# Patient Record
Sex: MALE | Race: BLACK OR AFRICAN AMERICAN | NOT HISPANIC OR LATINO | Employment: OTHER | URBAN - METROPOLITAN AREA
[De-identification: names, ages, dates, MRNs, and addresses within clinical notes are randomized per-mention and may not be internally consistent; named-entity substitution may affect disease eponyms.]

---

## 2017-01-02 ENCOUNTER — COMMUNICATION - HEALTHEAST (OUTPATIENT)
Dept: INTERNAL MEDICINE | Facility: CLINIC | Age: 67
End: 2017-01-02

## 2017-01-10 ENCOUNTER — COMMUNICATION - HEALTHEAST (OUTPATIENT)
Dept: INTERNAL MEDICINE | Facility: CLINIC | Age: 67
End: 2017-01-10

## 2017-01-10 ENCOUNTER — COMMUNICATION - HEALTHEAST (OUTPATIENT)
Dept: SCHEDULING | Facility: CLINIC | Age: 67
End: 2017-01-10

## 2017-01-11 ENCOUNTER — COMMUNICATION - HEALTHEAST (OUTPATIENT)
Dept: SCHEDULING | Facility: CLINIC | Age: 67
End: 2017-01-11

## 2017-01-20 ENCOUNTER — COMMUNICATION - HEALTHEAST (OUTPATIENT)
Dept: INTERNAL MEDICINE | Facility: CLINIC | Age: 67
End: 2017-01-20

## 2017-01-25 ENCOUNTER — COMMUNICATION - HEALTHEAST (OUTPATIENT)
Dept: INTERNAL MEDICINE | Facility: CLINIC | Age: 67
End: 2017-01-25

## 2017-01-26 ENCOUNTER — COMMUNICATION - HEALTHEAST (OUTPATIENT)
Dept: INTERNAL MEDICINE | Facility: CLINIC | Age: 67
End: 2017-01-26

## 2017-01-27 ENCOUNTER — COMMUNICATION - HEALTHEAST (OUTPATIENT)
Dept: INTERNAL MEDICINE | Facility: CLINIC | Age: 67
End: 2017-01-27

## 2017-02-06 ENCOUNTER — OFFICE VISIT - HEALTHEAST (OUTPATIENT)
Dept: INTERNAL MEDICINE | Facility: CLINIC | Age: 67
End: 2017-02-06

## 2017-02-06 DIAGNOSIS — M48.02 STENOSIS OF CERVICAL SPINE WITH MYELOPATHY (H): ICD-10-CM

## 2017-02-06 DIAGNOSIS — J44.9 COPD (CHRONIC OBSTRUCTIVE PULMONARY DISEASE) (H): ICD-10-CM

## 2017-02-06 DIAGNOSIS — I25.10 ASHD (ARTERIOSCLEROTIC HEART DISEASE): ICD-10-CM

## 2017-02-06 DIAGNOSIS — G99.2 STENOSIS OF CERVICAL SPINE WITH MYELOPATHY (H): ICD-10-CM

## 2017-02-08 ENCOUNTER — COMMUNICATION - HEALTHEAST (OUTPATIENT)
Dept: INTERNAL MEDICINE | Facility: CLINIC | Age: 67
End: 2017-02-08

## 2017-02-09 ENCOUNTER — COMMUNICATION - HEALTHEAST (OUTPATIENT)
Dept: INTERNAL MEDICINE | Facility: CLINIC | Age: 67
End: 2017-02-09

## 2017-02-09 DIAGNOSIS — J44.9 COPD (CHRONIC OBSTRUCTIVE PULMONARY DISEASE) (H): ICD-10-CM

## 2017-02-15 ENCOUNTER — COMMUNICATION - HEALTHEAST (OUTPATIENT)
Dept: INTERNAL MEDICINE | Facility: CLINIC | Age: 67
End: 2017-02-15

## 2017-02-17 ENCOUNTER — COMMUNICATION - HEALTHEAST (OUTPATIENT)
Dept: INTERNAL MEDICINE | Facility: CLINIC | Age: 67
End: 2017-02-17

## 2017-02-19 ENCOUNTER — COMMUNICATION - HEALTHEAST (OUTPATIENT)
Dept: INTERNAL MEDICINE | Facility: CLINIC | Age: 67
End: 2017-02-19

## 2017-02-24 ENCOUNTER — AMBULATORY - HEALTHEAST (OUTPATIENT)
Dept: NEUROSURGERY | Facility: CLINIC | Age: 67
End: 2017-02-24

## 2017-02-27 ENCOUNTER — COMMUNICATION - HEALTHEAST (OUTPATIENT)
Dept: INTERNAL MEDICINE | Facility: CLINIC | Age: 67
End: 2017-02-27

## 2017-03-09 ENCOUNTER — COMMUNICATION - HEALTHEAST (OUTPATIENT)
Dept: INTERNAL MEDICINE | Facility: CLINIC | Age: 67
End: 2017-03-09

## 2017-03-10 ENCOUNTER — COMMUNICATION - HEALTHEAST (OUTPATIENT)
Dept: INTERNAL MEDICINE | Facility: CLINIC | Age: 67
End: 2017-03-10

## 2017-03-15 ENCOUNTER — OFFICE VISIT - HEALTHEAST (OUTPATIENT)
Dept: INTERNAL MEDICINE | Facility: CLINIC | Age: 67
End: 2017-03-15

## 2017-03-15 DIAGNOSIS — M48.062 LUMBAR STENOSIS WITH NEUROGENIC CLAUDICATION: ICD-10-CM

## 2017-03-15 DIAGNOSIS — M48.02 STENOSIS OF CERVICAL SPINE WITH MYELOPATHY (H): ICD-10-CM

## 2017-03-15 DIAGNOSIS — G99.2 STENOSIS OF CERVICAL SPINE WITH MYELOPATHY (H): ICD-10-CM

## 2017-03-15 DIAGNOSIS — E11.9 DIABETES MELLITUS, TYPE 2 (H): ICD-10-CM

## 2017-03-16 ENCOUNTER — COMMUNICATION - HEALTHEAST (OUTPATIENT)
Dept: INTERNAL MEDICINE | Facility: CLINIC | Age: 67
End: 2017-03-16

## 2017-03-27 ENCOUNTER — COMMUNICATION - HEALTHEAST (OUTPATIENT)
Dept: INTERNAL MEDICINE | Facility: CLINIC | Age: 67
End: 2017-03-27

## 2017-04-02 ENCOUNTER — RECORDS - HEALTHEAST (OUTPATIENT)
Dept: ADMINISTRATIVE | Facility: OTHER | Age: 67
End: 2017-04-02

## 2017-04-03 ENCOUNTER — RECORDS - HEALTHEAST (OUTPATIENT)
Dept: ADMINISTRATIVE | Facility: OTHER | Age: 67
End: 2017-04-03

## 2017-04-05 ENCOUNTER — COMMUNICATION - HEALTHEAST (OUTPATIENT)
Dept: INTERNAL MEDICINE | Facility: CLINIC | Age: 67
End: 2017-04-05

## 2017-04-05 ENCOUNTER — RECORDS - HEALTHEAST (OUTPATIENT)
Dept: ADMINISTRATIVE | Facility: OTHER | Age: 67
End: 2017-04-05

## 2017-04-06 ENCOUNTER — RECORDS - HEALTHEAST (OUTPATIENT)
Dept: ADMINISTRATIVE | Facility: OTHER | Age: 67
End: 2017-04-06

## 2017-04-07 ENCOUNTER — RECORDS - HEALTHEAST (OUTPATIENT)
Dept: ADMINISTRATIVE | Facility: OTHER | Age: 67
End: 2017-04-07

## 2017-04-10 ENCOUNTER — OFFICE VISIT - HEALTHEAST (OUTPATIENT)
Dept: INTERNAL MEDICINE | Facility: CLINIC | Age: 67
End: 2017-04-10

## 2017-04-10 DIAGNOSIS — M48.062 LUMBAR STENOSIS WITH NEUROGENIC CLAUDICATION: ICD-10-CM

## 2017-04-10 DIAGNOSIS — J44.9 CHRONIC OBSTRUCTIVE PULMONARY DISEASE, UNSPECIFIED COPD TYPE (H): ICD-10-CM

## 2017-04-10 DIAGNOSIS — E11.9 DIABETES MELLITUS, TYPE 2 (H): ICD-10-CM

## 2017-04-10 DIAGNOSIS — J10.1 INFLUENZA B: ICD-10-CM

## 2017-04-10 ASSESSMENT — MIFFLIN-ST. JEOR: SCORE: 1553.25

## 2017-04-19 ENCOUNTER — COMMUNICATION - HEALTHEAST (OUTPATIENT)
Dept: INTERNAL MEDICINE | Facility: CLINIC | Age: 67
End: 2017-04-19

## 2017-04-25 ENCOUNTER — COMMUNICATION - HEALTHEAST (OUTPATIENT)
Dept: SCHEDULING | Facility: CLINIC | Age: 67
End: 2017-04-25

## 2017-04-26 ENCOUNTER — COMMUNICATION - HEALTHEAST (OUTPATIENT)
Dept: INTERNAL MEDICINE | Facility: CLINIC | Age: 67
End: 2017-04-26

## 2017-04-26 ENCOUNTER — AMBULATORY - HEALTHEAST (OUTPATIENT)
Dept: INTERNAL MEDICINE | Facility: CLINIC | Age: 67
End: 2017-04-26

## 2017-04-27 ENCOUNTER — COMMUNICATION - HEALTHEAST (OUTPATIENT)
Dept: INTERNAL MEDICINE | Facility: CLINIC | Age: 67
End: 2017-04-27

## 2017-04-30 ENCOUNTER — COMMUNICATION - HEALTHEAST (OUTPATIENT)
Dept: INTERNAL MEDICINE | Facility: CLINIC | Age: 67
End: 2017-04-30

## 2017-05-01 ENCOUNTER — RECORDS - HEALTHEAST (OUTPATIENT)
Dept: ADMINISTRATIVE | Facility: OTHER | Age: 67
End: 2017-05-01

## 2017-05-02 ENCOUNTER — OFFICE VISIT - HEALTHEAST (OUTPATIENT)
Dept: INTERNAL MEDICINE | Facility: CLINIC | Age: 67
End: 2017-05-02

## 2017-05-02 DIAGNOSIS — M48.02 STENOSIS OF CERVICAL SPINE WITH MYELOPATHY (H): ICD-10-CM

## 2017-05-02 DIAGNOSIS — G99.2 STENOSIS OF CERVICAL SPINE WITH MYELOPATHY (H): ICD-10-CM

## 2017-05-02 DIAGNOSIS — E11.9 DIABETES MELLITUS, TYPE 2 (H): ICD-10-CM

## 2017-05-02 DIAGNOSIS — J44.9 CHRONIC OBSTRUCTIVE PULMONARY DISEASE, UNSPECIFIED COPD TYPE (H): ICD-10-CM

## 2017-05-02 RX ORDER — DOCUSATE SODIUM 100 MG/1
100 CAPSULE, LIQUID FILLED ORAL PRN
Status: SHIPPED | COMMUNITY
Start: 2017-05-02

## 2017-05-08 ENCOUNTER — COMMUNICATION - HEALTHEAST (OUTPATIENT)
Dept: INTERNAL MEDICINE | Facility: CLINIC | Age: 67
End: 2017-05-08

## 2017-05-18 ENCOUNTER — COMMUNICATION - HEALTHEAST (OUTPATIENT)
Dept: INTERNAL MEDICINE | Facility: CLINIC | Age: 67
End: 2017-05-18

## 2017-05-19 ENCOUNTER — COMMUNICATION - HEALTHEAST (OUTPATIENT)
Dept: INTERNAL MEDICINE | Facility: CLINIC | Age: 67
End: 2017-05-19

## 2017-05-21 ENCOUNTER — RECORDS - HEALTHEAST (OUTPATIENT)
Dept: ADMINISTRATIVE | Facility: OTHER | Age: 67
End: 2017-05-21

## 2017-05-22 ENCOUNTER — OFFICE VISIT - HEALTHEAST (OUTPATIENT)
Dept: INTERNAL MEDICINE | Facility: CLINIC | Age: 67
End: 2017-05-22

## 2017-05-22 ENCOUNTER — COMMUNICATION - HEALTHEAST (OUTPATIENT)
Dept: INTERNAL MEDICINE | Facility: CLINIC | Age: 67
End: 2017-05-22

## 2017-05-22 DIAGNOSIS — B02.29 POSTHERPETIC NEURALGIA: ICD-10-CM

## 2017-05-22 DIAGNOSIS — J44.9 CHRONIC OBSTRUCTIVE PULMONARY DISEASE, UNSPECIFIED COPD TYPE (H): ICD-10-CM

## 2017-05-22 DIAGNOSIS — M48.062 LUMBAR STENOSIS WITH NEUROGENIC CLAUDICATION: ICD-10-CM

## 2017-05-22 DIAGNOSIS — L98.2 SWEET SYNDROME: ICD-10-CM

## 2017-05-27 ENCOUNTER — COMMUNICATION - HEALTHEAST (OUTPATIENT)
Dept: INTERNAL MEDICINE | Facility: CLINIC | Age: 67
End: 2017-05-27

## 2017-06-05 ENCOUNTER — COMMUNICATION - HEALTHEAST (OUTPATIENT)
Dept: INTERNAL MEDICINE | Facility: CLINIC | Age: 67
End: 2017-06-05

## 2017-06-07 ENCOUNTER — COMMUNICATION - HEALTHEAST (OUTPATIENT)
Dept: INTERNAL MEDICINE | Facility: CLINIC | Age: 67
End: 2017-06-07

## 2017-06-09 ENCOUNTER — COMMUNICATION - HEALTHEAST (OUTPATIENT)
Dept: INTERNAL MEDICINE | Facility: CLINIC | Age: 67
End: 2017-06-09

## 2017-06-11 ENCOUNTER — COMMUNICATION - HEALTHEAST (OUTPATIENT)
Dept: INTERNAL MEDICINE | Facility: CLINIC | Age: 67
End: 2017-06-11

## 2017-06-12 ENCOUNTER — RECORDS - HEALTHEAST (OUTPATIENT)
Dept: ADMINISTRATIVE | Facility: OTHER | Age: 67
End: 2017-06-12

## 2017-06-13 ENCOUNTER — RECORDS - HEALTHEAST (OUTPATIENT)
Dept: ADMINISTRATIVE | Facility: OTHER | Age: 67
End: 2017-06-13

## 2017-06-13 ENCOUNTER — COMMUNICATION - HEALTHEAST (OUTPATIENT)
Dept: INTERNAL MEDICINE | Facility: CLINIC | Age: 67
End: 2017-06-13

## 2017-06-15 ENCOUNTER — COMMUNICATION - HEALTHEAST (OUTPATIENT)
Dept: INTERNAL MEDICINE | Facility: CLINIC | Age: 67
End: 2017-06-15

## 2017-06-15 ENCOUNTER — RECORDS - HEALTHEAST (OUTPATIENT)
Dept: ADMINISTRATIVE | Facility: OTHER | Age: 67
End: 2017-06-15

## 2017-06-19 ENCOUNTER — COMMUNICATION - HEALTHEAST (OUTPATIENT)
Dept: INTERNAL MEDICINE | Facility: CLINIC | Age: 67
End: 2017-06-19

## 2017-06-19 ENCOUNTER — OFFICE VISIT - HEALTHEAST (OUTPATIENT)
Dept: INTERNAL MEDICINE | Facility: CLINIC | Age: 67
End: 2017-06-19

## 2017-06-19 DIAGNOSIS — I25.10 ASHD (ARTERIOSCLEROTIC HEART DISEASE): ICD-10-CM

## 2017-06-19 DIAGNOSIS — Z23 NEED FOR VACCINATION: ICD-10-CM

## 2017-06-19 DIAGNOSIS — J44.9 CHRONIC OBSTRUCTIVE PULMONARY DISEASE, UNSPECIFIED COPD TYPE (H): ICD-10-CM

## 2017-06-19 DIAGNOSIS — L98.2 SWEET SYNDROME: ICD-10-CM

## 2017-06-19 DIAGNOSIS — M48.062 LUMBAR STENOSIS WITH NEUROGENIC CLAUDICATION: ICD-10-CM

## 2017-06-20 ENCOUNTER — COMMUNICATION - HEALTHEAST (OUTPATIENT)
Dept: INTERNAL MEDICINE | Facility: CLINIC | Age: 67
End: 2017-06-20

## 2017-07-07 ENCOUNTER — RECORDS - HEALTHEAST (OUTPATIENT)
Dept: ADMINISTRATIVE | Facility: OTHER | Age: 67
End: 2017-07-07

## 2017-07-11 ENCOUNTER — COMMUNICATION - HEALTHEAST (OUTPATIENT)
Dept: INTERNAL MEDICINE | Facility: CLINIC | Age: 67
End: 2017-07-11

## 2017-07-12 ENCOUNTER — COMMUNICATION - HEALTHEAST (OUTPATIENT)
Dept: INTERNAL MEDICINE | Facility: CLINIC | Age: 67
End: 2017-07-12

## 2017-07-13 ENCOUNTER — COMMUNICATION - HEALTHEAST (OUTPATIENT)
Dept: INTERNAL MEDICINE | Facility: CLINIC | Age: 67
End: 2017-07-13

## 2017-07-20 ENCOUNTER — COMMUNICATION - HEALTHEAST (OUTPATIENT)
Dept: INTERNAL MEDICINE | Facility: CLINIC | Age: 67
End: 2017-07-20

## 2017-07-20 ENCOUNTER — COMMUNICATION - HEALTHEAST (OUTPATIENT)
Dept: SCHEDULING | Facility: CLINIC | Age: 67
End: 2017-07-20

## 2017-08-01 ENCOUNTER — COMMUNICATION - HEALTHEAST (OUTPATIENT)
Dept: INTERNAL MEDICINE | Facility: CLINIC | Age: 67
End: 2017-08-01

## 2017-08-01 DIAGNOSIS — M48.062 LUMBAR STENOSIS WITH NEUROGENIC CLAUDICATION: ICD-10-CM

## 2017-08-02 ENCOUNTER — COMMUNICATION - HEALTHEAST (OUTPATIENT)
Dept: INTERNAL MEDICINE | Facility: CLINIC | Age: 67
End: 2017-08-02

## 2017-08-02 DIAGNOSIS — M48.062 LUMBAR STENOSIS WITH NEUROGENIC CLAUDICATION: ICD-10-CM

## 2017-08-04 ENCOUNTER — COMMUNICATION - HEALTHEAST (OUTPATIENT)
Dept: INTERNAL MEDICINE | Facility: CLINIC | Age: 67
End: 2017-08-04

## 2017-08-07 ENCOUNTER — COMMUNICATION - HEALTHEAST (OUTPATIENT)
Dept: INTERNAL MEDICINE | Facility: CLINIC | Age: 67
End: 2017-08-07

## 2017-08-16 ENCOUNTER — OFFICE VISIT - HEALTHEAST (OUTPATIENT)
Dept: INTERNAL MEDICINE | Facility: CLINIC | Age: 67
End: 2017-08-16

## 2017-08-16 ENCOUNTER — COMMUNICATION - HEALTHEAST (OUTPATIENT)
Dept: INTERNAL MEDICINE | Facility: CLINIC | Age: 67
End: 2017-08-16

## 2017-08-16 DIAGNOSIS — G99.2 STENOSIS OF CERVICAL SPINE WITH MYELOPATHY (H): ICD-10-CM

## 2017-08-16 DIAGNOSIS — M48.02 STENOSIS OF CERVICAL SPINE WITH MYELOPATHY (H): ICD-10-CM

## 2017-08-16 DIAGNOSIS — J44.9 CHRONIC OBSTRUCTIVE PULMONARY DISEASE, UNSPECIFIED COPD TYPE (H): ICD-10-CM

## 2017-08-16 DIAGNOSIS — E11.9 DIABETES MELLITUS, TYPE 2 (H): ICD-10-CM

## 2017-08-16 DIAGNOSIS — M48.062 LUMBAR STENOSIS WITH NEUROGENIC CLAUDICATION: ICD-10-CM

## 2017-08-17 ENCOUNTER — RECORDS - HEALTHEAST (OUTPATIENT)
Dept: ADMINISTRATIVE | Facility: OTHER | Age: 67
End: 2017-08-17

## 2017-08-18 ENCOUNTER — COMMUNICATION - HEALTHEAST (OUTPATIENT)
Dept: INTERNAL MEDICINE | Facility: CLINIC | Age: 67
End: 2017-08-18

## 2017-08-18 DIAGNOSIS — I25.10 ASHD (ARTERIOSCLEROTIC HEART DISEASE): ICD-10-CM

## 2017-08-19 ENCOUNTER — RECORDS - HEALTHEAST (OUTPATIENT)
Dept: ADMINISTRATIVE | Facility: OTHER | Age: 67
End: 2017-08-19

## 2017-08-21 ENCOUNTER — COMMUNICATION - HEALTHEAST (OUTPATIENT)
Dept: INTERNAL MEDICINE | Facility: CLINIC | Age: 67
End: 2017-08-21

## 2017-08-22 ENCOUNTER — COMMUNICATION - HEALTHEAST (OUTPATIENT)
Dept: INTERNAL MEDICINE | Facility: CLINIC | Age: 67
End: 2017-08-22

## 2017-08-22 DIAGNOSIS — J44.9 COPD (CHRONIC OBSTRUCTIVE PULMONARY DISEASE) (H): ICD-10-CM

## 2017-08-23 ENCOUNTER — COMMUNICATION - HEALTHEAST (OUTPATIENT)
Dept: INTERNAL MEDICINE | Facility: CLINIC | Age: 67
End: 2017-08-23

## 2017-08-25 ENCOUNTER — COMMUNICATION - HEALTHEAST (OUTPATIENT)
Dept: INTERNAL MEDICINE | Facility: CLINIC | Age: 67
End: 2017-08-25

## 2017-09-05 ENCOUNTER — COMMUNICATION - HEALTHEAST (OUTPATIENT)
Dept: SCHEDULING | Facility: CLINIC | Age: 67
End: 2017-09-05

## 2017-09-06 ENCOUNTER — COMMUNICATION - HEALTHEAST (OUTPATIENT)
Dept: INTERNAL MEDICINE | Facility: CLINIC | Age: 67
End: 2017-09-06

## 2017-09-06 DIAGNOSIS — I50.9 EDEMA DUE TO CONGESTIVE HEART FAILURE (H): ICD-10-CM

## 2017-09-06 DIAGNOSIS — I50.9 CHF (CONGESTIVE HEART FAILURE) (H): ICD-10-CM

## 2017-09-08 ENCOUNTER — COMMUNICATION - HEALTHEAST (OUTPATIENT)
Dept: INTERNAL MEDICINE | Facility: CLINIC | Age: 67
End: 2017-09-08

## 2017-09-08 ENCOUNTER — RECORDS - HEALTHEAST (OUTPATIENT)
Dept: ADMINISTRATIVE | Facility: OTHER | Age: 67
End: 2017-09-08

## 2017-09-20 ENCOUNTER — COMMUNICATION - HEALTHEAST (OUTPATIENT)
Dept: INTERNAL MEDICINE | Facility: CLINIC | Age: 67
End: 2017-09-20

## 2017-09-20 DIAGNOSIS — L29.9 PRURITUS: ICD-10-CM

## 2017-09-21 ENCOUNTER — OFFICE VISIT - HEALTHEAST (OUTPATIENT)
Dept: INTERNAL MEDICINE | Facility: CLINIC | Age: 67
End: 2017-09-21

## 2017-09-21 DIAGNOSIS — L29.9 PRURITUS: ICD-10-CM

## 2017-09-21 DIAGNOSIS — J44.9 CHRONIC OBSTRUCTIVE PULMONARY DISEASE, UNSPECIFIED COPD TYPE (H): ICD-10-CM

## 2017-09-21 DIAGNOSIS — Z00.00 HEALTH CARE MAINTENANCE: ICD-10-CM

## 2017-09-21 DIAGNOSIS — L89.92: ICD-10-CM

## 2017-09-21 DIAGNOSIS — I25.10 ASHD (ARTERIOSCLEROTIC HEART DISEASE): ICD-10-CM

## 2017-09-21 DIAGNOSIS — M48.02 STENOSIS OF CERVICAL SPINE WITH MYELOPATHY (H): ICD-10-CM

## 2017-09-21 DIAGNOSIS — G99.2 STENOSIS OF CERVICAL SPINE WITH MYELOPATHY (H): ICD-10-CM

## 2017-09-26 ENCOUNTER — COMMUNICATION - HEALTHEAST (OUTPATIENT)
Dept: INTERNAL MEDICINE | Facility: CLINIC | Age: 67
End: 2017-09-26

## 2017-09-27 ENCOUNTER — COMMUNICATION - HEALTHEAST (OUTPATIENT)
Dept: INTERNAL MEDICINE | Facility: CLINIC | Age: 67
End: 2017-09-27

## 2017-09-27 DIAGNOSIS — J44.9 COPD (CHRONIC OBSTRUCTIVE PULMONARY DISEASE) (H): ICD-10-CM

## 2017-10-02 ENCOUNTER — COMMUNICATION - HEALTHEAST (OUTPATIENT)
Dept: INTERNAL MEDICINE | Facility: CLINIC | Age: 67
End: 2017-10-02

## 2017-10-02 DIAGNOSIS — M48.062 LUMBAR STENOSIS WITH NEUROGENIC CLAUDICATION: ICD-10-CM

## 2017-10-04 ENCOUNTER — COMMUNICATION - HEALTHEAST (OUTPATIENT)
Dept: INTERNAL MEDICINE | Facility: CLINIC | Age: 67
End: 2017-10-04

## 2017-10-08 ENCOUNTER — COMMUNICATION - HEALTHEAST (OUTPATIENT)
Dept: INTERNAL MEDICINE | Facility: CLINIC | Age: 67
End: 2017-10-08

## 2017-10-10 ENCOUNTER — COMMUNICATION - HEALTHEAST (OUTPATIENT)
Dept: INTERNAL MEDICINE | Facility: CLINIC | Age: 67
End: 2017-10-10

## 2017-10-16 ENCOUNTER — COMMUNICATION - HEALTHEAST (OUTPATIENT)
Dept: INTERNAL MEDICINE | Facility: CLINIC | Age: 67
End: 2017-10-16

## 2017-10-16 DIAGNOSIS — J44.9 CHRONIC OBSTRUCTIVE PULMONARY DISEASE, UNSPECIFIED COPD TYPE (H): ICD-10-CM

## 2017-10-17 ENCOUNTER — COMMUNICATION - HEALTHEAST (OUTPATIENT)
Dept: INTERNAL MEDICINE | Facility: CLINIC | Age: 67
End: 2017-10-17

## 2017-10-17 DIAGNOSIS — M48.062 LUMBAR STENOSIS WITH NEUROGENIC CLAUDICATION: ICD-10-CM

## 2017-10-24 ENCOUNTER — COMMUNICATION - HEALTHEAST (OUTPATIENT)
Dept: INTERNAL MEDICINE | Facility: CLINIC | Age: 67
End: 2017-10-24

## 2017-10-24 ENCOUNTER — OFFICE VISIT - HEALTHEAST (OUTPATIENT)
Dept: INTERNAL MEDICINE | Facility: CLINIC | Age: 67
End: 2017-10-24

## 2017-10-24 ENCOUNTER — COMMUNICATION - HEALTHEAST (OUTPATIENT)
Dept: SCHEDULING | Facility: CLINIC | Age: 67
End: 2017-10-24

## 2017-10-24 DIAGNOSIS — M48.02 STENOSIS OF CERVICAL SPINE WITH MYELOPATHY (H): ICD-10-CM

## 2017-10-24 DIAGNOSIS — J44.1 CHRONIC OBSTRUCTIVE PULMONARY DISEASE WITH ACUTE EXACERBATION (H): ICD-10-CM

## 2017-10-24 DIAGNOSIS — L98.2 SWEET SYNDROME: ICD-10-CM

## 2017-10-24 DIAGNOSIS — M48.062 LUMBAR STENOSIS WITH NEUROGENIC CLAUDICATION: ICD-10-CM

## 2017-10-24 DIAGNOSIS — G99.2 STENOSIS OF CERVICAL SPINE WITH MYELOPATHY (H): ICD-10-CM

## 2017-10-26 ENCOUNTER — COMMUNICATION - HEALTHEAST (OUTPATIENT)
Dept: INTERNAL MEDICINE | Facility: CLINIC | Age: 67
End: 2017-10-26

## 2017-10-26 DIAGNOSIS — M48.062 LUMBAR STENOSIS WITH NEUROGENIC CLAUDICATION: ICD-10-CM

## 2017-10-30 ENCOUNTER — COMMUNICATION - HEALTHEAST (OUTPATIENT)
Dept: INTERNAL MEDICINE | Facility: CLINIC | Age: 67
End: 2017-10-30

## 2017-10-30 DIAGNOSIS — J44.9 COPD (CHRONIC OBSTRUCTIVE PULMONARY DISEASE) (H): ICD-10-CM

## 2017-10-31 ENCOUNTER — COMMUNICATION - HEALTHEAST (OUTPATIENT)
Dept: INTERNAL MEDICINE | Facility: CLINIC | Age: 67
End: 2017-10-31

## 2017-10-31 DIAGNOSIS — M48.062 LUMBAR STENOSIS WITH NEUROGENIC CLAUDICATION: ICD-10-CM

## 2017-11-01 ENCOUNTER — COMMUNICATION - HEALTHEAST (OUTPATIENT)
Dept: INTERNAL MEDICINE | Facility: CLINIC | Age: 67
End: 2017-11-01

## 2017-11-01 DIAGNOSIS — Z00.00 HEALTHCARE MAINTENANCE: ICD-10-CM

## 2017-11-07 ENCOUNTER — COMMUNICATION - HEALTHEAST (OUTPATIENT)
Dept: INTERNAL MEDICINE | Facility: CLINIC | Age: 67
End: 2017-11-07

## 2017-11-20 ENCOUNTER — COMMUNICATION - HEALTHEAST (OUTPATIENT)
Dept: INTERNAL MEDICINE | Facility: CLINIC | Age: 67
End: 2017-11-20

## 2017-11-20 DIAGNOSIS — M48.062 LUMBAR STENOSIS WITH NEUROGENIC CLAUDICATION: ICD-10-CM

## 2017-12-04 ENCOUNTER — COMMUNICATION - HEALTHEAST (OUTPATIENT)
Dept: INTERNAL MEDICINE | Facility: CLINIC | Age: 67
End: 2017-12-04

## 2017-12-04 DIAGNOSIS — I50.9 EDEMA DUE TO CONGESTIVE HEART FAILURE (H): ICD-10-CM

## 2017-12-05 ENCOUNTER — COMMUNICATION - HEALTHEAST (OUTPATIENT)
Dept: INTERNAL MEDICINE | Facility: CLINIC | Age: 67
End: 2017-12-05

## 2017-12-05 DIAGNOSIS — J44.9 COPD (CHRONIC OBSTRUCTIVE PULMONARY DISEASE) (H): ICD-10-CM

## 2017-12-07 ENCOUNTER — COMMUNICATION - HEALTHEAST (OUTPATIENT)
Dept: INTERNAL MEDICINE | Facility: CLINIC | Age: 67
End: 2017-12-07

## 2017-12-07 DIAGNOSIS — J44.9 COPD (CHRONIC OBSTRUCTIVE PULMONARY DISEASE) (H): ICD-10-CM

## 2017-12-11 ENCOUNTER — RECORDS - HEALTHEAST (OUTPATIENT)
Dept: ADMINISTRATIVE | Facility: OTHER | Age: 67
End: 2017-12-11

## 2017-12-12 ENCOUNTER — RECORDS - HEALTHEAST (OUTPATIENT)
Dept: ADMINISTRATIVE | Facility: OTHER | Age: 67
End: 2017-12-12

## 2017-12-16 ENCOUNTER — COMMUNICATION - HEALTHEAST (OUTPATIENT)
Dept: INTERNAL MEDICINE | Facility: CLINIC | Age: 67
End: 2017-12-16

## 2017-12-16 DIAGNOSIS — J44.9 COPD (CHRONIC OBSTRUCTIVE PULMONARY DISEASE) (H): ICD-10-CM

## 2017-12-18 ENCOUNTER — COMMUNICATION - HEALTHEAST (OUTPATIENT)
Dept: INTERNAL MEDICINE | Facility: CLINIC | Age: 67
End: 2017-12-18

## 2017-12-18 DIAGNOSIS — M48.062 LUMBAR STENOSIS WITH NEUROGENIC CLAUDICATION: ICD-10-CM

## 2017-12-27 ENCOUNTER — COMMUNICATION - HEALTHEAST (OUTPATIENT)
Dept: INTERNAL MEDICINE | Facility: CLINIC | Age: 67
End: 2017-12-27

## 2017-12-27 ENCOUNTER — OFFICE VISIT - HEALTHEAST (OUTPATIENT)
Dept: INTERNAL MEDICINE | Facility: CLINIC | Age: 67
End: 2017-12-27

## 2017-12-27 DIAGNOSIS — L98.2 SWEET SYNDROME: ICD-10-CM

## 2017-12-27 DIAGNOSIS — J44.1 CHRONIC OBSTRUCTIVE PULMONARY DISEASE WITH ACUTE EXACERBATION (H): ICD-10-CM

## 2017-12-27 DIAGNOSIS — E11.9 DIABETES MELLITUS, TYPE 2 (H): ICD-10-CM

## 2017-12-27 DIAGNOSIS — K59.00 CONSTIPATION: ICD-10-CM

## 2017-12-27 DIAGNOSIS — G99.2 STENOSIS OF CERVICAL SPINE WITH MYELOPATHY (H): ICD-10-CM

## 2017-12-27 DIAGNOSIS — M54.2 NECK PAIN OF OVER 3 MONTHS DURATION: ICD-10-CM

## 2017-12-27 DIAGNOSIS — M48.02 STENOSIS OF CERVICAL SPINE WITH MYELOPATHY (H): ICD-10-CM

## 2017-12-27 ASSESSMENT — MIFFLIN-ST. JEOR: SCORE: 1469.33

## 2018-01-04 ENCOUNTER — COMMUNICATION - HEALTHEAST (OUTPATIENT)
Dept: INTERNAL MEDICINE | Facility: CLINIC | Age: 68
End: 2018-01-04

## 2018-01-04 DIAGNOSIS — E11.9 DIABETES MELLITUS, TYPE 2 (H): ICD-10-CM

## 2018-01-06 ENCOUNTER — COMMUNICATION - HEALTHEAST (OUTPATIENT)
Dept: INTERNAL MEDICINE | Facility: CLINIC | Age: 68
End: 2018-01-06

## 2018-01-08 ENCOUNTER — COMMUNICATION - HEALTHEAST (OUTPATIENT)
Dept: INTERNAL MEDICINE | Facility: CLINIC | Age: 68
End: 2018-01-08

## 2018-01-08 ENCOUNTER — RECORDS - HEALTHEAST (OUTPATIENT)
Dept: ADMINISTRATIVE | Facility: OTHER | Age: 68
End: 2018-01-08

## 2018-01-08 DIAGNOSIS — J44.9 COPD (CHRONIC OBSTRUCTIVE PULMONARY DISEASE) (H): ICD-10-CM

## 2018-01-09 ENCOUNTER — COMMUNICATION - HEALTHEAST (OUTPATIENT)
Dept: INTERNAL MEDICINE | Facility: CLINIC | Age: 68
End: 2018-01-09

## 2018-01-09 DIAGNOSIS — E11.9 DM (DIABETES MELLITUS) (H): ICD-10-CM

## 2018-01-11 ENCOUNTER — COMMUNICATION - HEALTHEAST (OUTPATIENT)
Dept: INTERNAL MEDICINE | Facility: CLINIC | Age: 68
End: 2018-01-11

## 2018-01-11 ENCOUNTER — COMMUNICATION - HEALTHEAST (OUTPATIENT)
Dept: SCHEDULING | Facility: CLINIC | Age: 68
End: 2018-01-11

## 2018-01-11 DIAGNOSIS — M48.062 LUMBAR STENOSIS WITH NEUROGENIC CLAUDICATION: ICD-10-CM

## 2018-01-16 ENCOUNTER — COMMUNICATION - HEALTHEAST (OUTPATIENT)
Dept: INTERNAL MEDICINE | Facility: CLINIC | Age: 68
End: 2018-01-16

## 2018-01-16 DIAGNOSIS — J44.9 COPD (CHRONIC OBSTRUCTIVE PULMONARY DISEASE) (H): ICD-10-CM

## 2018-01-19 ENCOUNTER — HOME CARE/HOSPICE - HEALTHEAST (OUTPATIENT)
Dept: HOME HEALTH SERVICES | Facility: HOME HEALTH | Age: 68
End: 2018-01-19

## 2018-01-20 ENCOUNTER — COMMUNICATION - HEALTHEAST (OUTPATIENT)
Dept: INTERNAL MEDICINE | Facility: CLINIC | Age: 68
End: 2018-01-20

## 2018-01-20 DIAGNOSIS — M48.062 LUMBAR STENOSIS WITH NEUROGENIC CLAUDICATION: ICD-10-CM

## 2018-01-21 ENCOUNTER — COMMUNICATION - HEALTHEAST (OUTPATIENT)
Dept: HOME HEALTH SERVICES | Facility: HOME HEALTH | Age: 68
End: 2018-01-21

## 2018-01-22 ENCOUNTER — COMMUNICATION - HEALTHEAST (OUTPATIENT)
Dept: INTERNAL MEDICINE | Facility: CLINIC | Age: 68
End: 2018-01-22

## 2018-01-24 ENCOUNTER — RECORDS - HEALTHEAST (OUTPATIENT)
Dept: ADMINISTRATIVE | Facility: OTHER | Age: 68
End: 2018-01-24

## 2018-01-31 ENCOUNTER — COMMUNICATION - HEALTHEAST (OUTPATIENT)
Dept: INTERNAL MEDICINE | Facility: CLINIC | Age: 68
End: 2018-01-31

## 2018-02-03 ENCOUNTER — COMMUNICATION - HEALTHEAST (OUTPATIENT)
Dept: INTERNAL MEDICINE | Facility: CLINIC | Age: 68
End: 2018-02-03

## 2018-02-03 DIAGNOSIS — M54.2 NECK PAIN: ICD-10-CM

## 2018-02-05 ENCOUNTER — COMMUNICATION - HEALTHEAST (OUTPATIENT)
Dept: INTERNAL MEDICINE | Facility: CLINIC | Age: 68
End: 2018-02-05

## 2018-02-05 DIAGNOSIS — M48.062 LUMBAR STENOSIS WITH NEUROGENIC CLAUDICATION: ICD-10-CM

## 2018-02-12 ENCOUNTER — COMMUNICATION - HEALTHEAST (OUTPATIENT)
Dept: INTERNAL MEDICINE | Facility: CLINIC | Age: 68
End: 2018-02-12

## 2018-02-12 ENCOUNTER — AMBULATORY - HEALTHEAST (OUTPATIENT)
Dept: NURSING | Facility: CLINIC | Age: 68
End: 2018-02-12

## 2018-02-12 DIAGNOSIS — Z71.89 COMPLEX CARE COORDINATION: ICD-10-CM

## 2018-02-13 ENCOUNTER — COMMUNICATION - HEALTHEAST (OUTPATIENT)
Dept: NURSING | Facility: CLINIC | Age: 68
End: 2018-02-13

## 2018-02-13 ENCOUNTER — COMMUNICATION - HEALTHEAST (OUTPATIENT)
Dept: INTERNAL MEDICINE | Facility: CLINIC | Age: 68
End: 2018-02-13

## 2018-02-14 ENCOUNTER — COMMUNICATION - HEALTHEAST (OUTPATIENT)
Dept: INTERNAL MEDICINE | Facility: CLINIC | Age: 68
End: 2018-02-14

## 2018-02-19 ENCOUNTER — RECORDS - HEALTHEAST (OUTPATIENT)
Dept: ADMINISTRATIVE | Facility: OTHER | Age: 68
End: 2018-02-19

## 2018-02-21 ENCOUNTER — AMBULATORY - HEALTHEAST (OUTPATIENT)
Dept: INTERNAL MEDICINE | Facility: CLINIC | Age: 68
End: 2018-02-21

## 2018-02-21 ENCOUNTER — COMMUNICATION - HEALTHEAST (OUTPATIENT)
Dept: INTERNAL MEDICINE | Facility: CLINIC | Age: 68
End: 2018-02-21

## 2018-02-21 DIAGNOSIS — J44.1 CHRONIC OBSTRUCTIVE PULMONARY DISEASE WITH ACUTE EXACERBATION (H): ICD-10-CM

## 2018-02-21 DIAGNOSIS — M48.062 LUMBAR STENOSIS WITH NEUROGENIC CLAUDICATION: ICD-10-CM

## 2018-03-04 ENCOUNTER — COMMUNICATION - HEALTHEAST (OUTPATIENT)
Dept: SCHEDULING | Facility: CLINIC | Age: 68
End: 2018-03-04

## 2018-03-06 ENCOUNTER — RECORDS - HEALTHEAST (OUTPATIENT)
Dept: ADMINISTRATIVE | Facility: OTHER | Age: 68
End: 2018-03-06

## 2018-03-07 ENCOUNTER — COMMUNICATION - HEALTHEAST (OUTPATIENT)
Dept: INTERNAL MEDICINE | Facility: CLINIC | Age: 68
End: 2018-03-07

## 2018-03-07 DIAGNOSIS — J44.1 CHRONIC OBSTRUCTIVE PULMONARY DISEASE WITH ACUTE EXACERBATION (H): ICD-10-CM

## 2018-03-16 ENCOUNTER — COMMUNICATION - HEALTHEAST (OUTPATIENT)
Dept: INTERNAL MEDICINE | Facility: CLINIC | Age: 68
End: 2018-03-16

## 2018-03-16 DIAGNOSIS — J44.9 COPD (CHRONIC OBSTRUCTIVE PULMONARY DISEASE) (H): ICD-10-CM

## 2018-03-21 ENCOUNTER — RECORDS - HEALTHEAST (OUTPATIENT)
Dept: ADMINISTRATIVE | Facility: OTHER | Age: 68
End: 2018-03-21

## 2018-03-21 ENCOUNTER — COMMUNICATION - HEALTHEAST (OUTPATIENT)
Dept: INTERNAL MEDICINE | Facility: CLINIC | Age: 68
End: 2018-03-21

## 2018-03-21 DIAGNOSIS — M48.062 LUMBAR STENOSIS WITH NEUROGENIC CLAUDICATION: ICD-10-CM

## 2018-03-26 ENCOUNTER — COMMUNICATION - HEALTHEAST (OUTPATIENT)
Dept: INTERNAL MEDICINE | Facility: CLINIC | Age: 68
End: 2018-03-26

## 2018-03-26 DIAGNOSIS — M48.062 LUMBAR STENOSIS WITH NEUROGENIC CLAUDICATION: ICD-10-CM

## 2018-04-02 ENCOUNTER — COMMUNICATION - HEALTHEAST (OUTPATIENT)
Dept: INTERNAL MEDICINE | Facility: CLINIC | Age: 68
End: 2018-04-02

## 2018-04-02 DIAGNOSIS — J44.9 COPD (CHRONIC OBSTRUCTIVE PULMONARY DISEASE) (H): ICD-10-CM

## 2018-04-05 ENCOUNTER — COMMUNICATION - HEALTHEAST (OUTPATIENT)
Dept: INTERNAL MEDICINE | Facility: CLINIC | Age: 68
End: 2018-04-05

## 2018-04-05 DIAGNOSIS — E11.65 TYPE 2 DIABETES MELLITUS WITH HYPERGLYCEMIA, WITHOUT LONG-TERM CURRENT USE OF INSULIN (H): ICD-10-CM

## 2018-04-11 ENCOUNTER — COMMUNICATION - HEALTHEAST (OUTPATIENT)
Dept: INTERNAL MEDICINE | Facility: CLINIC | Age: 68
End: 2018-04-11

## 2018-04-12 ENCOUNTER — COMMUNICATION - HEALTHEAST (OUTPATIENT)
Dept: NURSING | Facility: CLINIC | Age: 68
End: 2018-04-12

## 2018-04-18 ENCOUNTER — COMMUNICATION - HEALTHEAST (OUTPATIENT)
Dept: INTERNAL MEDICINE | Facility: CLINIC | Age: 68
End: 2018-04-18

## 2018-04-18 DIAGNOSIS — M48.062 LUMBAR STENOSIS WITH NEUROGENIC CLAUDICATION: ICD-10-CM

## 2018-05-01 ENCOUNTER — COMMUNICATION - HEALTHEAST (OUTPATIENT)
Dept: INTERNAL MEDICINE | Facility: CLINIC | Age: 68
End: 2018-05-01

## 2018-05-01 DIAGNOSIS — D50.9 IRON DEFICIENCY ANEMIA: ICD-10-CM

## 2018-05-03 ENCOUNTER — COMMUNICATION - HEALTHEAST (OUTPATIENT)
Dept: NURSING | Facility: CLINIC | Age: 68
End: 2018-05-03

## 2018-05-11 ENCOUNTER — COMMUNICATION - HEALTHEAST (OUTPATIENT)
Dept: INTERNAL MEDICINE | Facility: CLINIC | Age: 68
End: 2018-05-11

## 2018-05-11 DIAGNOSIS — M48.062 LUMBAR STENOSIS WITH NEUROGENIC CLAUDICATION: ICD-10-CM

## 2018-05-23 ENCOUNTER — OFFICE VISIT - HEALTHEAST (OUTPATIENT)
Dept: INTERNAL MEDICINE | Facility: CLINIC | Age: 68
End: 2018-05-23

## 2018-05-23 DIAGNOSIS — Z12.11 SCREEN FOR COLON CANCER: ICD-10-CM

## 2018-05-23 DIAGNOSIS — E11.65 TYPE 2 DIABETES MELLITUS WITH HYPERGLYCEMIA, WITHOUT LONG-TERM CURRENT USE OF INSULIN (H): ICD-10-CM

## 2018-05-23 DIAGNOSIS — J44.1 CHRONIC OBSTRUCTIVE PULMONARY DISEASE WITH ACUTE EXACERBATION (H): ICD-10-CM

## 2018-05-23 DIAGNOSIS — I25.10 ASHD (ARTERIOSCLEROTIC HEART DISEASE): ICD-10-CM

## 2018-05-23 DIAGNOSIS — M54.2 NECK PAIN OF OVER 3 MONTHS DURATION: ICD-10-CM

## 2018-05-23 DIAGNOSIS — M65.222 CALCIFIC TENDINITIS OF LEFT UPPER ARM: ICD-10-CM

## 2018-05-23 LAB
ANION GAP SERPL CALCULATED.3IONS-SCNC: 9 MMOL/L (ref 5–18)
BUN SERPL-MCNC: 8 MG/DL (ref 8–22)
CALCIUM SERPL-MCNC: 9.4 MG/DL (ref 8.5–10.5)
CHLORIDE BLD-SCNC: 104 MMOL/L (ref 98–107)
CO2 SERPL-SCNC: 26 MMOL/L (ref 22–31)
CREAT SERPL-MCNC: 0.63 MG/DL (ref 0.7–1.3)
ERYTHROCYTE [DISTWIDTH] IN BLOOD BY AUTOMATED COUNT: 14 % (ref 11–14.5)
GFR SERPL CREATININE-BSD FRML MDRD: >60 ML/MIN/1.73M2
GLUCOSE BLD-MCNC: 89 MG/DL (ref 70–125)
HBA1C MFR BLD: 5.4 % (ref 3.5–6)
HCT VFR BLD AUTO: 36.6 % (ref 40–54)
HGB BLD-MCNC: 11.7 G/DL (ref 14–18)
MCH RBC QN AUTO: 25 PG (ref 27–34)
MCHC RBC AUTO-ENTMCNC: 31.8 G/DL (ref 32–36)
MCV RBC AUTO: 78 FL (ref 80–100)
PLATELET # BLD AUTO: 373 THOU/UL (ref 140–440)
PMV BLD AUTO: 7.2 FL (ref 7–10)
POTASSIUM BLD-SCNC: 3.9 MMOL/L (ref 3.5–5)
RBC # BLD AUTO: 4.67 MILL/UL (ref 4.4–6.2)
SODIUM SERPL-SCNC: 139 MMOL/L (ref 136–145)
WBC: 5.6 THOU/UL (ref 4–11)

## 2018-05-30 ENCOUNTER — COMMUNICATION - HEALTHEAST (OUTPATIENT)
Dept: INTERNAL MEDICINE | Facility: CLINIC | Age: 68
End: 2018-05-30

## 2018-05-30 ENCOUNTER — COMMUNICATION - HEALTHEAST (OUTPATIENT)
Dept: NURSING | Facility: CLINIC | Age: 68
End: 2018-05-30

## 2018-05-30 DIAGNOSIS — J44.9 COPD (CHRONIC OBSTRUCTIVE PULMONARY DISEASE) (H): ICD-10-CM

## 2018-06-12 ENCOUNTER — COMMUNICATION - HEALTHEAST (OUTPATIENT)
Dept: INTERNAL MEDICINE | Facility: CLINIC | Age: 68
End: 2018-06-12

## 2018-06-12 DIAGNOSIS — M48.062 LUMBAR STENOSIS WITH NEUROGENIC CLAUDICATION: ICD-10-CM

## 2018-06-12 DIAGNOSIS — J44.1 CHRONIC OBSTRUCTIVE PULMONARY DISEASE WITH ACUTE EXACERBATION (H): ICD-10-CM

## 2018-06-15 ENCOUNTER — COMMUNICATION - HEALTHEAST (OUTPATIENT)
Dept: INTERNAL MEDICINE | Facility: CLINIC | Age: 68
End: 2018-06-15

## 2018-06-15 DIAGNOSIS — J44.9 COPD (CHRONIC OBSTRUCTIVE PULMONARY DISEASE) (H): ICD-10-CM

## 2018-06-16 ENCOUNTER — COMMUNICATION - HEALTHEAST (OUTPATIENT)
Dept: INTERNAL MEDICINE | Facility: CLINIC | Age: 68
End: 2018-06-16

## 2018-06-16 DIAGNOSIS — M48.062 LUMBAR STENOSIS WITH NEUROGENIC CLAUDICATION: ICD-10-CM

## 2018-06-28 ENCOUNTER — COMMUNICATION - HEALTHEAST (OUTPATIENT)
Dept: INTERNAL MEDICINE | Facility: CLINIC | Age: 68
End: 2018-06-28

## 2018-06-28 DIAGNOSIS — L89.309 DECUBITUS ULCER OF BUTTOCK: ICD-10-CM

## 2018-07-06 ENCOUNTER — COMMUNICATION - HEALTHEAST (OUTPATIENT)
Dept: INTERNAL MEDICINE | Facility: CLINIC | Age: 68
End: 2018-07-06

## 2018-07-06 DIAGNOSIS — E11.65 TYPE 2 DIABETES MELLITUS WITH HYPERGLYCEMIA, WITHOUT LONG-TERM CURRENT USE OF INSULIN (H): ICD-10-CM

## 2018-07-09 ENCOUNTER — COMMUNICATION - HEALTHEAST (OUTPATIENT)
Dept: INTERNAL MEDICINE | Facility: CLINIC | Age: 68
End: 2018-07-09

## 2018-07-09 DIAGNOSIS — J44.1 CHRONIC OBSTRUCTIVE PULMONARY DISEASE WITH ACUTE EXACERBATION (H): ICD-10-CM

## 2018-07-09 DIAGNOSIS — M48.062 LUMBAR STENOSIS WITH NEUROGENIC CLAUDICATION: ICD-10-CM

## 2018-07-24 ENCOUNTER — RECORDS - HEALTHEAST (OUTPATIENT)
Dept: ADMINISTRATIVE | Facility: OTHER | Age: 68
End: 2018-07-24

## 2018-07-24 ENCOUNTER — OFFICE VISIT - HEALTHEAST (OUTPATIENT)
Dept: VASCULAR SURGERY | Facility: CLINIC | Age: 68
End: 2018-07-24

## 2018-07-24 DIAGNOSIS — L89.322 PRESSURE ULCER OF LEFT BUTTOCK, STAGE 2 (H): ICD-10-CM

## 2018-07-24 DIAGNOSIS — Z99.3 WHEELCHAIR BOUND: ICD-10-CM

## 2018-07-24 DIAGNOSIS — L89.312 PRESSURE ULCER OF RIGHT BUTTOCK, STAGE 2 (H): ICD-10-CM

## 2018-07-24 DIAGNOSIS — G12.21 ALS (AMYOTROPHIC LATERAL SCLEROSIS) (H): ICD-10-CM

## 2018-07-24 DIAGNOSIS — F17.200 NICOTINE ADDICTION: ICD-10-CM

## 2018-07-24 DIAGNOSIS — L98.2 SWEET SYNDROME: ICD-10-CM

## 2018-07-24 DIAGNOSIS — E11.628 TYPE 2 DIABETES MELLITUS WITH OTHER SKIN COMPLICATIONS (CODE): ICD-10-CM

## 2018-07-24 DIAGNOSIS — Z79.52 LONG TERM SYSTEMIC STEROID USER: ICD-10-CM

## 2018-07-31 ENCOUNTER — COMMUNICATION - HEALTHEAST (OUTPATIENT)
Dept: INTERNAL MEDICINE | Facility: CLINIC | Age: 68
End: 2018-07-31

## 2018-07-31 DIAGNOSIS — M48.062 LUMBAR STENOSIS WITH NEUROGENIC CLAUDICATION: ICD-10-CM

## 2018-08-01 ENCOUNTER — COMMUNICATION - HEALTHEAST (OUTPATIENT)
Dept: INTERNAL MEDICINE | Facility: CLINIC | Age: 68
End: 2018-08-01

## 2018-08-01 ENCOUNTER — OFFICE VISIT - HEALTHEAST (OUTPATIENT)
Dept: INTERNAL MEDICINE | Facility: CLINIC | Age: 68
End: 2018-08-01

## 2018-08-01 DIAGNOSIS — M75.82 ROTATOR CUFF TENDINITIS, LEFT: ICD-10-CM

## 2018-08-01 DIAGNOSIS — L89.159 DECUBITUS ULCER OF SACRAL REGION: ICD-10-CM

## 2018-08-01 DIAGNOSIS — L29.9 ITCHING: ICD-10-CM

## 2018-08-01 DIAGNOSIS — J44.1 CHRONIC OBSTRUCTIVE PULMONARY DISEASE WITH ACUTE EXACERBATION (H): ICD-10-CM

## 2018-08-01 DIAGNOSIS — G12.21 ALS (AMYOTROPHIC LATERAL SCLEROSIS) (H): ICD-10-CM

## 2018-08-04 ENCOUNTER — COMMUNICATION - HEALTHEAST (OUTPATIENT)
Dept: INTERNAL MEDICINE | Facility: CLINIC | Age: 68
End: 2018-08-04

## 2018-08-04 DIAGNOSIS — M48.062 LUMBAR STENOSIS WITH NEUROGENIC CLAUDICATION: ICD-10-CM

## 2018-08-06 ENCOUNTER — RECORDS - HEALTHEAST (OUTPATIENT)
Dept: ADMINISTRATIVE | Facility: OTHER | Age: 68
End: 2018-08-06

## 2018-08-12 ENCOUNTER — COMMUNICATION - HEALTHEAST (OUTPATIENT)
Dept: INTERNAL MEDICINE | Facility: CLINIC | Age: 68
End: 2018-08-12

## 2018-08-12 DIAGNOSIS — I25.10 ASHD (ARTERIOSCLEROTIC HEART DISEASE): ICD-10-CM

## 2018-08-21 ENCOUNTER — COMMUNICATION - HEALTHEAST (OUTPATIENT)
Dept: INTERNAL MEDICINE | Facility: CLINIC | Age: 68
End: 2018-08-21

## 2018-08-21 DIAGNOSIS — J44.1 CHRONIC OBSTRUCTIVE PULMONARY DISEASE WITH ACUTE EXACERBATION (H): ICD-10-CM

## 2018-08-28 ENCOUNTER — COMMUNICATION - HEALTHEAST (OUTPATIENT)
Dept: INTERNAL MEDICINE | Facility: CLINIC | Age: 68
End: 2018-08-28

## 2018-08-28 ENCOUNTER — RECORDS - HEALTHEAST (OUTPATIENT)
Dept: ADMINISTRATIVE | Facility: OTHER | Age: 68
End: 2018-08-28

## 2018-08-28 DIAGNOSIS — J44.1 CHRONIC OBSTRUCTIVE PULMONARY DISEASE WITH ACUTE EXACERBATION (H): ICD-10-CM

## 2018-08-28 DIAGNOSIS — M48.062 LUMBAR STENOSIS WITH NEUROGENIC CLAUDICATION: ICD-10-CM

## 2018-08-31 ENCOUNTER — COMMUNICATION - HEALTHEAST (OUTPATIENT)
Dept: INTERNAL MEDICINE | Facility: CLINIC | Age: 68
End: 2018-08-31

## 2018-08-31 DIAGNOSIS — J44.9 CHRONIC OBSTRUCTIVE PULMONARY DISEASE, UNSPECIFIED COPD TYPE (H): ICD-10-CM

## 2018-09-18 ENCOUNTER — OFFICE VISIT - HEALTHEAST (OUTPATIENT)
Dept: INTERNAL MEDICINE | Facility: CLINIC | Age: 68
End: 2018-09-18

## 2018-09-18 DIAGNOSIS — M48.062 LUMBAR STENOSIS WITH NEUROGENIC CLAUDICATION: ICD-10-CM

## 2018-09-18 DIAGNOSIS — M25.512 CHRONIC LEFT SHOULDER PAIN: ICD-10-CM

## 2018-09-18 DIAGNOSIS — G89.29 CHRONIC LEFT SHOULDER PAIN: ICD-10-CM

## 2018-09-18 DIAGNOSIS — G12.21 ALS (AMYOTROPHIC LATERAL SCLEROSIS) (H): ICD-10-CM

## 2018-09-18 DIAGNOSIS — J44.9 COPD (CHRONIC OBSTRUCTIVE PULMONARY DISEASE) (H): ICD-10-CM

## 2018-09-18 DIAGNOSIS — M54.2 NECK PAIN OF OVER 3 MONTHS DURATION: ICD-10-CM

## 2018-09-18 DIAGNOSIS — L98.2 SWEET SYNDROME: ICD-10-CM

## 2018-09-18 LAB
ANION GAP SERPL CALCULATED.3IONS-SCNC: 9 MMOL/L (ref 5–18)
BASOPHILS # BLD AUTO: 0 THOU/UL (ref 0–0.2)
BASOPHILS NFR BLD AUTO: 0 % (ref 0–2)
BUN SERPL-MCNC: 8 MG/DL (ref 8–22)
CALCIUM SERPL-MCNC: 9.1 MG/DL (ref 8.5–10.5)
CHLORIDE BLD-SCNC: 106 MMOL/L (ref 98–107)
CO2 SERPL-SCNC: 25 MMOL/L (ref 22–31)
CREAT SERPL-MCNC: 0.61 MG/DL (ref 0.7–1.3)
EOSINOPHIL # BLD AUTO: 0.2 THOU/UL (ref 0–0.4)
EOSINOPHIL NFR BLD AUTO: 3 % (ref 0–6)
ERYTHROCYTE [DISTWIDTH] IN BLOOD BY AUTOMATED COUNT: 14.6 % (ref 11–14.5)
GFR SERPL CREATININE-BSD FRML MDRD: >60 ML/MIN/1.73M2
GLUCOSE BLD-MCNC: 89 MG/DL (ref 70–125)
HCT VFR BLD AUTO: 39 % (ref 40–54)
HGB BLD-MCNC: 12.4 G/DL (ref 14–18)
LYMPHOCYTES # BLD AUTO: 1.6 THOU/UL (ref 0.8–4.4)
LYMPHOCYTES NFR BLD AUTO: 27 % (ref 20–40)
MCH RBC QN AUTO: 25.8 PG (ref 27–34)
MCHC RBC AUTO-ENTMCNC: 31.8 G/DL (ref 32–36)
MCV RBC AUTO: 81 FL (ref 80–100)
MONOCYTES # BLD AUTO: 0.4 THOU/UL (ref 0–0.9)
MONOCYTES NFR BLD AUTO: 7 % (ref 2–10)
NEUTROPHILS # BLD AUTO: 3.7 THOU/UL (ref 2–7.7)
NEUTROPHILS NFR BLD AUTO: 63 % (ref 50–70)
PLATELET # BLD AUTO: 338 THOU/UL (ref 140–440)
PMV BLD AUTO: 7 FL (ref 7–10)
POTASSIUM BLD-SCNC: 3.3 MMOL/L (ref 3.5–5)
RBC # BLD AUTO: 4.81 MILL/UL (ref 4.4–6.2)
SODIUM SERPL-SCNC: 140 MMOL/L (ref 136–145)
WBC: 5.9 THOU/UL (ref 4–11)

## 2018-09-24 ENCOUNTER — COMMUNICATION - HEALTHEAST (OUTPATIENT)
Dept: INTERNAL MEDICINE | Facility: CLINIC | Age: 68
End: 2018-09-24

## 2018-09-24 DIAGNOSIS — J44.9 COPD (CHRONIC OBSTRUCTIVE PULMONARY DISEASE) (H): ICD-10-CM

## 2018-09-25 ENCOUNTER — COMMUNICATION - HEALTHEAST (OUTPATIENT)
Dept: INTERNAL MEDICINE | Facility: CLINIC | Age: 68
End: 2018-09-25

## 2018-09-25 DIAGNOSIS — J44.9 COPD (CHRONIC OBSTRUCTIVE PULMONARY DISEASE) (H): ICD-10-CM

## 2018-10-05 ENCOUNTER — COMMUNICATION - HEALTHEAST (OUTPATIENT)
Dept: INTERNAL MEDICINE | Facility: CLINIC | Age: 68
End: 2018-10-05

## 2018-10-05 DIAGNOSIS — E11.65 TYPE 2 DIABETES MELLITUS WITH HYPERGLYCEMIA, WITHOUT LONG-TERM CURRENT USE OF INSULIN (H): ICD-10-CM

## 2018-10-16 ENCOUNTER — COMMUNICATION - HEALTHEAST (OUTPATIENT)
Dept: INTERNAL MEDICINE | Facility: CLINIC | Age: 68
End: 2018-10-16

## 2018-10-16 DIAGNOSIS — M48.062 LUMBAR STENOSIS WITH NEUROGENIC CLAUDICATION: ICD-10-CM

## 2018-10-17 ENCOUNTER — COMMUNICATION - HEALTHEAST (OUTPATIENT)
Dept: INTERNAL MEDICINE | Facility: CLINIC | Age: 68
End: 2018-10-17

## 2018-10-17 DIAGNOSIS — M48.062 LUMBAR STENOSIS WITH NEUROGENIC CLAUDICATION: ICD-10-CM

## 2018-10-21 ENCOUNTER — RECORDS - HEALTHEAST (OUTPATIENT)
Dept: ADMINISTRATIVE | Facility: OTHER | Age: 68
End: 2018-10-21

## 2018-11-06 ENCOUNTER — COMMUNICATION - HEALTHEAST (OUTPATIENT)
Dept: INTERNAL MEDICINE | Facility: CLINIC | Age: 68
End: 2018-11-06

## 2018-11-13 ENCOUNTER — RECORDS - HEALTHEAST (OUTPATIENT)
Dept: ADMINISTRATIVE | Facility: OTHER | Age: 68
End: 2018-11-13

## 2018-11-14 ENCOUNTER — OFFICE VISIT - HEALTHEAST (OUTPATIENT)
Dept: INTERNAL MEDICINE | Facility: CLINIC | Age: 68
End: 2018-11-14

## 2018-11-14 DIAGNOSIS — J43.1 PANLOBULAR EMPHYSEMA (H): ICD-10-CM

## 2018-11-14 DIAGNOSIS — M48.062 LUMBAR STENOSIS WITH NEUROGENIC CLAUDICATION: ICD-10-CM

## 2018-11-14 DIAGNOSIS — E11.65 TYPE 2 DIABETES MELLITUS WITH HYPERGLYCEMIA, WITHOUT LONG-TERM CURRENT USE OF INSULIN (H): ICD-10-CM

## 2018-11-14 DIAGNOSIS — L98.2 SWEET SYNDROME: ICD-10-CM

## 2018-11-14 DIAGNOSIS — G12.21 ALS (AMYOTROPHIC LATERAL SCLEROSIS) (H): ICD-10-CM

## 2018-11-14 LAB
ALBUMIN SERPL-MCNC: 3.1 G/DL (ref 3.5–5)
ALP SERPL-CCNC: 73 U/L (ref 45–120)
ALT SERPL W P-5'-P-CCNC: <9 U/L (ref 0–45)
ANION GAP SERPL CALCULATED.3IONS-SCNC: 9 MMOL/L (ref 5–18)
AST SERPL W P-5'-P-CCNC: 13 U/L (ref 0–40)
BILIRUB SERPL-MCNC: 0.4 MG/DL (ref 0–1)
BUN SERPL-MCNC: 8 MG/DL (ref 8–22)
CALCIUM SERPL-MCNC: 8.7 MG/DL (ref 8.5–10.5)
CHLORIDE BLD-SCNC: 106 MMOL/L (ref 98–107)
CO2 SERPL-SCNC: 26 MMOL/L (ref 22–31)
CREAT SERPL-MCNC: 0.66 MG/DL (ref 0.7–1.3)
ERYTHROCYTE [DISTWIDTH] IN BLOOD BY AUTOMATED COUNT: 13.7 % (ref 11–14.5)
GFR SERPL CREATININE-BSD FRML MDRD: >60 ML/MIN/1.73M2
GLUCOSE BLD-MCNC: 89 MG/DL (ref 70–125)
HBA1C MFR BLD: 5.7 % (ref 3.5–6)
HCT VFR BLD AUTO: 34.9 % (ref 40–54)
HGB BLD-MCNC: 11.2 G/DL (ref 14–18)
MCH RBC QN AUTO: 25.5 PG (ref 27–34)
MCHC RBC AUTO-ENTMCNC: 32 G/DL (ref 32–36)
MCV RBC AUTO: 80 FL (ref 80–100)
PLATELET # BLD AUTO: 319 THOU/UL (ref 140–440)
PMV BLD AUTO: 7.4 FL (ref 7–10)
POTASSIUM BLD-SCNC: 3.2 MMOL/L (ref 3.5–5)
PROT SERPL-MCNC: 5.8 G/DL (ref 6–8)
RBC # BLD AUTO: 4.38 MILL/UL (ref 4.4–6.2)
SODIUM SERPL-SCNC: 141 MMOL/L (ref 136–145)
WBC: 4.9 THOU/UL (ref 4–11)

## 2018-11-15 ENCOUNTER — COMMUNICATION - HEALTHEAST (OUTPATIENT)
Dept: INTERNAL MEDICINE | Facility: CLINIC | Age: 68
End: 2018-11-15

## 2018-11-16 ENCOUNTER — COMMUNICATION - HEALTHEAST (OUTPATIENT)
Dept: INTERNAL MEDICINE | Facility: CLINIC | Age: 68
End: 2018-11-16

## 2018-11-16 DIAGNOSIS — I25.10 ASHD (ARTERIOSCLEROTIC HEART DISEASE): ICD-10-CM

## 2018-12-10 ENCOUNTER — COMMUNICATION - HEALTHEAST (OUTPATIENT)
Dept: INTERNAL MEDICINE | Facility: CLINIC | Age: 68
End: 2018-12-10

## 2018-12-10 DIAGNOSIS — M48.062 LUMBAR STENOSIS WITH NEUROGENIC CLAUDICATION: ICD-10-CM

## 2018-12-28 ENCOUNTER — COMMUNICATION - HEALTHEAST (OUTPATIENT)
Dept: INTERNAL MEDICINE | Facility: CLINIC | Age: 68
End: 2018-12-28

## 2018-12-28 DIAGNOSIS — E11.9 DIABETES MELLITUS, TYPE 2 (H): ICD-10-CM

## 2019-01-08 ENCOUNTER — OFFICE VISIT - HEALTHEAST (OUTPATIENT)
Dept: INTERNAL MEDICINE | Facility: CLINIC | Age: 69
End: 2019-01-08

## 2019-01-08 DIAGNOSIS — M48.062 LUMBAR STENOSIS WITH NEUROGENIC CLAUDICATION: ICD-10-CM

## 2019-01-08 DIAGNOSIS — L98.2 SWEET SYNDROME: ICD-10-CM

## 2019-01-08 DIAGNOSIS — J44.1 CHRONIC OBSTRUCTIVE PULMONARY DISEASE WITH ACUTE EXACERBATION (H): ICD-10-CM

## 2019-01-08 DIAGNOSIS — I25.10 ASHD (ARTERIOSCLEROTIC HEART DISEASE): ICD-10-CM

## 2019-01-08 DIAGNOSIS — B02.29 POSTHERPETIC NEURALGIA: ICD-10-CM

## 2019-01-08 DIAGNOSIS — G12.21 ALS (AMYOTROPHIC LATERAL SCLEROSIS) (H): ICD-10-CM

## 2019-01-08 LAB
ANION GAP SERPL CALCULATED.3IONS-SCNC: 12 MMOL/L (ref 5–18)
BUN SERPL-MCNC: 9 MG/DL (ref 8–22)
CALCIUM SERPL-MCNC: 8.9 MG/DL (ref 8.5–10.5)
CHLORIDE BLD-SCNC: 105 MMOL/L (ref 98–107)
CO2 SERPL-SCNC: 24 MMOL/L (ref 22–31)
CREAT SERPL-MCNC: 0.66 MG/DL (ref 0.7–1.3)
ERYTHROCYTE [DISTWIDTH] IN BLOOD BY AUTOMATED COUNT: 12.8 % (ref 11–14.5)
GFR SERPL CREATININE-BSD FRML MDRD: >60 ML/MIN/1.73M2
GLUCOSE BLD-MCNC: 83 MG/DL (ref 70–125)
HCT VFR BLD AUTO: 33.4 % (ref 40–54)
HGB BLD-MCNC: 10.9 G/DL (ref 14–18)
MCH RBC QN AUTO: 25 PG (ref 27–34)
MCHC RBC AUTO-ENTMCNC: 32.5 G/DL (ref 32–36)
MCV RBC AUTO: 77 FL (ref 80–100)
PLATELET # BLD AUTO: 353 THOU/UL (ref 140–440)
PMV BLD AUTO: 6.9 FL (ref 7–10)
POTASSIUM BLD-SCNC: 3.5 MMOL/L (ref 3.5–5)
RBC # BLD AUTO: 4.35 MILL/UL (ref 4.4–6.2)
SODIUM SERPL-SCNC: 141 MMOL/L (ref 136–145)
WBC: 5.3 THOU/UL (ref 4–11)

## 2019-01-09 ENCOUNTER — COMMUNICATION - HEALTHEAST (OUTPATIENT)
Dept: INTERNAL MEDICINE | Facility: CLINIC | Age: 69
End: 2019-01-09

## 2019-01-12 ENCOUNTER — COMMUNICATION - HEALTHEAST (OUTPATIENT)
Dept: INTERNAL MEDICINE | Facility: CLINIC | Age: 69
End: 2019-01-12

## 2019-01-12 DIAGNOSIS — M48.062 LUMBAR STENOSIS WITH NEUROGENIC CLAUDICATION: ICD-10-CM

## 2019-01-12 DIAGNOSIS — E11.65 TYPE 2 DIABETES MELLITUS WITH HYPERGLYCEMIA, WITHOUT LONG-TERM CURRENT USE OF INSULIN (H): ICD-10-CM

## 2019-01-16 ENCOUNTER — COMMUNICATION - HEALTHEAST (OUTPATIENT)
Dept: INTERNAL MEDICINE | Facility: CLINIC | Age: 69
End: 2019-01-16

## 2019-01-29 ENCOUNTER — COMMUNICATION - HEALTHEAST (OUTPATIENT)
Dept: INTERNAL MEDICINE | Facility: CLINIC | Age: 69
End: 2019-01-29

## 2019-01-29 DIAGNOSIS — M48.062 LUMBAR STENOSIS WITH NEUROGENIC CLAUDICATION: ICD-10-CM

## 2019-01-30 ENCOUNTER — COMMUNICATION - HEALTHEAST (OUTPATIENT)
Dept: INTERNAL MEDICINE | Facility: CLINIC | Age: 69
End: 2019-01-30

## 2019-01-30 DIAGNOSIS — J44.9 COPD (CHRONIC OBSTRUCTIVE PULMONARY DISEASE) (H): ICD-10-CM

## 2019-02-08 ENCOUNTER — COMMUNICATION - HEALTHEAST (OUTPATIENT)
Dept: INTERNAL MEDICINE | Facility: CLINIC | Age: 69
End: 2019-02-08

## 2019-02-08 DIAGNOSIS — M54.2 NECK PAIN: ICD-10-CM

## 2019-02-08 DIAGNOSIS — I50.9 EDEMA DUE TO CONGESTIVE HEART FAILURE (H): ICD-10-CM

## 2019-02-08 DIAGNOSIS — J44.1 CHRONIC OBSTRUCTIVE PULMONARY DISEASE WITH ACUTE EXACERBATION (H): ICD-10-CM

## 2019-02-13 ENCOUNTER — COMMUNICATION - HEALTHEAST (OUTPATIENT)
Dept: INTERNAL MEDICINE | Facility: CLINIC | Age: 69
End: 2019-02-13

## 2019-02-13 ENCOUNTER — OFFICE VISIT - HEALTHEAST (OUTPATIENT)
Dept: INTERNAL MEDICINE | Facility: CLINIC | Age: 69
End: 2019-02-13

## 2019-02-13 DIAGNOSIS — M75.82 ROTATOR CUFF TENDINITIS, LEFT: ICD-10-CM

## 2019-02-13 DIAGNOSIS — G12.21 ALS (AMYOTROPHIC LATERAL SCLEROSIS) (H): ICD-10-CM

## 2019-02-13 DIAGNOSIS — J41.1 MUCOPURULENT CHRONIC BRONCHITIS (H): ICD-10-CM

## 2019-02-13 DIAGNOSIS — L89.43 PRESSURE INJURY OF CONTIGUOUS REGION INVOLVING BACK AND BUTTOCK, STAGE 3, UNSPECIFIED LATERALITY (H): ICD-10-CM

## 2019-02-22 ENCOUNTER — COMMUNICATION - HEALTHEAST (OUTPATIENT)
Dept: INTERNAL MEDICINE | Facility: CLINIC | Age: 69
End: 2019-02-22

## 2019-02-22 DIAGNOSIS — L98.2 SWEET SYNDROME: ICD-10-CM

## 2019-02-22 DIAGNOSIS — M48.062 LUMBAR STENOSIS WITH NEUROGENIC CLAUDICATION: ICD-10-CM

## 2019-02-23 ENCOUNTER — COMMUNICATION - HEALTHEAST (OUTPATIENT)
Dept: INTERNAL MEDICINE | Facility: CLINIC | Age: 69
End: 2019-02-23

## 2019-02-25 ENCOUNTER — COMMUNICATION - HEALTHEAST (OUTPATIENT)
Dept: INTERNAL MEDICINE | Facility: CLINIC | Age: 69
End: 2019-02-25

## 2019-02-27 ENCOUNTER — COMMUNICATION - HEALTHEAST (OUTPATIENT)
Dept: INTERNAL MEDICINE | Facility: CLINIC | Age: 69
End: 2019-02-27

## 2019-02-27 ENCOUNTER — COMMUNICATION - HEALTHEAST (OUTPATIENT)
Dept: SCHEDULING | Facility: CLINIC | Age: 69
End: 2019-02-27

## 2019-02-27 DIAGNOSIS — J44.1 CHRONIC OBSTRUCTIVE PULMONARY DISEASE WITH ACUTE EXACERBATION (H): ICD-10-CM

## 2019-03-06 ENCOUNTER — COMMUNICATION - HEALTHEAST (OUTPATIENT)
Dept: INTERNAL MEDICINE | Facility: CLINIC | Age: 69
End: 2019-03-06

## 2019-03-06 ENCOUNTER — AMBULATORY - HEALTHEAST (OUTPATIENT)
Dept: INTERNAL MEDICINE | Facility: CLINIC | Age: 69
End: 2019-03-06

## 2019-03-06 DIAGNOSIS — L89.43: ICD-10-CM

## 2019-03-11 ENCOUNTER — COMMUNICATION - HEALTHEAST (OUTPATIENT)
Dept: INTERNAL MEDICINE | Facility: CLINIC | Age: 69
End: 2019-03-11

## 2019-03-11 DIAGNOSIS — E11.9 DIABETES MELLITUS, TYPE 2 (H): ICD-10-CM

## 2019-03-13 ENCOUNTER — COMMUNICATION - HEALTHEAST (OUTPATIENT)
Dept: INTERNAL MEDICINE | Facility: CLINIC | Age: 69
End: 2019-03-13

## 2019-03-13 ENCOUNTER — OFFICE VISIT - HEALTHEAST (OUTPATIENT)
Dept: INTERNAL MEDICINE | Facility: CLINIC | Age: 69
End: 2019-03-13

## 2019-03-13 DIAGNOSIS — M54.2 NECK PAIN OF OVER 3 MONTHS DURATION: ICD-10-CM

## 2019-03-13 DIAGNOSIS — E11.65 TYPE 2 DIABETES MELLITUS WITH HYPERGLYCEMIA, WITHOUT LONG-TERM CURRENT USE OF INSULIN (H): ICD-10-CM

## 2019-03-13 DIAGNOSIS — L89.43 PRESSURE INJURY OF CONTIGUOUS REGION INVOLVING BACK AND BUTTOCK, STAGE 3, UNSPECIFIED LATERALITY (H): ICD-10-CM

## 2019-03-13 DIAGNOSIS — J41.1 MUCOPURULENT CHRONIC BRONCHITIS (H): ICD-10-CM

## 2019-03-13 LAB
ANION GAP SERPL CALCULATED.3IONS-SCNC: 10 MMOL/L (ref 5–18)
BUN SERPL-MCNC: 16 MG/DL (ref 8–22)
CALCIUM SERPL-MCNC: 8.7 MG/DL (ref 8.5–10.5)
CHLORIDE BLD-SCNC: 106 MMOL/L (ref 98–107)
CO2 SERPL-SCNC: 24 MMOL/L (ref 22–31)
CREAT SERPL-MCNC: 0.8 MG/DL (ref 0.7–1.3)
GFR SERPL CREATININE-BSD FRML MDRD: >60 ML/MIN/1.73M2
GLUCOSE BLD-MCNC: 112 MG/DL (ref 70–125)
HBA1C MFR BLD: 5.4 % (ref 3.5–6)
POTASSIUM BLD-SCNC: 4.1 MMOL/L (ref 3.5–5)
SODIUM SERPL-SCNC: 140 MMOL/L (ref 136–145)

## 2019-03-21 ENCOUNTER — COMMUNICATION - HEALTHEAST (OUTPATIENT)
Dept: INTERNAL MEDICINE | Facility: CLINIC | Age: 69
End: 2019-03-21

## 2019-03-21 DIAGNOSIS — L98.2 SWEET SYNDROME: ICD-10-CM

## 2019-03-21 DIAGNOSIS — M48.062 LUMBAR STENOSIS WITH NEUROGENIC CLAUDICATION: ICD-10-CM

## 2019-03-24 ENCOUNTER — RECORDS - HEALTHEAST (OUTPATIENT)
Dept: ADMINISTRATIVE | Facility: OTHER | Age: 69
End: 2019-03-24

## 2019-03-27 LAB — HBA1C MFR BLD: 5.2 % (ref 0–5.6)

## 2019-03-28 ENCOUNTER — COMMUNICATION - HEALTHEAST (OUTPATIENT)
Dept: INTERNAL MEDICINE | Facility: CLINIC | Age: 69
End: 2019-03-28

## 2019-03-29 ENCOUNTER — OFFICE VISIT - HEALTHEAST (OUTPATIENT)
Dept: VASCULAR SURGERY | Facility: CLINIC | Age: 69
End: 2019-03-29

## 2019-03-29 DIAGNOSIS — L98.412 NON-PRESSURE CHRONIC ULCER OF BUTTOCK WITH FAT LAYER EXPOSED (H): ICD-10-CM

## 2019-03-29 DIAGNOSIS — E11.65 TYPE 2 DIABETES MELLITUS WITH HYPERGLYCEMIA, WITHOUT LONG-TERM CURRENT USE OF INSULIN (H): ICD-10-CM

## 2019-03-29 DIAGNOSIS — Z79.52 LONG TERM SYSTEMIC STEROID USER: ICD-10-CM

## 2019-03-29 DIAGNOSIS — D50.9 IRON DEFICIENCY ANEMIA, UNSPECIFIED IRON DEFICIENCY ANEMIA TYPE: ICD-10-CM

## 2019-03-29 DIAGNOSIS — G12.21 ALS (AMYOTROPHIC LATERAL SCLEROSIS) (H): ICD-10-CM

## 2019-03-29 DIAGNOSIS — F17.210 CIGARETTE NICOTINE DEPENDENCE WITHOUT COMPLICATION: ICD-10-CM

## 2019-03-29 DIAGNOSIS — M48.062 LUMBAR STENOSIS WITH NEUROGENIC CLAUDICATION: ICD-10-CM

## 2019-03-29 RX ORDER — RILUZOLE 50 MG/1
TABLET, FILM COATED ORAL
Status: SHIPPED | COMMUNITY
Start: 2019-03-11 | End: 2021-09-28

## 2019-03-29 ASSESSMENT — MIFFLIN-ST. JEOR: SCORE: 1355.93

## 2019-04-02 ENCOUNTER — COMMUNICATION - HEALTHEAST (OUTPATIENT)
Dept: INTERNAL MEDICINE | Facility: CLINIC | Age: 69
End: 2019-04-02

## 2019-04-02 DIAGNOSIS — D50.9 IRON DEFICIENCY ANEMIA: ICD-10-CM

## 2019-04-05 ENCOUNTER — COMMUNICATION - HEALTHEAST (OUTPATIENT)
Dept: INTERNAL MEDICINE | Facility: CLINIC | Age: 69
End: 2019-04-05

## 2019-04-08 ENCOUNTER — COMMUNICATION - HEALTHEAST (OUTPATIENT)
Dept: SCHEDULING | Facility: CLINIC | Age: 69
End: 2019-04-08

## 2019-04-09 ENCOUNTER — RECORDS - HEALTHEAST (OUTPATIENT)
Dept: ADMINISTRATIVE | Facility: OTHER | Age: 69
End: 2019-04-09

## 2019-04-09 ENCOUNTER — COMMUNICATION - HEALTHEAST (OUTPATIENT)
Dept: VASCULAR SURGERY | Facility: CLINIC | Age: 69
End: 2019-04-09

## 2019-04-09 ENCOUNTER — COMMUNICATION - HEALTHEAST (OUTPATIENT)
Dept: INTERNAL MEDICINE | Facility: CLINIC | Age: 69
End: 2019-04-09

## 2019-04-09 DIAGNOSIS — B02.29 POSTHERPETIC NEURALGIA: ICD-10-CM

## 2019-04-11 ENCOUNTER — COMMUNICATION - HEALTHEAST (OUTPATIENT)
Dept: SCHEDULING | Facility: CLINIC | Age: 69
End: 2019-04-11

## 2019-04-11 ENCOUNTER — COMMUNICATION - HEALTHEAST (OUTPATIENT)
Dept: GERIATRIC MEDICINE | Facility: CLINIC | Age: 69
End: 2019-04-11

## 2019-04-12 ENCOUNTER — COMMUNICATION - HEALTHEAST (OUTPATIENT)
Dept: INTERNAL MEDICINE | Facility: CLINIC | Age: 69
End: 2019-04-12

## 2019-04-12 DIAGNOSIS — M48.062 LUMBAR STENOSIS WITH NEUROGENIC CLAUDICATION: ICD-10-CM

## 2019-04-15 ENCOUNTER — RECORDS - HEALTHEAST (OUTPATIENT)
Dept: ADMINISTRATIVE | Facility: OTHER | Age: 69
End: 2019-04-15

## 2019-04-16 ENCOUNTER — COMMUNICATION - HEALTHEAST (OUTPATIENT)
Dept: INTERNAL MEDICINE | Facility: CLINIC | Age: 69
End: 2019-04-16

## 2019-04-16 DIAGNOSIS — M62.838 MUSCLE SPASMS OF LOWER EXTREMITY: ICD-10-CM

## 2019-04-17 ENCOUNTER — COMMUNICATION - HEALTHEAST (OUTPATIENT)
Dept: GERIATRIC MEDICINE | Facility: CLINIC | Age: 69
End: 2019-04-17

## 2019-04-18 ENCOUNTER — COMMUNICATION - HEALTHEAST (OUTPATIENT)
Dept: INTERNAL MEDICINE | Facility: CLINIC | Age: 69
End: 2019-04-18

## 2019-04-18 DIAGNOSIS — M62.838 MUSCLE SPASMS OF LOWER EXTREMITY: ICD-10-CM

## 2019-04-18 RX ORDER — BACLOFEN 10 MG/1
TABLET ORAL
Qty: 135 TABLET | Refills: 0 | Status: SHIPPED | OUTPATIENT
Start: 2019-04-18 | End: 2022-05-23

## 2019-04-19 ENCOUNTER — RECORDS - HEALTHEAST (OUTPATIENT)
Dept: ADMINISTRATIVE | Facility: OTHER | Age: 69
End: 2019-04-19

## 2019-04-19 ENCOUNTER — OFFICE VISIT - HEALTHEAST (OUTPATIENT)
Dept: VASCULAR SURGERY | Facility: CLINIC | Age: 69
End: 2019-04-19

## 2019-04-19 DIAGNOSIS — Z79.52 LONG TERM SYSTEMIC STEROID USER: ICD-10-CM

## 2019-04-19 DIAGNOSIS — L08.9 LOCAL INFECTION OF WOUND: ICD-10-CM

## 2019-04-19 DIAGNOSIS — G12.21 ALS (AMYOTROPHIC LATERAL SCLEROSIS) (H): ICD-10-CM

## 2019-04-19 DIAGNOSIS — L98.412 NON-PRESSURE CHRONIC ULCER OF BUTTOCK WITH FAT LAYER EXPOSED (H): ICD-10-CM

## 2019-04-19 DIAGNOSIS — E11.65 TYPE 2 DIABETES MELLITUS WITH HYPERGLYCEMIA, WITHOUT LONG-TERM CURRENT USE OF INSULIN (H): ICD-10-CM

## 2019-04-19 DIAGNOSIS — F17.210 CIGARETTE NICOTINE DEPENDENCE WITHOUT COMPLICATION: ICD-10-CM

## 2019-04-19 DIAGNOSIS — T14.8XXA LOCAL INFECTION OF WOUND: ICD-10-CM

## 2019-04-22 ENCOUNTER — COMMUNICATION - HEALTHEAST (OUTPATIENT)
Dept: GERIATRIC MEDICINE | Facility: CLINIC | Age: 69
End: 2019-04-22

## 2019-04-22 LAB
BACTERIA SPEC CULT: ABNORMAL
GRAM STAIN RESULT: ABNORMAL
GRAM STAIN RESULT: ABNORMAL

## 2019-04-23 ENCOUNTER — COMMUNICATION - HEALTHEAST (OUTPATIENT)
Dept: VASCULAR SURGERY | Facility: CLINIC | Age: 69
End: 2019-04-23

## 2019-04-23 ENCOUNTER — COMMUNICATION - HEALTHEAST (OUTPATIENT)
Dept: GERIATRIC MEDICINE | Facility: CLINIC | Age: 69
End: 2019-04-23

## 2019-04-23 ENCOUNTER — AMBULATORY - HEALTHEAST (OUTPATIENT)
Dept: VASCULAR SURGERY | Facility: CLINIC | Age: 69
End: 2019-04-23

## 2019-04-23 DIAGNOSIS — L98.412 NON-PRESSURE CHRONIC ULCER OF BUTTOCK WITH FAT LAYER EXPOSED (H): ICD-10-CM

## 2019-04-24 ENCOUNTER — HOME CARE/HOSPICE - HEALTHEAST (OUTPATIENT)
Dept: HOME HEALTH SERVICES | Facility: HOME HEALTH | Age: 69
End: 2019-04-24

## 2019-04-25 ENCOUNTER — HOME CARE/HOSPICE - HEALTHEAST (OUTPATIENT)
Dept: HOME HEALTH SERVICES | Facility: HOME HEALTH | Age: 69
End: 2019-04-25

## 2019-04-25 ENCOUNTER — COMMUNICATION - HEALTHEAST (OUTPATIENT)
Dept: GERIATRIC MEDICINE | Facility: CLINIC | Age: 69
End: 2019-04-25

## 2019-04-25 ENCOUNTER — COMMUNICATION - HEALTHEAST (OUTPATIENT)
Dept: HOME HEALTH SERVICES | Facility: HOME HEALTH | Age: 69
End: 2019-04-25

## 2019-04-25 ASSESSMENT — MIFFLIN-ST. JEOR: SCORE: 1446.65

## 2019-04-27 ENCOUNTER — HOME CARE/HOSPICE - HEALTHEAST (OUTPATIENT)
Dept: HOME HEALTH SERVICES | Facility: HOME HEALTH | Age: 69
End: 2019-04-27

## 2019-04-29 ENCOUNTER — HOME CARE/HOSPICE - HEALTHEAST (OUTPATIENT)
Dept: HOME HEALTH SERVICES | Facility: HOME HEALTH | Age: 69
End: 2019-04-29

## 2019-04-29 ENCOUNTER — COMMUNICATION - HEALTHEAST (OUTPATIENT)
Dept: HOME HEALTH SERVICES | Facility: HOME HEALTH | Age: 69
End: 2019-04-29

## 2019-04-30 ENCOUNTER — OFFICE VISIT - HEALTHEAST (OUTPATIENT)
Dept: VASCULAR SURGERY | Facility: CLINIC | Age: 69
End: 2019-04-30

## 2019-04-30 ENCOUNTER — COMMUNICATION - HEALTHEAST (OUTPATIENT)
Dept: GERIATRIC MEDICINE | Facility: CLINIC | Age: 69
End: 2019-04-30

## 2019-04-30 DIAGNOSIS — Z79.52 LONG TERM SYSTEMIC STEROID USER: ICD-10-CM

## 2019-04-30 DIAGNOSIS — F17.210 CIGARETTE NICOTINE DEPENDENCE WITHOUT COMPLICATION: ICD-10-CM

## 2019-04-30 DIAGNOSIS — G12.21 ALS (AMYOTROPHIC LATERAL SCLEROSIS) (H): ICD-10-CM

## 2019-04-30 DIAGNOSIS — E11.65 TYPE 2 DIABETES MELLITUS WITH HYPERGLYCEMIA, WITHOUT LONG-TERM CURRENT USE OF INSULIN (H): ICD-10-CM

## 2019-04-30 DIAGNOSIS — M79.89 LEG SWELLING: ICD-10-CM

## 2019-04-30 DIAGNOSIS — L98.412 NON-PRESSURE CHRONIC ULCER OF BUTTOCK WITH FAT LAYER EXPOSED (H): ICD-10-CM

## 2019-04-30 ASSESSMENT — MIFFLIN-ST. JEOR: SCORE: 1442.11

## 2019-05-01 ENCOUNTER — HOME CARE/HOSPICE - HEALTHEAST (OUTPATIENT)
Dept: HOME HEALTH SERVICES | Facility: HOME HEALTH | Age: 69
End: 2019-05-01

## 2019-05-02 ENCOUNTER — HOME CARE/HOSPICE - HEALTHEAST (OUTPATIENT)
Dept: HOME HEALTH SERVICES | Facility: HOME HEALTH | Age: 69
End: 2019-05-02

## 2019-05-03 ENCOUNTER — COMMUNICATION - HEALTHEAST (OUTPATIENT)
Dept: INTERNAL MEDICINE | Facility: CLINIC | Age: 69
End: 2019-05-03

## 2019-05-03 ENCOUNTER — HOME CARE/HOSPICE - HEALTHEAST (OUTPATIENT)
Dept: HOME HEALTH SERVICES | Facility: HOME HEALTH | Age: 69
End: 2019-05-03

## 2019-05-05 ENCOUNTER — HOME CARE/HOSPICE - HEALTHEAST (OUTPATIENT)
Dept: HOME HEALTH SERVICES | Facility: HOME HEALTH | Age: 69
End: 2019-05-05

## 2019-05-06 ENCOUNTER — AMBULATORY - HEALTHEAST (OUTPATIENT)
Dept: INTERNAL MEDICINE | Facility: CLINIC | Age: 69
End: 2019-05-06

## 2019-05-06 DIAGNOSIS — Z12.11 COLON CANCER SCREENING: ICD-10-CM

## 2019-05-07 ENCOUNTER — HOME CARE/HOSPICE - HEALTHEAST (OUTPATIENT)
Dept: HOME HEALTH SERVICES | Facility: HOME HEALTH | Age: 69
End: 2019-05-07

## 2019-05-07 ENCOUNTER — COMMUNICATION - HEALTHEAST (OUTPATIENT)
Dept: INTERNAL MEDICINE | Facility: CLINIC | Age: 69
End: 2019-05-07

## 2019-05-08 ENCOUNTER — OFFICE VISIT - HEALTHEAST (OUTPATIENT)
Dept: INTERNAL MEDICINE | Facility: CLINIC | Age: 69
End: 2019-05-08

## 2019-05-08 ENCOUNTER — HOME CARE/HOSPICE - HEALTHEAST (OUTPATIENT)
Dept: HOME HEALTH SERVICES | Facility: HOME HEALTH | Age: 69
End: 2019-05-08

## 2019-05-08 DIAGNOSIS — S91.209A AVULSION OF TOENAIL, INITIAL ENCOUNTER: ICD-10-CM

## 2019-05-08 DIAGNOSIS — J41.1 MUCOPURULENT CHRONIC BRONCHITIS (H): ICD-10-CM

## 2019-05-08 DIAGNOSIS — L03.031 CELLULITIS OF TOE OF RIGHT FOOT: ICD-10-CM

## 2019-05-08 LAB
BASOPHILS # BLD AUTO: 0 THOU/UL (ref 0–0.2)
BASOPHILS NFR BLD AUTO: 0 % (ref 0–2)
C REACTIVE PROTEIN LHE: 0.6 MG/DL (ref 0–0.8)
EOSINOPHIL # BLD AUTO: 0.2 THOU/UL (ref 0–0.4)
EOSINOPHIL NFR BLD AUTO: 5 % (ref 0–6)
ERYTHROCYTE [DISTWIDTH] IN BLOOD BY AUTOMATED COUNT: 14.3 % (ref 11–14.5)
HCT VFR BLD AUTO: 34.1 % (ref 40–54)
HGB BLD-MCNC: 10.8 G/DL (ref 14–18)
LYMPHOCYTES # BLD AUTO: 1.5 THOU/UL (ref 0.8–4.4)
LYMPHOCYTES NFR BLD AUTO: 29 % (ref 20–40)
MCH RBC QN AUTO: 23.7 PG (ref 27–34)
MCHC RBC AUTO-ENTMCNC: 31.7 G/DL (ref 32–36)
MCV RBC AUTO: 75 FL (ref 80–100)
MONOCYTES # BLD AUTO: 0.5 THOU/UL (ref 0–0.9)
MONOCYTES NFR BLD AUTO: 9 % (ref 2–10)
NEUTROPHILS # BLD AUTO: 2.9 THOU/UL (ref 2–7.7)
NEUTROPHILS NFR BLD AUTO: 57 % (ref 50–70)
PLATELET # BLD AUTO: 359 THOU/UL (ref 140–440)
PMV BLD AUTO: 6.7 FL (ref 7–10)
RBC # BLD AUTO: 4.55 MILL/UL (ref 4.4–6.2)
WBC: 5 THOU/UL (ref 4–11)

## 2019-05-09 ENCOUNTER — COMMUNICATION - HEALTHEAST (OUTPATIENT)
Dept: HOME HEALTH SERVICES | Facility: HOME HEALTH | Age: 69
End: 2019-05-09

## 2019-05-09 ENCOUNTER — HOME CARE/HOSPICE - HEALTHEAST (OUTPATIENT)
Dept: HOME HEALTH SERVICES | Facility: HOME HEALTH | Age: 69
End: 2019-05-09

## 2019-05-09 ENCOUNTER — COMMUNICATION - HEALTHEAST (OUTPATIENT)
Dept: INTERNAL MEDICINE | Facility: CLINIC | Age: 69
End: 2019-05-09

## 2019-05-09 DIAGNOSIS — M54.2 NECK PAIN: ICD-10-CM

## 2019-05-09 DIAGNOSIS — B02.29 POSTHERPETIC NEURALGIA: ICD-10-CM

## 2019-05-09 DIAGNOSIS — J44.1 CHRONIC OBSTRUCTIVE PULMONARY DISEASE WITH ACUTE EXACERBATION (H): ICD-10-CM

## 2019-05-10 ENCOUNTER — HOME CARE/HOSPICE - HEALTHEAST (OUTPATIENT)
Dept: HOME HEALTH SERVICES | Facility: HOME HEALTH | Age: 69
End: 2019-05-10

## 2019-05-10 ENCOUNTER — AMBULATORY - HEALTHEAST (OUTPATIENT)
Dept: VASCULAR SURGERY | Facility: CLINIC | Age: 69
End: 2019-05-10

## 2019-05-11 ENCOUNTER — HOME CARE/HOSPICE - HEALTHEAST (OUTPATIENT)
Dept: HOME HEALTH SERVICES | Facility: HOME HEALTH | Age: 69
End: 2019-05-11

## 2019-05-12 ENCOUNTER — HOME CARE/HOSPICE - HEALTHEAST (OUTPATIENT)
Dept: HOME HEALTH SERVICES | Facility: HOME HEALTH | Age: 69
End: 2019-05-12

## 2019-05-13 ENCOUNTER — HOME CARE/HOSPICE - HEALTHEAST (OUTPATIENT)
Dept: HOME HEALTH SERVICES | Facility: HOME HEALTH | Age: 69
End: 2019-05-13

## 2019-05-13 ENCOUNTER — COMMUNICATION - HEALTHEAST (OUTPATIENT)
Dept: GERIATRIC MEDICINE | Facility: CLINIC | Age: 69
End: 2019-05-13

## 2019-05-14 ENCOUNTER — HOME CARE/HOSPICE - HEALTHEAST (OUTPATIENT)
Dept: HOME HEALTH SERVICES | Facility: HOME HEALTH | Age: 69
End: 2019-05-14

## 2019-05-15 ENCOUNTER — HOME CARE/HOSPICE - HEALTHEAST (OUTPATIENT)
Dept: HOME HEALTH SERVICES | Facility: HOME HEALTH | Age: 69
End: 2019-05-15

## 2019-05-16 ENCOUNTER — COMMUNICATION - HEALTHEAST (OUTPATIENT)
Dept: INTERNAL MEDICINE | Facility: CLINIC | Age: 69
End: 2019-05-16

## 2019-05-16 ENCOUNTER — HOME CARE/HOSPICE - HEALTHEAST (OUTPATIENT)
Dept: HOME HEALTH SERVICES | Facility: HOME HEALTH | Age: 69
End: 2019-05-16

## 2019-05-16 DIAGNOSIS — M48.062 LUMBAR STENOSIS WITH NEUROGENIC CLAUDICATION: ICD-10-CM

## 2019-05-16 DIAGNOSIS — L98.2 SWEET SYNDROME: ICD-10-CM

## 2019-05-17 ENCOUNTER — HOME CARE/HOSPICE - HEALTHEAST (OUTPATIENT)
Dept: HOME HEALTH SERVICES | Facility: HOME HEALTH | Age: 69
End: 2019-05-17

## 2019-05-18 ENCOUNTER — HOME CARE/HOSPICE - HEALTHEAST (OUTPATIENT)
Dept: HOME HEALTH SERVICES | Facility: HOME HEALTH | Age: 69
End: 2019-05-18

## 2019-05-19 ENCOUNTER — HOME CARE/HOSPICE - HEALTHEAST (OUTPATIENT)
Dept: HOME HEALTH SERVICES | Facility: HOME HEALTH | Age: 69
End: 2019-05-19

## 2019-05-20 ENCOUNTER — HOME CARE/HOSPICE - HEALTHEAST (OUTPATIENT)
Dept: HOME HEALTH SERVICES | Facility: HOME HEALTH | Age: 69
End: 2019-05-20

## 2019-05-22 ENCOUNTER — COMMUNICATION - HEALTHEAST (OUTPATIENT)
Dept: HOME HEALTH SERVICES | Facility: HOME HEALTH | Age: 69
End: 2019-05-22

## 2019-05-22 ENCOUNTER — HOME CARE/HOSPICE - HEALTHEAST (OUTPATIENT)
Dept: HOME HEALTH SERVICES | Facility: HOME HEALTH | Age: 69
End: 2019-05-22

## 2019-05-23 ENCOUNTER — HOME CARE/HOSPICE - HEALTHEAST (OUTPATIENT)
Dept: HOME HEALTH SERVICES | Facility: HOME HEALTH | Age: 69
End: 2019-05-23

## 2019-05-24 ENCOUNTER — HOME CARE/HOSPICE - HEALTHEAST (OUTPATIENT)
Dept: HOME HEALTH SERVICES | Facility: HOME HEALTH | Age: 69
End: 2019-05-24

## 2019-05-24 ENCOUNTER — COMMUNICATION - HEALTHEAST (OUTPATIENT)
Dept: CARE COORDINATION | Facility: CLINIC | Age: 69
End: 2019-05-24

## 2019-05-27 ENCOUNTER — HOME CARE/HOSPICE - HEALTHEAST (OUTPATIENT)
Dept: HOME HEALTH SERVICES | Facility: HOME HEALTH | Age: 69
End: 2019-05-27

## 2019-05-28 ENCOUNTER — COMMUNICATION - HEALTHEAST (OUTPATIENT)
Dept: INTERNAL MEDICINE | Facility: CLINIC | Age: 69
End: 2019-05-28

## 2019-05-28 DIAGNOSIS — J44.9 CHRONIC OBSTRUCTIVE PULMONARY DISEASE, UNSPECIFIED COPD TYPE (H): ICD-10-CM

## 2019-05-29 ENCOUNTER — COMMUNICATION - HEALTHEAST (OUTPATIENT)
Dept: CARE COORDINATION | Facility: CLINIC | Age: 69
End: 2019-05-29

## 2019-05-29 ENCOUNTER — OFFICE VISIT - HEALTHEAST (OUTPATIENT)
Dept: VASCULAR SURGERY | Facility: CLINIC | Age: 69
End: 2019-05-29

## 2019-05-29 ENCOUNTER — HOME CARE/HOSPICE - HEALTHEAST (OUTPATIENT)
Dept: HOME HEALTH SERVICES | Facility: HOME HEALTH | Age: 69
End: 2019-05-29

## 2019-05-29 DIAGNOSIS — G12.21 ALS (AMYOTROPHIC LATERAL SCLEROSIS) (H): ICD-10-CM

## 2019-05-29 DIAGNOSIS — F17.210 CIGARETTE NICOTINE DEPENDENCE WITHOUT COMPLICATION: ICD-10-CM

## 2019-05-29 DIAGNOSIS — Z99.3 WHEELCHAIR BOUND: ICD-10-CM

## 2019-05-29 DIAGNOSIS — L98.412 NON-PRESSURE CHRONIC ULCER OF BUTTOCK WITH FAT LAYER EXPOSED (H): ICD-10-CM

## 2019-05-29 DIAGNOSIS — E11.65 TYPE 2 DIABETES MELLITUS WITH HYPERGLYCEMIA, WITHOUT LONG-TERM CURRENT USE OF INSULIN (H): ICD-10-CM

## 2019-05-29 DIAGNOSIS — L89.312 PRESSURE ULCER OF RIGHT BUTTOCK, STAGE 2 (H): ICD-10-CM

## 2019-05-29 DIAGNOSIS — Z79.52 LONG TERM SYSTEMIC STEROID USER: ICD-10-CM

## 2019-05-29 DIAGNOSIS — L89.322 PRESSURE ULCER OF LEFT BUTTOCK, STAGE 2 (H): ICD-10-CM

## 2019-05-29 DIAGNOSIS — M48.062 LUMBAR STENOSIS WITH NEUROGENIC CLAUDICATION: ICD-10-CM

## 2019-05-29 DIAGNOSIS — M79.89 LEG SWELLING: ICD-10-CM

## 2019-05-29 DIAGNOSIS — L98.2 SWEET SYNDROME: ICD-10-CM

## 2019-05-31 ENCOUNTER — HOME CARE/HOSPICE - HEALTHEAST (OUTPATIENT)
Dept: HOME HEALTH SERVICES | Facility: HOME HEALTH | Age: 69
End: 2019-05-31

## 2019-06-03 ENCOUNTER — HOME CARE/HOSPICE - HEALTHEAST (OUTPATIENT)
Dept: HOME HEALTH SERVICES | Facility: HOME HEALTH | Age: 69
End: 2019-06-03

## 2019-06-03 ENCOUNTER — COMMUNICATION - HEALTHEAST (OUTPATIENT)
Dept: HOME HEALTH SERVICES | Facility: HOME HEALTH | Age: 69
End: 2019-06-03

## 2019-06-04 ENCOUNTER — HOME CARE/HOSPICE - HEALTHEAST (OUTPATIENT)
Dept: HOME HEALTH SERVICES | Facility: HOME HEALTH | Age: 69
End: 2019-06-04

## 2019-06-04 ENCOUNTER — COMMUNICATION - HEALTHEAST (OUTPATIENT)
Dept: GERIATRIC MEDICINE | Facility: CLINIC | Age: 69
End: 2019-06-04

## 2019-06-05 ENCOUNTER — RECORDS - HEALTHEAST (OUTPATIENT)
Dept: ADMINISTRATIVE | Facility: OTHER | Age: 69
End: 2019-06-05

## 2019-06-05 ENCOUNTER — HOME CARE/HOSPICE - HEALTHEAST (OUTPATIENT)
Dept: HOME HEALTH SERVICES | Facility: HOME HEALTH | Age: 69
End: 2019-06-05

## 2019-06-05 ENCOUNTER — COMMUNICATION - HEALTHEAST (OUTPATIENT)
Dept: CARE COORDINATION | Facility: CLINIC | Age: 69
End: 2019-06-05

## 2019-06-06 ENCOUNTER — RECORDS - HEALTHEAST (OUTPATIENT)
Dept: ADMINISTRATIVE | Facility: OTHER | Age: 69
End: 2019-06-06

## 2019-06-08 ENCOUNTER — COMMUNICATION - HEALTHEAST (OUTPATIENT)
Dept: INTERNAL MEDICINE | Facility: CLINIC | Age: 69
End: 2019-06-08

## 2019-06-08 ENCOUNTER — HOME CARE/HOSPICE - HEALTHEAST (OUTPATIENT)
Dept: HOME HEALTH SERVICES | Facility: HOME HEALTH | Age: 69
End: 2019-06-08

## 2019-06-08 DIAGNOSIS — J44.1 CHRONIC OBSTRUCTIVE PULMONARY DISEASE WITH ACUTE EXACERBATION (H): ICD-10-CM

## 2019-06-08 DIAGNOSIS — E11.9 DIABETES MELLITUS, TYPE 2 (H): ICD-10-CM

## 2019-06-09 ENCOUNTER — RECORDS - HEALTHEAST (OUTPATIENT)
Dept: ADMINISTRATIVE | Facility: OTHER | Age: 69
End: 2019-06-09

## 2019-06-09 ENCOUNTER — HOME CARE/HOSPICE - HEALTHEAST (OUTPATIENT)
Dept: HOME HEALTH SERVICES | Facility: HOME HEALTH | Age: 69
End: 2019-06-09

## 2019-06-10 ENCOUNTER — COMMUNICATION - HEALTHEAST (OUTPATIENT)
Dept: GERIATRIC MEDICINE | Facility: CLINIC | Age: 69
End: 2019-06-10

## 2019-06-10 ENCOUNTER — RECORDS - HEALTHEAST (OUTPATIENT)
Dept: LAB | Facility: CLINIC | Age: 69
End: 2019-06-10

## 2019-06-10 ENCOUNTER — OFFICE VISIT - HEALTHEAST (OUTPATIENT)
Dept: GERIATRICS | Facility: CLINIC | Age: 69
End: 2019-06-10

## 2019-06-10 ENCOUNTER — COMMUNICATION - HEALTHEAST (OUTPATIENT)
Dept: INTERNAL MEDICINE | Facility: CLINIC | Age: 69
End: 2019-06-10

## 2019-06-10 DIAGNOSIS — S82.402G TIBIA/FIBULA FRACTURE, LEFT, CLOSED, WITH DELAYED HEALING, SUBSEQUENT ENCOUNTER: ICD-10-CM

## 2019-06-10 DIAGNOSIS — I10 HYPERTENSION, UNSPECIFIED TYPE: ICD-10-CM

## 2019-06-10 DIAGNOSIS — J43.1 PANLOBULAR EMPHYSEMA (H): ICD-10-CM

## 2019-06-10 DIAGNOSIS — K59.00 CONSTIPATION: ICD-10-CM

## 2019-06-10 DIAGNOSIS — N40.1 BENIGN PROSTATIC HYPERPLASIA WITH LOWER URINARY TRACT SYMPTOMS, SYMPTOM DETAILS UNSPECIFIED: ICD-10-CM

## 2019-06-10 DIAGNOSIS — D64.9 ANEMIA, UNSPECIFIED TYPE: ICD-10-CM

## 2019-06-10 DIAGNOSIS — S82.202G TIBIA/FIBULA FRACTURE, LEFT, CLOSED, WITH DELAYED HEALING, SUBSEQUENT ENCOUNTER: ICD-10-CM

## 2019-06-10 DIAGNOSIS — G89.4 CHRONIC PAIN SYNDROME: ICD-10-CM

## 2019-06-10 DIAGNOSIS — G12.21 ALS (AMYOTROPHIC LATERAL SCLEROSIS) (H): ICD-10-CM

## 2019-06-10 DIAGNOSIS — E11.65 TYPE 2 DIABETES MELLITUS WITH HYPERGLYCEMIA, WITHOUT LONG-TERM CURRENT USE OF INSULIN (H): ICD-10-CM

## 2019-06-10 DIAGNOSIS — I25.10 ASHD (ARTERIOSCLEROTIC HEART DISEASE): ICD-10-CM

## 2019-06-10 DIAGNOSIS — L89.152 PRESSURE INJURY OF SACRAL REGION, STAGE 2 (H): ICD-10-CM

## 2019-06-10 LAB
ANION GAP SERPL CALCULATED.3IONS-SCNC: 5 MMOL/L (ref 5–18)
BUN SERPL-MCNC: 9 MG/DL (ref 8–22)
CALCIUM SERPL-MCNC: 8.7 MG/DL (ref 8.5–10.5)
CHLORIDE BLD-SCNC: 97 MMOL/L (ref 98–107)
CO2 SERPL-SCNC: 33 MMOL/L (ref 22–31)
CREAT SERPL-MCNC: 0.65 MG/DL (ref 0.7–1.3)
GFR SERPL CREATININE-BSD FRML MDRD: >60 ML/MIN/1.73M2
GLUCOSE BLD-MCNC: 158 MG/DL (ref 70–125)
POTASSIUM BLD-SCNC: 3.6 MMOL/L (ref 3.5–5)
SODIUM SERPL-SCNC: 135 MMOL/L (ref 136–145)

## 2019-06-11 ENCOUNTER — AMBULATORY - HEALTHEAST (OUTPATIENT)
Dept: ADMINISTRATIVE | Facility: CLINIC | Age: 69
End: 2019-06-11

## 2019-06-11 ENCOUNTER — OFFICE VISIT - HEALTHEAST (OUTPATIENT)
Dept: GERIATRICS | Facility: CLINIC | Age: 69
End: 2019-06-11

## 2019-06-11 DIAGNOSIS — I10 ESSENTIAL HYPERTENSION: ICD-10-CM

## 2019-06-11 DIAGNOSIS — G12.21 ALS (AMYOTROPHIC LATERAL SCLEROSIS) (H): ICD-10-CM

## 2019-06-11 DIAGNOSIS — E87.8 ELECTROLYTE IMBALANCE: ICD-10-CM

## 2019-06-11 DIAGNOSIS — D50.9 MICROCYTIC ANEMIA: ICD-10-CM

## 2019-06-11 DIAGNOSIS — J43.1 PANLOBULAR EMPHYSEMA (H): ICD-10-CM

## 2019-06-11 DIAGNOSIS — S82.202G TIBIA/FIBULA FRACTURE, LEFT, CLOSED, WITH DELAYED HEALING, SUBSEQUENT ENCOUNTER: ICD-10-CM

## 2019-06-11 DIAGNOSIS — E11.65 TYPE 2 DIABETES MELLITUS WITH HYPERGLYCEMIA, WITHOUT LONG-TERM CURRENT USE OF INSULIN (H): ICD-10-CM

## 2019-06-11 DIAGNOSIS — M48.062 LUMBAR STENOSIS WITH NEUROGENIC CLAUDICATION: ICD-10-CM

## 2019-06-11 DIAGNOSIS — S82.402G TIBIA/FIBULA FRACTURE, LEFT, CLOSED, WITH DELAYED HEALING, SUBSEQUENT ENCOUNTER: ICD-10-CM

## 2019-06-11 RX ORDER — BUDESONIDE 1 MG/2ML
1 INHALANT ORAL 2 TIMES DAILY
Status: SHIPPED | COMMUNITY
Start: 2019-06-11 | End: 2024-03-20

## 2019-06-12 ENCOUNTER — RECORDS - HEALTHEAST (OUTPATIENT)
Dept: LAB | Facility: CLINIC | Age: 69
End: 2019-06-12

## 2019-06-12 LAB
BASOPHILS # BLD AUTO: 0.1 THOU/UL (ref 0–0.2)
BASOPHILS NFR BLD AUTO: 1 % (ref 0–2)
EOSINOPHIL # BLD AUTO: 0.5 THOU/UL (ref 0–0.4)
EOSINOPHIL NFR BLD AUTO: 7 % (ref 0–6)
ERYTHROCYTE [DISTWIDTH] IN BLOOD BY AUTOMATED COUNT: 17.1 % (ref 11–14.5)
HCT VFR BLD AUTO: 28.1 % (ref 40–54)
HGB BLD-MCNC: 8.1 G/DL (ref 14–18)
LYMPHOCYTES # BLD AUTO: 1.4 THOU/UL (ref 0.8–4.4)
LYMPHOCYTES NFR BLD AUTO: 20 % (ref 20–40)
MCH RBC QN AUTO: 22.7 PG (ref 27–34)
MCHC RBC AUTO-ENTMCNC: 28.8 G/DL (ref 32–36)
MCV RBC AUTO: 79 FL (ref 80–100)
MONOCYTES # BLD AUTO: 0.8 THOU/UL (ref 0–0.9)
MONOCYTES NFR BLD AUTO: 11 % (ref 2–10)
NEUTROPHILS # BLD AUTO: 4.4 THOU/UL (ref 2–7.7)
NEUTROPHILS NFR BLD AUTO: 62 % (ref 50–70)
PLATELET # BLD AUTO: 441 THOU/UL (ref 140–440)
PMV BLD AUTO: 9.3 FL (ref 8.5–12.5)
RBC # BLD AUTO: 3.57 MILL/UL (ref 4.4–6.2)
WBC: 7.1 THOU/UL (ref 4–11)

## 2019-06-13 ENCOUNTER — OFFICE VISIT - HEALTHEAST (OUTPATIENT)
Dept: GERIATRICS | Facility: CLINIC | Age: 69
End: 2019-06-13

## 2019-06-13 DIAGNOSIS — J43.1 PANLOBULAR EMPHYSEMA (H): ICD-10-CM

## 2019-06-13 DIAGNOSIS — S82.202G TIBIA/FIBULA FRACTURE, LEFT, CLOSED, WITH DELAYED HEALING, SUBSEQUENT ENCOUNTER: ICD-10-CM

## 2019-06-13 DIAGNOSIS — S82.402G TIBIA/FIBULA FRACTURE, LEFT, CLOSED, WITH DELAYED HEALING, SUBSEQUENT ENCOUNTER: ICD-10-CM

## 2019-06-13 DIAGNOSIS — G12.21 ALS (AMYOTROPHIC LATERAL SCLEROSIS) (H): ICD-10-CM

## 2019-06-13 DIAGNOSIS — G89.4 CHRONIC PAIN SYNDROME: ICD-10-CM

## 2019-06-13 DIAGNOSIS — D50.9 MICROCYTIC ANEMIA: ICD-10-CM

## 2019-06-13 DIAGNOSIS — M48.062 LUMBAR STENOSIS WITH NEUROGENIC CLAUDICATION: ICD-10-CM

## 2019-06-13 LAB
BASOPHILS # BLD AUTO: 0.1 THOU/UL (ref 0–0.2)
BASOPHILS NFR BLD AUTO: 1 % (ref 0–2)
EOSINOPHIL # BLD AUTO: 0.5 THOU/UL (ref 0–0.4)
EOSINOPHIL NFR BLD AUTO: 7 % (ref 0–6)
ERYTHROCYTE [DISTWIDTH] IN BLOOD BY AUTOMATED COUNT: 17.1 % (ref 11–14.5)
HCT VFR BLD AUTO: 28.1 % (ref 40–54)
HGB BLD-MCNC: 8.1 G/DL (ref 14–18)
LYMPHOCYTES # BLD AUTO: 1.4 THOU/UL (ref 0.8–4.4)
LYMPHOCYTES NFR BLD AUTO: 20 % (ref 20–40)
MCH RBC QN AUTO: 22.7 PG (ref 27–34)
MCHC RBC AUTO-ENTMCNC: 28.8 G/DL (ref 32–36)
MCV RBC AUTO: 79 FL (ref 80–100)
MONOCYTES # BLD AUTO: 0.8 THOU/UL (ref 0–0.9)
MONOCYTES NFR BLD AUTO: 11 % (ref 2–10)
NEUTROPHILS # BLD AUTO: 4.4 THOU/UL (ref 2–7.7)
NEUTROPHILS NFR BLD AUTO: 62 % (ref 50–70)
PATH REPORT.MICROSCOPIC SPEC OTHER STN: ABNORMAL
PLAT MORPH BLD: ABNORMAL
PLATELET # BLD AUTO: 441 THOU/UL (ref 140–440)
PMV BLD AUTO: 9.3 FL (ref 8.5–12.5)
POLYCHROMASIA BLD QL SMEAR: ABNORMAL
RBC # BLD AUTO: 3.57 MILL/UL (ref 4.4–6.2)
WBC: 7.1 THOU/UL (ref 4–11)

## 2019-06-14 ENCOUNTER — RECORDS - HEALTHEAST (OUTPATIENT)
Dept: LAB | Facility: CLINIC | Age: 69
End: 2019-06-14

## 2019-06-14 LAB
25(OH)D3 SERPL-MCNC: 34.7 NG/ML (ref 30–80)
ANION GAP SERPL CALCULATED.3IONS-SCNC: 8 MMOL/L (ref 5–18)
BUN SERPL-MCNC: 14 MG/DL (ref 8–22)
CALCIUM SERPL-MCNC: 8.9 MG/DL (ref 8.5–10.5)
CHLORIDE BLD-SCNC: 100 MMOL/L (ref 98–107)
CO2 SERPL-SCNC: 31 MMOL/L (ref 22–31)
CREAT SERPL-MCNC: 0.63 MG/DL (ref 0.7–1.3)
GFR SERPL CREATININE-BSD FRML MDRD: >60 ML/MIN/1.73M2
GLUCOSE BLD-MCNC: 72 MG/DL (ref 70–125)
POTASSIUM BLD-SCNC: 4.7 MMOL/L (ref 3.5–5)
SODIUM SERPL-SCNC: 139 MMOL/L (ref 136–145)
TSH SERPL DL<=0.005 MIU/L-ACNC: 0.67 UIU/ML (ref 0.3–5)

## 2019-06-17 ENCOUNTER — COMMUNICATION - HEALTHEAST (OUTPATIENT)
Dept: HOME HEALTH SERVICES | Facility: HOME HEALTH | Age: 69
End: 2019-06-17

## 2019-06-17 ENCOUNTER — COMMUNICATION - HEALTHEAST (OUTPATIENT)
Dept: INTERNAL MEDICINE | Facility: CLINIC | Age: 69
End: 2019-06-17

## 2019-06-17 ENCOUNTER — COMMUNICATION - HEALTHEAST (OUTPATIENT)
Dept: SCHEDULING | Facility: CLINIC | Age: 69
End: 2019-06-17

## 2019-06-17 DIAGNOSIS — M48.062 LUMBAR STENOSIS WITH NEUROGENIC CLAUDICATION: ICD-10-CM

## 2019-06-17 LAB
LAB AP CHARGES (HE HISTORICAL CONVERSION): NORMAL
PATH REPORT.COMMENTS IMP SPEC: NORMAL
PATH REPORT.COMMENTS IMP SPEC: NORMAL
PATH REPORT.FINAL DX SPEC: NORMAL
PATH REPORT.MICROSCOPIC SPEC OTHER STN: NORMAL

## 2019-06-18 ENCOUNTER — COMMUNICATION - HEALTHEAST (OUTPATIENT)
Dept: GERIATRICS | Facility: CLINIC | Age: 69
End: 2019-06-18

## 2019-06-18 ENCOUNTER — AMBULATORY - HEALTHEAST (OUTPATIENT)
Dept: GERIATRICS | Facility: CLINIC | Age: 69
End: 2019-06-18

## 2019-06-18 ENCOUNTER — HOME CARE/HOSPICE - HEALTHEAST (OUTPATIENT)
Dept: HOME HEALTH SERVICES | Facility: HOME HEALTH | Age: 69
End: 2019-06-18

## 2019-06-25 ENCOUNTER — RECORDS - HEALTHEAST (OUTPATIENT)
Dept: ADMINISTRATIVE | Facility: OTHER | Age: 69
End: 2019-06-25

## 2019-06-25 ENCOUNTER — COMMUNICATION - HEALTHEAST (OUTPATIENT)
Dept: INTERNAL MEDICINE | Facility: CLINIC | Age: 69
End: 2019-06-25

## 2019-06-25 ENCOUNTER — OFFICE VISIT - HEALTHEAST (OUTPATIENT)
Dept: VASCULAR SURGERY | Facility: CLINIC | Age: 69
End: 2019-06-25

## 2019-06-25 ENCOUNTER — OFFICE VISIT - HEALTHEAST (OUTPATIENT)
Dept: INTERNAL MEDICINE | Facility: CLINIC | Age: 69
End: 2019-06-25

## 2019-06-25 DIAGNOSIS — G99.2 STENOSIS OF CERVICAL SPINE WITH MYELOPATHY (H): ICD-10-CM

## 2019-06-25 DIAGNOSIS — L89.150 PRESSURE ULCER OF COCCYGEAL REGION, UNSTAGEABLE (H): ICD-10-CM

## 2019-06-25 DIAGNOSIS — E11.65 TYPE 2 DIABETES MELLITUS WITH HYPERGLYCEMIA, WITHOUT LONG-TERM CURRENT USE OF INSULIN (H): ICD-10-CM

## 2019-06-25 DIAGNOSIS — B02.29 POSTHERPETIC NEURALGIA: ICD-10-CM

## 2019-06-25 DIAGNOSIS — Z79.52 LONG TERM SYSTEMIC STEROID USER: ICD-10-CM

## 2019-06-25 DIAGNOSIS — I25.10 ASHD (ARTERIOSCLEROTIC HEART DISEASE): ICD-10-CM

## 2019-06-25 DIAGNOSIS — F17.210 CIGARETTE NICOTINE DEPENDENCE WITHOUT COMPLICATION: ICD-10-CM

## 2019-06-25 DIAGNOSIS — M48.02 STENOSIS OF CERVICAL SPINE WITH MYELOPATHY (H): ICD-10-CM

## 2019-06-25 DIAGNOSIS — G12.21 ALS (AMYOTROPHIC LATERAL SCLEROSIS) (H): ICD-10-CM

## 2019-06-25 LAB
ANION GAP SERPL CALCULATED.3IONS-SCNC: 10 MMOL/L (ref 5–18)
BUN SERPL-MCNC: 11 MG/DL (ref 8–22)
CALCIUM SERPL-MCNC: 9.2 MG/DL (ref 8.5–10.5)
CHLORIDE BLD-SCNC: 103 MMOL/L (ref 98–107)
CO2 SERPL-SCNC: 26 MMOL/L (ref 22–31)
CREAT SERPL-MCNC: 0.66 MG/DL (ref 0.7–1.3)
GFR SERPL CREATININE-BSD FRML MDRD: >60 ML/MIN/1.73M2
GLUCOSE BLD-MCNC: 81 MG/DL (ref 70–125)
HBA1C MFR BLD: 5.1 % (ref 3.5–6)
POTASSIUM BLD-SCNC: 4.3 MMOL/L (ref 3.5–5)
SODIUM SERPL-SCNC: 139 MMOL/L (ref 136–145)

## 2019-06-26 ENCOUNTER — COMMUNICATION - HEALTHEAST (OUTPATIENT)
Dept: VASCULAR SURGERY | Facility: CLINIC | Age: 69
End: 2019-06-26

## 2019-06-27 ENCOUNTER — COMMUNICATION - HEALTHEAST (OUTPATIENT)
Dept: INTERNAL MEDICINE | Facility: CLINIC | Age: 69
End: 2019-06-27

## 2019-07-01 ENCOUNTER — COMMUNICATION - HEALTHEAST (OUTPATIENT)
Dept: INTERNAL MEDICINE | Facility: CLINIC | Age: 69
End: 2019-07-01

## 2019-07-02 ENCOUNTER — COMMUNICATION - HEALTHEAST (OUTPATIENT)
Dept: INTERNAL MEDICINE | Facility: CLINIC | Age: 69
End: 2019-07-02

## 2019-07-02 ENCOUNTER — RECORDS - HEALTHEAST (OUTPATIENT)
Dept: ADMINISTRATIVE | Facility: OTHER | Age: 69
End: 2019-07-02

## 2019-07-02 ENCOUNTER — COMMUNICATION - HEALTHEAST (OUTPATIENT)
Dept: VASCULAR SURGERY | Facility: CLINIC | Age: 69
End: 2019-07-02

## 2019-07-02 DIAGNOSIS — L98.2 SWEET SYNDROME: ICD-10-CM

## 2019-07-02 DIAGNOSIS — D50.9 IRON DEFICIENCY ANEMIA: ICD-10-CM

## 2019-07-07 ENCOUNTER — COMMUNICATION - HEALTHEAST (OUTPATIENT)
Dept: INTERNAL MEDICINE | Facility: CLINIC | Age: 69
End: 2019-07-07

## 2019-07-07 DIAGNOSIS — M48.062 LUMBAR STENOSIS WITH NEUROGENIC CLAUDICATION: ICD-10-CM

## 2019-07-08 ENCOUNTER — COMMUNICATION - HEALTHEAST (OUTPATIENT)
Dept: INTERNAL MEDICINE | Facility: CLINIC | Age: 69
End: 2019-07-08

## 2019-07-08 DIAGNOSIS — J44.1 CHRONIC OBSTRUCTIVE PULMONARY DISEASE WITH ACUTE EXACERBATION (H): ICD-10-CM

## 2019-07-08 DIAGNOSIS — E11.9 DIABETES MELLITUS, TYPE 2 (H): ICD-10-CM

## 2019-07-08 DIAGNOSIS — J44.9 COPD (CHRONIC OBSTRUCTIVE PULMONARY DISEASE) (H): ICD-10-CM

## 2019-07-09 ENCOUNTER — COMMUNICATION - HEALTHEAST (OUTPATIENT)
Dept: INTERNAL MEDICINE | Facility: CLINIC | Age: 69
End: 2019-07-09

## 2019-07-12 ENCOUNTER — COMMUNICATION - HEALTHEAST (OUTPATIENT)
Dept: INTERNAL MEDICINE | Facility: CLINIC | Age: 69
End: 2019-07-12

## 2019-07-12 DIAGNOSIS — L29.9 ITCHING: ICD-10-CM

## 2019-07-15 ENCOUNTER — AMBULATORY - HEALTHEAST (OUTPATIENT)
Dept: INTERNAL MEDICINE | Facility: CLINIC | Age: 69
End: 2019-07-15

## 2019-07-15 ENCOUNTER — COMMUNICATION - HEALTHEAST (OUTPATIENT)
Dept: INTERNAL MEDICINE | Facility: CLINIC | Age: 69
End: 2019-07-15

## 2019-07-15 DIAGNOSIS — M48.062 LUMBAR STENOSIS WITH NEUROGENIC CLAUDICATION: ICD-10-CM

## 2019-07-15 DIAGNOSIS — L89.43: ICD-10-CM

## 2019-07-15 DIAGNOSIS — L98.2 SWEET SYNDROME: ICD-10-CM

## 2019-07-15 DIAGNOSIS — J44.1 CHRONIC OBSTRUCTIVE PULMONARY DISEASE WITH ACUTE EXACERBATION (H): ICD-10-CM

## 2019-07-16 ENCOUNTER — HOME CARE/HOSPICE - HEALTHEAST (OUTPATIENT)
Dept: HOME HEALTH SERVICES | Facility: HOME HEALTH | Age: 69
End: 2019-07-16

## 2019-07-17 ENCOUNTER — AMBULATORY - HEALTHEAST (OUTPATIENT)
Dept: INTERNAL MEDICINE | Facility: CLINIC | Age: 69
End: 2019-07-17

## 2019-07-17 ENCOUNTER — COMMUNICATION - HEALTHEAST (OUTPATIENT)
Dept: HOME HEALTH SERVICES | Facility: HOME HEALTH | Age: 69
End: 2019-07-17

## 2019-07-17 DIAGNOSIS — L89.43: ICD-10-CM

## 2019-07-18 ENCOUNTER — COMMUNICATION - HEALTHEAST (OUTPATIENT)
Dept: GERIATRIC MEDICINE | Facility: CLINIC | Age: 69
End: 2019-07-18

## 2019-07-19 ENCOUNTER — COMMUNICATION - HEALTHEAST (OUTPATIENT)
Dept: HOME HEALTH SERVICES | Facility: HOME HEALTH | Age: 69
End: 2019-07-19

## 2019-07-21 ENCOUNTER — RECORDS - HEALTHEAST (OUTPATIENT)
Dept: ADMINISTRATIVE | Facility: OTHER | Age: 69
End: 2019-07-21

## 2019-07-21 ENCOUNTER — HOME CARE/HOSPICE - HEALTHEAST (OUTPATIENT)
Dept: HOME HEALTH SERVICES | Facility: HOME HEALTH | Age: 69
End: 2019-07-21

## 2019-07-22 ENCOUNTER — HOME CARE/HOSPICE - HEALTHEAST (OUTPATIENT)
Dept: HOME HEALTH SERVICES | Facility: HOME HEALTH | Age: 69
End: 2019-07-22

## 2019-07-22 ENCOUNTER — COMMUNICATION - HEALTHEAST (OUTPATIENT)
Dept: HOME HEALTH SERVICES | Facility: HOME HEALTH | Age: 69
End: 2019-07-22

## 2019-07-23 ENCOUNTER — COMMUNICATION - HEALTHEAST (OUTPATIENT)
Dept: HOME HEALTH SERVICES | Facility: HOME HEALTH | Age: 69
End: 2019-07-23

## 2019-07-23 ENCOUNTER — HOME CARE/HOSPICE - HEALTHEAST (OUTPATIENT)
Dept: HOME HEALTH SERVICES | Facility: HOME HEALTH | Age: 69
End: 2019-07-23

## 2019-07-23 ENCOUNTER — AMBULATORY - HEALTHEAST (OUTPATIENT)
Dept: INTERNAL MEDICINE | Facility: CLINIC | Age: 69
End: 2019-07-23

## 2019-07-24 ENCOUNTER — RECORDS - HEALTHEAST (OUTPATIENT)
Dept: ADMINISTRATIVE | Facility: OTHER | Age: 69
End: 2019-07-24

## 2019-07-24 ENCOUNTER — HOME CARE/HOSPICE - HEALTHEAST (OUTPATIENT)
Dept: HOME HEALTH SERVICES | Facility: HOME HEALTH | Age: 69
End: 2019-07-24

## 2019-07-25 ENCOUNTER — HOME CARE/HOSPICE - HEALTHEAST (OUTPATIENT)
Dept: HOME HEALTH SERVICES | Facility: HOME HEALTH | Age: 69
End: 2019-07-25

## 2019-07-26 ENCOUNTER — HOME CARE/HOSPICE - HEALTHEAST (OUTPATIENT)
Dept: HOME HEALTH SERVICES | Facility: HOME HEALTH | Age: 69
End: 2019-07-26

## 2019-07-29 ENCOUNTER — HOME CARE/HOSPICE - HEALTHEAST (OUTPATIENT)
Dept: HOME HEALTH SERVICES | Facility: HOME HEALTH | Age: 69
End: 2019-07-29

## 2019-07-30 ENCOUNTER — HOME CARE/HOSPICE - HEALTHEAST (OUTPATIENT)
Dept: HOME HEALTH SERVICES | Facility: HOME HEALTH | Age: 69
End: 2019-07-30

## 2019-07-31 ENCOUNTER — HOME CARE/HOSPICE - HEALTHEAST (OUTPATIENT)
Dept: HOME HEALTH SERVICES | Facility: HOME HEALTH | Age: 69
End: 2019-07-31

## 2019-08-01 ENCOUNTER — COMMUNICATION - HEALTHEAST (OUTPATIENT)
Dept: HOME HEALTH SERVICES | Facility: HOME HEALTH | Age: 69
End: 2019-08-01

## 2019-08-01 ENCOUNTER — COMMUNICATION - HEALTHEAST (OUTPATIENT)
Dept: CARE COORDINATION | Facility: CLINIC | Age: 69
End: 2019-08-01

## 2019-08-01 ENCOUNTER — HOME CARE/HOSPICE - HEALTHEAST (OUTPATIENT)
Dept: HOME HEALTH SERVICES | Facility: HOME HEALTH | Age: 69
End: 2019-08-01

## 2019-08-02 ENCOUNTER — HOME CARE/HOSPICE - HEALTHEAST (OUTPATIENT)
Dept: HOME HEALTH SERVICES | Facility: HOME HEALTH | Age: 69
End: 2019-08-02

## 2019-08-06 ENCOUNTER — HOME CARE/HOSPICE - HEALTHEAST (OUTPATIENT)
Dept: HOME HEALTH SERVICES | Facility: HOME HEALTH | Age: 69
End: 2019-08-06

## 2019-08-07 ENCOUNTER — COMMUNICATION - HEALTHEAST (OUTPATIENT)
Dept: INTERNAL MEDICINE | Facility: CLINIC | Age: 69
End: 2019-08-07

## 2019-08-07 ENCOUNTER — HOME CARE/HOSPICE - HEALTHEAST (OUTPATIENT)
Dept: HOME HEALTH SERVICES | Facility: HOME HEALTH | Age: 69
End: 2019-08-07

## 2019-08-07 ENCOUNTER — RECORDS - HEALTHEAST (OUTPATIENT)
Dept: ADMINISTRATIVE | Facility: OTHER | Age: 69
End: 2019-08-07

## 2019-08-07 DIAGNOSIS — J44.1 CHRONIC OBSTRUCTIVE PULMONARY DISEASE WITH ACUTE EXACERBATION (H): ICD-10-CM

## 2019-08-07 DIAGNOSIS — B02.29 POSTHERPETIC NEURALGIA: ICD-10-CM

## 2019-08-07 LAB
ALBUMIN (URINE) MG/SPEC: <5 MG/L
CREATININE (URINE): 0.54 G/L
HBA1C MFR BLD: 5.3 % (ref 0–5.6)
LDLC SERPL CALC-MCNC: 26 MG/DL

## 2019-08-08 ENCOUNTER — HOME CARE/HOSPICE - HEALTHEAST (OUTPATIENT)
Dept: HOME HEALTH SERVICES | Facility: HOME HEALTH | Age: 69
End: 2019-08-08

## 2019-08-08 ENCOUNTER — COMMUNICATION - HEALTHEAST (OUTPATIENT)
Dept: INTERNAL MEDICINE | Facility: CLINIC | Age: 69
End: 2019-08-08

## 2019-08-08 DIAGNOSIS — M48.062 LUMBAR STENOSIS WITH NEUROGENIC CLAUDICATION: ICD-10-CM

## 2019-08-09 ENCOUNTER — HOME CARE/HOSPICE - HEALTHEAST (OUTPATIENT)
Dept: HOME HEALTH SERVICES | Facility: HOME HEALTH | Age: 69
End: 2019-08-09

## 2019-08-09 ENCOUNTER — OFFICE VISIT - HEALTHEAST (OUTPATIENT)
Dept: VASCULAR SURGERY | Facility: CLINIC | Age: 69
End: 2019-08-09

## 2019-08-09 DIAGNOSIS — E11.65 TYPE 2 DIABETES MELLITUS WITH HYPERGLYCEMIA, WITHOUT LONG-TERM CURRENT USE OF INSULIN (H): ICD-10-CM

## 2019-08-09 DIAGNOSIS — Z79.52 LONG TERM SYSTEMIC STEROID USER: ICD-10-CM

## 2019-08-09 DIAGNOSIS — G12.21 ALS (AMYOTROPHIC LATERAL SCLEROSIS) (H): ICD-10-CM

## 2019-08-09 DIAGNOSIS — L89.150 PRESSURE ULCER OF COCCYGEAL REGION, UNSTAGEABLE (H): ICD-10-CM

## 2019-08-09 DIAGNOSIS — F17.210 CIGARETTE NICOTINE DEPENDENCE WITHOUT COMPLICATION: ICD-10-CM

## 2019-08-09 ASSESSMENT — MIFFLIN-ST. JEOR: SCORE: 1442.11

## 2019-08-12 ENCOUNTER — HOME CARE/HOSPICE - HEALTHEAST (OUTPATIENT)
Dept: HOME HEALTH SERVICES | Facility: HOME HEALTH | Age: 69
End: 2019-08-12

## 2019-08-13 ENCOUNTER — HOME CARE/HOSPICE - HEALTHEAST (OUTPATIENT)
Dept: HOME HEALTH SERVICES | Facility: HOME HEALTH | Age: 69
End: 2019-08-13

## 2019-08-13 ENCOUNTER — COMMUNICATION - HEALTHEAST (OUTPATIENT)
Dept: RADIATION ONCOLOGY | Facility: HOSPITAL | Age: 69
End: 2019-08-13

## 2019-08-13 ENCOUNTER — COMMUNICATION - HEALTHEAST (OUTPATIENT)
Dept: INTERNAL MEDICINE | Facility: CLINIC | Age: 69
End: 2019-08-13

## 2019-08-13 DIAGNOSIS — M48.062 LUMBAR STENOSIS WITH NEUROGENIC CLAUDICATION: ICD-10-CM

## 2019-08-14 ENCOUNTER — HOME CARE/HOSPICE - HEALTHEAST (OUTPATIENT)
Dept: HOME HEALTH SERVICES | Facility: HOME HEALTH | Age: 69
End: 2019-08-14

## 2019-08-14 ENCOUNTER — RECORDS - HEALTHEAST (OUTPATIENT)
Dept: HEALTH INFORMATION MANAGEMENT | Facility: CLINIC | Age: 69
End: 2019-08-14

## 2019-08-15 ENCOUNTER — HOME CARE/HOSPICE - HEALTHEAST (OUTPATIENT)
Dept: HOME HEALTH SERVICES | Facility: HOME HEALTH | Age: 69
End: 2019-08-15

## 2019-08-15 ENCOUNTER — COMMUNICATION - HEALTHEAST (OUTPATIENT)
Dept: GERIATRIC MEDICINE | Facility: CLINIC | Age: 69
End: 2019-08-15

## 2019-08-15 ENCOUNTER — COMMUNICATION - HEALTHEAST (OUTPATIENT)
Dept: HOME HEALTH SERVICES | Facility: HOME HEALTH | Age: 69
End: 2019-08-15

## 2019-08-16 ENCOUNTER — HOME CARE/HOSPICE - HEALTHEAST (OUTPATIENT)
Dept: HOME HEALTH SERVICES | Facility: HOME HEALTH | Age: 69
End: 2019-08-16

## 2019-08-20 ENCOUNTER — COMMUNICATION - HEALTHEAST (OUTPATIENT)
Dept: HOME HEALTH SERVICES | Facility: HOME HEALTH | Age: 69
End: 2019-08-20

## 2019-08-20 ENCOUNTER — HOME CARE/HOSPICE - HEALTHEAST (OUTPATIENT)
Dept: HOME HEALTH SERVICES | Facility: HOME HEALTH | Age: 69
End: 2019-08-20

## 2019-08-21 ENCOUNTER — COMMUNICATION - HEALTHEAST (OUTPATIENT)
Dept: GERIATRIC MEDICINE | Facility: CLINIC | Age: 69
End: 2019-08-21

## 2019-08-22 ENCOUNTER — HOME CARE/HOSPICE - HEALTHEAST (OUTPATIENT)
Dept: HOME HEALTH SERVICES | Facility: HOME HEALTH | Age: 69
End: 2019-08-22

## 2019-08-27 ENCOUNTER — AMBULATORY - HEALTHEAST (OUTPATIENT)
Dept: OTHER | Facility: CLINIC | Age: 69
End: 2019-08-27

## 2019-08-28 ENCOUNTER — COMMUNICATION - HEALTHEAST (OUTPATIENT)
Dept: GERIATRIC MEDICINE | Facility: CLINIC | Age: 69
End: 2019-08-28

## 2019-08-28 ENCOUNTER — OFFICE VISIT - HEALTHEAST (OUTPATIENT)
Dept: INTERNAL MEDICINE | Facility: CLINIC | Age: 69
End: 2019-08-28

## 2019-08-28 DIAGNOSIS — M54.2 NECK PAIN OF OVER 3 MONTHS DURATION: ICD-10-CM

## 2019-08-28 DIAGNOSIS — E11.65 TYPE 2 DIABETES MELLITUS WITH HYPERGLYCEMIA, WITHOUT LONG-TERM CURRENT USE OF INSULIN (H): ICD-10-CM

## 2019-08-28 DIAGNOSIS — M48.02 STENOSIS OF CERVICAL SPINE WITH MYELOPATHY (H): ICD-10-CM

## 2019-08-28 DIAGNOSIS — D50.9 IRON DEFICIENCY ANEMIA: ICD-10-CM

## 2019-08-28 DIAGNOSIS — G99.2 STENOSIS OF CERVICAL SPINE WITH MYELOPATHY (H): ICD-10-CM

## 2019-08-28 DIAGNOSIS — K59.09 CHRONIC CONSTIPATION: ICD-10-CM

## 2019-08-28 LAB
ALBUMIN SERPL-MCNC: 3.3 G/DL (ref 3.5–5)
ALP SERPL-CCNC: 83 U/L (ref 45–120)
ALT SERPL W P-5'-P-CCNC: 15 U/L (ref 0–45)
ANION GAP SERPL CALCULATED.3IONS-SCNC: 9 MMOL/L (ref 5–18)
AST SERPL W P-5'-P-CCNC: 14 U/L (ref 0–40)
BILIRUB SERPL-MCNC: 0.3 MG/DL (ref 0–1)
BUN SERPL-MCNC: 11 MG/DL (ref 8–22)
CALCIUM SERPL-MCNC: 8.6 MG/DL (ref 8.5–10.5)
CHLORIDE BLD-SCNC: 105 MMOL/L (ref 98–107)
CO2 SERPL-SCNC: 26 MMOL/L (ref 22–31)
CREAT SERPL-MCNC: 0.67 MG/DL (ref 0.7–1.3)
ERYTHROCYTE [DISTWIDTH] IN BLOOD BY AUTOMATED COUNT: 15 % (ref 11–14.5)
GFR SERPL CREATININE-BSD FRML MDRD: >60 ML/MIN/1.73M2
GLUCOSE BLD-MCNC: 75 MG/DL (ref 70–125)
HCT VFR BLD AUTO: 28.6 % (ref 40–54)
HGB BLD-MCNC: 8.7 G/DL (ref 14–18)
MCH RBC QN AUTO: 20.4 PG (ref 27–34)
MCHC RBC AUTO-ENTMCNC: 30.5 G/DL (ref 32–36)
MCV RBC AUTO: 67 FL (ref 80–100)
PLATELET # BLD AUTO: 449 THOU/UL (ref 140–440)
PMV BLD AUTO: 6.9 FL (ref 7–10)
POTASSIUM BLD-SCNC: 4.4 MMOL/L (ref 3.5–5)
PROT SERPL-MCNC: 6 G/DL (ref 6–8)
RBC # BLD AUTO: 4.27 MILL/UL (ref 4.4–6.2)
SODIUM SERPL-SCNC: 140 MMOL/L (ref 136–145)
WBC: 6 THOU/UL (ref 4–11)

## 2019-08-28 ASSESSMENT — MIFFLIN-ST. JEOR: SCORE: 1442.11

## 2019-08-29 ENCOUNTER — COMMUNICATION - HEALTHEAST (OUTPATIENT)
Dept: INTERNAL MEDICINE | Facility: CLINIC | Age: 69
End: 2019-08-29

## 2019-09-02 ENCOUNTER — COMMUNICATION - HEALTHEAST (OUTPATIENT)
Dept: SCHEDULING | Facility: CLINIC | Age: 69
End: 2019-09-02

## 2019-09-03 ENCOUNTER — COMMUNICATION - HEALTHEAST (OUTPATIENT)
Dept: INTERNAL MEDICINE | Facility: CLINIC | Age: 69
End: 2019-09-03

## 2019-09-03 DIAGNOSIS — L98.2 SWEET SYNDROME: ICD-10-CM

## 2019-09-03 DIAGNOSIS — M48.062 LUMBAR STENOSIS WITH NEUROGENIC CLAUDICATION: ICD-10-CM

## 2019-09-06 ENCOUNTER — COMMUNICATION - HEALTHEAST (OUTPATIENT)
Dept: INTERNAL MEDICINE | Facility: CLINIC | Age: 69
End: 2019-09-06

## 2019-09-06 DIAGNOSIS — E11.9 DIABETES MELLITUS, TYPE 2 (H): ICD-10-CM

## 2019-09-06 DIAGNOSIS — B02.29 POSTHERPETIC NEURALGIA: ICD-10-CM

## 2019-09-07 ENCOUNTER — COMMUNICATION - HEALTHEAST (OUTPATIENT)
Dept: INTERNAL MEDICINE | Facility: CLINIC | Age: 69
End: 2019-09-07

## 2019-09-07 DIAGNOSIS — J44.1 CHRONIC OBSTRUCTIVE PULMONARY DISEASE WITH ACUTE EXACERBATION (H): ICD-10-CM

## 2019-09-18 ENCOUNTER — COMMUNICATION - HEALTHEAST (OUTPATIENT)
Dept: GERIATRIC MEDICINE | Facility: CLINIC | Age: 69
End: 2019-09-18

## 2019-09-23 ENCOUNTER — COMMUNICATION - HEALTHEAST (OUTPATIENT)
Dept: INTERNAL MEDICINE | Facility: CLINIC | Age: 69
End: 2019-09-23

## 2019-09-23 DIAGNOSIS — M48.062 LUMBAR STENOSIS WITH NEUROGENIC CLAUDICATION: ICD-10-CM

## 2019-09-30 ENCOUNTER — COMMUNICATION - HEALTHEAST (OUTPATIENT)
Dept: INTERNAL MEDICINE | Facility: CLINIC | Age: 69
End: 2019-09-30

## 2019-09-30 ENCOUNTER — RECORDS - HEALTHEAST (OUTPATIENT)
Dept: ADMINISTRATIVE | Facility: OTHER | Age: 69
End: 2019-09-30

## 2019-09-30 DIAGNOSIS — M48.062 LUMBAR STENOSIS WITH NEUROGENIC CLAUDICATION: ICD-10-CM

## 2019-10-01 ENCOUNTER — COMMUNICATION - HEALTHEAST (OUTPATIENT)
Dept: INTERNAL MEDICINE | Facility: CLINIC | Age: 69
End: 2019-10-01

## 2019-10-02 ENCOUNTER — COMMUNICATION - HEALTHEAST (OUTPATIENT)
Dept: GERIATRIC MEDICINE | Facility: CLINIC | Age: 69
End: 2019-10-02

## 2019-10-02 ENCOUNTER — COMMUNICATION - HEALTHEAST (OUTPATIENT)
Dept: INTERNAL MEDICINE | Facility: CLINIC | Age: 69
End: 2019-10-02

## 2019-10-02 DIAGNOSIS — M48.062 LUMBAR STENOSIS WITH NEUROGENIC CLAUDICATION: ICD-10-CM

## 2019-10-04 ENCOUNTER — COMMUNICATION - HEALTHEAST (OUTPATIENT)
Dept: INTERNAL MEDICINE | Facility: CLINIC | Age: 69
End: 2019-10-04

## 2019-10-04 DIAGNOSIS — J44.1 CHRONIC OBSTRUCTIVE PULMONARY DISEASE WITH ACUTE EXACERBATION (H): ICD-10-CM

## 2019-10-04 DIAGNOSIS — J44.9 COPD (CHRONIC OBSTRUCTIVE PULMONARY DISEASE) (H): ICD-10-CM

## 2019-10-06 ENCOUNTER — COMMUNICATION - HEALTHEAST (OUTPATIENT)
Dept: INTERNAL MEDICINE | Facility: CLINIC | Age: 69
End: 2019-10-06

## 2019-10-06 DIAGNOSIS — K59.01 SLOW TRANSIT CONSTIPATION: ICD-10-CM

## 2019-10-06 DIAGNOSIS — J44.1 CHRONIC OBSTRUCTIVE PULMONARY DISEASE WITH ACUTE EXACERBATION (H): ICD-10-CM

## 2019-10-06 DIAGNOSIS — D50.9 IRON DEFICIENCY ANEMIA: ICD-10-CM

## 2019-10-07 ENCOUNTER — COMMUNICATION - HEALTHEAST (OUTPATIENT)
Dept: GERIATRIC MEDICINE | Facility: CLINIC | Age: 69
End: 2019-10-07

## 2019-10-08 ENCOUNTER — OFFICE VISIT - HEALTHEAST (OUTPATIENT)
Dept: INTERNAL MEDICINE | Facility: CLINIC | Age: 69
End: 2019-10-08

## 2019-10-08 ENCOUNTER — RECORDS - HEALTHEAST (OUTPATIENT)
Dept: ADMINISTRATIVE | Facility: OTHER | Age: 69
End: 2019-10-08

## 2019-10-08 DIAGNOSIS — L29.9 ITCHING: ICD-10-CM

## 2019-10-08 DIAGNOSIS — G89.29 CHRONIC NECK PAIN: ICD-10-CM

## 2019-10-08 DIAGNOSIS — J44.9 COPD (CHRONIC OBSTRUCTIVE PULMONARY DISEASE) (H): ICD-10-CM

## 2019-10-08 DIAGNOSIS — Z23 NEED FOR VACCINATION: ICD-10-CM

## 2019-10-08 DIAGNOSIS — M54.2 CHRONIC NECK PAIN: ICD-10-CM

## 2019-10-08 DIAGNOSIS — M77.8 TENDINITIS OF LEFT SHOULDER: ICD-10-CM

## 2019-10-08 DIAGNOSIS — J44.1 CHRONIC OBSTRUCTIVE PULMONARY DISEASE WITH ACUTE EXACERBATION (H): ICD-10-CM

## 2019-10-28 ENCOUNTER — COMMUNICATION - HEALTHEAST (OUTPATIENT)
Dept: SCHEDULING | Facility: CLINIC | Age: 69
End: 2019-10-28

## 2019-10-28 ENCOUNTER — COMMUNICATION - HEALTHEAST (OUTPATIENT)
Dept: INTERNAL MEDICINE | Facility: CLINIC | Age: 69
End: 2019-10-28

## 2019-10-28 DIAGNOSIS — M54.2 CHRONIC NECK PAIN: ICD-10-CM

## 2019-10-28 DIAGNOSIS — M48.062 LUMBAR STENOSIS WITH NEUROGENIC CLAUDICATION: ICD-10-CM

## 2019-10-28 DIAGNOSIS — L98.2 SWEET SYNDROME: ICD-10-CM

## 2019-10-28 DIAGNOSIS — G89.29 CHRONIC NECK PAIN: ICD-10-CM

## 2019-11-05 ENCOUNTER — COMMUNICATION - HEALTHEAST (OUTPATIENT)
Dept: INTERNAL MEDICINE | Facility: CLINIC | Age: 69
End: 2019-11-05

## 2019-11-05 ENCOUNTER — COMMUNICATION - HEALTHEAST (OUTPATIENT)
Dept: GERIATRIC MEDICINE | Facility: CLINIC | Age: 69
End: 2019-11-05

## 2019-11-05 DIAGNOSIS — I25.10 ASHD (ARTERIOSCLEROTIC HEART DISEASE): ICD-10-CM

## 2019-11-07 ENCOUNTER — COMMUNICATION - HEALTHEAST (OUTPATIENT)
Dept: GERIATRIC MEDICINE | Facility: CLINIC | Age: 69
End: 2019-11-07

## 2019-11-14 ENCOUNTER — COMMUNICATION - HEALTHEAST (OUTPATIENT)
Dept: GERIATRIC MEDICINE | Facility: CLINIC | Age: 69
End: 2019-11-14

## 2019-11-15 ENCOUNTER — COMMUNICATION - HEALTHEAST (OUTPATIENT)
Dept: GERIATRIC MEDICINE | Facility: CLINIC | Age: 69
End: 2019-11-15

## 2019-11-15 ENCOUNTER — AMBULATORY - HEALTHEAST (OUTPATIENT)
Dept: OTHER | Facility: CLINIC | Age: 69
End: 2019-11-15

## 2019-11-18 ENCOUNTER — COMMUNICATION - HEALTHEAST (OUTPATIENT)
Dept: GERIATRIC MEDICINE | Facility: CLINIC | Age: 69
End: 2019-11-18

## 2019-11-21 ENCOUNTER — COMMUNICATION - HEALTHEAST (OUTPATIENT)
Dept: INTERNAL MEDICINE | Facility: CLINIC | Age: 69
End: 2019-11-21

## 2019-11-21 DIAGNOSIS — L98.2 SWEET SYNDROME: ICD-10-CM

## 2019-11-21 DIAGNOSIS — B37.0 THRUSH: ICD-10-CM

## 2019-11-21 DIAGNOSIS — D50.9 IRON DEFICIENCY ANEMIA: ICD-10-CM

## 2019-11-21 DIAGNOSIS — M48.062 LUMBAR STENOSIS WITH NEUROGENIC CLAUDICATION: ICD-10-CM

## 2019-11-28 ENCOUNTER — COMMUNICATION - HEALTHEAST (OUTPATIENT)
Dept: INTERNAL MEDICINE | Facility: CLINIC | Age: 69
End: 2019-11-28

## 2019-11-28 DIAGNOSIS — J44.1 CHRONIC OBSTRUCTIVE PULMONARY DISEASE WITH ACUTE EXACERBATION (H): ICD-10-CM

## 2019-12-01 ENCOUNTER — COMMUNICATION - HEALTHEAST (OUTPATIENT)
Dept: INTERNAL MEDICINE | Facility: CLINIC | Age: 69
End: 2019-12-01

## 2019-12-01 DIAGNOSIS — M48.062 LUMBAR STENOSIS WITH NEUROGENIC CLAUDICATION: ICD-10-CM

## 2019-12-16 ENCOUNTER — COMMUNICATION - HEALTHEAST (OUTPATIENT)
Dept: GERIATRIC MEDICINE | Facility: CLINIC | Age: 69
End: 2019-12-16

## 2019-12-16 ENCOUNTER — RECORDS - HEALTHEAST (OUTPATIENT)
Dept: ADMINISTRATIVE | Facility: OTHER | Age: 69
End: 2019-12-16

## 2019-12-23 ENCOUNTER — COMMUNICATION - HEALTHEAST (OUTPATIENT)
Dept: INTERNAL MEDICINE | Facility: CLINIC | Age: 69
End: 2019-12-23

## 2019-12-23 ENCOUNTER — RECORDS - HEALTHEAST (OUTPATIENT)
Dept: HEALTH INFORMATION MANAGEMENT | Facility: CLINIC | Age: 69
End: 2019-12-23

## 2019-12-23 DIAGNOSIS — M48.062 SPINAL STENOSIS OF LUMBAR REGION WITH NEUROGENIC CLAUDICATION: ICD-10-CM

## 2019-12-23 DIAGNOSIS — G12.21 ALS (AMYOTROPHIC LATERAL SCLEROSIS) (H): ICD-10-CM

## 2019-12-24 ENCOUNTER — OFFICE VISIT - HEALTHEAST (OUTPATIENT)
Dept: INTERNAL MEDICINE | Facility: CLINIC | Age: 69
End: 2019-12-24

## 2019-12-24 ENCOUNTER — COMMUNICATION - HEALTHEAST (OUTPATIENT)
Dept: INTERNAL MEDICINE | Facility: CLINIC | Age: 69
End: 2019-12-24

## 2019-12-24 DIAGNOSIS — M48.062 LUMBAR STENOSIS WITH NEUROGENIC CLAUDICATION: ICD-10-CM

## 2019-12-24 DIAGNOSIS — I25.10 ASHD (ARTERIOSCLEROTIC HEART DISEASE): ICD-10-CM

## 2019-12-24 DIAGNOSIS — Z12.5 SCREENING FOR PROSTATE CANCER: ICD-10-CM

## 2019-12-24 DIAGNOSIS — B37.0 THRUSH: ICD-10-CM

## 2019-12-24 DIAGNOSIS — Z79.899 MEDICATION MANAGEMENT: ICD-10-CM

## 2019-12-24 DIAGNOSIS — J44.9 COPD (CHRONIC OBSTRUCTIVE PULMONARY DISEASE) (H): ICD-10-CM

## 2019-12-24 DIAGNOSIS — M54.2 CHRONIC NECK PAIN: ICD-10-CM

## 2019-12-24 DIAGNOSIS — J44.9 CHRONIC OBSTRUCTIVE PULMONARY DISEASE, UNSPECIFIED COPD TYPE (H): ICD-10-CM

## 2019-12-24 DIAGNOSIS — G89.29 CHRONIC NECK PAIN: ICD-10-CM

## 2019-12-24 DIAGNOSIS — M54.2 NECK PAIN OF OVER 3 MONTHS DURATION: ICD-10-CM

## 2019-12-24 DIAGNOSIS — Z00.00 ROUTINE GENERAL MEDICAL EXAMINATION AT A HEALTH CARE FACILITY: ICD-10-CM

## 2019-12-24 DIAGNOSIS — L98.2 SWEET SYNDROME: ICD-10-CM

## 2019-12-24 DIAGNOSIS — B02.29 POSTHERPETIC NEURALGIA: ICD-10-CM

## 2019-12-24 DIAGNOSIS — E11.65 TYPE 2 DIABETES MELLITUS WITH HYPERGLYCEMIA, WITHOUT LONG-TERM CURRENT USE OF INSULIN (H): ICD-10-CM

## 2019-12-24 DIAGNOSIS — J44.1 CHRONIC OBSTRUCTIVE PULMONARY DISEASE WITH ACUTE EXACERBATION (H): ICD-10-CM

## 2019-12-24 DIAGNOSIS — K59.09 CHRONIC CONSTIPATION: ICD-10-CM

## 2019-12-24 DIAGNOSIS — R35.0 BENIGN PROSTATIC HYPERPLASIA WITH URINARY FREQUENCY: ICD-10-CM

## 2019-12-24 DIAGNOSIS — I50.9 EDEMA DUE TO CONGESTIVE HEART FAILURE (H): ICD-10-CM

## 2019-12-24 DIAGNOSIS — J43.1 PANLOBULAR EMPHYSEMA (H): ICD-10-CM

## 2019-12-24 DIAGNOSIS — N40.1 BENIGN PROSTATIC HYPERPLASIA WITH URINARY FREQUENCY: ICD-10-CM

## 2019-12-24 DIAGNOSIS — L29.9 ITCHING: ICD-10-CM

## 2019-12-24 DIAGNOSIS — M17.11 PRIMARY OSTEOARTHRITIS OF RIGHT KNEE: ICD-10-CM

## 2019-12-24 DIAGNOSIS — D50.9 IRON DEFICIENCY ANEMIA: ICD-10-CM

## 2019-12-24 DIAGNOSIS — G12.21 ALS (AMYOTROPHIC LATERAL SCLEROSIS) (H): ICD-10-CM

## 2019-12-24 LAB
ALBUMIN SERPL-MCNC: 3.6 G/DL (ref 3.5–5)
ALP SERPL-CCNC: 85 U/L (ref 45–120)
ALT SERPL W P-5'-P-CCNC: 11 U/L (ref 0–45)
ANION GAP SERPL CALCULATED.3IONS-SCNC: 10 MMOL/L (ref 5–18)
AST SERPL W P-5'-P-CCNC: 12 U/L (ref 0–40)
BASOPHILS # BLD AUTO: 0 THOU/UL (ref 0–0.2)
BASOPHILS NFR BLD AUTO: 0 % (ref 0–2)
BILIRUB SERPL-MCNC: 0.2 MG/DL (ref 0–1)
BUN SERPL-MCNC: 11 MG/DL (ref 8–22)
CALCIUM SERPL-MCNC: 9 MG/DL (ref 8.5–10.5)
CHLORIDE BLD-SCNC: 102 MMOL/L (ref 98–107)
CHOLEST SERPL-MCNC: 97 MG/DL
CO2 SERPL-SCNC: 28 MMOL/L (ref 22–31)
CREAT SERPL-MCNC: 0.71 MG/DL (ref 0.7–1.3)
CREAT UR-MCNC: 170.1 MG/DL
EOSINOPHIL # BLD AUTO: 0.1 THOU/UL (ref 0–0.4)
EOSINOPHIL NFR BLD AUTO: 1 % (ref 0–6)
ERYTHROCYTE [DISTWIDTH] IN BLOOD BY AUTOMATED COUNT: 19.3 % (ref 11–14.5)
FASTING STATUS PATIENT QL REPORTED: YES
GFR SERPL CREATININE-BSD FRML MDRD: >60 ML/MIN/1.73M2
GLUCOSE BLD-MCNC: 136 MG/DL (ref 70–125)
HBA1C MFR BLD: 5.3 % (ref 3.5–6)
HCT VFR BLD AUTO: 30.4 % (ref 40–54)
HDLC SERPL-MCNC: 48 MG/DL
HGB BLD-MCNC: 8.7 G/DL (ref 14–18)
LDLC SERPL CALC-MCNC: 36 MG/DL
LYMPHOCYTES # BLD AUTO: 0.7 THOU/UL (ref 0.8–4.4)
LYMPHOCYTES NFR BLD AUTO: 14 % (ref 20–40)
MCH RBC QN AUTO: 19.6 PG (ref 27–34)
MCHC RBC AUTO-ENTMCNC: 28.6 G/DL (ref 32–36)
MCV RBC AUTO: 69 FL (ref 80–100)
MICROALBUMIN UR-MCNC: 1.81 MG/DL (ref 0–1.99)
MICROALBUMIN/CREAT UR: 10.6 MG/G
MONOCYTES # BLD AUTO: 0.3 THOU/UL (ref 0–0.9)
MONOCYTES NFR BLD AUTO: 6 % (ref 2–10)
NEUTROPHILS # BLD AUTO: 3.9 THOU/UL (ref 2–7.7)
NEUTROPHILS NFR BLD AUTO: 78 % (ref 50–70)
PLATELET # BLD AUTO: 390 THOU/UL (ref 140–440)
PMV BLD AUTO: 8.9 FL (ref 8.5–12.5)
POTASSIUM BLD-SCNC: 3.8 MMOL/L (ref 3.5–5)
PROT SERPL-MCNC: 6.5 G/DL (ref 6–8)
PSA SERPL-MCNC: 3.5 NG/ML (ref 0–4.5)
RBC # BLD AUTO: 4.43 MILL/UL (ref 4.4–6.2)
SODIUM SERPL-SCNC: 140 MMOL/L (ref 136–145)
TRIGL SERPL-MCNC: 64 MG/DL
WBC: 5 THOU/UL (ref 4–11)

## 2019-12-24 RX ORDER — AMOXICILLIN 250 MG
2 CAPSULE ORAL DAILY PRN
Qty: 30 TABLET | Refills: 3 | Status: SHIPPED | OUTPATIENT
Start: 2019-12-24 | End: 2024-03-20

## 2019-12-24 RX ORDER — IPRATROPIUM BROMIDE AND ALBUTEROL SULFATE 2.5; .5 MG/3ML; MG/3ML
3 SOLUTION RESPIRATORY (INHALATION) EVERY 4 HOURS PRN
Qty: 30 VIAL | Refills: 3 | Status: SHIPPED | OUTPATIENT
Start: 2019-12-24 | End: 2024-03-20

## 2019-12-24 RX ORDER — LEVALBUTEROL TARTRATE 45 UG/1
2 AEROSOL, METERED ORAL EVERY 4 HOURS PRN
Qty: 5 INHALER | Refills: 3 | Status: SHIPPED | OUTPATIENT
Start: 2019-12-24 | End: 2024-03-20

## 2019-12-24 RX ORDER — FUROSEMIDE 20 MG
40 TABLET ORAL 2 TIMES DAILY
Qty: 180 TABLET | Refills: 3 | Status: SHIPPED | OUTPATIENT
Start: 2019-12-24 | End: 2021-09-01

## 2019-12-24 RX ORDER — NYSTATIN 100000/ML
500000 SUSPENSION, ORAL (FINAL DOSE FORM) ORAL 4 TIMES DAILY
Qty: 200 ML | Refills: 0 | Status: SHIPPED | OUTPATIENT
Start: 2019-12-24 | End: 2021-08-13

## 2019-12-24 ASSESSMENT — PATIENT HEALTH QUESTIONNAIRE - PHQ9: SUM OF ALL RESPONSES TO PHQ QUESTIONS 1-9: 9

## 2019-12-28 ENCOUNTER — COMMUNICATION - HEALTHEAST (OUTPATIENT)
Dept: INTERNAL MEDICINE | Facility: CLINIC | Age: 69
End: 2019-12-28

## 2019-12-28 DIAGNOSIS — G12.21 ALS (AMYOTROPHIC LATERAL SCLEROSIS) (H): ICD-10-CM

## 2019-12-31 ENCOUNTER — COMMUNICATION - HEALTHEAST (OUTPATIENT)
Dept: HOME HEALTH SERVICES | Facility: HOME HEALTH | Age: 69
End: 2019-12-31

## 2019-12-31 ENCOUNTER — RECORDS - HEALTHEAST (OUTPATIENT)
Dept: HOME HEALTH SERVICES | Facility: HOME HEALTH | Age: 69
End: 2019-12-31

## 2019-12-31 ENCOUNTER — COMMUNICATION - HEALTHEAST (OUTPATIENT)
Dept: INTERNAL MEDICINE | Facility: CLINIC | Age: 69
End: 2019-12-31

## 2019-12-31 ENCOUNTER — HOME CARE/HOSPICE - HEALTHEAST (OUTPATIENT)
Dept: HOME HEALTH SERVICES | Facility: HOME HEALTH | Age: 69
End: 2019-12-31

## 2019-12-31 ENCOUNTER — AMBULATORY - HEALTHEAST (OUTPATIENT)
Dept: INTERNAL MEDICINE | Facility: CLINIC | Age: 69
End: 2019-12-31

## 2020-01-02 ENCOUNTER — COMMUNICATION - HEALTHEAST (OUTPATIENT)
Dept: VASCULAR SURGERY | Facility: CLINIC | Age: 70
End: 2020-01-02

## 2020-01-02 ENCOUNTER — COMMUNICATION - HEALTHEAST (OUTPATIENT)
Dept: INTERNAL MEDICINE | Facility: CLINIC | Age: 70
End: 2020-01-02

## 2020-01-02 DIAGNOSIS — J44.1 CHRONIC OBSTRUCTIVE PULMONARY DISEASE WITH ACUTE EXACERBATION (H): ICD-10-CM

## 2020-01-05 ENCOUNTER — HOME CARE/HOSPICE - HEALTHEAST (OUTPATIENT)
Dept: HOME HEALTH SERVICES | Facility: HOME HEALTH | Age: 70
End: 2020-01-05

## 2020-01-06 ENCOUNTER — COMMUNICATION - HEALTHEAST (OUTPATIENT)
Dept: HOME HEALTH SERVICES | Facility: HOME HEALTH | Age: 70
End: 2020-01-06

## 2020-01-07 ENCOUNTER — COMMUNICATION - HEALTHEAST (OUTPATIENT)
Dept: GERIATRIC MEDICINE | Facility: CLINIC | Age: 70
End: 2020-01-07

## 2020-01-07 ENCOUNTER — HOME CARE/HOSPICE - HEALTHEAST (OUTPATIENT)
Dept: HOME HEALTH SERVICES | Facility: HOME HEALTH | Age: 70
End: 2020-01-07

## 2020-01-07 ENCOUNTER — COMMUNICATION - HEALTHEAST (OUTPATIENT)
Dept: HOME HEALTH SERVICES | Facility: HOME HEALTH | Age: 70
End: 2020-01-07

## 2020-01-08 ENCOUNTER — HOME CARE/HOSPICE - HEALTHEAST (OUTPATIENT)
Dept: HOME HEALTH SERVICES | Facility: HOME HEALTH | Age: 70
End: 2020-01-08

## 2020-01-08 ENCOUNTER — COMMUNICATION - HEALTHEAST (OUTPATIENT)
Dept: HOME HEALTH SERVICES | Facility: HOME HEALTH | Age: 70
End: 2020-01-08

## 2020-01-09 ENCOUNTER — HOME CARE/HOSPICE - HEALTHEAST (OUTPATIENT)
Dept: HOME HEALTH SERVICES | Facility: HOME HEALTH | Age: 70
End: 2020-01-09

## 2020-01-10 ENCOUNTER — HOME CARE/HOSPICE - HEALTHEAST (OUTPATIENT)
Dept: HOME HEALTH SERVICES | Facility: HOME HEALTH | Age: 70
End: 2020-01-10

## 2020-01-13 ENCOUNTER — HOME CARE/HOSPICE - HEALTHEAST (OUTPATIENT)
Dept: HOME HEALTH SERVICES | Facility: HOME HEALTH | Age: 70
End: 2020-01-13

## 2020-01-14 ENCOUNTER — HOME CARE/HOSPICE - HEALTHEAST (OUTPATIENT)
Dept: HOME HEALTH SERVICES | Facility: HOME HEALTH | Age: 70
End: 2020-01-14

## 2020-01-15 ENCOUNTER — HOME CARE/HOSPICE - HEALTHEAST (OUTPATIENT)
Dept: HOME HEALTH SERVICES | Facility: HOME HEALTH | Age: 70
End: 2020-01-15

## 2020-01-16 ENCOUNTER — HOME CARE/HOSPICE - HEALTHEAST (OUTPATIENT)
Dept: HOME HEALTH SERVICES | Facility: HOME HEALTH | Age: 70
End: 2020-01-16

## 2020-01-17 ENCOUNTER — HOME CARE/HOSPICE - HEALTHEAST (OUTPATIENT)
Dept: HOME HEALTH SERVICES | Facility: HOME HEALTH | Age: 70
End: 2020-01-17

## 2020-01-20 ENCOUNTER — HOME CARE/HOSPICE - HEALTHEAST (OUTPATIENT)
Dept: HOME HEALTH SERVICES | Facility: HOME HEALTH | Age: 70
End: 2020-01-20

## 2020-01-21 ENCOUNTER — HOME CARE/HOSPICE - HEALTHEAST (OUTPATIENT)
Dept: HOME HEALTH SERVICES | Facility: HOME HEALTH | Age: 70
End: 2020-01-21

## 2020-01-22 ENCOUNTER — HOME CARE/HOSPICE - HEALTHEAST (OUTPATIENT)
Dept: HOME HEALTH SERVICES | Facility: HOME HEALTH | Age: 70
End: 2020-01-22

## 2020-01-23 ENCOUNTER — COMMUNICATION - HEALTHEAST (OUTPATIENT)
Dept: HOME HEALTH SERVICES | Facility: HOME HEALTH | Age: 70
End: 2020-01-23

## 2020-01-23 ENCOUNTER — HOME CARE/HOSPICE - HEALTHEAST (OUTPATIENT)
Dept: HOME HEALTH SERVICES | Facility: HOME HEALTH | Age: 70
End: 2020-01-23

## 2020-01-24 ENCOUNTER — HOME CARE/HOSPICE - HEALTHEAST (OUTPATIENT)
Dept: HOME HEALTH SERVICES | Facility: HOME HEALTH | Age: 70
End: 2020-01-24

## 2020-01-24 ENCOUNTER — COMMUNICATION - HEALTHEAST (OUTPATIENT)
Dept: INTERNAL MEDICINE | Facility: CLINIC | Age: 70
End: 2020-01-24

## 2020-01-24 DIAGNOSIS — M48.062 LUMBAR STENOSIS WITH NEUROGENIC CLAUDICATION: ICD-10-CM

## 2020-01-27 ENCOUNTER — HOME CARE/HOSPICE - HEALTHEAST (OUTPATIENT)
Dept: HOME HEALTH SERVICES | Facility: HOME HEALTH | Age: 70
End: 2020-01-27

## 2020-01-28 ENCOUNTER — COMMUNICATION - HEALTHEAST (OUTPATIENT)
Dept: HOME HEALTH SERVICES | Facility: HOME HEALTH | Age: 70
End: 2020-01-28

## 2020-01-28 ENCOUNTER — HOME CARE/HOSPICE - HEALTHEAST (OUTPATIENT)
Dept: HOME HEALTH SERVICES | Facility: HOME HEALTH | Age: 70
End: 2020-01-28

## 2020-01-29 ENCOUNTER — HOME CARE/HOSPICE - HEALTHEAST (OUTPATIENT)
Dept: HOME HEALTH SERVICES | Facility: HOME HEALTH | Age: 70
End: 2020-01-29

## 2020-01-29 ENCOUNTER — OFFICE VISIT - HEALTHEAST (OUTPATIENT)
Dept: VASCULAR SURGERY | Facility: CLINIC | Age: 70
End: 2020-01-29

## 2020-01-29 DIAGNOSIS — Z99.3 WHEELCHAIR BOUND: ICD-10-CM

## 2020-01-29 DIAGNOSIS — G12.21 ALS (AMYOTROPHIC LATERAL SCLEROSIS) (H): ICD-10-CM

## 2020-01-29 DIAGNOSIS — L89.150 PRESSURE ULCER OF COCCYGEAL REGION, UNSTAGEABLE (H): ICD-10-CM

## 2020-01-29 DIAGNOSIS — E11.65 TYPE 2 DIABETES MELLITUS WITH HYPERGLYCEMIA, WITHOUT LONG-TERM CURRENT USE OF INSULIN (H): ICD-10-CM

## 2020-01-29 DIAGNOSIS — Z79.52 LONG TERM SYSTEMIC STEROID USER: ICD-10-CM

## 2020-01-29 DIAGNOSIS — M79.89 LEG SWELLING: ICD-10-CM

## 2020-01-30 ENCOUNTER — HOME CARE/HOSPICE - HEALTHEAST (OUTPATIENT)
Dept: HOME HEALTH SERVICES | Facility: HOME HEALTH | Age: 70
End: 2020-01-30

## 2020-01-31 ENCOUNTER — HOME CARE/HOSPICE - HEALTHEAST (OUTPATIENT)
Dept: HOME HEALTH SERVICES | Facility: HOME HEALTH | Age: 70
End: 2020-01-31

## 2020-01-31 ENCOUNTER — COMMUNICATION - HEALTHEAST (OUTPATIENT)
Dept: HOME HEALTH SERVICES | Facility: HOME HEALTH | Age: 70
End: 2020-01-31

## 2020-02-01 ENCOUNTER — HOME CARE/HOSPICE - HEALTHEAST (OUTPATIENT)
Dept: HOME HEALTH SERVICES | Facility: HOME HEALTH | Age: 70
End: 2020-02-01

## 2020-02-02 ENCOUNTER — HOME CARE/HOSPICE - HEALTHEAST (OUTPATIENT)
Dept: HOME HEALTH SERVICES | Facility: HOME HEALTH | Age: 70
End: 2020-02-02

## 2020-02-03 ENCOUNTER — COMMUNICATION - HEALTHEAST (OUTPATIENT)
Dept: INTERNAL MEDICINE | Facility: CLINIC | Age: 70
End: 2020-02-03

## 2020-02-03 ENCOUNTER — HOME CARE/HOSPICE - HEALTHEAST (OUTPATIENT)
Dept: HOME HEALTH SERVICES | Facility: HOME HEALTH | Age: 70
End: 2020-02-03

## 2020-02-03 DIAGNOSIS — K59.01 SLOW TRANSIT CONSTIPATION: ICD-10-CM

## 2020-02-04 ENCOUNTER — HOME CARE/HOSPICE - HEALTHEAST (OUTPATIENT)
Dept: HOME HEALTH SERVICES | Facility: HOME HEALTH | Age: 70
End: 2020-02-04

## 2020-02-05 ENCOUNTER — COMMUNICATION - HEALTHEAST (OUTPATIENT)
Dept: INTERNAL MEDICINE | Facility: CLINIC | Age: 70
End: 2020-02-05

## 2020-02-05 ENCOUNTER — HOME CARE/HOSPICE - HEALTHEAST (OUTPATIENT)
Dept: HOME HEALTH SERVICES | Facility: HOME HEALTH | Age: 70
End: 2020-02-05

## 2020-02-06 ENCOUNTER — HOME CARE/HOSPICE - HEALTHEAST (OUTPATIENT)
Dept: HOME HEALTH SERVICES | Facility: HOME HEALTH | Age: 70
End: 2020-02-06

## 2020-02-07 ENCOUNTER — HOME CARE/HOSPICE - HEALTHEAST (OUTPATIENT)
Dept: HOME HEALTH SERVICES | Facility: HOME HEALTH | Age: 70
End: 2020-02-07

## 2020-02-08 ENCOUNTER — HOME CARE/HOSPICE - HEALTHEAST (OUTPATIENT)
Dept: HOME HEALTH SERVICES | Facility: HOME HEALTH | Age: 70
End: 2020-02-08

## 2020-02-09 ENCOUNTER — HOME CARE/HOSPICE - HEALTHEAST (OUTPATIENT)
Dept: HOME HEALTH SERVICES | Facility: HOME HEALTH | Age: 70
End: 2020-02-09

## 2020-02-10 ENCOUNTER — OFFICE VISIT - HEALTHEAST (OUTPATIENT)
Dept: INTERNAL MEDICINE | Facility: CLINIC | Age: 70
End: 2020-02-10

## 2020-02-10 ENCOUNTER — HOME CARE/HOSPICE - HEALTHEAST (OUTPATIENT)
Dept: HOME HEALTH SERVICES | Facility: HOME HEALTH | Age: 70
End: 2020-02-10

## 2020-02-10 DIAGNOSIS — M48.062 LUMBAR STENOSIS WITH NEUROGENIC CLAUDICATION: ICD-10-CM

## 2020-02-10 DIAGNOSIS — J34.89 RHINORRHEA: ICD-10-CM

## 2020-02-10 DIAGNOSIS — J43.1 PANLOBULAR EMPHYSEMA (H): ICD-10-CM

## 2020-02-11 ENCOUNTER — HOME CARE/HOSPICE - HEALTHEAST (OUTPATIENT)
Dept: HOME HEALTH SERVICES | Facility: HOME HEALTH | Age: 70
End: 2020-02-11

## 2020-02-12 ENCOUNTER — HOME CARE/HOSPICE - HEALTHEAST (OUTPATIENT)
Dept: HOME HEALTH SERVICES | Facility: HOME HEALTH | Age: 70
End: 2020-02-12

## 2020-02-12 ENCOUNTER — COMMUNICATION - HEALTHEAST (OUTPATIENT)
Dept: HOME HEALTH SERVICES | Facility: HOME HEALTH | Age: 70
End: 2020-02-12

## 2020-02-13 ENCOUNTER — HOME CARE/HOSPICE - HEALTHEAST (OUTPATIENT)
Dept: HOME HEALTH SERVICES | Facility: HOME HEALTH | Age: 70
End: 2020-02-13

## 2020-02-14 ENCOUNTER — HOME CARE/HOSPICE - HEALTHEAST (OUTPATIENT)
Dept: HOME HEALTH SERVICES | Facility: HOME HEALTH | Age: 70
End: 2020-02-14

## 2020-02-15 ENCOUNTER — HOME CARE/HOSPICE - HEALTHEAST (OUTPATIENT)
Dept: HOME HEALTH SERVICES | Facility: HOME HEALTH | Age: 70
End: 2020-02-15

## 2020-02-16 ENCOUNTER — HOME CARE/HOSPICE - HEALTHEAST (OUTPATIENT)
Dept: HOME HEALTH SERVICES | Facility: HOME HEALTH | Age: 70
End: 2020-02-16

## 2020-02-17 ENCOUNTER — HOME CARE/HOSPICE - HEALTHEAST (OUTPATIENT)
Dept: HOME HEALTH SERVICES | Facility: HOME HEALTH | Age: 70
End: 2020-02-17

## 2020-02-18 ENCOUNTER — COMMUNICATION - HEALTHEAST (OUTPATIENT)
Dept: INTERNAL MEDICINE | Facility: CLINIC | Age: 70
End: 2020-02-18

## 2020-02-18 ENCOUNTER — HOME CARE/HOSPICE - HEALTHEAST (OUTPATIENT)
Dept: HOME HEALTH SERVICES | Facility: HOME HEALTH | Age: 70
End: 2020-02-18

## 2020-02-18 ENCOUNTER — COMMUNICATION - HEALTHEAST (OUTPATIENT)
Dept: SCHEDULING | Facility: CLINIC | Age: 70
End: 2020-02-18

## 2020-02-18 DIAGNOSIS — M48.062 LUMBAR STENOSIS WITH NEUROGENIC CLAUDICATION: ICD-10-CM

## 2020-02-18 DIAGNOSIS — L98.2 SWEET SYNDROME: ICD-10-CM

## 2020-02-19 ENCOUNTER — HOME CARE/HOSPICE - HEALTHEAST (OUTPATIENT)
Dept: HOME HEALTH SERVICES | Facility: HOME HEALTH | Age: 70
End: 2020-02-19

## 2020-02-20 ENCOUNTER — HOME CARE/HOSPICE - HEALTHEAST (OUTPATIENT)
Dept: HOME HEALTH SERVICES | Facility: HOME HEALTH | Age: 70
End: 2020-02-20

## 2020-02-21 ENCOUNTER — HOME CARE/HOSPICE - HEALTHEAST (OUTPATIENT)
Dept: HOME HEALTH SERVICES | Facility: HOME HEALTH | Age: 70
End: 2020-02-21

## 2020-02-22 ENCOUNTER — HOME CARE/HOSPICE - HEALTHEAST (OUTPATIENT)
Dept: HOME HEALTH SERVICES | Facility: HOME HEALTH | Age: 70
End: 2020-02-22

## 2020-02-23 ENCOUNTER — HOME CARE/HOSPICE - HEALTHEAST (OUTPATIENT)
Dept: HOME HEALTH SERVICES | Facility: HOME HEALTH | Age: 70
End: 2020-02-23

## 2020-02-24 ENCOUNTER — HOME CARE/HOSPICE - HEALTHEAST (OUTPATIENT)
Dept: HOME HEALTH SERVICES | Facility: HOME HEALTH | Age: 70
End: 2020-02-24

## 2020-02-25 ENCOUNTER — HOME CARE/HOSPICE - HEALTHEAST (OUTPATIENT)
Dept: HOME HEALTH SERVICES | Facility: HOME HEALTH | Age: 70
End: 2020-02-25

## 2020-02-26 ENCOUNTER — COMMUNICATION - HEALTHEAST (OUTPATIENT)
Dept: SCHEDULING | Facility: CLINIC | Age: 70
End: 2020-02-26

## 2020-02-26 ENCOUNTER — COMMUNICATION - HEALTHEAST (OUTPATIENT)
Dept: HOME HEALTH SERVICES | Facility: HOME HEALTH | Age: 70
End: 2020-02-26

## 2020-02-26 ENCOUNTER — AMBULATORY - HEALTHEAST (OUTPATIENT)
Dept: INTERNAL MEDICINE | Facility: CLINIC | Age: 70
End: 2020-02-26

## 2020-02-26 ENCOUNTER — HOME CARE/HOSPICE - HEALTHEAST (OUTPATIENT)
Dept: HOME HEALTH SERVICES | Facility: HOME HEALTH | Age: 70
End: 2020-02-26

## 2020-02-26 ENCOUNTER — COMMUNICATION - HEALTHEAST (OUTPATIENT)
Dept: GERIATRIC MEDICINE | Facility: CLINIC | Age: 70
End: 2020-02-26

## 2020-02-26 DIAGNOSIS — M48.062 LUMBAR STENOSIS WITH NEUROGENIC CLAUDICATION: ICD-10-CM

## 2020-02-27 ENCOUNTER — HOME CARE/HOSPICE - HEALTHEAST (OUTPATIENT)
Dept: HOME HEALTH SERVICES | Facility: HOME HEALTH | Age: 70
End: 2020-02-27

## 2020-02-28 ENCOUNTER — HOME CARE/HOSPICE - HEALTHEAST (OUTPATIENT)
Dept: HOME HEALTH SERVICES | Facility: HOME HEALTH | Age: 70
End: 2020-02-28

## 2020-02-29 ENCOUNTER — HOME CARE/HOSPICE - HEALTHEAST (OUTPATIENT)
Dept: HOME HEALTH SERVICES | Facility: HOME HEALTH | Age: 70
End: 2020-02-29

## 2020-03-01 ENCOUNTER — HOME CARE/HOSPICE - HEALTHEAST (OUTPATIENT)
Dept: HOME HEALTH SERVICES | Facility: HOME HEALTH | Age: 70
End: 2020-03-01

## 2020-03-02 ENCOUNTER — HOME CARE/HOSPICE - HEALTHEAST (OUTPATIENT)
Dept: HOME HEALTH SERVICES | Facility: HOME HEALTH | Age: 70
End: 2020-03-02

## 2020-03-02 ENCOUNTER — COMMUNICATION - HEALTHEAST (OUTPATIENT)
Dept: HOME HEALTH SERVICES | Facility: HOME HEALTH | Age: 70
End: 2020-03-02

## 2020-03-03 ENCOUNTER — HOME CARE/HOSPICE - HEALTHEAST (OUTPATIENT)
Dept: HOME HEALTH SERVICES | Facility: HOME HEALTH | Age: 70
End: 2020-03-03

## 2020-03-04 ENCOUNTER — HOME CARE/HOSPICE - HEALTHEAST (OUTPATIENT)
Dept: HOME HEALTH SERVICES | Facility: HOME HEALTH | Age: 70
End: 2020-03-04

## 2020-03-04 ENCOUNTER — COMMUNICATION - HEALTHEAST (OUTPATIENT)
Dept: HOME HEALTH SERVICES | Facility: HOME HEALTH | Age: 70
End: 2020-03-04

## 2020-03-04 ENCOUNTER — OFFICE VISIT - HEALTHEAST (OUTPATIENT)
Dept: VASCULAR SURGERY | Facility: CLINIC | Age: 70
End: 2020-03-04

## 2020-03-04 DIAGNOSIS — G12.21 ALS (AMYOTROPHIC LATERAL SCLEROSIS) (H): ICD-10-CM

## 2020-03-04 DIAGNOSIS — E11.65 TYPE 2 DIABETES MELLITUS WITH HYPERGLYCEMIA, WITHOUT LONG-TERM CURRENT USE OF INSULIN (H): ICD-10-CM

## 2020-03-04 DIAGNOSIS — L89.150 PRESSURE ULCER OF COCCYGEAL REGION, UNSTAGEABLE (H): ICD-10-CM

## 2020-03-04 DIAGNOSIS — M79.89 LEG SWELLING: ICD-10-CM

## 2020-03-04 DIAGNOSIS — Z99.3 WHEELCHAIR BOUND: ICD-10-CM

## 2020-03-04 DIAGNOSIS — Z79.52 LONG TERM SYSTEMIC STEROID USER: ICD-10-CM

## 2020-03-04 RX ORDER — HYDROPHILIC CREAM
1 PASTE (GRAM) TOPICAL 2 TIMES DAILY
Refills: 3 | Status: SHIPPED
Start: 2020-03-04 | End: 2022-05-23

## 2020-03-05 ENCOUNTER — COMMUNICATION - HEALTHEAST (OUTPATIENT)
Dept: INTERNAL MEDICINE | Facility: CLINIC | Age: 70
End: 2020-03-05

## 2020-03-05 ENCOUNTER — COMMUNICATION - HEALTHEAST (OUTPATIENT)
Dept: GERIATRIC MEDICINE | Facility: CLINIC | Age: 70
End: 2020-03-05

## 2020-03-05 ENCOUNTER — HOME CARE/HOSPICE - HEALTHEAST (OUTPATIENT)
Dept: HOME HEALTH SERVICES | Facility: HOME HEALTH | Age: 70
End: 2020-03-05

## 2020-03-05 DIAGNOSIS — M48.062 LUMBAR STENOSIS WITH NEUROGENIC CLAUDICATION: ICD-10-CM

## 2020-03-05 DIAGNOSIS — J34.89 RHINORRHEA: ICD-10-CM

## 2020-03-05 ASSESSMENT — ACTIVITIES OF DAILY LIVING (ADL): DEPENDENT_IADLS:: CLEANING;COOKING;LAUNDRY;SHOPPING;MEAL PREPARATION;TRANSPORTATION;INCONTINENCE

## 2020-03-06 ENCOUNTER — HOME CARE/HOSPICE - HEALTHEAST (OUTPATIENT)
Dept: HOME HEALTH SERVICES | Facility: HOME HEALTH | Age: 70
End: 2020-03-06

## 2020-03-07 ENCOUNTER — HOME CARE/HOSPICE - HEALTHEAST (OUTPATIENT)
Dept: HOME HEALTH SERVICES | Facility: HOME HEALTH | Age: 70
End: 2020-03-07

## 2020-03-08 ENCOUNTER — HOME CARE/HOSPICE - HEALTHEAST (OUTPATIENT)
Dept: HOME HEALTH SERVICES | Facility: HOME HEALTH | Age: 70
End: 2020-03-08

## 2020-03-09 ENCOUNTER — HOME CARE/HOSPICE - HEALTHEAST (OUTPATIENT)
Dept: HOME HEALTH SERVICES | Facility: HOME HEALTH | Age: 70
End: 2020-03-09

## 2020-03-10 ENCOUNTER — HOME CARE/HOSPICE - HEALTHEAST (OUTPATIENT)
Dept: HOME HEALTH SERVICES | Facility: HOME HEALTH | Age: 70
End: 2020-03-10

## 2020-03-11 ENCOUNTER — HOME CARE/HOSPICE - HEALTHEAST (OUTPATIENT)
Dept: HOME HEALTH SERVICES | Facility: HOME HEALTH | Age: 70
End: 2020-03-11

## 2020-03-11 ENCOUNTER — RECORDS - HEALTHEAST (OUTPATIENT)
Dept: ADMINISTRATIVE | Facility: OTHER | Age: 70
End: 2020-03-11

## 2020-03-11 ENCOUNTER — OFFICE VISIT - HEALTHEAST (OUTPATIENT)
Dept: INTERNAL MEDICINE | Facility: CLINIC | Age: 70
End: 2020-03-11

## 2020-03-11 DIAGNOSIS — M48.062 LUMBAR STENOSIS WITH NEUROGENIC CLAUDICATION: ICD-10-CM

## 2020-03-12 ENCOUNTER — HOME CARE/HOSPICE - HEALTHEAST (OUTPATIENT)
Dept: HOME HEALTH SERVICES | Facility: HOME HEALTH | Age: 70
End: 2020-03-12

## 2020-03-12 ENCOUNTER — COMMUNICATION - HEALTHEAST (OUTPATIENT)
Dept: SCHEDULING | Facility: CLINIC | Age: 70
End: 2020-03-12

## 2020-03-13 ENCOUNTER — COMMUNICATION - HEALTHEAST (OUTPATIENT)
Dept: SCHEDULING | Facility: CLINIC | Age: 70
End: 2020-03-13

## 2020-03-13 ENCOUNTER — HOME CARE/HOSPICE - HEALTHEAST (OUTPATIENT)
Dept: HOME HEALTH SERVICES | Facility: HOME HEALTH | Age: 70
End: 2020-03-13

## 2020-03-13 DIAGNOSIS — K59.01 SLOW TRANSIT CONSTIPATION: ICD-10-CM

## 2020-03-14 ENCOUNTER — HOME CARE/HOSPICE - HEALTHEAST (OUTPATIENT)
Dept: HOME HEALTH SERVICES | Facility: HOME HEALTH | Age: 70
End: 2020-03-14

## 2020-03-15 ENCOUNTER — HOME CARE/HOSPICE - HEALTHEAST (OUTPATIENT)
Dept: HOME HEALTH SERVICES | Facility: HOME HEALTH | Age: 70
End: 2020-03-15

## 2020-03-16 ENCOUNTER — HOME CARE/HOSPICE - HEALTHEAST (OUTPATIENT)
Dept: HOME HEALTH SERVICES | Facility: HOME HEALTH | Age: 70
End: 2020-03-16

## 2020-03-17 ENCOUNTER — COMMUNICATION - HEALTHEAST (OUTPATIENT)
Dept: GERIATRIC MEDICINE | Facility: CLINIC | Age: 70
End: 2020-03-17

## 2020-03-18 ENCOUNTER — HOME CARE/HOSPICE - HEALTHEAST (OUTPATIENT)
Dept: HOME HEALTH SERVICES | Facility: HOME HEALTH | Age: 70
End: 2020-03-18

## 2020-03-19 ENCOUNTER — HOME CARE/HOSPICE - HEALTHEAST (OUTPATIENT)
Dept: HOME HEALTH SERVICES | Facility: HOME HEALTH | Age: 70
End: 2020-03-19

## 2020-03-19 ENCOUNTER — COMMUNICATION - HEALTHEAST (OUTPATIENT)
Dept: SCHEDULING | Facility: CLINIC | Age: 70
End: 2020-03-19

## 2020-03-19 ENCOUNTER — COMMUNICATION - HEALTHEAST (OUTPATIENT)
Dept: GERIATRIC MEDICINE | Facility: CLINIC | Age: 70
End: 2020-03-19

## 2020-03-19 ENCOUNTER — COMMUNICATION - HEALTHEAST (OUTPATIENT)
Dept: INTERNAL MEDICINE | Facility: CLINIC | Age: 70
End: 2020-03-19

## 2020-03-19 DIAGNOSIS — L98.2 SWEET SYNDROME: ICD-10-CM

## 2020-03-20 ENCOUNTER — HOME CARE/HOSPICE - HEALTHEAST (OUTPATIENT)
Dept: HOME HEALTH SERVICES | Facility: HOME HEALTH | Age: 70
End: 2020-03-20

## 2020-03-21 ENCOUNTER — HOME CARE/HOSPICE - HEALTHEAST (OUTPATIENT)
Dept: HOME HEALTH SERVICES | Facility: HOME HEALTH | Age: 70
End: 2020-03-21

## 2020-03-22 ENCOUNTER — HOME CARE/HOSPICE - HEALTHEAST (OUTPATIENT)
Dept: HOME HEALTH SERVICES | Facility: HOME HEALTH | Age: 70
End: 2020-03-22

## 2020-03-22 ENCOUNTER — AMBULATORY - HEALTHEAST (OUTPATIENT)
Dept: INTERNAL MEDICINE | Facility: CLINIC | Age: 70
End: 2020-03-22

## 2020-03-23 ENCOUNTER — HOME CARE/HOSPICE - HEALTHEAST (OUTPATIENT)
Dept: HOME HEALTH SERVICES | Facility: HOME HEALTH | Age: 70
End: 2020-03-23

## 2020-03-24 ENCOUNTER — HOME CARE/HOSPICE - HEALTHEAST (OUTPATIENT)
Dept: HOME HEALTH SERVICES | Facility: HOME HEALTH | Age: 70
End: 2020-03-24

## 2020-03-25 ENCOUNTER — HOME CARE/HOSPICE - HEALTHEAST (OUTPATIENT)
Dept: HOME HEALTH SERVICES | Facility: HOME HEALTH | Age: 70
End: 2020-03-25

## 2020-03-26 ENCOUNTER — HOME CARE/HOSPICE - HEALTHEAST (OUTPATIENT)
Dept: HOME HEALTH SERVICES | Facility: HOME HEALTH | Age: 70
End: 2020-03-26

## 2020-03-27 ENCOUNTER — RECORDS - HEALTHEAST (OUTPATIENT)
Dept: ADMINISTRATIVE | Facility: OTHER | Age: 70
End: 2020-03-27

## 2020-03-27 ENCOUNTER — COMMUNICATION - HEALTHEAST (OUTPATIENT)
Dept: VASCULAR SURGERY | Facility: CLINIC | Age: 70
End: 2020-03-27

## 2020-03-27 ENCOUNTER — COMMUNICATION - HEALTHEAST (OUTPATIENT)
Dept: HOME HEALTH SERVICES | Facility: HOME HEALTH | Age: 70
End: 2020-03-27

## 2020-03-27 ENCOUNTER — HOME CARE/HOSPICE - HEALTHEAST (OUTPATIENT)
Dept: HOME HEALTH SERVICES | Facility: HOME HEALTH | Age: 70
End: 2020-03-27

## 2020-03-28 ENCOUNTER — HOME CARE/HOSPICE - HEALTHEAST (OUTPATIENT)
Dept: HOME HEALTH SERVICES | Facility: HOME HEALTH | Age: 70
End: 2020-03-28

## 2020-03-29 ENCOUNTER — COMMUNICATION - HEALTHEAST (OUTPATIENT)
Dept: SCHEDULING | Facility: CLINIC | Age: 70
End: 2020-03-29

## 2020-03-29 ENCOUNTER — HOME CARE/HOSPICE - HEALTHEAST (OUTPATIENT)
Dept: HOME HEALTH SERVICES | Facility: HOME HEALTH | Age: 70
End: 2020-03-29

## 2020-03-29 DIAGNOSIS — M48.062 LUMBAR STENOSIS WITH NEUROGENIC CLAUDICATION: ICD-10-CM

## 2020-03-30 ENCOUNTER — HOME CARE/HOSPICE - HEALTHEAST (OUTPATIENT)
Dept: HOME HEALTH SERVICES | Facility: HOME HEALTH | Age: 70
End: 2020-03-30

## 2020-03-30 ENCOUNTER — AMBULATORY - HEALTHEAST (OUTPATIENT)
Dept: INTERNAL MEDICINE | Facility: CLINIC | Age: 70
End: 2020-03-30

## 2020-03-30 ENCOUNTER — COMMUNICATION - HEALTHEAST (OUTPATIENT)
Dept: INTERNAL MEDICINE | Facility: CLINIC | Age: 70
End: 2020-03-30

## 2020-03-30 ENCOUNTER — COMMUNICATION - HEALTHEAST (OUTPATIENT)
Dept: SCHEDULING | Facility: CLINIC | Age: 70
End: 2020-03-30

## 2020-03-30 DIAGNOSIS — K59.01 SLOW TRANSIT CONSTIPATION: ICD-10-CM

## 2020-03-30 DIAGNOSIS — M48.062 LUMBAR STENOSIS WITH NEUROGENIC CLAUDICATION: ICD-10-CM

## 2020-03-30 RX ORDER — SODIUM PHOSPHATE,MONO-DIBASIC 19G-7G/118
ENEMA (ML) RECTAL
Qty: 133 ML | Refills: 0 | Status: SHIPPED | OUTPATIENT
Start: 2020-03-30 | End: 2022-05-23

## 2020-03-31 ENCOUNTER — RECORDS - HEALTHEAST (OUTPATIENT)
Dept: ADMINISTRATIVE | Facility: OTHER | Age: 70
End: 2020-03-31

## 2020-03-31 ENCOUNTER — HOME CARE/HOSPICE - HEALTHEAST (OUTPATIENT)
Dept: HOME HEALTH SERVICES | Facility: HOME HEALTH | Age: 70
End: 2020-03-31

## 2020-03-31 ENCOUNTER — COMMUNICATION - HEALTHEAST (OUTPATIENT)
Dept: GERIATRIC MEDICINE | Facility: CLINIC | Age: 70
End: 2020-03-31

## 2020-04-01 ENCOUNTER — HOME CARE/HOSPICE - HEALTHEAST (OUTPATIENT)
Dept: HOME HEALTH SERVICES | Facility: HOME HEALTH | Age: 70
End: 2020-04-01

## 2020-04-02 ENCOUNTER — HOME CARE/HOSPICE - HEALTHEAST (OUTPATIENT)
Dept: HOME HEALTH SERVICES | Facility: HOME HEALTH | Age: 70
End: 2020-04-02

## 2020-04-02 ENCOUNTER — COMMUNICATION - HEALTHEAST (OUTPATIENT)
Dept: INTERNAL MEDICINE | Facility: CLINIC | Age: 70
End: 2020-04-02

## 2020-04-02 ENCOUNTER — COMMUNICATION - HEALTHEAST (OUTPATIENT)
Dept: GERIATRIC MEDICINE | Facility: CLINIC | Age: 70
End: 2020-04-02

## 2020-04-02 DIAGNOSIS — L98.2 SWEET SYNDROME: ICD-10-CM

## 2020-04-03 ENCOUNTER — HOME CARE/HOSPICE - HEALTHEAST (OUTPATIENT)
Dept: HOME HEALTH SERVICES | Facility: HOME HEALTH | Age: 70
End: 2020-04-03

## 2020-04-04 ENCOUNTER — HOME CARE/HOSPICE - HEALTHEAST (OUTPATIENT)
Dept: HOME HEALTH SERVICES | Facility: HOME HEALTH | Age: 70
End: 2020-04-04

## 2020-04-05 ENCOUNTER — HOME CARE/HOSPICE - HEALTHEAST (OUTPATIENT)
Dept: HOME HEALTH SERVICES | Facility: HOME HEALTH | Age: 70
End: 2020-04-05

## 2020-04-06 ENCOUNTER — HOME CARE/HOSPICE - HEALTHEAST (OUTPATIENT)
Dept: HOME HEALTH SERVICES | Facility: HOME HEALTH | Age: 70
End: 2020-04-06

## 2020-04-07 ENCOUNTER — HOME CARE/HOSPICE - HEALTHEAST (OUTPATIENT)
Dept: HOME HEALTH SERVICES | Facility: HOME HEALTH | Age: 70
End: 2020-04-07

## 2020-04-08 ENCOUNTER — COMMUNICATION - HEALTHEAST (OUTPATIENT)
Dept: GERIATRIC MEDICINE | Facility: CLINIC | Age: 70
End: 2020-04-08

## 2020-04-08 ENCOUNTER — HOME CARE/HOSPICE - HEALTHEAST (OUTPATIENT)
Dept: HOME HEALTH SERVICES | Facility: HOME HEALTH | Age: 70
End: 2020-04-08

## 2020-04-08 ENCOUNTER — COMMUNICATION - HEALTHEAST (OUTPATIENT)
Dept: VASCULAR SURGERY | Facility: CLINIC | Age: 70
End: 2020-04-08

## 2020-04-09 ENCOUNTER — HOME CARE/HOSPICE - HEALTHEAST (OUTPATIENT)
Dept: HOME HEALTH SERVICES | Facility: HOME HEALTH | Age: 70
End: 2020-04-09

## 2020-04-10 ENCOUNTER — COMMUNICATION - HEALTHEAST (OUTPATIENT)
Dept: VASCULAR SURGERY | Facility: CLINIC | Age: 70
End: 2020-04-10

## 2020-04-10 ENCOUNTER — HOME CARE/HOSPICE - HEALTHEAST (OUTPATIENT)
Dept: HOME HEALTH SERVICES | Facility: HOME HEALTH | Age: 70
End: 2020-04-10

## 2020-04-11 ENCOUNTER — HOME CARE/HOSPICE - HEALTHEAST (OUTPATIENT)
Dept: HOME HEALTH SERVICES | Facility: HOME HEALTH | Age: 70
End: 2020-04-11

## 2020-04-12 ENCOUNTER — COMMUNICATION - HEALTHEAST (OUTPATIENT)
Dept: HOME HEALTH SERVICES | Facility: HOME HEALTH | Age: 70
End: 2020-04-12

## 2020-04-13 ENCOUNTER — HOME CARE/HOSPICE - HEALTHEAST (OUTPATIENT)
Dept: HOME HEALTH SERVICES | Facility: HOME HEALTH | Age: 70
End: 2020-04-13

## 2020-04-13 ENCOUNTER — OFFICE VISIT - HEALTHEAST (OUTPATIENT)
Dept: VASCULAR SURGERY | Facility: CLINIC | Age: 70
End: 2020-04-13

## 2020-04-13 ENCOUNTER — COMMUNICATION - HEALTHEAST (OUTPATIENT)
Dept: VASCULAR SURGERY | Facility: CLINIC | Age: 70
End: 2020-04-13

## 2020-04-13 DIAGNOSIS — E11.65 TYPE 2 DIABETES MELLITUS WITH HYPERGLYCEMIA, WITHOUT LONG-TERM CURRENT USE OF INSULIN (H): ICD-10-CM

## 2020-04-13 DIAGNOSIS — Z79.52 LONG TERM SYSTEMIC STEROID USER: ICD-10-CM

## 2020-04-13 DIAGNOSIS — F17.210 CIGARETTE NICOTINE DEPENDENCE WITHOUT COMPLICATION: ICD-10-CM

## 2020-04-13 DIAGNOSIS — Z99.3 WHEELCHAIR BOUND: ICD-10-CM

## 2020-04-13 DIAGNOSIS — L89.150 PRESSURE ULCER OF COCCYGEAL REGION, UNSTAGEABLE (H): ICD-10-CM

## 2020-04-13 DIAGNOSIS — G12.21 ALS (AMYOTROPHIC LATERAL SCLEROSIS) (H): ICD-10-CM

## 2020-04-13 DIAGNOSIS — M79.89 LEG SWELLING: ICD-10-CM

## 2020-04-14 ENCOUNTER — COMMUNICATION - HEALTHEAST (OUTPATIENT)
Dept: INTERNAL MEDICINE | Facility: CLINIC | Age: 70
End: 2020-04-14

## 2020-04-14 ENCOUNTER — HOME CARE/HOSPICE - HEALTHEAST (OUTPATIENT)
Dept: HOME HEALTH SERVICES | Facility: HOME HEALTH | Age: 70
End: 2020-04-14

## 2020-04-14 DIAGNOSIS — M48.062 LUMBAR STENOSIS WITH NEUROGENIC CLAUDICATION: ICD-10-CM

## 2020-04-15 ENCOUNTER — HOME CARE/HOSPICE - HEALTHEAST (OUTPATIENT)
Dept: HOME HEALTH SERVICES | Facility: HOME HEALTH | Age: 70
End: 2020-04-15

## 2020-04-16 ENCOUNTER — COMMUNICATION - HEALTHEAST (OUTPATIENT)
Dept: HOME HEALTH SERVICES | Facility: HOME HEALTH | Age: 70
End: 2020-04-16

## 2020-04-16 ENCOUNTER — HOME CARE/HOSPICE - HEALTHEAST (OUTPATIENT)
Dept: HOME HEALTH SERVICES | Facility: HOME HEALTH | Age: 70
End: 2020-04-16

## 2020-04-17 ENCOUNTER — COMMUNICATION - HEALTHEAST (OUTPATIENT)
Dept: VASCULAR SURGERY | Facility: CLINIC | Age: 70
End: 2020-04-17

## 2020-04-17 ENCOUNTER — HOME CARE/HOSPICE - HEALTHEAST (OUTPATIENT)
Dept: HOME HEALTH SERVICES | Facility: HOME HEALTH | Age: 70
End: 2020-04-17

## 2020-04-18 ENCOUNTER — HOME CARE/HOSPICE - HEALTHEAST (OUTPATIENT)
Dept: HOME HEALTH SERVICES | Facility: HOME HEALTH | Age: 70
End: 2020-04-18

## 2020-04-19 ENCOUNTER — HOME CARE/HOSPICE - HEALTHEAST (OUTPATIENT)
Dept: HOME HEALTH SERVICES | Facility: HOME HEALTH | Age: 70
End: 2020-04-19

## 2020-04-20 ENCOUNTER — HOME CARE/HOSPICE - HEALTHEAST (OUTPATIENT)
Dept: HOME HEALTH SERVICES | Facility: HOME HEALTH | Age: 70
End: 2020-04-20

## 2020-04-21 ENCOUNTER — HOME CARE/HOSPICE - HEALTHEAST (OUTPATIENT)
Dept: HOME HEALTH SERVICES | Facility: HOME HEALTH | Age: 70
End: 2020-04-21

## 2020-04-21 ENCOUNTER — COMMUNICATION - HEALTHEAST (OUTPATIENT)
Dept: GERIATRIC MEDICINE | Facility: CLINIC | Age: 70
End: 2020-04-21

## 2020-04-22 ENCOUNTER — HOME CARE/HOSPICE - HEALTHEAST (OUTPATIENT)
Dept: HOME HEALTH SERVICES | Facility: HOME HEALTH | Age: 70
End: 2020-04-22

## 2020-04-22 ENCOUNTER — COMMUNICATION - HEALTHEAST (OUTPATIENT)
Dept: HOME HEALTH SERVICES | Facility: HOME HEALTH | Age: 70
End: 2020-04-22

## 2020-04-23 ENCOUNTER — HOME CARE/HOSPICE - HEALTHEAST (OUTPATIENT)
Dept: HOME HEALTH SERVICES | Facility: HOME HEALTH | Age: 70
End: 2020-04-23

## 2020-04-24 ENCOUNTER — HOME CARE/HOSPICE - HEALTHEAST (OUTPATIENT)
Dept: HOME HEALTH SERVICES | Facility: HOME HEALTH | Age: 70
End: 2020-04-24

## 2020-04-24 ENCOUNTER — COMMUNICATION - HEALTHEAST (OUTPATIENT)
Dept: INTERNAL MEDICINE | Facility: CLINIC | Age: 70
End: 2020-04-24

## 2020-04-24 DIAGNOSIS — J34.89 RHINORRHEA: ICD-10-CM

## 2020-04-25 ENCOUNTER — HOME CARE/HOSPICE - HEALTHEAST (OUTPATIENT)
Dept: HOME HEALTH SERVICES | Facility: HOME HEALTH | Age: 70
End: 2020-04-25

## 2020-04-26 ENCOUNTER — HOME CARE/HOSPICE - HEALTHEAST (OUTPATIENT)
Dept: HOME HEALTH SERVICES | Facility: HOME HEALTH | Age: 70
End: 2020-04-26

## 2020-04-27 ENCOUNTER — COMMUNICATION - HEALTHEAST (OUTPATIENT)
Dept: SCHEDULING | Facility: CLINIC | Age: 70
End: 2020-04-27

## 2020-04-27 ENCOUNTER — HOME CARE/HOSPICE - HEALTHEAST (OUTPATIENT)
Dept: HOME HEALTH SERVICES | Facility: HOME HEALTH | Age: 70
End: 2020-04-27

## 2020-04-27 DIAGNOSIS — M62.838 MUSCLE SPASM: ICD-10-CM

## 2020-04-27 DIAGNOSIS — L98.2 SWEET SYNDROME: ICD-10-CM

## 2020-04-28 ENCOUNTER — HOME CARE/HOSPICE - HEALTHEAST (OUTPATIENT)
Dept: HOME HEALTH SERVICES | Facility: HOME HEALTH | Age: 70
End: 2020-04-28

## 2020-04-29 ENCOUNTER — HOME CARE/HOSPICE - HEALTHEAST (OUTPATIENT)
Dept: HOME HEALTH SERVICES | Facility: HOME HEALTH | Age: 70
End: 2020-04-29

## 2020-04-30 ENCOUNTER — COMMUNICATION - HEALTHEAST (OUTPATIENT)
Dept: HOME HEALTH SERVICES | Facility: HOME HEALTH | Age: 70
End: 2020-04-30

## 2020-04-30 ENCOUNTER — HOME CARE/HOSPICE - HEALTHEAST (OUTPATIENT)
Dept: HOME HEALTH SERVICES | Facility: HOME HEALTH | Age: 70
End: 2020-04-30

## 2020-05-01 ENCOUNTER — HOME CARE/HOSPICE - HEALTHEAST (OUTPATIENT)
Dept: HOME HEALTH SERVICES | Facility: HOME HEALTH | Age: 70
End: 2020-05-01

## 2020-05-02 ENCOUNTER — HOME CARE/HOSPICE - HEALTHEAST (OUTPATIENT)
Dept: HOME HEALTH SERVICES | Facility: HOME HEALTH | Age: 70
End: 2020-05-02

## 2020-05-03 ENCOUNTER — AMBULATORY - HEALTHEAST (OUTPATIENT)
Dept: INTERNAL MEDICINE | Facility: CLINIC | Age: 70
End: 2020-05-03

## 2020-05-03 ENCOUNTER — HOME CARE/HOSPICE - HEALTHEAST (OUTPATIENT)
Dept: HOME HEALTH SERVICES | Facility: HOME HEALTH | Age: 70
End: 2020-05-03

## 2020-05-04 ENCOUNTER — COMMUNICATION - HEALTHEAST (OUTPATIENT)
Dept: INTERNAL MEDICINE | Facility: CLINIC | Age: 70
End: 2020-05-04

## 2020-05-04 ENCOUNTER — HOME CARE/HOSPICE - HEALTHEAST (OUTPATIENT)
Dept: HOME HEALTH SERVICES | Facility: HOME HEALTH | Age: 70
End: 2020-05-04

## 2020-05-04 ENCOUNTER — COMMUNICATION - HEALTHEAST (OUTPATIENT)
Dept: VASCULAR SURGERY | Facility: CLINIC | Age: 70
End: 2020-05-04

## 2020-05-04 DIAGNOSIS — L29.9 ITCHING: ICD-10-CM

## 2020-05-04 DIAGNOSIS — M62.838 MUSCLE SPASM: ICD-10-CM

## 2020-05-04 RX ORDER — TRIAMCINOLONE ACETONIDE 1 MG/G
CREAM TOPICAL
Qty: 30 G | Refills: 0 | Status: SHIPPED | OUTPATIENT
Start: 2020-05-04 | End: 2024-03-20

## 2020-05-05 ENCOUNTER — HOME CARE/HOSPICE - HEALTHEAST (OUTPATIENT)
Dept: HOME HEALTH SERVICES | Facility: HOME HEALTH | Age: 70
End: 2020-05-05

## 2020-05-06 ENCOUNTER — HOME CARE/HOSPICE - HEALTHEAST (OUTPATIENT)
Dept: HOME HEALTH SERVICES | Facility: HOME HEALTH | Age: 70
End: 2020-05-06

## 2020-05-07 ENCOUNTER — HOME CARE/HOSPICE - HEALTHEAST (OUTPATIENT)
Dept: HOME HEALTH SERVICES | Facility: HOME HEALTH | Age: 70
End: 2020-05-07

## 2020-05-08 ENCOUNTER — HOME CARE/HOSPICE - HEALTHEAST (OUTPATIENT)
Dept: HOME HEALTH SERVICES | Facility: HOME HEALTH | Age: 70
End: 2020-05-08

## 2020-05-08 ENCOUNTER — COMMUNICATION - HEALTHEAST (OUTPATIENT)
Dept: INTERNAL MEDICINE | Facility: CLINIC | Age: 70
End: 2020-05-08

## 2020-05-08 DIAGNOSIS — M62.838 MUSCLE SPASM: ICD-10-CM

## 2020-05-09 ENCOUNTER — HOME CARE/HOSPICE - HEALTHEAST (OUTPATIENT)
Dept: HOME HEALTH SERVICES | Facility: HOME HEALTH | Age: 70
End: 2020-05-09

## 2020-05-10 ENCOUNTER — HOME CARE/HOSPICE - HEALTHEAST (OUTPATIENT)
Dept: HOME HEALTH SERVICES | Facility: HOME HEALTH | Age: 70
End: 2020-05-10

## 2020-05-11 ENCOUNTER — COMMUNICATION - HEALTHEAST (OUTPATIENT)
Dept: INTERNAL MEDICINE | Facility: CLINIC | Age: 70
End: 2020-05-11

## 2020-05-11 ENCOUNTER — OFFICE VISIT - HEALTHEAST (OUTPATIENT)
Dept: VASCULAR SURGERY | Facility: CLINIC | Age: 70
End: 2020-05-11

## 2020-05-11 ENCOUNTER — HOME CARE/HOSPICE - HEALTHEAST (OUTPATIENT)
Dept: HOME HEALTH SERVICES | Facility: HOME HEALTH | Age: 70
End: 2020-05-11

## 2020-05-11 ENCOUNTER — COMMUNICATION - HEALTHEAST (OUTPATIENT)
Dept: VASCULAR SURGERY | Facility: CLINIC | Age: 70
End: 2020-05-11

## 2020-05-11 DIAGNOSIS — Z99.3 WHEELCHAIR BOUND: ICD-10-CM

## 2020-05-11 DIAGNOSIS — E11.65 TYPE 2 DIABETES MELLITUS WITH HYPERGLYCEMIA, WITHOUT LONG-TERM CURRENT USE OF INSULIN (H): ICD-10-CM

## 2020-05-11 DIAGNOSIS — G12.21 ALS (AMYOTROPHIC LATERAL SCLEROSIS) (H): ICD-10-CM

## 2020-05-11 DIAGNOSIS — Z79.52 LONG TERM SYSTEMIC STEROID USER: ICD-10-CM

## 2020-05-11 DIAGNOSIS — M48.062 LUMBAR STENOSIS WITH NEUROGENIC CLAUDICATION: ICD-10-CM

## 2020-05-11 DIAGNOSIS — F17.210 CIGARETTE NICOTINE DEPENDENCE WITHOUT COMPLICATION: ICD-10-CM

## 2020-05-11 DIAGNOSIS — M79.89 LEG SWELLING: ICD-10-CM

## 2020-05-11 DIAGNOSIS — L89.150 PRESSURE ULCER OF COCCYGEAL REGION, UNSTAGEABLE (H): ICD-10-CM

## 2020-05-12 ENCOUNTER — HOME CARE/HOSPICE - HEALTHEAST (OUTPATIENT)
Dept: HOME HEALTH SERVICES | Facility: HOME HEALTH | Age: 70
End: 2020-05-12

## 2020-05-14 ENCOUNTER — HOME CARE/HOSPICE - HEALTHEAST (OUTPATIENT)
Dept: HOME HEALTH SERVICES | Facility: HOME HEALTH | Age: 70
End: 2020-05-14

## 2020-05-15 ENCOUNTER — HOME CARE/HOSPICE - HEALTHEAST (OUTPATIENT)
Dept: HOME HEALTH SERVICES | Facility: HOME HEALTH | Age: 70
End: 2020-05-15

## 2020-05-16 ENCOUNTER — COMMUNICATION - HEALTHEAST (OUTPATIENT)
Dept: INTERNAL MEDICINE | Facility: CLINIC | Age: 70
End: 2020-05-16

## 2020-05-18 ENCOUNTER — HOME CARE/HOSPICE - HEALTHEAST (OUTPATIENT)
Dept: HOME HEALTH SERVICES | Facility: HOME HEALTH | Age: 70
End: 2020-05-18

## 2020-05-19 ENCOUNTER — COMMUNICATION - HEALTHEAST (OUTPATIENT)
Dept: GERIATRIC MEDICINE | Facility: CLINIC | Age: 70
End: 2020-05-19

## 2020-05-21 ENCOUNTER — HOME CARE/HOSPICE - HEALTHEAST (OUTPATIENT)
Dept: HOME HEALTH SERVICES | Facility: HOME HEALTH | Age: 70
End: 2020-05-21

## 2020-05-25 ENCOUNTER — HOME CARE/HOSPICE - HEALTHEAST (OUTPATIENT)
Dept: HOME HEALTH SERVICES | Facility: HOME HEALTH | Age: 70
End: 2020-05-25

## 2020-05-28 ENCOUNTER — COMMUNICATION - HEALTHEAST (OUTPATIENT)
Dept: INTERNAL MEDICINE | Facility: CLINIC | Age: 70
End: 2020-05-28

## 2020-05-28 ENCOUNTER — HOME CARE/HOSPICE - HEALTHEAST (OUTPATIENT)
Dept: HOME HEALTH SERVICES | Facility: HOME HEALTH | Age: 70
End: 2020-05-28

## 2020-05-28 DIAGNOSIS — J34.89 RHINORRHEA: ICD-10-CM

## 2020-06-01 ENCOUNTER — HOME CARE/HOSPICE - HEALTHEAST (OUTPATIENT)
Dept: HOME HEALTH SERVICES | Facility: HOME HEALTH | Age: 70
End: 2020-06-01

## 2020-06-03 ENCOUNTER — COMMUNICATION - HEALTHEAST (OUTPATIENT)
Dept: INTERNAL MEDICINE | Facility: CLINIC | Age: 70
End: 2020-06-03

## 2020-06-04 ENCOUNTER — HOME CARE/HOSPICE - HEALTHEAST (OUTPATIENT)
Dept: HOME HEALTH SERVICES | Facility: HOME HEALTH | Age: 70
End: 2020-06-04

## 2020-06-08 ENCOUNTER — COMMUNICATION - HEALTHEAST (OUTPATIENT)
Dept: VASCULAR SURGERY | Facility: CLINIC | Age: 70
End: 2020-06-08

## 2020-06-08 ENCOUNTER — HOME CARE/HOSPICE - HEALTHEAST (OUTPATIENT)
Dept: HOME HEALTH SERVICES | Facility: HOME HEALTH | Age: 70
End: 2020-06-08

## 2020-06-08 ENCOUNTER — OFFICE VISIT - HEALTHEAST (OUTPATIENT)
Dept: VASCULAR SURGERY | Facility: CLINIC | Age: 70
End: 2020-06-08

## 2020-06-08 ENCOUNTER — COMMUNICATION - HEALTHEAST (OUTPATIENT)
Dept: HOME HEALTH SERVICES | Facility: HOME HEALTH | Age: 70
End: 2020-06-08

## 2020-06-08 DIAGNOSIS — Z99.3 WHEELCHAIR BOUND: ICD-10-CM

## 2020-06-08 DIAGNOSIS — Z79.52 LONG TERM SYSTEMIC STEROID USER: ICD-10-CM

## 2020-06-08 DIAGNOSIS — G12.21 ALS (AMYOTROPHIC LATERAL SCLEROSIS) (H): ICD-10-CM

## 2020-06-08 DIAGNOSIS — E11.65 TYPE 2 DIABETES MELLITUS WITH HYPERGLYCEMIA, WITHOUT LONG-TERM CURRENT USE OF INSULIN (H): ICD-10-CM

## 2020-06-08 DIAGNOSIS — M79.89 LEG SWELLING: ICD-10-CM

## 2020-06-08 DIAGNOSIS — F17.210 CIGARETTE NICOTINE DEPENDENCE WITHOUT COMPLICATION: ICD-10-CM

## 2020-06-08 DIAGNOSIS — L89.150 PRESSURE ULCER OF COCCYGEAL REGION, UNSTAGEABLE (H): ICD-10-CM

## 2020-06-09 ENCOUNTER — HOME CARE/HOSPICE - HEALTHEAST (OUTPATIENT)
Dept: HOME HEALTH SERVICES | Facility: HOME HEALTH | Age: 70
End: 2020-06-09

## 2020-06-10 ENCOUNTER — HOME CARE/HOSPICE - HEALTHEAST (OUTPATIENT)
Dept: HOME HEALTH SERVICES | Facility: HOME HEALTH | Age: 70
End: 2020-06-10

## 2020-06-11 ENCOUNTER — COMMUNICATION - HEALTHEAST (OUTPATIENT)
Dept: INTERNAL MEDICINE | Facility: CLINIC | Age: 70
End: 2020-06-11

## 2020-06-11 ENCOUNTER — HOME CARE/HOSPICE - HEALTHEAST (OUTPATIENT)
Dept: HOME HEALTH SERVICES | Facility: HOME HEALTH | Age: 70
End: 2020-06-11

## 2020-06-11 DIAGNOSIS — J34.89 RHINORRHEA: ICD-10-CM

## 2020-06-11 DIAGNOSIS — M48.062 LUMBAR STENOSIS WITH NEUROGENIC CLAUDICATION: ICD-10-CM

## 2020-06-12 ENCOUNTER — HOME CARE/HOSPICE - HEALTHEAST (OUTPATIENT)
Dept: HOME HEALTH SERVICES | Facility: HOME HEALTH | Age: 70
End: 2020-06-12

## 2020-06-13 ENCOUNTER — HOME CARE/HOSPICE - HEALTHEAST (OUTPATIENT)
Dept: HOME HEALTH SERVICES | Facility: HOME HEALTH | Age: 70
End: 2020-06-13

## 2020-06-14 ENCOUNTER — HOME CARE/HOSPICE - HEALTHEAST (OUTPATIENT)
Dept: HOME HEALTH SERVICES | Facility: HOME HEALTH | Age: 70
End: 2020-06-14

## 2020-06-15 ENCOUNTER — HOME CARE/HOSPICE - HEALTHEAST (OUTPATIENT)
Dept: HOME HEALTH SERVICES | Facility: HOME HEALTH | Age: 70
End: 2020-06-15

## 2020-06-16 ENCOUNTER — HOME CARE/HOSPICE - HEALTHEAST (OUTPATIENT)
Dept: HOME HEALTH SERVICES | Facility: HOME HEALTH | Age: 70
End: 2020-06-16

## 2020-06-17 ENCOUNTER — COMMUNICATION - HEALTHEAST (OUTPATIENT)
Dept: INTERNAL MEDICINE | Facility: CLINIC | Age: 70
End: 2020-06-17

## 2020-06-17 ENCOUNTER — OFFICE VISIT - HEALTHEAST (OUTPATIENT)
Dept: INTERNAL MEDICINE | Facility: CLINIC | Age: 70
End: 2020-06-17

## 2020-06-17 ENCOUNTER — COMMUNICATION - HEALTHEAST (OUTPATIENT)
Dept: HOME HEALTH SERVICES | Facility: HOME HEALTH | Age: 70
End: 2020-06-17

## 2020-06-17 ENCOUNTER — HOME CARE/HOSPICE - HEALTHEAST (OUTPATIENT)
Dept: HOME HEALTH SERVICES | Facility: HOME HEALTH | Age: 70
End: 2020-06-17

## 2020-06-17 ENCOUNTER — AMBULATORY - HEALTHEAST (OUTPATIENT)
Dept: INTERNAL MEDICINE | Facility: CLINIC | Age: 70
End: 2020-06-17

## 2020-06-17 DIAGNOSIS — G89.29 CHRONIC NECK PAIN: ICD-10-CM

## 2020-06-17 DIAGNOSIS — R35.0 BENIGN PROSTATIC HYPERPLASIA WITH URINARY FREQUENCY: ICD-10-CM

## 2020-06-17 DIAGNOSIS — J43.1 PANLOBULAR EMPHYSEMA (H): ICD-10-CM

## 2020-06-17 DIAGNOSIS — M48.062 LUMBAR STENOSIS WITH NEUROGENIC CLAUDICATION: ICD-10-CM

## 2020-06-17 DIAGNOSIS — M54.2 CHRONIC NECK PAIN: ICD-10-CM

## 2020-06-17 DIAGNOSIS — G12.21 ALS (AMYOTROPHIC LATERAL SCLEROSIS) (H): ICD-10-CM

## 2020-06-17 DIAGNOSIS — N40.1 BENIGN PROSTATIC HYPERPLASIA WITH URINARY FREQUENCY: ICD-10-CM

## 2020-06-17 DIAGNOSIS — G99.2 STENOSIS OF CERVICAL SPINE WITH MYELOPATHY (H): ICD-10-CM

## 2020-06-17 DIAGNOSIS — M48.02 STENOSIS OF CERVICAL SPINE WITH MYELOPATHY (H): ICD-10-CM

## 2020-06-18 ENCOUNTER — HOME CARE/HOSPICE - HEALTHEAST (OUTPATIENT)
Dept: HOME HEALTH SERVICES | Facility: HOME HEALTH | Age: 70
End: 2020-06-18

## 2020-06-19 ENCOUNTER — HOME CARE/HOSPICE - HEALTHEAST (OUTPATIENT)
Dept: HOME HEALTH SERVICES | Facility: HOME HEALTH | Age: 70
End: 2020-06-19

## 2020-06-20 ENCOUNTER — HOME CARE/HOSPICE - HEALTHEAST (OUTPATIENT)
Dept: HOME HEALTH SERVICES | Facility: HOME HEALTH | Age: 70
End: 2020-06-20

## 2020-06-21 ENCOUNTER — HOME CARE/HOSPICE - HEALTHEAST (OUTPATIENT)
Dept: HOME HEALTH SERVICES | Facility: HOME HEALTH | Age: 70
End: 2020-06-21

## 2020-06-22 ENCOUNTER — HOME CARE/HOSPICE - HEALTHEAST (OUTPATIENT)
Dept: HOME HEALTH SERVICES | Facility: HOME HEALTH | Age: 70
End: 2020-06-22

## 2020-06-22 ENCOUNTER — AMBULATORY - HEALTHEAST (OUTPATIENT)
Dept: INTERNAL MEDICINE | Facility: CLINIC | Age: 70
End: 2020-06-22

## 2020-06-22 ENCOUNTER — COMMUNICATION - HEALTHEAST (OUTPATIENT)
Dept: HOME HEALTH SERVICES | Facility: HOME HEALTH | Age: 70
End: 2020-06-22

## 2020-06-23 ENCOUNTER — HOME CARE/HOSPICE - HEALTHEAST (OUTPATIENT)
Dept: HOME HEALTH SERVICES | Facility: HOME HEALTH | Age: 70
End: 2020-06-23

## 2020-06-24 ENCOUNTER — HOME CARE/HOSPICE - HEALTHEAST (OUTPATIENT)
Dept: HOME HEALTH SERVICES | Facility: HOME HEALTH | Age: 70
End: 2020-06-24

## 2020-06-24 ENCOUNTER — COMMUNICATION - HEALTHEAST (OUTPATIENT)
Dept: INTERNAL MEDICINE | Facility: CLINIC | Age: 70
End: 2020-06-24

## 2020-06-24 DIAGNOSIS — G89.29 CHRONIC NECK PAIN: ICD-10-CM

## 2020-06-24 DIAGNOSIS — M54.2 CHRONIC NECK PAIN: ICD-10-CM

## 2020-06-25 ENCOUNTER — HOME CARE/HOSPICE - HEALTHEAST (OUTPATIENT)
Dept: HOME HEALTH SERVICES | Facility: HOME HEALTH | Age: 70
End: 2020-06-25

## 2020-06-26 ENCOUNTER — RECORDS - HEALTHEAST (OUTPATIENT)
Dept: ADMINISTRATIVE | Facility: OTHER | Age: 70
End: 2020-06-26

## 2020-06-26 ENCOUNTER — COMMUNICATION - HEALTHEAST (OUTPATIENT)
Dept: VASCULAR SURGERY | Facility: CLINIC | Age: 70
End: 2020-06-26

## 2020-06-26 ENCOUNTER — HOME CARE/HOSPICE - HEALTHEAST (OUTPATIENT)
Dept: HOME HEALTH SERVICES | Facility: HOME HEALTH | Age: 70
End: 2020-06-26

## 2020-06-27 ENCOUNTER — HOME CARE/HOSPICE - HEALTHEAST (OUTPATIENT)
Dept: HOME HEALTH SERVICES | Facility: HOME HEALTH | Age: 70
End: 2020-06-27

## 2020-06-28 ENCOUNTER — HOME CARE/HOSPICE - HEALTHEAST (OUTPATIENT)
Dept: HOME HEALTH SERVICES | Facility: HOME HEALTH | Age: 70
End: 2020-06-28

## 2020-06-29 ENCOUNTER — COMMUNICATION - HEALTHEAST (OUTPATIENT)
Dept: GERIATRIC MEDICINE | Facility: CLINIC | Age: 70
End: 2020-06-29

## 2020-06-30 ENCOUNTER — HOME CARE/HOSPICE - HEALTHEAST (OUTPATIENT)
Dept: HOME HEALTH SERVICES | Facility: HOME HEALTH | Age: 70
End: 2020-06-30

## 2020-07-01 ENCOUNTER — COMMUNICATION - HEALTHEAST (OUTPATIENT)
Dept: HOME HEALTH SERVICES | Facility: HOME HEALTH | Age: 70
End: 2020-07-01

## 2020-07-01 ENCOUNTER — HOME CARE/HOSPICE - HEALTHEAST (OUTPATIENT)
Dept: HOME HEALTH SERVICES | Facility: HOME HEALTH | Age: 70
End: 2020-07-01

## 2020-07-02 ENCOUNTER — AMBULATORY - HEALTHEAST (OUTPATIENT)
Dept: VASCULAR SURGERY | Facility: CLINIC | Age: 70
End: 2020-07-02

## 2020-07-02 ENCOUNTER — HOME CARE/HOSPICE - HEALTHEAST (OUTPATIENT)
Dept: HOME HEALTH SERVICES | Facility: HOME HEALTH | Age: 70
End: 2020-07-02

## 2020-07-03 ENCOUNTER — HOME CARE/HOSPICE - HEALTHEAST (OUTPATIENT)
Dept: HOME HEALTH SERVICES | Facility: HOME HEALTH | Age: 70
End: 2020-07-03

## 2020-07-04 ENCOUNTER — HOME CARE/HOSPICE - HEALTHEAST (OUTPATIENT)
Dept: HOME HEALTH SERVICES | Facility: HOME HEALTH | Age: 70
End: 2020-07-04

## 2020-07-05 ENCOUNTER — HOME CARE/HOSPICE - HEALTHEAST (OUTPATIENT)
Dept: HOME HEALTH SERVICES | Facility: HOME HEALTH | Age: 70
End: 2020-07-05

## 2020-07-07 ENCOUNTER — HOME CARE/HOSPICE - HEALTHEAST (OUTPATIENT)
Dept: HOME HEALTH SERVICES | Facility: HOME HEALTH | Age: 70
End: 2020-07-07

## 2020-07-07 ENCOUNTER — OFFICE VISIT - HEALTHEAST (OUTPATIENT)
Dept: VASCULAR SURGERY | Facility: CLINIC | Age: 70
End: 2020-07-07

## 2020-07-07 DIAGNOSIS — G12.21 ALS (AMYOTROPHIC LATERAL SCLEROSIS) (H): ICD-10-CM

## 2020-07-07 DIAGNOSIS — F17.210 CIGARETTE NICOTINE DEPENDENCE WITHOUT COMPLICATION: ICD-10-CM

## 2020-07-07 DIAGNOSIS — Z99.3 WHEELCHAIR BOUND: ICD-10-CM

## 2020-07-07 DIAGNOSIS — M79.89 LEG SWELLING: ICD-10-CM

## 2020-07-07 DIAGNOSIS — E11.65 TYPE 2 DIABETES MELLITUS WITH HYPERGLYCEMIA, WITHOUT LONG-TERM CURRENT USE OF INSULIN (H): ICD-10-CM

## 2020-07-07 DIAGNOSIS — L89.150 PRESSURE ULCER OF COCCYGEAL REGION, UNSTAGEABLE (H): ICD-10-CM

## 2020-07-07 DIAGNOSIS — Z79.52 LONG TERM SYSTEMIC STEROID USER: ICD-10-CM

## 2020-07-09 ENCOUNTER — HOME CARE/HOSPICE - HEALTHEAST (OUTPATIENT)
Dept: HOME HEALTH SERVICES | Facility: HOME HEALTH | Age: 70
End: 2020-07-09

## 2020-07-10 ENCOUNTER — HOME CARE/HOSPICE - HEALTHEAST (OUTPATIENT)
Dept: HOME HEALTH SERVICES | Facility: HOME HEALTH | Age: 70
End: 2020-07-10

## 2020-07-11 ENCOUNTER — COMMUNICATION - HEALTHEAST (OUTPATIENT)
Dept: HOME HEALTH SERVICES | Facility: HOME HEALTH | Age: 70
End: 2020-07-11

## 2020-07-11 ENCOUNTER — HOME CARE/HOSPICE - HEALTHEAST (OUTPATIENT)
Dept: HOME HEALTH SERVICES | Facility: HOME HEALTH | Age: 70
End: 2020-07-11

## 2020-07-13 ENCOUNTER — RECORDS - HEALTHEAST (OUTPATIENT)
Dept: ADMINISTRATIVE | Facility: OTHER | Age: 70
End: 2020-07-13

## 2020-07-13 ENCOUNTER — HOME CARE/HOSPICE - HEALTHEAST (OUTPATIENT)
Dept: HOME HEALTH SERVICES | Facility: HOME HEALTH | Age: 70
End: 2020-07-13

## 2020-07-14 ENCOUNTER — HOME CARE/HOSPICE - HEALTHEAST (OUTPATIENT)
Dept: HOME HEALTH SERVICES | Facility: HOME HEALTH | Age: 70
End: 2020-07-14

## 2020-07-16 ENCOUNTER — HOME CARE/HOSPICE - HEALTHEAST (OUTPATIENT)
Dept: HOME HEALTH SERVICES | Facility: HOME HEALTH | Age: 70
End: 2020-07-16

## 2020-07-16 ENCOUNTER — COMMUNICATION - HEALTHEAST (OUTPATIENT)
Dept: HOME HEALTH SERVICES | Facility: HOME HEALTH | Age: 70
End: 2020-07-16

## 2020-07-17 ENCOUNTER — COMMUNICATION - HEALTHEAST (OUTPATIENT)
Dept: VASCULAR SURGERY | Facility: CLINIC | Age: 70
End: 2020-07-17

## 2020-07-19 ENCOUNTER — HOME CARE/HOSPICE - HEALTHEAST (OUTPATIENT)
Dept: HOME HEALTH SERVICES | Facility: HOME HEALTH | Age: 70
End: 2020-07-19

## 2020-07-21 ENCOUNTER — HOME CARE/HOSPICE - HEALTHEAST (OUTPATIENT)
Dept: HOME HEALTH SERVICES | Facility: HOME HEALTH | Age: 70
End: 2020-07-21

## 2020-07-24 ENCOUNTER — HOME CARE/HOSPICE - HEALTHEAST (OUTPATIENT)
Dept: HOME HEALTH SERVICES | Facility: HOME HEALTH | Age: 70
End: 2020-07-24

## 2020-07-28 ENCOUNTER — COMMUNICATION - HEALTHEAST (OUTPATIENT)
Dept: HOME HEALTH SERVICES | Facility: HOME HEALTH | Age: 70
End: 2020-07-28

## 2020-07-28 ENCOUNTER — HOME CARE/HOSPICE - HEALTHEAST (OUTPATIENT)
Dept: HOME HEALTH SERVICES | Facility: HOME HEALTH | Age: 70
End: 2020-07-28

## 2020-08-03 ENCOUNTER — COMMUNICATION - HEALTHEAST (OUTPATIENT)
Dept: INTERNAL MEDICINE | Facility: CLINIC | Age: 70
End: 2020-08-03

## 2020-08-03 ENCOUNTER — HOME CARE/HOSPICE - HEALTHEAST (OUTPATIENT)
Dept: HOME HEALTH SERVICES | Facility: HOME HEALTH | Age: 70
End: 2020-08-03

## 2020-08-03 DIAGNOSIS — J44.1 CHRONIC OBSTRUCTIVE PULMONARY DISEASE WITH ACUTE EXACERBATION (H): ICD-10-CM

## 2020-08-07 ENCOUNTER — HOME CARE/HOSPICE - HEALTHEAST (OUTPATIENT)
Dept: HOME HEALTH SERVICES | Facility: HOME HEALTH | Age: 70
End: 2020-08-07

## 2020-08-10 ENCOUNTER — COMMUNICATION - HEALTHEAST (OUTPATIENT)
Dept: INTERNAL MEDICINE | Facility: CLINIC | Age: 70
End: 2020-08-10

## 2020-08-10 DIAGNOSIS — J44.1 CHRONIC OBSTRUCTIVE PULMONARY DISEASE WITH ACUTE EXACERBATION (H): ICD-10-CM

## 2020-08-10 DIAGNOSIS — M54.2 CHRONIC NECK PAIN: ICD-10-CM

## 2020-08-10 DIAGNOSIS — G89.29 CHRONIC NECK PAIN: ICD-10-CM

## 2020-08-10 DIAGNOSIS — M62.838 MUSCLE SPASM: ICD-10-CM

## 2020-08-10 DIAGNOSIS — M48.062 LUMBAR STENOSIS WITH NEUROGENIC CLAUDICATION: ICD-10-CM

## 2020-08-11 ENCOUNTER — COMMUNICATION - HEALTHEAST (OUTPATIENT)
Dept: INTERNAL MEDICINE | Facility: CLINIC | Age: 70
End: 2020-08-11

## 2020-08-11 DIAGNOSIS — J44.1 CHRONIC OBSTRUCTIVE PULMONARY DISEASE WITH ACUTE EXACERBATION (H): ICD-10-CM

## 2020-08-11 RX ORDER — DULOXETIN HYDROCHLORIDE 20 MG/1
60 CAPSULE, DELAYED RELEASE ORAL DAILY
Qty: 270 CAPSULE | Refills: 3 | Status: SHIPPED | OUTPATIENT
Start: 2020-08-11 | End: 2021-08-26

## 2020-08-12 RX ORDER — TIOTROPIUM BROMIDE 18 UG/1
18 CAPSULE ORAL; RESPIRATORY (INHALATION) DAILY
Qty: 90 CAPSULE | Refills: 3 | Status: SHIPPED | OUTPATIENT
Start: 2020-08-12 | End: 2021-09-03

## 2020-08-13 ENCOUNTER — HOME CARE/HOSPICE - HEALTHEAST (OUTPATIENT)
Dept: HOME HEALTH SERVICES | Facility: HOME HEALTH | Age: 70
End: 2020-08-13

## 2020-08-14 ENCOUNTER — COMMUNICATION - HEALTHEAST (OUTPATIENT)
Dept: INTERNAL MEDICINE | Facility: CLINIC | Age: 70
End: 2020-08-14

## 2020-08-17 ENCOUNTER — HOME CARE/HOSPICE - HEALTHEAST (OUTPATIENT)
Dept: HOME HEALTH SERVICES | Facility: HOME HEALTH | Age: 70
End: 2020-08-17

## 2020-08-17 ENCOUNTER — COMMUNICATION - HEALTHEAST (OUTPATIENT)
Dept: INTERNAL MEDICINE | Facility: CLINIC | Age: 70
End: 2020-08-17

## 2020-08-17 ENCOUNTER — COMMUNICATION - HEALTHEAST (OUTPATIENT)
Dept: HOME HEALTH SERVICES | Facility: HOME HEALTH | Age: 70
End: 2020-08-17

## 2020-08-17 DIAGNOSIS — I50.9 EDEMA DUE TO CONGESTIVE HEART FAILURE (H): ICD-10-CM

## 2020-08-18 RX ORDER — POTASSIUM CHLORIDE 1500 MG/1
TABLET, EXTENDED RELEASE ORAL
Qty: 180 TABLET | Refills: 1 | Status: SHIPPED | OUTPATIENT
Start: 2020-08-18 | End: 2021-12-22

## 2020-08-22 ENCOUNTER — HOME CARE/HOSPICE - HEALTHEAST (OUTPATIENT)
Dept: HOME HEALTH SERVICES | Facility: HOME HEALTH | Age: 70
End: 2020-08-22

## 2020-08-24 ENCOUNTER — COMMUNICATION - HEALTHEAST (OUTPATIENT)
Dept: VASCULAR SURGERY | Facility: CLINIC | Age: 70
End: 2020-08-24

## 2020-08-24 ENCOUNTER — HOME CARE/HOSPICE - HEALTHEAST (OUTPATIENT)
Dept: HOME HEALTH SERVICES | Facility: HOME HEALTH | Age: 70
End: 2020-08-24

## 2020-08-27 ENCOUNTER — HOME CARE/HOSPICE - HEALTHEAST (OUTPATIENT)
Dept: HOME HEALTH SERVICES | Facility: HOME HEALTH | Age: 70
End: 2020-08-27

## 2020-08-30 ENCOUNTER — HOME CARE/HOSPICE - HEALTHEAST (OUTPATIENT)
Dept: HOME HEALTH SERVICES | Facility: HOME HEALTH | Age: 70
End: 2020-08-30

## 2020-08-31 ENCOUNTER — COMMUNICATION - HEALTHEAST (OUTPATIENT)
Dept: GERIATRIC MEDICINE | Facility: CLINIC | Age: 70
End: 2020-08-31

## 2020-09-01 ENCOUNTER — HOME CARE/HOSPICE - HEALTHEAST (OUTPATIENT)
Dept: HOME HEALTH SERVICES | Facility: HOME HEALTH | Age: 70
End: 2020-09-01

## 2020-09-01 ENCOUNTER — AMBULATORY - HEALTHEAST (OUTPATIENT)
Dept: INTERNAL MEDICINE | Facility: CLINIC | Age: 70
End: 2020-09-01

## 2020-09-01 ENCOUNTER — COMMUNICATION - HEALTHEAST (OUTPATIENT)
Dept: HOME HEALTH SERVICES | Facility: HOME HEALTH | Age: 70
End: 2020-09-01

## 2020-09-03 ENCOUNTER — COMMUNICATION - HEALTHEAST (OUTPATIENT)
Dept: GERIATRIC MEDICINE | Facility: CLINIC | Age: 70
End: 2020-09-03

## 2020-09-04 ENCOUNTER — COMMUNICATION - HEALTHEAST (OUTPATIENT)
Dept: INTERNAL MEDICINE | Facility: CLINIC | Age: 70
End: 2020-09-04

## 2020-09-04 ENCOUNTER — HOME CARE/HOSPICE - HEALTHEAST (OUTPATIENT)
Dept: HOME HEALTH SERVICES | Facility: HOME HEALTH | Age: 70
End: 2020-09-04

## 2020-09-04 DIAGNOSIS — M54.2 CHRONIC NECK PAIN: ICD-10-CM

## 2020-09-04 DIAGNOSIS — G89.29 CHRONIC NECK PAIN: ICD-10-CM

## 2020-09-06 ENCOUNTER — HOME CARE/HOSPICE - HEALTHEAST (OUTPATIENT)
Dept: HOME HEALTH SERVICES | Facility: HOME HEALTH | Age: 70
End: 2020-09-06

## 2020-09-09 ENCOUNTER — COMMUNICATION - HEALTHEAST (OUTPATIENT)
Dept: VASCULAR SURGERY | Facility: CLINIC | Age: 70
End: 2020-09-09

## 2020-09-09 ENCOUNTER — COMMUNICATION - HEALTHEAST (OUTPATIENT)
Dept: HOME HEALTH SERVICES | Facility: HOME HEALTH | Age: 70
End: 2020-09-09

## 2020-09-09 ENCOUNTER — HOME CARE/HOSPICE - HEALTHEAST (OUTPATIENT)
Dept: HOME HEALTH SERVICES | Facility: HOME HEALTH | Age: 70
End: 2020-09-09

## 2020-09-10 ENCOUNTER — OFFICE VISIT - HEALTHEAST (OUTPATIENT)
Dept: VASCULAR SURGERY | Facility: CLINIC | Age: 70
End: 2020-09-10

## 2020-09-10 ENCOUNTER — HOME CARE/HOSPICE - HEALTHEAST (OUTPATIENT)
Dept: HOME HEALTH SERVICES | Facility: HOME HEALTH | Age: 70
End: 2020-09-10

## 2020-09-10 DIAGNOSIS — M79.89 LEG SWELLING: ICD-10-CM

## 2020-09-10 DIAGNOSIS — E11.65 TYPE 2 DIABETES MELLITUS WITH HYPERGLYCEMIA, WITHOUT LONG-TERM CURRENT USE OF INSULIN (H): ICD-10-CM

## 2020-09-10 DIAGNOSIS — G12.21 ALS (AMYOTROPHIC LATERAL SCLEROSIS) (H): ICD-10-CM

## 2020-09-10 DIAGNOSIS — F17.210 CIGARETTE NICOTINE DEPENDENCE WITHOUT COMPLICATION: ICD-10-CM

## 2020-09-10 DIAGNOSIS — L89.150 PRESSURE ULCER OF COCCYGEAL REGION, UNSTAGEABLE (H): ICD-10-CM

## 2020-09-10 DIAGNOSIS — Z79.52 LONG TERM SYSTEMIC STEROID USER: ICD-10-CM

## 2020-09-10 DIAGNOSIS — Z99.3 WHEELCHAIR BOUND: ICD-10-CM

## 2020-09-10 ASSESSMENT — MIFFLIN-ST. JEOR: SCORE: 1396.75

## 2020-09-13 ENCOUNTER — HOME CARE/HOSPICE - HEALTHEAST (OUTPATIENT)
Dept: HOME HEALTH SERVICES | Facility: HOME HEALTH | Age: 70
End: 2020-09-13

## 2020-09-14 ENCOUNTER — AMBULATORY - HEALTHEAST (OUTPATIENT)
Dept: INTERNAL MEDICINE | Facility: CLINIC | Age: 70
End: 2020-09-14

## 2020-09-18 ENCOUNTER — HOME CARE/HOSPICE - HEALTHEAST (OUTPATIENT)
Dept: HOME HEALTH SERVICES | Facility: HOME HEALTH | Age: 70
End: 2020-09-18

## 2020-09-19 ENCOUNTER — HOME CARE/HOSPICE - HEALTHEAST (OUTPATIENT)
Dept: HOME HEALTH SERVICES | Facility: HOME HEALTH | Age: 70
End: 2020-09-19

## 2020-09-21 ENCOUNTER — COMMUNICATION - HEALTHEAST (OUTPATIENT)
Dept: INTERNAL MEDICINE | Facility: CLINIC | Age: 70
End: 2020-09-21

## 2020-09-21 ENCOUNTER — COMMUNICATION - HEALTHEAST (OUTPATIENT)
Dept: HOME HEALTH SERVICES | Facility: HOME HEALTH | Age: 70
End: 2020-09-21

## 2020-09-21 DIAGNOSIS — E11.9 DIABETES MELLITUS, TYPE 2 (H): ICD-10-CM

## 2020-09-22 ENCOUNTER — HOME CARE/HOSPICE - HEALTHEAST (OUTPATIENT)
Dept: HOME HEALTH SERVICES | Facility: HOME HEALTH | Age: 70
End: 2020-09-22

## 2020-09-23 ENCOUNTER — HOME CARE/HOSPICE - HEALTHEAST (OUTPATIENT)
Dept: HOME HEALTH SERVICES | Facility: HOME HEALTH | Age: 70
End: 2020-09-23

## 2020-09-25 ENCOUNTER — HOME CARE/HOSPICE - HEALTHEAST (OUTPATIENT)
Dept: HOME HEALTH SERVICES | Facility: HOME HEALTH | Age: 70
End: 2020-09-25

## 2020-09-28 ENCOUNTER — HOME CARE/HOSPICE - HEALTHEAST (OUTPATIENT)
Dept: HOME HEALTH SERVICES | Facility: HOME HEALTH | Age: 70
End: 2020-09-28

## 2020-09-28 ENCOUNTER — COMMUNICATION - HEALTHEAST (OUTPATIENT)
Dept: INTERNAL MEDICINE | Facility: CLINIC | Age: 70
End: 2020-09-28

## 2020-09-28 DIAGNOSIS — M54.2 CHRONIC NECK PAIN: ICD-10-CM

## 2020-09-28 DIAGNOSIS — G89.29 CHRONIC NECK PAIN: ICD-10-CM

## 2020-09-30 ENCOUNTER — HOME CARE/HOSPICE - HEALTHEAST (OUTPATIENT)
Dept: HOME HEALTH SERVICES | Facility: HOME HEALTH | Age: 70
End: 2020-09-30

## 2020-09-30 ENCOUNTER — COMMUNICATION - HEALTHEAST (OUTPATIENT)
Dept: HOME HEALTH SERVICES | Facility: HOME HEALTH | Age: 70
End: 2020-09-30

## 2020-10-01 ENCOUNTER — HOME CARE/HOSPICE - HEALTHEAST (OUTPATIENT)
Dept: HOME HEALTH SERVICES | Facility: HOME HEALTH | Age: 70
End: 2020-10-01

## 2020-10-02 ENCOUNTER — HOME CARE/HOSPICE - HEALTHEAST (OUTPATIENT)
Dept: HOME HEALTH SERVICES | Facility: HOME HEALTH | Age: 70
End: 2020-10-02

## 2020-10-04 ENCOUNTER — HOME CARE/HOSPICE - HEALTHEAST (OUTPATIENT)
Dept: HOME HEALTH SERVICES | Facility: HOME HEALTH | Age: 70
End: 2020-10-04

## 2020-10-07 ENCOUNTER — HOME CARE/HOSPICE - HEALTHEAST (OUTPATIENT)
Dept: HOME HEALTH SERVICES | Facility: HOME HEALTH | Age: 70
End: 2020-10-07

## 2020-10-09 ENCOUNTER — HOME CARE/HOSPICE - HEALTHEAST (OUTPATIENT)
Dept: HOME HEALTH SERVICES | Facility: HOME HEALTH | Age: 70
End: 2020-10-09

## 2020-10-09 ENCOUNTER — COMMUNICATION - HEALTHEAST (OUTPATIENT)
Dept: VASCULAR SURGERY | Facility: CLINIC | Age: 70
End: 2020-10-09

## 2020-10-09 ENCOUNTER — OFFICE VISIT - HEALTHEAST (OUTPATIENT)
Dept: VASCULAR SURGERY | Facility: CLINIC | Age: 70
End: 2020-10-09

## 2020-10-09 DIAGNOSIS — F17.210 CIGARETTE NICOTINE DEPENDENCE WITHOUT COMPLICATION: ICD-10-CM

## 2020-10-09 DIAGNOSIS — L89.150 PRESSURE ULCER OF COCCYGEAL REGION, UNSTAGEABLE (H): ICD-10-CM

## 2020-10-09 DIAGNOSIS — M79.89 LEG SWELLING: ICD-10-CM

## 2020-10-09 DIAGNOSIS — E11.65 TYPE 2 DIABETES MELLITUS WITH HYPERGLYCEMIA, WITHOUT LONG-TERM CURRENT USE OF INSULIN (H): ICD-10-CM

## 2020-10-09 DIAGNOSIS — G12.21 ALS (AMYOTROPHIC LATERAL SCLEROSIS) (H): ICD-10-CM

## 2020-10-09 DIAGNOSIS — Z79.52 LONG TERM SYSTEMIC STEROID USER: ICD-10-CM

## 2020-10-09 DIAGNOSIS — Z99.3 WHEELCHAIR BOUND: ICD-10-CM

## 2020-10-10 ENCOUNTER — HOME CARE/HOSPICE - HEALTHEAST (OUTPATIENT)
Dept: HOME HEALTH SERVICES | Facility: HOME HEALTH | Age: 70
End: 2020-10-10

## 2020-10-12 ENCOUNTER — HOME CARE/HOSPICE - HEALTHEAST (OUTPATIENT)
Dept: HOME HEALTH SERVICES | Facility: HOME HEALTH | Age: 70
End: 2020-10-12

## 2020-10-14 ENCOUNTER — HOME CARE/HOSPICE - HEALTHEAST (OUTPATIENT)
Dept: HOME HEALTH SERVICES | Facility: HOME HEALTH | Age: 70
End: 2020-10-14

## 2020-10-14 ENCOUNTER — COMMUNICATION - HEALTHEAST (OUTPATIENT)
Dept: INTERNAL MEDICINE | Facility: CLINIC | Age: 70
End: 2020-10-14

## 2020-10-14 DIAGNOSIS — J43.1 PANLOBULAR EMPHYSEMA (H): ICD-10-CM

## 2020-10-15 ENCOUNTER — COMMUNICATION - HEALTHEAST (OUTPATIENT)
Dept: INTERNAL MEDICINE | Facility: CLINIC | Age: 70
End: 2020-10-15

## 2020-10-15 DIAGNOSIS — E11.9 DIABETES MELLITUS, TYPE 2 (H): ICD-10-CM

## 2020-10-16 ENCOUNTER — HOME CARE/HOSPICE - HEALTHEAST (OUTPATIENT)
Dept: HOME HEALTH SERVICES | Facility: HOME HEALTH | Age: 70
End: 2020-10-16

## 2020-10-19 ENCOUNTER — HOME CARE/HOSPICE - HEALTHEAST (OUTPATIENT)
Dept: HOME HEALTH SERVICES | Facility: HOME HEALTH | Age: 70
End: 2020-10-19

## 2020-10-20 ENCOUNTER — COMMUNICATION - HEALTHEAST (OUTPATIENT)
Dept: INTERNAL MEDICINE | Facility: CLINIC | Age: 70
End: 2020-10-20

## 2020-10-21 ENCOUNTER — HOME CARE/HOSPICE - HEALTHEAST (OUTPATIENT)
Dept: HOME HEALTH SERVICES | Facility: HOME HEALTH | Age: 70
End: 2020-10-21

## 2020-10-22 ENCOUNTER — COMMUNICATION - HEALTHEAST (OUTPATIENT)
Dept: HOME HEALTH SERVICES | Facility: HOME HEALTH | Age: 70
End: 2020-10-22

## 2020-10-26 ENCOUNTER — AMBULATORY - HEALTHEAST (OUTPATIENT)
Dept: INTERNAL MEDICINE | Facility: CLINIC | Age: 70
End: 2020-10-26

## 2020-10-26 DIAGNOSIS — J43.1 PANLOBULAR EMPHYSEMA (H): ICD-10-CM

## 2020-10-26 DIAGNOSIS — E11.9 DIABETES MELLITUS, TYPE 2 (H): ICD-10-CM

## 2020-10-27 ENCOUNTER — COMMUNICATION - HEALTHEAST (OUTPATIENT)
Dept: INTERNAL MEDICINE | Facility: CLINIC | Age: 70
End: 2020-10-27

## 2020-10-27 ENCOUNTER — HOME CARE/HOSPICE - HEALTHEAST (OUTPATIENT)
Dept: HOME HEALTH SERVICES | Facility: HOME HEALTH | Age: 70
End: 2020-10-27

## 2020-10-27 DIAGNOSIS — G89.29 CHRONIC NECK PAIN: ICD-10-CM

## 2020-10-27 DIAGNOSIS — M54.2 CHRONIC NECK PAIN: ICD-10-CM

## 2020-10-28 ENCOUNTER — HOME CARE/HOSPICE - HEALTHEAST (OUTPATIENT)
Dept: HOME HEALTH SERVICES | Facility: HOME HEALTH | Age: 70
End: 2020-10-28

## 2020-10-28 ENCOUNTER — COMMUNICATION - HEALTHEAST (OUTPATIENT)
Dept: HOME HEALTH SERVICES | Facility: HOME HEALTH | Age: 70
End: 2020-10-28

## 2020-10-29 ENCOUNTER — HOME CARE/HOSPICE - HEALTHEAST (OUTPATIENT)
Dept: HOME HEALTH SERVICES | Facility: HOME HEALTH | Age: 70
End: 2020-10-29

## 2020-10-29 ENCOUNTER — COMMUNICATION - HEALTHEAST (OUTPATIENT)
Dept: HOME HEALTH SERVICES | Facility: HOME HEALTH | Age: 70
End: 2020-10-29

## 2020-11-02 ENCOUNTER — COMMUNICATION - HEALTHEAST (OUTPATIENT)
Dept: HOME HEALTH SERVICES | Facility: HOME HEALTH | Age: 70
End: 2020-11-02

## 2020-11-02 ENCOUNTER — HOME CARE/HOSPICE - HEALTHEAST (OUTPATIENT)
Dept: HOME HEALTH SERVICES | Facility: HOME HEALTH | Age: 70
End: 2020-11-02

## 2020-11-02 ENCOUNTER — COMMUNICATION - HEALTHEAST (OUTPATIENT)
Dept: INTERNAL MEDICINE | Facility: CLINIC | Age: 70
End: 2020-11-02

## 2020-11-02 DIAGNOSIS — N40.1 BENIGN PROSTATIC HYPERPLASIA WITH URINARY FREQUENCY: ICD-10-CM

## 2020-11-02 DIAGNOSIS — E11.9 DIABETES MELLITUS, TYPE 2 (H): ICD-10-CM

## 2020-11-02 DIAGNOSIS — J43.1 PANLOBULAR EMPHYSEMA (H): ICD-10-CM

## 2020-11-02 DIAGNOSIS — M48.062 LUMBAR STENOSIS WITH NEUROGENIC CLAUDICATION: ICD-10-CM

## 2020-11-02 DIAGNOSIS — R35.0 BENIGN PROSTATIC HYPERPLASIA WITH URINARY FREQUENCY: ICD-10-CM

## 2020-11-02 RX ORDER — CETIRIZINE HYDROCHLORIDE 10 MG/1
10 TABLET ORAL DAILY
Qty: 90 TABLET | Refills: 3 | Status: SHIPPED | OUTPATIENT
Start: 2020-11-02 | End: 2021-08-04

## 2020-11-04 ENCOUNTER — HOME CARE/HOSPICE - HEALTHEAST (OUTPATIENT)
Dept: HOME HEALTH SERVICES | Facility: HOME HEALTH | Age: 70
End: 2020-11-04

## 2020-11-05 ENCOUNTER — HOME CARE/HOSPICE - HEALTHEAST (OUTPATIENT)
Dept: HOME HEALTH SERVICES | Facility: HOME HEALTH | Age: 70
End: 2020-11-05

## 2020-11-05 ENCOUNTER — COMMUNICATION - HEALTHEAST (OUTPATIENT)
Dept: INTERNAL MEDICINE | Facility: CLINIC | Age: 70
End: 2020-11-05

## 2020-11-05 DIAGNOSIS — J44.9 COPD (CHRONIC OBSTRUCTIVE PULMONARY DISEASE) (H): ICD-10-CM

## 2020-11-05 DIAGNOSIS — G89.29 CHRONIC NECK PAIN: ICD-10-CM

## 2020-11-05 DIAGNOSIS — M54.2 CHRONIC NECK PAIN: ICD-10-CM

## 2020-11-06 ENCOUNTER — COMMUNICATION - HEALTHEAST (OUTPATIENT)
Dept: VASCULAR SURGERY | Facility: CLINIC | Age: 70
End: 2020-11-06

## 2020-11-06 ENCOUNTER — HOME CARE/HOSPICE - HEALTHEAST (OUTPATIENT)
Dept: HOME HEALTH SERVICES | Facility: HOME HEALTH | Age: 70
End: 2020-11-06

## 2020-11-09 ENCOUNTER — COMMUNICATION - HEALTHEAST (OUTPATIENT)
Dept: HOME HEALTH SERVICES | Facility: HOME HEALTH | Age: 70
End: 2020-11-09

## 2020-11-09 ENCOUNTER — HOME CARE/HOSPICE - HEALTHEAST (OUTPATIENT)
Dept: HOME HEALTH SERVICES | Facility: HOME HEALTH | Age: 70
End: 2020-11-09

## 2020-11-10 ENCOUNTER — COMMUNICATION - HEALTHEAST (OUTPATIENT)
Dept: GERIATRIC MEDICINE | Facility: CLINIC | Age: 70
End: 2020-11-10

## 2020-11-11 RX ORDER — BUDESONIDE AND FORMOTEROL FUMARATE DIHYDRATE 160; 4.5 UG/1; UG/1
AEROSOL RESPIRATORY (INHALATION)
Qty: 2 INHALER | Refills: 6 | Status: SHIPPED | OUTPATIENT
Start: 2020-11-11 | End: 2021-09-03

## 2020-11-12 ENCOUNTER — HOME CARE/HOSPICE - HEALTHEAST (OUTPATIENT)
Dept: HOME HEALTH SERVICES | Facility: HOME HEALTH | Age: 70
End: 2020-11-12

## 2020-11-13 ENCOUNTER — HOME CARE/HOSPICE - HEALTHEAST (OUTPATIENT)
Dept: HOME HEALTH SERVICES | Facility: HOME HEALTH | Age: 70
End: 2020-11-13

## 2020-11-16 ENCOUNTER — COMMUNICATION - HEALTHEAST (OUTPATIENT)
Dept: INTERNAL MEDICINE | Facility: CLINIC | Age: 70
End: 2020-11-16

## 2020-11-16 ENCOUNTER — HOME CARE/HOSPICE - HEALTHEAST (OUTPATIENT)
Dept: HOME HEALTH SERVICES | Facility: HOME HEALTH | Age: 70
End: 2020-11-16

## 2020-11-16 DIAGNOSIS — D50.9 IRON DEFICIENCY ANEMIA: ICD-10-CM

## 2020-11-17 ENCOUNTER — COMMUNICATION - HEALTHEAST (OUTPATIENT)
Dept: INTERNAL MEDICINE | Facility: CLINIC | Age: 70
End: 2020-11-17

## 2020-11-17 DIAGNOSIS — R35.0 BENIGN PROSTATIC HYPERPLASIA WITH URINARY FREQUENCY: ICD-10-CM

## 2020-11-17 DIAGNOSIS — D50.9 IRON DEFICIENCY ANEMIA: ICD-10-CM

## 2020-11-17 DIAGNOSIS — N40.1 BENIGN PROSTATIC HYPERPLASIA WITH URINARY FREQUENCY: ICD-10-CM

## 2020-11-17 RX ORDER — TAMSULOSIN HYDROCHLORIDE 0.4 MG/1
0.4 CAPSULE ORAL
Qty: 90 CAPSULE | Refills: 3 | Status: SHIPPED | OUTPATIENT
Start: 2020-11-17 | End: 2022-06-09

## 2020-11-18 ENCOUNTER — HOME CARE/HOSPICE - HEALTHEAST (OUTPATIENT)
Dept: HOME HEALTH SERVICES | Facility: HOME HEALTH | Age: 70
End: 2020-11-18

## 2020-11-19 ENCOUNTER — HOME CARE/HOSPICE - HEALTHEAST (OUTPATIENT)
Dept: HOME HEALTH SERVICES | Facility: HOME HEALTH | Age: 70
End: 2020-11-19

## 2020-11-23 ENCOUNTER — COMMUNICATION - HEALTHEAST (OUTPATIENT)
Dept: HOME HEALTH SERVICES | Facility: HOME HEALTH | Age: 70
End: 2020-11-23

## 2020-11-23 ENCOUNTER — HOME CARE/HOSPICE - HEALTHEAST (OUTPATIENT)
Dept: HOME HEALTH SERVICES | Facility: HOME HEALTH | Age: 70
End: 2020-11-23

## 2020-11-23 ENCOUNTER — COMMUNICATION - HEALTHEAST (OUTPATIENT)
Dept: VASCULAR SURGERY | Facility: CLINIC | Age: 70
End: 2020-11-23

## 2020-11-25 ENCOUNTER — HOME CARE/HOSPICE - HEALTHEAST (OUTPATIENT)
Dept: HOME HEALTH SERVICES | Facility: HOME HEALTH | Age: 70
End: 2020-11-25

## 2020-11-26 ENCOUNTER — HOME CARE/HOSPICE - HEALTHEAST (OUTPATIENT)
Dept: HOME HEALTH SERVICES | Facility: HOME HEALTH | Age: 70
End: 2020-11-26

## 2020-11-27 ENCOUNTER — COMMUNICATION - HEALTHEAST (OUTPATIENT)
Dept: INTERNAL MEDICINE | Facility: CLINIC | Age: 70
End: 2020-11-27

## 2020-11-27 DIAGNOSIS — M54.2 CHRONIC NECK PAIN: ICD-10-CM

## 2020-11-27 DIAGNOSIS — G89.29 CHRONIC NECK PAIN: ICD-10-CM

## 2020-11-28 ENCOUNTER — COMMUNICATION - HEALTHEAST (OUTPATIENT)
Dept: CARE COORDINATION | Facility: CLINIC | Age: 70
End: 2020-11-28

## 2020-11-28 ENCOUNTER — HOME CARE/HOSPICE - HEALTHEAST (OUTPATIENT)
Dept: HOME HEALTH SERVICES | Facility: HOME HEALTH | Age: 70
End: 2020-11-28

## 2020-11-30 ENCOUNTER — COMMUNICATION - HEALTHEAST (OUTPATIENT)
Dept: HOME HEALTH SERVICES | Facility: HOME HEALTH | Age: 70
End: 2020-11-30

## 2020-11-30 ENCOUNTER — HOME CARE/HOSPICE - HEALTHEAST (OUTPATIENT)
Dept: HOME HEALTH SERVICES | Facility: HOME HEALTH | Age: 70
End: 2020-11-30

## 2020-12-01 ENCOUNTER — HOME CARE/HOSPICE - HEALTHEAST (OUTPATIENT)
Dept: HOME HEALTH SERVICES | Facility: HOME HEALTH | Age: 70
End: 2020-12-01

## 2020-12-02 ENCOUNTER — HOME CARE/HOSPICE - HEALTHEAST (OUTPATIENT)
Dept: HOME HEALTH SERVICES | Facility: HOME HEALTH | Age: 70
End: 2020-12-02

## 2020-12-03 ENCOUNTER — HOME CARE/HOSPICE - HEALTHEAST (OUTPATIENT)
Dept: HOME HEALTH SERVICES | Facility: HOME HEALTH | Age: 70
End: 2020-12-03

## 2020-12-03 ENCOUNTER — COMMUNICATION - HEALTHEAST (OUTPATIENT)
Dept: VASCULAR SURGERY | Facility: CLINIC | Age: 70
End: 2020-12-03

## 2020-12-07 ENCOUNTER — HOME CARE/HOSPICE - HEALTHEAST (OUTPATIENT)
Dept: HOME HEALTH SERVICES | Facility: HOME HEALTH | Age: 70
End: 2020-12-07

## 2020-12-09 ENCOUNTER — OFFICE VISIT - HEALTHEAST (OUTPATIENT)
Dept: VASCULAR SURGERY | Facility: CLINIC | Age: 70
End: 2020-12-09

## 2020-12-09 ENCOUNTER — HOME CARE/HOSPICE - HEALTHEAST (OUTPATIENT)
Dept: HOME HEALTH SERVICES | Facility: HOME HEALTH | Age: 70
End: 2020-12-09

## 2020-12-09 DIAGNOSIS — L89.150 PRESSURE ULCER OF COCCYGEAL REGION, UNSTAGEABLE (H): ICD-10-CM

## 2020-12-09 DIAGNOSIS — Z99.3 WHEELCHAIR BOUND: ICD-10-CM

## 2020-12-09 DIAGNOSIS — M79.89 LEG SWELLING: ICD-10-CM

## 2020-12-09 DIAGNOSIS — E11.65 TYPE 2 DIABETES MELLITUS WITH HYPERGLYCEMIA, WITHOUT LONG-TERM CURRENT USE OF INSULIN (H): ICD-10-CM

## 2020-12-09 DIAGNOSIS — L97.812 ULCER OF RIGHT SHIN WITH FAT LAYER EXPOSED (H): ICD-10-CM

## 2020-12-09 DIAGNOSIS — Z79.52 LONG TERM SYSTEMIC STEROID USER: ICD-10-CM

## 2020-12-10 ENCOUNTER — HOME CARE/HOSPICE - HEALTHEAST (OUTPATIENT)
Dept: HOME HEALTH SERVICES | Facility: HOME HEALTH | Age: 70
End: 2020-12-10

## 2020-12-14 ENCOUNTER — HOME CARE/HOSPICE - HEALTHEAST (OUTPATIENT)
Dept: HOME HEALTH SERVICES | Facility: HOME HEALTH | Age: 70
End: 2020-12-14

## 2020-12-16 ENCOUNTER — HOME CARE/HOSPICE - HEALTHEAST (OUTPATIENT)
Dept: HOME HEALTH SERVICES | Facility: HOME HEALTH | Age: 70
End: 2020-12-16

## 2020-12-18 ENCOUNTER — HOME CARE/HOSPICE - HEALTHEAST (OUTPATIENT)
Dept: HOME HEALTH SERVICES | Facility: HOME HEALTH | Age: 70
End: 2020-12-18

## 2020-12-21 ENCOUNTER — HOME CARE/HOSPICE - HEALTHEAST (OUTPATIENT)
Dept: HOME HEALTH SERVICES | Facility: HOME HEALTH | Age: 70
End: 2020-12-21

## 2020-12-21 ENCOUNTER — COMMUNICATION - HEALTHEAST (OUTPATIENT)
Dept: INTERNAL MEDICINE | Facility: CLINIC | Age: 70
End: 2020-12-21

## 2020-12-21 DIAGNOSIS — G89.29 CHRONIC NECK PAIN: ICD-10-CM

## 2020-12-21 DIAGNOSIS — M54.2 CHRONIC NECK PAIN: ICD-10-CM

## 2020-12-22 ENCOUNTER — HOME CARE/HOSPICE - HEALTHEAST (OUTPATIENT)
Dept: HOME HEALTH SERVICES | Facility: HOME HEALTH | Age: 70
End: 2020-12-22

## 2020-12-23 ENCOUNTER — COMMUNICATION - HEALTHEAST (OUTPATIENT)
Dept: INTERNAL MEDICINE | Facility: CLINIC | Age: 70
End: 2020-12-23

## 2020-12-23 DIAGNOSIS — E11.65 TYPE 2 DIABETES MELLITUS WITH HYPERGLYCEMIA, WITHOUT LONG-TERM CURRENT USE OF INSULIN (H): ICD-10-CM

## 2020-12-23 RX ORDER — ATORVASTATIN CALCIUM 10 MG/1
10 TABLET, FILM COATED ORAL DAILY
Qty: 90 TABLET | Refills: 3 | Status: SHIPPED | OUTPATIENT
Start: 2020-12-23 | End: 2021-12-14

## 2020-12-24 ENCOUNTER — HOME CARE/HOSPICE - HEALTHEAST (OUTPATIENT)
Dept: HOME HEALTH SERVICES | Facility: HOME HEALTH | Age: 70
End: 2020-12-24

## 2020-12-28 ENCOUNTER — HOME CARE/HOSPICE - HEALTHEAST (OUTPATIENT)
Dept: HOME HEALTH SERVICES | Facility: HOME HEALTH | Age: 70
End: 2020-12-28

## 2020-12-29 ENCOUNTER — HOME CARE/HOSPICE - HEALTHEAST (OUTPATIENT)
Dept: HOME HEALTH SERVICES | Facility: HOME HEALTH | Age: 70
End: 2020-12-29

## 2020-12-29 ENCOUNTER — COMMUNICATION - HEALTHEAST (OUTPATIENT)
Dept: HOME HEALTH SERVICES | Facility: HOME HEALTH | Age: 70
End: 2020-12-29

## 2020-12-30 ENCOUNTER — HOME CARE/HOSPICE - HEALTHEAST (OUTPATIENT)
Dept: HOME HEALTH SERVICES | Facility: HOME HEALTH | Age: 70
End: 2020-12-30

## 2020-12-31 ENCOUNTER — HOME CARE/HOSPICE - HEALTHEAST (OUTPATIENT)
Dept: HOME HEALTH SERVICES | Facility: HOME HEALTH | Age: 70
End: 2020-12-31

## 2021-01-04 ENCOUNTER — HOME CARE/HOSPICE - HEALTHEAST (OUTPATIENT)
Dept: HOME HEALTH SERVICES | Facility: HOME HEALTH | Age: 71
End: 2021-01-04

## 2021-01-06 ENCOUNTER — HOME CARE/HOSPICE - HEALTHEAST (OUTPATIENT)
Dept: HOME HEALTH SERVICES | Facility: HOME HEALTH | Age: 71
End: 2021-01-06

## 2021-01-07 ENCOUNTER — HOME CARE/HOSPICE - HEALTHEAST (OUTPATIENT)
Dept: HOME HEALTH SERVICES | Facility: HOME HEALTH | Age: 71
End: 2021-01-07

## 2021-01-08 ENCOUNTER — HOME CARE/HOSPICE - HEALTHEAST (OUTPATIENT)
Dept: HOME HEALTH SERVICES | Facility: HOME HEALTH | Age: 71
End: 2021-01-08

## 2021-01-10 ENCOUNTER — HOME CARE/HOSPICE - HEALTHEAST (OUTPATIENT)
Dept: HOME HEALTH SERVICES | Facility: HOME HEALTH | Age: 71
End: 2021-01-10

## 2021-01-10 ENCOUNTER — COMMUNICATION - HEALTHEAST (OUTPATIENT)
Dept: HOME HEALTH SERVICES | Facility: HOME HEALTH | Age: 71
End: 2021-01-10

## 2021-01-11 ENCOUNTER — HOME CARE/HOSPICE - HEALTHEAST (OUTPATIENT)
Dept: HOME HEALTH SERVICES | Facility: HOME HEALTH | Age: 71
End: 2021-01-11

## 2021-01-12 ENCOUNTER — HOME CARE/HOSPICE - HEALTHEAST (OUTPATIENT)
Dept: HOME HEALTH SERVICES | Facility: HOME HEALTH | Age: 71
End: 2021-01-12

## 2021-01-13 ENCOUNTER — COMMUNICATION - HEALTHEAST (OUTPATIENT)
Dept: GERIATRIC MEDICINE | Facility: CLINIC | Age: 71
End: 2021-01-13

## 2021-01-13 ENCOUNTER — HOME CARE/HOSPICE - HEALTHEAST (OUTPATIENT)
Dept: HOME HEALTH SERVICES | Facility: HOME HEALTH | Age: 71
End: 2021-01-13

## 2021-01-14 ENCOUNTER — HOME CARE/HOSPICE - HEALTHEAST (OUTPATIENT)
Dept: HOME HEALTH SERVICES | Facility: HOME HEALTH | Age: 71
End: 2021-01-14

## 2021-01-14 ENCOUNTER — COMMUNICATION - HEALTHEAST (OUTPATIENT)
Dept: GERIATRIC MEDICINE | Facility: CLINIC | Age: 71
End: 2021-01-14

## 2021-01-14 ENCOUNTER — COMMUNICATION - HEALTHEAST (OUTPATIENT)
Dept: HOME HEALTH SERVICES | Facility: HOME HEALTH | Age: 71
End: 2021-01-14

## 2021-01-18 ENCOUNTER — HOME CARE/HOSPICE - HEALTHEAST (OUTPATIENT)
Dept: HOME HEALTH SERVICES | Facility: HOME HEALTH | Age: 71
End: 2021-01-18

## 2021-01-19 ENCOUNTER — HOME CARE/HOSPICE - HEALTHEAST (OUTPATIENT)
Dept: HOME HEALTH SERVICES | Facility: HOME HEALTH | Age: 71
End: 2021-01-19

## 2021-01-20 ENCOUNTER — HOME CARE/HOSPICE - HEALTHEAST (OUTPATIENT)
Dept: HOME HEALTH SERVICES | Facility: HOME HEALTH | Age: 71
End: 2021-01-20

## 2021-01-20 ENCOUNTER — COMMUNICATION - HEALTHEAST (OUTPATIENT)
Dept: HOME HEALTH SERVICES | Facility: HOME HEALTH | Age: 71
End: 2021-01-20

## 2021-01-20 ENCOUNTER — OFFICE VISIT - HEALTHEAST (OUTPATIENT)
Dept: VASCULAR SURGERY | Facility: CLINIC | Age: 71
End: 2021-01-20

## 2021-01-20 ENCOUNTER — COMMUNICATION - HEALTHEAST (OUTPATIENT)
Dept: VASCULAR SURGERY | Facility: CLINIC | Age: 71
End: 2021-01-20

## 2021-01-20 ENCOUNTER — OFFICE VISIT - HEALTHEAST (OUTPATIENT)
Dept: INTERNAL MEDICINE | Facility: CLINIC | Age: 71
End: 2021-01-20

## 2021-01-20 DIAGNOSIS — Z76.89 ENCOUNTER TO ESTABLISH CARE: ICD-10-CM

## 2021-01-20 DIAGNOSIS — L97.812 ULCER OF RIGHT SHIN WITH FAT LAYER EXPOSED (H): ICD-10-CM

## 2021-01-20 DIAGNOSIS — E11.65 TYPE 2 DIABETES MELLITUS WITH HYPERGLYCEMIA, WITHOUT LONG-TERM CURRENT USE OF INSULIN (H): ICD-10-CM

## 2021-01-20 DIAGNOSIS — M48.02 STENOSIS OF CERVICAL SPINE WITH MYELOPATHY (H): ICD-10-CM

## 2021-01-20 DIAGNOSIS — G12.21 ALS (AMYOTROPHIC LATERAL SCLEROSIS) (H): ICD-10-CM

## 2021-01-20 DIAGNOSIS — M48.062 LUMBAR STENOSIS WITH NEUROGENIC CLAUDICATION: ICD-10-CM

## 2021-01-20 DIAGNOSIS — G99.2 STENOSIS OF CERVICAL SPINE WITH MYELOPATHY (H): ICD-10-CM

## 2021-01-20 DIAGNOSIS — L89.150 PRESSURE ULCER OF COCCYGEAL REGION, UNSTAGEABLE (H): ICD-10-CM

## 2021-01-20 DIAGNOSIS — Z99.3 WHEELCHAIR BOUND: ICD-10-CM

## 2021-01-20 DIAGNOSIS — M54.2 CHRONIC NECK PAIN: ICD-10-CM

## 2021-01-20 DIAGNOSIS — G89.29 CHRONIC NECK PAIN: ICD-10-CM

## 2021-01-20 DIAGNOSIS — M79.89 LEG SWELLING: ICD-10-CM

## 2021-01-20 DIAGNOSIS — L97.822 SKIN ULCER OF LEFT PRETIBIAL REGION WITH FAT LAYER EXPOSED (H): ICD-10-CM

## 2021-01-20 DIAGNOSIS — Z79.52 LONG TERM SYSTEMIC STEROID USER: ICD-10-CM

## 2021-01-20 RX ORDER — FOLIC ACID 1 MG/1
1000 TABLET ORAL DAILY
Qty: 90 TABLET | Refills: 3 | Status: SHIPPED | OUTPATIENT
Start: 2021-01-20 | End: 2021-12-15

## 2021-01-20 RX ORDER — MUPIROCIN 20 MG/G
OINTMENT TOPICAL
Qty: 22 G | Refills: 0 | Status: SHIPPED | OUTPATIENT
Start: 2021-01-20 | End: 2024-03-20

## 2021-01-21 ENCOUNTER — HOME CARE/HOSPICE - HEALTHEAST (OUTPATIENT)
Dept: HOME HEALTH SERVICES | Facility: HOME HEALTH | Age: 71
End: 2021-01-21

## 2021-01-22 ENCOUNTER — HOME CARE/HOSPICE - HEALTHEAST (OUTPATIENT)
Dept: HOME HEALTH SERVICES | Facility: HOME HEALTH | Age: 71
End: 2021-01-22

## 2021-01-22 ENCOUNTER — COMMUNICATION - HEALTHEAST (OUTPATIENT)
Dept: HOME HEALTH SERVICES | Facility: HOME HEALTH | Age: 71
End: 2021-01-22

## 2021-01-25 ENCOUNTER — HOME CARE/HOSPICE - HEALTHEAST (OUTPATIENT)
Dept: HOME HEALTH SERVICES | Facility: HOME HEALTH | Age: 71
End: 2021-01-25

## 2021-01-26 ENCOUNTER — HOME CARE/HOSPICE - HEALTHEAST (OUTPATIENT)
Dept: HOME HEALTH SERVICES | Facility: HOME HEALTH | Age: 71
End: 2021-01-26

## 2021-01-27 ENCOUNTER — HOME CARE/HOSPICE - HEALTHEAST (OUTPATIENT)
Dept: HOME HEALTH SERVICES | Facility: HOME HEALTH | Age: 71
End: 2021-01-27

## 2021-01-28 ENCOUNTER — HOME CARE/HOSPICE - HEALTHEAST (OUTPATIENT)
Dept: HOME HEALTH SERVICES | Facility: HOME HEALTH | Age: 71
End: 2021-01-28

## 2021-01-29 ENCOUNTER — HOME CARE/HOSPICE - HEALTHEAST (OUTPATIENT)
Dept: HOME HEALTH SERVICES | Facility: HOME HEALTH | Age: 71
End: 2021-01-29

## 2021-01-29 ENCOUNTER — COMMUNICATION - HEALTHEAST (OUTPATIENT)
Dept: ADMINISTRATIVE | Facility: CLINIC | Age: 71
End: 2021-01-29

## 2021-01-29 DIAGNOSIS — M48.062 LUMBAR STENOSIS WITH NEUROGENIC CLAUDICATION: ICD-10-CM

## 2021-01-29 DIAGNOSIS — G89.29 CHRONIC NECK PAIN: ICD-10-CM

## 2021-01-29 DIAGNOSIS — M54.2 CHRONIC NECK PAIN: ICD-10-CM

## 2021-01-29 DIAGNOSIS — J44.9 COPD (CHRONIC OBSTRUCTIVE PULMONARY DISEASE) (H): ICD-10-CM

## 2021-02-01 ENCOUNTER — HOME CARE/HOSPICE - HEALTHEAST (OUTPATIENT)
Dept: HOME HEALTH SERVICES | Facility: HOME HEALTH | Age: 71
End: 2021-02-01

## 2021-02-01 RX ORDER — ALBUTEROL SULFATE 90 UG/1
2 AEROSOL, METERED RESPIRATORY (INHALATION) EVERY 6 HOURS PRN
Qty: 1 EACH | Refills: 2 | Status: SHIPPED | OUTPATIENT
Start: 2021-02-01 | End: 2021-07-29

## 2021-02-02 ENCOUNTER — HOME CARE/HOSPICE - HEALTHEAST (OUTPATIENT)
Dept: HOME HEALTH SERVICES | Facility: HOME HEALTH | Age: 71
End: 2021-02-02

## 2021-02-03 ENCOUNTER — HOME CARE/HOSPICE - HEALTHEAST (OUTPATIENT)
Dept: HOME HEALTH SERVICES | Facility: HOME HEALTH | Age: 71
End: 2021-02-03

## 2021-02-04 ENCOUNTER — HOME CARE/HOSPICE - HEALTHEAST (OUTPATIENT)
Dept: HOME HEALTH SERVICES | Facility: HOME HEALTH | Age: 71
End: 2021-02-04

## 2021-02-04 ENCOUNTER — COMMUNICATION - HEALTHEAST (OUTPATIENT)
Dept: INTERNAL MEDICINE | Facility: CLINIC | Age: 71
End: 2021-02-04

## 2021-02-05 ENCOUNTER — HOME CARE/HOSPICE - HEALTHEAST (OUTPATIENT)
Dept: HOME HEALTH SERVICES | Facility: HOME HEALTH | Age: 71
End: 2021-02-05

## 2021-02-08 ENCOUNTER — COMMUNICATION - HEALTHEAST (OUTPATIENT)
Dept: GERIATRIC MEDICINE | Facility: CLINIC | Age: 71
End: 2021-02-08

## 2021-02-08 ENCOUNTER — HOME CARE/HOSPICE - HEALTHEAST (OUTPATIENT)
Dept: HOME HEALTH SERVICES | Facility: HOME HEALTH | Age: 71
End: 2021-02-08

## 2021-02-08 ASSESSMENT — ACTIVITIES OF DAILY LIVING (ADL)
DEPENDENT_IADLS:: CLEANING;COOKING;LAUNDRY;SHOPPING;MEAL PREPARATION;MEDICATION MANAGEMENT;MONEY MANAGEMENT;TRANSPORTATION;INCONTINENCE

## 2021-02-09 ENCOUNTER — AMBULATORY - HEALTHEAST (OUTPATIENT)
Dept: INTERNAL MEDICINE | Facility: CLINIC | Age: 71
End: 2021-02-09

## 2021-02-09 ENCOUNTER — HOME CARE/HOSPICE - HEALTHEAST (OUTPATIENT)
Dept: HOME HEALTH SERVICES | Facility: HOME HEALTH | Age: 71
End: 2021-02-09

## 2021-02-09 DIAGNOSIS — J44.1 CHRONIC OBSTRUCTIVE PULMONARY DISEASE WITH ACUTE EXACERBATION (H): ICD-10-CM

## 2021-02-10 ENCOUNTER — HOME CARE/HOSPICE - HEALTHEAST (OUTPATIENT)
Dept: HOME HEALTH SERVICES | Facility: HOME HEALTH | Age: 71
End: 2021-02-10

## 2021-02-10 ENCOUNTER — COMMUNICATION - HEALTHEAST (OUTPATIENT)
Dept: GERIATRIC MEDICINE | Facility: CLINIC | Age: 71
End: 2021-02-10

## 2021-02-10 ENCOUNTER — COMMUNICATION - HEALTHEAST (OUTPATIENT)
Dept: HOME HEALTH SERVICES | Facility: HOME HEALTH | Age: 71
End: 2021-02-10

## 2021-02-10 ENCOUNTER — OFFICE VISIT - HEALTHEAST (OUTPATIENT)
Dept: INTERNAL MEDICINE | Facility: CLINIC | Age: 71
End: 2021-02-10

## 2021-02-10 DIAGNOSIS — Z12.11 COLON CANCER SCREENING: ICD-10-CM

## 2021-02-10 DIAGNOSIS — Z72.0 TOBACCO ABUSE: ICD-10-CM

## 2021-02-10 DIAGNOSIS — M19.012 PRIMARY OSTEOARTHRITIS OF LEFT SHOULDER: ICD-10-CM

## 2021-02-10 DIAGNOSIS — G12.21 ALS (AMYOTROPHIC LATERAL SCLEROSIS) (H): ICD-10-CM

## 2021-02-10 DIAGNOSIS — M48.062 LUMBAR STENOSIS WITH NEUROGENIC CLAUDICATION: ICD-10-CM

## 2021-02-10 DIAGNOSIS — I10 ESSENTIAL HYPERTENSION: ICD-10-CM

## 2021-02-10 ASSESSMENT — MIFFLIN-ST. JEOR: SCORE: 1385.86

## 2021-02-12 ENCOUNTER — HOME CARE/HOSPICE - HEALTHEAST (OUTPATIENT)
Dept: HOME HEALTH SERVICES | Facility: HOME HEALTH | Age: 71
End: 2021-02-12

## 2021-02-16 ENCOUNTER — HOME CARE/HOSPICE - HEALTHEAST (OUTPATIENT)
Dept: HOME HEALTH SERVICES | Facility: HOME HEALTH | Age: 71
End: 2021-02-16

## 2021-02-17 ENCOUNTER — COMMUNICATION - HEALTHEAST (OUTPATIENT)
Dept: VASCULAR SURGERY | Facility: CLINIC | Age: 71
End: 2021-02-17

## 2021-02-17 ENCOUNTER — COMMUNICATION - HEALTHEAST (OUTPATIENT)
Dept: GERIATRIC MEDICINE | Facility: CLINIC | Age: 71
End: 2021-02-17

## 2021-02-17 ENCOUNTER — OFFICE VISIT - HEALTHEAST (OUTPATIENT)
Dept: VASCULAR SURGERY | Facility: CLINIC | Age: 71
End: 2021-02-17

## 2021-02-17 ENCOUNTER — HOME CARE/HOSPICE - HEALTHEAST (OUTPATIENT)
Dept: HOME HEALTH SERVICES | Facility: HOME HEALTH | Age: 71
End: 2021-02-17

## 2021-02-17 DIAGNOSIS — E11.65 TYPE 2 DIABETES MELLITUS WITH HYPERGLYCEMIA, WITHOUT LONG-TERM CURRENT USE OF INSULIN (H): ICD-10-CM

## 2021-02-17 DIAGNOSIS — Z79.52 LONG TERM SYSTEMIC STEROID USER: ICD-10-CM

## 2021-02-17 DIAGNOSIS — F17.210 CIGARETTE NICOTINE DEPENDENCE WITHOUT COMPLICATION: ICD-10-CM

## 2021-02-17 DIAGNOSIS — L97.822 SKIN ULCER OF LEFT PRETIBIAL REGION WITH FAT LAYER EXPOSED (H): ICD-10-CM

## 2021-02-17 DIAGNOSIS — M79.89 LEG SWELLING: ICD-10-CM

## 2021-02-17 DIAGNOSIS — L97.812 ULCER OF RIGHT SHIN WITH FAT LAYER EXPOSED (H): ICD-10-CM

## 2021-02-17 DIAGNOSIS — L89.150 PRESSURE ULCER OF COCCYGEAL REGION, UNSTAGEABLE (H): ICD-10-CM

## 2021-02-17 DIAGNOSIS — Z99.3 WHEELCHAIR BOUND: ICD-10-CM

## 2021-02-17 DIAGNOSIS — G12.21 ALS (AMYOTROPHIC LATERAL SCLEROSIS) (H): ICD-10-CM

## 2021-02-19 ENCOUNTER — HOME CARE/HOSPICE - HEALTHEAST (OUTPATIENT)
Dept: HOME HEALTH SERVICES | Facility: HOME HEALTH | Age: 71
End: 2021-02-19

## 2021-02-19 ENCOUNTER — HOSPITAL ENCOUNTER (OUTPATIENT)
Dept: PHYSICAL MEDICINE AND REHAB | Facility: CLINIC | Age: 71
Discharge: HOME | End: 2021-02-19
Attending: INTERNAL MEDICINE

## 2021-02-19 DIAGNOSIS — M54.16 LUMBAR RADICULITIS: ICD-10-CM

## 2021-02-19 DIAGNOSIS — M19.012 PRIMARY OSTEOARTHRITIS OF LEFT SHOULDER: ICD-10-CM

## 2021-02-19 DIAGNOSIS — M79.18 MYOFASCIAL PAIN: ICD-10-CM

## 2021-02-19 DIAGNOSIS — M48.062 SPINAL STENOSIS OF LUMBAR REGION WITH NEUROGENIC CLAUDICATION: ICD-10-CM

## 2021-02-19 DIAGNOSIS — M51.26 LUMBAR DISC HERNIATION: ICD-10-CM

## 2021-02-22 ENCOUNTER — HOME CARE/HOSPICE - HEALTHEAST (OUTPATIENT)
Dept: HOME HEALTH SERVICES | Facility: HOME HEALTH | Age: 71
End: 2021-02-22

## 2021-02-22 ENCOUNTER — COMMUNICATION - HEALTHEAST (OUTPATIENT)
Dept: GERIATRIC MEDICINE | Facility: CLINIC | Age: 71
End: 2021-02-22

## 2021-02-22 DIAGNOSIS — G12.21 ALS (AMYOTROPHIC LATERAL SCLEROSIS) (H): ICD-10-CM

## 2021-02-23 ENCOUNTER — COMMUNICATION - HEALTHEAST (OUTPATIENT)
Dept: FAMILY MEDICINE | Facility: CLINIC | Age: 71
End: 2021-02-23

## 2021-02-24 ENCOUNTER — COMMUNICATION - HEALTHEAST (OUTPATIENT)
Dept: HOME HEALTH SERVICES | Facility: HOME HEALTH | Age: 71
End: 2021-02-24

## 2021-02-25 ENCOUNTER — COMMUNICATION - HEALTHEAST (OUTPATIENT)
Dept: HOME HEALTH SERVICES | Facility: HOME HEALTH | Age: 71
End: 2021-02-25

## 2021-02-25 DIAGNOSIS — D50.9 IRON DEFICIENCY ANEMIA: ICD-10-CM

## 2021-02-28 ENCOUNTER — HOME CARE/HOSPICE - HEALTHEAST (OUTPATIENT)
Dept: HOME HEALTH SERVICES | Facility: HOME HEALTH | Age: 71
End: 2021-02-28

## 2021-03-01 ENCOUNTER — HOSPITAL ENCOUNTER (OUTPATIENT)
Dept: PHYSICAL MEDICINE AND REHAB | Facility: CLINIC | Age: 71
Discharge: HOME | End: 2021-03-01
Attending: PAIN MEDICINE

## 2021-03-01 ENCOUNTER — COMMUNICATION - HEALTHEAST (OUTPATIENT)
Dept: INTERNAL MEDICINE | Facility: CLINIC | Age: 71
End: 2021-03-01

## 2021-03-01 DIAGNOSIS — G89.29 CHRONIC NECK PAIN: ICD-10-CM

## 2021-03-01 DIAGNOSIS — M19.012 PRIMARY OSTEOARTHRITIS OF LEFT SHOULDER: ICD-10-CM

## 2021-03-01 DIAGNOSIS — M54.2 CHRONIC NECK PAIN: ICD-10-CM

## 2021-03-03 ENCOUNTER — COMMUNICATION - HEALTHEAST (OUTPATIENT)
Dept: HOME HEALTH SERVICES | Facility: HOME HEALTH | Age: 71
End: 2021-03-03

## 2021-03-03 DIAGNOSIS — G89.29 CHRONIC NECK PAIN: ICD-10-CM

## 2021-03-03 DIAGNOSIS — M54.2 CHRONIC NECK PAIN: ICD-10-CM

## 2021-03-10 ENCOUNTER — COMMUNICATION - HEALTHEAST (OUTPATIENT)
Dept: PHARMACY | Facility: HOSPITAL | Age: 71
End: 2021-03-10

## 2021-03-10 DIAGNOSIS — D50.9 IRON DEFICIENCY ANEMIA: ICD-10-CM

## 2021-03-10 RX ORDER — FERROUS SULFATE 325(65) MG
1 TABLET ORAL
Qty: 90 TABLET | Refills: 3 | Status: SHIPPED | OUTPATIENT
Start: 2021-03-10 | End: 2022-02-24

## 2021-03-12 ENCOUNTER — HOME CARE/HOSPICE - HEALTHEAST (OUTPATIENT)
Dept: HOME HEALTH SERVICES | Facility: HOME HEALTH | Age: 71
End: 2021-03-12

## 2021-03-15 ENCOUNTER — RECORDS - HEALTHEAST (OUTPATIENT)
Dept: ADMINISTRATIVE | Facility: OTHER | Age: 71
End: 2021-03-15

## 2021-03-17 ENCOUNTER — COMMUNICATION - HEALTHEAST (OUTPATIENT)
Dept: PHARMACY | Facility: HOSPITAL | Age: 71
End: 2021-03-17

## 2021-03-17 DIAGNOSIS — J43.1 PANLOBULAR EMPHYSEMA (H): ICD-10-CM

## 2021-03-22 ENCOUNTER — COMMUNICATION - HEALTHEAST (OUTPATIENT)
Dept: FAMILY MEDICINE | Facility: CLINIC | Age: 71
End: 2021-03-22

## 2021-03-23 ENCOUNTER — COMMUNICATION - HEALTHEAST (OUTPATIENT)
Dept: GERIATRIC MEDICINE | Facility: CLINIC | Age: 71
End: 2021-03-23

## 2021-03-31 ENCOUNTER — COMMUNICATION - HEALTHEAST (OUTPATIENT)
Dept: SCHEDULING | Facility: CLINIC | Age: 71
End: 2021-03-31

## 2021-03-31 ENCOUNTER — COMMUNICATION - HEALTHEAST (OUTPATIENT)
Dept: HOME HEALTH SERVICES | Facility: HOME HEALTH | Age: 71
End: 2021-03-31

## 2021-03-31 DIAGNOSIS — G89.29 CHRONIC NECK PAIN: ICD-10-CM

## 2021-03-31 DIAGNOSIS — I25.10 ASHD (ARTERIOSCLEROTIC HEART DISEASE): ICD-10-CM

## 2021-03-31 DIAGNOSIS — M54.2 CHRONIC NECK PAIN: ICD-10-CM

## 2021-03-31 RX ORDER — METOPROLOL TARTRATE 25 MG/1
TABLET, FILM COATED ORAL
Qty: 180 TABLET | Refills: 3 | Status: SHIPPED | OUTPATIENT
Start: 2021-03-31 | End: 2022-02-18

## 2021-04-06 ENCOUNTER — OFFICE VISIT - HEALTHEAST (OUTPATIENT)
Dept: INTERNAL MEDICINE | Facility: CLINIC | Age: 71
End: 2021-04-06

## 2021-04-06 DIAGNOSIS — Z12.11 COLON CANCER SCREENING: ICD-10-CM

## 2021-04-06 ASSESSMENT — ANXIETY QUESTIONNAIRES
7. FEELING AFRAID AS IF SOMETHING AWFUL MIGHT HAPPEN: SEVERAL DAYS
4. TROUBLE RELAXING: NEARLY EVERY DAY
6. BECOMING EASILY ANNOYED OR IRRITABLE: SEVERAL DAYS
3. WORRYING TOO MUCH ABOUT DIFFERENT THINGS: NEARLY EVERY DAY
1. FEELING NERVOUS, ANXIOUS, OR ON EDGE: NOT AT ALL
5. BEING SO RESTLESS THAT IT IS HARD TO SIT STILL: NOT AT ALL
IF YOU CHECKED OFF ANY PROBLEMS ON THIS QUESTIONNAIRE, HOW DIFFICULT HAVE THESE PROBLEMS MADE IT FOR YOU TO DO YOUR WORK, TAKE CARE OF THINGS AT HOME, OR GET ALONG WITH OTHER PEOPLE: SOMEWHAT DIFFICULT
2. NOT BEING ABLE TO STOP OR CONTROL WORRYING: SEVERAL DAYS
GAD7 TOTAL SCORE: 9

## 2021-04-07 ENCOUNTER — COMMUNICATION - HEALTHEAST (OUTPATIENT)
Dept: GERIATRIC MEDICINE | Facility: CLINIC | Age: 71
End: 2021-04-07

## 2021-04-14 ENCOUNTER — COMMUNICATION - HEALTHEAST (OUTPATIENT)
Dept: GERIATRIC MEDICINE | Facility: CLINIC | Age: 71
End: 2021-04-14

## 2021-04-17 ENCOUNTER — COMMUNICATION - HEALTHEAST (OUTPATIENT)
Dept: INTERNAL MEDICINE | Facility: CLINIC | Age: 71
End: 2021-04-17

## 2021-04-17 DIAGNOSIS — E11.9 DIABETES MELLITUS, TYPE 2 (H): ICD-10-CM

## 2021-04-21 ENCOUNTER — COMMUNICATION - HEALTHEAST (OUTPATIENT)
Dept: INTERNAL MEDICINE | Facility: CLINIC | Age: 71
End: 2021-04-21

## 2021-04-21 ENCOUNTER — COMMUNICATION - HEALTHEAST (OUTPATIENT)
Dept: PHYSICAL MEDICINE AND REHAB | Facility: CLINIC | Age: 71
End: 2021-04-21

## 2021-04-21 ENCOUNTER — RECORDS - HEALTHEAST (OUTPATIENT)
Dept: ADMINISTRATIVE | Facility: OTHER | Age: 71
End: 2021-04-21

## 2021-04-21 ENCOUNTER — COMMUNICATION - HEALTHEAST (OUTPATIENT)
Dept: ADMINISTRATIVE | Facility: CLINIC | Age: 71
End: 2021-04-21

## 2021-04-21 DIAGNOSIS — M79.18 MYOFASCIAL PAIN: ICD-10-CM

## 2021-04-21 DIAGNOSIS — D50.9 IRON DEFICIENCY ANEMIA: ICD-10-CM

## 2021-04-21 DIAGNOSIS — M51.26 LUMBAR DISC HERNIATION: ICD-10-CM

## 2021-04-21 DIAGNOSIS — M54.16 LUMBAR RADICULITIS: ICD-10-CM

## 2021-04-21 DIAGNOSIS — M48.062 LUMBAR STENOSIS WITH NEUROGENIC CLAUDICATION: ICD-10-CM

## 2021-04-21 DIAGNOSIS — M48.062 SPINAL STENOSIS OF LUMBAR REGION WITH NEUROGENIC CLAUDICATION: ICD-10-CM

## 2021-04-21 DIAGNOSIS — M54.2 CHRONIC NECK PAIN: ICD-10-CM

## 2021-04-21 DIAGNOSIS — G89.29 CHRONIC NECK PAIN: ICD-10-CM

## 2021-04-21 RX ORDER — GABAPENTIN 300 MG/1
CAPSULE ORAL
Qty: 180 CAPSULE | Refills: 0 | Status: SHIPPED | OUTPATIENT
Start: 2021-04-21 | End: 2024-03-20

## 2021-04-28 ENCOUNTER — RECORDS - HEALTHEAST (OUTPATIENT)
Dept: ADMINISTRATIVE | Facility: OTHER | Age: 71
End: 2021-04-28

## 2021-04-29 ENCOUNTER — COMMUNICATION - HEALTHEAST (OUTPATIENT)
Dept: HOME HEALTH SERVICES | Facility: HOME HEALTH | Age: 71
End: 2021-04-29

## 2021-04-29 DIAGNOSIS — D50.9 IRON DEFICIENCY ANEMIA: ICD-10-CM

## 2021-04-29 DIAGNOSIS — M48.062 LUMBAR STENOSIS WITH NEUROGENIC CLAUDICATION: ICD-10-CM

## 2021-04-30 RX ORDER — MELOXICAM 7.5 MG/1
TABLET ORAL
Qty: 30 TABLET | Refills: 0 | Status: SHIPPED | OUTPATIENT
Start: 2021-04-30 | End: 2021-08-02

## 2021-05-01 ENCOUNTER — COMMUNICATION - HEALTHEAST (OUTPATIENT)
Dept: SCHEDULING | Facility: CLINIC | Age: 71
End: 2021-05-01

## 2021-05-01 DIAGNOSIS — M54.2 CHRONIC NECK PAIN: ICD-10-CM

## 2021-05-01 DIAGNOSIS — G89.29 CHRONIC NECK PAIN: ICD-10-CM

## 2021-05-05 ENCOUNTER — COMMUNICATION - HEALTHEAST (OUTPATIENT)
Dept: GERIATRIC MEDICINE | Facility: CLINIC | Age: 71
End: 2021-05-05

## 2021-05-06 ENCOUNTER — COMMUNICATION - HEALTHEAST (OUTPATIENT)
Dept: PHARMACY | Facility: HOSPITAL | Age: 71
End: 2021-05-06

## 2021-05-06 ENCOUNTER — OFFICE VISIT - HEALTHEAST (OUTPATIENT)
Dept: FAMILY MEDICINE | Facility: CLINIC | Age: 71
End: 2021-05-06

## 2021-05-06 DIAGNOSIS — I10 ESSENTIAL HYPERTENSION: ICD-10-CM

## 2021-05-06 DIAGNOSIS — Z74.09 IMPAIRED MOBILITY AND ACTIVITIES OF DAILY LIVING: ICD-10-CM

## 2021-05-06 DIAGNOSIS — E46 PROTEIN-CALORIE MALNUTRITION, UNSPECIFIED SEVERITY (H): ICD-10-CM

## 2021-05-06 DIAGNOSIS — Z78.9 IMPAIRED MOBILITY AND ACTIVITIES OF DAILY LIVING: ICD-10-CM

## 2021-05-06 DIAGNOSIS — G12.21 ALS (AMYOTROPHIC LATERAL SCLEROSIS) (H): ICD-10-CM

## 2021-05-06 DIAGNOSIS — E11.40 TYPE 2 DIABETES MELLITUS WITH DIABETIC NEUROPATHY, WITHOUT LONG-TERM CURRENT USE OF INSULIN (H): ICD-10-CM

## 2021-05-06 DIAGNOSIS — L29.9 ITCHING: ICD-10-CM

## 2021-05-06 DIAGNOSIS — D50.8 OTHER IRON DEFICIENCY ANEMIA: ICD-10-CM

## 2021-05-06 DIAGNOSIS — J44.1 CHRONIC OBSTRUCTIVE PULMONARY DISEASE WITH ACUTE EXACERBATION (H): ICD-10-CM

## 2021-05-06 DIAGNOSIS — R47.1 DYSARTHRIA: ICD-10-CM

## 2021-05-06 RX ORDER — LACTOSE-REDUCED FOOD
LIQUID (ML) ORAL
Qty: 14,220 ML | Refills: 3 | Status: SHIPPED | OUTPATIENT
Start: 2021-05-06 | End: 2021-09-03

## 2021-05-06 RX ORDER — HYDROXYZINE HYDROCHLORIDE 25 MG/1
25 TABLET, FILM COATED ORAL EVERY 8 HOURS PRN
Qty: 100 TABLET | Refills: 1 | Status: SHIPPED | OUTPATIENT
Start: 2021-05-06 | End: 2021-09-03

## 2021-05-10 ENCOUNTER — COMMUNICATION - HEALTHEAST (OUTPATIENT)
Dept: PHYSICAL MEDICINE AND REHAB | Facility: CLINIC | Age: 71
End: 2021-05-10

## 2021-05-10 DIAGNOSIS — M48.062 SPINAL STENOSIS OF LUMBAR REGION WITH NEUROGENIC CLAUDICATION: ICD-10-CM

## 2021-05-10 DIAGNOSIS — M54.16 LUMBAR RADICULITIS: ICD-10-CM

## 2021-05-10 DIAGNOSIS — M51.26 LUMBAR DISC HERNIATION: ICD-10-CM

## 2021-05-10 DIAGNOSIS — M79.18 MYOFASCIAL PAIN: ICD-10-CM

## 2021-05-10 RX ORDER — LORAZEPAM 1 MG/1
1 TABLET ORAL ONCE
Qty: 1 TABLET | Refills: 0 | Status: SHIPPED | OUTPATIENT
Start: 2021-05-10 | End: 2024-03-20

## 2021-05-15 ENCOUNTER — COMMUNICATION - HEALTHEAST (OUTPATIENT)
Dept: HOME HEALTH SERVICES | Facility: HOME HEALTH | Age: 71
End: 2021-05-15

## 2021-05-15 DIAGNOSIS — M48.062 LUMBAR STENOSIS WITH NEUROGENIC CLAUDICATION: ICD-10-CM

## 2021-05-19 ENCOUNTER — HOSPITAL ENCOUNTER (OUTPATIENT)
Dept: MRI IMAGING | Facility: HOSPITAL | Age: 71
Discharge: HOME | End: 2021-05-19
Attending: PAIN MEDICINE

## 2021-05-19 DIAGNOSIS — M51.26 LUMBAR DISC HERNIATION: ICD-10-CM

## 2021-05-19 DIAGNOSIS — M54.16 LUMBAR RADICULITIS: ICD-10-CM

## 2021-05-19 DIAGNOSIS — M48.062 SPINAL STENOSIS OF LUMBAR REGION WITH NEUROGENIC CLAUDICATION: ICD-10-CM

## 2021-05-19 DIAGNOSIS — M79.18 MYOFASCIAL PAIN: ICD-10-CM

## 2021-05-20 ENCOUNTER — RECORDS - HEALTHEAST (OUTPATIENT)
Dept: ADMINISTRATIVE | Facility: OTHER | Age: 71
End: 2021-05-20

## 2021-05-23 ENCOUNTER — AMBULATORY - HEALTHEAST (OUTPATIENT)
Dept: SURGERY | Facility: CLINIC | Age: 71
End: 2021-05-23

## 2021-05-23 DIAGNOSIS — Z11.59 ENCOUNTER FOR SCREENING FOR OTHER VIRAL DISEASES: ICD-10-CM

## 2021-05-26 ENCOUNTER — COMMUNICATION - HEALTHEAST (OUTPATIENT)
Dept: GERIATRIC MEDICINE | Facility: CLINIC | Age: 71
End: 2021-05-26

## 2021-05-26 ENCOUNTER — COMMUNICATION - HEALTHEAST (OUTPATIENT)
Dept: HOME HEALTH SERVICES | Facility: HOME HEALTH | Age: 71
End: 2021-05-26

## 2021-05-26 VITALS
OXYGEN SATURATION: 93 % | TEMPERATURE: 97.9 F | HEART RATE: 86 BPM | DIASTOLIC BLOOD PRESSURE: 68 MMHG | RESPIRATION RATE: 18 BRPM | SYSTOLIC BLOOD PRESSURE: 104 MMHG

## 2021-05-26 VITALS
OXYGEN SATURATION: 98 % | RESPIRATION RATE: 18 BRPM | SYSTOLIC BLOOD PRESSURE: 110 MMHG | DIASTOLIC BLOOD PRESSURE: 65 MMHG | HEART RATE: 87 BPM | TEMPERATURE: 97.4 F

## 2021-05-26 VITALS
RESPIRATION RATE: 16 BRPM | SYSTOLIC BLOOD PRESSURE: 100 MMHG | HEART RATE: 80 BPM | DIASTOLIC BLOOD PRESSURE: 60 MMHG | OXYGEN SATURATION: 95 % | TEMPERATURE: 99.1 F

## 2021-05-26 VITALS
DIASTOLIC BLOOD PRESSURE: 80 MMHG | TEMPERATURE: 97.6 F | HEART RATE: 72 BPM | SYSTOLIC BLOOD PRESSURE: 112 MMHG | OXYGEN SATURATION: 92 %

## 2021-05-26 VITALS
RESPIRATION RATE: 16 BRPM | SYSTOLIC BLOOD PRESSURE: 112 MMHG | HEART RATE: 64 BPM | RESPIRATION RATE: 16 BRPM | SYSTOLIC BLOOD PRESSURE: 110 MMHG | DIASTOLIC BLOOD PRESSURE: 78 MMHG | DIASTOLIC BLOOD PRESSURE: 62 MMHG | RESPIRATION RATE: 18 BRPM | SYSTOLIC BLOOD PRESSURE: 110 MMHG | DIASTOLIC BLOOD PRESSURE: 64 MMHG | HEART RATE: 78 BPM | HEART RATE: 72 BPM | TEMPERATURE: 98 F

## 2021-05-26 VITALS
SYSTOLIC BLOOD PRESSURE: 108 MMHG | TEMPERATURE: 98.2 F | HEART RATE: 56 BPM | RESPIRATION RATE: 18 BRPM | DIASTOLIC BLOOD PRESSURE: 73 MMHG | OXYGEN SATURATION: 95 %

## 2021-05-26 VITALS
SYSTOLIC BLOOD PRESSURE: 110 MMHG | RESPIRATION RATE: 16 BRPM | TEMPERATURE: 98 F | DIASTOLIC BLOOD PRESSURE: 60 MMHG | HEART RATE: 70 BPM

## 2021-05-26 VITALS
HEART RATE: 85 BPM | RESPIRATION RATE: 18 BRPM | DIASTOLIC BLOOD PRESSURE: 72 MMHG | OXYGEN SATURATION: 96 % | TEMPERATURE: 98.6 F | SYSTOLIC BLOOD PRESSURE: 120 MMHG

## 2021-05-26 VITALS
TEMPERATURE: 98.8 F | HEART RATE: 77 BPM | DIASTOLIC BLOOD PRESSURE: 80 MMHG | OXYGEN SATURATION: 95 % | DIASTOLIC BLOOD PRESSURE: 62 MMHG | SYSTOLIC BLOOD PRESSURE: 126 MMHG | SYSTOLIC BLOOD PRESSURE: 114 MMHG | RESPIRATION RATE: 18 BRPM | HEART RATE: 80 BPM | RESPIRATION RATE: 16 BRPM | TEMPERATURE: 98.9 F | OXYGEN SATURATION: 94 %

## 2021-05-26 VITALS
SYSTOLIC BLOOD PRESSURE: 110 MMHG | DIASTOLIC BLOOD PRESSURE: 64 MMHG | HEART RATE: 90 BPM | OXYGEN SATURATION: 96 % | TEMPERATURE: 100.8 F | RESPIRATION RATE: 16 BRPM

## 2021-05-26 VITALS
OXYGEN SATURATION: 93 % | TEMPERATURE: 98.6 F | HEART RATE: 74 BPM | SYSTOLIC BLOOD PRESSURE: 105 MMHG | DIASTOLIC BLOOD PRESSURE: 64 MMHG

## 2021-05-26 VITALS
RESPIRATION RATE: 16 BRPM | TEMPERATURE: 99 F | SYSTOLIC BLOOD PRESSURE: 131 MMHG | DIASTOLIC BLOOD PRESSURE: 72 MMHG | TEMPERATURE: 97.6 F | DIASTOLIC BLOOD PRESSURE: 80 MMHG | HEART RATE: 73 BPM | OXYGEN SATURATION: 97 % | OXYGEN SATURATION: 97 % | SYSTOLIC BLOOD PRESSURE: 123 MMHG | HEART RATE: 72 BPM

## 2021-05-26 VITALS — DIASTOLIC BLOOD PRESSURE: 60 MMHG | SYSTOLIC BLOOD PRESSURE: 116 MMHG | RESPIRATION RATE: 16 BRPM | HEART RATE: 68 BPM

## 2021-05-26 VITALS
TEMPERATURE: 99 F | DIASTOLIC BLOOD PRESSURE: 80 MMHG | RESPIRATION RATE: 17 BRPM | OXYGEN SATURATION: 96 % | HEART RATE: 76 BPM | RESPIRATION RATE: 16 BRPM | SYSTOLIC BLOOD PRESSURE: 137 MMHG | TEMPERATURE: 98.7 F | SYSTOLIC BLOOD PRESSURE: 100 MMHG | HEART RATE: 70 BPM | DIASTOLIC BLOOD PRESSURE: 64 MMHG | OXYGEN SATURATION: 95 %

## 2021-05-26 VITALS
TEMPERATURE: 98.9 F | OXYGEN SATURATION: 96 % | SYSTOLIC BLOOD PRESSURE: 126 MMHG | DIASTOLIC BLOOD PRESSURE: 82 MMHG | HEART RATE: 98 BPM

## 2021-05-26 VITALS
OXYGEN SATURATION: 94 % | SYSTOLIC BLOOD PRESSURE: 108 MMHG | DIASTOLIC BLOOD PRESSURE: 78 MMHG | RESPIRATION RATE: 16 BRPM | DIASTOLIC BLOOD PRESSURE: 78 MMHG | RESPIRATION RATE: 19 BRPM | TEMPERATURE: 99.4 F | HEART RATE: 87 BPM | HEART RATE: 88 BPM | OXYGEN SATURATION: 97 % | SYSTOLIC BLOOD PRESSURE: 131 MMHG | TEMPERATURE: 99 F

## 2021-05-26 VITALS
RESPIRATION RATE: 18 BRPM | SYSTOLIC BLOOD PRESSURE: 105 MMHG | OXYGEN SATURATION: 95 % | TEMPERATURE: 98 F | DIASTOLIC BLOOD PRESSURE: 62 MMHG | HEART RATE: 73 BPM

## 2021-05-26 VITALS
TEMPERATURE: 98.7 F | HEART RATE: 77 BPM | OXYGEN SATURATION: 96 % | DIASTOLIC BLOOD PRESSURE: 65 MMHG | RESPIRATION RATE: 17 BRPM | SYSTOLIC BLOOD PRESSURE: 109 MMHG

## 2021-05-26 VITALS
OXYGEN SATURATION: 94 % | SYSTOLIC BLOOD PRESSURE: 110 MMHG | TEMPERATURE: 98.2 F | SYSTOLIC BLOOD PRESSURE: 121 MMHG | OXYGEN SATURATION: 99 % | TEMPERATURE: 98 F | HEART RATE: 77 BPM | DIASTOLIC BLOOD PRESSURE: 78 MMHG | RESPIRATION RATE: 16 BRPM | HEART RATE: 69 BPM | RESPIRATION RATE: 16 BRPM | DIASTOLIC BLOOD PRESSURE: 58 MMHG

## 2021-05-26 VITALS
SYSTOLIC BLOOD PRESSURE: 104 MMHG | TEMPERATURE: 97.5 F | DIASTOLIC BLOOD PRESSURE: 58 MMHG | RESPIRATION RATE: 18 BRPM | HEART RATE: 75 BPM | OXYGEN SATURATION: 94 %

## 2021-05-26 VITALS
DIASTOLIC BLOOD PRESSURE: 66 MMHG | OXYGEN SATURATION: 97 % | HEART RATE: 78 BPM | RESPIRATION RATE: 18 BRPM | SYSTOLIC BLOOD PRESSURE: 120 MMHG | TEMPERATURE: 98.5 F

## 2021-05-26 VITALS
TEMPERATURE: 98 F | SYSTOLIC BLOOD PRESSURE: 108 MMHG | HEART RATE: 77 BPM | DIASTOLIC BLOOD PRESSURE: 58 MMHG | DIASTOLIC BLOOD PRESSURE: 58 MMHG | HEART RATE: 98 BPM | TEMPERATURE: 97.2 F | SYSTOLIC BLOOD PRESSURE: 122 MMHG | OXYGEN SATURATION: 97 % | RESPIRATION RATE: 18 BRPM | RESPIRATION RATE: 18 BRPM

## 2021-05-26 VITALS
RESPIRATION RATE: 18 BRPM | DIASTOLIC BLOOD PRESSURE: 80 MMHG | TEMPERATURE: 98.8 F | OXYGEN SATURATION: 94 % | SYSTOLIC BLOOD PRESSURE: 118 MMHG | HEART RATE: 99 BPM

## 2021-05-26 VITALS
TEMPERATURE: 97.6 F | SYSTOLIC BLOOD PRESSURE: 106 MMHG | RESPIRATION RATE: 18 BRPM | HEART RATE: 89 BPM | OXYGEN SATURATION: 93 % | DIASTOLIC BLOOD PRESSURE: 62 MMHG

## 2021-05-26 VITALS
HEART RATE: 69 BPM | TEMPERATURE: 98.5 F | SYSTOLIC BLOOD PRESSURE: 107 MMHG | DIASTOLIC BLOOD PRESSURE: 68 MMHG | OXYGEN SATURATION: 94 %

## 2021-05-26 VITALS — HEART RATE: 77 BPM | DIASTOLIC BLOOD PRESSURE: 62 MMHG | OXYGEN SATURATION: 96 % | SYSTOLIC BLOOD PRESSURE: 116 MMHG

## 2021-05-26 VITALS
TEMPERATURE: 99.2 F | DIASTOLIC BLOOD PRESSURE: 70 MMHG | OXYGEN SATURATION: 95 % | HEART RATE: 85 BPM | RESPIRATION RATE: 18 BRPM | SYSTOLIC BLOOD PRESSURE: 105 MMHG

## 2021-05-26 VITALS
DIASTOLIC BLOOD PRESSURE: 64 MMHG | RESPIRATION RATE: 18 BRPM | OXYGEN SATURATION: 95 % | SYSTOLIC BLOOD PRESSURE: 100 MMHG | HEART RATE: 75 BPM | TEMPERATURE: 98.8 F

## 2021-05-26 VITALS
SYSTOLIC BLOOD PRESSURE: 110 MMHG | TEMPERATURE: 98 F | HEART RATE: 70 BPM | RESPIRATION RATE: 18 BRPM | DIASTOLIC BLOOD PRESSURE: 68 MMHG

## 2021-05-26 VITALS
DIASTOLIC BLOOD PRESSURE: 60 MMHG | TEMPERATURE: 98.1 F | HEART RATE: 90 BPM | RESPIRATION RATE: 18 BRPM | SYSTOLIC BLOOD PRESSURE: 104 MMHG | OXYGEN SATURATION: 97 %

## 2021-05-26 VITALS
TEMPERATURE: 98.8 F | SYSTOLIC BLOOD PRESSURE: 118 MMHG | DIASTOLIC BLOOD PRESSURE: 60 MMHG | HEART RATE: 61 BPM | OXYGEN SATURATION: 95 %

## 2021-05-26 VITALS
HEART RATE: 72 BPM | SYSTOLIC BLOOD PRESSURE: 110 MMHG | TEMPERATURE: 97.4 F | RESPIRATION RATE: 18 BRPM | DIASTOLIC BLOOD PRESSURE: 68 MMHG

## 2021-05-26 VITALS
OXYGEN SATURATION: 96 % | DIASTOLIC BLOOD PRESSURE: 80 MMHG | HEART RATE: 81 BPM | RESPIRATION RATE: 16 BRPM | SYSTOLIC BLOOD PRESSURE: 140 MMHG | TEMPERATURE: 99.5 F

## 2021-05-26 VITALS — SYSTOLIC BLOOD PRESSURE: 110 MMHG | DIASTOLIC BLOOD PRESSURE: 54 MMHG | OXYGEN SATURATION: 94 %

## 2021-05-26 VITALS
DIASTOLIC BLOOD PRESSURE: 62 MMHG | RESPIRATION RATE: 16 BRPM | SYSTOLIC BLOOD PRESSURE: 105 MMHG | TEMPERATURE: 98 F | HEART RATE: 70 BPM | OXYGEN SATURATION: 93 %

## 2021-05-26 VITALS
RESPIRATION RATE: 18 BRPM | SYSTOLIC BLOOD PRESSURE: 112 MMHG | HEART RATE: 97 BPM | DIASTOLIC BLOOD PRESSURE: 68 MMHG | OXYGEN SATURATION: 94 % | TEMPERATURE: 97.4 F

## 2021-05-26 VITALS — RESPIRATION RATE: 16 BRPM | DIASTOLIC BLOOD PRESSURE: 60 MMHG | HEART RATE: 70 BPM | SYSTOLIC BLOOD PRESSURE: 110 MMHG

## 2021-05-26 VITALS — DIASTOLIC BLOOD PRESSURE: 70 MMHG | SYSTOLIC BLOOD PRESSURE: 104 MMHG | HEART RATE: 70 BPM | OXYGEN SATURATION: 96 %

## 2021-05-26 VITALS
DIASTOLIC BLOOD PRESSURE: 68 MMHG | TEMPERATURE: 97.8 F | HEART RATE: 69 BPM | OXYGEN SATURATION: 94 % | SYSTOLIC BLOOD PRESSURE: 107 MMHG

## 2021-05-26 VITALS
HEART RATE: 72 BPM | DIASTOLIC BLOOD PRESSURE: 80 MMHG | RESPIRATION RATE: 16 BRPM | OXYGEN SATURATION: 98 % | SYSTOLIC BLOOD PRESSURE: 120 MMHG | TEMPERATURE: 98.9 F

## 2021-05-26 VITALS
SYSTOLIC BLOOD PRESSURE: 105 MMHG | HEART RATE: 68 BPM | RESPIRATION RATE: 16 BRPM | OXYGEN SATURATION: 94 % | TEMPERATURE: 98 F | DIASTOLIC BLOOD PRESSURE: 60 MMHG

## 2021-05-26 VITALS
TEMPERATURE: 97.5 F | HEART RATE: 80 BPM | OXYGEN SATURATION: 93 % | DIASTOLIC BLOOD PRESSURE: 62 MMHG | SYSTOLIC BLOOD PRESSURE: 106 MMHG | RESPIRATION RATE: 16 BRPM

## 2021-05-26 VITALS
HEART RATE: 70 BPM | RESPIRATION RATE: 18 BRPM | TEMPERATURE: 98 F | SYSTOLIC BLOOD PRESSURE: 110 MMHG | DIASTOLIC BLOOD PRESSURE: 62 MMHG

## 2021-05-26 DIAGNOSIS — M48.062 LUMBAR STENOSIS WITH NEUROGENIC CLAUDICATION: ICD-10-CM

## 2021-05-26 ASSESSMENT — PATIENT HEALTH QUESTIONNAIRE - PHQ9: SUM OF ALL RESPONSES TO PHQ QUESTIONS 1-9: 9

## 2021-05-27 VITALS
HEART RATE: 80 BPM | TEMPERATURE: 98 F | HEART RATE: 68 BPM | TEMPERATURE: 100.1 F | SYSTOLIC BLOOD PRESSURE: 108 MMHG | SYSTOLIC BLOOD PRESSURE: 125 MMHG | DIASTOLIC BLOOD PRESSURE: 62 MMHG | RESPIRATION RATE: 18 BRPM | DIASTOLIC BLOOD PRESSURE: 76 MMHG

## 2021-05-27 VITALS
DIASTOLIC BLOOD PRESSURE: 72 MMHG | OXYGEN SATURATION: 97 % | SYSTOLIC BLOOD PRESSURE: 113 MMHG | HEART RATE: 70 BPM | SYSTOLIC BLOOD PRESSURE: 136 MMHG | OXYGEN SATURATION: 96 % | HEART RATE: 74 BPM | DIASTOLIC BLOOD PRESSURE: 80 MMHG | RESPIRATION RATE: 20 BRPM | TEMPERATURE: 99.3 F

## 2021-05-27 VITALS
TEMPERATURE: 98.1 F | HEART RATE: 77 BPM | SYSTOLIC BLOOD PRESSURE: 110 MMHG | RESPIRATION RATE: 16 BRPM | OXYGEN SATURATION: 96 % | DIASTOLIC BLOOD PRESSURE: 78 MMHG

## 2021-05-27 VITALS
OXYGEN SATURATION: 95 % | DIASTOLIC BLOOD PRESSURE: 70 MMHG | RESPIRATION RATE: 18 BRPM | HEART RATE: 84 BPM | TEMPERATURE: 98.6 F | SYSTOLIC BLOOD PRESSURE: 99 MMHG

## 2021-05-27 VITALS
TEMPERATURE: 98 F | SYSTOLIC BLOOD PRESSURE: 110 MMHG | TEMPERATURE: 99.6 F | SYSTOLIC BLOOD PRESSURE: 135 MMHG | HEART RATE: 98 BPM | HEART RATE: 60 BPM | DIASTOLIC BLOOD PRESSURE: 60 MMHG | RESPIRATION RATE: 18 BRPM | DIASTOLIC BLOOD PRESSURE: 62 MMHG | OXYGEN SATURATION: 97 %

## 2021-05-27 VITALS
SYSTOLIC BLOOD PRESSURE: 110 MMHG | OXYGEN SATURATION: 90 % | HEART RATE: 120 BPM | SYSTOLIC BLOOD PRESSURE: 112 MMHG | RESPIRATION RATE: 16 BRPM | DIASTOLIC BLOOD PRESSURE: 60 MMHG | RESPIRATION RATE: 16 BRPM | TEMPERATURE: 97.5 F | TEMPERATURE: 98.7 F | DIASTOLIC BLOOD PRESSURE: 58 MMHG | HEART RATE: 85 BPM | OXYGEN SATURATION: 95 %

## 2021-05-27 VITALS
HEART RATE: 70 BPM | SYSTOLIC BLOOD PRESSURE: 110 MMHG | TEMPERATURE: 98.8 F | TEMPERATURE: 98 F | OXYGEN SATURATION: 95 % | SYSTOLIC BLOOD PRESSURE: 108 MMHG | DIASTOLIC BLOOD PRESSURE: 58 MMHG | RESPIRATION RATE: 18 BRPM | DIASTOLIC BLOOD PRESSURE: 58 MMHG | HEART RATE: 87 BPM | RESPIRATION RATE: 16 BRPM

## 2021-05-27 VITALS
RESPIRATION RATE: 16 BRPM | DIASTOLIC BLOOD PRESSURE: 62 MMHG | HEART RATE: 72 BPM | TEMPERATURE: 99 F | OXYGEN SATURATION: 95 % | SYSTOLIC BLOOD PRESSURE: 108 MMHG

## 2021-05-27 VITALS
SYSTOLIC BLOOD PRESSURE: 104 MMHG | DIASTOLIC BLOOD PRESSURE: 60 MMHG | RESPIRATION RATE: 16 BRPM | TEMPERATURE: 98.5 F | HEART RATE: 84 BPM | SYSTOLIC BLOOD PRESSURE: 103 MMHG | HEART RATE: 67 BPM | OXYGEN SATURATION: 95 % | DIASTOLIC BLOOD PRESSURE: 67 MMHG | OXYGEN SATURATION: 91 %

## 2021-05-27 VITALS — HEART RATE: 87 BPM | SYSTOLIC BLOOD PRESSURE: 122 MMHG | DIASTOLIC BLOOD PRESSURE: 85 MMHG | OXYGEN SATURATION: 97 %

## 2021-05-27 VITALS
DIASTOLIC BLOOD PRESSURE: 64 MMHG | TEMPERATURE: 97.8 F | HEART RATE: 77 BPM | OXYGEN SATURATION: 96 % | RESPIRATION RATE: 16 BRPM | SYSTOLIC BLOOD PRESSURE: 105 MMHG

## 2021-05-27 VITALS — HEART RATE: 77 BPM | RESPIRATION RATE: 16 BRPM | OXYGEN SATURATION: 95 % | TEMPERATURE: 99 F

## 2021-05-27 VITALS
HEART RATE: 80 BPM | OXYGEN SATURATION: 96 % | SYSTOLIC BLOOD PRESSURE: 112 MMHG | RESPIRATION RATE: 18 BRPM | DIASTOLIC BLOOD PRESSURE: 62 MMHG | TEMPERATURE: 98.2 F

## 2021-05-27 VITALS
DIASTOLIC BLOOD PRESSURE: 68 MMHG | TEMPERATURE: 98.8 F | RESPIRATION RATE: 18 BRPM | OXYGEN SATURATION: 96 % | SYSTOLIC BLOOD PRESSURE: 108 MMHG | HEART RATE: 79 BPM

## 2021-05-27 VITALS — TEMPERATURE: 98.4 F | DIASTOLIC BLOOD PRESSURE: 62 MMHG | HEART RATE: 72 BPM | SYSTOLIC BLOOD PRESSURE: 106 MMHG

## 2021-05-27 VITALS
DIASTOLIC BLOOD PRESSURE: 68 MMHG | RESPIRATION RATE: 17 BRPM | SYSTOLIC BLOOD PRESSURE: 104 MMHG | OXYGEN SATURATION: 95 % | TEMPERATURE: 98.3 F | HEART RATE: 75 BPM

## 2021-05-27 VITALS — TEMPERATURE: 98.7 F | OXYGEN SATURATION: 96 % | HEART RATE: 78 BPM

## 2021-05-27 VITALS
DIASTOLIC BLOOD PRESSURE: 68 MMHG | HEART RATE: 72 BPM | OXYGEN SATURATION: 95 % | SYSTOLIC BLOOD PRESSURE: 108 MMHG | RESPIRATION RATE: 16 BRPM | TEMPERATURE: 98 F

## 2021-05-27 VITALS
DIASTOLIC BLOOD PRESSURE: 70 MMHG | RESPIRATION RATE: 16 BRPM | TEMPERATURE: 98.5 F | SYSTOLIC BLOOD PRESSURE: 118 MMHG | HEART RATE: 70 BPM | OXYGEN SATURATION: 97 %

## 2021-05-27 VITALS
TEMPERATURE: 100.4 F | HEART RATE: 68 BPM | OXYGEN SATURATION: 95 % | SYSTOLIC BLOOD PRESSURE: 122 MMHG | DIASTOLIC BLOOD PRESSURE: 62 MMHG

## 2021-05-27 VITALS
OXYGEN SATURATION: 96 % | DIASTOLIC BLOOD PRESSURE: 73 MMHG | TEMPERATURE: 98.3 F | SYSTOLIC BLOOD PRESSURE: 118 MMHG | DIASTOLIC BLOOD PRESSURE: 68 MMHG | HEART RATE: 78 BPM | SYSTOLIC BLOOD PRESSURE: 115 MMHG | HEART RATE: 85 BPM | OXYGEN SATURATION: 97 % | RESPIRATION RATE: 18 BRPM | TEMPERATURE: 98.2 F

## 2021-05-27 VITALS
SYSTOLIC BLOOD PRESSURE: 124 MMHG | DIASTOLIC BLOOD PRESSURE: 62 MMHG | RESPIRATION RATE: 16 BRPM | HEART RATE: 77 BPM | TEMPERATURE: 99.8 F | OXYGEN SATURATION: 96 %

## 2021-05-27 VITALS
HEART RATE: 87 BPM | OXYGEN SATURATION: 95 % | SYSTOLIC BLOOD PRESSURE: 110 MMHG | DIASTOLIC BLOOD PRESSURE: 58 MMHG | TEMPERATURE: 98.8 F

## 2021-05-27 VITALS
TEMPERATURE: 98.7 F | TEMPERATURE: 98 F | SYSTOLIC BLOOD PRESSURE: 100 MMHG | OXYGEN SATURATION: 96 % | DIASTOLIC BLOOD PRESSURE: 58 MMHG | RESPIRATION RATE: 18 BRPM | HEART RATE: 71 BPM | HEART RATE: 60 BPM | DIASTOLIC BLOOD PRESSURE: 58 MMHG | SYSTOLIC BLOOD PRESSURE: 105 MMHG | RESPIRATION RATE: 16 BRPM

## 2021-05-27 VITALS
RESPIRATION RATE: 18 BRPM | OXYGEN SATURATION: 97 % | DIASTOLIC BLOOD PRESSURE: 62 MMHG | SYSTOLIC BLOOD PRESSURE: 109 MMHG | HEART RATE: 70 BPM | TEMPERATURE: 97.8 F

## 2021-05-27 VITALS
SYSTOLIC BLOOD PRESSURE: 110 MMHG | DIASTOLIC BLOOD PRESSURE: 58 MMHG | RESPIRATION RATE: 16 BRPM | TEMPERATURE: 98 F | HEART RATE: 67 BPM

## 2021-05-27 VITALS
DIASTOLIC BLOOD PRESSURE: 51 MMHG | TEMPERATURE: 97.6 F | OXYGEN SATURATION: 94 % | SYSTOLIC BLOOD PRESSURE: 90 MMHG | HEART RATE: 74 BPM

## 2021-05-27 VITALS
RESPIRATION RATE: 16 BRPM | HEART RATE: 76 BPM | SYSTOLIC BLOOD PRESSURE: 110 MMHG | OXYGEN SATURATION: 93 % | DIASTOLIC BLOOD PRESSURE: 60 MMHG | TEMPERATURE: 99.3 F

## 2021-05-27 VITALS
OXYGEN SATURATION: 96 % | DIASTOLIC BLOOD PRESSURE: 66 MMHG | HEART RATE: 77 BPM | TEMPERATURE: 98.5 F | RESPIRATION RATE: 16 BRPM | SYSTOLIC BLOOD PRESSURE: 98 MMHG

## 2021-05-27 VITALS
TEMPERATURE: 99.6 F | OXYGEN SATURATION: 94 % | SYSTOLIC BLOOD PRESSURE: 118 MMHG | DIASTOLIC BLOOD PRESSURE: 54 MMHG | RESPIRATION RATE: 18 BRPM | HEART RATE: 74 BPM

## 2021-05-27 VITALS
SYSTOLIC BLOOD PRESSURE: 122 MMHG | HEART RATE: 87 BPM | TEMPERATURE: 98.9 F | DIASTOLIC BLOOD PRESSURE: 72 MMHG | RESPIRATION RATE: 17 BRPM | OXYGEN SATURATION: 97 %

## 2021-05-27 VITALS
DIASTOLIC BLOOD PRESSURE: 48 MMHG | HEART RATE: 80 BPM | SYSTOLIC BLOOD PRESSURE: 92 MMHG | OXYGEN SATURATION: 95 % | TEMPERATURE: 98.4 F

## 2021-05-27 VITALS
SYSTOLIC BLOOD PRESSURE: 100 MMHG | OXYGEN SATURATION: 96 % | HEART RATE: 62 BPM | HEART RATE: 76 BPM | SYSTOLIC BLOOD PRESSURE: 99 MMHG | DIASTOLIC BLOOD PRESSURE: 58 MMHG | TEMPERATURE: 98 F | RESPIRATION RATE: 18 BRPM | DIASTOLIC BLOOD PRESSURE: 56 MMHG | OXYGEN SATURATION: 94 %

## 2021-05-27 VITALS
HEART RATE: 84 BPM | SYSTOLIC BLOOD PRESSURE: 130 MMHG | OXYGEN SATURATION: 94 % | RESPIRATION RATE: 16 BRPM | DIASTOLIC BLOOD PRESSURE: 70 MMHG | TEMPERATURE: 99.6 F

## 2021-05-27 VITALS
HEART RATE: 86 BPM | RESPIRATION RATE: 18 BRPM | OXYGEN SATURATION: 92 % | SYSTOLIC BLOOD PRESSURE: 134 MMHG | TEMPERATURE: 98.6 F | DIASTOLIC BLOOD PRESSURE: 72 MMHG

## 2021-05-27 VITALS
OXYGEN SATURATION: 95 % | RESPIRATION RATE: 18 BRPM | TEMPERATURE: 99.5 F | SYSTOLIC BLOOD PRESSURE: 104 MMHG | HEART RATE: 78 BPM | DIASTOLIC BLOOD PRESSURE: 58 MMHG

## 2021-05-27 VITALS
RESPIRATION RATE: 16 BRPM | OXYGEN SATURATION: 95 % | HEART RATE: 96 BPM | OXYGEN SATURATION: 94 % | TEMPERATURE: 99 F | SYSTOLIC BLOOD PRESSURE: 120 MMHG | SYSTOLIC BLOOD PRESSURE: 94 MMHG | TEMPERATURE: 98.2 F | DIASTOLIC BLOOD PRESSURE: 62 MMHG | HEART RATE: 74 BPM | DIASTOLIC BLOOD PRESSURE: 65 MMHG | RESPIRATION RATE: 18 BRPM

## 2021-05-27 VITALS
DIASTOLIC BLOOD PRESSURE: 60 MMHG | OXYGEN SATURATION: 94 % | SYSTOLIC BLOOD PRESSURE: 98 MMHG | HEART RATE: 66 BPM | TEMPERATURE: 98 F | RESPIRATION RATE: 18 BRPM

## 2021-05-27 VITALS
HEART RATE: 60 BPM | SYSTOLIC BLOOD PRESSURE: 110 MMHG | TEMPERATURE: 45.7 F | OXYGEN SATURATION: 96 % | DIASTOLIC BLOOD PRESSURE: 60 MMHG

## 2021-05-27 VITALS
TEMPERATURE: 98.4 F | RESPIRATION RATE: 16 BRPM | DIASTOLIC BLOOD PRESSURE: 62 MMHG | HEART RATE: 70 BPM | SYSTOLIC BLOOD PRESSURE: 108 MMHG | OXYGEN SATURATION: 96 %

## 2021-05-27 VITALS
OXYGEN SATURATION: 95 % | DIASTOLIC BLOOD PRESSURE: 70 MMHG | SYSTOLIC BLOOD PRESSURE: 104 MMHG | TEMPERATURE: 98.7 F | RESPIRATION RATE: 14 BRPM | HEART RATE: 78 BPM

## 2021-05-27 VITALS
TEMPERATURE: 98.7 F | RESPIRATION RATE: 17 BRPM | OXYGEN SATURATION: 96 % | SYSTOLIC BLOOD PRESSURE: 109 MMHG | SYSTOLIC BLOOD PRESSURE: 128 MMHG | DIASTOLIC BLOOD PRESSURE: 56 MMHG | OXYGEN SATURATION: 95 % | HEART RATE: 80 BPM | HEART RATE: 81 BPM | DIASTOLIC BLOOD PRESSURE: 71 MMHG

## 2021-05-27 VITALS — RESPIRATION RATE: 18 BRPM | DIASTOLIC BLOOD PRESSURE: 61 MMHG | SYSTOLIC BLOOD PRESSURE: 125 MMHG | HEART RATE: 73 BPM

## 2021-05-27 VITALS
OXYGEN SATURATION: 94 % | DIASTOLIC BLOOD PRESSURE: 60 MMHG | SYSTOLIC BLOOD PRESSURE: 132 MMHG | RESPIRATION RATE: 17 BRPM | HEART RATE: 95 BPM | TEMPERATURE: 99.5 F

## 2021-05-27 VITALS
DIASTOLIC BLOOD PRESSURE: 72 MMHG | HEART RATE: 73 BPM | DIASTOLIC BLOOD PRESSURE: 58 MMHG | OXYGEN SATURATION: 97 % | SYSTOLIC BLOOD PRESSURE: 107 MMHG | TEMPERATURE: 97.1 F | HEART RATE: 95 BPM | RESPIRATION RATE: 18 BRPM | SYSTOLIC BLOOD PRESSURE: 138 MMHG

## 2021-05-27 VITALS
HEART RATE: 84 BPM | DIASTOLIC BLOOD PRESSURE: 62 MMHG | OXYGEN SATURATION: 95 % | SYSTOLIC BLOOD PRESSURE: 110 MMHG | TEMPERATURE: 98.8 F

## 2021-05-27 VITALS
SYSTOLIC BLOOD PRESSURE: 128 MMHG | HEART RATE: 84 BPM | DIASTOLIC BLOOD PRESSURE: 62 MMHG | TEMPERATURE: 99.3 F | RESPIRATION RATE: 18 BRPM | OXYGEN SATURATION: 96 %

## 2021-05-27 VITALS — RESPIRATION RATE: 18 BRPM | SYSTOLIC BLOOD PRESSURE: 110 MMHG | HEART RATE: 80 BPM | DIASTOLIC BLOOD PRESSURE: 60 MMHG

## 2021-05-27 VITALS
DIASTOLIC BLOOD PRESSURE: 72 MMHG | TEMPERATURE: 97.6 F | SYSTOLIC BLOOD PRESSURE: 134 MMHG | HEART RATE: 86 BPM | OXYGEN SATURATION: 92 %

## 2021-05-27 VITALS
RESPIRATION RATE: 16 BRPM | HEART RATE: 65 BPM | TEMPERATURE: 98.5 F | SYSTOLIC BLOOD PRESSURE: 90 MMHG | OXYGEN SATURATION: 93 % | DIASTOLIC BLOOD PRESSURE: 48 MMHG

## 2021-05-27 VITALS — DIASTOLIC BLOOD PRESSURE: 65 MMHG | HEART RATE: 95 BPM | TEMPERATURE: 97.3 F | SYSTOLIC BLOOD PRESSURE: 116 MMHG

## 2021-05-27 VITALS
SYSTOLIC BLOOD PRESSURE: 105 MMHG | TEMPERATURE: 98 F | DIASTOLIC BLOOD PRESSURE: 58 MMHG | OXYGEN SATURATION: 94 % | HEART RATE: 77 BPM | RESPIRATION RATE: 18 BRPM

## 2021-05-27 VITALS
RESPIRATION RATE: 16 BRPM | TEMPERATURE: 98.6 F | DIASTOLIC BLOOD PRESSURE: 60 MMHG | HEART RATE: 74 BPM | DIASTOLIC BLOOD PRESSURE: 58 MMHG | HEART RATE: 60 BPM | OXYGEN SATURATION: 94 % | TEMPERATURE: 98 F | RESPIRATION RATE: 16 BRPM | OXYGEN SATURATION: 95 % | SYSTOLIC BLOOD PRESSURE: 110 MMHG | SYSTOLIC BLOOD PRESSURE: 102 MMHG

## 2021-05-27 VITALS
DIASTOLIC BLOOD PRESSURE: 56 MMHG | OXYGEN SATURATION: 95 % | SYSTOLIC BLOOD PRESSURE: 104 MMHG | TEMPERATURE: 99.3 F | DIASTOLIC BLOOD PRESSURE: 56 MMHG | OXYGEN SATURATION: 99 % | RESPIRATION RATE: 18 BRPM | HEART RATE: 79 BPM | RESPIRATION RATE: 16 BRPM | TEMPERATURE: 98 F | OXYGEN SATURATION: 97 % | SYSTOLIC BLOOD PRESSURE: 106 MMHG | TEMPERATURE: 98.5 F | HEART RATE: 72 BPM | SYSTOLIC BLOOD PRESSURE: 105 MMHG | HEART RATE: 84 BPM | DIASTOLIC BLOOD PRESSURE: 68 MMHG | RESPIRATION RATE: 18 BRPM

## 2021-05-27 VITALS
RESPIRATION RATE: 16 BRPM | TEMPERATURE: 98 F | SYSTOLIC BLOOD PRESSURE: 130 MMHG | DIASTOLIC BLOOD PRESSURE: 70 MMHG | OXYGEN SATURATION: 96 % | HEART RATE: 83 BPM

## 2021-05-27 VITALS
TEMPERATURE: 98.9 F | RESPIRATION RATE: 15 BRPM | SYSTOLIC BLOOD PRESSURE: 100 MMHG | DIASTOLIC BLOOD PRESSURE: 64 MMHG | HEART RATE: 77 BPM | OXYGEN SATURATION: 96 %

## 2021-05-27 VITALS
DIASTOLIC BLOOD PRESSURE: 60 MMHG | TEMPERATURE: 98.2 F | HEART RATE: 72 BPM | RESPIRATION RATE: 16 BRPM | SYSTOLIC BLOOD PRESSURE: 110 MMHG

## 2021-05-27 VITALS — HEART RATE: 60 BPM | SYSTOLIC BLOOD PRESSURE: 108 MMHG | DIASTOLIC BLOOD PRESSURE: 60 MMHG | RESPIRATION RATE: 18 BRPM

## 2021-05-27 VITALS — DIASTOLIC BLOOD PRESSURE: 50 MMHG | SYSTOLIC BLOOD PRESSURE: 108 MMHG | HEART RATE: 62 BPM | RESPIRATION RATE: 4 BRPM

## 2021-05-27 VITALS
TEMPERATURE: 98.2 F | OXYGEN SATURATION: 96 % | HEART RATE: 72 BPM | TEMPERATURE: 98 F | DIASTOLIC BLOOD PRESSURE: 58 MMHG | RESPIRATION RATE: 16 BRPM | OXYGEN SATURATION: 94 % | SYSTOLIC BLOOD PRESSURE: 110 MMHG | DIASTOLIC BLOOD PRESSURE: 64 MMHG | RESPIRATION RATE: 16 BRPM | HEART RATE: 72 BPM | SYSTOLIC BLOOD PRESSURE: 100 MMHG

## 2021-05-27 VITALS
SYSTOLIC BLOOD PRESSURE: 98 MMHG | HEART RATE: 73 BPM | OXYGEN SATURATION: 95 % | DIASTOLIC BLOOD PRESSURE: 60 MMHG | TEMPERATURE: 98.4 F | TEMPERATURE: 99 F | SYSTOLIC BLOOD PRESSURE: 124 MMHG | HEART RATE: 93 BPM | DIASTOLIC BLOOD PRESSURE: 72 MMHG | OXYGEN SATURATION: 94 %

## 2021-05-27 VITALS
HEART RATE: 82 BPM | DIASTOLIC BLOOD PRESSURE: 79 MMHG | RESPIRATION RATE: 18 BRPM | SYSTOLIC BLOOD PRESSURE: 112 MMHG | OXYGEN SATURATION: 96 %

## 2021-05-27 VITALS
HEART RATE: 98 BPM | DIASTOLIC BLOOD PRESSURE: 58 MMHG | TEMPERATURE: 98.8 F | SYSTOLIC BLOOD PRESSURE: 100 MMHG | OXYGEN SATURATION: 96 % | RESPIRATION RATE: 16 BRPM

## 2021-05-27 VITALS — HEART RATE: 68 BPM | SYSTOLIC BLOOD PRESSURE: 110 MMHG | RESPIRATION RATE: 16 BRPM | DIASTOLIC BLOOD PRESSURE: 60 MMHG

## 2021-05-27 VITALS
SYSTOLIC BLOOD PRESSURE: 105 MMHG | TEMPERATURE: 98.1 F | DIASTOLIC BLOOD PRESSURE: 68 MMHG | RESPIRATION RATE: 18 BRPM | HEART RATE: 65 BPM | OXYGEN SATURATION: 98 %

## 2021-05-27 VITALS
HEART RATE: 66 BPM | DIASTOLIC BLOOD PRESSURE: 50 MMHG | OXYGEN SATURATION: 94 % | RESPIRATION RATE: 18 BRPM | SYSTOLIC BLOOD PRESSURE: 98 MMHG | TEMPERATURE: 98.2 F

## 2021-05-27 VITALS
TEMPERATURE: 99 F | SYSTOLIC BLOOD PRESSURE: 112 MMHG | OXYGEN SATURATION: 96 % | DIASTOLIC BLOOD PRESSURE: 64 MMHG | HEART RATE: 87 BPM

## 2021-05-27 VITALS
RESPIRATION RATE: 18 BRPM | DIASTOLIC BLOOD PRESSURE: 64 MMHG | TEMPERATURE: 97.9 F | OXYGEN SATURATION: 97 % | HEART RATE: 70 BPM | SYSTOLIC BLOOD PRESSURE: 120 MMHG

## 2021-05-27 VITALS
OXYGEN SATURATION: 97 % | HEART RATE: 98 BPM | RESPIRATION RATE: 16 BRPM | DIASTOLIC BLOOD PRESSURE: 68 MMHG | HEART RATE: 76 BPM | SYSTOLIC BLOOD PRESSURE: 106 MMHG | TEMPERATURE: 98.8 F | RESPIRATION RATE: 16 BRPM | TEMPERATURE: 99.3 F | SYSTOLIC BLOOD PRESSURE: 132 MMHG | DIASTOLIC BLOOD PRESSURE: 72 MMHG | OXYGEN SATURATION: 94 %

## 2021-05-27 VITALS
DIASTOLIC BLOOD PRESSURE: 68 MMHG | SYSTOLIC BLOOD PRESSURE: 128 MMHG | OXYGEN SATURATION: 95 % | TEMPERATURE: 98.3 F | HEART RATE: 77 BPM

## 2021-05-27 VITALS
RESPIRATION RATE: 18 BRPM | DIASTOLIC BLOOD PRESSURE: 64 MMHG | SYSTOLIC BLOOD PRESSURE: 108 MMHG | TEMPERATURE: 98.6 F | HEART RATE: 77 BPM | OXYGEN SATURATION: 95 %

## 2021-05-27 VITALS
SYSTOLIC BLOOD PRESSURE: 110 MMHG | DIASTOLIC BLOOD PRESSURE: 80 MMHG | TEMPERATURE: 98.3 F | DIASTOLIC BLOOD PRESSURE: 60 MMHG | SYSTOLIC BLOOD PRESSURE: 136 MMHG | OXYGEN SATURATION: 94 % | OXYGEN SATURATION: 93 % | HEART RATE: 85 BPM | HEART RATE: 74 BPM | RESPIRATION RATE: 16 BRPM

## 2021-05-27 VITALS
DIASTOLIC BLOOD PRESSURE: 66 MMHG | RESPIRATION RATE: 16 BRPM | HEART RATE: 72 BPM | OXYGEN SATURATION: 96 % | TEMPERATURE: 98 F | SYSTOLIC BLOOD PRESSURE: 103 MMHG

## 2021-05-27 VITALS
HEART RATE: 67 BPM | SYSTOLIC BLOOD PRESSURE: 110 MMHG | DIASTOLIC BLOOD PRESSURE: 60 MMHG | RESPIRATION RATE: 16 BRPM | TEMPERATURE: 99.3 F | OXYGEN SATURATION: 97 %

## 2021-05-27 VITALS
HEART RATE: 76 BPM | RESPIRATION RATE: 18 BRPM | SYSTOLIC BLOOD PRESSURE: 96 MMHG | OXYGEN SATURATION: 98 % | TEMPERATURE: 98.8 F | DIASTOLIC BLOOD PRESSURE: 54 MMHG

## 2021-05-27 VITALS
DIASTOLIC BLOOD PRESSURE: 59 MMHG | SYSTOLIC BLOOD PRESSURE: 105 MMHG | HEART RATE: 76 BPM | TEMPERATURE: 98.4 F | OXYGEN SATURATION: 95 %

## 2021-05-27 VITALS
HEART RATE: 86 BPM | DIASTOLIC BLOOD PRESSURE: 68 MMHG | SYSTOLIC BLOOD PRESSURE: 118 MMHG | OXYGEN SATURATION: 96 % | TEMPERATURE: 98.7 F

## 2021-05-27 VITALS
SYSTOLIC BLOOD PRESSURE: 126 MMHG | DIASTOLIC BLOOD PRESSURE: 64 MMHG | TEMPERATURE: 98.3 F | RESPIRATION RATE: 16 BRPM | OXYGEN SATURATION: 97 % | HEART RATE: 74 BPM

## 2021-05-27 VITALS
DIASTOLIC BLOOD PRESSURE: 52 MMHG | HEART RATE: 57 BPM | TEMPERATURE: 97.9 F | SYSTOLIC BLOOD PRESSURE: 110 MMHG | OXYGEN SATURATION: 99 % | RESPIRATION RATE: 20 BRPM

## 2021-05-27 VITALS
HEART RATE: 86 BPM | SYSTOLIC BLOOD PRESSURE: 108 MMHG | RESPIRATION RATE: 16 BRPM | TEMPERATURE: 98 F | DIASTOLIC BLOOD PRESSURE: 60 MMHG

## 2021-05-27 VITALS
DIASTOLIC BLOOD PRESSURE: 72 MMHG | TEMPERATURE: 98.8 F | HEART RATE: 87 BPM | SYSTOLIC BLOOD PRESSURE: 108 MMHG | OXYGEN SATURATION: 91 %

## 2021-05-27 VITALS
RESPIRATION RATE: 16 BRPM | TEMPERATURE: 98.7 F | OXYGEN SATURATION: 98 % | HEART RATE: 80 BPM | SYSTOLIC BLOOD PRESSURE: 120 MMHG | DIASTOLIC BLOOD PRESSURE: 80 MMHG

## 2021-05-27 VITALS
SYSTOLIC BLOOD PRESSURE: 105 MMHG | HEART RATE: 70 BPM | DIASTOLIC BLOOD PRESSURE: 58 MMHG | TEMPERATURE: 98 F | RESPIRATION RATE: 16 BRPM

## 2021-05-27 VITALS
TEMPERATURE: 97.7 F | SYSTOLIC BLOOD PRESSURE: 110 MMHG | RESPIRATION RATE: 17 BRPM | DIASTOLIC BLOOD PRESSURE: 70 MMHG | OXYGEN SATURATION: 96 % | HEART RATE: 75 BPM

## 2021-05-27 VITALS — OXYGEN SATURATION: 97 % | HEART RATE: 70 BPM | SYSTOLIC BLOOD PRESSURE: 102 MMHG | DIASTOLIC BLOOD PRESSURE: 56 MMHG

## 2021-05-27 VITALS
OXYGEN SATURATION: 94 % | TEMPERATURE: 97.9 F | DIASTOLIC BLOOD PRESSURE: 58 MMHG | HEART RATE: 88 BPM | SYSTOLIC BLOOD PRESSURE: 112 MMHG | RESPIRATION RATE: 18 BRPM

## 2021-05-27 VITALS
RESPIRATION RATE: 18 BRPM | HEART RATE: 97 BPM | OXYGEN SATURATION: 97 % | TEMPERATURE: 99.3 F | DIASTOLIC BLOOD PRESSURE: 78 MMHG | SYSTOLIC BLOOD PRESSURE: 136 MMHG

## 2021-05-27 VITALS
OXYGEN SATURATION: 96 % | TEMPERATURE: 97.6 F | HEART RATE: 70 BPM | DIASTOLIC BLOOD PRESSURE: 76 MMHG | SYSTOLIC BLOOD PRESSURE: 112 MMHG

## 2021-05-27 VITALS
SYSTOLIC BLOOD PRESSURE: 108 MMHG | DIASTOLIC BLOOD PRESSURE: 78 MMHG | OXYGEN SATURATION: 95 % | RESPIRATION RATE: 16 BRPM | HEART RATE: 64 BPM | TEMPERATURE: 98 F

## 2021-05-27 VITALS — HEART RATE: 89 BPM | SYSTOLIC BLOOD PRESSURE: 111 MMHG | DIASTOLIC BLOOD PRESSURE: 68 MMHG | RESPIRATION RATE: 16 BRPM

## 2021-05-27 VITALS
HEART RATE: 77 BPM | DIASTOLIC BLOOD PRESSURE: 64 MMHG | TEMPERATURE: 98 F | RESPIRATION RATE: 16 BRPM | OXYGEN SATURATION: 96 % | SYSTOLIC BLOOD PRESSURE: 114 MMHG

## 2021-05-27 VITALS
RESPIRATION RATE: 18 BRPM | SYSTOLIC BLOOD PRESSURE: 138 MMHG | DIASTOLIC BLOOD PRESSURE: 84 MMHG | HEART RATE: 89 BPM | OXYGEN SATURATION: 92 % | TEMPERATURE: 99.1 F

## 2021-05-27 VITALS
OXYGEN SATURATION: 94 % | SYSTOLIC BLOOD PRESSURE: 140 MMHG | HEART RATE: 106 BPM | DIASTOLIC BLOOD PRESSURE: 77 MMHG | TEMPERATURE: 98.1 F

## 2021-05-27 VITALS
OXYGEN SATURATION: 96 % | SYSTOLIC BLOOD PRESSURE: 100 MMHG | RESPIRATION RATE: 17 BRPM | HEART RATE: 86 BPM | TEMPERATURE: 99.1 F | DIASTOLIC BLOOD PRESSURE: 62 MMHG

## 2021-05-27 VITALS
OXYGEN SATURATION: 94 % | DIASTOLIC BLOOD PRESSURE: 70 MMHG | RESPIRATION RATE: 18 BRPM | TEMPERATURE: 98 F | SYSTOLIC BLOOD PRESSURE: 108 MMHG | HEART RATE: 68 BPM

## 2021-05-27 VITALS
RESPIRATION RATE: 18 BRPM | SYSTOLIC BLOOD PRESSURE: 124 MMHG | TEMPERATURE: 97.7 F | HEART RATE: 84 BPM | DIASTOLIC BLOOD PRESSURE: 79 MMHG | OXYGEN SATURATION: 95 %

## 2021-05-27 VITALS
TEMPERATURE: 98.7 F | OXYGEN SATURATION: 94 % | RESPIRATION RATE: 18 BRPM | SYSTOLIC BLOOD PRESSURE: 100 MMHG | DIASTOLIC BLOOD PRESSURE: 58 MMHG | HEART RATE: 77 BPM

## 2021-05-27 VITALS
DIASTOLIC BLOOD PRESSURE: 68 MMHG | HEART RATE: 72 BPM | OXYGEN SATURATION: 93 % | RESPIRATION RATE: 18 BRPM | SYSTOLIC BLOOD PRESSURE: 110 MMHG | TEMPERATURE: 98 F

## 2021-05-27 VITALS
SYSTOLIC BLOOD PRESSURE: 93 MMHG | TEMPERATURE: 98.2 F | DIASTOLIC BLOOD PRESSURE: 52 MMHG | OXYGEN SATURATION: 96 % | HEART RATE: 77 BPM

## 2021-05-27 VITALS
SYSTOLIC BLOOD PRESSURE: 102 MMHG | TEMPERATURE: 98.4 F | HEART RATE: 79 BPM | DIASTOLIC BLOOD PRESSURE: 56 MMHG | OXYGEN SATURATION: 95 % | RESPIRATION RATE: 18 BRPM

## 2021-05-27 VITALS
DIASTOLIC BLOOD PRESSURE: 58 MMHG | HEART RATE: 91 BPM | OXYGEN SATURATION: 94 % | TEMPERATURE: 99.9 F | SYSTOLIC BLOOD PRESSURE: 104 MMHG

## 2021-05-27 VITALS
HEART RATE: 88 BPM | TEMPERATURE: 100.1 F | OXYGEN SATURATION: 94 % | DIASTOLIC BLOOD PRESSURE: 60 MMHG | SYSTOLIC BLOOD PRESSURE: 110 MMHG

## 2021-05-27 VITALS
SYSTOLIC BLOOD PRESSURE: 100 MMHG | RESPIRATION RATE: 16 BRPM | OXYGEN SATURATION: 96 % | DIASTOLIC BLOOD PRESSURE: 58 MMHG | TEMPERATURE: 98.7 F | HEART RATE: 75 BPM

## 2021-05-27 VITALS
HEART RATE: 88 BPM | SYSTOLIC BLOOD PRESSURE: 108 MMHG | DIASTOLIC BLOOD PRESSURE: 66 MMHG | RESPIRATION RATE: 16 BRPM | OXYGEN SATURATION: 94 % | TEMPERATURE: 98.8 F

## 2021-05-27 VITALS
TEMPERATURE: 99.5 F | DIASTOLIC BLOOD PRESSURE: 80 MMHG | SYSTOLIC BLOOD PRESSURE: 126 MMHG | OXYGEN SATURATION: 95 % | HEART RATE: 92 BPM

## 2021-05-27 VITALS
OXYGEN SATURATION: 91 % | SYSTOLIC BLOOD PRESSURE: 104 MMHG | HEART RATE: 84 BPM | DIASTOLIC BLOOD PRESSURE: 60 MMHG | TEMPERATURE: 98.5 F | RESPIRATION RATE: 16 BRPM

## 2021-05-27 VITALS
HEART RATE: 93 BPM | OXYGEN SATURATION: 95 % | TEMPERATURE: 99 F | DIASTOLIC BLOOD PRESSURE: 60 MMHG | SYSTOLIC BLOOD PRESSURE: 98 MMHG | RESPIRATION RATE: 14 BRPM

## 2021-05-27 VITALS
SYSTOLIC BLOOD PRESSURE: 100 MMHG | HEART RATE: 79 BPM | TEMPERATURE: 98.8 F | DIASTOLIC BLOOD PRESSURE: 60 MMHG | OXYGEN SATURATION: 96 %

## 2021-05-27 VITALS — TEMPERATURE: 98.1 F

## 2021-05-27 VITALS
SYSTOLIC BLOOD PRESSURE: 128 MMHG | DIASTOLIC BLOOD PRESSURE: 58 MMHG | TEMPERATURE: 98.5 F | OXYGEN SATURATION: 97 % | RESPIRATION RATE: 18 BRPM | HEART RATE: 97 BPM

## 2021-05-27 VITALS
SYSTOLIC BLOOD PRESSURE: 102 MMHG | RESPIRATION RATE: 16 BRPM | HEART RATE: 95 BPM | OXYGEN SATURATION: 94 % | DIASTOLIC BLOOD PRESSURE: 58 MMHG | TEMPERATURE: 98.1 F

## 2021-05-27 VITALS
HEART RATE: 58 BPM | SYSTOLIC BLOOD PRESSURE: 98 MMHG | RESPIRATION RATE: 16 BRPM | TEMPERATURE: 98.8 F | DIASTOLIC BLOOD PRESSURE: 62 MMHG | OXYGEN SATURATION: 94 %

## 2021-05-27 VITALS
DIASTOLIC BLOOD PRESSURE: 50 MMHG | OXYGEN SATURATION: 96 % | TEMPERATURE: 99.3 F | SYSTOLIC BLOOD PRESSURE: 98 MMHG | HEART RATE: 74 BPM

## 2021-05-27 VITALS
DIASTOLIC BLOOD PRESSURE: 62 MMHG | SYSTOLIC BLOOD PRESSURE: 107 MMHG | OXYGEN SATURATION: 96 % | TEMPERATURE: 97.9 F | HEART RATE: 84 BPM

## 2021-05-27 NOTE — TELEPHONE ENCOUNTER
He has taking baclofen 3 times daily in the past for muscle spasm.  That medication can be refilled.

## 2021-05-27 NOTE — TELEPHONE ENCOUNTER
Dr. Crabtree,  Forms have been placed in your in box to review.  Geovanna PRICE, CMA/CMT....................10:11 AM

## 2021-05-27 NOTE — TELEPHONE ENCOUNTER
Found this form for [employer] to be filled out has been up front in basket, I don't know if person dropped it off or what, but needs to be completed, form up front in basket.

## 2021-05-27 NOTE — TELEPHONE ENCOUNTER
Who is calling:  [facility]   Reason for Call:  Discuss frequent visit to er for pain management.  Date of last appointment with primary care: unknown  Okay to leave a detailed message: No

## 2021-05-27 NOTE — PROGRESS NOTES
[facility] Care Coordination Contact      Phone call spoke with Dr. Leyda Ko s office expressing concern over medication seeking behavior with frequent visits to ER in the last year.  Informed her that is [facility] CC and is aware of home situation and will be making annual HV s this month, request that if there is anything CC can assist wit. for  to reach out to me.     Ruddy Alexander RN  [facility]  [phone]

## 2021-05-27 NOTE — TELEPHONE ENCOUNTER
PT PCA WORKER STOPPED BY WITH PAPERS DR. HILL TO FILL OUT PLEASE CALL PT WHEN PAPERS ARE READY FOR  [phone] PAPERS UP FRONT IN BASKET OK TO LMOM

## 2021-05-27 NOTE — TELEPHONE ENCOUNTER
As he has 14 tablets left, I would prefer that his regular PCP, Dr. Crabtree make this refill on Monday when he returns.

## 2021-05-27 NOTE — TELEPHONE ENCOUNTER
Writer spoke with Pop and told him the supplies have been ordered as of today and will be arriving in about 2 days. He verbalized understanding.     Raymond Zayas LPN 04/09/19 1058

## 2021-05-27 NOTE — PATIENT INSTRUCTIONS - HE
Important Instructions     * Stop using your current products and dressings                    How to care for your wound    Cleanse wound with saline sent to you with your supplies or a wound wash found at the pharmacy.    *   Cut to fit the silvercel and place in or on the wound.      *   Cover the wound with  Allevyn life sacral shape    *   Change dressing: every other day         *   Do not get your ulcer wet when you shower or take a bath.  You can cover it with cling wrap, a bag taped to your skin or a cast protector.    *   Good nutrition is important for wound healing.  I recommend increasing your protein.  You can do this through your diet, nutritional supplements, and/or protein powder.      Please call [facility] before substituting any product    Call our clinic nurse at [phone] if there is an increase in drainage, pain, redness, or the wound size increases.  This maybe a sign of infection and require attention prior to the next appointment      Carito Sutherland, APRN, CNP, CWCN

## 2021-05-27 NOTE — TELEPHONE ENCOUNTER
Controlled Substance Refill Request  Medication Name:   Requested Prescriptions     Pending Prescriptions Disp Refills     oxyCODONE (ROXICODONE) 30 MG immediate release tablet 90 tablet 0     Sig: Take 1 tablet (30 mg total) by mouth 3 (three) times a day.     Date Last Fill: 3/22/2019  Pharmacy: [facility]      Submit electronically to pharmacy  Controlled Substance Agreement Date Scanned:   Encounter-Level CSA Scan Date:    There are no encounter-level csa scan date.       Last office visit with prescriber/PCP: 3/13/2019 Geoff Crabtree MD OR same dept: 3/13/2019 Geoff Crabtree MD OR same specialty: 3/13/2019 Geoff Crabtree MD  Last physical: Visit date not found Last MTM visit: Visit date not found      Patient requesting a refill of this medication. Reports he is not out of medication and has 14 pills left. Please advise!

## 2021-05-27 NOTE — TELEPHONE ENCOUNTER
Medication Request  Medication name: Something for muscle cramps, he can't remember the name of the medication Dr. Crabtree gave him in the past  Pharmacy Name and Location: [facility] # [id number]  Reason for request: muscle cramps.  Patient states Dr. Crabtree is aware patient gets muscle cramps  When did you use medication last?:  Patient does not remember  Patient offered appointment:  patient declined  Okay to leave a detailed message: yes

## 2021-05-27 NOTE — TELEPHONE ENCOUNTER
Spoke with worker and she is concerned he has been going to ER in search of oxycodone. Stating he lost his or they were stolen.  Please see ER Visit notes. She would like you to address this.  is still ok he does fill a couple days early sometimes. Per insurance it is ok to fill 3 days early.  Florence Gamino CSS

## 2021-05-27 NOTE — TELEPHONE ENCOUNTER
Pt's care giver Pop reports pt was given Allevyn patches for coccyx wound. He was supposed to receive more supply but but this has not occurred. Please contact Pop and update him on if/when supply may be coming.

## 2021-05-27 NOTE — PROGRESS NOTES
[facility] Care Coordination Contact    Left voice mail message for member to request for home visit for annual healthrisk assessment.     Ruddy Alexander RN  [facility]  [phone]

## 2021-05-27 NOTE — TELEPHONE ENCOUNTER
Refill request has been set up for Dr. Crabtree to review per message below.      Left detailed message for the patient relaying message below from Dr. Crabtree.  Asked that the patient call back if he has any further questions.  Geovanna PIRCE CMA/MIA....................12:15 PM

## 2021-05-27 NOTE — TELEPHONE ENCOUNTER
RN cannot approve Refill Request    RN can NOT refill this medication med is not covered by policy/route to provider.    Armand Burroughs, [facility] Triage/Med Refill 4/9/2019    Requested Prescriptions   Pending Prescriptions Disp Refills     lidocaine (LIDODERM) 5 % [Pharmacy Med Name: LIDOCAINE 5% PATCH] 30 patch 0     Sig: REMOVE AND DISCARD PATCH WITHIN 12 HOURS OR AS DIRECTED BY MD       There is no refill protocol information for this order

## 2021-05-28 ENCOUNTER — RECORDS - HEALTHEAST (OUTPATIENT)
Dept: ADMINISTRATIVE | Facility: CLINIC | Age: 71
End: 2021-05-28

## 2021-05-28 ASSESSMENT — ANXIETY QUESTIONNAIRES: GAD7 TOTAL SCORE: 9

## 2021-05-28 ASSESSMENT — ASTHMA QUESTIONNAIRES: ACT_TOTALSCORE: 16

## 2021-05-28 NOTE — PATIENT INSTRUCTIONS - HE
Follow up with your primary regarding your swelling.      Continue to wear your compression stockings.                      How to care for your wound    Cleanse wound with saline sent to you with your supplies or a wound wash found at the pharmacy.    *   Cut to fit the Endoform and place in or on the wound.      *   Cover the wound with Mepilex border or Allevyn Adhesive Gentle.     *   Change dressing: every three days      *   Do not get your ulcer wet when you shower or take a bath.  You can cover it with cling wrap, a bag taped to your skin or a cast protector.    *   Good nutrition is important for wound healing.  I recommend increasing your protein.  You can do this through your diet, nutritional supplements, and/or protein powder.      Please call [facility] before substituting any product    Call our clinic nurse at [phone] if there is an increase in drainage, pain, redness, or the wound size increases.  This maybe a sign of infection and require attention prior to the next appointment      Carito Sutherland, APRN, CNP, CWCN

## 2021-05-28 NOTE — TELEPHONE ENCOUNTER
Prescription Monitoring Program activity reviewed with no discrepancies noted.      Last fill per : 4/23/2019  Quantity/days supply: 90    Controlled Substance Agreement on file: No  Date:     Last office visit with provider:  5/8/2019 Geoff Crabtree MD    Please advise.

## 2021-05-28 NOTE — TELEPHONE ENCOUNTER
Called pt regarding wound culture results, NA left a message to call the clinic - [facility]

## 2021-05-28 NOTE — PROGRESS NOTES
[facility] Care Coordination Contact    4/23/19 Email from Halie Mckeon RN that Carito marin Nurse Practitioner ordered home care today 4/23/19.  They have up to 24 hours to contact Jef, and then 48 hours to see him for their first visit.      [facility] Care Coordination Contact    [facility] for Mr. Centeno that homecare will be contacting him soon to schedule visit to open to homecare.  Encourage him to check messages and return their call so can start services.    Ruddy Alexander RN  [facility]  [phone]

## 2021-05-28 NOTE — PROGRESS NOTES
OFFICE VISIT NOTE    Subjective:   Chief Complaint:  Toe Injury (right big toe nail is falling off)    This is a 68-year-old man with multiple problems including COPD, CHD, severe neck and back pain, borderline diabetes mellitus type 2.  He is a chronic smoker yet.  Yesterday, he uses left foot to pull his shoe off and he ripped his great toenail off of the right great toe.  Is barely hanging on.  Today, the top of the right foot was getting swollen and sore.  No fevers or chills.    Current Outpatient Medications   Medication Sig     albuterol (PROVENTIL) 2.5 mg /3 mL (0.083 %) nebulizer solution Take 2.5 mg by nebulization every 4 (four) hours as needed for shortness of breath.      baclofen (LIORESAL) 10 MG tablet TAKE 1/2 TABLET(5 MG) BY MOUTH THREE TIMES DAILY     budesonide (PULMICORT) 1 mg/2 mL nebulizer solution Take 2 mL by nebulization 2 (two) times a day. Mix with Duoneb     budesonide-formoterol (SYMBICORT) 160-4.5 mcg/actuation inhaler Inhale 2 puffs 2 (two) times a day.     budesonide-formoterol (SYMBICORT) 160-4.5 mcg/actuation inhaler INHALE 2 PUFFS BY MOUTH TWICE DAILY     calcium carbonate-vitamin D3 (CALCIUM 600 + D,3,) 600 mg(1,500mg) -400 unit per tablet Take 1 tablet by mouth daily.     calcium carbonate-vitamin D3 (CALTRATE 600 PLUS D3) 600 mg(1,500mg) -400 unit per tablet TAKE 1 TABLET BY MOUTH DAILY     cetirizine (ZYRTEC) 10 MG tablet Take 1 tablet (10 mg total) by mouth daily.     cholecalciferol, vitamin D3, 1,000 unit tablet Take 2,000 Units by mouth daily.     cyanocobalamin 1000 MCG tablet Take 1,000 mcg by mouth daily.     DAILY-HIEN tablet TAKE 1 TABLET BY MOUTH EVERY DAY     diphenhydrAMINE (BENADRYL) 25 mg capsule Take 50 mg by mouth every 6 (six) hours as needed for itching.     docusate sodium (COLACE) 100 MG capsule Take 100 mg by mouth 2 (two) times a day as needed for constipation.     DULoxetine (CYMBALTA) 20 MG capsule Take 20 mg by mouth.     ENSURE ACTIVE PROTEIN-MUSCLE  Liqd DRINK 1 BOTTLE BY MOUTH TWICE DAILY     ENSURE ENLIVE 0.08 gram-1.5 kcal/mL Liqd DRINK 1 BOTTLE BY MOUTH TWICE DAILY     ergocalciferol (ERGOCALCIFEROL) 50,000 unit capsule Take 50,000 Units by mouth once a week.     folic acid (FOLVITE) 1 MG tablet TAKE 1 TABLET BY MOUTH EVERY DAY     furosemide (LASIX) 20 MG tablet Take 20 mg by mouth daily.     gabapentin (NEURONTIN) 300 MG capsule TAKE 1 CAPSULE BY MOUTH TWICE DAILY     hydrOXYzine HCl (ATARAX) 25 MG tablet TAKE 1 TABLET(25 MG) BY MOUTH EVERY 8 HOURS AS NEEDED FOR ITCHING     ipratropium-albuterol (DUO-NEB) 0.5-2.5 mg/3 mL nebulizer Inhale 3 mL every 6 (six) hours as needed.     levalbuterol (XOPENEX HFA) 45 mcg/actuation inhaler INHALE 2 PUFFS BY MOUTH EVERY 4 HOURS AS NEEDED FOR WHEEZING     lidocaine (LIDODERM) 5 % REMOVE AND DISCARD PATCH WITHIN 12 HOURS OR AS DIRECTED BY MD     LYPETEY 75 mg capsule TAKE 1 CAPSULE(75 MG) BY MOUTH THREE TIMES DAILY     meloxicam (MOBIC) 7.5 MG tablet TAKE 1 TABLET(7.5 MG) BY MOUTH DAILY     metFORMIN (GLUCOPHAGE) 1000 MG tablet TAKE 1 TABLET BY MOUTH TWICE DAILY     metoprolol tartrate (LOPRESSOR) 25 MG tablet Take 1 tablet (25 mg total) by mouth 2 (two) times a day.     midazolam, PF, (VERSED) 1 mg/mL Soln injection Infuse 0.5-6 mg into a venous catheter.     multivitamin (TAB-A-HIEN) per tablet Take 1 tablet by mouth daily.     naloxone (NARCAN) 4 mg/actuation nasal spray 1 spray (4 mg dose) into one nostril for opioid reversal. Call 911. May repeat if no response in 3 minutes.     nicotine (NICOTROL) 10 mg inhaler Inhale 1 puff as needed for smoking cessation.     nystatin (MYCOSTATIN) 100,000 unit/mL suspension SHAKE LIQUID AND TAKE 5 ML BY MOUTH FOUR TIMES DAILY     omeprazole (PRILOSEC) 20 MG capsule Take 20 mg by mouth daily before breakfast.      oxyCODONE (ROXICODONE) 30 MG immediate release tablet Take 1 tablet (30 mg total) by mouth 3 (three) times a day.     potassium chloride (K-DUR,KLOR-CON) 20 MEQ tablet  "TAKE 1 TABLET(20 MEQ) BY MOUTH TWICE DAILY     predniSONE (DELTASONE) 5 MG tablet Take 5 mg by mouth daily.     riluzole (RILUTEK) 50 mg tablet TAKE 1 TABLET BY MOUTH EVERY 12 HOURS(1 HOUR BEFORE MEALS OR 2 HOURS AFTER MEALS)     tamsulosin (FLOMAX) 0.4 mg Cp24 Take 1 capsule (0.4 mg total) by mouth Daily after breakfast.     tiotropium (SPIRIVA) 18 mcg inhalation capsule Place 18 mcg into inhaler and inhale daily.     wound dressings (TRIAD WOUND DRESSING) Pste Apply 1 Tube topically 2 (two) times a day.     amoxicillin-clavulanate (AUGMENTIN) 875-125 mg per tablet Take 1 tablet by mouth 2 (two) times a day for 7 days.       PSFHx: Tobacco Status:  He  reports that he has been smoking cigarettes.  He has been smoking about 0.25 packs per day. He has never used smokeless tobacco.    Review of Systems:  A comprehensive review of systems is negative except for the comments above    Objective:    Ht 5' 10\" (1.778 m)   BMI 21.38 kg/m    GENERAL: No acute distress.  He is afebrile.  Blood pressure is 122/70.  Pulse is 80.  He is smells of tobacco and smoke.  The right great toe has a fungal infection in the nail.  The nail is about 80% evulsed.  Its hanging on on the inner aspect of the nail only.  The top of the foot is red and swollen.  Is tender to touch.    Assessment & Plan   Jef Centeno Jr. is a 68 y.o. male.    Self-induced partial nail avulsion.  He is started with cellulitis on top of the foot.  I used 2 cc of plain Xylocaine to anesthetize the cuticle.  I then easily popped off the nail and discarded it.  The wound was treated with bacitracin and a gauze dressing.  Starting on Augmentin 875 mg twice daily for 7 days.  Dressing should be changed tomorrow.  Bacitracin should be applied daily.  This could be several weeks before this nail grows back.  I want him to reduce his oxycodone to 20 mg 3 times daily.  He has been on high doses of narcotics we before I ever saw this man.  I have been able to reduce " narcotics in the past eliminating OxyContin.  He still taking oxycodone.  Eventually, like to get this dosage way down.  He is a little hesitant about trying to get off meds.  CBC and CRP today.  Diagnoses and all orders for this visit:    Avulsion of toenail, initial encounter    Cellulitis of toe of right foot  -     HM1(CBC and Differential)  -     C-Reactive Protein  -     amoxicillin-clavulanate (AUGMENTIN) 875-125 mg per tablet; Take 1 tablet by mouth 2 (two) times a day for 7 days.  Dispense: 14 tablet; Refill: 0  -     HM1 (CBC with Diff)    Mucopurulent chronic bronchitis (H)            Geoff Crabtree MD  Transcription using voice recognition software, may contain typographical errors.

## 2021-05-28 NOTE — TELEPHONE ENCOUNTER
Writer was contacted by the patients  - indicating Jef would benefit from home care to help him with his wounds.  A message was sent to FRANKI Proctor to review the file and make a referral if applicable.  Lorettar will contact the  with the outcome.

## 2021-05-28 NOTE — PATIENT INSTRUCTIONS - HE
Important Instructions     * Stop using your current products and dressings     Start taking your antibiotic.    We will call you with your culture results.                      How to care for your wound    Cleanse wound with saline sent to you with your supplies or a wound wash found at the pharmacy.    *   Cut to fit the silvercel and place in or on the wound.      *   Cover the wound with Mepilex border or Allevyn Adhesive Gentle.     *   Change dressing: every other day      *   Do not get your ulcer wet when you shower or take a bath.  You can cover it with cling wrap, a bag taped to your skin or a cast protector.    *   Good nutrition is important for wound healing.  I recommend increasing your protein.  You can do this through your diet, nutritional supplements, and/or protein powder.      Please call [facility] before substituting any product    Call our clinic nurse at [phone] if there is an increase in drainage, pain, redness, or the wound size increases.  This maybe a sign of infection and require attention prior to the next appointment      Carito Sutherland, APRN, CNP, CWCN

## 2021-05-28 NOTE — TELEPHONE ENCOUNTER
Request for Orders    Who s Requesting: [facility]    Orders being requested: Skilled nurse visits for 4x wk for 1 wk and 2x wk for 1 wk.    Wound care to Right great toe:  Change daily  Clean with saline  Apply bacitracin and cover with dry gauze.  Train PCA to do wound care.    Where to send Orders: [employer] or call [phone]

## 2021-05-28 NOTE — PROGRESS NOTES
[facility] Care Coordination Contact    Faxed received from [facility] indicating that member is getting PCA services from two PCA agencies and needs to correct on submitted PCA assessment as well as indicating designation of hours between the two agencies.    Phone call to [facility], spoke with Ida to verify if agency is still providing PCA services for member since was informed by member that long time PCA retired about a month ago and has not had replacement from agency.  Ida reports that was able to find PCA and is providing 19hrs/wk.  She will work with other agency if needs change but agrees to leave the same as last year at 20hrs/wk with [facility].  Care coordinator will auth for [facility] at 18.5/hrs per week.      Faxed updated PCA assessment and designated hours to [facility].     Ruddy Alexander RN  [facility]  [phone]

## 2021-05-28 NOTE — TELEPHONE ENCOUNTER
Request for Orders    Who s Requesting: Home Care Physical Therapist    Orders being requested:  PT 1 more time this week then 2x/wk x 3 weeks for strengthening, gait and balance    Where to send Orders: Please reply through Epic    Thank you

## 2021-05-28 NOTE — PROGRESS NOTES
[facility] Care Coordination Contact    Received after visit chart from care coordinator.  Completed following tasks: Mailed copy of care plan to client, Updated services in access and Submitted referrals/auths for HMK, MOW, and PERS.      Provider Signature - No POC Shared:  Member indicates that they do not want their POC shared with any EW providers.     Member needs to bee seen for the electric wheel chair and may need to have OT or PT eval for mobility needed. Noted to SWAPNIL Tavarez  Care Management Specialist  [facility]  [phone]

## 2021-05-28 NOTE — TELEPHONE ENCOUNTER
Orders placed in an orders only encounter for PCP to sign.  Left message to notify patient.    Tarah Davila, CMA

## 2021-05-28 NOTE — TELEPHONE ENCOUNTER
RN cannot approve Refill Request    RN can NOT refill this medication med is not covered by policy/route to provider       . Last office visit: 5/8/2019 Geoff Crabtree MD Last Physical: Visit date not found Last MTM visit: Visit date not found Last visit same specialty: 5/8/2019 Geoff Crabtree MD.  Next visit within 3 mo: Visit date not found  Next physical within 3 mo: Visit date not found      Keyanna Tenorio, [facility] Triage/Med Refill 5/9/2019    Requested Prescriptions   Pending Prescriptions Disp Refills     ENSURE ACTIVE PROTEIN-MUSCLE Liqd [Pharmacy Med Name: ENSURE ACTIVE HP CHOCOLATE LIQUID] 03782 mL 0     Sig: DRINK 1 BOTTLE BY MOUTH TWICE DAILY       There is no refill protocol information for this order

## 2021-05-28 NOTE — TELEPHONE ENCOUNTER
Okay for physical therapy and Occupational Therapy to evaluate and treat.  Okay for skilled nurse visits with attention to wound care and pain control.  Discontinue Mitosol 1.  Discontinue guaifenesin.  Discontinue cyanocobalamin.  Is he going to a pain clinic?  Where he did the fentanyl patches come from?

## 2021-05-28 NOTE — TELEPHONE ENCOUNTER
Request for Orders    Who s Requesting: Home Care Registered Nurse    Orders being requested:   Skilled nursing for general CP assessment, GI/, nutrition/hydration, pain, wound cares, s/sx of infection  2w1, 3w1, 4w1, 3PRN for any issues/concerns with wounds    Physical Therapy to EVAL and Treat emphasis on strength and endurance    Occupational Therapy to EVAL and treat emphasis on ADLs and home safety    Where to send Orders: Epic    Medication reviewed at SOC:  Patient is also the following medication that are not listed in med profile. Please review:  1. B Complex; Take 1 capsule PO daily started by Garfield Muñoz MD  2. Duloxetine 20mg; Take 1 cap PO daily  Started by Garfield Muñoz MD  3. Fentanyl 12mcg/hr; Apply 1 patch to skin every 3 days.  4. Ferrous Sulfate 1 tab daily  5. Finasteride 5mg; Take 5mg PO daily  6. Phosphate enema 7-19 gm/118ml; Take 1 dose PRN for constipation    Medications on med profile that he is not takin. Cyanocobalamin 1,000mcg  2. Guaifenesin  3. Medazolam (versed)    Please review and up date med list as needed. Let me know so I can follow up with patient as well.   Thank you    Casandra Contreras, MEI  [facility]  [phone]

## 2021-05-28 NOTE — PROGRESS NOTES
[facility] Care Coordination Contact    Emailed [facility] to verify if will have nurse visits to assist with wound care as stated per member.  Feels he would benefit from nurse visits to help manage since it has been ongoing for some time now.  Request that let me know if I can put in a referral for homecare if it hasn t been initiated yet and reference the vascular doctor for orders to start home care.      Ruddy Alexander RN  [facility]  [phone]

## 2021-05-28 NOTE — PROGRESS NOTES
[facility] Care Coordination Contact  [facility] Home Visit Assessment     Home visit for Health Risk Assessment with Jef Centeno Jr. completed on 4/22/2019    Type of residence:: [facility]  Current living arrangement:: I live alone     Assessment completed with:: Patient    Current Care Plan  Member currently receiving the following home care services:  None   Member currently receiving the following community resources: Meals on Wheels, Transportation Services, County Programs, Housekeeping/Chore Agency, PCA      Medication Review  Medication reconciliation completed in Epic: YES  Medication set-up & administration: Independent-does not set up  Self-administers medications  Medication Risk Assessment Medication (1 or more, place referral to MTM):  Recent falls within past year  MTM Referral Placed: No: No    Mental/Behavioral Health   Depression Screening: See PHQ assessment flowsheet.   Mental health DX:: No      Mental Health Diagnosis: No  Mental Health Services: None: No further intervention needed at this time.    Falls Assessment:       Any fall with injury in the past year?: No     ADL/IADL Dependencies:   Dependent ADLs:: Bathing, Dressing, Grooming, Incontinence, Wheelchair-with assist, Toileting  Dependent IADLs:: Cleaning, Cooking, Laundry, Shopping, Meal Preparation, Transportation, Incontinence    [facility] Plan sponsored benefits: Shared information re: Silver Sneakers/gym memberships, ASA, Calcium +D.    PCA Assessment completed at visit: Yes    Elderly Waiver Eligibility: Yes - will continue on EW    Care Plan & Recommendations: Mr. Centeno had over 10 visits to the ER in the last year. We talked about his frequent visits to ER which he states that he goes to ER due do pain.  He runs out of oxycodone and due to pain must go there so he can get medication to relieve his pain. He agrees that he needs to have better pain control and will address concern with PCP.   Gave him urgent  care information per his needs.  He reports that he is concern about his wounds and feels that would benefit for a nurse to come to home to check as this has been issue for past several years.  Will make referral for SNV's.  Mr. Centeno currently has PCA, homemaking, and PERS in placed.  Will continue with services.  See [facility] for detailed assessment information.    Follow-Up Plan: Member informed of future contact, plan to f/u with member with a 6 month telephone assessment.  Contact information shared with member and family, encouraged member to call with any questions or concerns at any time.    [facility] care continuum providers: Please refer to Health Care Home on the Epic Problem List to view this patient's [facility] Care Plan Summary.    Ruddy Alexander RN  [facility]  [phone]

## 2021-05-28 NOTE — TELEPHONE ENCOUNTER
Dr. Crabtree,  When would you like the patient to get a colonoscopy?  Please advise.  Thank you.  Geovanna PRICE CMA/MIA....................3:59 PM

## 2021-05-28 NOTE — TELEPHONE ENCOUNTER
I do not use fentanyl patches, so if he has them, it was not written by me.  He has been on narcotics now for many years and he is very hesitant to get off.

## 2021-05-28 NOTE — TELEPHONE ENCOUNTER
Controlled Substance Refill Request  Medication Name:   Requested Prescriptions     Pending Prescriptions Disp Refills     oxyCODONE (ROXICODONE) 30 MG immediate release tablet 90 tablet 0     Sig: Take 1 tablet (30 mg total) by mouth 3 (three) times a day.     Date Last Fill:4/15/2019  Pharmacy: : [id number] - [place], [place]       Submit electronically to pharmacy  Controlled Substance Agreement Date Scanned:   Encounter-Level CSA Scan Date:    There are no encounter-level csa scan date.       Last office visit with prescriber/PCP: 5/8/2019 Geoff Crabtree MD OR same dept: 5/8/2019 Geoff Crabtree MD OR same specialty: 5/8/2019 Geoff Crabtree MD  Last physical: Visit date not found Last MTM visit: Visit date not found

## 2021-05-28 NOTE — TELEPHONE ENCOUNTER
Who is calling:  The patient     Reason for Call:  The patient is calling because he states that he discussed getting a colonoscopy with PCP.  He hasn't heard anything yet about it and it looks like he needs a referral possibly regarding this.  The chart shows he isn't due until August 2019.  Please call the patient and discuss.    Okay to leave a detailed message: Yes

## 2021-05-28 NOTE — TELEPHONE ENCOUNTER
I don't think he is being seen at pain clinic. He reports he is taking the patches but I did not see this in the home. He had given me a medication list from  dated 4/11 and states he has yet to  medications at pharmacy. It looks like fentaly script is an old script?. He may be mistaking fentyl with lidocaine patches. I've asked several times and he was sure he is on fentyl. I've instructed him during my home visit to make appt with you to f/u on meds . He does not want nurses to set up his medications.

## 2021-05-28 NOTE — PROGRESS NOTES
[facility]:  [facility] PCA [facility].  Faxed copy of PCA assessment to PCA Agency and mailed copy to member.  Faxed MD Communication to PCP.     Juan Carlos Tavarez  Care Management Specialist  [facility]  [phone]

## 2021-05-28 NOTE — TELEPHONE ENCOUNTER
Refill Approved    Rx renewed per Medication Renewal Policy. Medication was last renewed on 2/11/19.    Keyanna Tenorio, [facility] Triage/Med Refill 5/9/2019     Requested Prescriptions   Pending Prescriptions Disp Refills     gabapentin (NEURONTIN) 300 MG capsule [Pharmacy Med Name: GABAPENTIN 300MG CAPSULES] 180 capsule 0     Sig: TAKE 1 CAPSULE BY MOUTH TWICE DAILY       Gabapentin/Levetiracetam/Tiagabine Refill Protocol  Passed - 5/9/2019  5:06 AM        Passed - PCP or prescribing provider visit in past 12 months or next 3 months     Last office visit with prescriber/PCP: 5/8/2019 Geoff Crabtree MD OR same dept: 5/8/2019 Geoff Crabtree MD OR same specialty: 5/8/2019 Geoff Crabtree MD  Last physical: Visit date not found Last MTM visit: Visit date not found   Next visit within 3 mo: Visit date not found  Next physical within 3 mo: Visit date not found  Prescriber OR PCP: Geoff Crabtree MD  Last diagnosis associated with med order: 1. Neck pain  - gabapentin (NEURONTIN) 300 MG capsule [Pharmacy Med Name: GABAPENTIN 300MG CAPSULES]; TAKE 1 CAPSULE BY MOUTH TWICE DAILY  Dispense: 180 capsule; Refill: 0    2. Postherpetic neuralgia  - lidocaine (LIDODERM) 5 % [Pharmacy Med Name: LIDOCAINE 5% PATCH]; REMOVE AND DISCARD PATCH WITHIN 12 HOURS OR AS DIRECTED BY MD  Dispense: 30 patch; Refill: 0    If protocol passes may refill for 12 months if within 3 months of last provider visit (or a total of 15 months).             lidocaine (LIDODERM) 5 % [Pharmacy Med Name: LIDOCAINE 5% PATCH] 30 patch 0     Sig: REMOVE AND DISCARD PATCH WITHIN 12 HOURS OR AS DIRECTED BY MD       There is no refill protocol information for this order

## 2021-05-28 NOTE — TELEPHONE ENCOUNTER
----- Message from Carito Sutherland CNP sent at 4/22/2019  6:13 PM CDT -----  Culture indicates that he does not need to take a different antibiotic

## 2021-05-29 NOTE — PROGRESS NOTES
[facility]        Visit Type: H & P (Distal left tib fib fracture after a fall)    Code Status:  DNR  Facility:  [facility] [[id number]]          PCP: Geoff Crabtree MD       PHONE: [phone]     FAX:[phone]        ASSESSMENT/PLAN:  1. Tibia/fibula fracture, left, closed, with delayed healing, subsequent encounter   continue APAP and Roxicodone.  Continue PT/OT.  Ortho follow-up.  Since the patient is on prednisone chronically, likelihood of osteoporosis is high.  Will start Fosamax 75 mg q. other week for a total of 3 years- 5 years.  Will check vitamin D and TSH levels.  Discontinue daily cholecalciferol since the patient is on weekly vitamin D.  We will also start aspirin 325 mg daily for 30 days for DVT prophylaxis   2. Type 2 diabetes mellitus with hyperglycemia, without long-term current use of insulin (H)   will decrease blood sugar checks to once a day, alternating a.m. and p.m.  Most recent hemoglobin A1c is 5.2, blood sugar levels stable with range 114-133   3. Lumbar stenosis with neurogenic claudication   as above, patient is on long-term narcotics.  Will defer to primary MD regarding narcotics taper   4. Panlobular emphysema (H)   continue nebulizers, will discontinue levalbuterol, Pulmicort due to redundancy.  A very long discussion was had with the patient regarding smoking cessation   5. Electrolyte imbalance   from the hospital, potassium was 3.1 on admit.  Now on potassium supplement since patient is on diuretic.  Will recheck BMP   6. Essential hypertension   stable on metoprolol/Lasix   7. Microcytic anemia   hemoglobin is low at 9.4, this is chronic but will recheck hemogram to trend   8. ALS (amyotrophic lateral sclerosis) (H)   stable         HISTORY OF PRESENT ILLNESS:   Jef Centeno Jr. is a 68 y.o. male with a history of ALS, CAD, spinal stenosis of the lumbar spine with chronic low back pain, chronic microcytic  anemia, COPD, and type 2 diabetes who lives in assisted living facility and fell 4 days ago and twisted his ankle.  Since then he complained of left leg pain and eventually was admitted for distal left tib-fib fracture.  He was seen by orthopedics and his left leg was placed in a cast.  He was then admitted to our facility for strengthening and rehab.  He will follow-up with orthopedics as an outpatient.    Currently, the patient states that he is doing much better although there are still episodes of stabbing pain on his left leg especially with movement.  He wants to make sure that he gets his narcotics on a timely basis to prevent onset of pain.  He states that he has been taking Roxicodone 30 mg 3 times daily for very long time and does not want this changed.  His appetite is good and he sleeps fairly well.  He denies any abdominal pain, difficulty in solving, nausea or vomiting.  He has occasional constipation.  He denies any fevers or chills, cough, chest pains or shortness of breath.  He denies any palpitations, dizziness or lightheadedness.      Other review of systems are negative.  He is wheelchair-bound.      PAST MEDICAL/SURGICAL HISTORY:  Past Medical History:   Diagnosis Date     Acute low back pain      ALS (amyotrophic lateral sclerosis) (H)      Asthma      BPH (benign prostatic hyperplasia)      CAD (coronary artery disease)      Closed fracture of tibia and fibula, left, initial encounter      COPD (chronic obstructive pulmonary disease) (H)      Decubitus ulcer, buttock      Emphysema lung (H)      Encephalopathy      Erectile dysfunction associated with type 2 diabetes mellitus (H)      HTN (hypertension)      Obstructive sleep apnea      Osteoarthritis      Postherpetic neuralgia      Spinal stenosis, cervical region      Type 2 diabetes mellitus with diabetic neuropathy (H)      Past Surgical History:   Procedure Laterality Date     CHOLECYSTECTOMY       LAPAROSCOPIC GASTROTOMY W/ REPAIR OF  ULCER       POSTERIOR FUSION CERVICAL SPINE      posterior fusion C2-C4 with laminnectomy; excision cervical lipoma      SIGMOIDOSCOPY  06/29/2008       SOCIAL HISTORY:  Social History     Socioeconomic History     Marital status:      Spouse name: Not on file     Number of children: Not on file     Years of education: Not on file     Highest education level: Not on file   Occupational History     Not on file   Social Needs     Financial resource strain: Not on file     Food insecurity:     Worry: Not on file     Inability: Not on file     Transportation needs:     Medical: Not on file     Non-medical: Not on file   Tobacco Use     Smoking status: Current Every Day Smoker     Packs/day: 0.25     Years: 46.00     Pack years: 11.50     Types: Cigarettes     Smokeless tobacco: Never Used     Tobacco comment: currently attempting to quit   Substance and Sexual Activity     Alcohol use: No     Comment: Quit drinking in 2014. Prior to that he drank excessively.     Drug use: No     Sexual activity: Not on file   Lifestyle     Physical activity:     Days per week: Not on file     Minutes per session: Not on file     Stress: Not on file   Relationships     Social connections:     Talks on phone: Not on file     Gets together: Not on file     Attends Oriental orthodox service: Not on file     Active member of club or organization: Not on file     Attends meetings of clubs or organizations: Not on file     Relationship status: Not on file     Intimate partner violence:     Fear of current or ex partner: Not on file     Emotionally abused: Not on file     Physically abused: Not on file     Forced sexual activity: Not on file   Other Topics Concern     Not on file   Social History Narrative    Patient is , 5 children by previous wife are alive and well. Patient has social security disability.       FAMILY HISTORY:  Family History   Problem Relation Age of Onset     Diabetes Mother      Diabetes Father      Cancer Father       Diabetes Sister      Diabetes Brother        MEDICATIONS:  Current Outpatient Medications on File Prior to Visit   Medication Sig     acetaminophen (TYLENOL) 500 MG tablet Take 1,000 mg by mouth 3 (three) times a day.     albuterol (PROAIR HFA;PROVENTIL HFA;VENTOLIN HFA) 90 mcg/actuation inhaler Inhale 2 puffs every 4 (four) hours as needed for wheezing.     albuterol (PROVENTIL) 2.5 mg /3 mL (0.083 %) nebulizer solution Take 2.5 mg by nebulization every 4 (four) hours as needed for shortness of breath.      b complex vitamins tablet Take 1 tablet by mouth daily.     baclofen (LIORESAL) 10 MG tablet TAKE 1/2 TABLET(5 MG) BY MOUTH THREE TIMES DAILY (Patient taking differently: TAKE 1 TABLET(10 MG) BY MOUTH THREE TIMES DAILY)     budesonide (PULMICORT) 1 mg/2 mL nebulizer solution Take 1 mg by nebulization 2 (two) times a day.     budesonide-formoterol (SYMBICORT) 160-4.5 mcg/actuation inhaler INHALE 2 PUFFS BY MOUTH TWICE DAILY     calcium carbonate-vitamin D3 (CALTRATE 600 PLUS D3) 600 mg(1,500mg) -400 unit per tablet TAKE 1 TABLET BY MOUTH DAILY     cetirizine (ZYRTEC) 10 MG tablet TAKE 1 TABLET(10 MG) BY MOUTH DAILY     cholecalciferol, vitamin D3, 1,000 unit tablet Take 4,000 Units by mouth daily.            docusate sodium (COLACE) 100 MG capsule Take 100 mg by mouth as needed for constipation.            [START ON 6/17/2019] DULoxetine (CYMBALTA) 20 MG capsule Take 60 mg by mouth daily.            ergocalciferol (ERGOCALCIFEROL) 50,000 unit capsule Take 50,000 Units by mouth once a week.     ferrous sulfate 325 (65 FE) MG tablet Take 1 tablet by mouth daily with breakfast.     folic acid (FOLVITE) 1 MG tablet TAKE 1 TABLET BY MOUTH EVERY DAY     furosemide (LASIX) 20 MG tablet Take 40 mg by mouth 2 (two) times a day.            gabapentin (NEURONTIN) 300 MG capsule TAKE 1 CAPSULE BY MOUTH TWICE DAILY     hydrOXYzine HCl (ATARAX) 25 MG tablet Take 12.5 mg by mouth 3 (three) times a day.      ipratropium-albuterol (DUO-NEB) 0.5-2.5 mg/3 mL nebulizer Inhale 3 mL every 4 (four) hours as needed.            levalbuterol (XOPENEX HFA) 45 mcg/actuation inhaler INHALE 2 PUFFS BY MOUTH EVERY 4 HOURS AS NEEDED FOR WHEEZING (Patient taking differently: INHALE 1-2 PUFFS BY MOUTH EVERY 4 HOURS AS NEEDED FOR WHEEZING)     lidocaine (LIDODERM) 5 % REMOVE AND DISCARD PATCH WITHIN 12 HOURS OR AS DIRECTED BY MD     LYRICA 75 mg capsule TAKE 1 CAPSULE(75 MG) BY MOUTH THREE TIMES DAILY (Patient taking differently: TAKE 1 CAPSULE(75 MG) BY MOUTH TWO TIMES DAILY)     meloxicam (MOBIC) 7.5 MG tablet TAKE 1 TABLET(7.5 MG) BY MOUTH DAILY (Patient taking differently: TAKE 1 TABLET(7.5 MG) BY MOUTH DAILY AS NEEDED)     metFORMIN (GLUCOPHAGE) 1000 MG tablet TAKE 1 TABLET BY MOUTH TWICE DAILY (Patient taking differently: TAKE 1/2 TABLET (500 MG) BY MOUTH DAILY WITH BREAKFAST)     metoprolol tartrate (LOPRESSOR) 25 MG tablet Take 1 tablet (25 mg total) by mouth 2 (two) times a day.     naloxone (NARCAN) 4 mg/actuation nasal spray 1 spray (4 mg dose) into one nostril for opioid reversal. Call 911. May repeat if no response in 3 minutes.     omeprazole (PRILOSEC) 20 MG capsule Take 20 mg by mouth daily before breakfast.      oxyCODONE (ROXICODONE) 30 MG immediate release tablet Take 1 tablet (30 mg total) by mouth 3 (three) times a day.     potassium chloride (K-DUR,KLOR-CON) 20 MEQ tablet TAKE 1 TABLET(20 MEQ) BY MOUTH TWICE DAILY     predniSONE (DELTASONE) 5 MG tablet Take 5 mg by mouth daily.     riluzole (RILUTEK) 50 mg tablet TAKE 1 TABLET BY MOUTH TWO TIMES A DAY BEFORE MEALS     [] sodium phosphates 133 mL (FOR FLEET) 19-7 gram/118 mL Enem rectal enema Insert 1 enema into the rectum once for 1 dose.     tamsulosin (FLOMAX) 0.4 mg Cp24 Take 1 capsule (0.4 mg total) by mouth Daily after breakfast.     tiotropium (SPIRIVA) 18 mcg inhalation capsule Place 18 mcg into inhaler and inhale daily.     No current  facility-administered medications on file prior to visit.        ALLERGIES:  No Known Allergies      PHYSICAL EXAMINATION:  Vital signs 116/69, 98.1, 93, 150 pounds, 18, 94% room air, wheelchair-bound  General: Awake, Alert, oriented x3, not in any form of acute distress, answers questions appropriately, follows simple commands, conversant thin  HEENT: Slightly pale conjunctiva, anicteric sclerae, oral mucosa is moist  NECK: Supple, without any lymphadenopathy, thyromegaly or any masses  LUNG: Creased breath sounds with poor chest expansion. There are no crackles, no wheezes, normal AP diameter  BACK: No kyphosis of the thoracic spine  CVS: There is good S1  S2, there are no murmurs, no heaves, rhythm is regular  ABDOMEN: Globular and soft, nontender to palpation, no organomegaly, good bowel sounds  EXTREMITIES: Good range of motion on both upper and lower extremities except on the left lower extremity which is in a cast but able to move toes with good capillary refill, no pedal edema, stasis dermatitis changes bilateral. no cyanosis or clubbing, no calf tenderness  SKIN: Warm and dry, no rashes, sacral decubitus ulcer noted    LABS: Hospital labs and imaging reviewed          >45 minutes of total time spent, greater than 55% of the time spent in coordination of care and counseling regarding the above medical issues and plan of care including a long discussion with the patient regarding smoking cessation, multiple questions answered. I have reviewed the patient's medical records, labs and medications.       Electronically signed by:Evie Lo MD

## 2021-05-29 NOTE — PROGRESS NOTES
[facility]      Facility:    [facility] [[id number]]  Code Status: DNR      Chief Complaint/Reason for Visit:  Chief Complaint   Patient presents with     Follow Up       HPI:   Jef is a 68 y.o. male who was seen today for TCU follow up visit.  He has a past medical history for HTN, BPH, DM2, ALS, CAD, chronic low back pain, COPD, spinal cervical stenosis. He was recently hospitalized at [facility] 6/5/2019 to 6/9/2019.  He had a fall at his [employer] 4-5 days prior in which he became wedged in between his stove and cabinets.  He is typically in a wheelchair and does receive PCA hours.  AFter the fall the pain of his left leg progressively got worse and he was seen in the ER and found to have a closed Tibia/fibula fracture.  Orthopedics was consulted and he was put in a soft cast.  He was found to have a stage II coccyx wound.  His discharging hgb was 8.7 which was down from baseline of 11.1.  He was discharged to TCU.    CBC was drawn yesterday and showed a hemoglobin of 8.1.  Redraw today continues to be 8.1.  He denies any symptoms of anemia of headaches, dizziness or increased fatigue.  He reports his pain is well managed when he is getting the Roxicodone unscheduled times along with the hydroxyzine.  He reports that he has an allergy to Tylenol and has been refusing to take his scheduled Tylenol.  He also is using a lidocaine patch, cut and gabapentin at this time. most of his pain complaints are chronic related to his ALS.  His left lower extremity remains in a soft cast he has good CMS and denies any symptoms of DVTs.  He will follow-up with orthopedics next Tuesday.    His blood sugars have been checked once a day on various times and they have all been stable.    His lungs sound better today clear and no adventitious lung sounds.  He denies any shortness of breath.  He denies any bleeding or bleeding issues with increased bruising after restarting aspirin 325 mg  daily.    Past Medical History:  Past Medical History:   Diagnosis Date     Acute low back pain      ALS (amyotrophic lateral sclerosis) (H)      Asthma      BPH (benign prostatic hyperplasia)      CAD (coronary artery disease)      Closed fracture of tibia and fibula, left, initial encounter      COPD (chronic obstructive pulmonary disease) (H)      Decubitus ulcer, buttock      Emphysema lung (H)      Encephalopathy      Erectile dysfunction associated with type 2 diabetes mellitus (H)      HTN (hypertension)      Obstructive sleep apnea      Osteoarthritis      Postherpetic neuralgia      Spinal stenosis, cervical region      Type 2 diabetes mellitus with diabetic neuropathy (H)            Surgical History:  Past Surgical History:   Procedure Laterality Date     CHOLECYSTECTOMY       LAPAROSCOPIC GASTROTOMY W/ REPAIR OF ULCER       POSTERIOR FUSION CERVICAL SPINE      posterior fusion C2-C4 with laminnectomy; excision cervical lipoma      SIGMOIDOSCOPY  06/29/2008       Family History:   Family History   Problem Relation Age of Onset     Diabetes Mother      Diabetes Father      Cancer Father      Diabetes Sister      Diabetes Brother        Social History:    Social History     Socioeconomic History     Marital status:      Spouse name: Not on file     Number of children: Not on file     Years of education: Not on file     Highest education level: Not on file   Occupational History     Not on file   Social Needs     Financial resource strain: Not on file     Food insecurity:     Worry: Not on file     Inability: Not on file     Transportation needs:     Medical: Not on file     Non-medical: Not on file   Tobacco Use     Smoking status: Current Every Day Smoker     Packs/day: 0.25     Years: 46.00     Pack years: 11.50     Types: Cigarettes     Smokeless tobacco: Never Used     Tobacco comment: currently attempting to quit   Substance and Sexual Activity     Alcohol use: No     Comment: Quit drinking in  2014. Prior to that he drank excessively.     Drug use: No     Sexual activity: Not on file   Lifestyle     Physical activity:     Days per week: Not on file     Minutes per session: Not on file     Stress: Not on file   Relationships     Social connections:     Talks on phone: Not on file     Gets together: Not on file     Attends Evangelical service: Not on file     Active member of club or organization: Not on file     Attends meetings of clubs or organizations: Not on file     Relationship status: Not on file     Intimate partner violence:     Fear of current or ex partner: Not on file     Emotionally abused: Not on file     Physically abused: Not on file     Forced sexual activity: Not on file   Other Topics Concern     Not on file   Social History Narrative    Patient is , 5 children by previous wife are alive and well. Patient has social security disability.          Review of Systems   Patient denies fever, chills, headache, lightheadedness, dizziness, rhinorrhea, cough, congestion, shortness of breath, chest pain, palpitations, abdominal pain, n/v, diarrhea, constipation, change in appetite, dysuria, frequency, burning or pain with urination.  Other than stated in HPI all other review of systems is negative.             Physical Exam   Vital signs: /57, heart rate 79, respiratory 12, temp 97.5.  GENERAL APPEARANCE: thin, frail elderly male in no acute distress.  HEENT: normocephalic, atraumatic  PERRL, sclerae anicteric, conjunctivae clear and moist, EOM intact  LUNGS: CTA, no adventitious lung sounds, respiratory effort normal.  CARD: RRR, S1, S2, without murmurs, gallops, rubs,   ABD: Soft and nontender with normal bowel sounds.   EXTREMITIES: LLE in cast, no edema of toes, no edema of right LE, CMS good.  No s/s of DVT.   NEURO: Alert and oriented x3, normal affect.  Face is symmetric.  SKIN: Inspection of the skin reveals no rashes, ulcerations or petechiae.  PSYCH:  euthymic          Medication List:  Current Outpatient Medications   Medication Sig     acetaminophen (TYLENOL) 500 MG tablet Take 1,000 mg by mouth 3 (three) times a day.     albuterol (PROAIR HFA;PROVENTIL HFA;VENTOLIN HFA) 90 mcg/actuation inhaler Inhale 2 puffs every 4 (four) hours as needed for wheezing.     albuterol (PROVENTIL) 2.5 mg /3 mL (0.083 %) nebulizer solution Take 2.5 mg by nebulization every 4 (four) hours as needed for shortness of breath.      b complex vitamins tablet Take 1 tablet by mouth daily.     baclofen (LIORESAL) 10 MG tablet TAKE 1/2 TABLET(5 MG) BY MOUTH THREE TIMES DAILY (Patient taking differently: TAKE 1 TABLET(10 MG) BY MOUTH THREE TIMES DAILY)     budesonide (PULMICORT) 1 mg/2 mL nebulizer solution Take 1 mg by nebulization 2 (two) times a day.     budesonide-formoterol (SYMBICORT) 160-4.5 mcg/actuation inhaler INHALE 2 PUFFS BY MOUTH TWICE DAILY     calcium carbonate-vitamin D3 (CALTRATE 600 PLUS D3) 600 mg(1,500mg) -400 unit per tablet TAKE 1 TABLET BY MOUTH DAILY     cetirizine (ZYRTEC) 10 MG tablet TAKE 1 TABLET(10 MG) BY MOUTH DAILY     cholecalciferol, vitamin D3, 1,000 unit tablet Take 4,000 Units by mouth daily.            docusate sodium (COLACE) 100 MG capsule Take 100 mg by mouth as needed for constipation.            [START ON 6/17/2019] DULoxetine (CYMBALTA) 20 MG capsule Take 60 mg by mouth daily.            ergocalciferol (ERGOCALCIFEROL) 50,000 unit capsule Take 50,000 Units by mouth once a week.     ferrous sulfate 325 (65 FE) MG tablet Take 1 tablet by mouth daily with breakfast.     folic acid (FOLVITE) 1 MG tablet TAKE 1 TABLET BY MOUTH EVERY DAY     furosemide (LASIX) 20 MG tablet Take 40 mg by mouth 2 (two) times a day.            gabapentin (NEURONTIN) 300 MG capsule TAKE 1 CAPSULE BY MOUTH TWICE DAILY     hydrOXYzine HCl (ATARAX) 25 MG tablet Take 12.5 mg by mouth 3 (three) times a day.     ipratropium-albuterol (DUO-NEB) 0.5-2.5 mg/3 mL nebulizer Inhale 3  mL every 4 (four) hours as needed.            levalbuterol (XOPENEX HFA) 45 mcg/actuation inhaler INHALE 2 PUFFS BY MOUTH EVERY 4 HOURS AS NEEDED FOR WHEEZING (Patient taking differently: INHALE 1-2 PUFFS BY MOUTH EVERY 4 HOURS AS NEEDED FOR WHEEZING)     lidocaine (LIDODERM) 5 % REMOVE AND DISCARD PATCH WITHIN 12 HOURS OR AS DIRECTED BY MD     LYRICA 75 mg capsule TAKE 1 CAPSULE(75 MG) BY MOUTH THREE TIMES DAILY (Patient taking differently: TAKE 1 CAPSULE(75 MG) BY MOUTH TWO TIMES DAILY)     meloxicam (MOBIC) 7.5 MG tablet TAKE 1 TABLET(7.5 MG) BY MOUTH DAILY (Patient taking differently: TAKE 1 TABLET(7.5 MG) BY MOUTH DAILY AS NEEDED)     metFORMIN (GLUCOPHAGE) 1000 MG tablet TAKE 1 TABLET BY MOUTH TWICE DAILY (Patient taking differently: TAKE 1/2 TABLET (500 MG) BY MOUTH DAILY WITH BREAKFAST)     metoprolol tartrate (LOPRESSOR) 25 MG tablet Take 1 tablet (25 mg total) by mouth 2 (two) times a day.     naloxone (NARCAN) 4 mg/actuation nasal spray 1 spray (4 mg dose) into one nostril for opioid reversal. Call 911. May repeat if no response in 3 minutes.     omeprazole (PRILOSEC) 20 MG capsule Take 20 mg by mouth daily before breakfast.      oxyCODONE (ROXICODONE) 30 MG immediate release tablet Take 1 tablet (30 mg total) by mouth 3 (three) times a day.     potassium chloride (K-DUR,KLOR-CON) 20 MEQ tablet TAKE 1 TABLET(20 MEQ) BY MOUTH TWICE DAILY     predniSONE (DELTASONE) 5 MG tablet Take 5 mg by mouth daily.     riluzole (RILUTEK) 50 mg tablet TAKE 1 TABLET BY MOUTH TWO TIMES A DAY BEFORE MEALS     tamsulosin (FLOMAX) 0.4 mg Cp24 Take 1 capsule (0.4 mg total) by mouth Daily after breakfast.     tiotropium (SPIRIVA) 18 mcg inhalation capsule Place 18 mcg into inhaler and inhale daily.       Labs:  Recent Results (from the past 240 hour(s))   Basic Metabolic Panel   Result Value Ref Range    Sodium 135 (L) 136 - 145 mmol/L    Potassium 3.6 3.5 - 5.0 mmol/L    Chloride 97 (L) 98 - 107 mmol/L    CO2 33 (H) 22 - 31  mmol/L    Anion Gap, Calculation 5 5 - 18 mmol/L    Glucose 158 (H) 70 - 125 mg/dL    Calcium 8.7 8.5 - 10.5 mg/dL    BUN 9 8 - 22 mg/dL    Creatinine 0.65 (L) 0.70 - 1.30 mg/dL    GFR MDRD Af Amer >60 >60 mL/min/1.73m2    GFR MDRD Non Af Amer >60 >60 mL/min/1.73m2   HM1 (CBC with Diff)   Result Value Ref Range    WBC 7.1 4.0 - 11.0 thou/uL    RBC 3.57 (L) 4.40 - 6.20 mill/uL    Hemoglobin 8.1 (L) 14.0 - 18.0 g/dL    Hematocrit 28.1 (L) 40.0 - 54.0 %    MCV 79 (L) 80 - 100 fL    MCH 22.7 (L) 27.0 - 34.0 pg    MCHC 28.8 (L) 32.0 - 36.0 g/dL    RDW 17.1 (H) 11.0 - 14.5 %    Platelets 441 (H) 140 - 440 thou/uL    MPV 9.3 8.5 - 12.5 fL    Neutrophils % 62 50 - 70 %    Lymphocytes % 20 20 - 40 %    Monocytes % 11 (H) 2 - 10 %    Eosinophils % 7 (H) 0 - 6 %    Basophils % 1 0 - 2 %    Neutrophils Absolute 4.4 2.0 - 7.7 thou/uL    Lymphocytes Absolute 1.4 0.8 - 4.4 thou/uL    Monocytes Absolute 0.8 0.0 - 0.9 thou/uL    Eosinophils Absolute 0.5 (H) 0.0 - 0.4 thou/uL    Basophils Absolute 0.1 0.0 - 0.2 thou/uL   Morphology,Smear Review (MORP)   Result Value Ref Range    Pathology, Smear Review See Separate Pathology Report (!) (none)    WBC 7.1 4.0 - 11.0 thou/uL    RBC 3.57 (L) 4.40 - 6.20 mill/uL    Hemoglobin 8.1 (L) 14.0 - 18.0 g/dL    Hematocrit 28.1 (L) 40.0 - 54.0 %    MCV 79 (L) 80 - 100 fL    MCH 22.7 (L) 27.0 - 34.0 pg    MCHC 28.8 (L) 32.0 - 36.0 g/dL    RDW 17.1 (H) 11.0 - 14.5 %    Platelets 441 (H) 140 - 440 thou/uL    MPV 9.3 8.5 - 12.5 fL    Neutrophils % 62 50 - 70 %    Lymphocytes % 20 20 - 40 %    Monocytes % 11 (H) 2 - 10 %    Eosinophils % 7 (H) 0 - 6 %    Basophils % 1 0 - 2 %    Neutrophils Absolute 4.4 2.0 - 7.7 thou/uL    Lymphocytes Absolute 1.4 0.8 - 4.4 thou/uL    Monocytes Absolute 0.8 0.0 - 0.9 thou/uL    Eosinophils Absolute 0.5 (H) 0.0 - 0.4 thou/uL    Basophils Absolute 0.1 0.0 - 0.2 thou/uL   Manual Differential   Result Value Ref Range    Platelet Estimate Increased (!) Normal     Polychromasia 1+ (!) Negative         Assessment:    ICD-10-CM    1. Tibia/fibula fracture, left, closed, with delayed healing, subsequent encounter S82.202G     S82.402G    2. Lumbar stenosis with neurogenic claudication M48.062    3. ALS (amyotrophic lateral sclerosis) (H) G12.21    4. Panlobular emphysema (H) J43.1    5. Microcytic anemia D50.9    6. Chronic pain syndrome G89.4        Plan:    Tib-fib fracture: Stable continue with therapies and follow-up with orthopedics next Tuesday.  Tinea with Fosamax 70 mg every other week.    Lumbar stenosis: Stable continue with previous home pain medications of meloxicam 7.5 mg, gabapentin, Lyrica    ALS: Continue with prednisone 5 mg daily.    Emphysema, continue with Symbicort, as needed DuoNeb's, PRN albuterol nebs, Spiriva, and albuterol inhaler.    Microcytic anemia: Peripheral smear to be done, will recheck hemoglobin next Monday, continue with ferrous sulfate 325 mg and folic acid 1 mg daily.    Chronic pain: Continue with Roxicodone 30 mg 3 times a day and does this with hydroxyzine 12.5 mg 3 times a day.  Discontinue acetaminophen and add as an allergy.          Electronically signed by: Marguerite Mcmahan CNP

## 2021-05-29 NOTE — TELEPHONE ENCOUNTER
If there are no open wounds, and he can bear weight, then I doubt he needs to be seen.  If he cannot put any weight on it, should be seen for an exam and x-ray.

## 2021-05-29 NOTE — PROGRESS NOTES
[facility] Care Coordination Contact    5/31/19   Email from Genevieve wound care nurse reports that member hasn't had PCA services for last two weeks.  Request for assistance to look into PCA services.      6/3/19  LVM for Maricel and Cameron to verify if has provided PCA services per member report has not had PCA services in the last two weeks.      6/4/19  Phone call from Maricel Trejo reports that check with PCA and was informed that her car broke down and was not able to see member in the last two weeks.  She stated that she did see member on 6/1/19 and noted some confusion.  Maya reports that she find another PCA to fill until regular PCA is able to fix car and return.      LVM for [facility] Johnston to verify PCA services for member.     VM from [facility] Johnston reports that hasn't had PCA through their agency in the last two weeks.  They are having difficulty staffing in member area and continues to recruit but unable to staff at this time.      Phone call to Mr. Centeno to discuss situation, he reports that has been contacted by [employer] and will sent out female PCA tomorrow. He reports that prefers male staff for personal cares.  Discuss [facility] not having staff due to location.  Discussed options of looking for another agency for staffing, having family member be PCA,  or possibility of just using [employer] if they do have staffing availability. He will consider and let care coordinator know in a few days.     Phone call from Maricel Trejo states that found PCA, Armand who will start to see him tomorrow.  She reports that will likely go forward with Armand being the PCA if works out with Mr. Centeno.  Discuss availability of staff to provide full 5.5hrs/day since [employer] is not able to staff at this time.  She reports that does have PCA staff looking for more hours so likely possibility.  Will follow up with Mr. Centeno in a few days to see what what he prefers once he meets this  PCA.      Ruddy Alexander RN  [facility]  [phone]

## 2021-05-29 NOTE — TELEPHONE ENCOUNTER
Marco Dawkins for orders requested below?  Please advise.  Thank you.  Geovanna PRICE, TIAN/CMT....................3:44 PM

## 2021-05-29 NOTE — PROGRESS NOTES
[facility] Care Coordination Contac     Phone call from [facility] OT to discuss eval for electric wheelchair to ensure that purpose of eval before discharging.  Request that she contact [employer] to verify and go forward with order.  Request that vendor contact care coordinator if needs other information.     Ruddy Alexander RN  [facility]  [phone]       Phone call to Genevieve [facility] Care Manager and discuss member's request for new electric wheelchair. Per supplier, will need OT eval to proceed.  Genevieve will discuss with member  and request for PCP order for OT eval for wheelchair.      Ruddy Alexander RN  [facility]  [phone]    19  Phone call from Karen [facility] OT to discuss process for electric wheelchair.  She reports that certification for homecare involvement will  19 and process is ongoing, will likely take 5 mos as member there are many steps in obtaining electric power wheelchair. She inquired about medical necessity letter to continue visits.  I referred her back to PCP to obtain necessary documents as PCP will have to sign off for [place] billing approval.  She also reports that member is difficult to contact and maybe issue if he doesn't respond timely. I informed her that I understand and if she can let member know that needs for member to be active in response time in order to move in process.      Ruddy Alexander RN  [facility]  [phone]

## 2021-05-29 NOTE — TELEPHONE ENCOUNTER
Patient discharged from [facility] without any orders to resume home care. Patient called today requesting home care resume.     Please place a new referral order in chart to resume home care services: skilled nursing, PT and OT.     Thank you

## 2021-05-29 NOTE — TELEPHONE ENCOUNTER
I would recommend he visit the emergency room.  He has so many other problems that a temperature over 100 degrees is worrisome.

## 2021-05-29 NOTE — TELEPHONE ENCOUNTER
RN cannot approve Refill Request    RN can NOT refill this medication med is not covered by policy/route to provider     . Last office visit: 5/8/2019 Geoff Crabtree MD Last Physical: Visit date not found Last MTM visit: Visit date not found Last visit same specialty: 5/8/2019 Geoff Crabtree MD.  Next visit within 3 mo: Visit date not found  Next physical within 3 mo: Visit date not found      Keyanna Tenorio, [facility] Triage/Med Refill 6/11/2019    Requested Prescriptions   Pending Prescriptions Disp Refills     sodium phosphates 133 mL (FOR FLEET) 19-7 gram/118 mL Enem rectal enema [Pharmacy Med Name: ENEMA READY-TO-USE 4.5OZ] 133 mL 0     Sig: Insert 1 enema into the rectum once for 1 dose.       There is no refill protocol information for this order

## 2021-05-29 NOTE — TELEPHONE ENCOUNTER
Resume home care services.  Specifically skilled nurses for wound care, physical therapy and Occupational Therapy.

## 2021-05-29 NOTE — TELEPHONE ENCOUNTER
Marco Patiño for orders requested below?  Please advise.  Thank you.  Geovanna PRICE, TIAN/MIA....................2:29 PM

## 2021-05-29 NOTE — PROGRESS NOTES
TRANSITIONS OF CARE (ARACELI) LOG   ARACELI tasks should be completed by the CC within one (1) business day of notification of each transition. Follow up contact with member is required after return to their usual care setting.  Note:  If CC finds out about the transitions fifteen (15) days or more after the member has returned to their usual care setting, no ARACELI log is needed. However, the CC should check in with the member to discuss the transition process, any changes needed to the care plan and document it in a case note.    Member Name:  Jef Centeno JrJennifer MCO Name:  [facility] MCO/Health Plan Member ID#: [id number]???   Product: [facility] Care Coordinator Contact:  Ruddy Alexander RN Agency/County/Care System: [facility]   Transition Communication Actions from Care Management Contact   Transition #1   Notification Date:6/10/19 Transition Date: 6/5/19   Transition From: Home     Is this the member s usual care setting?               yes Transition To: Hospital, [facility]   Transition Type:  Unplanned  Reason for Admission/Comments:  Member had fall several days prior     Shared CC contact info, care plan/services with receiving setting--Date completed: NA already discharged    Notified PCP of transition--Date completed:  6/5/19     via  (OR) Members PCP was the admitting physician   Transition #2   Transition #3  (if applicable)   Notification Date: 6/10/19         Transition To:  Skilled Nursing Facility, [facility].  Transition Date: 6/9/19     Transition Type:    Planned  Notified PCP -- Date completed:  6/9/19              Shared CC contact info, care plan/services with receiving setting or, if applicable, home care agency--Date completed:  6/10/19*Complete additional tasks below, if this transition is a return to usual care setting.      Comments:  LVM for Mello HURD to informed of involvement, request for call to update with plans to discharge home.  LVM for Maricel Trejo to inform that member is at TCU at this  time and unsure if PCA was successful in meeting member last week as scheduled.   Notification Date: 6/17/19     Transition To:  Home  Transition Date:   6/17/19           Transition Type:    Unplanned  Notified PCP--Date completed: 6/17/19         Shared CC contact info, care plan/services with receiving setting or, if applicable, home care agency--Date completed: 6/17/19      *Complete additional tasks below, if this transition is a return to usual care setting.      Comments:  Phone call from VICKEY Sarkar [facility] reports that member had left against medical advice today.  He felt he was not getting good care there.  He drove himself home.  Mello reports that he was still working with OT/PT.  He reports that was not able to contact him but did speak with sister, Juan Lius and reports that member is home and doing well.      Phone call to Maricel Trejo and informed her that member has return home and request that restart PCA services.      LV for Mr. Centeno to return call.  Noted per Epic that he made call to [facility] to restart services as well as to clinic to refill oxycodone.      Ruddy Alexander RN  [facility]  [phone]       *Complete tasks below when the member is discharging TO their usual care setting within one (1) business day of notification.  For situations where the Care Coordinator is notified of the discharge prior to the date of discharge, the Care Coordinator must follow up with the member or designated representative to confirm that discharge actually occurred and discuss required ARACELI tasks as outlined in the ARACELI Instructions.  (This includes situations where it may be a  new  usual care setting for the member. (i.e., a community member who decides upon permanent nursing home placement following hospitalization and rehab).    Date completed: 6/17/19 Communicated with member or their designated representative about the following:  care transition process; about changes to the member s health  status; plan of care updates; education about transitions and how to prevent unplanned transitions/readmissions  Four Pillars for Optimal Transition:    Check  Yes  - if the member, family member and/or SNF/facility staff manages the following:    If  No  provide explanation in the comments section.          []  Yes     [x]  No     Does the member have a follow-up appointment scheduled with primary care or specialist? (Mental health hospitalizations--the appt. should be w/in 7 days)   [x]  Yes     []  No     Can the member manage their medications or is there a system in place to manage medications (e.g. home care set-up)?         [x]  Yes     []  No     Can the member verbalize warning signs and symptoms to watch for and how to respond?         [x]  Yes     []  No     Does the member use a Personal Health Care Record?  Check  Yes  if visit summary, discharge summary, and/or healthcare summary are being used as a PHR.                                                                                                                                                                                    [x] Yes      [] No      Have you updated the member s care plan?  If  No  provide explanation in comments.   Comments:       [facility] Care Coordination Contact    Noted in Epic that member was admitted on 6/5/19 due to symptoms related to fall and discharged to TCU.    [facility] Care Coordination Contact    CC received notification of discharge to home. Discharge occurred on (Date 6/17/19) from [facility]. TCU  CC contacted member and left a message requesting a return call.  Member has a follow-up appointment with PCP in 7 days: No: Offered Assistance with setting up a follow up appointment   Member has had a change in condition: No  Home visit needed: No  Care plan reviewed and updated.  The following home based services PCA were resumed.  New referrals placed: Yes: RN  Transition log completed.    PCP notified of transition back to home via EMR.    Ruddy Alexander RN  [facility]  [phone]    [facility] Care Coordination Contact    Phone call to member discuss transition home, has left multiple messages but didn't get response.  He reports that the staff at the nursing home staff didn't much to help him so he left.  He states that there are currently two PCA's working with him.  One comes in the morning and one comes in the evening.  He was unsure of how many hours were provided together.  He states that is from two agencies.  He states that saw PCP will order nurse visits for wound care, PT/OT.  He will discuss need for electric wheelchair with nurse. Discussed role of community health worker and would like for her to meet him to see if can help with any support.  He was open and will call to schedule time.    Ruddy Alexander RN  [facility]  [phone]

## 2021-05-29 NOTE — TELEPHONE ENCOUNTER
Spoke with the patient and relayed message below from Dr. Crabtree.  Patient verbalized understanding and eventually agreed with the plan.  He states that he will try and find someone to bring him to the ER.    Also spoke with MEI Olmedo and relayed message below from Dr. Crabtree.  She verbalized understanding and had no further questions at this time.    Geovanna PRICE CMA/MIA....................4:21 PM

## 2021-05-29 NOTE — TELEPHONE ENCOUNTER
I read your note from today's visit with Bear, he does still have home care coming out. I think he is confused, as he no longer has PCA services coming out. We are working on getting him a new WC, in the mean time he can use your order for new cushion. Would you like us to cont wound care to his bottom?  He is not very compliant with keeping dressing on. Should home care continue with him. He is also getting PT and OT. Also will have MSW see him to help get PCA restarted.  Thanks,  Shara Tan RN

## 2021-05-29 NOTE — TELEPHONE ENCOUNTER
Request for Orders    Who s Requesting: Home Care Occupational Therapist    Orders being requested: OT  1visit(s) every 1 week(s) for 3 week(s)  And 2 PRN visit(s) for wheelchair recommendation.    Where to send Orders: [facility], response through Epic preferred. Please call if you have questions.    Thank you!  Trudy Morris, OTRL  [phone]

## 2021-05-29 NOTE — TELEPHONE ENCOUNTER
RN cannot approve Refill Request    RN can NOT refill this medication med is not covered by policy/route to provider. Last office visit: Visit date not found Last Physical: Visit date not found Last MTM visit: Visit date not found Last visit same specialty: 5/8/2019 Geoff Crabtree MD.  Next visit within 3 mo: Visit date not found  Next physical within 3 mo: Visit date not found      Brittany Ramirez, Care Connection Triage/Med Refill 6/8/2019    Requested Prescriptions   Pending Prescriptions Disp Refills     ENSURE ACTIVE PROTEIN-MUSCLE Liqd [Pharmacy Med Name: ENSURE ACTIVE HP CHOCOLATE LIQUID] 46014 mL 0     Sig: DRINK 1 BOTTLE BY MOUTH TWICE DAILY       There is no refill protocol information for this order

## 2021-05-29 NOTE — TELEPHONE ENCOUNTER
Dr. Crbatree,  Patient is requesting a refill of his oxycodone 30 mg tablets.  Refill has been set up for you to review.    Prescription Monitoring Program activity reviewed with no discrepancies noted.      Last fill per : 05/20/19  Quantity/days supply: #90 for 30 days    Controlled Substance Agreement on file: Yes  Date: 05/08/19    Last office visit with provider:  05/08/19    Please advise.    Geovanna PRICE CMA/MIA....................10:37 AM

## 2021-05-29 NOTE — TELEPHONE ENCOUNTER
Request for Orders    Who s Requesting: Home Care Physical Therapist    Orders being requested: extend home care PT 2x/week x 2 weeks, 1x/week x 1 week for gait, balance, strength, transfers, and endurance training, education in HEP and falls prevention.      Where to send Orders: please reply to this message    Thank you,  Nancy Burch, PT

## 2021-05-29 NOTE — TELEPHONE ENCOUNTER
"Was out seeing Bear today, his HR was 122 and temp 100.3, /70 02 96%. Pt states he was not feeling well yesterday, felt achy and cold. States is feeling better today, had not yet taken his am medications prior to my arrival. His left ankle is also still very swollen from his fall the other day and painful to the touch and to stand on. Recommended that he go into clinic. PT states, 'they won't do anything for it, this happened to my other ankle and if got better on its own.\"  Please advise  Thanks, Shara Tan, RN    "

## 2021-05-29 NOTE — TELEPHONE ENCOUNTER
RN cannot approve Refill Request    RN can NOT refill this medication med is not covered by policy/route to provider. Last office visit: Visit date not found Last Physical: Visit date not found Last MTM visit: Visit date not found Last visit same specialty: 5/8/2019 Geoff Crabtree MD.  Next visit within 3 mo: Visit date not found  Next physical within 3 mo: Visit date not found      Brittany Ramirez, Care Connection Triage/Med Refill 6/8/2019    Requested Prescriptions   Pending Prescriptions Disp Refills     calcium carbonate-vitamin D3 (CALTRATE 600 PLUS D3) 600 mg(1,500mg) -400 unit per tablet [Pharmacy Med Name: CALCIUM 600+D (400U) TABLETS] 90 tablet 0     Sig: TAKE 1 TABLET BY MOUTH DAILY       There is no refill protocol information for this order        DAILY-HIEN tablet [Pharmacy Med Name: DAILY-HIEN TABLETS] 120 tablet 0     Sig: TAKE 1 TABLET BY MOUTH EVERY DAY       There is no refill protocol information for this order

## 2021-05-29 NOTE — PROGRESS NOTES
[facility]      Facility:    [facility] [[id number]]  Code Status: DNR      Chief Complaint/Reason for Visit:  Chief Complaint   Patient presents with     Follow Up       HPI:   Jef is a 68 y.o. male who was seen today for initial TCU follow up visit.  He has a past medical history for HTN, BPH, DM2, ALS, CAD, chronic low back pain, COPD, spinal cervical stenosis. He was recently hospitalized at [facility] 6/5/2019 to 6/9/2019.  He had a fall at his long-term 4-5 days prior in which he became wedged in between his stove and cabinets.  He is typically in a wheelchair and does receive PCA hours.  AFter the fall the pain of his left leg progressively got worse and he was seen in the ER and found to have a closed Tibia/fibula fracture.  Orthopedics was consulted and he was put in a soft cast.  He was found to have a stage II coccyx wound.  His discharging hgb was 8.7 which was down from baseline of 11.1.  He was discharged to TCU.    Today, he complains of pain however later in the conversation states he does not want his pain meds adjusted and his pain is managed.  He take roxycodone 30mg three times a day and hydroxyzine for chronic pain.  His LLE is in a soft cast wrapped with ace.  He has good CMS to his toes and is able to move them.  He denies any calf pain.  He is on benadryl scheduled however he is not sure what he takes this for.  He has not been getting his hydroxyzine as this is prn.  He does have coarse rhonchi of his right upper and lower lungs.  He continues on neb treatments and inhalers.  He denies feeling anymore shortness of than he is typically. He reports he continues to smoke cigarettes about 1 pack/day.      Past Medical History:  Past Medical History:   Diagnosis Date     ALS (amyotrophic lateral sclerosis) (H)      Asthma      COPD (chronic obstructive pulmonary disease) (H)      Encephalopathy      Erectile dysfunction associated with type 2 diabetes  mellitus (H)      Obstructive sleep apnea      Osteoarthritis      Postherpetic neuralgia            Surgical History:  Past Surgical History:   Procedure Laterality Date     CHOLECYSTECTOMY       LAPAROSCOPIC GASTROTOMY W/ REPAIR OF ULCER         Family History:   Family History   Problem Relation Age of Onset     Diabetes Mother      Diabetes Sister      Diabetes Brother        Social History:    Social History     Socioeconomic History     Marital status:      Spouse name: Not on file     Number of children: Not on file     Years of education: Not on file     Highest education level: Not on file   Occupational History     Not on file   Social Needs     Financial resource strain: Not on file     Food insecurity:     Worry: Not on file     Inability: Not on file     Transportation needs:     Medical: Not on file     Non-medical: Not on file   Tobacco Use     Smoking status: Current Every Day Smoker     Packs/day: 0.25     Types: Cigarettes     Smokeless tobacco: Never Used     Tobacco comment: currently attempting to quit   Substance and Sexual Activity     Alcohol use: No     Comment: Quit drinking in 2014. Prior to that he drank excessively.     Drug use: No     Sexual activity: Not on file   Lifestyle     Physical activity:     Days per week: Not on file     Minutes per session: Not on file     Stress: Not on file   Relationships     Social connections:     Talks on phone: Not on file     Gets together: Not on file     Attends Jehovah's witness service: Not on file     Active member of club or organization: Not on file     Attends meetings of clubs or organizations: Not on file     Relationship status: Not on file     Intimate partner violence:     Fear of current or ex partner: Not on file     Emotionally abused: Not on file     Physically abused: Not on file     Forced sexual activity: Not on file   Other Topics Concern     Not on file   Social History Narrative    Patient is , 5 children by previous  wife are alive and well. Patient has social security disability.          Review of Systems   Patient denies fever, chills, headache, lightheadedness, dizziness, rhinorrhea, cough, congestion, shortness of breath, chest pain, palpitations, abdominal pain, n/v, diarrhea, constipation, change in appetite, dysuria, frequency, burning or pain with urination.  Other than stated in HPI all other review of systems is negative.             Physical Exam   Vital signs: Bp 104/60, HR 97, resp 20, 98.1 for temp  GENERAL APPEARANCE: thin, frail elderly male in no acute distress.  HEENT: normocephalic, atraumatic  PERRL, sclerae anicteric, conjunctivae clear and moist, EOM intact  LUNGS:coarse inspiratory rhonchi of right lung, respiratory effort normal.  CARD: RRR, S1, S2, without murmurs, gallops, rubs, no JVD, peripheral pulses 2+ and symmetric  ABD: Soft and nontender with normal bowel sounds.   EXTREMITIES: LLE in cast, no edema of toes, no edema of right LE, CMS good with good capillary refill.  No s/s of DVT.   NEURO: sleepy but arousable and conversational. Normal affect.  Face is symmetric.  SKIN: Inspection of the skin reveals no rashes, ulcerations or petechiae.  PSYCH: euthymic          Medication List:  Current Outpatient Medications   Medication Sig     hydrOXYzine HCl (ATARAX) 25 MG tablet Take 12.5 mg by mouth 3 (three) times a day.     albuterol (PROVENTIL) 2.5 mg /3 mL (0.083 %) nebulizer solution Take 2.5 mg by nebulization every 4 (four) hours as needed for shortness of breath.      baclofen (LIORESAL) 10 MG tablet TAKE 1/2 TABLET(5 MG) BY MOUTH THREE TIMES DAILY     budesonide-formoterol (SYMBICORT) 160-4.5 mcg/actuation inhaler INHALE 2 PUFFS BY MOUTH TWICE DAILY     calcium carbonate-vitamin D3 (CALTRATE 600 PLUS D3) 600 mg(1,500mg) -400 unit per tablet TAKE 1 TABLET BY MOUTH DAILY     cetirizine (ZYRTEC) 10 MG tablet TAKE 1 TABLET(10 MG) BY MOUTH DAILY     cholecalciferol, vitamin D3, 1,000 unit tablet  Take 2,000 Units by mouth daily.     cyanocobalamin 1000 MCG tablet Take 1,000 mcg by mouth daily.     DAILY-HIEN tablet TAKE 1 TABLET BY MOUTH EVERY DAY     diphenhydrAMINE (BENADRYL) 25 mg capsule Take 50 mg by mouth every 6 (six) hours as needed for itching.     docusate sodium (COLACE) 100 MG capsule Take 100 mg by mouth 2 (two) times a day as needed for constipation.     DULoxetine (CYMBALTA) 20 MG capsule Take 20 mg by mouth.     ENSURE ENLIVE 0.08 gram-1.5 kcal/mL Liqd DRINK 1 BOTTLE BY MOUTH TWICE DAILY     ergocalciferol (ERGOCALCIFEROL) 50,000 unit capsule Take 50,000 Units by mouth once a week.     folic acid (FOLVITE) 1 MG tablet TAKE 1 TABLET BY MOUTH EVERY DAY     furosemide (LASIX) 20 MG tablet Take 20 mg by mouth daily.     gabapentin (NEURONTIN) 300 MG capsule TAKE 1 CAPSULE BY MOUTH TWICE DAILY     ipratropium-albuterol (DUO-NEB) 0.5-2.5 mg/3 mL nebulizer Inhale 3 mL every 6 (six) hours as needed.     levalbuterol (XOPENEX HFA) 45 mcg/actuation inhaler INHALE 2 PUFFS BY MOUTH EVERY 4 HOURS AS NEEDED FOR WHEEZING     lidocaine (LIDODERM) 5 % REMOVE AND DISCARD PATCH WITHIN 12 HOURS OR AS DIRECTED BY MD     LYPETEY 75 mg capsule TAKE 1 CAPSULE(75 MG) BY MOUTH THREE TIMES DAILY     meloxicam (MOBIC) 7.5 MG tablet TAKE 1 TABLET(7.5 MG) BY MOUTH DAILY     metFORMIN (GLUCOPHAGE) 1000 MG tablet TAKE 1 TABLET BY MOUTH TWICE DAILY     metoprolol tartrate (LOPRESSOR) 25 MG tablet Take 1 tablet (25 mg total) by mouth 2 (two) times a day.     multivitamin (TAB-A-HIEN) per tablet Take 1 tablet by mouth daily.     naloxone (NARCAN) 4 mg/actuation nasal spray 1 spray (4 mg dose) into one nostril for opioid reversal. Call 911. May repeat if no response in 3 minutes.     nicotine (NICOTROL) 10 mg inhaler Inhale 1 puff as needed for smoking cessation.     omeprazole (PRILOSEC) 20 MG capsule Take 20 mg by mouth daily before breakfast.      oxyCODONE (ROXICODONE) 30 MG immediate release tablet Take 1 tablet (30 mg  total) by mouth 3 (three) times a day.     potassium chloride (K-DUR,KLOR-CON) 20 MEQ tablet TAKE 1 TABLET(20 MEQ) BY MOUTH TWICE DAILY     predniSONE (DELTASONE) 5 MG tablet Take 5 mg by mouth daily.     riluzole (RILUTEK) 50 mg tablet TAKE 1 TABLET BY MOUTH EVERY 12 HOURS(1 HOUR BEFORE MEALS OR 2 HOURS AFTER MEALS)     tamsulosin (FLOMAX) 0.4 mg Cp24 Take 1 capsule (0.4 mg total) by mouth Daily after breakfast.     tiotropium (SPIRIVA) 18 mcg inhalation capsule Place 18 mcg into inhaler and inhale daily.       Labs:  Recent Results (from the past 240 hour(s))   Basic Metabolic Panel   Result Value Ref Range    Sodium 135 (L) 136 - 145 mmol/L    Potassium 3.6 3.5 - 5.0 mmol/L    Chloride 97 (L) 98 - 107 mmol/L    CO2 33 (H) 22 - 31 mmol/L    Anion Gap, Calculation 5 5 - 18 mmol/L    Glucose 158 (H) 70 - 125 mg/dL    Calcium 8.7 8.5 - 10.5 mg/dL    BUN 9 8 - 22 mg/dL    Creatinine 0.65 (L) 0.70 - 1.30 mg/dL    GFR MDRD Af Amer >60 >60 mL/min/1.73m2    GFR MDRD Non Af Amer >60 >60 mL/min/1.73m2         Assessment:    ICD-10-CM    1. Tibia/fibula fracture, left, closed, with delayed healing, subsequent encounter S82.202G     S82.402G    2. Panlobular emphysema (H) J43.1    3. Chronic pain syndrome G89.4    4. ALS (amyotrophic lateral sclerosis) (H) G12.21    5. Type 2 diabetes mellitus with hyperglycemia, without long-term current use of insulin (H) E11.65    6. Hypertension, unspecified type I10    7. ASHD (arteriosclerotic heart disease) I25.10    8. Benign prostatic hyperplasia with lower urinary tract symptoms, symptom details unspecified N40.1    9. Pressure injury of sacral region, stage 2 L89.152        Plan:  Tib/fib fracture: continue NWB, therapies, will set up f/u with ortho in the next 2 weeks. Use lidocaine patch and chronic medications for pain management per his request. I will schedule acetaminophen 1000mg three times a day.     Emphysema: will need to closely monitor for pneumonia as he is not moving  much.  Counseled on quitting smoking and he declines any cessation resources and states he prefers to smoke.  At this time he is not smoking as he is unable to get outside.  Continue spiriva, symbicort, albuterol, xopenex, pulmicort. Discontinue benadryl as I am unsure of what this is for.     Chronic pain: I discussed the option to decrease his chronic roxicodone as so we had a little more options then to treat his acute pain more frequent.  He refused to have any changes and states he feels his pain is fine on the current roxicodone and prednisone. He reports his concern is he not getting the roxycodone at the same times he does at home.  I will schedule this with hydroxyzine at 8am, 2pm and 8pm.   He is also taking meloxicam and duloxetine.  He does take baclofen 10mg three times a day.  We could consider adding in prn oxycodone however he is quite sleepy when I am speaking with him.  He will have narcan 4mg spray onsite for respirations<10.     ALS: continue prednisone 5mg, meloxicam and roxycodone and gabapentin two times a day and Lyrica BiD. Continue baclofen 10mg three times a day.  He will need PT to map a wheelchair as he spends most of his time in the wheelchair and he reports his wheelchair is upwards of 10 years old.  I am concerned about his swallowing so I will order ST to eval and treat for swallowing.     DM: stable; continue meformin 1000mg two times a day     HTN: stable; continue metoprolol 25mg BiD and lasix 40mg two times a day BMP done today showed low Cr and low sodium likely due to some dehydration and malnutrition.  Will continue monitor as they are slightly low.     BPH: stable; continue with tamsulosin    ASHD: not on ASA.  Contue with metoprolol 25mg two times a day and cardiac diet.     Pressure ulcer: tape from dressing is tearing skin so will apply A&D ointment two times a day and with brief changes, pressure reducing mattress.     Anemia: Check CBC, continue FESO4 325mg daily and  folic acid    40 minutes total time spent with 25 minutes spent with patient and nursing in counseling and coordination of the above plan care re: pain management, pressure reduction, anemia, smoking cessation.            Electronically signed by: Marguerite Mcmahan CNP

## 2021-05-29 NOTE — PROGRESS NOTES
[facility] Patient Outreach:     Discharge Date::  6/17/19      Reason for TCU stay (discharge diagnosis)::  Fall with left tib/fib fx, stage 2 coccyx wound, anemia      Are you feeling better, the same or worse since your discharge?:  Patient is feeling better          As part of your discharge plan, did they discuss home care with you?: Yes        Have your seen them yet, or are they scheduled to visit?: No        Did you receive any new medications, or was there a change to your medications?: No            Do you have any follow up visits scheduled with your PCP or Specialist?:  Yes, with Specialist      Who are you seeing and when is it scheduled?:  Seeing wound clinic on 6/25/19.        I'm glad to hear you're doing well and we want you to continue to do well. Your PCP would like to see you for a follow-up visit. Can we help set that up for your today?: No        (RN) Provided patient the PCP's phone number to call if they have any questions or concerns?: No (Patient is seeing PCP on 6/25/19.  )

## 2021-05-29 NOTE — TELEPHONE ENCOUNTER
"Patient was seen today by [facility] for wound care.  Was told by patient that he slid out of his wheel chair 3 days ago.  He was in his kitchen cooking his dinner when he slid out of the wheelchair on to the floor.  He twisted his left ankle.  He used his life alert button and the fire department was dispatched to his home and helped get him back into his wheelchair.  He did not hit his head. He was not brought into the ER.  Patient stated that the fire men told him it was just a sprain.  Writer assessed right ankle.  It is very swollen and painful.  Patient states that he has not been able to walk since the injury occurred.  He has been sleeping in his wheelchair and has not been elevating his legs thus has 2+ pitting edema in bilateral lower legs.  Writer recommended that he bee seen to have the ankle evaluated.   Patient stated that \"It will be fine in a few days\" Writer informed him that she was going to update his PMD and patient said that if you believe he needs to be seen then he will come in and have his ankle assessed.  Please advise.  Thank you  Jenna Aldana RN  "

## 2021-05-29 NOTE — TELEPHONE ENCOUNTER
Pt calling in today  Pt wants rx filled    Controlled Substance Refill Request  Medication:   Requested Prescriptions      No prescriptions requested or ordered in this encounter     Date Last Fill: 05 17 19 -he wants this asap  Pharmacy: [facility]   Submit electronically to pharmacy  Controlled Substance Agreement on File:   Encounter-Level CSA Scan Date:    There are no encounter-level csa scan date.       Last office visit: Last office visit pertaining to requested medication was 04 03 19

## 2021-05-29 NOTE — TELEPHONE ENCOUNTER
Marco Dawkins for orders requested below?  Please advise.  Thank you.  Geovanna PRICE, TIAN/MIA....................9:26 AM

## 2021-05-30 VITALS — HEIGHT: 67 IN | BODY MASS INDEX: 28.88 KG/M2 | WEIGHT: 184 LBS

## 2021-05-30 NOTE — TELEPHONE ENCOUNTER
RN cannot approve Refill Request    RN can NOT refill this medication med is not covered by policy/route to provider. Last office visit: Visit date not found Last Physical: Visit date not found Last MTM visit: Visit date not found Last visit same specialty: 6/25/2019 Geoff Crabtree MD.  Next visit within 3 mo: Visit date not found  Next physical within 3 mo: Visit date not found      Alyssa Mi, [facility] Triage/Med Refill 7/2/2019    Requested Prescriptions   Pending Prescriptions Disp Refills     predniSONE (DELTASONE) 5 MG tablet [Pharmacy Med Name: PREDNISONE 5MG TABLETS] 30 tablet 0     Sig: TAKE 1 TABLET BY MOUTH DAILY       There is no refill protocol information for this order

## 2021-05-30 NOTE — TELEPHONE ENCOUNTER
The patient has a Aspen Bidwell cervical collar and is missing pads.  I called the distributor for the collar to order replacement pads. They recommended that a request be made through you for a new one as he has had it for approximately five years and insurance may cover a whole new collar vs just ordering replacement pads. The patient states that he would like to try wearing it again to help his neck pain and posture. Is this something that could be ordered for him?

## 2021-05-30 NOTE — TELEPHONE ENCOUNTER
New home care orders have been placed per message below from Dr. Crabtree.  Geovanna PRICE, TIAN/CMT....................1:20 PM

## 2021-05-30 NOTE — TELEPHONE ENCOUNTER
"Who is calling:  Patient   Reason for Call:  \"I want Geoff Crabtree MD to return my call.\" Patient declined to give any other information.  Date of last appointment with primary care: 6/25/19  Okay to leave a detailed message: Yes      "

## 2021-05-30 NOTE — TELEPHONE ENCOUNTER
Would you like home care orders placed, or just skilled nursing orders? I will place once you advise what is best for patient. Thank you.    Tarah Davila, CMA

## 2021-05-30 NOTE — TELEPHONE ENCOUNTER
Patient started with homecare on 7/21/19    SN 3wk4 for wound care to coccyx and left heel, edema to legs, pain, nutrition/hydration, med teaching    PT eval and treat for gait, balance, weakness    [facility] 2wk4 for ADL's, shower, skin, simple dressing change

## 2021-05-30 NOTE — TELEPHONE ENCOUNTER
RN cannot approve Refill Request    RN can NOT refill this medication prescription was discontinued on 6/10/2019 by Marguerite Mcmahan CNP.. Last office visit: 6/25/2019 Geoff Crabtree MD Last Physical: Visit date not found Last MTM visit: Visit date not found Last visit same specialty: 6/25/2019 Geoff Crabtree MD.  Next visit within 3 mo: Visit date not found  Next physical within 3 mo: Visit date not found      Bakari Dumont [facility] Triage/Med Refill 7/13/2019    Requested Prescriptions   Pending Prescriptions Disp Refills     hydrOXYzine HCl (ATARAX) 25 MG tablet [Pharmacy Med Name: HYDROXYZINE HCL 25MG TABS (WHITE)] 100 tablet 0     Sig: TAKE 1 TABLET(25 MG) BY MOUTH EVERY 8 HOURS AS NEEDED FOR ITCHING       Antihistamine Refill Protocol Passed - 7/12/2019  1:56 PM        Passed - Patient has had office visit/physical in last year     Last office visit with prescriber/PCP: 6/25/2019 Geoff Crabtree MD OR same dept: 6/25/2019 Geoff Crabtree MD OR same specialty: 6/25/2019 Geoff Crabtree MD  Last physical: Visit date not found Last MTM visit: Visit date not found   Next visit within 3 mo: Visit date not found  Next physical within 3 mo: Visit date not found  Prescriber OR PCP: Geoff Crabtree MD  Last diagnosis associated with med order: 1. Itching  - hydrOXYzine HCl (ATARAX) 25 MG tablet [Pharmacy Med Name: HYDROXYZINE HCL 25MG TABS (WHITE)]; TAKE 1 TABLET(25 MG) BY MOUTH EVERY 8 HOURS AS NEEDED FOR ITCHING  Dispense: 100 tablet; Refill: 0    If protocol passes may refill for 12 months if within 3 months of last provider visit (or a total of 15 months).

## 2021-05-30 NOTE — TELEPHONE ENCOUNTER
Request for Orders    Who s Requesting: Home Care Physical Therapist    Orders being requested: Home care Physical Therapy 2x/week x 1 week, 3x/week x 1 week, 2x/week x  1 week, 1x/week x 1 week for gait, balance, strength, transfers, and endurance training, pain managment education in HEP and falls prevention.      Where to send Orders: Please reply to this message    Thank you,  Nancy Burch, PT

## 2021-05-30 NOTE — TELEPHONE ENCOUNTER
Dr. Leyda Sandoval did write up an order. Where does it need to be sent to? Anywhere specific? They may want office visit notes for a face to face since he is on medicare but we can try without them. Thank you.  Florence Gamino CSS

## 2021-05-30 NOTE — TELEPHONE ENCOUNTER
[facility] was finally able to reach patient to schedule start of care assessment. Patient is requesting skilled nursing start of care to be completed on 7/18/19. This is outside of the 24 hour referral order.     New verbal order needed for SN SOC on 7/18/19.     Thank you

## 2021-05-30 NOTE — PROGRESS NOTES
OFFICE VISIT NOTE    Subjective:   Chief Complaint:  No chief complaint on file.    This is a 68-year-old man with multiple problems including ASHD, COPD with continued tobacco abuse, diabetes mellitus type 2, chronic cervical and lumbar radiculopathy.  He is also been told by a neurologist he has ALS.  He recently fell twisted his left ankle and suffered a fracture.  He was hospitalized for about a week.  Eventually went to a nursing home.  He says the care there was terrible.  In fact, he actually escaped from the place, as he snuck out, jumped into his pickup and drove home.  He says he is doing okay since being home.  Can put a little weight on his left ankle at this time.  He denies increasing shortness of breath.  No fevers.  Appetite is good to excellent.  He still driving his vehicle.  He uses a motorized wheelchair.    Current Outpatient Medications   Medication Sig     albuterol (PROAIR HFA;PROVENTIL HFA;VENTOLIN HFA) 90 mcg/actuation inhaler Inhale 2 puffs every 4 (four) hours as needed for wheezing.     albuterol (PROVENTIL) 2.5 mg /3 mL (0.083 %) nebulizer solution Take 2.5 mg by nebulization every 4 (four) hours as needed for shortness of breath.      atorvastatin (LIPITOR) 10 MG tablet Take 1 tablet (10 mg total) by mouth daily.     b complex vitamins tablet Take 1 tablet by mouth daily.     baclofen (LIORESAL) 10 MG tablet TAKE 1/2 TABLET(5 MG) BY MOUTH THREE TIMES DAILY (Patient taking differently: TAKE 1 TABLET(10 MG) BY MOUTH THREE TIMES DAILY)     budesonide (PULMICORT) 1 mg/2 mL nebulizer solution Take 1 mg by nebulization 2 (two) times a day.     budesonide-formoterol (SYMBICORT) 160-4.5 mcg/actuation inhaler INHALE 2 PUFFS BY MOUTH TWICE DAILY     calcium carbonate-vitamin D3 (CALTRATE 600 PLUS D3) 600 mg(1,500mg) -400 unit per tablet TAKE 1 TABLET BY MOUTH DAILY     cetirizine (ZYRTEC) 10 MG tablet TAKE 1 TABLET(10 MG) BY MOUTH DAILY     cholecalciferol, vitamin D3, 1,000 unit tablet Take  4,000 Units by mouth daily.            docusate sodium (COLACE) 100 MG capsule Take 100 mg by mouth as needed for constipation.            DULoxetine (CYMBALTA) 20 MG capsule Take 60 mg by mouth daily.            ENSURE ACTIVE PROTEIN-MUSCLE Liqd DRINK 1 BOTTLE PO BID     ergocalciferol (ERGOCALCIFEROL) 50,000 unit capsule Take 50,000 Units by mouth once a week.     ferrous sulfate 325 (65 FE) MG tablet Take 1 tablet by mouth daily with breakfast.     folic acid (FOLVITE) 1 MG tablet TAKE 1 TABLET BY MOUTH EVERY DAY     furosemide (LASIX) 20 MG tablet Take 40 mg by mouth 2 (two) times a day.            hydrOXYzine HCl (ATARAX) 25 MG tablet Take 12.5 mg by mouth 3 (three) times a day.     ipratropium-albuterol (DUO-NEB) 0.5-2.5 mg/3 mL nebulizer Inhale 3 mL every 4 (four) hours as needed.            levalbuterol (XOPENEX HFA) 45 mcg/actuation inhaler INHALE 2 PUFFS BY MOUTH EVERY 4 HOURS AS NEEDED FOR WHEEZING (Patient taking differently: INHALE 1-2 PUFFS BY MOUTH EVERY 4 HOURS AS NEEDED FOR WHEEZING)     lidocaine (LIDODERM) 5 % REMOVE AND DISCARD PATCH WITHIN 12 HOURS OR AS DIRECTED BY MD     LYRICA 75 mg capsule TAKE 1 CAPSULE(75 MG) BY MOUTH THREE TIMES DAILY (Patient taking differently: TAKE 1 CAPSULE(75 MG) BY MOUTH TWO TIMES DAILY)     meloxicam (MOBIC) 7.5 MG tablet TAKE 1 TABLET(7.5 MG) BY MOUTH DAILY (Patient taking differently: TAKE 1 TABLET(7.5 MG) BY MOUTH DAILY AS NEEDED)     metFORMIN (GLUCOPHAGE) 1000 MG tablet TAKE 1 TABLET BY MOUTH TWICE DAILY (Patient taking differently: TAKE 1/2 TABLET (500 MG) BY MOUTH DAILY WITH BREAKFAST)     metoprolol tartrate (LOPRESSOR) 25 MG tablet Take 1 tablet (25 mg total) by mouth 2 (two) times a day.     naloxone (NARCAN) 4 mg/actuation nasal spray 1 spray (4 mg dose) into one nostril for opioid reversal. Call 911. May repeat if no response in 3 minutes.     omeprazole (PRILOSEC) 20 MG capsule Take 20 mg by mouth daily before breakfast.      oxyCODONE (ROXICODONE) 30  MG immediate release tablet Take 1 tablet (30 mg total) by mouth 3 (three) times a day.     potassium chloride (K-DUR,KLOR-CON) 20 MEQ tablet TAKE 1 TABLET(20 MEQ) BY MOUTH TWICE DAILY     predniSONE (DELTASONE) 5 MG tablet Take 5 mg by mouth daily.     riluzole (RILUTEK) 50 mg tablet TAKE 1 TABLET BY MOUTH TWO TIMES A DAY BEFORE MEALS     sodium phosphates (FLEET ENEMA EXTRA) 19-7 gram/197 mL Enem INSERT 1 ENEMA RECTALLY ONCE AS NEEDED FOR CONSTIPATION FOR UP TO 1 DOSE     tamsulosin (FLOMAX) 0.4 mg Cp24 Take 1 capsule (0.4 mg total) by mouth Daily after breakfast.     tiotropium (SPIRIVA) 18 mcg inhalation capsule Place 18 mcg into inhaler and inhale daily.       PSFHx: Tobacco Status:  He  reports that he has been smoking cigarettes.  He has a 11.50 pack-year smoking history. He has never used smokeless tobacco.    Review of Systems:  A comprehensive review of systems is negative except for the comments above    Objective:    There were no vitals taken for this visit.  GENERAL: No acute distress.  He looks the same.  Left ankle is in a splint and wrapped in an Ace bandage.  Mentally he seems sharp and alert.  Blood pressure 1[birth year]26/1978.  Pulse 98.  Oxygen saturations 96%.  Lungs have rhonchi no rales.  Heart shows a sinus rhythm.  No gallop.  No JVD.  Still has a decubitus ulcer of the buttock.  He is being treated at the wound clinic.  I did not inspect the wound.    Assessment & Plan   Jef Centeno Jr. is a 68 y.o. male.    Clinically he is stable.  He has an appointment to see an orthopedist on July 2.  They can make a decision when the splint can be removed.    He needs to start on atorvastatin 10 mg daily.    Think he should have continued physical therapy to get better mobility.  He obviously needs to be seen continuously by the wound clinic until the buttock decubitus heals.  I will see him back in about 2 months.  He promised me he is going to continue to try to quit smoking.  An A1c check a basic  metabolic profile.    Diagnoses and all orders for this visit:    Type 2 diabetes mellitus with hyperglycemia, without long-term current use of insulin (H)  -     Glycosylated Hemoglobin A1c  -     Basic Metabolic Panel  -     atorvastatin (LIPITOR) 10 MG tablet; Take 1 tablet (10 mg total) by mouth daily.  Dispense: 30 tablet; Refill: 11    Stenosis of cervical spine with myelopathy (H)    Postherpetic neuralgia    ALS (amyotrophic lateral sclerosis) (H)    ASHD (arteriosclerotic heart disease)            Geoff Crabtree MD  Transcription using voice recognition software, may contain typographical errors.

## 2021-05-30 NOTE — TELEPHONE ENCOUNTER
RN cannot approve Refill Request    RN can NOT refill this medication med is not covered by policy/route to provider     . Last office visit: 6/25/2019 Geoff Crabtree MD Last Physical: Visit date not found Last MTM visit: Visit date not found Last visit same specialty: 6/25/2019 Geoff Crabtree MD.  Next visit within 3 mo: Visit date not found  Next physical within 3 mo: Visit date not found      Keyanna Tenorio, [facility] Triage/Med Refill 7/8/2019    Requested Prescriptions   Pending Prescriptions Disp Refills     budesonide-formoterol (SYMBICORT) 160-4.5 mcg/actuation inhaler [Pharmacy Med Name: SYMBICORT 160/4.5MCG (120 ORAL INH)]  0     Sig: INHALE 2 PUFFS BY MOUTH TWICE DAILY       There is no refill protocol information for this order        ENSURE ACTIVE PROTEIN-MUSCLE Liqd [Pharmacy Med Name: ENSURE ACTIVE HP CHOCOLATE LIQUID] 38910 mL 0     Sig: DRINK 1 BOTTLE BY MOUTH TWICE DAILY       There is no refill protocol information for this order        calcium carbonate-vitamin D3 (CALTRATE 600 PLUS D3) 600 mg(1,500mg) -400 unit per tablet [Pharmacy Med Name: CALCIUM W/D 600MG TABLETS] 90 tablet 0     Sig: TAKE 1 TABLET BY MOUTH DAILY       There is no refill protocol information for this order

## 2021-05-30 NOTE — TELEPHONE ENCOUNTER
Refill Request  Did you contact pharmacy: No  Medication name: Oxycodone     Who prescribed the medication: Dr. Crabtree  Pharmacy Name and Location: [facility] on file  Is patient out of medication: No.  7 approx. days left  Patient notified refills processed in 72 hours:  yes  Okay to leave a detailed message: yes

## 2021-05-30 NOTE — TELEPHONE ENCOUNTER
For FYI purposes only, patient experiencing no side effects    Drug-Drug: furosemide, sodium phosphates and furosemide   Use of sodium phosphate for bowel cleansing in patients maintained on diuretics may increase the risk of acute phosphate nephropathy, which is an acute kidney injury associated with deposits of calcium phosphate crystal in the renal tubules that may result in permanent renal function impairment. Acute phosphate nephropathy presents as acute kidney injury with minimal proteinuria and a bland urine sediment.(2) Use of oral sodium phosphate products at laxative doses has not been associated with acute kidney injury.(3)   Details Severe Interaction  Drug-Drug: meloxicam and sodium phosphates   Use of sodium phosphate for bowel cleansing in patients maintained on nonsteroidal anti-inflammatory drugs (NSAIDs) may increase the risk of acute phosphate nephropathy, which is an acute kidney injury associated with deposits of calcium phosphate crystal in the renal tubules that may result in permanent renal function impairment. Acute phosphate nephropathy presents as acute kidney injury with minimal proteinuria and a bland urine sediment.(2) Use of oral sodium phosphate products at laxative doses has not been associated with acute kidney injury.(3)   Details Severe Interaction

## 2021-05-30 NOTE — TELEPHONE ENCOUNTER
Controlled Substance Refill Request  Medication:   Requested Prescriptions     Pending Prescriptions Disp Refills     oxyCODONE (ROXICODONE) 30 MG immediate release tablet 90 tablet 0     Sig: Take 1 tablet (30 mg total) by mouth 3 (three) times a day.     Date Last Fill: 6/17/19  Pharmacy: [id number] - [place], [place] - [address] AT [facility]   Submit electronically to pharmacy  Controlled Substance Agreement on File:   Encounter-Level CSA Scan Date:    There are no encounter-level csa scan date.       Last office visit: Last office visit pertaining to requested medication was 6/25/19.

## 2021-05-30 NOTE — TELEPHONE ENCOUNTER
It looks too early to me.  He had it last filled on 6/17/19 so he should still have some until Dr. Crabtree is back.  Thanks.

## 2021-05-30 NOTE — TELEPHONE ENCOUNTER
Spoke with patient and he is ok to wait for your return on Monday. He has a helper for now. He also needs a refill on his oxycodone and prednisone.  Florence Gamino CSS

## 2021-05-30 NOTE — TELEPHONE ENCOUNTER
Patient has new open areas and multiple fluid filled blisters on his left lower shin with drainage from blistered areas.  Increased bilateral lower extremity edema especially in left leg. Please note that the patient only has 2 pairs of tubigrips and would benefit from having a least 2 more pairs due to difficulty washing in between home care visits. Has limited wound care supplies in the home and had to use a 2x2 foam dressing with adhesive border for his coccyx wounds.  Did not completely cover the wounds. Please send over any new wound care orders after clinic visit 7/24. Thank you

## 2021-05-30 NOTE — TELEPHONE ENCOUNTER
Referral Request  Type of referral: Skilled Nursing Visits for wound care  Who s requesting: Patient  Why the request: Patient states he has a fractured left leg and open wounds on his buttocks.  When he was discharged from [facility] in June he did have home care services but they are now discontinued.  He is home bound at this time and in need of care for his wounds.  Wounds are on his buttocks and he is unable to care for himself at this time.  Have you been seen for this request: Yes  Does patient have a preference on a group/provider? np  Okay to leave a detailed message?  Yes

## 2021-05-30 NOTE — TELEPHONE ENCOUNTER
Refill request has been set up for Dr. Crabtree to review.     Left message for the patient relaying message below from Dr. Crabtree.  Asked that he call back with any further questions.    Geovanna PRICE CMA/MIA....................5:21 PM

## 2021-05-30 NOTE — PROGRESS NOTES
Home visit with member and also present was TIFFANY Pearl.  Explained Shanna's role and offered assistance to support member's needs in the community to prevent further ER visits/hospitalizations.  Member feels strongly about maintaining independence in community. Member states that can manage living independently in apartment but just needs stable PCA.  He will work with home care for wounds and order for electric wheelchair.        Goals for member at this time:  1.Find new PCA agency  2.Start Meals Plan offered by nurse at neurologist clinic.  3. Get a new electric wheelchair.   4. Complete AD- Shanna will f/u with visit to complete with member.    Ruddy Alexander RN  [facility]  [phone]    LVM for nurse [facility] Neuro[facility] to discuss meals as mentioned by member.      Tasked CMS to find PCA agency to work with member.      Ruddy Alexander RN  [facility]  [phone]

## 2021-05-30 NOTE — TELEPHONE ENCOUNTER
Refill Request  Did you contact pharmacy: No  Medication name:     oxyCODONE (ROXICODONE) 30 MG immediate release tablet   Medication   Date: 7/15/2019 Department: [facility] Internal Medicine Ordering/Authorizing: Geoff Crabtree MD  Order Providers     Prescribing Provider Encounter Provider  Geoff Crabtree MD Groppoli, David J, MD  Outpatient Medication Detail      Disp Refills Start End   oxyCODONE (ROXICODONE) 30 MG immediate release tablet 90 tablet 0 7/15/2019    Sig - Route: Take 1 tablet (30 mg total) by mouth 3 (three) times a day. - Oral   Sent to pharmacy as: oxyCODONE (ROXICODONE) 30 MG immediate release tablet   Earliest Fill Date: 7/15/2019   E-Prescribing Status: Receipt confirmed by pharmacy (7/15/2019  8:59 AM CDT)   Associated Diagnoses     Lumbar stenosis with neurogenic claudication     Pharmacy     [id number] - [place], [place] - [address] AT [facility]  predniSONE (DELTASONE) 5 MG tablet   Medication   Date: 5/17/2019 Department: [facility] Internal Medicine Ordering/Authorizing: Ronny Gallardo MD   Order Providers     Prescribing Provider Encounter Provider   Ronny Gallardo MD Groppoli, David J, MD   Outpatient Medication Detail      Disp Refills Start End    predniSONE (DELTASONE) 5 MG tablet 30 tablet 3 5/17/2019     Sig - Route: Take 5 mg by mouth daily. - Oral    Sent to pharmacy as: predniSONE (DELTASONE) 5 MG tablet    E-Prescribing Status: Receipt confirmed by pharmacy (5/17/2019 10:14 AM CDT)    Associated Diagnoses     Sweet syndrome       Pharmacy     [id number] - [place], [place] - [address] AT [facility]       Requested Prescriptions      No prescriptions requested or ordered in this encounter     Who prescribed the medication: Dr Crabtree  Pharmacy Name and Location: [facility]   Is patient out of medication: Yes  Patient  notified refills processed in 72 hours:  yes  Okay to leave a detailed message: yes

## 2021-05-30 NOTE — TELEPHONE ENCOUNTER
Medication Question or Clarification  Who is calling: Pharmacy: fax  What medication are you calling about? (include dose and sig) Levabuterol HFA INH 15 GM   Who prescribed the medication?: Dr Crabtree  What is your question/concern?: The insurance does not cover this.  Can the order be changed to  Proair HFA ,Proair Resp Click or does provider want PA started?   [facility]  Okay to leave a detailed message?: Yes  Site CMT - Please call the pharmacy to obtain any additional needed information.

## 2021-05-30 NOTE — TELEPHONE ENCOUNTER
Per Milagro Aceves from [facility] Jef denied getting the cushion ordered by Obdulia Prieto 5/2019.  A new request was sent to [facility], they will reach out to Jef and verify if he wants to receive the cushion at this time.      [facility] # [phone]  Fax # [phone]

## 2021-05-30 NOTE — TELEPHONE ENCOUNTER
Patient was not at home/ did not answer the door when clinician went to home yesterday. Reconfirmed with patient for start of care assessment on 7/21/19     New verbal order needed for SN SOC on 7/21/19.      Thank you

## 2021-05-30 NOTE — TELEPHONE ENCOUNTER
Called pt. He has enough pills until Dr Crabtree's return next Monday 6/15. Will send to Dr Crabtree.

## 2021-05-30 NOTE — TELEPHONE ENCOUNTER
Who is requesting the letter?  Patient  Why is the letter needed? Patient needs a doctor's statement regarding patient does have a left leg fracture and is in need of PCA.  Patient is currently in a cast.  His next doctor's appointment for follow up is July 2, 2019.  How would you like this letter returned? Please call when ready.  Patient needs letter today.  Okay to leave a detailed message? Yes

## 2021-05-30 NOTE — TELEPHONE ENCOUNTER
Referral for home care, skilled nursing, and wound management has been placed for Dr. Crabtree to review.      Spoke with the patient and relayed message below from Dr. Crabtree.  He verbalized understanding and had no further questions at this time.  Geovanna PRICE CMA/MIA....................5:25 PM

## 2021-05-30 NOTE — TELEPHONE ENCOUNTER
Spoke with Danii at [facility] and relayed message below from Dr. Crabtree.  She verbalized understanding and had no further questions at this time.  Geovanna PRICE, TIAN/MIA....................3:06 PM

## 2021-05-30 NOTE — TELEPHONE ENCOUNTER
RN cannot approve Refill Request    RN can NOT refill this medication med is not covered by policy/route to provider. Last office visit: 6/25/2019 Geoff Crabtree MD Last Physical: Visit date not found Last MTM visit: Visit date not found Last visit same specialty: 6/25/2019 Geoff Crabtree MD.  Next visit within 3 mo: Visit date not found  Next physical within 3 mo: Visit date not found      Alyssa Mi, Care Connection Triage/Med Refill 7/2/2019    Requested Prescriptions   Pending Prescriptions Disp Refills     meloxicam (MOBIC) 7.5 MG tablet [Pharmacy Med Name: MELOXICAM 7.5MG TABLETS] 90 tablet 0     Sig: TAKE 1 TABLET(7.5 MG) BY MOUTH DAILY       There is no refill protocol information for this order

## 2021-05-30 NOTE — TELEPHONE ENCOUNTER
Prescription Monitoring Program activity reviewed with no discrepancies noted.      Last fill per : 06/17/19  Quantity/days supply: #90 for a 30 day supply    Controlled Substance Agreement on file: Yes  Date: 05/07/19    Last office visit with provider:  6/25/2019 Geoff Crabtree MD    Please advise.

## 2021-05-30 NOTE — PROGRESS NOTES
[facility] Care Coordination Contact   Phone call from Deisy reports that referral is just for MOW's.  Member just needs to call them to restart as they need to ensure that member is home for delivery.      LVM for Jef to let know that referral they made was just MOW's same provider he has been getting.  Gave him contact number to restart.      Ruddy Alexander RN  [facility]  [phone]

## 2021-05-30 NOTE — TELEPHONE ENCOUNTER
Filled on 7/15/19    oxyCODONE (ROXICODONE) 30 MG immediate release tablet 90 tablet 0 7/15/2019     Sig - Route: Take 1 tablet (30 mg total) by mouth 3 (three) times a day. - Oral    Sent to pharmacy as: oxyCODONE (ROXICODONE) 30 MG immediate release tablet    Earliest Fill Date: 7/15/2019    E-Prescribing Status: Receipt confirmed by pharmacy (7/15/2019  8:59 AM CDT)

## 2021-05-31 VITALS — HEIGHT: 70 IN | WEIGHT: 155 LBS | BODY MASS INDEX: 22.19 KG/M2

## 2021-05-31 NOTE — PROGRESS NOTES
Phone call from RAYMOND Mir reports that he called [facility] and doesn't have auth for PCA or homemaking.  Informed him that already informed [facility] last week that needs to have member sign change of agency forms to submit to [facility].  Gave previous agency names.  Informed him that has already submitted auth to [facility] for homemaking.      Ruddy Alexander RN  [facility]  [phone]

## 2021-05-31 NOTE — PROGRESS NOTES
"OFFICE VISIT NOTE    Subjective:   Chief Complaint:  Follow-up (pt states that his neck is bothering him again-needs a new neck brace)    This is a very complicated patient with multiple problems.  May be the most complicated patient in my practice.  Over the years, has been managed by many doctors and has several diagnoses on the chart made by other physicians.  I inherited him about 10 years ago.  He basically has chronic severe back and neck pain.  A cervical myelopathy.  He requires long-term use of narcotics currently using oxycodone 30 mg 3 times daily.  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  Denies polyuria polydipsia.  Ambulates with use of walker still drives a car.  He has a diagnosis of COPD from smoking.  Also past history of coronary artery disease.  Current Outpatient Medications   Medication Sig     albuterol (PROAIR HFA;PROVENTIL HFA;VENTOLIN HFA) 90 mcg/actuation inhaler Inhale 2 puffs 3 (three) times a day.     albuterol (PROVENTIL) 2.5 mg /3 mL (0.083 %) nebulizer solution Take 2.5 mg by nebulization every 4 (four) hours as needed for shortness of breath.      aspirin 81 MG EC tablet Take 81 mg by mouth daily.     atorvastatin (LIPITOR) 10 MG tablet TAKE 1 TABLET(10 MG) BY MOUTH DAILY     b complex vitamins tablet Take 1 tablet by mouth daily.     baclofen (LIORESAL) 10 MG tablet TAKE 1/2 TABLET(5 MG) BY MOUTH THREE TIMES DAILY (Patient taking differently: TAKE 1 TABLET(10 MG) BY MOUTH THREE TIMES DAILY)     budesonide (PULMICORT) 1 mg/2 mL nebulizer solution Take 1 mg by nebulization 2 (two) times a day.     budesonide-formoterol (SYMBICORT) 160-4.5 mcg/actuation inhaler INHALE 2 PUFFS BY MOUTH TWICE DAILY     calcium carbonate-vitamin D3 (CALTRATE 600 PLUS D3) 600 mg(1,500mg) -400 unit per tablet TAKE 1 TABLET BY MOUTH DAILY     cetirizine (ZYRTEC) 10 " MG tablet TAKE 1 TABLET(10 MG) BY MOUTH DAILY     cholecalciferol, vitamin D3, 1,000 unit tablet Take 4,000 Units by mouth daily.            docusate sodium (COLACE) 100 MG capsule Take 100 mg by mouth as needed for constipation.            DULoxetine (CYMBALTA) 20 MG capsule Take 60 mg by mouth daily.            ENSURE ACTIVE PROTEIN-MUSCLE Liqd DRINK 1 BOTTLE TWICE DAILY     ergocalciferol (ERGOCALCIFEROL) 50,000 unit capsule Take 50,000 Units by mouth once a week.     ferrous sulfate 325 (65 FE) MG tablet Take 1 tablet by mouth daily with breakfast.     folic acid (FOLVITE) 1 MG tablet TAKE 1 TABLET BY MOUTH EVERY DAY     furosemide (LASIX) 20 MG tablet Take 40 mg by mouth 2 (two) times a day.            gabapentin (NEURONTIN) 300 MG capsule Take 300 mg by mouth 2 (two) times a day.     hydrOXYzine HCl (ATARAX) 25 MG tablet TAKE 1 TABLET(25 MG) BY MOUTH EVERY 8 HOURS AS NEEDED FOR ITCHING     ipratropium-albuterol (DUO-NEB) 0.5-2.5 mg/3 mL nebulizer Inhale 3 mL every 4 (four) hours as needed.            levalbuterol (XOPENEX HFA) 45 mcg/actuation inhaler INHALE 2 PUFFS BY MOUTH EVERY 4 HOURS AS NEEDED FOR WHEEZING (Patient taking differently: INHALE 1-2 PUFFS BY MOUTH EVERY 4 HOURS AS NEEDED FOR WHEEZING)     lidocaine (LIDODERM) 5 % REMOVE AND DISCARD PATCH WITHIN 12 HOURS OR AS DIRECTED BY DOCTOR     meloxicam (MOBIC) 7.5 MG tablet TAKE 1 TABLET(7.5 MG) BY MOUTH DAILY     metFORMIN (GLUCOPHAGE) 1000 MG tablet TAKE 1 TABLET BY MOUTH TWICE DAILY (Patient taking differently: TAKE 1/2 TABLET (500 MG) BY MOUTH DAILY WITH BREAKFAST)     metoprolol tartrate (LOPRESSOR) 25 MG tablet Take 1 tablet (25 mg total) by mouth 2 (two) times a day.     midazolam, PF, (VERSED) 1 mg/mL Soln injection Infuse 0.5-6 mg into a venous catheter.     naloxone (NARCAN) 4 mg/actuation nasal spray 1 spray (4 mg dose) into one nostril for opioid reversal. Call 911. May repeat if no response in 3 minutes.     omeprazole (PRILOSEC) 20 MG  "capsule Take 20 mg by mouth daily before breakfast.      oxyCODONE (ROXICODONE) 30 MG immediate release tablet Take 1 tablet (30 mg total) by mouth 3 (three) times a day.     potassium chloride (K-DUR,KLOR-CON) 20 MEQ tablet TAKE 1 TABLET(20 MEQ) BY MOUTH TWICE DAILY     predniSONE (DELTASONE) 5 MG tablet Take 5 mg by mouth daily.     riluzole (RILUTEK) 50 mg tablet TAKE 1 TABLET BY MOUTH TWO TIMES A DAY BEFORE MEALS     sodium phosphates (FLEET ENEMA EXTRA) 19-7 gram/197 mL Enem INSERT 1 ENEMA RECTALLY ONCE AS NEEDED FOR CONSTIPATION FOR UP TO 1 DOSE     tamsulosin (FLOMAX) 0.4 mg Cp24 Take 1 capsule (0.4 mg total) by mouth Daily after breakfast.     tiotropium (SPIRIVA) 18 mcg inhalation capsule Place 18 mcg into inhaler and inhale daily.     senna-docusate (PERICOLACE) 8.6-50 mg tablet Take 2 tablets by mouth daily as needed for constipation.     sodium phosphates 133 mL (SODIUM PHOSPHATE) 19-7 gram/118 mL Enem rectal enema Insert 1 enema into the rectum once for 1 dose.       PSFHx: Tobacco Status:  He  reports that he has been smoking cigarettes.  He has a 11.50 pack-year smoking history. He has never used smokeless tobacco.    Review of Systems:  A comprehensive review of systems is negative except for the comments above    Objective:    /58   Pulse 68   Ht 5' 10\" (1.778 m)   Wt 149 lb (67.6 kg)   SpO2 94% Comment: RA  BMI 21.38 kg/m    GENERAL: No acute distress.  Is in a wheelchair today.  Power wheelchair.  Blood pressure 104 58.  Pulse 68.  Oxygen saturations 94%.  He still smokes 1/2 packs of cigarettes per day.  Looks older than the age of 69.  No edema.  Lungs have bilateral rhonchi and squeaks.  Consistent with COPD.  Heart does show a regular rhythm without murmur gallop.  Handgrip is normal.  Significant cervical spine deformity.  He carries a diagnosis of ALS but I do not see any fasciculations or worsening weakness over the years.    Assessment & Plan   Jef Centeno JrJennifer is a 69 y.o. " male.    He is stable he has multiple problems.  I been unable to wean him down off the dosage of oxycodone.  When I do, he goes to the emergency room and the dosage of narcotic is increased.  Will continue on oxycodone 30 mg 3 times daily.  Check a CBC and chemistry survey.  Begin to reduce prednisone.  I would like to get him down to 2.5 mg in the next few months.  The prescription for a new cervical collar was written.  He should return to clinic to see me in 3 months.  He needs to follow-up with a neurologist regarding this diagnosis of ALS.    Diagnoses and all orders for this visit:    Type 2 diabetes mellitus with hyperglycemia, without long-term current use of insulin (H)  -     Comprehensive Metabolic Panel    Iron deficiency anemia  -     meloxicam (MOBIC) 7.5 MG tablet; TAKE 1 TABLET(7.5 MG) BY MOUTH DAILY  Dispense: 90 tablet; Refill: 0  -     HM2(CBC w/o Differential)    Stenosis of cervical spine with myelopathy (H)    Neck pain of over 3 months duration    Chronic constipation  -     senna-docusate (PERICOLACE) 8.6-50 mg tablet; Take 2 tablets by mouth daily as needed for constipation.  Dispense: 30 tablet; Refill: 3  -     sodium phosphates 133 mL (SODIUM PHOSPHATE) 19-7 gram/118 mL Enem rectal enema; Insert 1 enema into the rectum once for 1 dose.  Dispense: 6 mL; Refill: 3            Geoff Crabtree MD  Transcription using voice recognition software, may contain typographical errors.

## 2021-05-31 NOTE — TELEPHONE ENCOUNTER
I would continue current treatment plan.  Does he have a follow-up appointment scheduled with Dr. Crabtree in the next 10 days?

## 2021-05-31 NOTE — PROGRESS NOTES
[facility] Care Coordination Contact    CHW,met member at his home to follow up on ACP, upon arrival member was making coffee and PCA was also there and meals on wheels brought delivery.  Member noted he  was getting to know his new PCA (Joyce) and since our last visit  his meals were back on schedule.   Member note his physical therapist had just left and he was having problems with  his neck brace and the therapist told him he would need a new one since the one he has is older. CHW told member, I would follow up with his Care Coordinator Ruddy about getting a new brace, and he soul also follow up with his Dr. Member's current brace at  chin level no longer has padding for the chin to rest in and it is  rubbing his chin..     CHW reviewed the ACP planning process and member noted he thinks this was completed with his will. Member noted I could follow up with his sister who has a copy and talk with her. Member would also see  If he could locate his copy member attempted to contact sister while CHW was at the resident but she was not available.     Next steps CHW will follow up with sister Kyra Montemayor [phone] on directive.  Member to call CHW if he is able to locate.      TIFFANY Pearl  [facility]  [phone]

## 2021-05-31 NOTE — TELEPHONE ENCOUNTER
RN cannot approve Refill Request    RN can NOT refill this medication med is not covered by policy/route to provider. Last office visit: Visit date not found Last Physical: Visit date not found Last MTM visit: Visit date not found Last visit same specialty: 6/25/2019 Geoff Crabtree MD.  Next visit within 3 mo: Visit date not found  Next physical within 3 mo: Visit date not found      Nancy Duncan, Care Connection Triage/Med Refill 8/7/2019    Requested Prescriptions   Pending Prescriptions Disp Refills     lidocaine (LIDODERM) 5 % [Pharmacy Med Name: LIDOCAINE 5% PATCH] 30 patch 0     Sig: REMOVE AND DISCARD PATCH WITHIN 12 HOURS OR AS DIRECTED BY DOCTOR       There is no refill protocol information for this order        ENSURE ACTIVE PROTEIN-MUSCLE Liqd [Pharmacy Med Name: ENSURE ACTIVE HP CHOCOLATE LIQUID] 45210 mL 0     Sig: DRINK 1 BOTTLE TWICE DAILY       There is no refill protocol information for this order

## 2021-05-31 NOTE — TELEPHONE ENCOUNTER
PT arrived around 10:25AM, Pt had 3-4+ pitting edema B LEs, no compression garments on. Pt reports took his diuretic at 4AM.     /60    Temp 98.8

## 2021-05-31 NOTE — TELEPHONE ENCOUNTER
Controlled Substance Refill Request  Medication Name:   Requested Prescriptions     Pending Prescriptions Disp Refills     oxyCODONE (ROXICODONE) 30 MG immediate release tablet 90 tablet 0     Sig: Take 1 tablet (30 mg total) by mouth 3 (three) times a day.     Date Last Fill: 7/15/2019  Pharmacy: [id number] - [place], [place] - [address] AT [facility]      Submit electronically to pharmacy  Controlled Substance Agreement Date Scanned:   Encounter-Level CSA Scan Date:    There are no encounter-level csa scan date.       Last office visit with prescriber/PCP: 6/25/2019 Geoff Crabtree MD OR same dept: 6/25/2019 Geoff Crabtree MD OR same specialty: 6/25/2019 Geoff Crabtree MD  Last physical: Visit date not found Last MTM visit: Visit date not found

## 2021-05-31 NOTE — TELEPHONE ENCOUNTER
Okay to initiate wound care as recommended.  Is also okay to have an MSW evaluation for safe home care.

## 2021-05-31 NOTE — TELEPHONE ENCOUNTER
Spoke with the patient and relayed message below from Dr. Crabtree regarding his refill request.  Patient verbalized understanding and had no further questions at this time.    Refill has been set up for Dr. Crabtree to review.  Geovanna PRICE CMA/MIA....................12:39 PM

## 2021-05-31 NOTE — PROGRESS NOTES
[facility] Care Coordination Contact    Received after visit chart from care coordinator.  Completed following tasks: Updated services in access and Submitted referrals/auths for K 8 hrs/wk (32 units/wk) with [facility].     Juan Carlos Tavarez  Care Management Specialist  [facility]  [phone]

## 2021-05-31 NOTE — TELEPHONE ENCOUNTER
Prescription Monitoring Program activity reviewed with no discrepancies noted.      Last fill per : 7/15/2019  Quantity/days supply: 90    Controlled Substance Agreement on file: No  Date:     Last office visit with provider:  6/25/2019 Geoff Crabtree MD    Please advise.

## 2021-05-31 NOTE — PROGRESS NOTES
[facility] Care Coordination Contact    Phone call from SHANNAN Morton with member at home visit.  Mr. Centeno reports that did not hear from [facility] about starting date of PCA.  Informed him that had spoke with RAYMOND Alvarado and reports that PCA will start this Friday, 8/16/19 per staff.  Gave him information relay from [facility] regarding times PCA would be working.  Request that he call [facility] if doesn't hear anything by Friday as care coordinator will be taking PTO next week.      Ruddy Alexander RN  [facility]  [phone]

## 2021-05-31 NOTE — TELEPHONE ENCOUNTER
Pt's edema today is 2-3+. Pt did have compression garments on when PT arrived. Pt states he is taking diuretic as printed on bottle. Furosemide 40 mg (20 mg x 2 tabs) 2x/day.  Thank you,  Nancy Burch, PT

## 2021-05-31 NOTE — PROGRESS NOTES
CC referral for HMK and potential PCA services. [facility] left message to member but no returned call from member. Care coordinator will contact member to provide [employer] contact number of [phone] to member and once member and [facility] agreed to services than CMS will send Auth to [facility].     Juan Carlos Tavarez  Care Management Specialist  [facility]  [phone]

## 2021-05-31 NOTE — TELEPHONE ENCOUNTER
Thank you for your reply. I believe he took how much is indicated on his med list. I have not been there since this visit but am going back today. I will check in with him again today. I educated PCA in donning compression stockings when they arrive in the morning and educated Pt that he can't refuse to put these on. This was more of an FYI, unless it is determined by  Your clinic that something needs to be done.   Thank you,  Nancy Burch

## 2021-05-31 NOTE — TELEPHONE ENCOUNTER
Pt calling to see if his Oxycodone Rx has been refilled.  Rx last filled 8/13/19 #90.  Pt will call his Pharmacy to send in refill request.  Robyn Marin RN, [facility] RN Triage Nurse Advisor

## 2021-05-31 NOTE — PROGRESS NOTES
Phone call from [facility] home health which he reports that just spoke with Mr. Centeno and agrees to start homemaking and PCA services with agency.  He requests for copy of PCA assessment.  Discuss current services at this time.  He reports that has PCA that will work 9:00-3:30 daily.  He also reports that Clarke from their agency did intake with member on 7/23/19 already.  Informed him that member reported that has not been contacted by agency at all.  He did confirmed with Clarke that was at home.  Member may likely be confused with multiple providers.  He reports that will plan to start on 8/5/19.      Faxed copy of PCA assessment to [facility].     Ruddy Alexander RN  [facility]  [phone]

## 2021-05-31 NOTE — TELEPHONE ENCOUNTER
Dr Crabtree is out of the office this week. Is this an FYI for him or does it need to be addressed?  How much diuretic did patient take at 4 AM?    Thank you  Sharon Martinez CMA

## 2021-05-31 NOTE — TELEPHONE ENCOUNTER
Request for Orders    Who s Requesting: Home Care Physical Therapist    Orders being requested: extend home care PT 2x/week x 1 week for further education in HEP, standing balance, transfers following weight bearing restrictions. Pt has PCA starting next week. So, further PT is indicated for caregiver training/education.    Where to send Orders: Please reply to this message    Thank you,  Nancy Burch, PT

## 2021-05-31 NOTE — TELEPHONE ENCOUNTER
Patient calling to request that PCP review his medication oxycodone 30 mg. He did have a refill request put in last week. Today he picked up his medication and notes that there were only 15 pills  He was unaware that a change to his medication was being made.   Patient would like a call back to let him know if he will be getting more.  Thank you.  Angela Bryant RN  Care Connection Triage Nurse  5:43 PM  8/12/2019

## 2021-05-31 NOTE — PROGRESS NOTES
[facility] Care Coordination Contact     CHW, contacted member  set up appointment for 8/22/19  to review ACP with member.    TIFFANY Pearl  [facility]  [phone]

## 2021-05-31 NOTE — TELEPHONE ENCOUNTER
Spoke with patient and relayed message. Helped him schedule with Dr. Crabtree next Week.  Florence Gamino CSS

## 2021-05-31 NOTE — PROGRESS NOTES
Phone call was made to member yesterday which he reports that hasn't been contacted by [facility] for PCA services.  He reports that there is no PCA from either agency at this time.  He has been working with [facility] and nurse but unsure if anyone will work with him for electric wheelchair.     I called him back this morning, informed him that [facility] has female staff to provide him with homemaking and PCA services. They did leave him messages last week with no response. He states that it would be fine for him to work with female staff.   Gave him [facility] to contact for initial visit and if would like for care coordinator to be present, once arrange to let me know schedule for next week.      I also reached out to [facility] nurse to inquire about having OT continue process for electric wheelchair.      Ruddy Alexander RN  [facility]  [phone]

## 2021-05-31 NOTE — TELEPHONE ENCOUNTER
Hi Dr. Crabtree,  Patient has abrasion on left lower calf d/t wearing boots. I'm requesting order for treatment:   -clean area with normal saline, apply dry gauze, secure with Kerlix and tape; apply Tubigrip.   OK for above wound treatment order?  I'm also requesting order for MSW evaluation for safe home care service and compliance with care.  OK for all orders?    Phone: [phone]

## 2021-05-31 NOTE — PROGRESS NOTES
[facility] Care Coordination Contact      Phone call from RAYMOND Mir reports to verify name of PCA agency to submit change of agency so agency can provide services.  He called [facility] and was told that was not [facility]. Eleno Ortiz PCA will not tell him name and member doesn't report other agency.  I informed him that will call Carlyn Prakash to clarify.      Phone call to Eleno Ortiz to discuss situation.  She reports that  [facility] gave wrong name, should be [facility].  She requests resubmit form and she will process today.    Ruddy Alexander RN  [facility]  [phone]

## 2021-05-31 NOTE — PROGRESS NOTES
[facility] Care Coordination Contact  8/12/19  Phone call from Marguerite Horton, [phone] [employer] nurse intake.  She wanted to get more information on wound care as she feels not delegated PCA task and doesn t want to take on case if that is part of regimen for PCA. Informed her that needs to speak to SN from [facility] to discuss dressing changes, etc.. Request that she let me know as will have to look into another agency if [employer] isn t able to provide care for member.  She reports will let CC know ater discussion with nurse and Christiano.      8/13/19: VM from Ida Mahoney SW to inquire if agency will be providing homemaking and PCA services so can relay message to member.      Phone call to Christiano to follow up with member's case.  Christiano reports that nurse already cleared to admit case for care.  Therefore, already has available staff and will likely start services in 24-48 hrs pending field nurse.  He states that agency will complete both PCA and homemaking services.  He will call care coordinator of date of service for homemaking for authorization.    LVM for Rosa Mahoney to relay update of PCA services.  Request that member call me with any questions.    Ruddy Alexander RN  [facility]  [phone]

## 2021-05-31 NOTE — TELEPHONE ENCOUNTER
needs another order to help pt understand services and when they are to get in place.     1 sw order in 2 weeks.    Please respond in epic    Thank You.

## 2021-05-31 NOTE — PROGRESS NOTES
[facility] Care Coordination Contact     CHW,received a return call from member sister Kyra([phone]) on wether the member had a will. Kyra  was not sure because member's wife had taken care before she passed away. She suggested doing a new  directive if unable to locate she was only aware of what member had share with her verbally.  Kyra noted Will/ Directivel may be in a blue box he has and I should ask him to check the blue box and if it is not in the box move forward with new.  Kyra noted she new member wanted son to be involved and I shared, I had verbal permission  from member to talk with son and I would follow  up with member to update his  Authorization  To Share Health Information ATSHI.      CHW,also contacted member and asked him if he would be able to locate and check the blue box referenced by his Kyra. Member know where box is  and will check for the Will/ Directive and  noted he would and follow up with me on the status. CHW also reviewed the list  ATSHI and member  does not want Geraldine Nieto on the list  or information shared with. Her, CHW, told member we would update on the next visit . Member also received a prescription today from his DR visit to get new neck brace which he will work to order.       Next steps, Member to follow up with CHW on Will/  Directive once member contacts CHW will schedule visit to complete new directive if old is not located, update ATSHI and check to see if member has ordered new brace.    TIFFANY Pearl  [facility]  [phone]

## 2021-05-31 NOTE — TELEPHONE ENCOUNTER
Okay for social service to make visits once in the next 2 weeks to assist patient understanding services that are offered

## 2021-05-31 NOTE — TELEPHONE ENCOUNTER
That prescription should be for oxycodone, 30 mg 3 times daily 90/month.  I am not sure why he only got 15 tabs.  Please put the prescription in the request bin.

## 2021-05-31 NOTE — PROGRESS NOTES
8/2/19 VM from Christiano, [employer] inquiring if [facility] has addressed wounds on buttocks.  He also wants to have emergency contact in the event that agency is unable to contact him.    Phone call to Christiano,he reports that nurse is working on POC and has two areas that needs to be address (PCA involvement with woundcare and emergency contact). He inquires if PCA will be assisting with wound changes if so then needs to be trained by RN. Request that he call [facility] nurse involved to see what they are currently recommending per cares.  Gave sister. Juan Luis contact.  Christiano reports that he left messages for member with no response and wants to make sure that there is emergency contact to relay information when pca is not able to work.  Informed him that Mr. Centeno has limited family support and in the past he reports that a nephew comes to help if needed.  It is a topic that would need to be explore further with him as member doesn't appear to be checking messages and is often difficult to get a hold of.  Christiano will update care coordinator once nurse reviews information to see if family conference needed to clarify roles.      Ruddy Alexander RN  [facility]  [phone]

## 2021-05-31 NOTE — PROGRESS NOTES
[facility] Care Coordination Contact    Phone call from [facility] reports that start date of PCA and homemaking will be Friday, 8/16/19. Requests for auth for homemaking to agency.  He states that has female PCA which will work seven days a week with 5.5 and additional 8 hrs/homemaking per week will be divided into a hour a day with Monday working 2 hrs to equal 8 hrs/wk.  He states that PCA will work from 9-3:30p.m. Will submit auth for homemaking and he will have member sign change of agency form to submit to [facility].      Tasked CMS to issue auth to cover homemaking services.    Ruddy Alexander RN  [facility]  [phone]

## 2021-06-01 VITALS — BODY MASS INDEX: 23.91 KG/M2 | HEIGHT: 69 IN

## 2021-06-01 NOTE — TELEPHONE ENCOUNTER
Patient Returning Call  Reason for call:  Patient called back.  Information relayed to patient:  n/a  Patient has additional questions:  Yes  If YES, what are your questions/concerns:  Patient states he would like the hard copy mailed to him today as he did not find anyone to come in to .  Please mail today.  Okay to leave a detailed message?: No call back needed

## 2021-06-01 NOTE — TELEPHONE ENCOUNTER
RN cannot approve Refill Request    RN can NOT refill this medication med is not covered by policy/route to provider     . Last office visit: 8/28/2019 Geoff Crabtree MD Last Physical: Visit date not found Last MTM visit: Visit date not found Last visit same specialty: 8/28/2019 Geoff Crabtree MD.  Next visit within 3 mo: Visit date not found  Next physical within 3 mo: Visit date not found      Keyanna Tenorio, Care Connection Triage/Med Refill 9/6/2019    Requested Prescriptions   Pending Prescriptions Disp Refills     DAILY-HIEN tablet [Pharmacy Med Name: DAILY-HIEN TABLETS] 120 tablet 3     Sig: TAKE 1 TABLET BY MOUTH DAILY       There is no refill protocol information for this order        lidocaine (LIDODERM) 5 % [Pharmacy Med Name: LIDOCAINE 5% PATCH] 30 patch 0     Sig: PLACE 1 PATCH ON CLEAN SKIN DAILY. REMOVE AND DISCARD PATCH WITH 12 HOURS       There is no refill protocol information for this order

## 2021-06-01 NOTE — TELEPHONE ENCOUNTER
Prescription is at the . Patient notified. He will call back and give a name of who is picking up if it is not himself.    Tarah Davila, [facility]

## 2021-06-01 NOTE — TELEPHONE ENCOUNTER
Controlled Substance Refill Request  Medication Name:   Requested Prescriptions     Pending Prescriptions Disp Refills     oxyCODONE (ROXICODONE) 30 MG immediate release tablet 90 tablet 0     Sig: Take 1 tablet (30 mg total) by mouth 3 (three) times a day.     Date Last Fill: 9/3/2019  Pharmacy: [facility].      Submit electronically to pharmacy  Controlled Substance Agreement Date Scanned:   Encounter-Level CSA Scan Date:    There are no encounter-level csa scan date.       Last office visit with prescriber/PCP: 8/28/2019 Geoff Crabtree MD OR same dept: 8/28/2019 Geoff Crabtree MD OR same specialty: 8/28/2019 Geoff Crabtree MD  Last physical: Visit date not found Last MTM visit: Visit date not found

## 2021-06-01 NOTE — TELEPHONE ENCOUNTER
Prescription has been set up for Dr. Crabtree to review.      Spoke with the patient and let him know that the prescription will be sent to his pharmacy and will be able to be filled on Saturday, 10/5/19.  He verbalized understanding and had no further questions at this time.  Geovanna PRICE CMA/MIA....................10:50 AM

## 2021-06-01 NOTE — TELEPHONE ENCOUNTER
Prescription dated 09/24/19 was printed due to the finger print scanning system being down.     Tarah Davila, CMA

## 2021-06-01 NOTE — TELEPHONE ENCOUNTER
Prescription Monitoring Program activity reviewed with no discrepancies noted.      Last fill per : 9/3/19   Quantity/days supply: 90 tablets for 30 days    Controlled Substance Agreement on file: Yes  Date: 5/7/19    Last office visit with provider:  8/28/2019 Geoff Crabtree MD    Please advise.      ** Rx date not to fill until 10/3/19 as it's still too soon.

## 2021-06-01 NOTE — TELEPHONE ENCOUNTER
Patient last filled 30 day supply on 9/9/19, ok to fill early?     Patient stated he can't pick his Rx up until 10/5 according to the pharmacy even though the Rx states okay to  10/3/19 from Geoff Crabtree MD. Patient stated the pharmacy will release the oxycodone if Geoff Crabtree MD calls and tell them that's okay. Please call [facility] to verify this.

## 2021-06-01 NOTE — TELEPHONE ENCOUNTER
Who is calling:  Patient   Reason for Call:  Is his prescription for Oxycodone at the ?  It is noted as be set to print.  Date of last appointment with primary care: 8/28/19  Okay to leave a detailed message: Yes

## 2021-06-01 NOTE — PROGRESS NOTES
[facility] Care Coordination Contact     CHW, contacted member to follow up on ACP. Left message to call back.    TIFFANY Pearl  [facility]  [phone]

## 2021-06-01 NOTE — PROGRESS NOTES
[facility] Care Coordination Contact    10/1/19: Phone call from Cassandra nurse with [employer] reports that saw member for homemaking review and reports that member is asking for homemaker to use EBT to buy food for him.  She inquired if member can benefit for ILS worker to assist with taking money out for bank card or use EBT as agency doesn't allow for their workers to engage with member's financial needs. She reports that doesn't allow homemaker to transport or go out of the home.  She reports that member and homemaker is adjusting.  Member prefers to have male worker so can take baths but is willing to work with female worker. One challenge has been language barrier as PCA is of another ethnic background and sometimes accent makes hard to understand.  She encourage that at those times to write it out.  She states that currently has male PCA for weekends.   Informed her that will follow up with member to see what his needs are as has been doing on his own.      Ruddy Alexander RN  [facility]  [phone]    [facility] Care Coordination Contact      [facility] Six-Month Telephone Assessment    6 month telephone assessment completed on 10/2/19.    ER visits: Yes -  [place]  Hospitalizations: Yes  TCU stays: Yes, [facility]  Significant health status changes: NO  Falls/Injuries: Yes:   ADL/IADL changes: No  Changes in services: No    Caregiver Assessment follow up: NA    Goals: See POC in chart for goal progress documentation.  Spoke with member, he reported that is needing some assistance with obtaining money from bank, grocery shopping, and medical appts as PCA/homemaker unable to go out of home.  Discuss ICLS program which he said he would be interested if can find someone to complete duties.  At this time, cousin and nephew complete duties for him.  He reports that has been having some difficulty with PCA's  Due to PCA having language barrier but will be patient and work with agency to see  if can find compatible worker.       Will see member in 6 months for an annual health risk assessment.   Encouraged member to call CC with any questions or concerns in the meantime.     Ruddy Alexander RN  [facility]  [phone]

## 2021-06-01 NOTE — TELEPHONE ENCOUNTER
RN cannot approve Refill Request    RN can NOT refill this medication med is not covered by policy/route to provider. Last office visit: Visit date not found Last Physical: Visit date not found Last MTM visit: Visit date not found Last visit same specialty: 8/28/2019 Geoff Crabtree MD.  Next visit within 3 mo: Visit date not found  Next physical within 3 mo: Visit date not found      Ghislaine Kern, Care Connection Triage/Med Refill 9/7/2019    Requested Prescriptions   Pending Prescriptions Disp Refills     ENSURE ACTIVE PROTEIN-MUSCLE Liqd [Pharmacy Med Name: ENSURE ACTIVE HP CHOCOLATE LIQUID] 85991 mL 0     Sig: DRINK 1 BOTTLE TWICE DAILY       There is no refill protocol information for this order

## 2021-06-02 VITALS — WEIGHT: 130 LBS | HEIGHT: 70 IN | BODY MASS INDEX: 18.61 KG/M2

## 2021-06-02 NOTE — PROGRESS NOTES
[facility] Care Coordination Contact     CHW, spoke with member  and  member apologized for not getting back to me. Member noted he had problems with phone and had to get a new one. Member noted he was not able to find his will and  he would follow up with his son to determine a time we could go over ACP  And, member would get back to CHW by 10/14 or sooner depending on when he connects w/ son.      Shanna Murray, TIFFANY  [facility]  [phone]

## 2021-06-02 NOTE — TELEPHONE ENCOUNTER
Is he still on Neurontin (gabapentin) ?  Should not be on both Lyrica and gabapentin.  Lyrica dosage if that is being used, is 75 twice daily

## 2021-06-02 NOTE — TELEPHONE ENCOUNTER
RN cannot approve Refill Request    RN can NOT refill this medication med is not covered by policy/route to provider. Last office visit: 8/28/2019 Geoff Crabtree MD Last Physical: Visit date not found Last MTM visit: Visit date not found Last visit same specialty: 8/28/2019 Geoff Crabtree MD.  Next visit within 3 mo: Visit date not found  Next physical within 3 mo: Visit date not found      Shelby Rodríguez, Care Connection Triage/Med Refill 10/6/2019    Requested Prescriptions   Pending Prescriptions Disp Refills     meloxicam (MOBIC) 7.5 MG tablet [Pharmacy Med Name: MELOXICAM 7.5MG TABLETS] 90 tablet 0     Sig: TAKE 1 TABLET(7.5 MG) BY MOUTH DAILY       There is no refill protocol information for this order        ENSURE ACTIVE PROTEIN-MUSCLE Liqd [Pharmacy Med Name: ENSURE ACTIVE HP CHOCOLATE LIQUID] 24383 mL 0     Sig: DRINK 1 BOTTLE TWICE DAILY       There is no refill protocol information for this order

## 2021-06-02 NOTE — TELEPHONE ENCOUNTER
RN cannot approve Refill Request    RN can NOT refill this medication med is not covered by policy/route to provider. Last office visit: Visit date not found Last Physical: Visit date not found Last MTM visit: Visit date not found Last visit same specialty: 8/28/2019 Geoff Crabtree MD.  Next visit within 3 mo: Visit date not found  Next physical within 3 mo: Visit date not found      Brittany Ramirez, Care Connection Triage/Med Refill 10/5/2019    Requested Prescriptions   Pending Prescriptions Disp Refills     budesonide-formoterol (SYMBICORT) 160-4.5 mcg/actuation inhaler [Pharmacy Med Name: SYMBICORT 160/4.5MCG (120 ORAL INH)]       Sig: INHALE 2 PUFFS BY MOUTH TWICE DAILY       There is no refill protocol information for this order

## 2021-06-02 NOTE — PROGRESS NOTES
OFFICE VISIT NOTE    Subjective:   Chief Complaint:  Shoulder Pain (left)    This is a complicated 69-year-old male with multiple problems including COPD, ASHD, probable ALS, chronic neck and back pain for which she requires narcotics.  Is in today with complaint of having increasing pain decreased range of motion of the left shoulder.  No recent falls or injuries.  No numbness of the hands.    Current Outpatient Medications   Medication Sig     albuterol (PROAIR HFA;PROVENTIL HFA;VENTOLIN HFA) 90 mcg/actuation inhaler Inhale 2 puffs 3 (three) times a day.     albuterol (PROVENTIL) 2.5 mg /3 mL (0.083 %) nebulizer solution Take 2.5 mg by nebulization every 4 (four) hours as needed for shortness of breath.      aspirin 81 MG EC tablet Take 81 mg by mouth daily.     atorvastatin (LIPITOR) 10 MG tablet TAKE 1 TABLET(10 MG) BY MOUTH DAILY     baclofen (LIORESAL) 10 MG tablet TAKE 1/2 TABLET(5 MG) BY MOUTH THREE TIMES DAILY (Patient taking differently: TAKE 1 TABLET(10 MG) BY MOUTH THREE TIMES DAILY)     budesonide (PULMICORT) 1 mg/2 mL nebulizer solution Take 1 mg by nebulization 2 (two) times a day.     budesonide-formoterol (SYMBICORT) 160-4.5 mcg/actuation inhaler INHALE 2 PUFFS BY MOUTH TWICE DAILY     calcium carbonate-vitamin D3 (CALTRATE 600 PLUS D3) 600 mg(1,500mg) -400 unit per tablet TAKE 1 TABLET BY MOUTH DAILY     cetirizine (ZYRTEC) 10 MG tablet TAKE 1 TABLET(10 MG) BY MOUTH DAILY     cholecalciferol, vitamin D3, 1,000 unit tablet Take 4,000 Units by mouth daily.            DAILY-HIEN tablet TAKE 1 TABLET BY MOUTH DAILY     docusate sodium (COLACE) 100 MG capsule Take 100 mg by mouth as needed for constipation.            DULoxetine (CYMBALTA) 20 MG capsule Take 60 mg by mouth daily.            ENSURE ACTIVE PROTEIN-MUSCLE Liqd DRINK 1 BOTTLE TWICE DAILY     ergocalciferol (ERGOCALCIFEROL) 50,000 unit capsule Take 50,000 Units by mouth once a week.     ferrous sulfate 325 (65 FE) MG tablet Take 1 tablet by  mouth daily with breakfast.     FLEET ENEMA 19-7 gram/118 mL Enem rectal enema INSERT 1 ENEMA RECTALLY ONCE AS NEEDED FOR CONSTIPATION FOR UP TO 1 DOSE     folic acid (FOLVITE) 1 MG tablet TAKE 1 TABLET BY MOUTH EVERY DAY     furosemide (LASIX) 20 MG tablet Take 40 mg by mouth 2 (two) times a day.            gabapentin (NEURONTIN) 300 MG capsule Take 300 mg by mouth 2 (two) times a day.     hydrOXYzine HCl (ATARAX) 25 MG tablet Take 1 tablet (25 mg total) by mouth every 8 (eight) hours as needed for itching.     ipratropium-albuterol (DUO-NEB) 0.5-2.5 mg/3 mL nebulizer Inhale 3 mL every 4 (four) hours as needed.            levalbuterol (XOPENEX HFA) 45 mcg/actuation inhaler Inhale 2 puffs every 4 (four) hours as needed for wheezing.     meloxicam (MOBIC) 7.5 MG tablet TAKE 1 TABLET(7.5 MG) BY MOUTH DAILY     metFORMIN (GLUCOPHAGE) 1000 MG tablet TAKE 1 TABLET BY MOUTH TWICE DAILY (Patient taking differently: TAKE 1/2 TABLET (500 MG) BY MOUTH DAILY WITH BREAKFAST)     metoprolol tartrate (LOPRESSOR) 25 MG tablet Take 1 tablet (25 mg total) by mouth 2 (two) times a day.     naloxone (NARCAN) 4 mg/actuation nasal spray 1 spray (4 mg dose) into one nostril for opioid reversal. Call 911. May repeat if no response in 3 minutes.     omeprazole (PRILOSEC) 20 MG capsule Take 20 mg by mouth daily before breakfast.      oxyCODONE (ROXICODONE) 30 MG immediate release tablet Take 1 tablet (30 mg total) by mouth 3 (three) times a day.     potassium chloride (K-DUR,KLOR-CON) 20 MEQ tablet TAKE 1 TABLET(20 MEQ) BY MOUTH TWICE DAILY     predniSONE (DELTASONE) 5 MG tablet Take 5 mg by mouth daily.     riluzole (RILUTEK) 50 mg tablet TAKE 1 TABLET BY MOUTH TWO TIMES A DAY BEFORE MEALS     senna-docusate (PERICOLACE) 8.6-50 mg tablet Take 2 tablets by mouth daily as needed for constipation.     SPIRIVA WITH HANDIHALER 18 mcg inhalation capsule Place 1 capsule (2 puffs total) into inhaler and inhale daily.     tamsulosin (FLOMAX) 0.4 mg  Cp24 Take 1 capsule (0.4 mg total) by mouth Daily after breakfast.     b complex vitamins tablet Take 1 tablet by mouth daily.     lidocaine (LIDODERM) 5 % PLACE 1 PATCH ON CLEAN SKIN DAILY. REMOVE AND DISCARD PATCH WITH 12 HOURS     midazolam, PF, (VERSED) 1 mg/mL Soln injection Infuse 0.5-6 mg into a venous catheter.       PSFHx: Tobacco Status:  He  reports that he has been smoking cigarettes. He has a 460.00 pack-year smoking history. He has never used smokeless tobacco.    Review of Systems:  A comprehensive review of systems is negative except for the comments above    Objective:    /62 (Patient Site: Right Arm, Patient Position: Sitting)   Pulse 77   SpO2 96%   GENERAL: No acute distress.  He is in a power wheelchair.  He is alert and answers questions.  Blood pressure 160/70 pulse 80 temperature is normal.  Oxygen saturation is 96%.  Lungs have bilateral rhonchi.  He continues to smoke.  Heart shows a sinus rhythm.  No peripheral edema.  Great deal atrophy of muscles in the posterior neck as well as posterior shoulder region.  He has significant decreased range of motion of the left shoulder.  He has crepitus suggesting arthritis and/or tendinitis.  Circulation of the distal wrists and hands are normal.    Assessment & Plan   Jef Centeno Jr. is a 69 y.o. male.    Left shoulder pain which is probably combination of tendinitis of the shoulder as well as arthritis.  He requested a cortisone shot.  I cleansed the skin with alcohol and Betadine.  I then injected 2 cc of 1% Xylocaine and 40 mg of Kenalog 40 into the left shoulder using a posterior approach.  A 27-gauge needle was used.  There were no complications.  A bandage was applied.  He will apply ice when he gets home.  He wanted a flu shot today.  He continues to see a neurologist every 3 to 4 months for his ALS.  That is a diagnosis from the neuro.    Diagnoses and all orders for this visit:    Tendinitis of left shoulder  -     triamcinolone  acetonide 40 mg/mL injection 40 mg (KENALOG-40)    COPD (chronic obstructive pulmonary disease) (H)  -     levalbuterol (XOPENEX HFA) 45 mcg/actuation inhaler; Inhale 2 puffs every 4 (four) hours as needed for wheezing.  Dispense: 5 Inhaler; Refill: 0    Itching  -     hydrOXYzine HCl (ATARAX) 25 MG tablet; Take 1 tablet (25 mg total) by mouth every 8 (eight) hours as needed for itching.  Dispense: 100 tablet; Refill: 1    Chronic obstructive pulmonary disease with acute exacerbation (H)  -     SPIRIVA WITH HANDIHALER 18 mcg inhalation capsule; Place 1 capsule (2 puffs total) into inhaler and inhale daily.  Dispense: 90 capsule; Refill: 3    Chronic neck pain    Need for vaccination  -     Influenza High Dose, Seasonal 65+ yrs            Geoff Crabtree MD  Transcription using voice recognition software, may contain typographical errors.

## 2021-06-02 NOTE — TELEPHONE ENCOUNTER
Okay to refill this.  Make sure the patient understands that it has been 25 days and he only should have used 75 tabs by now.  That means he should have 15 tabs of left.  If he is using more than 3 a day, we will not fill it.

## 2021-06-02 NOTE — TELEPHONE ENCOUNTER
RN cannot approve Refill Request    RN can NOT refill this medication med is not covered by policy/route to provider. Last office visit: 8/28/2019 Geoff Crabtree MD Last Physical: Visit date not found Last MTM visit: Visit date not found Last visit same specialty: 8/28/2019 Geoff Crabtree MD.  Next visit within 3 mo: Visit date not found  Next physical within 3 mo: Visit date not found      Nancy Duncan, Care Connection Triage/Med Refill 10/7/2019    Requested Prescriptions   Pending Prescriptions Disp Refills     FLEET ENEMA 19-7 gram/118 mL Enem rectal enema [Pharmacy Med Name: FLEET ENEMA 4S (532ML)] 3990 mL      Sig: INSERT 1 ENEMA RECTALLY ONCE AS NEEDED FOR CONSTIPATION FOR UP TO 1 DOSE       There is no refill protocol information for this order

## 2021-06-02 NOTE — PROGRESS NOTES
[facility] Care Coordination Contact    Received after visit chart from care coordinator.  Completed following tasks: Updated services in access and Submitted referrals/auths for ICLS 16 units/wk eff. 10/14/19-4/30/20 with [facility].     Juan Carlos Tavarez  Care Management Specialist  [facility]  [phone]

## 2021-06-02 NOTE — TELEPHONE ENCOUNTER
Dr. Crabtree,  Spoke with the patient and relayed message below.  Patient states that he stopped taking the gabapentin over a month ago since it only made him sleepy.  Prescription for lyrica has been set up for you to review.  Geovanna PRICE, TIAN/CMT....................1:04 PM

## 2021-06-02 NOTE — TELEPHONE ENCOUNTER
Dr. Crabtree,  Spoke with the patient and he states that he is only taking 3 tablets per day and is just calling 5 days ahead of time in case Michelle has problems refilling the medication.  Refill has been set up for you to refill with a message to the pharmacy not to fill until 11/3/19.  The patient is aware that the medication will not be filled until 11/3/19.  He verbalized understanding and had no further questions at this time.    Geovanna PRICE CMA/MIA....................1:01 PM

## 2021-06-02 NOTE — TELEPHONE ENCOUNTER
Marco Patiño for refill of Lyrica 75 mg capsules requested below?  Looks like the medication was last refilled 1/14/19 and it was reported that the patient was taking 1 capsule two times a day at that time.  Please advise.  Thank you.  Geovanna PRICE CMA/MIA....................10:37 AM

## 2021-06-02 NOTE — TELEPHONE ENCOUNTER
Controlled Substance Refill Request  Medication Name:   Requested Prescriptions     Pending Prescriptions Disp Refills     oxyCODONE (ROXICODONE) 30 MG immediate release tablet 90 tablet 0     Sig: Take 1 tablet (30 mg total) by mouth 3 (three) times a day.     Date Last Fill: 10/2/19  Pharmacy: [facility] # [id number]      Submit electronically to pharmacy  Controlled Substance Agreement Date Scanned:   Encounter-Level CSA Scan Date:    There are no encounter-level csa scan date.       Last office visit with prescriber/PCP: 10/8/2019 Geoff Crabtree MD OR same dept: Visit date not found OR same specialty: Visit date not found  Last physical: Visit date not found Last MTM visit: Visit date not found

## 2021-06-02 NOTE — PROGRESS NOTES
[facility] Care Coordination Contact    Phone call to member to discuss to informed that has found agency that provides ICLS services.  He said he was open and interested.  He thought 4 hrs per week would be a good start.  Informed him that will contact provider, [facility] and their staff should be contacting him soon.    Phone call to True, [facility] states will contact member once receives [phone] and after intake will submit again to care coordinator to sign.  Informed her that will start with 4hrs/wk.      Faxed [phone] to [facility].     Ruddy Alexander RN  [facility]  [phone]

## 2021-06-02 NOTE — TELEPHONE ENCOUNTER
RN cannot approve Refill Request    RN can NOT refill this medication Historical.  . Last office visit: 8/28/2019 Geoff Crabtree MD Last Physical: Visit date not found Last MTM visit: Visit date not found Last visit same specialty: 8/28/2019 Geoff Crabtree MD.  Next visit within 3 mo: Visit date not found  Next physical within 3 mo: Visit date not found      Génesis Geronimo, Care Connection Triage/Med Refill 10/6/2019    Requested Prescriptions   Pending Prescriptions Disp Refills     SPIRIVA WITH HANDIHALER 18 mcg inhalation capsule [Pharmacy Med Name: SPIRIVA 18MCG CAPS 30S &HANDIHALER] 90 capsule      Sig: INHALE 1 CAPSULE BY MOUTH ONCE DAILY USING HANDIHALER DEVICE       Ipratropium/Tiotropium/Umeclidinium Refill Protocol Passed - 10/4/2019  4:02 PM        Passed - PCP or prescribing provider visit in last 6 months     Last office visit with prescriber/PCP: 8/28/2019 OR same dept: 8/28/2019 Geoff Crabtree MD OR same specialty: 8/28/2019 Geoff Crabtree MD Last physical: Visit date not found Last MTM visit: Visit date not found     Next appt within 3 mo: Visit date not found  Next physical within 3 mo: Visit date not found  Prescriber OR PCP: Geoff Crabtree MD  Last diagnosis associated with med order: There are no diagnoses linked to this encounter.  If protocol passes may refill for 6 months if within 3 months of last provider visit (or a total of 9 months).

## 2021-06-02 NOTE — TELEPHONE ENCOUNTER
Dr. Crabtree,  Okay for refill requested?  Looks like we last set the medication up to be refilled on 10/3/19.  We are unable to check the  as the website is currently not working.    Please advise if it is okay to refill.  Thank you.  Geovanna PRICE CMA/MIA....................10:32 AM

## 2021-06-02 NOTE — PROGRESS NOTES
[facility] Care Coordination Contact     CHW, reached out to member to follow up on ACP lm to call.    TIFFANY Pearl  [facility]  [phone]

## 2021-06-02 NOTE — PROGRESS NOTES
[facility] Care Coordination Contact    Phone call from RAYMOND Alvarado would like to clarify if there is another PCA working with member from another agency.  Informed him that is ICLS worker as he needs assistance with grocery shopping and other errands as PCA/homemaker is not able to transport member.      Phone call to [facility] reports that ICLS worker did go out last week but also had PCA there so has reschedule to go during time when PCA is not there.    Tasked CMS to submit auth for ICLS services.

## 2021-06-03 ENCOUNTER — COMMUNICATION - HEALTHEAST (OUTPATIENT)
Dept: INTERNAL MEDICINE | Facility: CLINIC | Age: 71
End: 2021-06-03

## 2021-06-03 VITALS — BODY MASS INDEX: 21.33 KG/M2 | WEIGHT: 149 LBS | HEIGHT: 70 IN

## 2021-06-03 VITALS — BODY MASS INDEX: 21.47 KG/M2 | WEIGHT: 150 LBS | HEIGHT: 70 IN

## 2021-06-03 VITALS — HEIGHT: 70 IN | BODY MASS INDEX: 21.38 KG/M2

## 2021-06-03 VITALS — WEIGHT: 149 LBS | HEIGHT: 70 IN | BODY MASS INDEX: 21.33 KG/M2

## 2021-06-03 VITALS — HEIGHT: 70 IN | BODY MASS INDEX: 21.33 KG/M2 | WEIGHT: 149 LBS

## 2021-06-03 DIAGNOSIS — G89.29 CHRONIC NECK PAIN: ICD-10-CM

## 2021-06-03 DIAGNOSIS — M54.2 CHRONIC NECK PAIN: ICD-10-CM

## 2021-06-03 RX ORDER — OXYCODONE HYDROCHLORIDE 30 MG/1
30 TABLET ORAL 3 TIMES DAILY
Qty: 90 TABLET | Refills: 0 | Status: SHIPPED | OUTPATIENT
Start: 2021-06-03 | End: 2021-07-29

## 2021-06-03 NOTE — TELEPHONE ENCOUNTER
RN cannot approve Refill Request    RN can NOT refill this medication med is not covered by policy/route to provider. Last office visit: 10/8/2019 Geoff Crabtree MD Last Physical: Visit date not found Last MTM visit: Visit date not found Last visit same specialty: 10/8/2019 Geoff Crabtree MD.  Next visit within 3 mo: Visit date not found  Next physical within 3 mo: Visit date not found      Alyssa Mi, Care Connection Triage/Med Refill 11/28/2019    Requested Prescriptions   Pending Prescriptions Disp Refills     ENSURE ACTIVE PROTEIN-MUSCLE Liqd [Pharmacy Med Name: ENSURE ACTIVE HP CHOCOLATE LIQUID] 86263 mL 0     Sig: DRINK 1 BOTTLE TWICE DAILY       There is no refill protocol information for this order

## 2021-06-03 NOTE — PROGRESS NOTES
[facility] Care Coordination Contact    CHW, on behalf of member reached out to member son Darnell Centeno([phone]) to share information on ACP which member will be completing. Member requested CHW review w/ son since he is planning to list her and his  daughter as Health Care Agents. CHW, reviewed ACP materials w/ son and sent her information electronic and provided contact info if he had questions. Son asked if member had life insurance and CHW informed  Darnell not aware of this and he should follow up w/ member.      CHW to meet w/ member 11/12/19      TIFFANY Peral  [facility]  [phone]

## 2021-06-03 NOTE — PROGRESS NOTES
[facility] Care Coordination Contact    CHW, on behalf of member reached out to member daughter Iam Centeno([phone]) to share information on ACP which member will be completing.Member requested CHW review w/ daughter since he is planning to list her and his son as Health Care Agents. CHW, reviewed ACP materials w/ daughter and sent her information electronic and provided contact info if she had questions.    CHW to meet w/ member 11/12/19     TIFFANY Pearl  [facility]  [phone]

## 2021-06-03 NOTE — PROGRESS NOTES
[facility] Care Coordination Contact     CHW,on 11/12/19 met member at residence to complete MA paper work, ACP, and updated his Authorization to Share protected  Health information. Member very concerned about not getting medicine and CHW explained  once paper work completed plan was to get him reinstated so he would have benefits. Member called his sister Kyra  to  share he had completed  ACP and she would be listed on the medical release.      CHW, faxed MA paperwork to [facility].    TIFFANY Pearl  [facility]  [phone]

## 2021-06-03 NOTE — TELEPHONE ENCOUNTER
RN cannot approve Refill Request    RN can NOT refill this medication med is not covered by policy/route to provider. Last office visit: 10/8/2019 Geoff Crabtree MD Last Physical: Visit date not found Last MTM visit: Visit date not found Last visit same specialty: 10/8/2019 Geoff Crabtree MD.  Next visit within 3 mo: Visit date not found  Next physical within 3 mo: Visit date not found      Marilee Ford, Care Connection Triage/Med Refill 11/21/2019    Requested Prescriptions   Pending Prescriptions Disp Refills     meloxicam (MOBIC) 7.5 MG tablet 90 tablet 0       There is no refill protocol information for this order        predniSONE (DELTASONE) 5 MG tablet 30 tablet 3     Sig: Take 5 mg by mouth daily.       There is no refill protocol information for this order

## 2021-06-03 NOTE — PROGRESS NOTES
[facility] Care Coordination Contact     CHW, on behalf of member contacted  member nay Centeno  to share information on  ACP. Darnell returned call and  provided times to call back. CHW,will reach out later this week.    TIFFANY Pearl  [facility]  [phone]

## 2021-06-03 NOTE — PROGRESS NOTES
[facility] Care Coordination Contact     CHW, followed up w/ member on EBT card. Member informed CHW he received a call form his financial worker and his MA had been reinstated and member was very happy to get the news. He noted the balance for the EBT  was not a concern since he was now going to get his regular amount. CHW acknowledge this was good news for him      CHW, told member she would be mailing original paperwork to him which had be faxed to [facility].      TIFFANY Pearl  [facility]  [phone]

## 2021-06-03 NOTE — TELEPHONE ENCOUNTER
Medication Request  Medication name: Nystatin Suspension 100,000 unit/ml   Pharmacy Name and Location: [facility] #[id number]  Reason for request: Flare up of oral thrush  When did you use medication last?:  February 2019  Patient offered appointment:  patient declined  Okay to leave a detailed message: yes

## 2021-06-03 NOTE — TELEPHONE ENCOUNTER
Prescription Monitoring Program activity reviewed with no discrepancies noted.      Last fill per : 10/28/19   Quantity/days supply: 90 tablets for 30 days    Controlled Substance Agreement on file: Yes  Date: 5/7/19    Last office visit with provider:  10/8/2019 Geoff Crabtree MD    Please advise.

## 2021-06-03 NOTE — TELEPHONE ENCOUNTER
Marco Richard for prescription requested below?  This is the last prescription that was sent of this medication:   nystatin (MYCOSTATIN) 100,000 unit/mL suspension 60 mL 0 10/14/2015 10/24/2015 --   Sig - Route: Take 5 mL (500,000 Units total) by mouth 4 (four) times a day. - Oral     Please advise and we can set up for you to review.  Geovanna PRICE CMA/CMT....................9:51 AM

## 2021-06-03 NOTE — PROGRESS NOTES
[facility] Care Coordination Contact    Phone call from TIFFANY Pearl reports rick saw member and helped completed annual renewal forms for MA and submitted on 11/13/19. She reports member reports that concern that may not be open by end of month which is when he has appt to see PCP and also get his refill of narcotics.  Informed her that will call FW to alert that has submitted paperwork and to process as soon as possible.      LVM for Johnny Jono. Financial Worker [phone] that member has submitted paperwork and request that she reinstate case as soon as possible to due to medical needs.    Ruddy Alexander RN  [facility]  [phone]

## 2021-06-03 NOTE — TELEPHONE ENCOUNTER
Refill Approved    Rx renewed per Medication Renewal Policy. Medication was last renewed on 11/19/18.    Chelsea De La Cruz, Care Connection Triage/Med Refill 11/5/2019     Requested Prescriptions   Pending Prescriptions Disp Refills     metoprolol tartrate (LOPRESSOR) 25 MG tablet [Pharmacy Med Name: METOPROLOL TARTRATE 25MG TABLETS] 180 tablet 3     Sig: TAKE 1 TABLET(25 MG) BY MOUTH TWICE DAILY       Beta-Blockers Refill Protocol Passed - 11/5/2019  5:09 AM        Passed - PCP or prescribing provider visit in past 12 months or next 3 months     Last office visit with prescriber/PCP: 10/8/2019 Geoff Crabtree MD OR same dept: 10/8/2019 Geoff Crabtree MD OR same specialty: 10/8/2019 Geoff Crabtree MD  Last physical: Visit date not found Last MTM visit: Visit date not found   Next visit within 3 mo: Visit date not found  Next physical within 3 mo: Visit date not found  Prescriber OR PCP: Geoff Crabtree MD  Last diagnosis associated with med order: 1. ASHD (arteriosclerotic heart disease)  - metoprolol tartrate (LOPRESSOR) 25 MG tablet [Pharmacy Med Name: METOPROLOL TARTRATE 25MG TABLETS]; TAKE 1 TABLET(25 MG) BY MOUTH TWICE DAILY  Dispense: 180 tablet; Refill: 3    If protocol passes may refill for 12 months if within 3 months of last provider visit (or a total of 15 months).             Passed - Blood pressure filed in past 12 months     BP Readings from Last 1 Encounters:   10/08/19 116/62

## 2021-06-03 NOTE — TELEPHONE ENCOUNTER
Left detailed message for the patient relaying message below from Dr. Shabazz.  Asked that the patient call with any further questions.    Prescription has been set up for Dr. Shabazz to review.  Geovanna PRICE CMA/MIA....................10:46 AM

## 2021-06-03 NOTE — PROGRESS NOTES
[facility] Care Coordination Contact     CHW, received a call from member concerned about his EBT status. CHW, reminded member until benefits reinstated he would not be eligible, member wanted to know balance on his card and could I help him obtain.CHW, told member would follow up and get back to him.     TIFFANY Pearl  [facility]  [phone]

## 2021-06-03 NOTE — TELEPHONE ENCOUNTER
Patient Returning Call  Reason for call:  Patient called back.  Information relayed to patient:  n/a  Patient has additional questions:  Yes  If YES, what are your questions/concerns:  Calling on the status of this medication.  Patient is requesting this to be filled today and please call patient when medication is sent to the pharmacy.  Okay to leave a detailed message?: Yes

## 2021-06-03 NOTE — TELEPHONE ENCOUNTER
Prescription Monitoring Program activity reviewed with no discrepancies noted.      Last fill per : 11/3/19  Quantity/days supply: 90/30    Controlled Substance Agreement on file: Yes  Date:     Last office visit with provider:  10/8/2019 Geoff Crabtree MD    Please advise.

## 2021-06-03 NOTE — TELEPHONE ENCOUNTER
Pt is out and needs a refill    Controlled Substance Refill Request  Medication:   Requested Prescriptions     Pending Prescriptions Disp Refills     oxyCODONE (ROXICODONE) 30 MG immediate release tablet 90 tablet 0     Sig: Take 1 tablet (30 mg total) by mouth 3 (three) times a day.       Date Last Fill: 10/28/19    Pharmacy: [facility]        Submit electronically to pharmacy      Controlled Substance Agreement on File:   Encounter-Level CSA Scan Date:    There are no encounter-level csa scan date.           Last office visit: 10/8/2019 Geoff Crabtree MD

## 2021-06-03 NOTE — TELEPHONE ENCOUNTER
Patient has 30 tablets left at this time.        Controlled Substance Refill Request  Medication Name:   Requested Prescriptions     Pending Prescriptions Disp Refills     oxyCODONE (ROXICODONE) 30 MG immediate release tablet 90 tablet 0     Sig: Take 1 tablet (30 mg total) by mouth 3 (three) times a day.     Date Last Fill: 10/28/19  Pharmacy: [facility] #[id number]      Submit electronically to pharmacy  Controlled Substance Agreement Date Scanned:   Encounter-Level CSA Scan Date:    There are no encounter-level csa scan date.       Last office visit with prescriber/PCP: 10/8/2019 Geoff Crabtree MD OR same dept: 10/8/2019 Geoff Crabtree MD OR same specialty: 10/8/2019 Geoff Crabtree MD  Last physical: Visit date not found Last MTM visit: Visit date not found

## 2021-06-04 VITALS — SYSTOLIC BLOOD PRESSURE: 116 MMHG | OXYGEN SATURATION: 97 % | DIASTOLIC BLOOD PRESSURE: 62 MMHG | HEART RATE: 106 BPM

## 2021-06-04 VITALS
OXYGEN SATURATION: 96 % | HEIGHT: 70 IN | TEMPERATURE: 98.7 F | RESPIRATION RATE: 16 BRPM | WEIGHT: 139 LBS | DIASTOLIC BLOOD PRESSURE: 58 MMHG | SYSTOLIC BLOOD PRESSURE: 100 MMHG | BODY MASS INDEX: 19.9 KG/M2 | HEART RATE: 75 BPM

## 2021-06-04 VITALS — DIASTOLIC BLOOD PRESSURE: 66 MMHG | SYSTOLIC BLOOD PRESSURE: 112 MMHG | OXYGEN SATURATION: 97 % | HEART RATE: 74 BPM

## 2021-06-04 NOTE — PROGRESS NOTES
[facility] Care Coordination Contact    12/19/19  Phone call to member, he was shopping and requested that he call me once he is done to discuss request for electric wheelchair.    Ruddy Alexander RN  [facility]  [phone]

## 2021-06-04 NOTE — TELEPHONE ENCOUNTER
Request for Orders  [facility] home care received a referral for this patient to start home care services for PT and OT.    However, orders  after 48 hours due to medicare guidelines. The patient requested the PT SOC to begin on 2020      Who s Requesting: Linda       Orders being requested: PT SOC  For 2020     Where to send Orders: Simply respond to this Epic Telephone Call message string, and we can take as a verbal order if appropriate.   Thank you.

## 2021-06-04 NOTE — TELEPHONE ENCOUNTER
Request for Orders    Who s Requesting: Home Care Physical Therapist    Orders being requested  PT: 2x/week for 3 weeks.  OT jaye, SN jaye    Where to send Orders: reply to message   Thank you.

## 2021-06-04 NOTE — TELEPHONE ENCOUNTER
RN cannot approve Refill Request    RN can NOT refill this medication med is not covered by policy/route to provider. Last office visit: 10/8/2019 Geoff Crabtree MD Last Physical: 12/24/2019 Last MTM visit: Visit date not found Last visit same specialty: 10/8/2019 Geoff Crabtree MD.    Angela Bryant, Care Connection Triage/Med Refill 1/4/2020    Requested Prescriptions   Pending Prescriptions Disp Refills     ENSURE ACTIVE PROTEIN-MUSCLE Liqd [Pharmacy Med Name: ENSURE ACTIVE HP CHOCOLATE LIQUID]  0     Sig: DRINK 1 BOTTLE TWICE DAILY       There is no refill protocol information for this order

## 2021-06-04 NOTE — PROGRESS NOTES
[facility] Care Coordination Contact    12/13/19  Vm from member reports that would like assistance in getting new electric wheelchair as was informed by vendor that no longer repairable. He also reports that PCA has not been consistent.      LVM for Mr. Centeno to call care coordinator back to discuss further.     Ruddy Alexander RN  [facility]  [phone]

## 2021-06-04 NOTE — PROGRESS NOTES
[facility] Care Coordination Contact    Phone to member, he reports that needs a new electric wheelchair since old wheelchair is not repairable.  He also reports that has been having unreliable PCA services as people continue to turn over.      Phone call to [facility] Armando Service Dept at [employer] which he reports that last staff message for member was on 12/11/19 which informed him that can try to repair his electric wheelchair if brings to their shop and see if they adjust some parts otherwise recommends new one at this time.  He reports that member has had this one since 2003 and most parts are no longer being made any longer.  He transferred me to Norma, phone [phone] - fax [phone] in which we discussed process in obtaining wheelchair.  She requests   -face to face visit not  -rx for electric wheelchair  -PT/OT eval for wheelchair in coordination with staff from [employer] on same visit    She states that once that is completed then there are other required documents which still need to be complete from .  Process may take up to six months but only needs one visit for PT/OT eval.      Informed her that member is seeing PCP tomorrow and will let member as well as call 's office with information needed.    Phone call to Dr. Crabtree' office, spoke with MARCELLE Rangel relay request for electric wheelchair and documents needed.  She will passed on information.    Phone call to [facility], spoke with Christiano about concerns voiced by member.  He reports that has been declined by Jef of some staff.  He reports that there is assigned person that will work with him M-F 9-3pm starting tommorrow. He reports that there is different staff on weekends.  He also reports that there has been times when PCA shows up and he has other things he needed to leave to.  He would appreciate if Jef would let agency know so PCA will not come until he is available..    Phone call to member, explain process  for electric wheelchair.  He reports that he understands and will let care coordinator know if has questions.  Also discussed problems with PCA.  He agreed that communication seems to be a problem. Encourage him to communicate to PCA when is not available.      Ruddy Alexander RN  [facility]  [phone]

## 2021-06-04 NOTE — TELEPHONE ENCOUNTER
Upcoming Appointment Question  When is the appointment: Tomorrow  What is your appointment for?: physical  Who is your appointment scheduled with?: PCP only  What is your question/concern?: Ruddy from [facility] stated the patient needs to get an electric wheel chair. Caller would like this be addressed at his appointment tomorrow. Caller stated the patient will need a face-to-face as well as an Rx faxed to [facility].    Caller also stated the patient will need a referral to [facility] for physical and occupational therapy, for an evaluation. Due the patient's confusion, caller wants to make sure that PT/OT come out on the same day as when [facility] comes out, to measure the patient for his electric wheel chair.    Caller would like a phone call to confirm that all of this was done.    [facility] fax:  [phone]  [facility]:  [phone]  Okay to leave a detailed message?: Yes  [phone]

## 2021-06-04 NOTE — TELEPHONE ENCOUNTER
Prior Authorization Request  Who s requesting:  Pharmacy  Pharmacy Name and Location: [facility] #[id number]  Medication Name: hydrOXYzine HCl (ATARAX) 25 MG tablet  Insurance Plan: [employer]   Insurance Member ID Number:  [id number]  Informed patient that prior authorizations can take up to 10 business days for response:   No  Okay to leave a detailed message: No        KEY: [id number]

## 2021-06-04 NOTE — TELEPHONE ENCOUNTER
Central PA team  [phone]  Pool: HE PA MED ([id number])          PA has been initiated.       PA form completed and faxed insurance via Cover My Meds     Key:  [id number]      Medication:  Hydroxyzine     Insurance:  ucare        Response will be received via fax and may take up to 5-10 business days depending on plan

## 2021-06-04 NOTE — TELEPHONE ENCOUNTER
----- Message from Ilene Ortiz CMA sent at 1/2/2020  7:06 AM CST -----  Regarding: move to 40 min for emeterio  Good morning,      Wondering if one of you could see if this patient could move to 740am on 1/7/19 as he needs a emeterio for positioning  and it would be best if he could be in a 40 min time slot.       Thank you,      Ilene

## 2021-06-04 NOTE — TELEPHONE ENCOUNTER
Patient okay to do physical therapy as recommended.   should hold furosemide if he is going to use a fleets enema.

## 2021-06-04 NOTE — TELEPHONE ENCOUNTER
Left message for patient to call back because we need to move his appointment to 0730 check in time due to needing extra time due to patient needs to use a HEATHER. Reserved time on schedule.

## 2021-06-04 NOTE — TELEPHONE ENCOUNTER
New verbal order needed for Skilled Nursing start of care on 1/5/20 per patient's request.     This is outside of the original 48 hour referral order from PCP.    Casandra, RN  [facility]  [phone]

## 2021-06-04 NOTE — TELEPHONE ENCOUNTER
Level 1 medication interaction identified.  ACTION REQUIRED: Update patient medication list as appropriate and respond to Home Care staff within 24 hours with action steps taken.      Medication Dose, Route Discharge Med List States Patient Reports Taking   meloxicam and fleet enema      Furosemide and fleet enema                        To respond, use smartphrase .HHACTION

## 2021-06-04 NOTE — TELEPHONE ENCOUNTER
RN cannot approve Refill Request    RN can NOT refill this medication med is not covered by policy/route to provider     . Last office visit: 10/8/2019 Geoff Crabtree MD Last Physical: 12/24/2019 Last MTM visit: Visit date not found Last visit same specialty: 10/8/2019 Geoff Crabtree MD.  Next visit within 3 mo: Visit date not found  Next physical within 3 mo: Visit date not found      Keyanna Tenorio, [facility] Triage/Med Refill 12/29/2019    Requested Prescriptions   Pending Prescriptions Disp Refills     folic acid (FOLVITE) 1 MG tablet [Pharmacy Med Name: FOLIC ACID 1MG TABLETS] 90 tablet 3     Sig: TAKE 1 TABLET BY MOUTH EVERY DAY       There is no refill protocol information for this order

## 2021-06-04 NOTE — PROGRESS NOTES
[facility] Care Coordination Contact    Phone call from nurse from PCP's office that will has sent visit notes and rx for wheelchair to [facility] and will get order submitted for referral for PT/OT evaluation as well.  Request that she put on order to coordinate with staff from [facility] as they need to be present as well.      Ruddy Alexander RN  [facility]  [phone]

## 2021-06-04 NOTE — PROGRESS NOTES
Assessment and Plan:   Complicated 69-year-old man for annual physical.  He has COPD, ASHD, chronic severe back and neck pain related to spinal stenosis.  He is undergone surgery in the past without much pain relief.  He is plagued with severe pain especially in the right side of the back buttock and down the right leg.  He has been using an electric wheelchair for 16 years.  His current wheelchair is no longer working; he needs a new one.  He is also been diagnosed with amyotrophic lateral sclerosis.  Some difficulty with speech.  No trouble with swallowing.  Overall weaker.  He is able to eat 2 or 3 meals a day.  He can use a walker for some ambulation otherwise he uses the electric wheelchair.    No incontinence.    1. Routine general medical examination at a health care facility  Is in a wheelchair.  His head is flexed forward as he has trouble with chronic neck pain.  He does answer questions appropriately.    2. ALS (amyotrophic lateral sclerosis) (H)   is normal.  Difficulty standing up.  Swallowing seems normal.  - Wheelchair, electric    3. ASHD (arteriosclerotic heart disease)  Currently denies any chest pain.    4. Panlobular emphysema (H)  Unfortunately, continues to smoke at least 1/2 pack of cigarettes a day.    5. Lumbar stenosis with neurogenic claudication  Lots of pain in the back, buttock, shooting down the right leg.  He requires long-term opioid use to reduce the pain.  He requires an electric wheelchair for mobility purposes.  He will need to have home physical therapy evaluate him for the proper size of the chair.  Also, I think he would benefit by physical and Occupational Therapy to get him more mobile.  Again, because of his spinal stenosis as well as ALS, he is unable to be transported safely to physical therapy so, therapy should be brought to him.    6. Neck pain of over 3 months duration  Had surgery several years ago without significant improvement in his pain.    7. Type 2  diabetes mellitus with hyperglycemia, without long-term current use of insulin (H)  Does use metformin.  A1c has been excellent.  We will recheck an A1c at this time.  - Glycosylated Hemoglobin A1c  - Lipid Cascade  - Microalbumin, Random Urine    8. Screening for prostate cancer  No change in urination.  Check PSA.  - PSA (Prostatic-Specific Antigen), Annual Screen    9. Medication management  I am going to reduce his prednisone to 2-1/2 mg day and then discontinue it.  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  I see no evidence of sweet syndrome at this time  - HM1(CBC and Differential)  - Comprehensive Metabolic Panel  - HM1 (CBC with Diff)    10. Primary osteoarthritis of right knee  Severe pain in the right knee.  I did inject the knee today.  Skin was prepped with alcohol and Betadine.  I then injected 2 cc of 1% Xylocaine and 40 mg of Kenalog 40 into the right knee.  A lateral approach was used.  27-gauge needle.  A bandage was then applied.  There were no complications.  - triamcinolone acetonide 40 mg/mL injection 40 mg (KENALOG-40)        The patient's current medical problems were reviewed.    I have had an Advance Directives discussion with the patient.  The following health maintenance schedule was reviewed with the patient and provided in printed form in the after visit summary:   Health Maintenance   Topic Date Due     HEPATITIS C SCREENING  1950     HEART FAILURE ACTION PLAN  1950     SPIROMETRY  1950     COPD ACTION PLAN  1950     DIABETIC FOOT EXAM  1950     LIPID  1950     DIABETIC EYE EXAM  1950     COLONOSCOPY  07/02/2000     ZOSTER VACCINES (2 of 3) 11/02/2012     MEDICARE ANNUAL WELLNESS VISIT  07/02/2015     MICROALBUMIN  12/29/2016     PNEUMOCOCCAL IMMUNIZATION 65+ LOW/MEDIUM RISK (2 of 2 - PPSV23) 09/04/2017     A1C  02/07/2020     BMP  02/28/2020     ALT  08/28/2020     CBC  08/28/2020     FALL RISK ASSESSMENT   08/28/2020     ADVANCE CARE PLANNING  11/15/2024     TD 18+ HE  06/19/2027     TSH  Completed     INFLUENZA VACCINE RULE BASED  Completed        Subjective:   Chief Complaint: Jef Centeno Jr. is an 69 y.o. male here for an Annual Wellness visit.   HPI: 69-year-old man for annual wellness visit.  Mostly plagued by severe pain in the back neck right hip right buttock right leg.  He requires narcotics 3 times daily.  Also uses Lyrica.  Currently denies any shortness of breath but does smoke.  Not much ambulation.  Generalized osteoarthritis.  Uses electric wheelchair.  Main reason for using the wheelchair is because of severe pain in his back as well as joint discomfort.  No dermatologic issues.  Denies chest pain.  No migraine.  No tremors.  Nocturia x1 no hematuria.  Bowel function is okay.  He requires an occasional fleets enema.    Review of Systems:    Please see above.  The rest of the review of systems are negative for all systems.    Patient Care Team:  Geoff Crabtree MD as PCP - General (Internal Medicine)  Ruddy Alexander RN as Lead Care Coordinator (Primary Care - CC)  Geoff Crabtree MD as Assigned PCP     Patient Active Problem List   Diagnosis     COPD (chronic obstructive pulmonary disease) (H)     Postherpetic neuralgia     Type 2 diabetes mellitus with hyperglycemia, without long-term current use of insulin (H)     ASHD (arteriosclerotic heart disease)     Neck pain of over 3 months duration     Stenosis of cervical spine with myelopathy (H)     Lumbar stenosis with neurogenic claudication     Nicotine addiction     Iron deficiency anemia     CHF (congestive heart failure) (H)     Abnormal chest CT     Panlobular emphysema (H)     ALS (amyotrophic lateral sclerosis) (H)     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, stage 3 (H)     Past Medical History:   Diagnosis Date     ALS (amyotrophic lateral sclerosis) (H)      BPH (benign prostatic hyperplasia)      Closed fracture  of tibia and fibula, left, initial encounter      COPD (chronic obstructive pulmonary disease) (H)      Decubitus ulcer, buttock      Erectile dysfunction associated with type 2 diabetes mellitus (H)      HTN (hypertension)      Obstructive sleep apnea      Postherpetic neuralgia      Type 2 diabetes mellitus with diabetic neuropathy (H)       Past Surgical History:   Procedure Laterality Date     CHOLECYSTECTOMY       LAPAROSCOPIC GASTROTOMY W/ REPAIR OF ULCER       POSTERIOR FUSION CERVICAL SPINE      posterior fusion C2-C4 with laminnectomy; excision cervical lipoma      SIGMOIDOSCOPY  06/29/2008      Family History   Problem Relation Age of Onset     Diabetes Mother      Diabetes Father      Cancer Father      Diabetes Sister      Diabetes Brother       Social History     Socioeconomic History     Marital status:      Spouse name: Not on file     Number of children: Not on file     Years of education: Not on file     Highest education level: Not on file   Occupational History     Not on file   Social Needs     Financial resource strain: Not on file     Food insecurity:     Worry: Not on file     Inability: Not on file     Transportation needs:     Medical: Not on file     Non-medical: Not on file   Tobacco Use     Smoking status: Current Every Day Smoker     Packs/day: 10.00     Years: 46.00     Pack years: 460.00     Types: Cigarettes     Smokeless tobacco: Never Used   Substance and Sexual Activity     Alcohol use: No     Comment: Quit drinking in 2014. Prior to that he drank excessively.     Drug use: No     Sexual activity: Not on file   Lifestyle     Physical activity:     Days per week: Not on file     Minutes per session: Not on file     Stress: Not on file   Relationships     Social connections:     Talks on phone: Not on file     Gets together: Not on file     Attends Religion service: Not on file     Active member of club or organization: Not on file     Attends meetings of clubs or  organizations: Not on file     Relationship status: Not on file     Intimate partner violence:     Fear of current or ex partner: Not on file     Emotionally abused: Not on file     Physically abused: Not on file     Forced sexual activity: Not on file   Other Topics Concern     Not on file   Social History Narrative    Patient is , 5 children by previous wife are alive and well. Patient has social security disability.      Current Outpatient Medications   Medication Sig Dispense Refill     albuterol (PROAIR HFA;PROVENTIL HFA;VENTOLIN HFA) 90 mcg/actuation inhaler Inhale 2 puffs 3 (three) times a day. 1 Inhaler 1     aspirin 81 MG EC tablet Take 81 mg by mouth daily.       atorvastatin (LIPITOR) 10 MG tablet TAKE 1 TABLET(10 MG) BY MOUTH DAILY 90 tablet 3     b complex vitamins tablet Take 1 tablet by mouth daily.       baclofen (LIORESAL) 10 MG tablet TAKE 1/2 TABLET(5 MG) BY MOUTH THREE TIMES DAILY (Patient taking differently: TAKE 1 TABLET(10 MG) BY MOUTH THREE TIMES DAILY) 135 tablet 0     budesonide (PULMICORT) 1 mg/2 mL nebulizer solution Take 1 mg by nebulization 2 (two) times a day.       budesonide-formoterol (SYMBICORT) 160-4.5 mcg/actuation inhaler INHALE 2 PUFFS BY MOUTH TWICE DAILY 2 Inhaler 3     calcium carbonate-vitamin D3 (CALTRATE 600 PLUS D3) 600 mg(1,500mg) -400 unit per tablet TAKE 1 TABLET BY MOUTH DAILY 90 tablet 3     cetirizine (ZYRTEC) 10 MG tablet TAKE 1 TABLET(10 MG) BY MOUTH DAILY 90 tablet 3     cholecalciferol, vitamin D3, 1,000 unit tablet Take 4,000 Units by mouth daily.              DAILY-HIEN tablet TAKE 1 TABLET BY MOUTH DAILY 120 tablet 3     docusate sodium (COLACE) 100 MG capsule Take 100 mg by mouth as needed for constipation.              DULoxetine (CYMBALTA) 20 MG capsule Take 60 mg by mouth daily.              ENSURE ACTIVE PROTEIN-MUSCLE Liqd DRINK 1 BOTTLE TWICE DAILY 05263 mL 0     ferrous sulfate 325 (65 FE) MG tablet Take 1 tablet by mouth daily with breakfast.        FLEET ENEMA 19-7 gram/118 mL Enem rectal enema INSERT 1 ENEMA RECTALLY ONCE AS NEEDED FOR CONSTIPATION FOR UP TO 1 DOSE 3 mL 1     folic acid (FOLVITE) 1 MG tablet TAKE 1 TABLET BY MOUTH EVERY DAY 90 tablet 3     furosemide (LASIX) 20 MG tablet Take 40 mg by mouth 2 (two) times a day.              hydrOXYzine HCl (ATARAX) 25 MG tablet Take 1 tablet (25 mg total) by mouth every 8 (eight) hours as needed for itching. 100 tablet 1     ipratropium-albuterol (DUO-NEB) 0.5-2.5 mg/3 mL nebulizer Inhale 3 mL every 4 (four) hours as needed.              levalbuterol (XOPENEX HFA) 45 mcg/actuation inhaler Inhale 2 puffs every 4 (four) hours as needed for wheezing. 5 Inhaler 0     lidocaine (LIDODERM) 5 % PLACE 1 PATCH ON CLEAN SKIN DAILY. REMOVE AND DISCARD PATCH WITH 12 HOURS 30 patch 0     meloxicam (MOBIC) 7.5 MG tablet 1 tab p.o. daily 90 tablet 0     metFORMIN (GLUCOPHAGE) 1000 MG tablet TAKE 1 TABLET BY MOUTH TWICE DAILY (Patient taking differently: TAKE 1/2 TABLET (500 MG) BY MOUTH DAILY WITH BREAKFAST) 180 tablet 0     metoprolol tartrate (LOPRESSOR) 25 MG tablet TAKE 1 TABLET(25 MG) BY MOUTH TWICE DAILY 180 tablet 3     naloxone (NARCAN) 4 mg/actuation nasal spray 1 spray (4 mg dose) into one nostril for opioid reversal. Call 911. May repeat if no response in 3 minutes. 1 Box 0     nystatin (MYCOSTATIN) 100,000 unit/mL suspension Take 5 mL (500,000 Units total) by mouth 4 (four) times a day. Swish and spit. 200 mL 0     omeprazole (PRILOSEC) 20 MG capsule Take 20 mg by mouth daily before breakfast.        oxyCODONE (ROXICODONE) 30 MG immediate release tablet Take 1 tablet (30 mg total) by mouth 3 (three) times a day. 90 tablet 0     potassium chloride (K-DUR,KLOR-CON) 20 MEQ tablet TAKE 1 TABLET(20 MEQ) BY MOUTH TWICE DAILY 180 tablet 3     predniSONE (DELTASONE) 5 MG tablet Take 5 mg by mouth daily. 30 tablet 3     pregabalin (LYRICA) 75 MG capsule Take 1 capsule (75 mg total) by mouth 2 (two) times a day. 180  capsule 3     riluzole (RILUTEK) 50 mg tablet TAKE 1 TABLET BY MOUTH TWO TIMES A DAY BEFORE MEALS       senna-docusate (PERICOLACE) 8.6-50 mg tablet Take 2 tablets by mouth daily as needed for constipation. 30 tablet 3     SPIRIVA WITH HANDIHALER 18 mcg inhalation capsule Place 1 capsule (2 puffs total) into inhaler and inhale daily. 90 capsule 3     tamsulosin (FLOMAX) 0.4 mg Cp24 Take 1 capsule (0.4 mg total) by mouth Daily after breakfast. 90 capsule 3     Current Facility-Administered Medications   Medication Dose Route Frequency Provider Last Rate Last Dose     lidocaine 2 % jelly (XYLOCAINE)   Topical PRN Obdulia Prieto, NP   1 application at 08/09/19 1530     triamcinolone acetonide 40 mg/mL injection 40 mg (KENALOG-40)  40 mg Intra-articular Once Geoff Crabtree MD          Objective:   Vital Signs: There were no vitals taken for this visit.     VisionScreening:  No exam data present     PHYSICAL EXAM  69-year-old man who appears older than his stated age.  Blood pressure 116/62.  Pulse is 104.  Oxygen saturations 97% on room air.  Eyes seem normal.  Probable early cataracts.  Mouth and throat exam is normal.  Wears upper and lower dentures.  No carotid bruits.  Significant lection deformity of the neck.  He sits with the head extended forward chin on his chest.  Lungs have bilateral rhonchi and squeaks.  Heart does show sinus rhythm.  No significant ectopic beats are heard.  There is a grade 1/6 systolic ejection murmur.  2+ pedal edema.  Decreased pulses in both feet.  Abdomen is nontender.  No masses or ascites.  No organomegaly.  Generalized osteoarthritis.  Painful osteoarthritic right knee.  Handgrip is fairly normal for age.  Symmetrical.  No obvious cognitive abnormalities  Chronic small decubitus over the coccyx.  Very superficial.  Assessment Results 12/24/2019   Activities of Daily Living No help needed   Instrumental Activities of Daily Living 1 - Full function   Get Up and Go Score (No  Data)   Some recent data might be hidden     A Mini-Cog score of 0-2 suggests the possibility of dementia, score of 3-5 suggests no dementia    Identified Health Risks:   The patient's PHQ-9 score is consistent with mild depression.   He is at risk for falling and has been provided with information to reduce the risk of falling at home.  Patient's advanced directive was discussed and I am comfortable with the patient's wishes.  Because of his significant arthritis as well as spinal stenosis, and ALS, he will require continued use of an electric wheelchair.  This face-to-face exam was done on 12/24/2019.

## 2021-06-05 VITALS
RESPIRATION RATE: 16 BRPM | SYSTOLIC BLOOD PRESSURE: 90 MMHG | DIASTOLIC BLOOD PRESSURE: 46 MMHG | BODY MASS INDEX: 19.56 KG/M2 | HEART RATE: 78 BPM | HEIGHT: 70 IN | OXYGEN SATURATION: 96 % | TEMPERATURE: 98.2 F | WEIGHT: 136.6 LBS

## 2021-06-05 NOTE — TELEPHONE ENCOUNTER
Pt reports he did not take his diuretic today, says he will take it tonight.   Pt states he won't take it when he has to go out of his house.   Pt is non-compliant with wearing compression garments.   Pt's edema is 3+ B lower legs today.   Vitals are WNL, and there are no s/s shortness of breath.     Please reply to this message with any comments or concerns.    Thank you,  Nancy Burch, PT

## 2021-06-05 NOTE — TELEPHONE ENCOUNTER
Left message with below information. I have asked him to call back if he has questions or if there is a reason he would like Claritin vs the Zyrtec.     Tarah Davila, CMA

## 2021-06-05 NOTE — TELEPHONE ENCOUNTER
Medication Question or Clarification  Who is calling: Patient   What medication are you calling about (include dose and sig)?:   cetirizine (ZYRTEC) 10 MG tablet 90 tablet 3 12/24/2019     Sig - Route: Take 1 tablet (10 mg total) by mouth daily. - Oral    Sent to pharmacy as: cetirizine 10 mg tablet (ZyrTEC)    E-Prescribing Status: Receipt confirmed by pharmacy (12/24/2019 12:12 PM CST)        Who prescribed the medication?: Geoff Crabtree MD    What is your question/concern?: Patient called in requesting Claritin 10 mg and an enema. Writer could not find the Claritin in chart and patient became upset.  Writer advised patient the allergy medication found was cetirizine. Writer called [facility] pharmacy to assist with refill request and the cetirizine was not due until the end of this month and they were waiting on a new Rx for the enema.  Please clarify with patient which allergy medication he should be taking and the cetirizine is not available for refill until the end of the month (as he picked up a 90 day supply prior per pharmacy).   Also please update patient on the status of the enema request.   Requested [facility]acy: [facility]  Okay to leave a detailed message?: Yes

## 2021-06-05 NOTE — PROGRESS NOTES
"Follow up Vascular Visit       Date of Service:1/29/2020    Date Last Seen: 1/2/2020; 1/2/2020    Chief Complaint: buttocks wound        Pt returns to [facility] with regards to their buttocks wound.  They have been lost to followup; last seen 6 months ago. They arrive today alone; arrives via medical transport. He currently has home care services daily; including OT/PT, RN, and PCA however the patient states that there was a lapse in services; I am not noting this in the chart. They are currently using aquacel; tegaderm to the buttocks wounds; this is not per my recommendation; the patient arrives today with no dressing on the buttocks wound. He is no longer wearing his compression stockings; states he can no longer get these on. He has good control of his diabetes; working with endocrinology; last A1C was 5.3% done 12/2019. He has a new w/c and a 4\" high density foam cushion. Per [facility] notes pt is up in his w/c for most hours of the day and has declined timers to help remind him to reposition however the patient states that he gets out of his chair frequently and and trying to walk around his home. He reports that he quit smoking 2-3 weeks ago; however arrives today smelling of cigarette smoke and had a lighter on his key chain. They are feeling well today. Denies fevers, chills. No shortness of breath.     Allergies: Acetaminophen    Medications:   Current Outpatient Medications:      albuterol (PROAIR HFA;PROVENTIL HFA;VENTOLIN HFA) 90 mcg/actuation inhaler, Inhale 2 puffs 3 (three) times a day., Disp: 1 Inhaler, Rfl: 12     aspirin 81 MG EC tablet, Take 81 mg by mouth daily., Disp: , Rfl:      atorvastatin (LIPITOR) 10 MG tablet, Take 1 tablet (10 mg total) by mouth daily., Disp: 90 tablet, Rfl: 3     b complex vitamins tablet, Take 1 tablet by mouth daily., Disp: , Rfl:      baclofen (LIORESAL) 10 MG tablet, TAKE 1/2 TABLET(5 MG) BY MOUTH THREE TIMES DAILY (Patient taking differently: TAKE 1 " TABLET(10 MG) BY MOUTH THREE TIMES DAILY), Disp: 135 tablet, Rfl: 0     budesonide (PULMICORT) 1 mg/2 mL nebulizer solution, Take 1 mg by nebulization 2 (two) times a day., Disp: , Rfl:      budesonide-formoterol (SYMBICORT) 160-4.5 mcg/actuation inhaler, INHALE 2 PUFFS BY MOUTH TWICE DAILY, Disp: 2 Inhaler, Rfl: 6     calcium carbonate-vitamin D3 (CALTRATE 600 PLUS D3) 600 mg(1,500mg) -400 unit per tablet, TAKE 1 TABLET BY MOUTH DAILY, Disp: 90 tablet, Rfl: 3     cetirizine (ZYRTEC) 10 MG tablet, Take 1 tablet (10 mg total) by mouth daily., Disp: 90 tablet, Rfl: 3     cholecalciferol, vitamin D3, 1,000 unit tablet, Take 4,000 Units by mouth daily.    , Disp: , Rfl:      DAILY-HIEN tablet, TAKE 1 TABLET BY MOUTH DAILY, Disp: 120 tablet, Rfl: 3     docusate sodium (COLACE) 100 MG capsule, Take 100 mg by mouth as needed for constipation.    , Disp: , Rfl:      DULoxetine (CYMBALTA) 20 MG capsule, Take 3 capsules (60 mg total) by mouth daily., Disp: 270 capsule, Rfl: 3     ENSURE ACTIVE PROTEIN-MUSCLE Liqd, DRINK 1 BOTTLE TWICE DAILY, Disp: 60 Bottle, Rfl: 2     ferrous sulfate 325 (65 FE) MG tablet, Take 1 tablet (325 mg total) by mouth daily with breakfast., Disp: 90 tablet, Rfl: 3     FLEET ENEMA 19-7 gram/118 mL Enem rectal enema, INSERT 1 ENEMA RECTALLY ONCE AS NEEDED FOR CONSTIPATION FOR UP TO 1 DOSE, Disp: 3 mL, Rfl: 1     folic acid (FOLVITE) 1 MG tablet, TAKE 1 TABLET BY MOUTH EVERY DAY, Disp: 90 tablet, Rfl: 3     furosemide (LASIX) 20 MG tablet, Take 2 tablets (40 mg total) by mouth 2 (two) times a day., Disp: 180 tablet, Rfl: 3     hydrOXYzine HCl (ATARAX) 25 MG tablet, Take 1 tablet (25 mg total) by mouth every 8 (eight) hours as needed for itching., Disp: 100 tablet, Rfl: 1     ipratropium-albuterol (DUO-NEB) 0.5-2.5 mg/3 mL nebulizer, Inhale 3 mL every 4 (four) hours as needed., Disp: 30 vial, Rfl: 3     levalbuterol (XOPENEX HFA) 45 mcg/actuation inhaler, Inhale 2 puffs every 4 (four) hours as needed  for wheezing., Disp: 5 Inhaler, Rfl: 3     lidocaine (LIDODERM) 5 %, PLACE 1 PATCH ON CLEAN SKIN DAILY. REMOVE AND DISCARD PATCH WITH 12 HOURS, Disp: 30 patch, Rfl: 6     meloxicam (MOBIC) 7.5 MG tablet, 1 tab p.o. daily, Disp: 90 tablet, Rfl: 3     metFORMIN (GLUCOPHAGE) 500 MG tablet, Take 1 tablet (500 mg total) by mouth daily with breakfast., Disp: 90 tablet, Rfl: 3     metoprolol tartrate (LOPRESSOR) 25 MG tablet, TAKE 1 TABLET(25 MG) BY MOUTH TWICE DAILY, Disp: 180 tablet, Rfl: 3     naloxone (NARCAN) 4 mg/actuation nasal spray, 1 spray (4 mg dose) into one nostril for opioid reversal. Call 911. May repeat if no response in 3 minutes., Disp: 1 Box, Rfl: 0     nystatin (MYCOSTATIN) 100,000 unit/mL suspension, Take 5 mL (500,000 Units total) by mouth 4 (four) times a day. Swish and spit., Disp: 200 mL, Rfl: 0     omeprazole (PRILOSEC) 20 MG capsule, Take 1 capsule (20 mg total) by mouth daily before breakfast., Disp: 90 capsule, Rfl: 3     oxyCODONE (ROXICODONE) 30 MG immediate release tablet, Take 1 tablet (30 mg total) by mouth 3 (three) times a day., Disp: 90 tablet, Rfl: 0     potassium chloride (K-DUR,KLOR-CON) 20 MEQ tablet, TAKE 1 TABLET(20 MEQ) BY MOUTH TWICE DAILY, Disp: 180 tablet, Rfl: 3     predniSONE (DELTASONE) 5 MG tablet, Take 2.5 mg by mouth daily For 2 weeks then stop., Disp: 7 tablet, Rfl: 0     pregabalin (LYRICA) 75 MG capsule, Take 1 capsule (75 mg total) by mouth 2 (two) times a day., Disp: 180 capsule, Rfl: 3     riluzole (RILUTEK) 50 mg tablet, TAKE 1 TABLET BY MOUTH TWO TIMES A DAY BEFORE MEALS, Disp: , Rfl:      senna-docusate (PERICOLACE) 8.6-50 mg tablet, Take 2 tablets by mouth daily as needed for constipation., Disp: 30 tablet, Rfl: 3     SPIRIVA WITH HANDIHALER 18 mcg inhalation capsule, Place 1 capsule (2 puffs total) into inhaler and inhale daily., Disp: 90 capsule, Rfl: 3     tamsulosin (FLOMAX) 0.4 mg cap, Take 1 capsule (0.4 mg total) by mouth Daily after breakfast., Disp: 90  capsule, Rfl: 3     wound dressings (TRIAD WOUND DRESSING) Pste, Apply 71 g topically 2 (two) times a day., Disp: , Rfl: 3    Current Facility-Administered Medications:      lidocaine 2 % jelly (XYLOCAINE), , Topical, PRN, Obdulia Prieto NP, 1 application at 08/09/19 1530    History:   Past Medical History:   Diagnosis Date     ALS (amyotrophic lateral sclerosis) (H)      BPH (benign prostatic hyperplasia)      Closed fracture of tibia and fibula, left, initial encounter      COPD (chronic obstructive pulmonary disease) (H)      Decubitus ulcer, buttock      Erectile dysfunction associated with type 2 diabetes mellitus (H)      HTN (hypertension)      Obstructive sleep apnea      Postherpetic neuralgia      Type 2 diabetes mellitus with diabetic neuropathy (H)        Physical Exam:    /62 (Patient Site: Right Arm, Patient Position: Sitting, Cuff Size: Adult Regular)   Pulse 72   Temp 98.4  F (36.9  C)     General:  Patient presents to clinic in no apparent distress.  Head: normocephalic atraumatic  Psychiatric:  Alert and oriented x3.   Respiratory: unlabored breathing; no cough; LS clear but diminished throughout  Cardiac: regular rate and rhythm; no murmurs appreciated  Integumentary:  Skin is uniformly warm, dry and pink.    Extremites: +4 pitting edema bilaterally; skin intact; no erythema; no pain with palpation; nails well trimmed; + debris in webbing space; skin appears well moisturized  Wound #1 Location: right buttock  Size: 2L x 1W x 0.1depth.  No sinus tract present, Wound base: slough  No undermining present. Wound is full thickness. There is light drainage. Periwound: no denudement, erythema, induration, maceration or warmth.        Wound #2 Location: left buttock  Size: 2L x 1W x 0.1depth.  No sinus tract present, Wound base: slough  No undermining present. Wound is full thickness. There is light drainage. Periwound: no denudement, erythema, induration, maceration or warmth.         Circumferential volume measures:    Vasc Edema 4/30/2019   Right just above MTP 24.5   Right Ankle 25.5   Right Widest Calf 32   Right Thigh Up 10cm 37   Left - just above MTP 26   Left Ankle 28   Left Widest Calf 33.5   Left Thigh Up 10cm 38       Ulceration(s)/Wound(s):     VASC Wound 03/29/19 Rt buttocks (Active)   Pre Size Length 2 1/29/2020  7:00 AM   Pre Size Width 1 1/29/2020  7:00 AM   Pre Size Depth 0.1 1/29/2020  7:00 AM   Pre Total Sq cm 2 1/29/2020  7:00 AM   Post Size Length 0.5 4/30/2019  8:00 AM   Post Size Width 0.7 4/30/2019  8:00 AM   Description scattered area surrounding 8/9/2019  3:00 PM       VASC Wound 06/25/19 crease of buttock (Active)   Pre Size Length 0 8/9/2019  3:00 PM   Pre Size Width 0 8/9/2019  3:00 PM   Pre Size Depth 0 8/9/2019  3:00 PM   Pre Total Sq cm 0 8/9/2019  3:00 PM   Undermined n 6/25/2019  8:00 AM   Tunneling n 6/25/2019  8:00 AM   Description pale pink wound bed 6/25/2019  8:00 AM       VASC Wound 01/29/20 Lt buttock (Active)   Pre Size Length 2 1/29/2020  8:00 AM   Pre Size Width 1 1/29/2020  8:00 AM   Pre Size Depth 0.1 1/29/2020  8:00 AM   Pre Total Sq cm 2 1/29/2020  8:00 AM       Pressure Ulcer/Injury 01/05/20 Coccyx Stage 2 (Active)       Pressure Ulcer/Injury 01/17/20 Buttocks Left Stage 1 (Active)       Pressure Ulcer/Injury 01/17/20 Buttocks Distal (further from center) Stage 1 (Active)        Lab Values    No results found for: SEDRATE  Lab Results   Component Value Date    CREATININE 0.71 12/24/2019     Lab Results   Component Value Date    HGBA1C 5.3 12/24/2019     Lab Results   Component Value Date    BUN 11 12/24/2019     Lab Results   Component Value Date    ALBUMIN 3.6 12/24/2019     Vitamin D, Total (25-Hydroxy)   Date Value Ref Range Status   06/14/2019 34.7 30.0 - 80.0 ng/mL Final             Impression:  1. Pressure ulcer of coccygeal region, unstageable (H)  wound dressings (TRIAD WOUND DRESSING) Pste   2. Type 2 diabetes mellitus with  "hyperglycemia, without long-term current use of insulin (H)  wound dressings (TRIAD WOUND DRESSING) Pste   3. Long term systemic steroid user  wound dressings (TRIAD WOUND DRESSING) Pste   4. ALS (amyotrophic lateral sclerosis) (H)  wound dressings (TRIAD WOUND DRESSING) Pste   5. Leg swelling  wound dressings (TRIAD WOUND DRESSING) Pste   6. Wheelchair bound  wound dressings (TRIAD WOUND DRESSING) Pste            Are any of these wounds new today: No; Location: na    Assessment/Plan:          1. Debridement: na     2.  Wound treatment: wound treatment will include irrigation and dressings to promote autolytic debridement which will include:will stop the dressings due to wound location would be best to apply triad paste 1-2 times per day Stable wounds           3. Edema: his swelling is much worse; he has stopped wearing compression; we will reduce this down using tubular compression and short stretch; re-wrap daily; when swelling is down transition back into compression stockings and wear these daily.  The compression wraps were applied today in clinic. Worsened            4. Nutrition: diabetic diet; well controlled           5. Offloading: pt reports that he is getting up frequently however nursing notes indicate he is sitting in his chair for most hours of the day; reminded patient that the wounds will not get better if he does not get off his buttocks; he now has a new w/c with 4\" foam cushion; could consider getting roho cushion as well     Patient will follow up with me in 4-6 weeks for reevaluation. They were instructed to call the clinic sooner with any signs or symptoms of infection or any further questions/concerns. Answered all questions.    Obdulia Prieto DNP, RN, CNP, CWOCN, CFCN, CLT  [facility]   [phone]        This note was electronically signed by Obdulia Prieto    "

## 2021-06-05 NOTE — PROGRESS NOTES
OFFICE VISIT NOTE    Subjective:   Chief Complaint:  Nasal Congestion (needs allergy medication. one minute it is runny and then he gets congested and it moves to his chest. he is coughing up alot of phlegm)    69-year-old man with multiple problems including COPD and continued smoking, chronic severe back pain from lumbar stenosis, possible ALS as diagnosed by his neurologist.  He is in today with complaint of increasing difficulty with rhinorrhea and chest congestion.  No fevers or chills.  Continues to smoke.  Continues to have severe pain in the back as well as right leg.  Denies fevers or chills.  No loss of bladder function.    Current Outpatient Medications   Medication Sig     albuterol (PROAIR HFA;PROVENTIL HFA;VENTOLIN HFA) 90 mcg/actuation inhaler Inhale 2 puffs 3 (three) times a day.     aspirin 81 MG EC tablet Take 81 mg by mouth daily.     atorvastatin (LIPITOR) 10 MG tablet Take 1 tablet (10 mg total) by mouth daily.     b complex vitamins tablet Take 1 tablet by mouth daily.     baclofen (LIORESAL) 10 MG tablet TAKE 1/2 TABLET(5 MG) BY MOUTH THREE TIMES DAILY (Patient taking differently: TAKE 1 TABLET(10 MG) BY MOUTH THREE TIMES DAILY)     budesonide (PULMICORT) 1 mg/2 mL nebulizer solution Take 1 mg by nebulization 2 (two) times a day.     budesonide-formoterol (SYMBICORT) 160-4.5 mcg/actuation inhaler INHALE 2 PUFFS BY MOUTH TWICE DAILY     calcium carbonate-vitamin D3 (CALTRATE 600 PLUS D3) 600 mg(1,500mg) -400 unit per tablet TAKE 1 TABLET BY MOUTH DAILY     cetirizine (ZYRTEC) 10 MG tablet Take 1 tablet (10 mg total) by mouth daily.     cholecalciferol, vitamin D3, 1,000 unit tablet Take 4,000 Units by mouth daily.            DAILY-HIEN tablet TAKE 1 TABLET BY MOUTH DAILY     docusate sodium (COLACE) 100 MG capsule Take 100 mg by mouth as needed for constipation.            DULoxetine (CYMBALTA) 20 MG capsule Take 3 capsules (60 mg total) by mouth daily.     ENSURE ACTIVE PROTEIN-MUSCLE Liqd  DRINK 1 BOTTLE TWICE DAILY     ferrous sulfate 325 (65 FE) MG tablet Take 1 tablet (325 mg total) by mouth daily with breakfast.     folic acid (FOLVITE) 1 MG tablet TAKE 1 TABLET BY MOUTH EVERY DAY     furosemide (LASIX) 20 MG tablet Take 2 tablets (40 mg total) by mouth 2 (two) times a day.     hydrOXYzine HCl (ATARAX) 25 MG tablet Take 1 tablet (25 mg total) by mouth every 8 (eight) hours as needed for itching.     ipratropium-albuterol (DUO-NEB) 0.5-2.5 mg/3 mL nebulizer Inhale 3 mL every 4 (four) hours as needed.     levalbuterol (XOPENEX HFA) 45 mcg/actuation inhaler Inhale 2 puffs every 4 (four) hours as needed for wheezing.     lidocaine (LIDODERM) 5 % PLACE 1 PATCH ON CLEAN SKIN DAILY. REMOVE AND DISCARD PATCH WITH 12 HOURS     loratadine-pseudoephedrine (CLARITIN-D 12 HOUR) 5-120 mg Tb12 Take 1 tablet by mouth 2 (two) times a day.     meloxicam (MOBIC) 7.5 MG tablet 1 tab p.o. daily     metFORMIN (GLUCOPHAGE) 500 MG tablet Take 1 tablet (500 mg total) by mouth daily with breakfast.     metoprolol tartrate (LOPRESSOR) 25 MG tablet TAKE 1 TABLET(25 MG) BY MOUTH TWICE DAILY     naloxone (NARCAN) 4 mg/actuation nasal spray 1 spray (4 mg dose) into one nostril for opioid reversal. Call 911. May repeat if no response in 3 minutes.     nystatin (MYCOSTATIN) 100,000 unit/mL suspension Take 5 mL (500,000 Units total) by mouth 4 (four) times a day. Swish and spit.     omeprazole (PRILOSEC) 20 MG capsule Take 1 capsule (20 mg total) by mouth daily before breakfast.     oxyCODONE (ROXICODONE) 30 MG immediate release tablet Take 1 tablet (30 mg total) by mouth 3 (three) times a day.     potassium chloride (K-DUR,KLOR-CON) 20 MEQ tablet TAKE 1 TABLET(20 MEQ) BY MOUTH TWICE DAILY     predniSONE (DELTASONE) 5 MG tablet Take 2.5 mg by mouth daily For 2 weeks then stop.     pregabalin (LYRICA) 75 MG capsule Take 1 capsule (75 mg total) by mouth 2 (two) times a day.     riluzole (RILUTEK) 50 mg tablet TAKE 1 TABLET BY MOUTH  TWO TIMES A DAY BEFORE MEALS     senna-docusate (PERICOLACE) 8.6-50 mg tablet Take 2 tablets by mouth daily as needed for constipation.     sodium phosphates 133 mL (FOR FLEET) 19-7 gram/118 mL Enem rectal enema INSERT 1 ENEMA INTO THE RECTUM ONCE FOR 1 DOSE     SPIRIVA WITH HANDIHALER 18 mcg inhalation capsule Place 1 capsule (2 puffs total) into inhaler and inhale daily.     tamsulosin (FLOMAX) 0.4 mg cap Take 1 capsule (0.4 mg total) by mouth Daily after breakfast.     wound dressings (TRIAD WOUND DRESSING) Pste Apply 71 g topically 2 (two) times a day.       PSFHx: Tobacco Status:  He  reports that he has been smoking cigarettes. He has a 460.00 pack-year smoking history. He has never used smokeless tobacco.    Review of Systems:  A comprehensive review of systems is negative except for the comments above    Objective:    There were no vitals taken for this visit.  GENERAL: No acute distress.  In wheelchair but he does get up and do short walks.  He needs the power wheelchair basically to get around because of severe back pain as well as weakness.  Lungs have bilateral rhonchi.  Oxygen saturation good at 97%.  Pulse is 74.  He is afebrile.  Heart shows a regular rhythm.  No gallop.  No edema.  No sinus tenderness.  Watery rhinorrhea in the nose.      Assessment & Plan   Jef Centeno Jr. is a 69 y.o. male.    Rhinorrhea.  We should try some Claritin-D twice daily for the next 2 to 3 weeks.  Continues to have chronic back pain.  I am going to try to reduce his oxycodone to 20 mg 3 times daily the next time he is due for refill.  Would like to back off that dosage if possible.  He understands he needs to quit smoking or his lungs are never going to get better.  Nurses are visiting him regarding ulcers on his buttocks.  They will continue nursing cares.  Diagnoses and all orders for this visit:    Rhinorrhea  -     loratadine-pseudoephedrine (CLARITIN-D 12 HOUR) 5-120 mg Tb12; Take 1 tablet by mouth 2 (two) times  a day.  Dispense: 60 tablet; Refill: 0    Lumbar stenosis with neurogenic claudication    Panlobular emphysema (H)            Geoff Crabtree MD  Transcription using voice recognition software, may contain typographical errors.

## 2021-06-05 NOTE — TELEPHONE ENCOUNTER
Prescription Monitoring Program activity reviewed with no discrepancies noted.  Last fill per : 1/2/20        Controlled Substance Agreement on file: No  Date: NA    Last office visit with provider:  10/8/2019 Geoff Crabtree MD    Please advise.

## 2021-06-05 NOTE — PROGRESS NOTES
Compression Applied to Bilateral  Tubigrip Size G  Compression Applied to Bilateral  Short Stretch

## 2021-06-05 NOTE — TELEPHONE ENCOUNTER
Request for Orders    Who s Requesting: Home Care Registered Nurse    Orders being requested:     SN EOD wound care x 30days, edema, HTN, COPD, Safety  [facility]HA for simple wound care EOD x 30 days, ace wrap, safety    Per vascular; Obdulia Prieto CNP    Where to send Orders: Epic hyperspace    Patient has 2 pressure sores on buttocks that need daily application of Triad. Patient is unable to apply himself or female PCA. Also, ace wrap for edema daily with removing at night by PCA.     Thank you,  Esther Urena, RN

## 2021-06-05 NOTE — TELEPHONE ENCOUNTER
Request for Orders    Who s Requesting: Home Care Registered Nurse    Orders being requested: [id number] for wound care to buttocks (cleanse with saline, pat dry, apply aquacell or similar, apply ABD or tegaderm foam padding), nutrition, GI/, safety    Where to send Orders: [facility]    Thank you,  Esther Urena RN

## 2021-06-05 NOTE — TELEPHONE ENCOUNTER
Request for Orders    Who s Requesting: Home Care Occupational Therapist    Orders being requested: OT  2 visit(s) every 1 week(s) for 4 week(s)  And 1 PRN for ADL/IADL retraining, home safety and wheelchair assessment.    Where to send Orders: [facility], response through Epic preferred. Please call if you have questions.    Thank you!  Trudy Morris, OTR/L  [phone]

## 2021-06-05 NOTE — TELEPHONE ENCOUNTER
Request for Orders    Who s Requesting: Home Care Physical Therapist    Orders being requested: extension in home care PT orders 2x/week x 2 weeks, 1x/week x 1 week for gait, balance, strength, transfers, and endurance training, education in HEP and falls prevention.    Where to send Orders: Please reply to this message    Thank you,  Nancy Burch, PT

## 2021-06-05 NOTE — TELEPHONE ENCOUNTER
Marco for referral to Occupational Therapy.  May treat as they recommend.  2 times a week for 4 weeks with PRN visits as necessary.  ADLs, gait training, home safety etc.

## 2021-06-05 NOTE — TELEPHONE ENCOUNTER
Controlled Substance Refill Request  Medication Name:   Requested Prescriptions     Pending Prescriptions Disp Refills     oxyCODONE (ROXICODONE) 30 MG immediate release tablet 90 tablet 0     Sig: Take 1 tablet (30 mg total) by mouth 3 (three) times a day.     Date Last Fill: 12/24/19  [facility]ed [facility]  Submit electronically to pharmacy  Controlled Substance Agreement on file:   Encounter-Level CSA Scan Date:    There are no encounter-level csa scan date.        Last office visit:  n/a

## 2021-06-05 NOTE — TELEPHONE ENCOUNTER
RN cannot approve Refill Request    RN can NOT refill this medication med is not covered by policy/route to provider. Last office visit: 10/8/2019 Geoff Crabtree MD Last Physical: 12/24/2019 Last MTM visit: Visit date not found Last visit same specialty: 10/8/2019 Geoff Crabtree MD.  Next visit within 3 mo: Visit date not found  Next physical within 3 mo: Visit date not found      Nancy Duncan, [facility] Triage/Med Refill 2/3/2020    Requested Prescriptions   Pending Prescriptions Disp Refills     sodium phosphates 133 mL (FOR FLEET) 19-7 gram/118 mL Enem rectal enema [Pharmacy Med Name: ENEMA READY-TO-USE 4.5OZ] 133 mL 0     Sig: INSERT 1 ENEMA INTO THE RECTUM ONCE FOR 1 DOSE       There is no refill protocol information for this order

## 2021-06-05 NOTE — TELEPHONE ENCOUNTER
Tee Yates according to the patient chart it looks like the wheel chair was already order on 12/24/19 and there were also documentation from his physical appt on 12/24/19. All information was already faxed to [facility] per Dr. Crabtree assistant.

## 2021-06-05 NOTE — TELEPHONE ENCOUNTER
Request for Orders    Who s Requesting: Home Care Occupational Therapist    Orders being requested: OT  4 visits over next 30 days for WC assessment, ADL retraining and home safety    Where to send Orders: [facility], response through Epic preferred. Please call if you have questions.    Thank you!  Trudy Morris, OTR/L  [phone]

## 2021-06-05 NOTE — TELEPHONE ENCOUNTER
From my on chart review, it appears patient was prescribed Claritin back in 2016 during that ED visit.  Is on cetirizine/Zyrtec and I would not be writing a Claritin prescription.  Why is he asking for prescription so much relief was filled on 12/24/2019?      Secondly if he needs an enema, can you please put an order for Fleet/sodium phosphate at about 133 mL-1 dose for me to sign.  Thank you

## 2021-06-05 NOTE — TELEPHONE ENCOUNTER
Please advise on allergy medication. Patient SHOULD have Zyrtec at his home due to the prescription that has been picked up. Thank you.    Tarah Davila, CMA

## 2021-06-05 NOTE — TELEPHONE ENCOUNTER
Called and spoke with Trudy -verbal orders given over the phone.     Just a FYI message from Trudy - Patient needing to get a power wheel chair. Trudy did advised patient to contact clinic to schedule a face to face visit for this. Nothing further is needed at this time from MD, just a FYI.

## 2021-06-05 NOTE — TELEPHONE ENCOUNTER
OK:  extension in home care PT orders 2x/week x 2 weeks, 1x/week x 1 week for gait, balance, strength, transfers, and endurance training, education in HEP and falls prevention.

## 2021-06-05 NOTE — PROGRESS NOTES
[facility] Care Coordination Contact    Completed following tasks:    Updated services in access and Submitted referrals/auths for PERS. Refaxed auth, PERS needs to remains with [facility] until merge to [facility] p/[facility] ([facility]). $60.00 Monthly from 5/1/19-4/30/20.     Juan Carlos Tavarez  Care Management Specialist  [facility]  [phone]

## 2021-06-06 NOTE — PROGRESS NOTES
Writer had a discussion about declining wound cares and have reviewed MD orders with patient. Thien([facility]) was present for this discussion.Both  Thien and Patient agreed that [facility] would perform wound care of cleansing and applying Triad paste along with applying Ton socks in the AM between 9-10am.  Thien will apply Triad paste and take off Ton hose before he leaves around 3pm. Patient v/u of this and how important it is to be   compliant. Patient also agreed to 1 week med setup in medication box. Thien has agreed to help remind patient to take medications. Please monitor compliance.    Esther Urena

## 2021-06-06 NOTE — TELEPHONE ENCOUNTER
Pt states he went to ER yesterday due to having so much pain and could not move or transfer self. PT is demonstrating difficulty transferring self safely to toilet from wheelchair today. Says he is still having a lot of pain. He is having difficulty lifting his legs to even have PT and HHA get his socks on. PCA not present this morning when HHA and PT arrived. Pt states he was waiting for PCA to come and help him to bathroom. Pt also reporting feeling confused today. PT and HHA present and feeling Pt is not safe to stay in his home right now without assistance. Please advise. We are feeling he needs to go back to ER.   Thank you,  Nancy Burch, PT

## 2021-06-06 NOTE — TELEPHONE ENCOUNTER
He should be continuing on his oxycodone 15 mg 3 times daily.  There is still an adequate dose.  He will be out of his Tylenol No. 3 soon and that will not be refilled.  I would encourage him to continue his physical therapy.

## 2021-06-06 NOTE — PROGRESS NOTES
[facility] Care Coordination Contact  [facility] Home Visit Assessment     Home visit for Health Risk Assessment with Jef Centeno Jr. completed on 3/5/2020    Type of residence:: Town home  Current living arrangement:: I live alone     Assessment completed with:: Patient    Current Care Plan  Member currently receiving the following home care services: Skilled Nursing, Physicial Therapy, Occupational Therapy   Member currently receiving the following community resources: Housekeeping/Chore Agency, DME, Lifeline, Meals on Wheels, Transportation Services, PCA      Medication Review  Medication reconciliation completed in Epic: YES  Medication set-up & administration: RN set up weekly  Self-administers medications  Medication Risk Assessment Medication (1 or more, place referral to MTM):  N/A: No risk factors identified  MTM Referral Placed: No: No risk factors idenified    Mental/Behavioral Health   Depression Screening: See PHQ assessment flowsheet.   Mental health DX:: No      Mental Health Diagnosis: No  Mental Health Services: None: No further intervention needed at this time.    Falls Assessment:       Any fall with injury in the past year?: Yes    ADL/IADL Dependencies:   Dependent ADLs:: Ambulation-walker, Bathing, Dressing, Grooming, Incontinence, Transfers, Wheelchair-with assist, Toileting  Dependent IADLs:: Cleaning, Cooking, Laundry, Shopping, Meal Preparation, Transportation, Incontinence    [facility] [place] [facility] sponsored benefits: Shared information re: Silver Sneakers/gym memberships, ASA, Calcium +D.    PCA Assessment completed at visit: Yes    Elderly Waiver Eligibility: Yes - will continue on EW    Care Plan & Recommendations: Will continue to utilize support services to maintain independence in the community.    See [facility] for detailed assessment information.    Follow-Up Plan: Member informed of future contact, plan to f/u with member with a 6 month telephone assessment.  Contact  information shared with member and family, encouraged member to call with any questions or concerns at any time.    [place] care continuum providers: Please refer to Health Care Home on the Epic Problem List to view this patient's [facility] Care Plan Summary.    Ruddy Alexander RN  [facility]  [phone]

## 2021-06-06 NOTE — TELEPHONE ENCOUNTER
Triage call:   Has pain from his back to his waist- cannot move  Muscle relaxer from ED yesterday - still in pain   States that his whole body is painful but low back is the most painful  Reports that the pain medication is not controlling his pain - reports that this pain medication was decreased to 15 mg from 30 mg that's when he feels his pain started   Severe pain - States that he is unable to walk, unable to get out of bed  Reports that he hasn't urinated since last night - states that his bladder doesn't feel full just hasn't been able to get out of bed yet   No fall or injures  No other symptoms     Triaged to be seen in the ER today - patient declines as he was there yesterday. Patient is requesting PCP advice- can PCP send another medication for patient to pharmacy?  Additional care advice? Please advise.     Alyssa Mi RN [facility] Care Connection Triage/Med Refill 2/26/2020 9:10 AM    Reason for Disposition    Numbness (loss of sensation) in groin or rectal area    Protocols used: BACK PAIN-A-OH

## 2021-06-06 NOTE — PROGRESS NOTES
[facility] Clinic Note  Jef Centeno Jr.   69 y.o. male    Date of Visit: 3/11/2020  Chief Complaint   Patient presents with     Leg Swelling     Leg Pain     Assessment/Plan  1. Lumbar stenosis with neurogenic claudication  Should be able to get oxycodone refill on 3/18/2020.  Defer to PCP on setting up this prescription.  - acetaminophen-codeine (TYLENOL-CODEINE #3) 300-30 mg per tablet; Take 1 tablet by mouth 3 (three) times a day as needed for pain.  Dispense: 20 tablet; Refill: 0     Much or all of the text in this note was generated through the use of Dragon Dictate voice-to-text software. Errors in spelling or words which seem out of context are unintentional. Sound alike errors, in particular, may have escaped editing  David Shafer MD    Return if symptoms worsen or fail to improve.    Subjective  This 69 y.o. old male.  This is my first time meeting Mr. Jacobo Aguilar.  Seen in vascular clinic on 3/4/2020 to follow-up on bilateral swelling in the lower extremities and a wound on the buttocks.  No debridement done at that visit, instead a buttock wound was irrigated and dressed dressed for chemical debridement.  Encouraged to continue wearing compression stockings and compression wraps were applied during the visit.  Plan to follow-up in 5 weeks for reevaluation.  History pertinent for postherpetic neuralgia arteriosclerotic heart disease, iron deficiency anemia, COPD, diabetes mellitus type 2, stenosis of cervical spine with myelopathy, lumbar stenosis with neurogenic claudication, CHF, ALS, panlobular emphysema.  Last saw Dr. Bourne 2/10/20.  States his oxycodone was lost in the ED on 2/25/20 when he presented with back pain. Lives alone and has a PCA. Has control over his bowels and has 1-2 BM per day. From  on 2/20/20 - 90 tab and Tylenol 3 for 20 tab on 2/26/20, written by Dr Crabtree. Will get new oxycodone Rx on 3/18/20 and states he has no more tylenol 3. Scale  pain 10/10 and pain is from the upper back down to his feet. No SOB or chest pain.    ROS A comprehensive review of systems was performed and was otherwise negative    Medications, allergies, and problem list were reviewed and updated    Exam  General appearance: Pleasant, nontoxic-appearing, no acute distress, alert and oriented x4  Vitals:    03/11/20 1130   BP: 136/80   Pulse: 85   SpO2: 93%   RESPIRATORY: Bilaterally with no crackles, wheezing or rhonchi anteriorly  CARDIOVASCULAR: Regular S1 and S2.  Radial pulses intact.  3+ pitting edema below the knees bilaterally with compression stockings.  Additional Information   Current Outpatient Medications   Medication Sig Dispense Refill     acetaminophen-codeine (TYLENOL-CODEINE #3) 300-30 mg per tablet Take 1 tablet by mouth 3 (three) times a day as needed for pain. 20 tablet 0     albuterol (PROAIR HFA;PROVENTIL HFA;VENTOLIN HFA) 90 mcg/actuation inhaler Inhale 2 puffs 3 (three) times a day. 1 Inhaler 12     aspirin 81 MG EC tablet Take 81 mg by mouth daily.       atorvastatin (LIPITOR) 10 MG tablet Take 1 tablet (10 mg total) by mouth daily. 90 tablet 3     b complex vitamins tablet Take 1 tablet by mouth daily.       baclofen (LIORESAL) 10 MG tablet TAKE 1/2 TABLET(5 MG) BY MOUTH THREE TIMES DAILY (Patient taking differently: TAKE 1 TABLET(10 MG) BY MOUTH THREE TIMES DAILY) 135 tablet 0     budesonide (PULMICORT) 1 mg/2 mL nebulizer solution Take 1 mg by nebulization 2 (two) times a day.       budesonide-formoterol (SYMBICORT) 160-4.5 mcg/actuation inhaler INHALE 2 PUFFS BY MOUTH TWICE DAILY 2 Inhaler 6     calcium carbonate-vitamin D3 (CALTRATE 600 PLUS D3) 600 mg(1,500mg) -400 unit per tablet TAKE 1 TABLET BY MOUTH DAILY 90 tablet 3     cetirizine (ZYRTEC) 10 MG tablet Take 1 tablet (10 mg total) by mouth daily. 90 tablet 3     cholecalciferol, vitamin D3, 1,000 unit tablet Take 4,000 Units by mouth daily.              DAILY-HIEN tablet TAKE 1 TABLET BY MOUTH  DAILY 120 tablet 3     docusate sodium (COLACE) 100 MG capsule Take 100 mg by mouth as needed for constipation.              DULoxetine (CYMBALTA) 20 MG capsule Take 3 capsules (60 mg total) by mouth daily. 270 capsule 3     ENSURE ACTIVE PROTEIN-MUSCLE Liqd DRINK 1 BOTTLE TWICE DAILY 60 Bottle 2     ferrous sulfate 325 (65 FE) MG tablet Take 1 tablet (325 mg total) by mouth daily with breakfast. 90 tablet 3     folic acid (FOLVITE) 1 MG tablet TAKE 1 TABLET BY MOUTH EVERY DAY 90 tablet 3     furosemide (LASIX) 20 MG tablet Take 2 tablets (40 mg total) by mouth 2 (two) times a day. 180 tablet 3     gabapentin (NEURONTIN) 300 MG capsule Take 300 mg by mouth.       hydrOXYzine HCl (ATARAX) 25 MG tablet Take 1 tablet (25 mg total) by mouth every 8 (eight) hours as needed for itching. 100 tablet 1     ipratropium-albuterol (DUO-NEB) 0.5-2.5 mg/3 mL nebulizer Inhale 3 mL every 4 (four) hours as needed. 30 vial 3     levalbuterol (XOPENEX HFA) 45 mcg/actuation inhaler Inhale 2 puffs every 4 (four) hours as needed for wheezing. 5 Inhaler 3     lidocaine (LIDODERM) 5 % Apply once daily   remove & Discard patch within 12 hours or as directed by MD 7 patch 0     loratadine-pseudoephedrine (CLARITIN-D 12 HOUR) 5-120 mg Tb12 Take 1 tablet by mouth 2 (two) times a day. 60 tablet 0     magnesium oxide (MAG-OX) 400 mg (241.3 mg magnesium) tablet Take 1 tablet (400 mg total) by mouth daily for 20 days. 20 tablet 0     meloxicam (MOBIC) 7.5 MG tablet 1 tab p.o. daily 90 tablet 3     metFORMIN (GLUCOPHAGE) 500 MG tablet Take 1 tablet (500 mg total) by mouth daily with breakfast. 90 tablet 3     metoprolol tartrate (LOPRESSOR) 25 MG tablet TAKE 1 TABLET(25 MG) BY MOUTH TWICE DAILY 180 tablet 3     naloxone (NARCAN) 4 mg/actuation nasal spray 1 spray (4 mg dose) into one nostril for opioid reversal. Call 911. May repeat if no response in 3 minutes. 1 Box 0     nystatin (MYCOSTATIN) 100,000 unit/mL suspension Take 5 mL (500,000 Units  total) by mouth 4 (four) times a day. Swish and spit. 200 mL 0     omeprazole (PRILOSEC) 20 MG capsule Take 1 capsule (20 mg total) by mouth daily before breakfast. 90 capsule 3     [START ON 3/19/2020] oxyCODONE (ROXICODONE) 15 MG immediate release tablet Take 1 tablet (15 mg total) by mouth 3 (three) times a day. 90 tablet 0     potassium chloride (K-DUR,KLOR-CON) 20 MEQ tablet TAKE 1 TABLET(20 MEQ) BY MOUTH TWICE DAILY 180 tablet 3     predniSONE (DELTASONE) 5 MG tablet Take 2.5 mg by mouth daily For 2 weeks then stop. 7 tablet 0     pregabalin (LYRICA) 75 MG capsule Take 1 capsule (75 mg total) by mouth 2 (two) times a day. 180 capsule 3     riluzole (RILUTEK) 50 mg tablet TAKE 1 TABLET BY MOUTH TWO TIMES A DAY BEFORE MEALS       senna-docusate (PERICOLACE) 8.6-50 mg tablet Take 2 tablets by mouth daily as needed for constipation. 30 tablet 3     sodium phosphates 133 mL (FOR FLEET) 19-7 gram/118 mL Enem rectal enema INSERT 1 ENEMA INTO THE RECTUM ONCE FOR 1 DOSE 133 mL 0     SPIRIVA WITH HANDIHALER 18 mcg inhalation capsule Place 1 capsule (2 puffs total) into inhaler and inhale daily. 90 capsule 3     tamsulosin (FLOMAX) 0.4 mg cap Take 1 capsule (0.4 mg total) by mouth Daily after breakfast. 90 capsule 3     vitamin B complex (B COMPLEX 1 ORAL) Take 1 capsule by mouth.       wound dressings (TRIAD WOUND DRESSING) Pste Apply 71 g topically 2 (two) times a day.  3     Current Facility-Administered Medications   Medication Dose Route Frequency Provider Last Rate Last Dose     lidocaine 2 % jelly (XYLOCAINE)   Topical PRN Obdulia Prieto NP   1 application at 08/09/19 1530     No Known Allergies  Social History     Social History Narrative    Patient is , 5 children by previous wife are alive and well. Patient has social security disability.     Social History     Tobacco Use     Smoking status: Current Every Day Smoker     Packs/day: 10.00     Years: 46.00     Pack years: 460.00     Types: Cigarettes      Smokeless tobacco: Never Used   Substance Use Topics     Alcohol use: No     Comment: Quit drinking in 2014. Prior to that he drank excessively.     Drug use: No     Family History   Problem Relation Age of Onset     Diabetes Mother      Diabetes Father      Cancer Father      Diabetes Sister      Diabetes Brother      Past Surgical History:   Procedure Laterality Date     CHOLECYSTECTOMY       LAPAROSCOPIC GASTROTOMY W/ REPAIR OF ULCER       POSTERIOR FUSION CERVICAL SPINE      posterior fusion C2-C4 with laminnectomy; excision cervical lipoma      SIGMOIDOSCOPY  06/29/2008   Time: total time spent with the patient was 15 minutes of which >50% was spent in counseling and coordination of care

## 2021-06-06 NOTE — PROGRESS NOTES
TEAM MEMBERS PRESENT:  : Nursing,: HHA and : Nursing    DISCIPLINES ORDERED:   Nursing and Home Health Aide    PROGRESS/STATUS, BARRIERS to DC/CONCERNS: (environmental, safety, mental health, cultural, language, non-compliance, cognition, caregiver dynamics): Triad paste/wound care for buttocks-non compliance,       PLAN/FOLLOW-UP NEEDED: Noncompliance with wound care, pain level, standing one hour per day    ANTICIPATED DC DATES/STATUS   OF ORDERS (2 day notice?):   Nursing:  and HHA: Wound care

## 2021-06-06 NOTE — TELEPHONE ENCOUNTER
Pt now reporting pain is a little better and is showing he can move his legs a little better, pain down from 10/10 to 7/10 with having a BM. Pt was able to walk with PT a couple of weeks ago, required Mod A x 2 for transfer wheelchair <> toilet today.   May not need to go to ER, but having signficant difficulty with mobility. Pt feels this is due to lower dose of pain med.  Please advise.  Thank you,  Nancy Burch, PT

## 2021-06-06 NOTE — TELEPHONE ENCOUNTER
RN cannot approve Refill Request    RN can NOT refill this medication med is not covered by policy/route to provider. Last office visit: 2/10/2020 Geoff Crabtree MD Last Physical: 12/24/2019 Last MTM visit: Visit date not found Last visit same specialty: Visit date not found.  Next visit within 3 mo: Visit date not found  Next physical within 3 mo: Visit date not found      Linda Sierra, Care Connection Triage/Med Refill 3/13/2020    Requested Prescriptions   Pending Prescriptions Disp Refills     sodium phosphates 133 mL (FOR FLEET) 19-7 gram/118 mL Enem rectal enema 133 mL 0       There is no refill protocol information for this order

## 2021-06-06 NOTE — PROGRESS NOTES
[facility] Care Coordination Contact    [facility]:  Emailed completed PCA assessment to [facility].  Faxed copy of PCA assessment to PCA Agency and mailed copy to member.  Faxed MD Communication to PCP.     Juan Carlos Tavarez  Care Management Specialist  [facility]  [phone]

## 2021-06-06 NOTE — TELEPHONE ENCOUNTER
Request for Orders    Who s Requesting: Home Care Physical Therapist    Orders being requested: extend home care PT 1x/week x 3 weeks (this will end up being 2x week of 2/9- adding 1 more visit on to end of week, then 1x2 after this) to progress HEP, balance and gait, caregiver education in walking and exercise program. Also want to add kinesiology tape to care plan for pain management.     Where to send Orders: Please reply to this message    Thank you,  Nancy Burch, PT

## 2021-06-06 NOTE — TELEPHONE ENCOUNTER
I have called in a prescription for Tylenol 3, 1 tablet every 8 hours as needed for pain.  1 week supply.  He should not need more than 5 to 7 days worth.

## 2021-06-06 NOTE — TELEPHONE ENCOUNTER
RN cannot approve Refill Request    RN can NOT refill this medication med is not covered by policy/route to provider. Last office visit: Visit date not found Last Physical: Visit date not found Last MTM visit: Visit date not found Last visit same specialty: Visit date not found.  Next visit within 3 mo: Visit date not found  Next physical within 3 mo: Visit date not found      Yana Linares, Care Connection Triage/Med Refill 2/18/2020    Requested Prescriptions   Pending Prescriptions Disp Refills     oxyCODONE (ROXICODONE) 30 MG immediate release tablet 90 tablet 0     Sig: Take 1 tablet (30 mg total) by mouth 3 (three) times a day.       There is no refill protocol information for this order

## 2021-06-06 NOTE — TELEPHONE ENCOUNTER
Request for Orders    Who s Requesting: Home Care Physical Therapist    Orders being requested: 1x1 to progress transfers, strength, balance, gait and pain management.     Where to send Orders: Please reply to this message    Thank you,  Nancy Burch, PT

## 2021-06-06 NOTE — TELEPHONE ENCOUNTER
RN cannot approve Refill Request    RN can NOT refill this medication med is not covered by policy/route to provider. Last office visit: Visit date not found Last Physical: Visit date not found Last MTM visit: Visit date not found Last visit same specialty: Visit date not found.  Next visit within 3 mo: Visit date not found  Next physical within 3 mo: Visit date not found      Yana Linares, Care Connection Triage/Med Refill 2/18/2020    Requested Prescriptions   Pending Prescriptions Disp Refills     oxyCODONE (ROXICODONE) 30 MG immediate release tablet 90 tablet 0     Sig: Take 1 tablet (30 mg total) by mouth 3 (three) times a day.       There is no refill protocol information for this order        predniSONE (DELTASONE) 5 MG tablet 7 tablet 0     Sig: Take 2.5 mg by mouth daily For 2 weeks then stop.       There is no refill protocol information for this order

## 2021-06-06 NOTE — TELEPHONE ENCOUNTER
Medication Request  Medication name: Patient is calling to ask if the prescription for Tylenol # 3 has been approved. Patient states he had asked the nurse earlier today for the prescription.The patient is requestingTylenol with Codeine for his pain. See previous message from MEI Shah. .   Requested Pharmacy: [facility]  Reason for request: Pain -please see previous triage message .  When did you use medication last?:  Patient states he has used Tylenol #3's in the past but unsure of date.  Patient offered appointment : Writer mentioned to the patient he may need to make an appointment .   Okay to leave a detailed message: yes

## 2021-06-06 NOTE — TELEPHONE ENCOUNTER
Spoke with patient and he states his oxycodone is not helping since it was cut in half. He stated this happened last time you tried to cut them in half. He is in a lot of pain. He can't find his bottle of oxycodone. He misplaced it yesterday in the laundry room he thinks. I advised him to  the Tylenol #3 and try that. I explained how you would not be able to replace the lost pills due to it being a controlled substance.   Florence Gamino CSS

## 2021-06-06 NOTE — TELEPHONE ENCOUNTER
A prescription was called for Tylenol 3.  He can use 3 times a day for the next 5 to 7 days.  He still allowed oxycodone 15 mg 3 times a day which is still a very big dose.

## 2021-06-06 NOTE — TELEPHONE ENCOUNTER
Who is calling:  [facility]  Reason for Call:  Requesting please that patient's current medication list be faxed to Home Care office ASAP please  Fax: [phone] Attn: Andrey  Date of last appointment with primary care: 3/11/20  Okay to leave a detailed message: Yes

## 2021-06-06 NOTE — PROGRESS NOTES
"FYI:  1/24:  Pt had no dressing on when SN arrived, he had Lido patch on open wounds. Reminded him he is not to put the Lido on wound, it could be making things worse. RN    3/7: Cruzito refused to let aide apply any cream or look at his buttocks. Patient stated he has been having loose stools. Ele/A    3/11: Patient refused X3 to allow writer to complete wound care to buttocks this day. He said \"The doctor will do it when I go the  re\" Patient has an 1120 am visit scheduled with David Shafer MD at the [facility] office this morning. Writer told the patient that this is not the vascular center and he again refused wound care. Writer asked patient to have to MD look at his bottom and to inform MD that he refused wound care. Patient is aware that CM will be alerted.  Beba/CAROL    3/12: Writer had a discussion about declining woun  d cares and have reviewed MD orders with patient. Thien([facility]) was present for this discussion.Both Thien and Patient agreed that [facility] would perform wound care of cleansing and applying Triad paste along with applying Ton socks in the AM between 9-10am. Thien will apply Triad paste and take off Ton hose before he leaves around 3pm. Patient v/u of this and how important it is to be compliant. Esther/MEI  Patient is noncompliant wi  th wound care and diabetic diet.  Esther Urena RN      "

## 2021-06-07 NOTE — PROGRESS NOTES
[facility] Care Coordination Contact    Received a request to submit a DTR for the terminated of ICLS. Documentation completed and faxed to the health plan. Care Coordinator aware.    Janee Duggan RN  [facility]  [phone] fax  [email or web address]

## 2021-06-07 NOTE — TELEPHONE ENCOUNTER
Wellness Screening Tool  Symptom Screening:  Do you have a:    Fever?  no    Cough?  no    Shortness of breath?  no  ? If yes, is this a change? no    Skin rash?  no  Within the past 14 days, have you been in contact with someone who:    Is currently being ruled out for COVID-19 (novel coronavirus)?  no    Has tested positive for COVID-19?  no    Has symptoms of a respiratory illness (fever, cough, shortness of breath)?  no  Have you recently traveled to an area with COVID-19 areas: no    Refer to the [facility] Coronavirus webpage for COVID-19 areas:    Within the past 3 weeks, have you been exposed to the following:    Pertussis?  no    Chicken pox?  no    Measles? no  Patient's appointment status: provider telephone visit; PCA or nurse will be present for appt; pt reminded to be ready from [id number]  Patient reminded that no visitors are allowed at this time due to COVID-19 concerns. If determined pt has symptoms and is still needed to be seen pt notified they must wear a mask upon entering the clinic.

## 2021-06-07 NOTE — TELEPHONE ENCOUNTER
Who is calling:  Ida Mahoney  with [facility]  Reason for Call:  States [facility] has the wheelchair order but is needing additional paperwork completed. Paperwork was faxed on 3/25. Please contact Kathy @ [phone] option 3 and fax # [phone]. Please advise asap!  Date of last appointment with primary care: 3/11/2020  Okay to leave a detailed message: Yes

## 2021-06-07 NOTE — TELEPHONE ENCOUNTER
RN cannot approve Refill Request    RN can NOT refill this medication med is not covered by policy/route to provider.      Keyanna Tenorio, Care Connection Triage/Med Refill 3/20/2020    Requested Prescriptions   Pending Prescriptions Disp Refills     predniSONE (DELTASONE) 5 MG tablet [Pharmacy Med Name: PREDNISONE 5MG TABLETS] 7 tablet 0     Sig: TAKE 1/2 TABLET BY MOUTH DAILY FOR 2 WEEKS THEN STOP       There is no refill protocol information for this order

## 2021-06-07 NOTE — PROGRESS NOTES
[facility] Care Coordination Contact    [facility] for FW to request to review case as there is an edit received when try to enter his screening into MMIS.  Request that she call me once updated.    Ruddy Alexander RN  [facility]  [phone]

## 2021-06-07 NOTE — TELEPHONE ENCOUNTER
Request for Orders    Who s Requesting: Home Care Registered Nurse    Orders being requested: MSW for community resources, help regarding wheelchair    Where to send Orders: epic    Thank you,  Esther Urena RN       negative...

## 2021-06-07 NOTE — TELEPHONE ENCOUNTER
Medication Request    Medication name:   predniSONE (DELTASONE) 5 MG tablet  30 tablet  2  4/18/2016 4/28/2016  --    Sig - Route: Take 1 tablet (5 mg total) by mouth daily.       Requested Pharmacy: [id number] - [place], [place] - [address]      Reason for request: Developing Rash    When did you use medication last?:  04/2020 per patient    Patient offered appointment:  patient declined     Okay to leave a detailed message: yes    Please send script to patients pharmacy and inform the patient of the outcome to this request.

## 2021-06-07 NOTE — TELEPHONE ENCOUNTER
Request for Orders    Who s Requesting: Home Care Registered Nurse    Orders being requested:   SN 3x4w for wound cares  HHA 4x4w for simple wound cares    Where to send Orders: mohini      Thank you, Esther Urena RN

## 2021-06-07 NOTE — TELEPHONE ENCOUNTER
Medication Request  Medication name: Acetaminophen-Codeine   Requested Pharmacy: [facility] [address]  Reason for request: Refill   When did you use medication last?: Today Patient offered appointment:  N/A - electronic request  Okay to leave a detailed message: yes

## 2021-06-07 NOTE — TELEPHONE ENCOUNTER
Tee Steiner,    This is an FYI regarding Bear Centeno's wheelchair, foam cushion.  OT/MSW are working on an electric chair with stand up features and they have requested am appropriate seat cushion to go with this wheelchair. Please do not try to order another wheelchair/cushion because this could cause insurance to deny what has already almost been accomplished. I know Bear has no idea of the time it takes to get these items approved through insurance but originally he was told it could take between 4-5 months possibly.  Bear is having memory issues and we are struggling with him regarding this due to memory/noncompliance.     Now that he states his pain is better I am hoping to get PT out again to help get some strength back.  Can I have an order for PT eval?  I will discuss with Bear soon about PT again.    Thank you,  sEther Urena RN

## 2021-06-07 NOTE — PROGRESS NOTES
"[facility] Care Coordination Contact    Phone call from Andrea, he removed \"U\" code and screening was entered into MMIS.    Ruddy Alexander RN  [facility]  [phone]      "

## 2021-06-07 NOTE — PROGRESS NOTES
Patient goal: Improve walking, wants to be I in household ambulation. Pt wants to be able to do some outdoor ambulation, to make it to garage and maybe even to his mail box.  Reason for this episode: Pt's pain has improved now, ready to start working on walking  Pertinent medical history: MS, chronic pain  Precautions/safety: (ex. contact precautions, cognition, activity restrictions, falls, surgical precautions, etc.):falls risk  Prior   Level of Function: when PT last discharged, Pt was unable to ambulate or participate in exercises due to severe pain.   Current Level of Function: Pt reports tolerating more activity now that pain has improved. Pt performed sit <> stand transfer to/from wheelchair and 4WW, with CGA. Pt ambulated 24 feet x 1 with 4WW, CGA, decreased step length, decreased foot clearance, knee buckling x 2 but able to recover with CGA to Min A x 1, forwa  rd flexed posture, crouched gait, unsafe use of walker. Tinetti 12/28.  Persons present for visit: pt, PT  Home Environment: independent cottage  Assistance Available: PCA 4 hours per day   Multidisciplinary Plan/Follow up Needs:   PT: 1x1, 2x3 (these orders will extend into new cert period) for gait, balance, strength, transfers, and endurance training, education in HEP and falls prevention.

## 2021-06-07 NOTE — PROGRESS NOTES
HOME HEALTH MISSED VISIT NOTIFICATION (FYI)     Your patient who receives home health services missed a visit on:   Tuesday 4/21/2020    Type of service missed: Nursing    Reason for missed visit:Pt requested, he has a MD appointment to attend in [facility]

## 2021-06-07 NOTE — TELEPHONE ENCOUNTER
Obdulia,    I apologize- I just noted that Bera has a telephone visit with you tomorrow @ 10:30. I am unable to be there but have pictures I can send you.  Can you let me know the email to send them to so I can get them to the correct place?    Thank you,    Esther Urena RN,

## 2021-06-07 NOTE — TELEPHONE ENCOUNTER
For the rash, he should use triamcinolone cream 0.1% twice daily.  Dispense 30 g.  Prednisone should not be taken for the rash.  Put the prescription in my prescription bin and I will sign it.

## 2021-06-07 NOTE — PROGRESS NOTES
[facility] Care Coordination Contact    Phone call from Jackie that was told PCA is no longer able to provide services and inquires about increasing meals. She also reports that likely will take two months for process to complete with electric wheelchair.  She reports that clinic is working with vendor with process.      Phone call to Melinda S[facility]chez reports that only able to ship 7meals/wk.    Phone call to [facility] which she reports that Tenzin DUONG was out on two days this week due to  issues.  However he still PCA and if is unable to be there, agency will try to staff.  She will let care coordinator know when unable to staff.     Ruddy Alexander RN  [facility]  [phone]

## 2021-06-07 NOTE — TELEPHONE ENCOUNTER
Medication Request  Medication name: Rajendra Puentes  Requested Pharmacy: [facility] #[id number]  Reason for request: New prescription  When did you use medication last?:  Yesterday  Patient offered appointment:  n/a  Okay to leave a detailed message: yes  [phone]    FYI: This medication is not listed on the patient's current medication list.

## 2021-06-07 NOTE — TELEPHONE ENCOUNTER
I refused this medication yesterday.  Patient is taking oxycodone.  3 times daily.  Tylenol 3 should not be refilled.

## 2021-06-07 NOTE — TELEPHONE ENCOUNTER
RN cannot approve Refill Request    RN can NOT refill this medication med is not covered by policy/route to provider. Last office visit: 2/10/2020 Geoff Crabtree MD Last Physical: 12/24/2019 Last MTM visit: Visit date not found Last visit same specialty: 3/11/2020 David Shafer MD.  Next visit within 3 mo: Visit date not found  Next physical within 3 mo: Visit date not found      Ghislaine Kern, Care Connection Triage/Med Refill 4/2/2020    Requested Prescriptions   Pending Prescriptions Disp Refills     predniSONE (DELTASONE) 5 MG tablet 7 tablet 0     Sig: TAKE 1/2 TABLET BY MOUTH DAILY FOR 2 WEEKS THEN STOP.       There is no refill protocol information for this order

## 2021-06-07 NOTE — PROGRESS NOTES
[facility] Care Coordination Contact    Phone call to [facility], spoke with Carey to verify if agency is providing ICLS services as was reported by member that they are no longer providing services.  She reports that caregiver quit one and a half month ago and is in process of locating new caregiver.  She reports that she has a couple of people lined up and will know by Monday if able to staff.  Request that she let me know when member goes on lenghty amount of time without care giver and to update me next week since will issue termination of services if no longer able to find staff.    Ruddy Alexander RN  [facility]  [phone]

## 2021-06-07 NOTE — PROGRESS NOTES
"Jef Centeno Jr. is a 69 y.o. male who is being evaluated via a billable telephone visit.      The patient has been notified of following:     \"This telephone visit will be conducted via a call between you and your physician/provider. We have found that certain health care needs can be provided without the need for a physical exam.  This service lets us provide the care you need with a short phone conversation.  If a prescription is necessary we can send it directly to your pharmacy.  If lab work is needed we can place an order for that and you can then stop by our lab to have the test done at a later time.    Telephone visits are billed at different rates depending on your insurance coverage. During this emergency period, for some insurers they may be billed the same as an in-person visit.  Please reach out to your insurance provider with any questions.    If during the course of the call the physician/provider feels a telephone visit is not appropriate, you will not be charged for this service.\"    Patient has given verbal consent to a Telephone visit? Yes    Patient would like to receive their AVS by AVS Preference: Mail a copy.        Rashmi Centeno LPN    "

## 2021-06-07 NOTE — PROGRESS NOTES
[facility] Care Coordination Contact    [facility] for [facility], Andrea [phone] to look into case as there is an edit preventing approval of MMIS entry to continue EW span.  Informed him that has left multiple messages for worker with no response.    Ruddy Alexander RN  [facility]  [phone]

## 2021-06-07 NOTE — PROGRESS NOTES
[facility] Care Coordination Contact  Phone call to [facility] to inquired about ICLS as when met with member he reports has discontinued.  She reports that they had staff that went out last week but he declined and said that he will let them know if he needs them.      Return phone call from member inquires about status of electric wheelchair as it has taken a long time.  Informed him that recently spoke with homecare worker last week who had contacted [facility] office and vendor.  She reports that clinic is working in vendor to meet requirements.  Gave her # for member to contact as she would be able to give him more specific details.     Also discussed ICLS services, he reports that he is not interested in services at this time as he has PCA who is meeting his needs.  He wishes to terminate ICLS at this time.    Emailed Janee Duggan to issue DTR for ICLS services to [facility].     Ruddy Alexander RN  [facility]  [phone]

## 2021-06-07 NOTE — TELEPHONE ENCOUNTER
Called Jef and left a message to find out how his wound is doing. Jef has [facility]. Let him know he should call clinic to set up a nurse visit that will be via the telephone and that we are requesting that a photo be sent before the visit. Requested he call clinic back when he gets the message.

## 2021-06-07 NOTE — PROGRESS NOTES
When writer arrived for SN visit patient was still in bed stating he was in severe pain throughout his body.  Writer noted the rainy day.  Just an FYI regarding PT visits-pt will decline visits on these days.    Esther Urena RN

## 2021-06-07 NOTE — PROGRESS NOTES
Nursing collaboration:  Pt did not take most of his meds from the planner since the last nurse visit.  Discussed with pt and he gets agitated, citing that he has been taking out of bottles for years and he can't just change. Asked if the bottles can be left in the box SN placed them in and put in his spare bedroom, then he wouldn't have them in his 2 drawers and on TV  his room to take them out of the bottles. He absolutely woul  d not hear of this and got angry at the continued discussion.   Requesting next RN out 4/9 review the Med set up intervention for the meds that need to be added to the planner since the patient hadn't picked up meds at [facility].

## 2021-06-07 NOTE — PROGRESS NOTES
"Patient did not return call to schedule wound care today.  Writer went to his home to do a well person check and he was there. Co vid screening questions asked at the door before entering.  Patient reports his phone is not working again and said \"I'm always home, knock on my bedroom window if I don't come to the door\"  Patient reports he was sleeping in bed yesterday when writer arrived and his PCA was not working.  His days off are Fri  day and Sunday.  "

## 2021-06-07 NOTE — TELEPHONE ENCOUNTER
Controlled Substance Refill Request  Medication Name:   Requested Prescriptions     Pending Prescriptions Disp Refills     oxyCODONE (ROXICODONE) 15 MG immediate release tablet 90 tablet 0     Sig: Take 1 tablet (15 mg total) by mouth 3 (three) times a day.     Date Last Fill: 3/9/2020  [facility]  Submit electronically to pharmacy  Controlled Substance Agreement on file:   Encounter-Level CSA Scan Date:    There are no encounter-level csa scan date.        Last office visit:  4/13/2020

## 2021-06-07 NOTE — TELEPHONE ENCOUNTER
Controlled Substance Refill Request  Medication Name:   Requested Prescriptions     Pending Prescriptions Disp Refills     acetaminophen-codeine (TYLENOL-CODEINE #3) 300-30 mg per tablet 20 tablet 0     Sig: Take 1 tablet by mouth 3 (three) times a day as needed for pain.     Date Last Fill: 3/11/20  [facility]  Submit electronically to pharmacy  Controlled Substance Agreement on file:   Encounter-Level CSA Scan Date:    There are no encounter-level csa scan date.        Last office visit:  3/11/20

## 2021-06-07 NOTE — TELEPHONE ENCOUNTER
Dr. Crabtree,  Order has been placed in your inbox for you to review.  Geovanna PRICE, CMA/CMT....................2:39 PM

## 2021-06-07 NOTE — TELEPHONE ENCOUNTER
Request for Orders    Who s Requesting: Home Care Physical Therapist    Orders being requested: Home Care Physical Therapy 1x/week x 1 week, 2x/week x 3 weeks for gait, balance, strength, transfers, and endurance training, education in HEP and falls prevention.      Where to send Orders: please reply to this message    Thank you,  Nancy Burch, PT

## 2021-06-07 NOTE — TELEPHONE ENCOUNTER
Could I please get orders to See Bear for the remainder of this [employer] certification period? 4/19 - 5/3/2020    1. Home Health Aide  4 x week   X  8 visits for simple wound care    2. Skilled Nurse visits 3 x week  X 7 visits   for wound care,BLE compression, pain, CHF, COPD, safety.     3. PRN visits x2 SN for new s/sx wound care    Thank You

## 2021-06-07 NOTE — TELEPHONE ENCOUNTER
Controlled Substance Refill Request  Medication Name:   Requested Prescriptions     Pending Prescriptions Disp Refills     acetaminophen-codeine (TYLENOL #3) 300-30 mg per tablet [Pharmacy Med Name: ACETAMINOPHEN/COD #3 (300/30MG) TAB] 20 tablet 0     Sig: TAKE 1 TABLET BY MOUTH THREE TIMES DAILY AS NEEDED FOR PAIN     Date Last Fill: 3/11/20  [facility]  Submit electronically to pharmacy  Controlled Substance Agreement on file:   Encounter-Level CSA Scan Date:    There are no encounter-level csa scan date.        Last office visit:  3/11/20

## 2021-06-07 NOTE — PROGRESS NOTES
[facility] Care Coordination Contact    Phone call to FW informed her that per [place] it doesn't indicate that member is open to EW and also is unable to enter screening into MMIS, likely U code.  Request that FW review member's case and update me.    Ruddy Alexander RN  [facility]  [phone]

## 2021-06-07 NOTE — PROGRESS NOTES
[facility] Care Coordination Contact    Received after visit chart from care coordinator.  Completed following tasks:    Mailed copy of care plan to client, Updated services in access and Submitted referrals/auths for HMK with [facility], MOW with [facility], PERS $60.00 with [facility], and ICLS with [facility]. Emailed to [facility] to send member [employer] passes 20 rush p/month from 5/1/20-4/30/21.    Provider Signature - No POC Shared:  Member indicates that they do not want their POC shared with any EW providers.    Juan Carlos Tavarez  Care Management Specialist  [facility]  [phone]

## 2021-06-07 NOTE — PROGRESS NOTES
[facility] Care Coordination Contact    3/27/20, BRYAN from [facility], Jackie to inquire about status of wheelchair since unable to reach member.    LVM for Jackie that was told by member that should get it in one month.  Request that she try to connect with him for status update.    Ruddy Alexander RN  [facility]  [phone]

## 2021-06-07 NOTE — PROGRESS NOTES
"FYI:    Please see 3/31 medication intervention. When writer arrived only Thursday AM was gone from medication box. Patient stated the last nurse out had completed medication box but the last nurse visit was a \"missed visit\". Please assess medication box compliance.  Writer sent message to vascular regarding wound and if treatment care should be changed. Waiting for response.    Esther Urena RN  "

## 2021-06-07 NOTE — TELEPHONE ENCOUNTER
Orders being requested: Wheelchair  Reason service is needed/diagnosis: ALS   When are orders needed by: asap  Where to send Orders: [facility] ph. [phone]  Okay to leave detailed message?  Yes Please call patient when order is sent [phone]

## 2021-06-08 NOTE — TELEPHONE ENCOUNTER
Medication Question or Clarification  Who is calling: Patient  What medication are you calling about (include dose and sig)?:   lidocaine (LIDODERM) 5 %  7 patch  0  5/4/2020      Sig: Apply once daily   remove & Discard patch within 12 hours or as directed by MD     Class: Print         Who prescribed the medication?:   Geoff Crabtree MD  What is your question/concern?:   Please re-send above script as it went to Print.  Thank you  [facility]ed [facility]acy: [id number]  Okay to leave a detailed message?: Yes    Please have Covering Provider re-send.  Thank you.

## 2021-06-08 NOTE — TELEPHONE ENCOUNTER
Left message to call back for: Bear  Information to relay to patient:  Please relay message below from Dr. Crabtree.  Thank you.  Geovanna PRICE, TIAN/CMT....................10:17 AM

## 2021-06-08 NOTE — PROGRESS NOTES
"Jackie,    Would you happen to know the answer regarding protein powder?  You've dealt with inusrance way more than myself?    Esther Urena  ----- Message -----  From: Jackie Waggoner RN  Sent: 6/2/2020   5:25 AM CDT  To: Esther Urena RN      Wound is getting bigger, pt would not stand long enough for writer to measure it.  Pt was aggravated when provided education about shifting weight and tilting chair, he said that doesn't ha  ve anything to do with it\". He reports it is from \"little bumps\" that form. Writer didn't see any little bumps. Washed with NS and patted dry and applied Triad paste. Due to extreme heat ( >90 degrees) in the house the Triate paste was watery ad difficult to apply. Writer fanned vigorously with cardboard to get to dry.App[facility] with [facility] 6[facility].  Has patient ever tried Protien supplement or does insurance cover it to your knowledge?  "

## 2021-06-08 NOTE — PROGRESS NOTES
Jackie,  Next time you see Bear can you get a picture of the new wounds on his buttocks and send to vascular?  He was too unsteady for me to take the picture when I saw him.    Thank you,  Esther

## 2021-06-08 NOTE — TELEPHONE ENCOUNTER
Dr. Calderon,  Spoke with the patient and relayed message below.  He verbalized understanding and requested a refill of the oxycodone at the current dose until he can speak with Dr. Crabtree.  Refill has been set up for you to review.    Prescription Monitoring Program activity reviewed with no discrepancies noted.      Last fill per : 05/12/2020  Quantity/days supply: #90 for a 30 day supply    Controlled Substance Agreement on file: Yes  Date: 05/08/2019    Last office visit with provider:  2/10/2020 Geoff Crabtree MD    Please advise.    Geovanna PRICE CMA/MIA....................1:12 PM

## 2021-06-08 NOTE — PROGRESS NOTES
Jef Centeno presents to the clinic on 2/6/17 for a face to face evaluation for a manual wheel chair replacement. He is unable to ambulate independently with a cane or walker due to his Spinal Stenosis with myelopathy. Not being able to ambulate independently without a wheelchair causes him difficulty getting around his home for daily activities such as feeding, bathing, toileting, and sleeping. Without a manual wheelchair this patient is at heightened risk for falls and further injuries that could result in mortality secondary to the attempts to perform an MRADL. Use of a manual wheelchair will significantly improve his ability to participate in MRADLs and he will use it on a regular basis in the home. His home provides adequate access between rooms, maneuvering space, and surface for use of the manual wheelchair that is being requested. Jef hasn't expressed an unwillingness to use his manual wheelchair. He is very acepting of the idea and will use it all the time when ambulating through the home. He has sufficient upper extremity function and other physical and mental capabilities needed to safely self-propel the manual wheelchair that is provided in the home during a typical day. He lives with his wife who will be able to provide assistance with the wheelchair if needed.

## 2021-06-08 NOTE — TELEPHONE ENCOUNTER
He should take 2  of the 15 mg tablets, 3 times daily.  When they are gone which will be in about 12 days, the other prescription should be filled.

## 2021-06-08 NOTE — PROGRESS NOTES
[facility] Care Coordination Contact    Phone call from member reports that gentle man will call me to give me his information but would like to change homecare agency to his if possible.    Phone call from staff at [facility] reports that member would like to change to utilize his agency and inquires about his medical insurance for his agency only accepts straight MA and waivers.  I informed him that member is with [facility].  He reports that will let member know that will not be able to change to his agency and will let member know.    Ruddy Alexander RN  [facility]  [phone]

## 2021-06-08 NOTE — TELEPHONE ENCOUNTER
Patient Returning Call  Reason for call:  Return call   Information relayed to patient:    Patient has additional questions:  Yes  If YES, what are your questions/concerns:  Caller stated that he was returning a call from Geoff Crabtree MD nurse. Caller stated that they called and left a message for him to call back.   Okay to leave a detailed message?: Yes

## 2021-06-08 NOTE — TELEPHONE ENCOUNTER
Spoke with the patient and relayed message below from Dr. Crabtree.  Patient verbalized understanding and had no further questions at this time.  Geovanna PRICE, TIAN/CMT....................11:18 AM

## 2021-06-08 NOTE — TELEPHONE ENCOUNTER
Request for Orders    Who s Requesting: Home Care Registered Nurse    Orders being requested:     SN 2x3w wound cares, med setup, safety    HHA 4x3w simple wound cares, safety    Per Obdulia Prieto CNP    Where to send Orders: [facility]      Thank you,  Esther Urena, RN

## 2021-06-08 NOTE — PROGRESS NOTES
"Jef Centeno Jr. is a 69 y.o. male who is being evaluated via a billable video visit.      The patient has been notified of following:     \"This video visit will be conducted via a call between you and your physician/provider. We have found that certain health care needs can be provided without the need for an in-person physical exam.  This service lets us provide the care you need with a video conversation.  If a prescription is necessary we can send it directly to your pharmacy.  If lab work is needed we can place an order for that and you can then stop by our lab to have the test done at a later time.    Video visits are billed at different rates depending on your insurance coverage. Please reach out to your insurance provider with any questions.    If during the course of the call the physician/provider feels a video visit is not appropriate, you will not be charged for this service.\"    Patient has given verbal consent to a Video visit? Yes    Patient would like to receive their AVS by AVS Preference: Mail a copy.    Patient would like the video invitation sent by: Text to cell phone: [phone]    Will anyone else be joining your video visit? No      Video-Visit Details    Type of service:  Video Visit    Originating Location (pt. Location): Home    Distant Location (provider location):  [facility]      Platform used for Video Visit: Hu Centeno LPN  "

## 2021-06-08 NOTE — TELEPHONE ENCOUNTER
He will need to discuss with Dr. Crabtree when he returns next week.  Previous notes indicate that he was not comfortable with him taking the higher dose.

## 2021-06-08 NOTE — PROGRESS NOTES
5/11:  FYI     Video appointment with Obdulia Prieto CNP took place while writer was at patient's home. Vascular had received requested pictures of patient wounds earlier to recommend wound cares. No changes in wound care at this time other than advising patient to set his wheelchair tilted back to help offload wound on buttocks. BLE care includes applying lotion/baby oil (pt preferred) to BLE and then compression stockings. Care only 2x   weekly with PCA completing cares on all other days. Thien/PCA in agreement.    Esther Urena RN

## 2021-06-08 NOTE — TELEPHONE ENCOUNTER
Who is calling:  Patient  Reason for Call:  Patient is unhappy with the 10 mg pain pills he is on and is requesting to be back on the 30 mg, he is getting no relief he can barely move. His legs, hips, and back are in severe pain. Patient would like to come in to see PCP and get his meds increased.  Date of last appointment with primary care: na  Okay to leave a detailed message: Yes

## 2021-06-08 NOTE — TELEPHONE ENCOUNTER
Controlled Substance Refill Request  Medication Name:   Requested Prescriptions     Pending Prescriptions Disp Refills     oxyCODONE (ROXICODONE) 15 MG immediate release tablet 90 tablet 0     Sig: Take 1 tablet (15 mg total) by mouth 3 (three) times a day.     Date Last Fill: 4/14/20  Is patient out of medication?: No, 3 days left  Patient notified refills processed within 3 business days:  Yes  [facility]ed Pharmacy: [facility]  Submit electronically to pharmacy  Controlled Substance Agreement on file:   Encounter-Level CSA Scan Date:    There are no encounter-level csa scan date.        Last office visit:  4/13/20         Attending Only

## 2021-06-08 NOTE — PROGRESS NOTES
"Jef Centeno Jr. is a 69 y.o. male who is being evaluated via a billable telephone visit.      The patient has been notified of following:     \"This telephone visit will be conducted via a call between you and your physician/provider. We have found that certain health care needs can be provided without the need for a physical exam.  This service lets us provide the care you need with a short phone conversation.  If a prescription is necessary we can send it directly to your pharmacy.  If lab work is needed we can place an order for that and you can then stop by our lab to have the test done at a later time.    Telephone visits are billed at different rates depending on your insurance coverage. During this emergency period, for some insurers they may be billed the same as an in-person visit.  Please reach out to your insurance provider with any questions.    If during the course of the call the physician/provider feels a telephone visit is not appropriate, you will not be charged for this service.\"    Patient has given verbal consent to a Telephone visit? Yes    What phone number would you like to be contacted at? [phone]    Patient would like to receive their AVS by AVS Preference: Mail a copy.    Additional provider notes: Telephone visit was made to the patient today.  69-year-old male with multiple problems including COPD, ALS, diabetes mellitus type 2, and chronic pain syndrome related to severe spinal stenosis.  He has had surgery in the past but still is miserable with pain.  Oxycodone 15 mg 3 times daily is not controlling his discomfort.  He can barely get out of his wheelchair.    Assessment/Plan:  1. Chronic neck pain  Discontinue oxycodone 15 mg.  Increase to 30 mg every 8 hours.  - oxyCODONE (ROXICODONE) 30 MG immediate release tablet; Take 1 tablet (30 mg total) by mouth every 8 (eight) hours as needed for pain.  Dispense: 90 tablet; Refill: 0    2. Stenosis of cervical spine with myelopathy " (H)  This is a source of his neck pain.  He also has severe spine pain.    3. Lumbar stenosis with neurogenic claudication  Severe pain.  Increasing his oxycodone.  Discontinue Tylenol 3.    4. Panlobular emphysema (H)  Continues to smoke.    5. ALS (amyotrophic lateral sclerosis) (H)  Sees a neurologist on a monthly basis.        Phone call duration:  12 minutes    Yana Gamino CMA

## 2021-06-08 NOTE — TELEPHONE ENCOUNTER
Marco Patiño for refill requested?  Refill has been set up for you to review.      Prescription Monitoring Program activity reviewed with no discrepancies noted.      Last fill per : 04/15/2020  Quantity/days supply: #90 for a 30 day refill    Controlled Substance Agreement on file: Yes  Date: 05/08/2019    Last office visit with provider:  2/10/2020 Geoff Crabtree MD    Please advise.  Geovanna PRICE CMA/MIA....................9:51 AM

## 2021-06-08 NOTE — TELEPHONE ENCOUNTER
Bear Zavala has asked me to take a picture of his buttocks wound and send it to you. Do you have an email I can send it to? He is currently seeing Obdulia Prieto @ vascular but is not satisfied with the healing time.       Also he needs his vitamin D/1000, certrizine, and tamsulosin refilled.    He has a red spot on his right heel which he has had for a long time that causes no pain but I am wondering if we should be protecting it somehow?  Hydrocolloid or similar?    Also can I get an order for MSW to help him get into assisted living?      Thank you,  Esther Urena RN

## 2021-06-08 NOTE — TELEPHONE ENCOUNTER
Forms Request  Name of form/paperwork: DMV  Have you been seen for this request: No  Do we have the form: No  When is form needed by: August 2020  How would you like the form returned: Mail  Patient Notified form requests are processed in 3-5 business days: Yes    Okay to leave a detailed message? Yes        Patient would like to discuss this issue with Dr. Crabtree directly.

## 2021-06-08 NOTE — PROGRESS NOTES
OFFICE VISIT NOTE    Subjective:   Chief Complaint:  Follow-up (face to face visit for mannual wheelchair) and Foot Swelling (bilateral)    66-year-old male with multiple problems including COPD, ASHD, cervical spinal stenosis with myelopathy, diabetes mellitus.  He is requesting a manual wheelchair.  He has significant leg weakness and spasticity and sometimes falls.  He can use a walker for only very short distances.  Otherwise, he needs a wheelchair to get around his home or whenever he leaves his home.    Current Outpatient Prescriptions   Medication Sig     albuterol (PROVENTIL HFA;VENTOLIN HFA) 90 mcg/actuation inhaler Inhale 2 puffs every 6 (six) hours as needed for wheezing.     calcium carbonate-vitamin D3 (CALCIUM 600 + D,3,) 600 mg(1,500mg) -400 unit per tablet Take 1 tablet by mouth daily.     cetirizine (ZYRTEC) 10 MG tablet Take 1 tablet (10 mg total) by mouth daily.     clotrimazole-betamethasone (LOTRISONE) cream APPLY TO AFFECTED AREA 2 TIMES DAILY     CONTOUR NEXT STRIPS Strp 4 (four) times a day.     diazepam (VALIUM) 10 MG tablet Take 10 mg by mouth daily.     diclofenac sodium 1.5 % Drop Apply 1.2 mL topically 4 (four) times a day.     diphenhydrAMINE (BENADRYL) 25 mg capsule TAKE 2 CAPSULES BY MOUTH EVERY 6 HOURS AS NEEDED FOR ITCHING     ferrous sulfate 325 (65 FE) MG tablet Take 325 mg by mouth.     fluticasone (FLOVENT DISKUS) 100 mcg/actuation DsDv Inhale 1 puff.     folic acid (FOLVITE) 1 MG tablet Take 1 tablet (1 mg total) by mouth daily.     furosemide (LASIX) 20 MG tablet Take 1 tablet (20 mg total) by mouth daily.     furosemide (LASIX) 20 MG tablet Take 1 tablet (20 mg total) by mouth daily.     gabapentin (NEURONTIN) 300 MG capsule TAKE 1 CAPSULE BY MOUTH TWICE DAILY     gemfibrozil (LOPID) 600 MG tablet Take 600 mg by mouth 2 (two) times a day.     glucose 4 GM chewable tablet Chew 4 g daily.     hydrOXYzine (ATARAX) 25 MG tablet Take 1 tablet (25 mg total) by mouth 3 (three) times  "a day as needed for itching.     insulin aspart (NOVOLOG FLEXPEN) 100 unit/mL injection pen Inject 5 Units under the skin 3 (three) times a day with meals.     insulin aspart (NOVOLOG) 100 unit/mL injection Inject 5 Units under the skin daily before lunch.      ipratropium-albuterol (DUO-NEB) 0.5-2.5 mg/mL nebulizer 3 mL.     levalbuterol (XOPENEX HFA) 45 mcg/actuation inhaler Inhale 2 puffs every 4 (four) hours as needed for wheezing.     lidocaine (XYLOCAINE) 5 % ointment Apply 2 grams up to 4 times daily to affected area(s)     meloxicam (MOBIC) 7.5 MG tablet Take 1 tablet (7.5 mg total) by mouth daily.     meloxicam (MOBIC) 7.5 MG tablet TAKE 1 TABLET BY MOUTH EVERY DAY     metFORMIN (GLUCOPHAGE) 1000 MG tablet Take 1,000 mg by mouth 2 (two) times a day with meals.     metoprolol tartrate (LOPRESSOR) 25 MG tablet TAKE 1 TABLET BY MOUTH TWICE DAILY     MICROLET LANCET Misc 4 (four) times a day.     multivitamin (ONE A DAY) per tablet Take 1 tablet by mouth daily.     nut.tx.gluc.intol,lac-free,soy (GLUCERNA SNACK SHAKE) Liqd Drink 1 shake at lunchtime     nystatin (MYCOSTATIN) 100,000 unit/mL suspension SHAKE LIQUID AND TAKE 5 ML BY MOUTH FOUR TIMES DAILY     omeprazole (PRILOSEC) 20 MG capsule Take 20 mg by mouth daily.     oxyCODONE (ROXICODONE) 30 MG immediate release tablet 1 tablet by mouth twice daily     oxyCODONE (ROXICODONE) 30 MG immediate release tablet Do not fill until  11/13/16     1 tablet by mouth twice a day     pen needle, diabetic (BD ULTRA-FINE CHELO PEN NEEDLES) 32 gauge x 5/32\" Ndle Use 4 times daily     potassium chloride SA (K-DUR,KLOR-CON) 20 MEQ tablet Take 20 mEq by mouth 2 (two) times a day.     predniSONE (DELTASONE) 10 MG tablet TAKE 2 TABLET BY MOUTH TWICE DAILY X3 DAYS, THEN 1 TABLET TWICE DAILY X3 DAYS, THEN 1 TABLET DAILY X 3 DAYS THEN STOP     predniSONE (DELTASONE) 20 MG tablet SEE NOTES     predniSONE (DELTASONE) 5 MG tablet Take 1 tablet (5 mg total) by mouth daily.     " "predniSONE (DELTASONE) 5 MG tablet TAKE 1 TABLET BY MOUTH EVERY DAY     SENNA LAX 8.6 mg tablet      SYMBICORT 160-4.5 mcg/actuation inhaler INHALE 2 PUFFS BY MOUTH TWICE DAILY     SYRINGE & NEEDLE,INSULIN,1 ML (INSULIN SYRINGE-NEEDLE U-100) 1 mL 29 X 7/16\" Syrg      tamsulosin (FLOMAX) 0.4 mg Cp24 Take 1 capsule (0.4 mg total) by mouth Daily after breakfast.     testosterone (ANDROGEL) 1.62 % (20.25 mg/1.25 gram) GlPk Place 1 pumps on the skin daily.     tiotropium (SPIRIVA) 18 mcg inhalation capsule Place 1 capsule (2 puffs total) into inhaler and inhale daily.     XOPENEX HFA 45 mcg/actuation inhaler INHALE 2 PUFFS BY MOUTH EVERY 4 HOURS AS NEEDED FOR WHEEZING       PSFHx: Tobacco Status:  He  reports that he has been smoking.  He has never used smokeless tobacco.    Review of Systems:  A comprehensive review of systems is negative except for the comments above    Objective:    There were no vitals taken for this visit.  GENERAL: No acute distress.  Medicines no distress.  He is in a wheelchair today.  Pulse is 85.  Oxygen saturations 92%.  No edema.  Lungs are bilateral squeaks and rhonchi.  Heart shows sinus rhythm.  Mentally sharp and alert.  Difficulties getting out of a chair.  Very weak legs.  He can walk about 25-30 feet if he is using a walker.  He has however fallen while using a walker.  Legs are getting weaker I think the risk of falling is getting greater.    Assessment & Plan   Jef Centeno is a 66 y.o. male.    Spinal stenosis with cervical and lumbar myelopathy  COPD.  Patient continues to smoke but was trying to stop  ASHD is stable.      Diagnoses and all orders for this visit:    Stenosis of cervical spine with myelopathy    COPD (chronic obstructive pulmonary disease)    ASHD (arteriosclerotic heart disease)        The following high BMI interventions were performed this visit: encouragement to exercise    Geoff Crabtree MD  Transcription using voice recognition software, may contain " typographical errors.

## 2021-06-08 NOTE — TELEPHONE ENCOUNTER
Medication Question or Clarification  Who is calling: Pharmacy  What medication are you calling about (include dose and sig)?:    Disp  Refills  Start  End     oxyCODONE (ROXICODONE) 30 MG immediate release tablet  90 tablet  0  6/17/2020      Sig - Route: Take 1 tablet (30 mg total) by mouth every 8 (eight) hours as needed for pain. - Oral     Sent to pharmacy as: oxyCODONE 30 mg tablet (ROXICODONE)     Earliest Fill Date: 6/17/2020     Notes to Pharmacy: Chronic pain     E-Prescribing Status: Receipt confirmed by pharmacy (6/17/2020  9:14 AM CDT)       Who prescribed the medication?: Geoff Crabtree MD   What is your question/concern?: Caller stated the patient had recently picked up Oxycodone 15 mg tablet on 6/11/2020. Caller stated even if the dose/SIG has been change the patient should still has remaining of what he just picked up. Caller stated the patient picked up a 30 days supply pf Oxycodone 15 mg tablet 6/11/2020  Requested Pharmacy: [facility] to leave a detailed message?: Yes  [phone]

## 2021-06-08 NOTE — TELEPHONE ENCOUNTER
Claritin D on med list and patient has been taking but it is now out and pharmacy states no refills. Should patient continue this twice daily or stop and just take domingo zyrtec? Please update med list or pharmacy.  Thanks

## 2021-06-08 NOTE — TELEPHONE ENCOUNTER
Patient stated that he would like to change this dose back to 30 mg tablet because the 15 mg tablet is not working. Patient stated that he would like a call when this is done. He has an appointment Wednesday

## 2021-06-08 NOTE — TELEPHONE ENCOUNTER
Left message for the patient relaying message below from Dr. Crabtree.  Asked the patient to call to schedule an appointment as requested below.  Geovanna PRICE CMA/MIA....................10:05 AM

## 2021-06-08 NOTE — TELEPHONE ENCOUNTER
Spoke with the patient and relayed message below from Dr. Crabtree.  Patient verbalized understanding but has many more questions for Dr. Crabtree.  He has been scheduled for a med check appointment on 6/17/2020 to discuss his additional medication questions.  Geovanna PRICE CMA/MIA....................3:44 PM

## 2021-06-08 NOTE — TELEPHONE ENCOUNTER
He has been on the 10 mg dose now for several months.  Very hesitant to increase it at this time.  He still drives his car etc. so is fairly active.  I want to avoid overdosing him.

## 2021-06-08 NOTE — PROGRESS NOTES
Patient seen today late afternoon for med set up. Patient has no supply of claritin D and no refills at pharmacy. Patient thought this may have been a temporary med- I sent message to MD for clarification. He declined wound care as it was done in the morning he said and he was already up and dressed.

## 2021-06-08 NOTE — TELEPHONE ENCOUNTER
Dr. Crabtree,  Would you like the patient to schedule a video/telephone visit?  Geovanna PRICE, CMA/CMT....................9:17 AM

## 2021-06-08 NOTE — TELEPHONE ENCOUNTER
Controlled Substance Refill Request  Medication Name:   Requested Prescriptions     Pending Prescriptions Disp Refills     LORATADINE-PSEUDOEPHEDRINE 5-120 mg per tablet [Pharmacy Med Name: LORATADINE-D 5-120MG TABLETS] 60 tablet 0     Sig: TAKE 1 TABLET BY MOUTH TWICE DAILY     Date Last Fill: 4/24/20  Requested Pharmacy: [facility]  Submit electronically to pharmacy  Controlled Substance Agreement on file:   Encounter-Level CSA Scan Date:    There are no encounter-level csa scan date.        Last office visit:  3/11/20

## 2021-06-08 NOTE — PROGRESS NOTES
Patient is to try applying Triad paste himself to wounds on bottom; please have him try to apply while staff is there to see if he is able. This will only happen while Thien/PCA s out with SO in the hospital.    Esther Urena RN

## 2021-06-08 NOTE — TELEPHONE ENCOUNTER
Spoke with the patient and relayed message from Dr. Crabtree in the duplicate message from this morning.  Patient verbalized understanding and had no further questions at this time.  Geovanna PRICE CMA/MIA....................11:29 AM    See duplicate message from this morning regarding message that needs to be relayed to the patient.  LUCIUS Andrew....................11:29 AM

## 2021-06-08 NOTE — PROGRESS NOTES
Your patient was discharged from LTAC[facility] on 5/14/2020 because all goals have been met.  Pt will continue HEP independently and complete walking program daily as tolerated with assist from his PCA.  Thank you for allowing us to provide care to this patient!  A Discharge summary is available upon requestâ  [phone].    Thanks,  Shelby Mitchell, PT

## 2021-06-08 NOTE — TELEPHONE ENCOUNTER
Medication Question or Clarification  Who is calling: Patient   What medication are you calling about (include dose and sig)?:    Disp  Refills  Start  End     oxyCODONE (ROXICODONE) 15 MG immediate release tablet  90 tablet  0  5/11/2020      Sig - Route: Take 1 tablet (15 mg total) by mouth 3 (three) times a day. - Oral     Sent to pharmacy as: oxyCODONE 15 mg tablet (Roxicodone)     Earliest Fill Date: 5/11/2020     Notes to Pharmacy: Chronic pain, long term use ok to fill 3/19/20     E-Prescribing Status: Receipt confirmed by pharmacy (5/11/2020 10:39 AM CDT)       Who prescribed the medication?: Geoff Crabtree MD   What is your question/concern?: Patient stated that he would like to change this dose back to 30 mg tablet because the 15 mg tablet is not working. Patient stated that he would like a call when this is done.  Requested [facility]acy: [id number]  Okay to leave a detailed message?: Yes  [phone]
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
## 2021-06-08 NOTE — TELEPHONE ENCOUNTER
Controlled Substance Refill Request  Medication Name:   Requested Prescriptions     Pending Prescriptions Disp Refills     oxyCODONE (ROXICODONE) 30 MG immediate release tablet 90 tablet 0     Sig: Take 1 tablet (30 mg total) by mouth 3 (three) times a day.     predniSONE (DELTASONE) 5 MG tablet 30 tablet 3     Sig: Take 5 mg by mouth daily.     Date Last Fill: 8/13/19  Pharmacy: [facility]       Submit electronically to pharmacy  Controlled Substance Agreement Date Scanned:   Encounter-Level CSA Scan Date:    There are no encounter-level csa scan date.       Last office visit with prescriber/PCP: 8/28/2019 Geoff Crabtree MD OR same dept: 8/28/2019 Geoff Crabtree MD OR same specialty: 8/28/2019 Geoff Crabtree MD  Last physical: Visit date not found Last MTM visit: Visit date not found    
Prescription Monitoring Program activity reviewed with no discrepancies noted.      Last fill per /Quantity/days supply:   08/13/2019 Oxycodone Hcl 30 Mg Tablet 90 tablets for 30 days        Controlled Substance Agreement on file: Yes  Date: 5/7/19    Last office visit with provider:  8/28/2019 Geoff Crabtree MD    Please advise.    
Breath Sounds equal & clear to percussion & auscultation, no accessory muscle use

## 2021-06-08 NOTE — TELEPHONE ENCOUNTER
acetaminophen-codeine (TYLENOL-CODEINE #3) 300-30 mg per tablet [[id number]]     Electronically signed by: David Shafer MD on 03/11/20 1144  Status: Discontinued    Ordering user: David Shafer MD 03/11/20 1144  Authorized by: David Shafer MD    PRN reasons: pain    Frequency: TID PRN 03/11/20 - 06/17/20  Released by: David Shafer MD 03/11/20 1144    Discontinued by: Geoff Crabtree MD 06/17/20 0940 [Therapy completed]

## 2021-06-08 NOTE — PROGRESS NOTES
"Jef Centeno Jr. is a 69 y.o. male who is being evaluated via a billable video visit.      The patient has been notified of following:     \"This video visit will be conducted via a call between you and your physician/provider. We have found that certain health care needs can be provided without the need for an in-person physical exam.  This service lets us provide the care you need with a video conversation.  If a prescription is necessary we can send it directly to your pharmacy.  If lab work is needed we can place an order for that and you can then stop by our lab to have the test done at a later time.    Video visits are billed at different rates depending on your insurance coverage. Please reach out to your insurance provider with any questions.    If during the course of the call the physician/provider feels a video visit is not appropriate, you will not be charged for this service.\"    Patient has given verbal consent to a Video visit? Yes    Patient would like to receive their AVS by AVS Preference: Mail a copy.    Patient would like the video invitation sent by: Text to cell phone: [phone]    Will anyone else be joining your video visit? No        Video-Visit Details    Type of service:  Video Visit    Originating Location (pt. Location): Home    Distant Location (provider location):  [facility]      Platform used for Video Visit: Hu Centeno LPN  "

## 2021-06-09 ENCOUNTER — COMMUNICATION - HEALTHEAST (OUTPATIENT)
Dept: HOME HEALTH SERVICES | Facility: HOME HEALTH | Age: 71
End: 2021-06-09

## 2021-06-09 DIAGNOSIS — M48.062 LUMBAR STENOSIS WITH NEUROGENIC CLAUDICATION: ICD-10-CM

## 2021-06-09 NOTE — PROGRESS NOTES
Patient is now on his [place] portion of insurance.  Started hydrocolloid dressing today 6/24-every 3 days. Will update HHA visits accordingly by end of day-    Esther Urena RN

## 2021-06-09 NOTE — PROGRESS NOTES
JUAN:  Just got a call from [facility] stating she has a patient in the Assisted living facility connected with the [facility]; no smoking allowed in the facility; could be a deal breaker for Bear since this patient is supposedly getting evicted for smoking in the home.    Esther Urena RN

## 2021-06-09 NOTE — PROGRESS NOTES
Writer was told by HHA today that PCA refused to take Andreia socks off before he left for the day on 6/18/2020.  Patient was able to get them off but was a struggle due to ALS. Writer called PCA services and was told taking off ANDREIA- is within scope of practice and will direct PCA to do this. Applying Triad paste or any type of wound care is not within their scope of practice.  Currently [facility] is having SN apply 2x weekly and assess, and HH  A 5x weekly so 1x daily. Patient is unable to reach and apply Triad paste appropriately.      Writer called [facility] and due to these issues received verbal orders to try hydrocolloid on buttocks pressure sore (with vascular ordering supplies), and change every 3-5days and see if this works.  If not working go back to Triad paste.  At next visit SN will take picture of wound and send to vascular.      Esther Urena RN

## 2021-06-09 NOTE — TELEPHONE ENCOUNTER
Jef Steiner has not received any hydrocolloid dressings as of yet from our last phone call conversation.  Has this order been replaced?        Please advise,    Esther Urena RN

## 2021-06-09 NOTE — PROGRESS NOTES
[facility] Care Coordination Contact  6/30/20    Voice message from SHANNAN Marcelo SW reports that did confirm with member that is interested in AL. She gave contacts for AL, [place] [phone].        Ruddy Alexander RN  [facility]  [phone]    Phone call to member and he expressed that he does want to move to the AL for more assistance.  PCA services are going well per member.  He reports that nurse is currently working with AL as well. Informed that will connect with SW.    Phone call to Mateo Ryan [facility] Director to discuss member's interest in moving into AL.  Inquired process/openings, etc..  She reports that she will look into it and update care coordinator next week.     Ruddy Alexander RN  [facility]  [phone]

## 2021-06-09 NOTE — PROGRESS NOTES
[facility] Care Coordination Contact    Voice message from SHANNAN Mejia reports that was told that member was interested in moving to AL, inquires about process.     Left voice message for member to call care coordinator.    Left voice mail message for Jackie CAMPBELL regarding process and if member is interested.    Ruddy Alexander RN  [facility]  [phone]

## 2021-06-09 NOTE — TELEPHONE ENCOUNTER
RN cannot approve Refill Request    RN can NOT refill this medication historical medication requested. Last office visit: 2/10/2020 Geoff Crabtree MD Last Physical: 12/24/2019 Last MTM visit: Visit date not found Last visit same specialty: 3/11/2020 David Shafer MD.  Next visit within 3 mo: Visit date not found  Next physical within 3 mo: Visit date not found      Beba Sierra, [facility] Triage/Med Refill 6/24/2020    Requested Prescriptions   Pending Prescriptions Disp Refills     gabapentin (NEURONTIN) 300 MG capsule [Pharmacy Med Name: GABAPENTIN 300MG CAPSULES] 180 capsule 0     Sig: TAKE 1 CAPSULE BY MOUTH TWICE DAILY       Gabapentin/Levetiracetam/Tiagabine Refill Protocol  Passed - 6/24/2020 10:14 AM        Passed - PCP or prescribing provider visit in past 12 months or next 3 months     Last office visit with prescriber/PCP: 2/10/2020 Geoff Crabtree MD OR same dept: 3/11/2020 David Shafer MD OR same specialty: 3/11/2020 David Shafer MD  Last physical: 12/24/2019 Last MTM visit: Visit date not found   Next visit within 3 mo: Visit date not found  Next physical within 3 mo: Visit date not found  Prescriber OR PCP: Geoff Crabtree MD  Last diagnosis associated with med order: There are no diagnoses linked to this encounter.  If protocol passes may refill for 12 months if within 3 months of last provider visit (or a total of 15 months).

## 2021-06-09 NOTE — PROGRESS NOTES
TEAM MEMBERS PRESENT:   RN, MSW  DISCIPLINES ORDERED:   Nursing, Medical Social Work and Home Health Aide    PROGRESS/STATUS, BARRIERS to DC/CONCERNS: (environmental, safety, mental health, cultural, language, non-compliance, cognition, caregiver dynamics): Wound cares to buttocks, wheelchair, noncompliant in offloading with walker, etc...  edema, PCA instability to hep with cares including taking off compression socks.       PLAN/FOLLO  W-UP NEEDED: MSW working on getting him admitted to the assisted living at [facility] but there may some financial barriers    ANTICIPATED DC DATES/STATUS OF ORDERS (2 day notice?):   unknown

## 2021-06-09 NOTE — TELEPHONE ENCOUNTER
Request for Orders    Who s Requesting: Home Care Registered Nurse    Orders being requested:   SN visits for wound care every 6 days,applying compression socks, medication mgmt/education, edema, safety    HHA visits for simple wound care every 6 days, applying compression socks    MSW for community resources    Where to send Orders: Epic      Thank you,    Esther Urena RN

## 2021-06-09 NOTE — PROGRESS NOTES
"Jef Centeno Jr. is a 70 y.o. male who is being evaluated via a billable video visit.      The patient has been notified of following:     \"This video visit will be conducted via a call between you and your physician/provider. We have found that certain health care needs can be provided without the need for an in-person physical exam.  This service lets us provide the care you need with a video conversation.  If a prescription is necessary we can send it directly to your pharmacy.  If lab work is needed we can place an order for that and you can then stop by our lab to have the test done at a later time.    Video visits are billed at different rates depending on your insurance coverage. Please reach out to your insurance provider with any questions.    If during the course of the call the physician/provider feels a video visit is not appropriate, you will not be charged for this service.\"    Patient has given verbal consent to a Video visit? Yes  How would you like to obtain your AVS? AVS Preference: Mail a copy.  Patient would like the video invitation sent by: Text to cell phone: [phone]  Will anyone else be joining your video visit? No                Video-Visit Details    Type of service:  Video Visit      Originating Location (pt. Location): Home    Distant Location (provider location):  [facility]      Platform used for Video Visit: Hu Magaña RN    "

## 2021-06-09 NOTE — PROGRESS NOTES
[facility] Care Coordination Contact  7/9/20  VM from VICKEY Marcelo reports that member no longer wants to move and plans to remain at apartment.        Left voice message for member to call care coordinator with any needs.    Phone call to SHANNAN Mejia reports that unsure why member changes mind but to update with any concerns.  PCA seems to reliable.     Ruddy Alexander RN  [facility]  [phone]

## 2021-06-09 NOTE — TELEPHONE ENCOUNTER
Obdulia,    Can you order more hydrocolloid for Jef? 3 packages and I don't see another appointment scheduled for him with you?    His appetite is minimal and eating protein is not happening-he has quite a few sweets around the apartment; does minimal off loading even though he states he does.      Would putting silvercell under the hydrocolloid be a consideration?  His PCA is unreliable so the triad paste is not a reliable choice for wound care?  What ideas do you have?    Currently we are doing every 3 days wound care-should we do every 5? With HHA EO dressing change.    Please advise,    Esther Urena RN

## 2021-06-09 NOTE — TELEPHONE ENCOUNTER
Bear Zavala's rx for pregabalin 75 mg Oral 2 times daily  Has disappeared from his med list but I have not received any notice of it being discontinued.       Please advise.    Esther Urena RN

## 2021-06-09 NOTE — TELEPHONE ENCOUNTER
Obdulia,    Can you please order more hydrocolloid for Jef.  I just started the 2nd pacakge and he is concerned about the supply. If you could order 2 more packages that would be appreciated.  We are currently doing wound care every 3 days using .5 of the hydrocolloid on the right buttocks and 1/4 on the right side. I will take a picture on Monday's visit so you can see how it's progressing. I did have to use a clear tegaderm over the right hydocolloid where it is cut due to it catching when he is wiping and came off. I have been applying skin prep also.       Esther Urena RN

## 2021-06-09 NOTE — PROGRESS NOTES
OFFICE VISIT NOTE    Subjective:   Chief Complaint:  Follow-up    66-year-old male with multiple problems including cervical spinal stenosis with myelopatyy the lower extremities.  He still uses a walker for ambulation.  Sometimes, he can go short distances with a cane.  He has a difficult time getting in and out of a regular bed.  No falls or injuries.  No shortness of breath.  He still smokes a half a pack of cigarettes per day.    Current Outpatient Prescriptions   Medication Sig     albuterol (PROVENTIL HFA;VENTOLIN HFA) 90 mcg/actuation inhaler Inhale 2 puffs every 6 (six) hours as needed for wheezing.     calcium carbonate-vitamin D3 (CALCIUM 600 + D,3,) 600 mg(1,500mg) -400 unit per tablet Take 1 tablet by mouth daily.     cetirizine (ZYRTEC) 10 MG tablet Take 1 tablet (10 mg total) by mouth daily.     clotrimazole-betamethasone (LOTRISONE) cream APPLY TO AFFECTED AREA 2 TIMES DAILY     CONTOUR NEXT STRIPS Strp 4 (four) times a day.     diazepam (VALIUM) 10 MG tablet Take 10 mg by mouth daily.     diphenhydrAMINE (BENADRYL) 25 mg capsule TAKE 2 CAPSULES BY MOUTH EVERY 6 HOURS AS NEEDED FOR ITCHING     ferrous sulfate 325 (65 FE) MG tablet Take 325 mg by mouth.     fluticasone (FLOVENT DISKUS) 100 mcg/actuation DsDv Inhale 1 puff.     folic acid (FOLVITE) 1 MG tablet TAKE 1 TABLET BY MOUTH EVERY DAY     furosemide (LASIX) 20 MG tablet Take 1 tablet (20 mg total) by mouth daily.     furosemide (LASIX) 20 MG tablet Take 1 tablet (20 mg total) by mouth daily.     gabapentin (NEURONTIN) 300 MG capsule TAKE 1 CAPSULE BY MOUTH TWICE DAILY     gemfibrozil (LOPID) 600 MG tablet Take 600 mg by mouth 2 (two) times a day.     glucose 4 GM chewable tablet Chew 4 g daily.     hydrOXYzine (ATARAX) 25 MG tablet Take 1 tablet (25 mg total) by mouth 3 (three) times a day as needed for itching.     insulin aspart (NOVOLOG) 100 unit/mL injection Inject 5 Units under the skin daily before lunch.      ipratropium-albuterol  "(DUO-NEB) 0.5-2.5 mg/mL nebulizer 3 mL.     levalbuterol (XOPENEX HFA) 45 mcg/actuation inhaler Inhale 2 puffs every 4 (four) hours as needed for wheezing.     levalbuterol (XOPENEX HFA) 45 mcg/actuation inhaler INHALE 2 PUFFS BY MOUTH EVERY 4 HOURS AS NEEDED FOR WHEEZING     lidocaine (XYLOCAINE) 5 % ointment Apply 2 grams up to 4 times daily to affected area(s)     meloxicam (MOBIC) 7.5 MG tablet Take 1 tablet (7.5 mg total) by mouth daily.     meloxicam (MOBIC) 7.5 MG tablet TAKE 1 TABLET BY MOUTH EVERY DAY     metFORMIN (GLUCOPHAGE) 1000 MG tablet Take 1,000 mg by mouth 2 (two) times a day with meals.     metoprolol tartrate (LOPRESSOR) 25 MG tablet TAKE 1 TABLET BY MOUTH TWICE DAILY     MICROLET LANCET Misc 4 (four) times a day.     multivitamin (ONE A DAY) per tablet Take 1 tablet by mouth daily.     NOVOLOG FLEXPEN 100 unit/mL injection pen INJECT 5 UNITS UNDER THE SKIN THREE TIMES DAILY WITH MEALS     nut.tx.gluc.intol,lac-free,soy (GLUCERNA SNACK SHAKE) Liqd Drink 1 shake at lunchtime     nystatin (MYCOSTATIN) 100,000 unit/mL suspension SHAKE LIQUID AND TAKE 5 ML BY MOUTH FOUR TIMES DAILY     omeprazole (PRILOSEC) 20 MG capsule Take 20 mg by mouth daily.     oxyCODONE (ROXICODONE) 30 MG immediate release tablet Do not fill until  11/13/16     1 tablet by mouth twice a day     oxyCODONE (ROXICODONE) 30 MG immediate release tablet 1 tablet by mouth twice daily     pen needle, diabetic (BD ULTRA-FINE CHELO PEN NEEDLES) 32 gauge x 5/32\" Ndle Use 4 times daily     potassium chloride SA (K-DUR,KLOR-CON) 20 MEQ tablet Take 20 mEq by mouth 2 (two) times a day.     predniSONE (DELTASONE) 10 MG tablet TAKE 2 TABLETS BY MOUTH TWICE DAILY FOR 3 DAYS, THEN 1 TABLET TWICE DAILY FOR 3 DAYS, THEN 1 TABLET DAILY FOR 3 DAYS, THEN STOP     predniSONE (DELTASONE) 10 MG tablet Take 1 tablet (10 mg total) by mouth daily.     predniSONE (DELTASONE) 20 MG tablet SEE NOTES     predniSONE (DELTASONE) 5 MG tablet Take 1 tablet (5 mg " "total) by mouth daily.     predniSONE (DELTASONE) 5 MG tablet TAKE 1 TABLET BY MOUTH EVERY DAY     SENNA LAX 8.6 mg tablet      SYMBICORT 160-4.5 mcg/actuation inhaler INHALE 2 PUFFS BY MOUTH TWICE DAILY     SYRINGE & NEEDLE,INSULIN,1 ML (INSULIN SYRINGE-NEEDLE U-100) 1 mL 29 X 7/16\" Syrg      tamsulosin (FLOMAX) 0.4 mg Cp24 Take 1 capsule (0.4 mg total) by mouth Daily after breakfast.     testosterone (ANDROGEL) 1.62 % (20.25 mg/1.25 gram) GlPk Place 1 pumps on the skin daily.     tiotropium (SPIRIVA) 18 mcg inhalation capsule Place 1 capsule (2 puffs total) into inhaler and inhale daily.       PSFHx: Tobacco Status:  He  reports that he has been smoking.  He has never used smokeless tobacco.    Review of Systems:  A comprehensive review of systems is negative except for the comments above    Objective:    There were no vitals taken for this visit.  GENERAL: No acute distress.  She appears to be in better health than last time.  Pulse is 76.  He is able to stand up unassisted at this time.  Trace edema of the legs.  Lungs or rhonchi but no rales or wheezes this time.  Significantly improved over last visit.  Handgrip is normal.  His decreased range of motion of the lumbar and cervical spine.  Sensation lower extremities.  Abdomen is nontender.    Assessment & Plan   Jef Centeno is a 66 y.o. male.    Clinically he is stable.  He has significant myelopathy of the lower extremities from lumbar and cervical spine stenosis.  He will require a hospital bed to assist in positioning of his back and neck.  He already is using a walker.  Hospital bed will create  safer environment for him as he will decrease  risk of falling getting in and out.  Probably needs to be in a 2 bedroom apartment as a hospital bed and will not fit in a room with another bed in it  .Face to face exam was done on 3/15/17.    Diagnoses and all orders for this visit:    Lumbar stenosis with neurogenic claudication    Stenosis of cervical spine with " myelopathy    Diabetes mellitus, type 2    Other orders  -     predniSONE (DELTASONE) 10 MG tablet; Take 1 tablet (10 mg total) by mouth daily.  Dispense: 30 tablet; Refill: 0        The following high BMI interventions were performed this visit: encouragement to exercise    Geoff Crabtree MD  Transcription using voice recognition software, may contain typographical errors.

## 2021-06-10 RX ORDER — PREDNISONE 5 MG/1
TABLET ORAL
Qty: 15 TABLET | Refills: 0 | Status: SHIPPED | OUTPATIENT
Start: 2021-06-10 | End: 2021-07-16

## 2021-06-10 NOTE — TELEPHONE ENCOUNTER
Medication Request  Medication name:    Disp  Refills  Start  End  YUDITH    predniSONE (DELTASONE) 5 MG tablet  30 tablet  2  4/18/2016 4/28/2016  --    Sig - Route: Take 1 tablet (5 mg total) by mouth daily. - Oral      LYRICA 75 mg capsule (Discontinued)  90 capsule  0  1/14/2019 8/28/2019  No    Sig: TAKE 1 CAPSULE(75 MG) BY MOUTH THREE TIMES DAILY    Patient taking differently: TAKE 1 CAPSULE(75 MG) BY MOUTH TWO TIMES DAILY               [facility]  Reason for request: Lumbar stenosis with neurogenic claudication     Okay to leave a detailed message: yes

## 2021-06-10 NOTE — TELEPHONE ENCOUNTER
"MARLINNILSJennifer Sifuentes has another .2 cm early, pink pressure sore on R upper leg near \"crease\" wit buttock.  Washed with NS and applied Hydrocolloid for protection. Please advise for wound care orders. Educated patient to offload pressure and to increase protein in the diet.  Other wounds are looking better, it may be a good idea to order another box of hydrocolloid so we don't run out as it seems to be helping at this time.    Thank You  "

## 2021-06-10 NOTE — TELEPHONE ENCOUNTER
Refill Approved    Rx renewed per Medication Renewal Policy. Medication was last renewed on 12/24/19.    Keyanna Tenorio, [facility] Triage/Med Refill 8/18/2020     Requested Prescriptions   Pending Prescriptions Disp Refills     potassium chloride (K-DUR,KLOR-CON) 20 MEQ tablet [Pharmacy Med Name: POTASSIUM CL 20MEQ ER TABLETS] 180 tablet 3     Sig: TAKE 1 TABLET(20 MEQ) BY MOUTH TWICE DAILY       Potassium Supplements Refill Protocol Passed - 8/17/2020 10:19 AM        Passed - PCP or prescribing provider visit in past 12 months       Last office visit with prescriber/PCP: 2/10/2020 Geoff Crabtree MD OR same dept: 3/11/2020 David Shafer MD OR same specialty: 3/11/2020 David Shafer MD  Last physical: 12/24/2019 Last MTM visit: Visit date not found   Next visit within 3 mo: Visit date not found  Next physical within 3 mo: Visit date not found  Prescriber OR PCP: Geoff Crabtree MD  Last diagnosis associated with med order: 1. Edema due to congestive heart failure (H)  - potassium chloride (K-DUR,KLOR-CON) 20 MEQ tablet [Pharmacy Med Name: POTASSIUM CL 20MEQ ER TABLETS]; TAKE 1 TABLET(20 MEQ) BY MOUTH TWICE DAILY  Dispense: 180 tablet; Refill: 3    If protocol passes may refill for 12 months if within 3 months of last provider visit (or a total of 15 months).             Passed - Potassium level in last 12 months     Lab Results   Component Value Date    Potassium 3.7 02/25/2020

## 2021-06-10 NOTE — TELEPHONE ENCOUNTER
"Obdulia,    Can you order more (3) boxes hydrocolloid (smaller than 5.25 x 6\"). Currently we are cutting the supplies in half but I think it will stick better with not having to do this.     Also, I'm sending a picture of his wounds-they are doing better. Unfortunately I was not able to place measuring next to it due to his ability to stand.  But I will send measurements with.     Can someone call him regarding a followup appointment?    Nursing is in every 5 days with HHA every 5 days.    Thank you,  Esther Spencer,RN      "

## 2021-06-10 NOTE — TELEPHONE ENCOUNTER
Refill Approved    Rx renewed per Medication Renewal Policy. Medication was last renewed on 12/24/19, last OV 6/17/20 .    Robyn Marin, [facility] Triage/Med Refill 8/12/2020     Requested Prescriptions   Pending Prescriptions Disp Refills     SPIRIVA WITH HANDIHALER 18 mcg inhalation capsule 90 capsule 3     Sig: Place 1 capsule (2 puffs total) into inhaler and inhale daily.       Ipratropium/Tiotropium/Umeclidinium Refill Protocol Passed - 8/11/2020 10:36 AM        Passed - PCP or prescribing provider visit in last 6 months     Last office visit with prescriber/PCP: Visit date not found OR same dept: 3/11/2020 David Shafer MD OR same specialty: 3/11/2020 David Shafer MD Last physical: Visit date not found Last MTM visit: Visit date not found     Next appt within 3 mo: Visit date not found  Next physical within 3 mo: Visit date not found  Prescriber OR PCP: Geoff Crabtree MD  Last diagnosis associated with med order: 1. Chronic obstructive pulmonary disease with acute exacerbation (H)  - SPIRIVA WITH HANDIHALER 18 mcg inhalation capsule; Place 1 capsule (2 puffs total) into inhaler and inhale daily.  Dispense: 90 capsule; Refill: 3    If protocol passes may refill for 6 months if within 3 months of last provider visit (or a total of 9 months).

## 2021-06-10 NOTE — PROGRESS NOTES
OFFICE VISIT NOTE    Subjective:   Chief Complaint:  Follow-up (ER for back pain at [facility] on 5/21/17)    This is a very complicated 66-year-old male with history of COPD, chronic neck pain, chronic spinal stenosis with lumbar pain, continued ongoing tobacco abuse, postherpetic neuralgia.  Adhere to this patient about 2-1/2 years ago.  He was on high doses of narcotics.  I been able to reduce the dosage to oxycodone 30 mg twice daily.  Recently had increasing pain.  He went to the emergency room yesterday.  He was given Dilaudid and told to see me today.  He does tell me that he ran out of his prednisone a few days ago.  He has been quite sleepy and lethargic today.    Current Outpatient Prescriptions   Medication Sig     albuterol (PROVENTIL) 2.5 mg /3 mL (0.083 %) nebulizer solution Take 2.5 mg by nebulization every 6 (six) hours as needed for wheezing. Inhale 3ml via a nebulizer every 4 hours if needed for SOB     calcium carbonate-vitamin D3 (CALCIUM 600 + D,3,) 600 mg(1,500mg) -400 unit per tablet Take 1 tablet by mouth daily.     cetirizine (ZYRTEC) 10 MG tablet Take 1 tablet (10 mg total) by mouth daily.     CONTOUR NEXT STRIPS Strp 4 (four) times a day.     diphenhydrAMINE (BENADRYL) 25 mg capsule TAKE 2 CAPSULES BY MOUTH EVERY 6 HOURS AS NEEDED FOR ITCHING     docusate sodium (COLACE) 100 MG capsule Take 100 mg by mouth 2 (two) times a day as needed for constipation.     DULoxetine (CYMBALTA) 30 MG capsule 1 capsule daily for 1 week, then increase to 2 capsules daily     finasteride (PROSCAR) 5 mg tablet Take 1 tablet by mouth daily.     fluticasone-vilanterol (BREO ELLIPTA) 100-25 mcg/dose DsDv inhaler Inhale 1 Dose 2 (two) times a day.     folic acid (FOLVITE) 1 MG tablet TAKE 1 TABLET BY MOUTH EVERY DAY     furosemide (LASIX) 40 MG tablet Take 20 mg by mouth daily.      gabapentin (NEURONTIN) 300 MG capsule TAKE 1 CAPSULE BY MOUTH TWICE DAILY     meloxicam (MOBIC) 7.5 MG tablet Take 1 tablet (7.5 mg  "total) by mouth daily.     metFORMIN (GLUCOPHAGE) 1000 MG tablet Take 1,000 mg by mouth daily with breakfast.      metoprolol tartrate (LOPRESSOR) 25 MG tablet TAKE 1 TABLET BY MOUTH TWICE DAILY     MICROLET LANCET Misc 4 (four) times a day.     multivitamin (ONE A DAY) per tablet Take 1 tablet by mouth daily.     NOVOLOG FLEXPEN 100 unit/mL injection pen INJECT 5 UNITS UNDER THE SKIN THREE TIMES DAILY WITH MEALS     omeprazole (PRILOSEC) 20 MG capsule Take 20 mg by mouth daily.     oxyCODONE (ROXICODONE) 30 MG immediate release tablet 1 tablet by mouth twice daily     oxyCODONE (ROXICODONE) 30 MG immediate release tablet 1 tab p.o. twice daily     potassium chloride SA (K-DUR,KLOR-CON) 20 MEQ tablet TAKE 1 TABLET BY MOUTH TWICE DAILY WITH MEALS     predniSONE (DELTASONE) 10 MG tablet TAKE 1 TABLET(10 MG) BY MOUTH DAILY     predniSONE (DELTASONE) 10 mg tablet Take 1 tablet (10 mg total) by mouth daily for 10 days.     SYRINGE & NEEDLE,INSULIN,1 ML (INSULIN SYRINGE-NEEDLE U-100) 1 mL 29 X 7/16\" Syrg      tamsulosin (FLOMAX) 0.4 mg Cp24 Take 1 capsule (0.4 mg total) by mouth Daily after breakfast.       PSFHx: Tobacco Status:  He  reports that he has been smoking.  He has never used smokeless tobacco.    Review of Systems:  A comprehensive review of systems is negative except for the comments above    Objective:    There were no vitals taken for this visit.  GENERAL: No acute distress.  Bit sleepy but he does answer questions appropriately.  His vital signs are stable.  He is afebrile.  Oxygen saturations 94%.  Lungs have rhonchi.  Handgrip is normal.  He is able to stand up out of a wheelchair.  He walks with the use of a walker.  1+ edema of the legs.  No cyanosis.  Heart does show regular rhythm.    Assessment & Plan   Jef Centeno is a 66 y.o. male.    Chronic pain syndrome related to spinal stenosis.  He has also had neck surgery in the past.  His sweets syndrome is been put on his own years ago.  He ran out a " few days ago.  Start back on prednisone 20 mg today then 10 mg daily.  Add the medication duloxetine 30 mg daily for 7 days then increase to 30 mg twice daily  Continue with oxycodone 30 mg but only twice daily.  I would like to reduce this man's narcotics in the future.  Return to clinic in 1 month.    Diagnoses and all orders for this visit:    Lumbar stenosis with neurogenic claudication  -     DULoxetine (CYMBALTA) 30 MG capsule; 1 capsule daily for 1 week, then increase to 2 capsules daily  Dispense: 60 capsule; Refill: 4  -     oxyCODONE (ROXICODONE) 30 MG immediate release tablet; 1 tab p.o. twice daily  Dispense: 60 tablet; Refill: 0    Chronic obstructive pulmonary disease, unspecified COPD type    Postherpetic neuralgia    Sweet syndrome  -     predniSONE (DELTASONE) 10 mg tablet; Take 1 tablet (10 mg total) by mouth daily for 10 days.  Dispense: 30 tablet; Refill: 3        The following high BMI interventions were performed this visit: encouragement to exercise    Geoff Crabtree MD  Transcription using voice recognition software, may contain typographical errors.

## 2021-06-10 NOTE — TELEPHONE ENCOUNTER
Who is calling:  Patient   Reason for Call:  Caller stated that he would like a call back from Geoff Crabtree MD or nurse. Caller would not go in detail to why he is needing the call.   Date of last appointment with primary care:   Okay to leave a detailed message: Yes

## 2021-06-10 NOTE — PROGRESS NOTES
Beba, Are you planning to see Bear at all this week? Wondering if it was missed to  schedule you for a visit.  Thank You

## 2021-06-10 NOTE — PROGRESS NOTES
OFFICE VISIT NOTE    Subjective:   Chief Complaint:  No chief complaint on file.    66-year-old male with COPD as well as history of cervical myelopathy.  He had decompression surgery about a year ago.  He slowly making progress.  He can feel a little tiny lump in the incision wonders what this is from.  He otherwise is doing well.  Blood sugars are much better.  He is now walking with a cane and no longer needs a walker.  His legs are definitely stronger.    Current Outpatient Prescriptions   Medication Sig     albuterol (PROVENTIL HFA;VENTOLIN HFA) 90 mcg/actuation inhaler Inhale 2 puffs every 6 (six) hours as needed for wheezing.     BANOPHEN 25 mg capsule TAKE 2 CAPSULES BY MOUTH EVERY 6 HOURS AS NEEDED FOR ITCHING     calcium carbonate-vitamin D3 (CALCIUM 600 + D,3,) 600 mg(1,500mg) -400 unit per tablet Take 1 tablet by mouth daily.     cetirizine (ZYRTEC) 10 MG tablet Take 1 tablet (10 mg total) by mouth daily.     clotrimazole-betamethasone (LOTRISONE) cream APPLY TO AFFECTED AREA 2 TIMES DAILY     CONTOUR NEXT STRIPS Strp 4 (four) times a day.     diazepam (VALIUM) 10 MG tablet Take 10 mg by mouth daily.     diphenhydrAMINE (BENADRYL) 25 mg capsule TAKE 2 CAPSULES BY MOUTH EVERY 6 HOURS AS NEEDED FOR ITCHING     ferrous sulfate 325 (65 FE) MG tablet Take 325 mg by mouth.     finasteride (PROSCAR) 5 mg tablet Take 1 tablet by mouth daily.     fluticasone (FLOVENT DISKUS) 100 mcg/actuation DsDv Inhale 1 puff.     fluticasone-vilanterol (BREO ELLIPTA) 100-25 mcg/dose DsDv inhaler Inhale 1 Dose 2 (two) times a day.     folic acid (FOLVITE) 1 MG tablet TAKE 1 TABLET BY MOUTH EVERY DAY     furosemide (LASIX) 40 MG tablet Take 40 mg by mouth 2 (two) times a day.     gabapentin (NEURONTIN) 300 MG capsule TAKE 1 CAPSULE BY MOUTH TWICE DAILY     gemfibrozil (LOPID) 600 MG tablet Take 600 mg by mouth 2 (two) times a day.     glucose 4 GM chewable tablet Chew 4 g daily.     hydrOXYzine (ATARAX) 25 MG tablet Take 1  "tablet (25 mg total) by mouth 3 (three) times a day as needed for itching.     insulin aspart (NOVOLOG) 100 unit/mL injection Inject 5 Units under the skin daily before lunch.      ipratropium-albuterol (DUO-NEB) 0.5-2.5 mg/mL nebulizer 3 mL.     levalbuterol (XOPENEX HFA) 45 mcg/actuation inhaler INHALE 2 PUFFS BY MOUTH EVERY 4 HOURS AS NEEDED FOR WHEEZING     lidocaine (XYLOCAINE) 5 % ointment Apply 2 grams up to 4 times daily to affected area(s)     meloxicam (MOBIC) 7.5 MG tablet Take 1 tablet (7.5 mg total) by mouth daily.     metFORMIN (GLUCOPHAGE) 1000 MG tablet Take 1,000 mg by mouth 2 (two) times a day with meals.     metoprolol tartrate (LOPRESSOR) 25 MG tablet TAKE 1 TABLET BY MOUTH TWICE DAILY     MICROLET LANCET Misc 4 (four) times a day.     multivitamin (ONE A DAY) per tablet Take 1 tablet by mouth daily.     NOVOLOG FLEXPEN 100 unit/mL injection pen INJECT 5 UNITS UNDER THE SKIN THREE TIMES DAILY WITH MEALS     nut.tx.gluc.intol,lac-free,soy (GLUCERNA SNACK SHAKE) Liqd Drink 1 shake at lunchtime     nystatin (MYCOSTATIN) 100,000 unit/mL suspension SHAKE LIQUID AND TAKE 5 ML BY MOUTH FOUR TIMES DAILY     omeprazole (PRILOSEC) 20 MG capsule Take 20 mg by mouth daily.     oxyCODONE (ROXICODONE) 30 MG immediate release tablet 1 tablet by mouth twice daily     pen needle, diabetic (BD ULTRA-FINE CHELO PEN NEEDLES) 32 gauge x 5/32\" Ndle Use 4 times daily     potassium chloride SA (K-DUR,KLOR-CON) 20 MEQ tablet TAKE 1 TABLET BY MOUTH TWICE DAILY WITH MEALS     predniSONE (DELTASONE) 10 MG tablet TAKE 1 TABLET(10 MG) BY MOUTH DAILY     SENNA LAX 8.6 mg tablet      SYMBICORT 160-4.5 mcg/actuation inhaler INHALE 2 PUFFS BY MOUTH TWICE DAILY     SYRINGE & NEEDLE,INSULIN,1 ML (INSULIN SYRINGE-NEEDLE U-100) 1 mL 29 X 7/16\" Syrg      tamsulosin (FLOMAX) 0.4 mg Cp24 Take 1 capsule (0.4 mg total) by mouth Daily after breakfast.     testosterone (ANDROGEL) 1.62 % (20.25 mg/1.25 gram) GlPk Place 1 pumps on the skin " daily.     tiotropium (SPIRIVA) 18 mcg inhalation capsule Place 1 capsule (2 puffs total) into inhaler and inhale daily.       Review of Systems:  A comprehensive review of systems is negative except for the comments above    Objective:    There were no vitals taken for this visit.  GENERAL: No acute distress.  Ambulates with the use of a walker.  Blood pressure 1 [birth year]/2/1968.  Pulse 88.  Oxygen saturations 97% on room air.  He has a small subcutaneous stitch.  It is not infected.  He should leave this alone.  Gets out of a chair without assistance.  Lungs have rhonchi which partially clear with coughing.  He still does smoke.  Assessment & Plan   Jef Centeno is a 66 y.o. male.    Likely he is improving.  The myelopathy has improved after surgery is gaining strength.  He should continue ambulating as needed.  Nothing needs to be done regarding a subcutaneous stitch.  She is on Breo Ellipta for COPD.  He does not need to take Symbicort.    Diagnoses and all orders for this visit:    Stenosis of cervical spine with myelopathy    Diabetes mellitus, type 2    Chronic obstructive pulmonary disease, unspecified COPD type        Geoff Crabtree MD  Transcription using voice recognition software, may contain typographical errors.

## 2021-06-10 NOTE — PROGRESS NOTES
More hydrocolloid but smaller size requested for vascular to order along with scheduling a f/u visit with vascular.  Picture taken of wound and sent to vascular.    Esther Urena RN

## 2021-06-10 NOTE — TELEPHONE ENCOUNTER
Left message to call back for: more info  Information to relay to patient:  Dr. Crabtree is out of the office until 8/24/20. Please ask him to give information so we are not playing phone tag. Thank you.  Florence Gamino CSS

## 2021-06-10 NOTE — TELEPHONE ENCOUNTER
RN cannot approve Refill Request    RN can NOT refill this medication med is not covered by policy/route to provider     . Last office visit: 2/10/2020 Geoff Crabtree MD Last Physical: 12/24/2019 Last MTM visit: Visit date not found Last visit same specialty: 3/11/2020 David Shafer MD.  Next visit within 3 mo: Visit date not found  Next physical within 3 mo: Visit date not found      Keyanna Tenorio, [facility] Triage/Med Refill 8/3/2020    Requested Prescriptions   Pending Prescriptions Disp Refills     ENSURE ACTIVE PROTEIN-MUSCLE Liqd [Pharmacy Med Name: ENSURE ACTIVE HP CHOCOLATE LIQUID]  0     Sig: DRINK 1 BOTTLE TWICE DAILY       There is no refill protocol information for this order

## 2021-06-10 NOTE — TELEPHONE ENCOUNTER
Prescription Monitoring Program activity reviewed with no discrepancies noted.      Last fill per :   Quantity/days supply:     Controlled Substance Agreement on file: Yes  Date:  2020    Last office visit with provider:  2/10/2020 Geoff Crabtree MD    Please advise.  Ya Vazquez CMA ([facility])

## 2021-06-10 NOTE — TELEPHONE ENCOUNTER
RN cannot approve Refill Request    RN can NOT refill this medication med is not covered by policy/route to provider. Last office visit: 2/10/2020 Geoff Crabtree MD Last Physical: 12/24/2019 Last MTM visit: Visit date not found Last visit same specialty: 3/11/2020 David Shafer MD.  Next visit within 3 mo: Visit date not found  Next physical within 3 mo: Visit date not found      Robyn Marin, [facility] Triage/Med Refill 8/11/2020    Requested Prescriptions   Pending Prescriptions Disp Refills     albuterol (PROAIR HFA;PROVENTIL HFA;VENTOLIN HFA) 90 mcg/actuation inhaler 1 Inhaler 12     Sig: Inhale 2 puffs 3 (three) times a day.       Albuterol/Levalbuterol Refill Protocol Passed - 8/10/2020  9:27 AM        Passed - PCP or prescribing provider visit in last year     Last office visit with prescriber/PCP: 2/10/2020 Geoff Crabtree MD OR same dept: 3/11/2020 David Shafer MD OR same specialty: 3/11/2020 David Shafer MD Last physical: 12/24/2019       Next appt within 3 mo: Visit date not found  Next physical within 3 mo: Visit date not found  Prescriber OR PCP: Geoff Crabtree MD  Last diagnosis associated with med order: 1. Chronic obstructive pulmonary disease with acute exacerbation (H)  - albuterol (PROAIR HFA;PROVENTIL HFA;VENTOLIN HFA) 90 mcg/actuation inhaler; Inhale 2 puffs 3 (three) times a day.  Dispense: 1 Inhaler; Refill: 12  - food supplemt, lactose-reduced (ENSURE ACTIVE PROTEIN-MUSCLE) Liqd  Dispense: 91107 mL; Refill: 0    2. Chronic neck pain  - DULoxetine (CYMBALTA) 20 MG capsule; Take 3 capsules (60 mg total) by mouth daily.  Dispense: 270 capsule; Refill: 3    3. Muscle spasm  - lidocaine (LIDODERM) 5 %; Apply once daily remove & Discard patch within 12 hours or as directed by MD  Dispense: 7 patch; Refill: 0    If protocol passes may refill for 6 months if within 3 months of last provider visit (or a total of 9 months). If patient  requesting >1 inhaler per month refill x 6 months and have patient make appointment with provider.             DULoxetine (CYMBALTA) 20 MG capsule 270 capsule 3     Sig: Take 3 capsules (60 mg total) by mouth daily.       Tricyclics/Misc Antidepressant/Antianxiety Meds Refill Protocol Passed - 8/10/2020  9:27 AM        Passed - PCP or prescribing provider visit in last year     Last office visit with prescriber/PCP: 2/10/2020 Geoff Crabtree MD OR same dept: 3/11/2020 David Shafer MD OR same specialty: 3/11/2020 David Shafer MD  Last physical: 12/24/2019 Last MTM visit: Visit date not found   Next visit within 3 mo: Visit date not found  Next physical within 3 mo: Visit date not found  Prescriber OR PCP: Geoff Crabtree MD  Last diagnosis associated with med order: 1. Chronic obstructive pulmonary disease with acute exacerbation (H)  - albuterol (PROAIR HFA;PROVENTIL HFA;VENTOLIN HFA) 90 mcg/actuation inhaler; Inhale 2 puffs 3 (three) times a day.  Dispense: 1 Inhaler; Refill: 12  - food supplemt, lactose-reduced (ENSURE ACTIVE PROTEIN-MUSCLE) Liqd  Dispense: 67041 mL; Refill: 0    2. Chronic neck pain  - DULoxetine (CYMBALTA) 20 MG capsule; Take 3 capsules (60 mg total) by mouth daily.  Dispense: 270 capsule; Refill: 3    3. Muscle spasm  - lidocaine (LIDODERM) 5 %; Apply once daily remove & Discard patch within 12 hours or as directed by MD  Dispense: 7 patch; Refill: 0    If protocol passes may refill for 12 months if within 3 months of last provider visit (or a total of 15 months).                food supplemt, lactose-reduced (ENSURE ACTIVE PROTEIN-MUSCLE) Liqd 59954 mL 0       There is no refill protocol information for this order        lidocaine (LIDODERM) 5 % 7 patch 0     Sig: Apply once daily remove & Discard patch within 12 hours or as directed by MD       There is no refill protocol information for this order

## 2021-06-10 NOTE — TELEPHONE ENCOUNTER
Spoke with patient. The letter he sent here doesn't state specifically what is needed. We need to know what exactly needs to be done on our end. He will pick the letter up at the  and take it to the DMV to see what is needed.   Florence Gamino CSS

## 2021-06-10 NOTE — TELEPHONE ENCOUNTER
Controlled Substance Refill Request  Medication Name:   Requested Prescriptions     Pending Prescriptions Disp Refills     oxyCODONE (ROXICODONE) 30 MG immediate release tablet 90 tablet 0     Sig: Take 1 tablet (30 mg total) by mouth every 8 (eight) hours as needed for pain.     Date Last Fill: 6/17/2020  [facility]ed [facility]acy: [facility]  Submit electronically to pharmacy  Controlled Substance Agreement on file:   Encounter-Level CSA Scan Date:    There are no encounter-level csa scan date.        Last office visit:  6/17/2020

## 2021-06-10 NOTE — TELEPHONE ENCOUNTER
Patient Returning Call  Reason for call:  Patient calling back  Information relayed to patient:  Relayed below message. Patient reports he needs to give a letter to the DMV by 8/19/2020 stating he is still able to drive so he does not have his drivers license revoked. Patient will  the letter, please notify patient when the letter is complete. Please advise and call patient asap.  Patient has additional questions:  No  If YES, what are your questions/concerns:  none  Okay to leave a detailed message?: Yes

## 2021-06-10 NOTE — PROGRESS NOTES
OFFICE VISIT NOTE    Subjective:   Chief Complaint:  Hospital Visit Follow Up (was in [place] 4/2[place]4/[place] then returned on 4/6-4/8 Dx'd with influenza A)    66-year-old male with COPD as well as chronic back pain from lumbar and cervical stenosis.  He was recently hospitalized with influenza.  Sounds like he was discharged on Tamiflu but he lost the pills after only 3 days of therapy.  Currently, denies any fevers chills or sweats.  He still a little weak.    Current Outpatient Prescriptions   Medication Sig     albuterol (PROVENTIL HFA;VENTOLIN HFA) 90 mcg/actuation inhaler Inhale 2 puffs every 6 (six) hours as needed for wheezing.     calcium carbonate-vitamin D3 (CALCIUM 600 + D,3,) 600 mg(1,500mg) -400 unit per tablet Take 1 tablet by mouth daily.     cetirizine (ZYRTEC) 10 MG tablet Take 1 tablet (10 mg total) by mouth daily.     clotrimazole-betamethasone (LOTRISONE) cream APPLY TO AFFECTED AREA 2 TIMES DAILY     CONTOUR NEXT STRIPS Strp 4 (four) times a day.     diazepam (VALIUM) 10 MG tablet Take 10 mg by mouth daily.     diphenhydrAMINE (BENADRYL) 25 mg capsule TAKE 2 CAPSULES BY MOUTH EVERY 6 HOURS AS NEEDED FOR ITCHING     ferrous sulfate 325 (65 FE) MG tablet Take 325 mg by mouth.     fluticasone (FLOVENT DISKUS) 100 mcg/actuation DsDv Inhale 1 puff.     folic acid (FOLVITE) 1 MG tablet TAKE 1 TABLET BY MOUTH EVERY DAY     folic acid (FOLVITE) 1 MG tablet TAKE 1 TABLET BY MOUTH EVERY DAY     furosemide (LASIX) 20 MG tablet Take 1 tablet (20 mg total) by mouth daily.     furosemide (LASIX) 20 MG tablet Take 1 tablet (20 mg total) by mouth daily.     gabapentin (NEURONTIN) 300 MG capsule TAKE 1 CAPSULE BY MOUTH TWICE DAILY     gemfibrozil (LOPID) 600 MG tablet Take 600 mg by mouth 2 (two) times a day.     glucose 4 GM chewable tablet Chew 4 g daily.     hydrOXYzine (ATARAX) 25 MG tablet Take 1 tablet (25 mg total) by mouth 3 (three) times a day as needed for itching.     insulin aspart (NOVOLOG) 100 unit/mL  "injection Inject 5 Units under the skin daily before lunch.      ipratropium-albuterol (DUO-NEB) 0.5-2.5 mg/mL nebulizer 3 mL.     levalbuterol (XOPENEX HFA) 45 mcg/actuation inhaler Inhale 2 puffs every 4 (four) hours as needed for wheezing.     levalbuterol (XOPENEX HFA) 45 mcg/actuation inhaler INHALE 2 PUFFS BY MOUTH EVERY 4 HOURS AS NEEDED FOR WHEEZING     lidocaine (XYLOCAINE) 5 % ointment Apply 2 grams up to 4 times daily to affected area(s)     meloxicam (MOBIC) 7.5 MG tablet Take 1 tablet (7.5 mg total) by mouth daily.     meloxicam (MOBIC) 7.5 MG tablet TAKE 1 TABLET BY MOUTH EVERY DAY     metFORMIN (GLUCOPHAGE) 1000 MG tablet Take 1,000 mg by mouth 2 (two) times a day with meals.     metoprolol tartrate (LOPRESSOR) 25 MG tablet TAKE 1 TABLET BY MOUTH TWICE DAILY     MICROLET LANCET Misc 4 (four) times a day.     multivitamin (ONE A DAY) per tablet Take 1 tablet by mouth daily.     NOVOLOG FLEXPEN 100 unit/mL injection pen INJECT 5 UNITS UNDER THE SKIN THREE TIMES DAILY WITH MEALS     nut.tx.gluc.intol,lac-free,soy (GLUCERNA SNACK SHAKE) Liqd Drink 1 shake at lunchtime     nystatin (MYCOSTATIN) 100,000 unit/mL suspension SHAKE LIQUID AND TAKE 5 ML BY MOUTH FOUR TIMES DAILY     omeprazole (PRILOSEC) 20 MG capsule Take 20 mg by mouth daily.     oxyCODONE (ROXICODONE) 30 MG immediate release tablet Do not fill until  11/13/16     1 tablet by mouth twice a day     oxyCODONE (ROXICODONE) 30 MG immediate release tablet 1 tablet by mouth twice daily     pen needle, diabetic (BD ULTRA-FINE CHELO PEN NEEDLES) 32 gauge x 5/32\" Ndle Use 4 times daily     potassium chloride SA (K-DUR,KLOR-CON) 20 MEQ tablet TAKE 1 TABLET BY MOUTH TWICE DAILY WITH MEALS     predniSONE (DELTASONE) 10 MG tablet TAKE 2 TABLETS BY MOUTH TWICE DAILY FOR 3 DAYS, THEN 1 TABLET TWICE DAILY FOR 3 DAYS, THEN 1 TABLET DAILY FOR 3 DAYS, THEN STOP     predniSONE (DELTASONE) 10 MG tablet TAKE 1 TABLET(10 MG) BY MOUTH DAILY     predniSONE (DELTASONE) " "20 MG tablet SEE NOTES     predniSONE (DELTASONE) 5 MG tablet Take 1 tablet (5 mg total) by mouth daily.     predniSONE (DELTASONE) 5 MG tablet TAKE 1 TABLET BY MOUTH EVERY DAY     SENNA LAX 8.6 mg tablet      SYMBICORT 160-4.5 mcg/actuation inhaler INHALE 2 PUFFS BY MOUTH TWICE DAILY     SYRINGE & NEEDLE,INSULIN,1 ML (INSULIN SYRINGE-NEEDLE U-100) 1 mL 29 X 7/16\" Syrg      tamsulosin (FLOMAX) 0.4 mg Cp24 Take 1 capsule (0.4 mg total) by mouth Daily after breakfast.     testosterone (ANDROGEL) 1.62 % (20.25 mg/1.25 gram) GlPk Place 1 pumps on the skin daily.     tiotropium (SPIRIVA) 18 mcg inhalation capsule Place 1 capsule (2 puffs total) into inhaler and inhale daily.     oseltamivir (TAMIFLU) 75 MG capsule Take 1 capsule (75 mg total) by mouth 2 (two) times a day for 2 days.       PSFHx: Tobacco Status:  He  reports that he has been smoking.  He has never used smokeless tobacco.    Review of Systems:  A comprehensive review of systems is negative except for the comments above    Objective:    Pulse 97  Ht 5' 7\" (1.702 m)  Wt 184 lb (83.5 kg)  SpO2 100%  BMI 28.82 kg/m2  GENERAL: No acute distress.  Pressures 99.  Oxygen saturations 96%.  Pulse 88.  No significant edema.  Lungs have bilateral squeaks and rhonchi consistent with COPD.  No rales.  No JVD.  Heart shows a regular rhythm without any significant ectopic beats.  Neurologic exam is normal though he does have weak legs.  He is able to get out of a chair by himself.  He ambulates with use of a walker or pushing a wheelchair.  Mentally he is sharp and alert.    Assessment & Plan   Jef Centeno is a 66 y.o. male.    Influenza  He lost his Tamiflu So will call prescription for 4 more capsules to finish out his recommended dosage.  He needs to quit smoking.  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  I will try to reduce that dosage to 7.5 mg in June.  He is now taking metformin, 3 tabs daily.  He is off of insulin. "  We will see what the blood sugar does.  Reducing prednisone over the past several months has improved blood sugars.  Diagnoses and all orders for this visit:    Influenza B    Lumbar stenosis with neurogenic claudication    Chronic obstructive pulmonary disease, unspecified COPD type    Diabetes mellitus, type 2    Other orders  -     oseltamivir (TAMIFLU) 75 MG capsule; Take 1 capsule (75 mg total) by mouth 2 (two) times a day for 2 days.  Dispense: 4 capsule; Refill: 0        The following high BMI interventions were performed this visit: encouragement to exercise    Geoff Crabtree MD  Transcription using voice recognition software, may contain typographical errors.

## 2021-06-11 NOTE — TELEPHONE ENCOUNTER
"Obdulia,    This is an FYI from the HHA visit today regarding Jef Centeno.               Patient refused to allow this HHA to complete dressing change as currently   ordered. He states that he has been applying Neosporin cream to wound   and leaving uncovered. Patient further states that the wound is feeling better   and not causing him pain since he started doing this some days ago.   Writer cleansed wound with NS and pat dry with gauze. Patient refused   hydrocolloid dressing several times during encounter. Patient also reports   that he transferred out of shower without assist of PCA yesterday   because \"I wanted to see if I could do it by myself\" Patient appears to have   lost weight since this HHA saw him last on 8/24/2020. CM alerted via phone   during visit and instructed writer to take photos of the wound for MD video   visit scheduled tomorrow. Patient reports feeling good and pain free today      Maya Burch RN will be at the video appointment with him tomorrow 9/10/2020  Esther Urena RN    "

## 2021-06-11 NOTE — TELEPHONE ENCOUNTER
Request for Orders    Who s Requesting: Home Care Registered Nurse    Orders being requested: Skilled Nursing care for med set, wound care, CHF teaching  1 visit every 10 days for 60 days  5 prn visits    Homehealth aide 1 visit every 10 days for 60 days to assist with wound care.   5 prn visits    Where to send Orders: Please respond here if in agreement.

## 2021-06-11 NOTE — TELEPHONE ENCOUNTER
Controlled Substance Refill Request  Medication Name:   Requested Prescriptions     Pending Prescriptions Disp Refills     oxyCODONE (ROXICODONE) 30 MG immediate release tablet 90 tablet 0     Sig: Take 1 tablet (30 mg total) by mouth every 8 (eight) hours as needed for pain.     Date Last Fill: 8/10/20  Is patient out of medication?:  No, one week left of medication.  Patient notified refills processed within 3 business days:  Yes  [facility]  Submit electronically to pharmacy  Controlled Substance Agreement on file:   Encounter-Level CSA Scan Date:    There are no encounter-level csa scan date.        Last office visit:  6/17/20

## 2021-06-11 NOTE — PROGRESS NOTES
OFFICE VISIT NOTE    Subjective:   Chief Complaint:  Back Pain    66-year-old male with multiple problems including ASHD, COPD, chronic neck and back pain.  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  He has been on long-term steroids for the sweets syndrome as well as a COPD.  He was recently hospitalized with increasing ankle edema.  He was discharged on diuretics.  He was told in the hospital that he needs to try Lyrica for his pain.  Gabapentin has not worked for him and in fact has caused excessive drowsiness.    Current Outpatient Prescriptions   Medication Sig     albuterol (PROVENTIL) 2.5 mg /3 mL (0.083 %) nebulizer solution Take 2.5 mg by nebulization every 6 (six) hours as needed for wheezing. Inhale 3ml via a nebulizer every 4 hours if needed for SOB     calcium carbonate-vitamin D3 (CALCIUM 600 + D,3,) 600 mg(1,500mg) -400 unit per tablet Take 1 tablet by mouth daily.     cetirizine (ZYRTEC) 10 MG tablet Take 1 tablet (10 mg total) by mouth daily.     CONTOUR NEXT STRIPS Strp 4 (four) times a day.     diphenhydrAMINE (BENADRYL) 25 mg capsule TAKE 2 CAPSULES BY MOUTH EVERY 6 HOURS AS NEEDED FOR ITCHING     docusate sodium (COLACE) 100 MG capsule Take 100 mg by mouth 2 (two) times a day as needed for constipation.     DULoxetine (CYMBALTA) 30 MG capsule TAKE 1 CAPSULE BY MOUTH DAILY X 1 WEEK THEN INCREASE TO 2 CAPSULES BY MOUTH EVERY DAY     finasteride (PROSCAR) 5 mg tablet Take 1 tablet by mouth daily.     fluticasone-vilanterol (BREO ELLIPTA) 100-25 mcg/dose DsDv inhaler Inhale 1 Dose 2 (two) times a day.     folic acid (FOLVITE) 1 MG tablet TAKE 1 TABLET BY MOUTH EVERY DAY     furosemide (LASIX) 40 MG tablet Take 20 mg by mouth daily.      levalbuterol (XOPENEX HFA) 45 mcg/actuation inhaler INHALE 2 PUFFS BY MOUTH EVERY 4 HOURS AS NEEDED FOR WHEEZING     meloxicam (MOBIC) 7.5 MG tablet Take 1 tablet (7.5 mg total) by mouth daily.     metFORMIN (GLUCOPHAGE) 1000 MG tablet  "Take 1,000 mg by mouth daily with breakfast.      metoprolol tartrate (LOPRESSOR) 25 MG tablet TAKE 1 TABLET BY MOUTH TWICE DAILY     MICROLET LANCET Misc 4 (four) times a day.     multivitamin (ONE A DAY) per tablet Take 1 tablet by mouth daily.     omeprazole (PRILOSEC) 20 MG capsule Take 20 mg by mouth daily.     oxyCODONE (ROXICODONE) 30 MG immediate release tablet 1 tab p.o. twice daily     oxyCODONE (ROXICODONE) 30 MG immediate release tablet 1 tablet by mouth twice daily     potassium chloride SA (K-DUR,KLOR-CON) 20 MEQ tablet TAKE 1 TABLET BY MOUTH TWICE DAILY WITH MEALS     predniSONE (DELTASONE) 10 MG tablet TAKE 1 TABLET(10 MG) BY MOUTH DAILY     pregabalin (LYRICA) 75 MG capsule Take 1 capsule (75 mg total) by mouth 3 (three) times a day.     SYRINGE & NEEDLE,INSULIN,1 ML (INSULIN SYRINGE-NEEDLE U-100) 1 mL 29 X 7/16\" Syrg      tamsulosin (FLOMAX) 0.4 mg Cp24 Take 1 capsule (0.4 mg total) by mouth Daily after breakfast.       PSFHx: Tobacco Status:  He  reports that he has been smoking.  He has never used smokeless tobacco.    Review of Systems:  A comprehensive review of systems is negative except for the comments above    Objective:    There were no vitals taken for this visit.  GENERAL: No acute distress.  In a wheelchair.  He is much more alert than when I saw him last time when he had fallen off his prednisone.  Blood pressure 128/82.  Pulse 78.  Oxygen saturations 96%.  Is no edema at this time.  Lungs are rhonchi.  No rales.  No wheezes.  He continues to smoke.  He is trying to stop.  Heart does show sinus rhythm.  The abdomen is without any ascites.  He still in a wheelchair but is able to stand up and take short steps.    Assessment & Plan   Jef Centeno is a 66 y.o. male.    Medically still has chronic pain from combination of spinal stenosis as well as fibromyalgia.  We will try him on Lyrica 75 mg 3 times daily.  Discontinue gabapentin.  Oxycodone is 30 mg twice daily.  I am trying to " reduce this dosage.  When he goes to the emergency room and the hospital, physicians there will tell him he  needs more dosages daily, so it is a ferrell to get him off this medicine right now, can leave him on a twice daily.  Check electrolytes and BUN today.  He is due for tetanus shot today.  Return to clinic in 4 weeks.    Diagnoses and all orders for this visit:    Lumbar stenosis with neurogenic claudication  -     pregabalin (LYRICA) 75 MG capsule; Take 1 capsule (75 mg total) by mouth 3 (three) times a day.  Dispense: 90 capsule; Refill: 2  -     oxyCODONE (ROXICODONE) 30 MG immediate release tablet; 1 tablet by mouth twice daily  Dispense: 60 tablet; Refill: 0    ASHD (arteriosclerotic heart disease)  -     Basic Metabolic Panel    Chronic obstructive pulmonary disease, unspecified COPD type    Sweet syndrome    Need for vaccination  -     Td, Adult, Adsorbed (blue label)        The following high BMI interventions were performed this visit: encouragement to exercise    Geoff Crabtree MD  Transcription using voice recognition software, may contain typographical errors.

## 2021-06-11 NOTE — TELEPHONE ENCOUNTER
Obdulia,    I'm thinking I need to keep a better eye on Bear Centeno regarding his buttocks, edema, and medications. Can I please change the visit set sets to     SN 1x6w for wound cares and HHA 2x6 w to help monitor this and wound care.      Thank you,    Esther UrenaRN

## 2021-06-11 NOTE — PROGRESS NOTES
"Jef Centeno Jr. is a 70 y.o. male who is being evaluated via a billable video visit.      The patient has been notified of following:     \"This video visit will be conducted via a call between you and your physician/provider. We have found that certain health care needs can be provided without the need for an in-person physical exam.  This service lets us provide the care you need with a video conversation.  If a prescription is necessary we can send it directly to your pharmacy.  If lab work is needed we can place an order for that and you can then stop by our lab to have the test done at a later time.    Video visits are billed at different rates depending on your insurance coverage. Please reach out to your insurance provider with any questions.    If during the course of the call the physician/provider feels a video visit is not appropriate, you will not be charged for this service.\"    Patient has given verbal consent to a Video visit? Yes  How would you like to obtain your AVS? AVS Preference: Mail a copy.  If dropped by the video visit, the video invitation should be sent to: Text to cell phone: [phone]  Will anyone else be joining your video visit? No; HC nurse Norma Torres LPN        Video-Visit Details    Type of service:  Video Visit    Originating Location (pt. Location): Home    Distant Location (provider location):  [facility]      Platform used for Video Visit: [facility]            Patient reports:    Change in status of the wound:  no    Change of drainage- color, amount, odor:  no    Change of swelling:  no    Change in Pain status:  no    New wounds developed:  no    Dressing Supplies Sufficient:  n/a    Reviewed with patient that I will contact them with scheduling a follow up appointment, supply needs and any further teaching that is identified by the provider during the call. I will also send out an AVS with all education, a wound " measuring tape and their next scheduled visit. I will include instructions to assist with installing the application on their mobile device if appropriate for future video visits.

## 2021-06-11 NOTE — TELEPHONE ENCOUNTER
Controlled Substance Refill Request  Medication Name:   Requested Prescriptions     Pending Prescriptions Disp Refills     oxyCODONE (ROXICODONE) 30 MG immediate release tablet 90 tablet 0     Sig: Take 1 tablet (30 mg total) by mouth every 8 (eight) hours as needed for pain.     Date Last Fill: 9/7/20  Is patient out of medication?: No, 6 days left  Patient notified refills processed within 3 business days:  Yes  [facility]  Submit electronically to pharmacy  Controlled Substance Agreement on file:   Encounter-Level CSA Scan Date:    There are no encounter-level csa scan date.        Last office visit:  6/17/20

## 2021-06-11 NOTE — TELEPHONE ENCOUNTER
Could we please get 3 more, ongoing, SN PRN visits for Med box adjustments?    Also, [facility] said they are needing information on his Prednisone RX?  They were able to fill an old RX that had 1 refill on it, but apparently the newer RX isn't working?    Thank You,  Jackie  
Okay for 3 more skilled nursing PRN visits.  Medication adjustments etc.  I am not sure what they mean by the prednisone is not working.  What dosage is he taking ?  
Statement Selected

## 2021-06-11 NOTE — PROGRESS NOTES
[facility] Care Coordination Contact      [facility] Six-Month Telephone Assessment    6 month telephone assessment completed on 8/31/20.    ER visits: No  Hospitalizations: No  TCU stays: No  Significant health status changes: na  Falls/Injuries: No  ADL/IADL changes: No  Changes in services: No    Caregiver Assessment follow up:  na    Goals: See POC in chart for goal progress documentation.  Member reports considering moving into [place] and will discuss plans with  before makes decision.  He reports that needs are being met with no concerns.    Will see member in 6 months for an annual health risk assessment.   Encouraged member to call CC with any questions or concerns in the meantime.     Ruddy Alexander RN  [facility]  [phone]

## 2021-06-11 NOTE — PROGRESS NOTES
FYI:  Writer received call from patient stating he has had no one out in quite awhile to assess his wound or set up his medications and he is not able to set them up himself.  Writer tried to call patient back but was only able to leave a message.  Patient has one 7xday medication box in the home plus 6x slots in his container where he keeps his meds.  At every nurse visit all boxes are to filled and assess that there is enough medicati  on until next visit. Writer is unable to make contact with patient to assess the medication still available. Message sent to CC to call patient and assess whether patient needs SN to fill medication box.    Esther Melendez RN

## 2021-06-12 NOTE — TELEPHONE ENCOUNTER
Controlled Substance Refill Request  Medication Name:   Requested Prescriptions     Pending Prescriptions Disp Refills     oxyCODONE (ROXICODONE) 30 MG immediate release tablet 90 tablet 0     Sig: Take 1 tablet (30 mg total) by mouth every 8 (eight) hours as needed for pain.     Date Last Fill: 10/28/2020  Is patient out of medication?:  Yes  Patient notified refills processed within 3 business days:  Yes  [facility]  Submit electronically to pharmacy  Controlled Substance Agreement on file:   Encounter-Level CSA Scan Date:    There are no encounter-level csa scan date.        Last office visit:  N/A      Pt lives in [place] and uses the pharmacy that is on his file. Pt's prescription was sent to the wrong pharmacy. Pt is completely out. Please advise.

## 2021-06-12 NOTE — TELEPHONE ENCOUNTER
Medication Request  Medication name: Albuterol inhaler  Requested [facility]acy: [facility]  Reason for request: patient requesting  Refill, not on patient's current medication list, please advise.  When did you use medication last?: today  Patient offered appointment:  patient declined  Okay to leave a detailed message: yes

## 2021-06-12 NOTE — TELEPHONE ENCOUNTER
Left message asking patient to call back with who prescribed this medication. Also, please relay message below from Dr. Crabtree.  Thank you.  Geovanna PRICE CMA/MIA....................11:19 AM

## 2021-06-12 NOTE — TELEPHONE ENCOUNTER
Medication Request  Medication name: Humira  [facility]ed [facility]acy: [facility]  Reason for request: for my arthritis  When did you use medication last?:  Not answered  Patient offered appointment:  patient declined and said Just have Rhonda Dr. Crabtree nurse call me.  Okay to leave a detailed message: yes

## 2021-06-12 NOTE — TELEPHONE ENCOUNTER
FROM: [facility] RN    Request for following orders to continue Homecare services for pt:     1. Skilled Nurse visits 1 visit /week for 7 weeks with emphasis on Assess/Teach: Med. Management/set up., CHF teaching and wound cares.      2. 3 PRN Skilled Nurse visits for medication issues or other pt. Concerns as appropriate.     Please respond with Ok for orders.     Thank-you,   Haim De La Cruz RN  [facility]  [phone]

## 2021-06-12 NOTE — TELEPHONE ENCOUNTER
FROM: [facility] RN    Requesting New RX sent to pt pharmacy for following meds:   Cetirizine   Aspirin  Prednisone  Tamsulosin  Pt recently had Prednisone and Tamsulosin picked up, however now no more supply in home and pt unable to remember what happened to meds.   Please fill as requested if able.   Thank-you,   Haim De La Cruz, RN  [facility]  [phone]

## 2021-06-12 NOTE — TELEPHONE ENCOUNTER
Dr Crabtree,    Please assist with these refills. If an appointment is needed with Bear/patient please let me know.     This request has been sent before per pharmacist.  Per the pharmacist the refill for Aspirin, B complex, daily vit, and ceterizine needs another prescription from the pts MD.       Thank you,  Esther Urena RN     sex. Use them from the beginning to the end of sexual contact. · Be sure to tell your sexual partner or partners that you have HPV. Even if you do not have symptoms, you can still pass HPV to others. · Having one sex partner (who does not have STIs and does not have sex with anyone else) is a good way to avoid STIs. When should you call for help? Watch closely for changes in your health, and be sure to contact your doctor if:  ? · You have vaginal pain during or after sex. ? · You have vaginal bleeding when you are not in your menstrual period. Where can you learn more? Go to [email or web address] and sign in to your [employer] account. Enter F690 in the [employer] box to learn more about \"Human Papillomavirus (HPV): Care Instructions. \"     If you do not have an account, please click on the \"Sign Up Now\" link. Current as of: March 20, 2017  Content Version: 11.5  © 6604-6206 [employer], [employer]. Care instructions adapted under license by [facility] ([facility]). If you have questions about a medical condition or this instruction, always ask your healthcare professional. [address] any warranty or liability for your use of this information.

## 2021-06-12 NOTE — TELEPHONE ENCOUNTER
I have refilled the prescription for aspirin, Multivite's, and cetirizine.  He does not also need to take vitamin B complex.

## 2021-06-12 NOTE — PROGRESS NOTES
OFFICE VISIT NOTE    Subjective:   Chief Complaint:  Follow-up (pain everywhere)    57-year-old with multiple problems including COPD, lumbar stenosis with myelopathy, history of cervical stenosis with myelopathy, postherpetic neuralgia, diabetes mellitus type 2.  He continues to have a great deal of difficulty with pain.  He now has a lot of pain in the lower legs as well as upper legs and pelvic region.  He thinks he is getting weaker.  No fevers or chills.  No recent falls or injuries.  Blood sugars running .  He is taking low-dose prednisone 10 mg a day for combination of sweet syndrome and possible polymyalgia rheumatica.    Current Outpatient Prescriptions   Medication Sig     albuterol (PROVENTIL) 2.5 mg /3 mL (0.083 %) nebulizer solution Take 2.5 mg by nebulization every 6 (six) hours as needed for wheezing. Inhale 3ml via a nebulizer every 4 hours if needed for SOB     BANOPHEN 25 mg capsule TAKE 2 CAPSULES BY MOUTH EVERY 6 HOURS AS NEEDED FOR ITCHING     calcium carbonate-vitamin D3 (CALCIUM 600 + D,3,) 600 mg(1,500mg) -400 unit per tablet Take 1 tablet by mouth daily.     calcium carbonate-vitamin D3 (CALTRATE 600 PLUS D3) 600 mg(1,500mg) -400 unit per tablet TAKE 1 TABLET BY MOUTH DAILY     cetirizine (ZYRTEC) 10 MG tablet TAKE 1 TABLET(10 MG) BY MOUTH DAILY     CONTOUR NEXT STRIPS Strp 4 (four) times a day.     diphenhydrAMINE (BENADRYL) 25 mg capsule TAKE 2 CAPSULES BY MOUTH EVERY 6 HOURS AS NEEDED FOR ITCHING     docusate sodium (COLACE) 100 MG capsule Take 100 mg by mouth 2 (two) times a day as needed for constipation.     DULoxetine (CYMBALTA) 30 MG capsule TAKE 1 CAPSULE BY MOUTH DAILY X 1 WEEK THEN INCREASE TO 2 CAPSULES BY MOUTH EVERY DAY     finasteride (PROSCAR) 5 mg tablet Take 1 tablet by mouth daily.     fluticasone-vilanterol (BREO ELLIPTA) 100-25 mcg/dose DsDv inhaler Inhale 1 Dose 2 (two) times a day.     folic acid (FOLVITE) 1 MG tablet TAKE 1 TABLET BY MOUTH EVERY DAY     folic  "acid (FOLVITE) 1 MG tablet TAKE 1 TABLET BY MOUTH EVERY DAY     furosemide (LASIX) 40 MG tablet Take 20 mg by mouth daily.      levalbuterol (XOPENEX HFA) 45 mcg/actuation inhaler INHALE 2 PUFFS BY MOUTH EVERY 4 HOURS AS NEEDED FOR WHEEZING     meloxicam (MOBIC) 7.5 MG tablet Take 1 tablet (7.5 mg total) by mouth daily.     metFORMIN (GLUCOPHAGE) 1000 MG tablet TAKE 1 TABLET BY MOUTH TWICE DAILY     metoprolol tartrate (LOPRESSOR) 25 MG tablet TAKE 1 TABLET BY MOUTH TWICE DAILY     MICROLET LANCET Misc 4 (four) times a day.     omeprazole (PRILOSEC) 20 MG capsule Take 20 mg by mouth daily.     oxyCODONE (ROXICODONE) 30 MG immediate release tablet Take 1 tablet (30 mg total) by mouth 3 (three) times a day.     potassium chloride SA (K-DUR,KLOR-CON) 20 MEQ tablet TAKE 1 TABLET BY MOUTH TWICE DAILY WITH MEALS     predniSONE (DELTASONE) 10 mg tablet TAKE 1 TABLET(10 MG) BY MOUTH DAILY     pregabalin (LYRICA) 75 MG capsule Take 1 capsule (75 mg total) by mouth 3 (three) times a day.     SYRINGE & NEEDLE,INSULIN,1 ML (INSULIN SYRINGE-NEEDLE U-100) 1 mL 29 X 7/16\" Syrg      TAB-A-HIEN per tablet TAKE 1 TABLET BY MOUTH DAILY     tamsulosin (FLOMAX) 0.4 mg Cp24 Take 1 capsule (0.4 mg total) by mouth Daily after breakfast.       PSFHx: Tobacco Status:  He  reports that he has been smoking.  He has never used smokeless tobacco.    Review of Systems:  A comprehensive review of systems is negative except for the comments above    Objective:    Pulse 89  SpO2 95%  GENERAL: No acute distress.  He is in a wheelchair.  Oxygen saturations 94%.  Pulse 98.  3+ edema of the lower feet and ankles.  Lungs have bilateral rhonchi and some coarse rales.  Partially clears with cough.  He is still smoking.  Heart does show regular rhythm without significant gallop.  Generalized leg weakness.  He does have a area of erythema over the coccyx suggesting possible early skin breakdown with formation of a bedsore.  Memory is normal.  He is able to " stand with the use of a walker.  Mentally he seems sharp and alert and answers questions appropriately.    Assessment & Plan   Jef Centeno is a 67 y.o. male.    Increasing difficulty with leg pain and weakness.  This may be a problem with continued lumbar stenosis.  He has already had surgery of the neck.  He is on chronic opioid use for severe lower extremity discomfort.  I am going to get a neurology consultation see if they have any ideas as to cause of his pain and proper treatment.  Letter was sent stating his disability.  He really needs to be in a 2 bedroom apartment with a hospital bed and a motorized wheelchair.  He is unable to get about.  He is high risk for getting bedsores.    Diagnoses and all orders for this visit:    Stenosis of cervical spine with myelopathy    Lumbar stenosis with neurogenic claudication  -     Ambulatory referral to Neurology    Diabetes mellitus, type 2    Chronic obstructive pulmonary disease, unspecified COPD type            Geoff Crabtree MD  Transcription using voice recognition software, may contain typographical errors.

## 2021-06-12 NOTE — PROGRESS NOTES
"   In the last 60days the patient has had no ED/hospital admissions. Patient continues to need medication setup/education due to slight cognitive decline, memory issues. Patient wound on buttocks is healed with slight redness on new skin; currently applying muprocin to wound per vascular orders daily. Patient has HHA 2x weekly to help wound care, skin integrity, applying TON hose and SN 1x weekly for med setup/education, skin assessment. Patient is in the process of arranging to move to connected assisted living facility in the future.       ----- Message -----   From: Haim Muñiz RN   Sent: 10/28/2020   7:08 AM CDT   To: Esther Urena RN   Subject: RE:                                                   Thanks Esther. For the Recert can you send me a 60 day?   ----- Message -----   From: Esther Urena RN   Sent: 10/27/2020  12:29 PM CDT   To: Haim De La Cruz RN         Writer recieved call from patient stating he's had no discipline come to the home in 2 weeks for setting up medications, wound care or Ton hose application. Writer verified last SN visit was 6 days ago with 7 days of medication setup. Writer requested patient go to plastic bin where medications are kept and check to see if any daily medications are filled in separate blue containers to add to white medicaiton box. Patient looked and there are 'several\" filled in the plastic container. Patient educated to bring those out and put in white plastic medication lawson. RN will be out on 10/28 for recertification and medication setup, wound cares. Patient does have PCA that comes M-Sat? that help put on TON hose daily and take off before he leaves home for the day. Patient educated that all medication dispensers will be filled when RN comes to Wednesday visit, along with wound cares, and massage for BLE, TON hose application.      Esther Urena RN   "

## 2021-06-12 NOTE — PROGRESS NOTES
"Jef Centeno Jr. is a 70 y.o. male who is being evaluated via a billable video visit.      The patient has been notified of following:     \"This video visit will be conducted via a call between you and your physician/provider. We have found that certain health care needs can be provided without the need for an in-person physical exam.  This service lets us provide the care you need with a video conversation.  If a prescription is necessary we can send it directly to your pharmacy.  If lab work is needed we can place an order for that and you can then stop by our lab to have the test done at a later time.    Video visits are billed at different rates depending on your insurance coverage. Please reach out to your insurance provider with any questions.    If during the course of the call the physician/provider feels a video visit is not appropriate, you will not be charged for this service.\"    Patient has given verbal consent to a Video visit? Yes  How would you like to obtain your AVS? AVS Preference: Mail a copy.  If dropped by the video visit, the video invitation should be sent to: [phone]  Will anyone else be joining your video visit? No Home care RN with patient   Esther       Video Start Time: 8:14 AM    Additional provider notes:       Video-Visit Details    Type of service:  Video Visit    Video End Time (time video stopped):   Originating Location (pt. Location): Home    Distant Location (provider location):  [facility]     Platform used for Video Visit: Hu Ortiz CMA  "

## 2021-06-12 NOTE — TELEPHONE ENCOUNTER
Spoke with the patient and relayed message below from Dr. Crabtree.  He verbalized understanding and had no further questions at this time.  Geovanna PRICE, TIAN/CMT....................9:13 AM

## 2021-06-12 NOTE — TELEPHONE ENCOUNTER
Patient Returning Call  Reason for call:  Return call.  Information relayed to patient:  Patient was informed of Geoff Crabtree MD's message below.  Patient has additional questions:  No  If YES, what are your questions/concerns:  n/a  Okay to leave a detailed message?: No call back needed    Patient verbalized understanding that will not prescribe him the Humira and had no further questions. Patient stated he has never been on this medication, so it has been prescribed to him yet. Patient was offered an appointment but declined it again.

## 2021-06-12 NOTE — TELEPHONE ENCOUNTER
Patient knows this request is early. Please advise.    Controlled Substance Refill Request  Medication Name:   Requested Prescriptions     Pending Prescriptions Disp Refills     oxyCODONE (ROXICODONE) 30 MG immediate release tablet 90 tablet 0     Sig: Take 1 tablet (30 mg total) by mouth every 8 (eight) hours as needed for pain.     Date Last Fill: 9/28/2020  Is patient out of medication?: No, 6 days left  Patient notified refills processed within 3 business days:  Yes  [facility]  Submit electronically to pharmacy  Controlled Substance Agreement on file:   Encounter-Level CSA Scan Date:    There are no encounter-level csa scan date.        Last office visit:  6/17/2020, virtual visit

## 2021-06-12 NOTE — TELEPHONE ENCOUNTER
Patient states he needs a refill. Not on medication list. Patient states you know about it and he is not sure why it is not on his list.    Tarah Davila, CMA

## 2021-06-12 NOTE — PROGRESS NOTES
"Writer recieved call from patient stating he's had no discipline come to the home in 2 weeks for setting up medications, wound care or Andreia hose application. Writer verified last SN visit was 6 days ago with 7 days of medication setup. Writer requested patient go to plastic bin where medications are kept and check to see if any daily medications are filled in separate blue containers to add to white medicaiton box. Patient looked and there are 'several\" filled in the plastic container. Patient educated to bring those out and put in white plastic medication lawson. RN will be out on 10/28 for recertification and medication setup, wound cares. Patient does have PCA that comes M-Sat? that help put on ANDREIA hose daily and take off before he leaves home for the day. Patient educated that all medication dispensers will be filled when RN comes to Wednesday visit, along with wound cares, and massage for BLE, ANDREIA hose application.      Esther Urena RN   "

## 2021-06-12 NOTE — TELEPHONE ENCOUNTER
Dr. Crabtree,  Marco for refills requested below?  Refills have been set up for you to review.  Geovanna PRICE CMA/MIA....................9:34 AM

## 2021-06-12 NOTE — TELEPHONE ENCOUNTER
RN cannot approve Refill Request    RN can NOT refill this medication med is not covered by policy/route to provider. Last office visit: 2/10/2020 Geoff Crabtree MD Last Physical: 12/24/2019 Last MTM visit: Visit date not found Last visit same specialty: 3/11/2020 David Shafer MD.  Next visit within 3 mo: Visit date not found  Next physical within 3 mo: Visit date not found      Keyanna Tenorio, [facility] Triage/Med Refill 11/6/2020    Requested Prescriptions   Pending Prescriptions Disp Refills     budesonide-formoteroL (SYMBICORT) 160-4.5 mcg/actuation inhaler 2 Inhaler 6     Sig: INHALE 2 PUFFS BY MOUTH TWICE DAILY       There is no refill protocol information for this order

## 2021-06-12 NOTE — TELEPHONE ENCOUNTER
FROM: [facility] RN    Request for ongoing orders for:     HHA 2x a week for 7 weeks  to assist with wound care, skin integrity, medication observation.    Please respond with OK for orders.     Thank-you,   Haim De La Cruz, RN  [facility]  [phone]

## 2021-06-12 NOTE — TELEPHONE ENCOUNTER
Prescription Monitoring Program activity reviewed with no discrepancies noted.      Last fill per : 10/7/20  Quantity/days supply: 90 tablets for 30 days    Controlled Substance Agreement on file: Yes  Date: 5/7/19  (Pt needs a new CSA)  Last office visit with provider:  2/10/2020 Geoff Crabtree MD    Please advise.

## 2021-06-12 NOTE — TELEPHONE ENCOUNTER
Obdulia,    It looks like Bear did not answer the phone for his telephone visit. Unfortunately we were unable to see him until later in the day.  Pictures were sent-can you please take a look at them and let me know if there is anything else you would like done?  His edema has been very good lately with barely any seen. Still not checking BS, but being compliant with his medications.    Let me know      Thank you,    Esther Urena RN

## 2021-06-12 NOTE — TELEPHONE ENCOUNTER
Refill Approved    Rx renewed per Medication Renewal Policy. Medication was last renewed on 9/6/2019  Last OV 12/24/2019  Yin Starkey, [facility] Triage/Med Refill 10/18/2020     Requested Prescriptions   Pending Prescriptions Disp Refills     DAILY-HIEN tablet [Pharmacy Med Name: DAILY-HIEN TABLETS] 90 tablet 0     Sig: TAKE 1 TABLET BY MOUTH DAILY       There is no refill protocol information for this order

## 2021-06-12 NOTE — TELEPHONE ENCOUNTER
Left message asking patient to call back with who prescribed this medication.     Tarah Davila, CMA

## 2021-06-12 NOTE — TELEPHONE ENCOUNTER
I have never prescribed injectable Humira for him.  He must be getting it from someone else.  We will not prescribe it for him.

## 2021-06-13 NOTE — PROGRESS NOTES
[facility] Care Coordination Contact    Phone call to member to discuss closing of [facility].  Informed him that Dr. Crabtree will be transitioning to the [facility].  He reports that will continue to see Dr. Crabtree there.  Writer will make PCC change to [facility].     Ruddy Alexander RN, PHN  [facility]  [phone]

## 2021-06-13 NOTE — TELEPHONE ENCOUNTER
Obdulia,    Can I get some clarification on Jef's wound care?  Can we do the medihoney every 5 days or when mepilex is saturated prn?  And HHA included in the simple wound cares prn    Thank you,    Esther Urena RN

## 2021-06-13 NOTE — PROGRESS NOTES
OFFICE VISIT NOTE    Subjective:   Chief Complaint:  Follow-up (sore on left buttocks)    87-year-old male with multiple complicated problems.  He has COPD, ASHD, cervical lumbar myelopathy, continued chronic smoking.  Very painful sore on the left buttock-sacral region.  It is not improving with time.  Unfortunately, he spends most of the day sitting in a wheelchair.  He continues to have very severe low back pain.  Some radiation into the thigh.  Nothing in the lower legs.  Difficult time walking.    Current Outpatient Prescriptions   Medication Sig     albuterol (PROVENTIL) 2.5 mg /3 mL (0.083 %) nebulizer solution Take 2.5 mg by nebulization every 6 (six) hours as needed for wheezing. Inhale 3ml via a nebulizer every 4 hours if needed for SOB     BANOPHEN 25 mg capsule TAKE 2 CAPSULES BY MOUTH EVERY 6 HOURS AS NEEDED FOR ITCHING     calcium carbonate-vitamin D3 (CALCIUM 600 + D,3,) 600 mg(1,500mg) -400 unit per tablet Take 1 tablet by mouth daily.     calcium carbonate-vitamin D3 (CALTRATE 600 PLUS D3) 600 mg(1,500mg) -400 unit per tablet TAKE 1 TABLET BY MOUTH DAILY     cetirizine (ZYRTEC) 10 MG tablet TAKE 1 TABLET(10 MG) BY MOUTH DAILY     CONTOUR NEXT STRIPS Strp 4 (four) times a day.     diphenhydrAMINE (BENADRYL) 25 mg capsule TAKE 2 CAPSULES BY MOUTH EVERY 6 HOURS AS NEEDED FOR ITCHING     docusate sodium (COLACE) 100 MG capsule Take 100 mg by mouth 2 (two) times a day as needed for constipation.     DULoxetine (CYMBALTA) 30 MG capsule TAKE 1 CAPSULE BY MOUTH DAILY X 1 WEEK THEN INCREASE TO 2 CAPSULES BY MOUTH EVERY DAY     finasteride (PROSCAR) 5 mg tablet Take 1 tablet by mouth daily.     fluticasone-vilanterol (BREO ELLIPTA) 100-25 mcg/dose DsDv inhaler Inhale 1 Dose 2 (two) times a day.     folic acid (FOLVITE) 1 MG tablet TAKE 1 TABLET BY MOUTH EVERY DAY     folic acid (FOLVITE) 1 MG tablet TAKE 1 TABLET BY MOUTH EVERY DAY     furosemide (LASIX) 40 MG tablet Take 20 mg by mouth daily.       "levalbuterol (XOPENEX HFA) 45 mcg/actuation inhaler INHALE 2 PUFFS BY MOUTH EVERY 4 HOURS AS NEEDED FOR WHEEZING     meloxicam (MOBIC) 7.5 MG tablet Take 1 tablet (7.5 mg total) by mouth daily.     meloxicam (MOBIC) 7.5 MG tablet TAKE 1 TABLET BY MOUTH EVERY DAY     metFORMIN (GLUCOPHAGE) 1000 MG tablet TAKE 1 TABLET BY MOUTH TWICE DAILY     metoprolol tartrate (LOPRESSOR) 25 MG tablet TAKE 1 TABLET BY MOUTH TWICE DAILY     MICROLET LANCET Misc 4 (four) times a day.     omeprazole (PRILOSEC) 20 MG capsule Take 20 mg by mouth daily.     oxyCODONE (ROXICODONE) 30 MG immediate release tablet Take 1 tablet (30 mg total) by mouth 3 (three) times a day.     potassium chloride SA (K-DUR,KLOR-CON) 20 MEQ tablet TAKE 1 TABLET BY MOUTH TWICE DAILY WITH MEALS     predniSONE (DELTASONE) 10 mg tablet TAKE 1 TABLET(10 MG) BY MOUTH DAILY     pregabalin (LYRICA) 75 MG capsule Take 1 capsule (75 mg total) by mouth 3 (three) times a day.     SYRINGE & NEEDLE,INSULIN,1 ML (INSULIN SYRINGE-NEEDLE U-100) 1 mL 29 X 7/16\" Syrg      TAB-A-HIEN per tablet TAKE 1 TABLET BY MOUTH DAILY     tamsulosin (FLOMAX) 0.4 mg Cp24 Take 1 capsule (0.4 mg total) by mouth Daily after breakfast.     menthol-zinc oxide (CALMOSEPTINE) 0.44-20.6 % Oint ointment Apply to affected area twice daily       PSFHx: Tobacco Status:  He  reports that he has been smoking.  He has never used smokeless tobacco.    Review of Systems:  A comprehensive review of systems is negative except for the comments above    Objective:    Pulse 87  SpO2 98%  GENERAL: No acute distress.  He is in a wheelchair.  He is afebrile.  He is able to stand up though with some difficulty.  Examination of the buttock region reveals a 1.3 cm diameter open decubitus ulcer.  There is no purulent drainage.  The base is pink.  No bloody drainage.  Quite tender when he sitting on the area.  No fluctuance.  He is bilateral rhonchi.  He continues to smoke cigarettes has been unable to stop.  Heart shows " a regular rhythm.  He is afebrile.  He is able to stand up.  Somewhat wobbly when he standing complaining of pain and some weakness in the upper legs.    Assessment & Plan   Jef Centeno is a 67 y.o. male.    Decubitus ulcer involving left buttock region.  I told most importantly, he needs to stay off this area.  I advised that he lying in bed on his right side to keep the ulcer me from any pressure.  Apply calmoseptine ointment twice daily.  Cover with nonstick gauze.  He continues to require narcotic therapy for his back pain which is severe.  He is seeing a neurologist at this time.  He is also seen a neurosurgeon who has advised surgery.  He wants to avoid surgery if possible.  30 minutes spent with patient and wife, mostly discussing options for his decubitus ulcer and his chronic low back pain.  Diagnoses and all orders for this visit:    Chronic obstructive pulmonary disease, unspecified COPD type    Health care maintenance  -     diphenhydrAMINE (BENADRYL) 25 mg capsule; TAKE 2 CAPSULES BY MOUTH EVERY 6 HOURS AS NEEDED FOR ITCHING  Dispense: 100 capsule; Refill: 0    ASHD (arteriosclerotic heart disease)    Stenosis of cervical spine with myelopathy    Decubitus ulcer, stage II  -     menthol-zinc oxide (CALMOSEPTINE) 0.44-20.6 % Oint ointment; Apply to affected area twice daily  Dispense: 71 g; Refill: 1    Pruritus  -     diphenhydrAMINE (BENADRYL) 25 mg capsule; TAKE 2 CAPSULES BY MOUTH EVERY 6 HOURS AS NEEDED FOR ITCHING  Dispense: 100 capsule; Refill: 0            Geoff Crabtree MD  Transcription using voice recognition software, may contain typographical errors.

## 2021-06-13 NOTE — TELEPHONE ENCOUNTER
Who is calling:  Patient  Reason for Call:  Would not specify, he just states he wants to speak with Dr Crabtree's nurse. No detail given.  Date of last appointment with primary care: 6/17/2020 virtual visit  Okay to leave a detailed message: Yes

## 2021-06-13 NOTE — TELEPHONE ENCOUNTER
Controlled Substance Refill Request  Medication Name:   Requested Prescriptions     Pending Prescriptions Disp Refills     oxyCODONE (ROXICODONE) 30 MG immediate release tablet 90 tablet 0     Sig: Take 1 tablet (30 mg total) by mouth every 8 (eight) hours as needed for pain.     Date Last Fill: 11/5/2020  [id number]  Submit electronically to pharmacy  Controlled Substance Agreement on file:   Encounter-Level CSA Scan Date:    There are no encounter-level csa scan date.        Last office visit:  6/17/2020

## 2021-06-13 NOTE — PROGRESS NOTES
HOME HEALTH MISSED VISIT NOTIFICATION (FYI)       Your patient who receives home health services missed a visit on: 11/13/2020         Type of service missed:Home Health Aide       Reason for missed visit (pick applicable reason):           Other (explain): Unable to reach patient by phone left voice message on both phone numbers. also wait outside of patient door for 20min      Provider(s) to be notified:  (if NOT in our system)      This request is sent as an inbasket message to p  his, , and provider if in our system.    Geoff Crabtree MD; Esther Spencer, RN;

## 2021-06-13 NOTE — PROGRESS NOTES
OFFICE VISIT NOTE    Subjective:   Chief Complaint:  Cough (X 1 week with lots of production.)    This is a 67-year-old man with COPD as well as chronic neck and back pain from cervical myelopathy.  He is for the most part wheelchair-bound.  He can walk with a walker for short distances only.  Continues to have severe neck and shoulder and lower back pain.  The past week he has had increasing coughing and mucus production.  Unfortunately, he continues to smoke and has not been able to quit.  No fevers but some sweats.    Current Outpatient Prescriptions   Medication Sig     albuterol (PROVENTIL) 2.5 mg /3 mL (0.083 %) nebulizer solution Take 2.5 mg by nebulization every 6 (six) hours as needed for wheezing. Inhale 3ml via a nebulizer every 4 hours if needed for SOB     BANOPHEN 25 mg capsule TAKE 2 CAPSULES BY MOUTH EVERY 6 HOURS AS NEEDED FOR ITCHING     calcium carbonate-vitamin D3 (CALCIUM 600 + D,3,) 600 mg(1,500mg) -400 unit per tablet Take 1 tablet by mouth daily.     calcium carbonate-vitamin D3 (CALTRATE 600 PLUS D3) 600 mg(1,500mg) -400 unit per tablet TAKE 1 TABLET BY MOUTH DAILY     cetirizine (ZYRTEC) 10 MG tablet TAKE 1 TABLET(10 MG) BY MOUTH DAILY     CONTOUR NEXT STRIPS Strp 4 (four) times a day.     desloratadine (CLARINEX) 5 mg tablet Take 1 tablet (5 mg total) by mouth daily.     diphenhydrAMINE (BENADRYL) 25 mg capsule TAKE 2 CAPSULES BY MOUTH EVERY 6 HOURS AS NEEDED FOR ITCHING     docusate sodium (COLACE) 100 MG capsule Take 100 mg by mouth 2 (two) times a day as needed for constipation.     DULoxetine (CYMBALTA) 30 MG capsule TAKE 1 CAPSULE BY MOUTH DAILY X 1 WEEK THEN INCREASE TO 2 CAPSULES BY MOUTH EVERY DAY     finasteride (PROSCAR) 5 mg tablet Take 1 tablet by mouth daily.     fluticasone-vilanterol (BREO ELLIPTA) 100-25 mcg/dose DsDv inhaler Inhale 1 Dose 2 (two) times a day.     folic acid (FOLVITE) 1 MG tablet TAKE 1 TABLET BY MOUTH EVERY DAY     folic acid (FOLVITE) 1 MG tablet TAKE 1  "TABLET BY MOUTH EVERY DAY     furosemide (LASIX) 40 MG tablet Take 20 mg by mouth daily.      hydrOXYzine (ATARAX) 25 MG tablet Take 1 tablet (25 mg total) by mouth every 8 (eight) hours as needed for itching.     levalbuterol (XOPENEX HFA) 45 mcg/actuation inhaler INHALE 2 PUFFS BY MOUTH EVERY 4 HOURS AS NEEDED FOR WHEEZING     meloxicam (MOBIC) 7.5 MG tablet Take 1 tablet (7.5 mg total) by mouth daily.     meloxicam (MOBIC) 7.5 MG tablet TAKE 1 TABLET BY MOUTH EVERY DAY     menthol-zinc oxide (CALMOSEPTINE) 0.44-20.6 % Oint ointment Apply to affected area twice daily     metFORMIN (GLUCOPHAGE) 1000 MG tablet TAKE 1 TABLET BY MOUTH TWICE DAILY     metoprolol tartrate (LOPRESSOR) 25 MG tablet TAKE 1 TABLET BY MOUTH TWICE DAILY     MICROLET LANCET Misc 4 (four) times a day.     omeprazole (PRILOSEC) 20 MG capsule Take 20 mg by mouth daily.     oxyCODONE (ROXICODONE) 30 MG immediate release tablet Take 1 tablet (30 mg total) by mouth 3 (three) times a day.     potassium chloride SA (K-DUR,KLOR-CON) 20 MEQ tablet TAKE 1 TABLET BY MOUTH TWICE DAILY WITH MEALS     predniSONE (DELTASONE) 10 mg tablet Take 0.5 tablets (5 mg total) by mouth daily.     pregabalin (LYRICA) 75 MG capsule Take 1 capsule (75 mg total) by mouth 3 (three) times a day.     SPIRIVA WITH HANDIHALER 18 mcg inhalation capsule INHALE THE CONTENTS OF ONE CAPSULE DAILY     SYMBICORT 160-4.5 mcg/actuation inhaler INHALE 2 PUFFS BY MOUTH TWICE DAILY     SYRINGE & NEEDLE,INSULIN,1 ML (INSULIN SYRINGE-NEEDLE U-100) 1 mL 29 X 7/16\" Syrg      TAB-A-HIEN per tablet TAKE 1 TABLET BY MOUTH DAILY     tamsulosin (FLOMAX) 0.4 mg Cp24 Take 1 capsule (0.4 mg total) by mouth Daily after breakfast.       PSFHx: Tobacco Status:  He  reports that he has been smoking.  He has never used smokeless tobacco.    Review of Systems:  A comprehensive review of systems is negative except for the comments above    Objective:    Pulse 98  Temp 98  F (36.7  C) (Oral)   SpO2 " 95%  GENERAL: No acute distress.  He is afebrile.  Oxygen saturations 94%.  Pulse 84.  ENT exam is unremarkable.  He has a second-degree burn on his right thigh from spilled coffee.  Lungs bilateral rhonchi and bilateral wheezing.  Respiratory rate is about 14.  No significant peripheral edema.  Heart does show a sinus rhythm.  He has difficulty standing up because of low back discomfort.  Handgrip is normal.  Slightly decreased range of motion to the neck.  Legs are considerably weaker than you would expect for a 67-year-old man.    Assessment & Plan   Jef Centeno is a 67 y.o. male.    The OPD exacerbation.  This was treated with prednisone 10 mg daily for 10 days then go back to the usual dosage of 5 mg daily.  Augmentin 875 mg twice daily.  Kenalog 40 intramuscular was given to reduce his wheezing.  This has helped him in the past.  Because of his inability to ambulate, get in and out of bed, position himself, would recommend a electric hospital bed.  This would facilitate getting into and out of bed.  Also repositioning him at times because of back and neck discomfort.  His face-to-face exam was done on 10/24/2017.    Diagnoses and all orders for this visit:    Chronic obstructive pulmonary disease with acute exacerbation    Sweet syndrome    Lumbar stenosis with neurogenic claudication    Stenosis of cervical spine with myelopathy        The following high BMI interventions were performed this visit: encouragement to exercise    Geoff Crabtree MD  Transcription using voice recognition software, may contain typographical errors.

## 2021-06-13 NOTE — PROGRESS NOTES
Beba,      Génesis'es LLE has about a [id number] red spot-can you let me know if it becomes  warm to the touch  and slightly red.  Both LE were showing edema around ankles and feet-the left one worse. Buttocks looks good-Obdulia said no f/u appointments unless sore returns. Keep applying ointment until fully healed.            Esther

## 2021-06-13 NOTE — TELEPHONE ENCOUNTER
Dr. Crabtree,  Spoke with the patient, who is requesting refills of tamsulosin and meloxicam.  Those refills have been set up for you to review.  Geovanna PRICE CMA/MIA....................10:13 AM

## 2021-06-13 NOTE — TELEPHONE ENCOUNTER
Left vm for Maya from [facility] stating Obdulia Prieto states no need for antibiotics for [place] wound. Obdulia Prieto would like medihoney and a mepilex boarder dressing every 3-4 days. Informed to call clinic with further questions

## 2021-06-13 NOTE — TELEPHONE ENCOUNTER
Clarification on wound care orders for right shin sent 11/23.    Do you want Medihoney gel or Medihoney alginate?   Can we use a tubigrip for compression? He can't tolerate his compression sock on it.  Or should there not be compression right now?    Are you able to order supplies?   It is about 4cm by 1 cm.     Thanks

## 2021-06-13 NOTE — PROGRESS NOTES
"Jef Centeno Jr. is a 70 y.o. male who is being evaluated via a billable video visit.      The patient has been notified of following:     \"This video visit will be conducted via a call between you and your physician/provider. We have found that certain health care needs can be provided without the need for an in-person physical exam.  This service lets us provide the care you need with a video conversation.  If a prescription is necessary we can send it directly to your pharmacy.  If lab work is needed we can place an order for that and you can then stop by our lab to have the test done at a later time.    Video visits are billed at different rates depending on your insurance coverage. Please reach out to your insurance provider with any questions.    If during the course of the call the physician/provider feels a video visit is not appropriate, you will not be charged for this service.\"    Patient has given verbal consent to a Video visit? Yes  How would you like to obtain your AVS? AVS Preference: Mail a copy.  If dropped by the video visit, the video invitation should be sent to: Text to cell phone: [phone]  Will anyone else be joining your video visit? No              Video-Visit Details    Type of service:  Video Visit      Originating Location (pt. Location): Home    Distant Location (provider location):  [facility]     Platform used for Video Visit: [facility]      Patient reports:    Change in status of the wound:  \"smaller, less irritated\"    Change of drainage- color, amount, odor:  no scant serosang    Change of swelling:  yes improved, compression socks    Change in Pain status:  no    New wounds developed: Rt shin wound    Dressing Supplies Sufficient:  yes    Reviewed with patient that I will contact them with scheduling a follow up appointment, supply needs and any further teaching that is identified by the provider during the call. I will also send out an " AVS with all education, a wound measuring tape and their next scheduled visit. I will include instructions to assist with installing the application on their mobile device if appropriate for future video visits.

## 2021-06-13 NOTE — TELEPHONE ENCOUNTER
Reason for Call:  Medication or medication refill:    Do you use a [facility]?  Name of the pharmacy and phone number for the current request: [facility] [place]   [phone]    Name of the medication requested: oxyCODONE (ROXICODONE) 30 MG immediate release tablet    Other request:     Can we leave a detailed message on this number? Yes    Phone number patient can be reached at: Home number on file [phone] (home)    Best Time: any    Call taken on 12/21/2020 at 3:17 PM by Laura L Goldberg

## 2021-06-13 NOTE — TELEPHONE ENCOUNTER
Pt was complaining of a tremor in his right leg. Pt only wanted his PCP notified because he feels PCP in knowledgeable about his condition. Pt vitals were normal. Only complaining of pain in wound on shin. It is tender to the touch. Otherwise pt is stable.

## 2021-06-14 NOTE — TELEPHONE ENCOUNTER
Medication: Oxycodone 30mg oral bid  Last Date Filled ?  Last appointment addressing medication use: 01/20/21      Taken as prescribed from physician notes? YES    Also wants refill Albuterol and prednisone

## 2021-06-14 NOTE — PROGRESS NOTES
Jef Centeno Jr. is a 70 y.o. male who is being evaluated via a billable video visit.      How would you like to obtain your AVS? Mail a copy.  If dropped from the video visit, the video invitation should be resent by: Text to cell phone: [phone]   Will anyone else be joining your video visit? No      Video Start Time: 11:46 PM  Assessment & Plan     Lumbar stenosis with neurogenic claudication  Refill request  - predniSONE (DELTASONE) 5 MG tablet  Dispense: 90 tablet; Refill: 0    ALS (amyotrophic lateral sclerosis) (H)  Refill request  - folic acid (FOLVITE) 1 MG tablet  Dispense: 90 tablet; Refill: 3    Chronic neck pain  Encounter to establish care  Refill request.  Also cleaned up medication list with medications he is not taking.  - oxyCODONE (ROXICODONE) 30 MG immediate release tablet  Dispense: 90 tablet; Refill: 0    Review of external notes as documented above     15 minutes spent on the date of the encounter doing chart review, history and exam, documentation and further activities as noted above    Tobacco Cessation:   reports that he has been smoking cigarettes. He has a 460.00 pack-year smoking history. He has never used smokeless tobacco.  I have not had an Advance Directives discussion with the patient.    Return if symptoms worsen or fail to improve.    David Shafer MD  [facility]     Jef Centeno Jr. is 70 y.o. and presents to clinic today for the following health issues   HPI Patient of Dr Crabtree.  Would like to establish care and request some refills.  Chronic neck pain, cervical stenosis, panlobular emphysema, ALS, lumbar stenosis with neurogenic claudication.  Last seen June 2020.  Here to establish care.  Receiving home care from [facility] for nursing, physical therapy, occupational therapy and social work. His PCA or his home care manages his own medications.  He is on oxycodone 30 mg tablets BID,  and gabapentin 300 mg  three times a day.  States sometimes he takes half a tablet of his prednisone although more commonly he will take 5 mg of prednisone daily if he intends to get up and about but if he stays at home will take half a tablet (2.5 mg).  Also needs refill of folic acid.  He has a total of 5 children. Wife passed away in 2018.     Review of Systems  ROS A limited review of systems was performed and was otherwise negative        Objective    Vitals - Patient Reported  Systolic (Patient Reported): 104  Diastolic (Patient Reported): 60  Blood pressure taken with manual cuff (will exclude from quality measure): Yes(home care nurse)  Temperature (Patient Reported): 98.5  F (36.9  C)  Pulse (Patient Reported): 84    Physical Exam  Unable to do due to telephone visit    I spent a total of 15 minutes face-to-face with Jef Centeno Jr. during today's office visit.        Video-Visit Details  Type of service:  Telephone visit  Video End Time (time video stopped): 12:01 PM  Originating Location (pt. Location): Home  Distant Location (provider location):  [facility]   Platform used for Video Visit: [employer]--> transitioned to telephone visit is unable to set up video connection

## 2021-06-14 NOTE — TELEPHONE ENCOUNTER
Marco verbal orders for Home care Occupational therapy as requested.    For home care, assisted living and nursing home needs for initiation of orders that pertain to nursing, physical therapy, occupational therapy and social work.  Established patient, seen by [facility] care provider within the last 2 years (3/11/20)

## 2021-06-14 NOTE — PROGRESS NOTES
[facility] Care Coordination Contact    Phone call from VICKEY Mejia reports that per nurse member would benefit from additional PCA to come during evening hours.  He has multiple falls involving  slipping out of his wheelchair and will be working with OT and DME company.  Member appears to be getting weaker from progression of ALS and would be appropriate for a higher level of care in assisted living but may not be ready. PCP has retired and will see another provider. Care coordinator will follow up with member.    Ruddy Alexander RN, PHN  [facility]  [phone]

## 2021-06-14 NOTE — TELEPHONE ENCOUNTER
Request for Orders    Who's Requesting: Home Care Occupational Therapist    Orders being requested: OT  2 visit(s) every 1 visit(s) every 1 week(s) for 1 week(s) for UE HEP, fall prevention strategies and compensatory strategies w/ IADLs.    Where to send Orders: [facility], response through Epic preferred. Please call if you have questions.    Thank you!  Trudy Morris, OTR/L  [phone]

## 2021-06-14 NOTE — PROGRESS NOTES
Jef Centeno Jr. is a 70 y.o. male who is being evaluated via a billable video visit.      How would you like to obtain your AVS? Mail a copy.  If dropped from the video visit, the video invitation should be resent by:   Will anyone else be joining your video visit? No    Patient reports:    Change in status of the wound: new wounds on both legs     Change of drainage- color, amount, odor: mod drainage no odor     Change of swelling:  swelling on both legs      New wounds developed:  yes new wounds on both legs due to falling out of chair        Reviewed with patient that I will contact them with scheduling a follow up appointment, supply needs and any further teaching that is identified by the provider during the call. I will also send out an AVS with all education, a wound measuring tape and their next scheduled visit. I will include instructions to assist with installing the application on their mobile device if appropriate for future video visits.   Video Start Time:     Subjective     Jef Centeno Jr. is 70 y.o. and presents to clinic today for the following health issues   HPI       Additional provider notes:   Review of Systems        Objective       Vitals:  No vitals were obtained today due to virtual visit.    Physical Exam              Video-Visit Details    Type of service:  Video Visit    Video End Time (time video stopped): Originating Location (pt. Location): Home    Distant Location (provider location):  [facility]     Platform used for Video Visit: [employer]

## 2021-06-14 NOTE — TELEPHONE ENCOUNTER
Physical Therapy with [facility] is requesting verbal ok for 1W1, 2W3 to work on strengthening, gait/transfer training, and balance training. You can respond to this inbasket with the ok or call and leave a message at [phone]. Thank you

## 2021-06-14 NOTE — TELEPHONE ENCOUNTER
Refill Request  Did you contact pharmacy: N/A  Medication name:   Albuterol  Oxycodone  Prenisone      Who prescribed the medication: PCP  [facility]  Is patient out of medication: Yes  Patient notified refills processed in 3 business days:  yes  Okay to leave a detailed message: yes

## 2021-06-14 NOTE — PROGRESS NOTES
[facility] Care Coordination Contact    Phone call to member to discuss reports of falls,  He reported that had UTI and was given antibiotics which made him drowsy which contributed to him slipping out of his wheelchair.  He denies injuries.  He reports that current PCA is consistent and needs are being met.  Current PCA comes 11-4:30 pm and he would benefit from having someone come in the evening. He states that he is still able to manage cares before PCA arrives and denies that has changed in health or increased weakness.  Recommended that member considered AL as unsure if hours would increase as well as the challenges of finding a PCA that can work evenings.  He declined and states that feels condition hasn't change and doesn't want to consider at this time. this time. Will check with PCA agency to see if has staff who may be able to provide PCA hours during evening.   Will call him in Feb.2021 to complete annual health risk assessment.    Ruddy Alexander RN, PHN  [facility]  [phone]

## 2021-06-14 NOTE — PROGRESS NOTES
TEAM MEMBERS PRESENT:   Nursing, HHA, MSW      DISCIPLINES ORDERED:    Physical Therapy, Occupational Therapy and Medical Social Work      PROGRESS/STATUS, BARRIERS to DC/CONCERNS: (environmental, safety, mental health, cultural, language, non-compliance, cognition, caregiver dynamics):    Patient has had mutliple falls recently mostly involving slipping out of his wheelchair.  Patient has ALS and PCA that appears to show up infrequently.      PLAN/FOLLOW-UP NEEDED: Possible overnight PCA, OT eval regarding falls      ANTICIPATED DC DATES/STATUS OF ORDERS (2 day notice?):    Patient will be long term for medication setup due to forgetfullness.

## 2021-06-14 NOTE — TELEPHONE ENCOUNTER
Request for Orders    Who s Requesting: Home Care Registered Nurse    Orders being requested:   SN 1x9w for medication mgmt/education, wound cares, safety, edema    HHA 2x4w for simple wound cares, safety, skin integrity      Where to send Orders:[facility]      Also, Dr Crabtree,    Can you tell me if patient prednisone has been discontinued? Or if he is to continue taking 2.5mg daily? Patient has run out of medication and it is not able to be refilled until 2/6/21. I am unsure if he is taking on his own even though he denies it.     Esther Urena,RN

## 2021-06-14 NOTE — TELEPHONE ENCOUNTER
Request for Orders    Who s Requesting: Home Care Registered Nurse    Orders being requested:     PT/OT jaye  MSW for care coordinations, possible assisted living, help getting Night PCA    Where to send Orders: [facility] Crabtree,    Bear had a fall/slip from his chair on Saturday, 1/9/21. He admits to having 2 other slips also-all while in his wheelchair-sometimes while sleeping, other trying to transfer. He has a new tear/wound to right knee and left shin. Writer applied mepilex after cleaning with saline-wounds are already scabbed.    I'd like OT/PT jaye to help with this and MSW to discuss Assisted living again. There have also been several medication discrepancies that Bear blames on the SN. I'm not sure I would think that the PCA is taking his prednisone or certirizine but Bear denies taking any medications out of the bottles. Bear is not aware that any of his pain medication has been taken-I am unable to ascertain this due to patient forgetfulness.       Thank you,  Esther UrenaRN

## 2021-06-14 NOTE — TELEPHONE ENCOUNTER
Requesting continuation of Home Health Aide orders for simple wound care and personal cares.    Home health Aide  2x wk through 2/27/21    Thank you.

## 2021-06-14 NOTE — TELEPHONE ENCOUNTER
Marco verbal orders for Home care Physical therapy as requested.    For home care, assisted living and nursing home needs for initiation of orders that pertain to nursing, physical therapy, occupational therapy and social work.  Established patient, seen by [facility] care provider within the last 2 years (6/17/20)

## 2021-06-14 NOTE — TELEPHONE ENCOUNTER
"Bear had a video visit today.   The wound care plan in the visit note does not match to orders in \"Change dressing\".    One is for Bactroban to wounds and the other is for Medihoney alginate.    Please clarify wound care and frequency.    Thank you.  "

## 2021-06-15 NOTE — PROGRESS NOTES
OFFICE VISIT NOTE    Subjective:   Chief Complaint:  Follow-up (discuss neurology results, wants his drivers liscence back)    67-year-old male with multiple problems including COPD, ASHD, lumbar and cervical spine stenosis with myelopathy.  He also has borderline diabetes mellitus type 2.  He has been doing physical therapy is slowly getting better.  He cannot ambulate with the use of a walker or pushing a wheelchair.  He is taking his 's test soon and wants to resume driving.  He has not had any alcohol now for 7 years.  He continues to smoke but is trying to quit smoking.  He takes Ensure supplements to regain strength and some weight.    Current Outpatient Prescriptions   Medication Sig     albuterol (PROVENTIL) 2.5 mg /3 mL (0.083 %) nebulizer solution Take 2.5 mg by nebulization every 6 (six) hours as needed for wheezing. Inhale 3ml via a nebulizer every 4 hours if needed for SOB     BANOPHEN 25 mg capsule TAKE 2 CAPSULES BY MOUTH EVERY 6 HOURS AS NEEDED FOR ITCHING     calcium carbonate-vitamin D3 (CALCIUM 600 + D,3,) 600 mg(1,500mg) -400 unit per tablet Take 1 tablet by mouth daily.     calcium carbonate-vitamin D3 (CALTRATE 600 PLUS D3) 600 mg(1,500mg) -400 unit per tablet TAKE 1 TABLET BY MOUTH DAILY     cetirizine (ZYRTEC) 10 MG tablet TAKE 1 TABLET(10 MG) BY MOUTH DAILY     CONTOUR NEXT STRIPS Strp 4 (four) times a day.     desloratadine (CLARINEX) 5 mg tablet Take 1 tablet (5 mg total) by mouth daily.     diphenhydrAMINE (BENADRYL) 25 mg capsule TAKE 2 CAPSULES BY MOUTH EVERY 6 HOURS AS NEEDED FOR ITCHING     docusate sodium (COLACE) 100 MG capsule Take 100 mg by mouth 2 (two) times a day as needed for constipation.     DULoxetine (CYMBALTA) 30 MG capsule TAKE 1 CAPSULE BY MOUTH DAILY X 1 WEEK THEN INCREASE TO 2 CAPSULES BY MOUTH EVERY DAY     finasteride (PROSCAR) 5 mg tablet Take 1 tablet by mouth daily.     fluticasone-vilanterol (BREO ELLIPTA) 100-25 mcg/dose DsDv inhaler Inhale 1 Dose 2 (two)  "times a day.     folic acid (FOLVITE) 1 MG tablet TAKE 1 TABLET BY MOUTH EVERY DAY     furosemide (LASIX) 20 MG tablet TAKE 1 TABLET(20 MG) BY MOUTH DAILY     furosemide (LASIX) 40 MG tablet Take 20 mg by mouth daily.      gabapentin (NEURONTIN) 300 MG capsule TAKE 1 CAPSULE BY MOUTH TWICE DAILY     hydrOXYzine (ATARAX) 25 MG tablet Take 1 tablet (25 mg total) by mouth every 8 (eight) hours as needed for itching.     levalbuterol (XOPENEX HFA) 45 mcg/actuation inhaler INHALE 2 PUFFS BY MOUTH EVERY 4 HOURS AS NEEDED FOR WHEEZING     meloxicam (MOBIC) 7.5 MG tablet Take 1 tablet (7.5 mg total) by mouth daily.     meloxicam (MOBIC) 7.5 MG tablet TAKE 1 TABLET BY MOUTH EVERY DAY     menthol-zinc oxide (CALMOSEPTINE) 0.44-20.6 % Oint ointment Apply to affected area twice daily     metFORMIN (GLUCOPHAGE) 1000 MG tablet TAKE 1 TABLET BY MOUTH TWICE DAILY     metoprolol tartrate (LOPRESSOR) 25 MG tablet TAKE 1 TABLET BY MOUTH TWICE DAILY     MICROLET LANCET Misc 4 (four) times a day.     omeprazole (PRILOSEC) 20 MG capsule Take 20 mg by mouth daily.     oxyCODONE (ROXICODONE) 30 MG immediate release tablet Take 1 tablet (30 mg total) by mouth 3 (three) times a day.     oxyCODONE (ROXICODONE) 5 MG immediate release tablet Take 6 tablets (30 mg total) by mouth 3 (three) times a day.     potassium chloride SA (K-DUR,KLOR-CON) 20 MEQ tablet Take 1 tablet (20 mEq total) by mouth 2 (two) times a day.     predniSONE (DELTASONE) 10 mg tablet Take 0.5 tablets (5 mg total) by mouth daily.     predniSONE (DELTASONE) 5 MG tablet Take 1 tablet (5 mg total) by mouth daily.     pregabalin (LYRICA) 75 MG capsule Take 1 capsule (75 mg total) by mouth 3 (three) times a day.     SPIRIVA WITH HANDIHALER 18 mcg inhalation capsule INHALE THE CONTENTS OF ONE CAPSULE DAILY     SYMBICORT 160-4.5 mcg/actuation inhaler INHALE 2 PUFFS BY MOUTH TWICE DAILY     SYRINGE & NEEDLE,INSULIN,1 ML (INSULIN SYRINGE-NEEDLE U-100) 1 mL 29 X 7/16\" Syrg      " TAB-A-HIEN per tablet TAKE 1 TABLET BY MOUTH EVERY DAY.     tamsulosin (FLOMAX) 0.4 mg Cp24 Take 1 capsule (0.4 mg total) by mouth Daily after breakfast.     food supplemt, lactose-reduced (ENSURE HIGH PROTEIN) Liqd Take 1 Can by mouth 2 (two) times a day.     sodium phosphates 133 mL (SODIUM PHOSPHATE) 19-7 gram/118 mL Enem rectal enema Insert 1 enema into the rectum once for 1 dose.       PSFHx: Tobacco Status:  He  reports that he has been smoking.  He has never used smokeless tobacco.    Review of Systems:  A comprehensive review of systems is negative except for the comments above    Objective:    There were no vitals taken for this visit.  GENERAL: No acute distress.  He looks better and his current weight.  He is able to get out of a chair without difficulty.  He uses a walker for ambulation.  Blood pressures 128/78.  Pulse 76.  Oxygen saturations 96%.  No edema or cyanosis.  Leg strength is overall pretty good.  He has good range of motion of both hips.  Lungs have bilateral rhonchi.  He still smoking.  Heart shows a sinus rhythm without murmur gallop or rub.  He is wide awake and alert.  Answers questions appropriately.  Overall he looks better than he did 2 or 3 months    Assessment & Plan   Jef Centeno is a 67 y.o. male.    COPD which is stable.  Patient unfortunately still smoking.  He is to stop by this June or July.  Problems remain stable.  CHD without any chest pain.  No signs of heart failure  I think it safe for him to take the 's test.  Certainly if he passes the test he can independently drive at this time.    Diagnoses and all orders for this visit:    Chronic obstructive pulmonary disease with acute exacerbation  -     food supplemt, lactose-reduced (ENSURE HIGH PROTEIN) Liqd; Take 1 Can by mouth 2 (two) times a day.  Dispense: 60 Can; Refill: 4    Diabetes mellitus, type 2    Neck pain of over 3 months duration    Stenosis of cervical spine with myelopathy    Sweet syndrome  -      predniSONE (DELTASONE) 5 MG tablet; Take 1 tablet (5 mg total) by mouth daily.  Dispense: 90 tablet; Refill: 3    Constipation  -     sodium phosphates 133 mL (SODIUM PHOSPHATE) 19-7 gram/118 mL Enem rectal enema; Insert 1 enema into the rectum once for 1 dose.  Dispense: 6 mL; Refill: 1            Geoff Crabtree MD  Transcription using voice recognition software, may contain typographical errors.

## 2021-06-15 NOTE — TELEPHONE ENCOUNTER
Last Office Visit  2/10/2021- Dr. Shafer  Notes:  Colon cancer screening  - Ambulatory referral for Colonoscopy     Lumbar stenosis with neurogenic claudication  Referred to spine clinic. Will remain on scheduled oxycodone 30 mg twice daily.  Reviewed , with 90 tablets filled on 2/4/2021  - Ambulatory referral to Spine Care     Arthritis of left shoulder region  Patient has severe degenerative changes in his left glenohumeral joint and degenerative change in the AC joint.  We will inquire from patient whether he would like to pursue injection in clinic versus Ortho clinic  - XR Shoulder Left 2 or More VWS     Essential hypertension  BP well controlled.  Only on Lopressor 25 mg twice daily.  - Basic Metabolic Panel     Tobacco abuse  In the precontemplation stage.  Review of external notes as documented in note      28 minutes spent on the date of the encounter doing chart review, history and exam, documentation and further activities as noted above     Tobacco Cessation:   reports that he has been smoking cigarettes. He has a 460.00 pack-year smoking history. He has never used smokeless tobacco.    Last Filled:  omeprazole (PRILOSEC) 20 MG capsule 90 capsule 3 12/24/2019  No   Sig - Route: Take 1 capsule (20 mg total) by mouth daily before breakfast. - Oral   Sent to pharmacy as: omeprazole 20 mg capsule,delayed release (PriLOSEC)   E-Prescribing Status: Receipt confirmed by pharmacy (12/24/2019 12:12 PM CST)       Next OV:  No appointments scheduled         Medication teed up for provider signature

## 2021-06-15 NOTE — PROGRESS NOTES
Assessment & Plan   Colon cancer screening  - Ambulatory referral for Colonoscopy    Lumbar stenosis with neurogenic claudication  ALS (amyotrophic lateral sclerosis)  Indefinitely wheelchair dependent. Fortunately not oxygen dependent. Referred to spine clinic. Will remain on scheduled oxycodone 30 mg twice daily.  Reviewed , with 90 tablets filled on 2/4/2021  - Ambulatory referral to Spine Care    Arthritis of left shoulder region  Patient has severe degenerative changes in his left glenohumeral joint and degenerative change in the AC joint.  We will inquire from patient whether he would like to pursue injection in clinic versus Ortho clinic  - XR Shoulder Left 2 or More VWS    Essential hypertension  BP well controlled.  Only on Lopressor 25 mg twice daily.  - Basic Metabolic Panel    Tobacco abuse  In the precontemplation stage.  Review of external notes as documented in note     28 minutes spent on the date of the encounter doing chart review, history and exam, documentation and further activities as noted above     Tobacco Cessation:   reports that he has been smoking cigarettes. He has a 460.00 pack-year smoking history. He has never used smokeless tobacco.    Return if symptoms worsen or fail to improve.    David Shafer MD  [facility]   Jef Centeno Jr. is 70 y.o. and presents today for the following health issues   HPI Mr. Jacobo Aguilar is complaining of worsening left shoulder pain.  States his left shoulder gets stuck and it hurts when he moves it around.  Is on oxycodone scheduled right now for his chronic left shoulder pain in addition to lower and upper back pain last received injection in the.  Last received a steroid injection in the left shoulder from October 2019.  Describes the pain 10 out of 10 when he moves it and nonexistent when he does not move it/remains still.  Would also like a brace for his neck in the setting of history of neck  "surgery and unable to keep his neck up due to his ALS.  In respect to meeting his basic instrumental ADLs, he has a nurse/aide who comes to his house every Tuesday and Thursday to help.  He is requesting referral for colonoscopy as he is overdue.  Lastly he is requesting to be scheduled for a Covid test.    Review of Systems  ROS A comprehensive review of systems was performed and was otherwise negative      Objective    BP 90/46 (Patient Site: Left Arm, Patient Position: Sitting, Cuff Size: Adult Regular)   Pulse 78   Temp 98.2  F (36.8  C) (Oral)   Resp 16   Ht 5' 10\" (1.778 m)   Wt 136 lb 9.6 oz (62 kg)   SpO2 96%   BMI 19.60 kg/m    Body mass index is 19.6 kg/m .  Physical Exam  GENERAL APPEARANCE: Pleasant, nontoxic-appearing, thin, NAD  HEENT: Head normal. Hearing was normal to voice.    NECK: Cervical neck chronically flexed at 90 degrees. Stable well-healed posterior cervical neck surgical incision  RESPIRATORY: Bilaterally with no c/w/r. No increased work of breathing.  CARDIOVASCULAR: Regular S1/S2.  Radial pulses intact.    MUSCULOSKELETAL: Skeletal configuration was normal and muscle mass was normal for age.  SKIN/HAIR/NAILS: Stable bilateral surface wounds in the shins, worse on the left than the right without any signs of purulence/cellulitis, and covered with Mepilex dressing.  Skin color was normal. Hair normal.  NEUROLOGIC: AOX4. Speech was normal.   PSYCHIATRIC:  Mood and affect were normal     "

## 2021-06-15 NOTE — PROGRESS NOTES
[facility] Care Coordination    Phone call to Christiano, [employer] agency, inquired if PCA is utilizing homemaking hours as well.  He states PCA is using homemaking and pca hours.  Inquired if agency can find staff to work with member during the evening hours several times a week. Christiano reports that is challenging with COVID-19, has limited staff and also if hours are not in bulk of 4 or more hours, many workers will not take on shift considering having to drive to member's home.  I informed him that PCA hours will remained the same but willing to increase homemaking hours if can accommodate.  He will contact care coordinator if can find staffing.      Left voice message for Jackie Mahoney, SW with [facility] to update that PCA hours will remain the same, will work with PCA agency to see if can find additional staffing to work evening shift but per agency can be a challenge during this time due to COVID-19 and limited hours per shift.     Ruddy Alexander RN, PHN  [facility]  [phone]

## 2021-06-15 NOTE — TELEPHONE ENCOUNTER
FYI:    Patient has video visit on 2/17. I sent wound pictures today.   Right knee appears healed.  Left shin has some scab areas with the lower aspect being a lighter scab.  Bactroban and foam dressing placed on the lower aspect of the left shin.  Scant drainage.

## 2021-06-15 NOTE — PATIENT INSTRUCTIONS - HE
I have discontinued the metformin, you do not need to be taking it  I have also ordered for you to have a lab visit next week to check your kidney function and electrolytes  I have placed referrals to the spine care and GI team for colonoscopy  We will do an xray of the left shoulder today  We will try to get you set up with covid testing

## 2021-06-15 NOTE — TELEPHONE ENCOUNTER
Dr. Shafer,    Bear Centeno is out of his oxycodone 30mg 2x daily because he did not remember that his prescription had changed so he took them per previous order og 15mg oxy 3x daily. This is an fyi but also am wondering if he is able to get more of his prescription since he now understands that the dosage had changed and frequency.     Please advise.    Esther Urena rN

## 2021-06-15 NOTE — TELEPHONE ENCOUNTER
I am unsure where the request for Riluzole came from has I do not see that this has been prescribed since 2018.  Thank you

## 2021-06-15 NOTE — TELEPHONE ENCOUNTER
Call patient. Dr. Shafer is out of the office. I reviewed his chart.   1. dicontinue metformin  2. Continue prednisone

## 2021-06-15 NOTE — PROGRESS NOTES
PCA worker was here today 2/16/2021 No Abdiel hose on. Patient told me that it my job to put on his abdiel hose

## 2021-06-15 NOTE — TELEPHONE ENCOUNTER
Thank you for the message.  I think the change from 3 times daily to twice daily was an error on my part and how it was ordered.  It was always meant to be 30 mg 3 times daily.  That is why the number of tablets did not change.  I I reviewed  and will refill for 30 mg 3 times daily.  Thank you

## 2021-06-15 NOTE — PROGRESS NOTES
[facility] Care Coordination  [facility] Home Visit Assessment     Home visit for Health Risk Assessment with Jef Centeno Jr. completed on 2/8/2021    Type of residence:: Town home  Current living arrangement:: I live alone     Assessment completed with: Patient    Current Care Plan  Member currently receiving the following home care services: Skilled Nursing, Physicial Therapy, Occupational Therapy, Home Health Aid   Member currently receiving the following community resources: Housekeeping/Chore Agency, DME, [employer], Meals on Wheels, Transportation Services, PCA      Medication Review  Medication reconciliation completed in Epic: IF NO, PLEASE EXPLAIN Managed by RN   Medication set-up & administration: RN set up weekly  Self-administers medications  Medication Risk Assessment Medication (1 or more, place referral to MTM):  Recent falls within past year  MTM Referral Placed: No: Has SNV's in placed.    Mental/Behavioral Health   Depression Screening:    PHQ-2 Total Score: 1       Mental health DX:: No        Falls Assessment:   Fallen 2 or more times in the past year?: Yes   Any fall with injury in the past year?: Yes    ADL/IADL Dependencies:   Dependent ADLs:: Bathing, Dressing, Grooming, Transfers, Wheelchair-with assist, Toileting, Ambulation-walker, Incontinence, Wheelchair-independent  Dependent IADLs:: Cleaning, Cooking, Laundry, Shopping, Meal Preparation, Medication Management, Money Management, Transportation, Incontinence    [facility] sponsored benefits: Shared information re: Silver Sneakers/gym memberships, ASA, Calcium +D.    PCA Assessment completed at visit: Yes Annual PCA assessment indicated 22 units per day of PCA. This is the same as the previous assessment.     Elderly Waiver Eligibility: Yes - will continue on EW    Care Plan & Recommendations: Member would like to receive supplements and also would like to see can have PCA worker in the evening hours for  companionship and assistance with cares.     See [facility] for detailed assessment information.    Follow-Up Plan: Member informed of future contact, plan to f/u with member with a 6 month telephone assessment.  Contact information shared with member and family, encouraged member to call with any questions or concerns at any time.    [place] care continuum providers: Please refer to Health Care Home on the Epic Problem List to view this patient's [facility] Care Plan Summary.    Ruddy Alexander RN, PHN  [facility]  [phone]

## 2021-06-15 NOTE — PROGRESS NOTES
[facility] Care Coordination    Received after visit chart from care coordinator.  Completed following tasks:     Mailed copy of care plan to client, Updated services in access and Submitted referrals/auths for [facility] 8 hrs/wk with [facility], 7 meals/wk with Luan Gomez, and PERS  $60.00 monthly with [employer]along [employer]. Serivices effective 3/1/21-2/28/22.      Provider Signature - No POC Shared:  Member indicates that they do not want their POC shared with any EW providers.     [employer] pass updated in spread sheet and submitted to [facility] on 2/23/21.    Juan Carlos Tavarez  Care Management Specialist  [facility]  [phone]

## 2021-06-15 NOTE — PROGRESS NOTES
ASSESSMENT: Jef Centeno Jr. is a 70 y.o. male who presents for consultation at the request of HE PCP David Shafer MD, with a past medical history significant for postherpetic neuralgia, iron deficiency anemia, type 2 diabetes, neck pain, cervical stenosis of the spine with myelopathy, lumbar stenosis with neurogenic claudication, nicotine addiction, ALS, pressure injury of the back and buttock stage III, left lower extremity wound, and abnormal EMG, age-related cataract of both eyes, anemia, BPH, cognitive disorder, rectal dysfunction, hypertension, left foot drop, tib-fib fracture of the left lower extremity, abdominal pain, idiopathic sensorimotor axonal neuropathy, impaired cognition, impaired gait, impaired mobility and activities of daily living, malnutrition, neurogenic bowel, neuropathic pain syndrome, hyperlipidemia, persistent asthma, weakness, lower urinary tract infection who presents today for new patient evaluation of low back and shoulder pain:    -Patient has decreased strength in his lower extremities especially in dorsiflexion and he has decreased range of motion in left shoulder.  Difficulty with going from sit to stand or walking.  There is a great deal debility.  His left shoulder pain is likely secondary to adhesive capsulitis as well as severe glenohumeral joint arthritis.  His low back and lower extremity pain is likely secondary to lumbar's spinal stenosis with neurogenic claudication.    Patient is neurologically intact on exam. No myelopathic or red flag symptoms.     ROGER Score: 64    WHO 5: 13     Diagnoses and all orders for this visit:    Primary osteoarthritis of left shoulder  -     OPS US Large Joint Injection Unilateral; Future; Expected date: 02/26/2021    Spinal stenosis of lumbar region with neurogenic claudication  -     MR Lumbar Spine Without Contrast; Future; Expected date: 02/26/2021  -     LORazepam (ATIVAN) 1 MG tablet; Take 1 tablet (1 mg total) by mouth  once in imaging for anxiety. Fill at preferred pharmacy and bring to MRI appointment. Do not take prior to arriving. You will need a  to bring you home after your appointment.  Dispense: 1 tablet; Refill: 0    Lumbar radiculitis  -     MR Lumbar Spine Without Contrast; Future; Expected date: 02/26/2021  -     LORazepam (ATIVAN) 1 MG tablet; Take 1 tablet (1 mg total) by mouth once in imaging for anxiety. Fill at preferred pharmacy and bring to MRI appointment. Do not take prior to arriving. You will need a  to bring you home after your appointment.  Dispense: 1 tablet; Refill: 0    Lumbar disc herniation  -     MR Lumbar Spine Without Contrast; Future; Expected date: 02/26/2021  -     LORazepam (ATIVAN) 1 MG tablet; Take 1 tablet (1 mg total) by mouth once in imaging for anxiety. Fill at preferred pharmacy and bring to MRI appointment. Do not take prior to arriving. You will need a  to bring you home after your appointment.  Dispense: 1 tablet; Refill: 0    Myofascial pain  -     MR Lumbar Spine Without Contrast; Future; Expected date: 02/26/2021  -     LORazepam (ATIVAN) 1 MG tablet; Take 1 tablet (1 mg total) by mouth once in imaging for anxiety. Fill at preferred pharmacy and bring to MRI appointment. Do not take prior to arriving. You will need a  to bring you home after your appointment.  Dispense: 1 tablet; Refill: 0      PLAN:  Reviewed spine anatomy and disease process. Discussed diagnosis and treatment options with the patient today. A shared decision making model was used.  The patient's values and choices were respected. The following represents what was discussed and decided upon by the provider and the patient.      -DIAGNOSTIC TESTS:  Images were personally reviewed and interpreted and explained to patient today using spine model.   --MRI of the lumbar spine dated 2/10/2016 is personally viewed images interpreted discussed with patient.  There is progression of L2-3 degeneration  with the right central disc protrusion which is now mild to moderate with spinal canal stenosis at this level.  There is a central disc extrusion at L3-4.  There is a circumferential disc osteophyte complex at L4-5 with severe spinal canal stenosis with right greater than left lateral recess stenosis.  At L5-S1 there is a disc protrusion with left lateral recess stenosis.  There is severe foraminal stenosis on the left at L4-5 bilaterally L5-S1.  There is lumbar spondylosis.  --X-ray of the right shoulder dated 2/10/2021 is personally viewed images interpreted discussed with patient.  There is severe degenerative arthritic changes of the left glenohumeral joint.  --Order an MRI of the lumbar spine with oral sedation as he is claustrophobic.    -PHYSICAL THERAPY: He is doing home physical therapy for his left shoulder and like him to startworking on his low back as well.  Discussed the importance of core strengthening, ROM, stretching exercises with the patient and how each of these entities is important in decreasing pain.  Explained to the patient that the purpose of physical therapy is to teach the patient a home exercise program.  These exercises need to be performed every day in order to decrease pain and prevent future occurrences of pain.        -MEDICATIONS: No changes to medications.  He is currently taking gabapentin and oxycodone.  -  Discussed multiple medication options today with patient. Discussed risks, side effects, and proper use of medications. Patient verbalized understanding.    -INTERVENTIONS: Ordered left glenohumeral joint injection under the guidance.  Discussed risks and benefits of injections with patient today.    -PATIENT EDUCATION: We discussed pain management in a multimodal fashion including physical therapy, medication management, possible future injections.    -FOLLOW-UP:   Patient will follow up 2 weeks after the injection.    Advised patient to call the [facility] if symptoms  worsen or you have problems controlling bladder and bowel function.   ______________________________________________________________________    SUBJECTIVE:  HPI:  Jef Centeno Jr.  Is a 70 y.o. male who presents today for new patient evaluation of left shoulder and low back pain.  Patient was seen by his primary care provider in 2/10/2021 for chronic left shoulder pain as well as low back pain.  He was referred to the spine center for further evaluation.  Patient notes that he had left shoulder pain for at least 3 years.  He did have an injection at that time with some improvement, however thereafter pain is returned.  He was going to be set up for another injection, however this is of the meaning of Covid and he was unable to get another one.  Pain is worse with lifting his arm as well as doing any sort of heavy lifting or any lifting at all.  Cold weather also worsens pain as does reaching.  IcyHot on his shoulder as well as oxycodone and gabapentin have been helpful.  He notes that he has had a pressure ulcer on the buttock area for 11 years.  He is not on any antibiotics, however has been having trouble healing.  He had low back pain for many years as well which is worse with standing and prolonged sitting walking seems to help, however he is unable to walk very far without having to sit down.  He also has a history of ALS for the last 3 years.  He has been doing in-home physical therapy for quite some time for every Tuesday and Thursday.  He denies any bowel or bladder changes, fevers, chills, unintentional weight loss.  Pain today is 0/10 is worst is 10/10 is best a 0/10.    -Treatment to Date: MRI of the lumbar spine dated 2/10/2016.  X-ray of the left shoulder dated 2/10/2021.  Left shoulder injection.    -Medications:    Current Outpatient Medications on File Prior to Encounter   Medication Sig Dispense Refill     albuterol (PROAIR HFA;PROVENTIL HFA;VENTOLIN HFA) 90 mcg/actuation inhaler Inhale 2 puffs  every 6 (six) hours as needed for wheezing. 1 each 2     aspirin 81 MG EC tablet Take 1 tablet (81 mg total) by mouth daily. 100 tablet 3     atorvastatin (LIPITOR) 10 MG tablet Take 1 tablet (10 mg total) by mouth daily. 90 tablet 3     b complex vitamins tablet Take 1 tablet by mouth daily. Per PCP patient does not need to take vitamin B complex anymore.       baclofen (LIORESAL) 10 MG tablet TAKE 1/2 TABLET(5 MG) BY MOUTH THREE TIMES DAILY (Patient taking differently: 10 mg 3 (three) times a day. ) 135 tablet 0     budesonide (PULMICORT) 1 mg/2 mL nebulizer solution Take 1 mg by nebulization 2 (two) times a day.       budesonide-formoteroL (SYMBICORT) 160-4.5 mcg/actuation inhaler INHALE 2 PUFFS BY MOUTH TWICE DAILY 2 Inhaler 6     calcium carbonate-vitamin D3 (CALTRATE 600 PLUS D3) 600 mg(1,500mg) -400 unit per tablet Take 1 tablet by mouth daily. 90 tablet 3     cetirizine (ZYRTEC) 10 MG tablet Take 1 tablet (10 mg total) by mouth daily. 90 tablet 3     cholecalciferol, vitamin D3, 1,000 unit (25 mcg) tablet Take 4 tablets (4,000 Units total) by mouth daily.  0     docusate sodium (COLACE) 100 MG capsule Take 100 mg by mouth as needed for constipation. Takes 1 cap in pm       doxycycline (MONODOX) 100 MG capsule Take 100 mg by mouth 2 (two) times a day. Take 1 capsule two times daily  Indications: Urinary Tract Infection       DULoxetine (CYMBALTA) 20 MG capsule Take 3 capsules (60 mg total) by mouth daily. 270 capsule 3     ferrous sulfate 325 (65 FE) MG tablet Take 1 tablet (325 mg total) by mouth daily with breakfast. 90 tablet 3     folic acid (FOLVITE) 1 MG tablet Take 1 tablet (1,000 mcg total) by mouth daily. 90 tablet 3     food supplemt, lactose-reduced (ENSURE ACTIVE PROTEIN-MUSCLE) Liqd DRINK 1 BOTTLE TWICE DAILY 02849 mL 0     furosemide (LASIX) 20 MG tablet Take 2 tablets (40 mg total) by mouth 2 (two) times a day. 180 tablet 3     gabapentin (NEURONTIN) 300 MG capsule TAKE 1 CAPSULE BY MOUTH TWICE  DAILY (Patient taking differently: TAKE 1 CAPSULE BY MOUTH 3x DAILY) 180 capsule 0     hydrOXYzine HCl (ATARAX) 25 MG tablet Take 1 tablet (25 mg total) by mouth every 8 (eight) hours as needed for itching. 100 tablet 1     ipratropium-albuterol (DUO-NEB) 0.5-2.5 mg/3 mL nebulizer Inhale 3 mL every 4 (four) hours as needed. 30 vial 3     levalbuterol (XOPENEX HFA) 45 mcg/actuation inhaler Inhale 2 puffs every 4 (four) hours as needed for wheezing. 5 Inhaler 3     meloxicam (MOBIC) 7.5 MG tablet 1 tab p.o. daily 90 tablet 3     metoprolol tartrate (LOPRESSOR) 25 MG tablet TAKE 1 TABLET(25 MG) BY MOUTH TWICE DAILY 180 tablet 3     multivitamin (DAILY-HIEN) per tablet Take 1 tablet by mouth daily. 90 tablet 3     multivitamin therapeutic w/minerals (THERADEX M) 27-0.4 mg Tab tablet Take 1 tablet by mouth daily.       mupirocin (BACTROBAN) 2 % ointment Apply topically to wound every day 22 g 0     naloxone (NARCAN) 4 mg/actuation nasal spray 1 spray (4 mg dose) into one nostril for opioid reversal. Call 911. May repeat if no response in 3 minutes. 1 Box 0     nystatin (MYCOSTATIN) 100,000 unit/mL suspension Take 5 mL (500,000 Units total) by mouth 4 (four) times a day. Swish and spit. (Patient taking differently: Take 5 mL by mouth 4 (four) times a day. Swish and spit. Taking PRN  Indications: Candidiasis Prophylaxis) 200 mL 0     omeprazole (PRILOSEC) 20 MG capsule Take 1 capsule (20 mg total) by mouth daily before breakfast. 90 capsule 3     oxyCODONE (ROXICODONE) 30 MG immediate release tablet Take 1 tablet (30 mg total) by mouth 2 (two) times a day. 90 tablet 0     potassium chloride (K-DUR,KLOR-CON) 20 MEQ tablet TAKE 1 TABLET(20 MEQ) BY MOUTH TWICE DAILY 180 tablet 1     predniSONE (DELTASONE) 5 MG tablet Take 2.5 mg by mouth daily. 90 tablet 2     riluzole (RILUTEK) 50 mg tablet TAKE 1 TABLET BY MOUTH TWO TIMES A DAY BEFORE MEALS       senna-docusate (PERICOLACE) 8.6-50 mg tablet Take 2 tablets by mouth daily as  needed for constipation. (Patient taking differently: Take 2 tablets by mouth daily as needed for constipation. pt isn't taking this med, only docusate  Indications: constipation) 30 tablet 3     sodium phosphates 133 mL (FOR FLEET) 19-7 gram/118 mL Enem rectal enema INSERT 1 ENEMA INTO THE RECTUM ONCE FOR 1 DOSE 133 mL 0     SPIRIVA WITH HANDIHALER 18 mcg inhalation capsule Place 1 capsule (2 puffs total) into inhaler and inhale daily. 90 capsule 3     tamsulosin (FLOMAX) 0.4 mg cap Take 1 capsule (0.4 mg total) by mouth Daily after breakfast. 90 capsule 3     triamcinolone (KENALOG) 0.1 % cream Apply thin layer two times a day to rash on neck and arm 30 g 0     wound dressings (TRIAD WOUND DRESSING) Pste Apply 71 g topically 2 (two) times a day.  3     Current Facility-Administered Medications on File Prior to Encounter   Medication Dose Route Frequency Provider Last Rate Last Admin     lidocaine 2 % jelly (XYLOCAINE)   Topical PRN Obdulia Prieto, NP   1 application at 08/09/19 1530       No Known Allergies    Past Medical History:   Diagnosis Date     ALS (amyotrophic lateral sclerosis) (H)      BPH (benign prostatic hyperplasia)      Closed fracture of tibia and fibula, left, initial encounter      COPD (chronic obstructive pulmonary disease) (H)      Decubitus ulcer, buttock      Erectile dysfunction associated with type 2 diabetes mellitus (H)      HTN (hypertension)      Obstructive sleep apnea      Postherpetic neuralgia      Type 2 diabetes mellitus with diabetic neuropathy (H)         Patient Active Problem List   Diagnosis     Postherpetic neuralgia     Type 2 diabetes mellitus with hyperglycemia, without long-term current use of insulin (H)     ASHD (arteriosclerotic heart disease)     Neck pain of over 3 months duration     Stenosis of cervical spine with myelopathy (H)     Lumbar stenosis with neurogenic claudication     Nicotine addiction     Iron deficiency anemia     Abnormal chest CT     ALS  (amyotrophic lateral sclerosis) (H)     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, stage 3 (H)     Wound of left lower extremity, initial encounter     Abnormal EMG     Age-related nuclear cataract of both eyes     Anemia     BPH (benign prostatic hyperplasia)     Acute low back pain     Dysarthria     Cognitive disorder     Erectile dysfunction due to arterial insufficiency     Essential hypertension     Fasciculations of muscle     Foot drop, left     Fracture tibia/fibula, left, closed, initial encounter     Abdominal pain     Idiopathic sensorimotor axonal neuropathy     Impaired cognition     Impaired gait     Impaired mobility and activities of daily living     Malnutrition (H)     Neurogenic bowel     Neuropathic pain syndrome (non-herpetic)     Other hyperlipidemia     Persistent asthma with acute exacerbation     Weakness     Lower urinary tract infection     Erectile dysfunction       Past Surgical History:   Procedure Laterality Date     CHOLECYSTECTOMY       LAPAROSCOPIC GASTROTOMY W/ REPAIR OF ULCER       POSTERIOR FUSION CERVICAL SPINE      posterior fusion C2-C4 with laminnectomy; excision cervical lipoma      SIGMOIDOSCOPY  06/29/2008       Family History   Problem Relation Age of Onset     Diabetes Mother      Diabetes Father      Cancer Father      Diabetes Sister      Diabetes Brother        Reviewed past medical, surgical, and family history with patient found on new patient intake packet located in EMR Media tab.     SOCIAL HX: He smokes daily.  He used to be a heavy drinker, however now longer drinks alcohol.  He denies use of recreational drugs.    ROS: Specifically negative for bowel/bladder dysfunction, balance changes, headache, dizziness, foot drop, fevers, chills, appetite changes, nausea/vomiting, unexplained weight loss. Otherwise 13 systems reviewed are negative. Please see the patient's intake questionnaire from today for details.    OBJECTIVE:  /54    Pulse 71   Temp 97.8  F (36.6  C) (Oral)   Resp 16   SpO2 95%     PHYSICAL EXAMINATION:    --CONSTITUTIONAL:  Vital signs as above.  No acute distress.  The patient is well nourished and well groomed.  --PSYCHIATRIC:  Appropriate mood and affect. The patient is awake, alert, oriented to person, place, time and answering questions appropriately with clear speech.    --SKIN:  Skin over the face, bilateral lower extremities, and posterior torso is clean, dry, intact without rashes.    --RESPIRATORY: Normal rhythm and effort. No abnormal accessory muscle breathing patterns noted.   --STANDING EXAMINATION: Moderate to severe increase of kyphosis in the thoracic spine.  --MUSCULOSKELETAL:  Range of motion is moderately limited in lumbar flexion, extension, lateral rotation.  Tenderness palpation along the low lumbar paraspinal muscles bilaterally.. Straight leg raising in the seated position is negative to radicular pain.  --GROSS MOTOR: Difficulty to assess gait as you does not have his walker with him and has difficulty walking without it.  He is able to take steps while holding hands..  He rises with the seated position with great difficulty.   --LOWER EXTREMITY MOTOR TESTING:  Plantar flexion left 5/5, right 5/5   Dorsiflexion left 3/5, right 3/5   Great toe MTP extension left 5/5, right 5/5  Knee flexion left 5/5, right 5/5  Knee extension left 5/5, right 5/5   Hip flexion left 3/5, right 3/5  Hip abduction left 5/5, right 5/5  Hip adduction left 5/5, right 5/5   --NEUROLOGICAL:  2/4 brachioradialis, triceps, biceps, patellar and achilles reflexes bilaterally.  Sensation to light touch is intact in the bilateral L4, L5, and S1 dermatomes.   CERVICAL:   --HEENT:  Sclera are non-injected.  --CERVICAL SPINE: Inspection reveals anterior head carriage. Range of motion is moderately limited in cervical flexion, extension, or lateral rotation. No tenderness to palpation.     --SHOULDERS: Severely decreased range of  motion bilaterally.  Alvares is positive on the left.  Tenderness palpation of the left shoulder.    RESULTS: Prior medical records from [facility] primary care provider were reviewed today.    Imaging: Lumbar spine Imaging was personally reviewed and interpreted today. The images were shown to the patient and the findings were explained using a spine model.      Xr Shoulder Left 2 Or More Vws    Result Date: 2/10/2021  EXAM DATE:         02/10/2021 EXAM: X-RAY SHOULDER LEFT, MINIMUM 2 VIEWS LOCATION: [facility] DATE/TIME: 2/10/2021 12:45 PM INDICATION: Other specified arthritis, left shoulder. COMPARISON: None. IMPRESSION: Severe degenerative change at the left glenohumeral joint. No evidence for acute fracture. Degenerative change at the acromioclavicular joint.

## 2021-06-15 NOTE — TELEPHONE ENCOUNTER
Left message to call back for: Jef  Information to relay to patient:  LMTCB    Please advise patient of result as below       ----- Message from David Shafer MD sent at 2/11/2021  8:24 AM CST -----  Please let patient know that the x-ray did show severe degenerative disease in the left shoulder.  This will be addressed when he meets with the spine team on 2/19/2021.  Thank you

## 2021-06-15 NOTE — TELEPHONE ENCOUNTER
Refill Request  Did you contact pharmacy: Yes - was told to call clinic  Medication name: OXYCODONE 30 mg  Who prescribed the medication: Dr. Crabtree  [facility]  Is patient out of medication: Yes  Patient notified refills processed in 3 business days:  yes  Okay to leave a detailed message: yes

## 2021-06-15 NOTE — PROGRESS NOTES
[facility] Care Coordination    Phone call to Carito, discuss wheelwalker options, standard walker is similar to the one member has. She reports that walker is cover under MA but bag is EW.  She reports that Kelly was working on order previously and will send quote. She reports that return policy is within ten days.    Phone call to member, he reports that doesn't want a wide walker and requests in red color.  Informed him that needs to return within 10 days once receive it if doesn't like it.  He reports that understands.     Ruddy Alexander RN, PHN  [facility]  [phone]

## 2021-06-15 NOTE — PROGRESS NOTES
[facility] Care Coordination    [facility]:  Emailed completed PCA assessment to [facility].  Faxed copy of PCA assessment to [facility] Agency and mailed copy to member.  Faxed MD Communication to PCP.     PCA Sig page, POC sig page and REA form mailed to member with a stamped return envelope.    Juan Carlos Tavarez  Care Management Specialist  [facility]  [phone]

## 2021-06-15 NOTE — TELEPHONE ENCOUNTER
"Patient returned call. Advised him per chart note \"that the x-ray did show severe degenerative disease in the left shoulder.  This will be addressed when he meets with the spine team on 2/19/2021.  Thank you\"   Nothing additional needed at this time, although patient also inquired about COVID vaccine appointment. Advised him currently scheduling for age 75 and older.   "

## 2021-06-15 NOTE — TELEPHONE ENCOUNTER
Patient has been on Omeprazole 20mg daily.  This prescription has run out and patient has switched to [facility] pharmacy.    The pharmacy is in need of a new script to add to Bear's profile.    Please send a new script to his current pharmacy.    Thank you.

## 2021-06-15 NOTE — PROGRESS NOTES
Thank you Jyoti!         ----- Message -----   From: Jyoti Kuhn, PTA   Sent: 2/9/2021  11:44 PM CST   To: Esther Spencer RN         Last PTA visit:    Non coverage completed (Y/N): no   Any equipment recommended/ordered: I am planning to reach out to pt's  to see if she can help pt. in ordering a new 4ww to use in his home for exercise and mobility   HEP given and Level of assist needed:  pt. given seated ex. to perform with TB; pt. reports he cont. with seated ex. as tolerated without theraband; pt. feels confident he can direct PCA to assist him with theraband if needed; pt. has been instr. in standing ex. in the past and today was able to teach back some of these; pt. reports he has a handout to complete HEP as tolerated   Any other disciplines still in that you are aware: , LEDY   BRIEF summary of progress and possible barriers: Pt is often limited due to level of pain; pt's pain is often affected by the weather; pt. has been able to demo improved or good mobility on days he is feeling well; on days he has more pain/discomfort he has a tendency to do less; pt. able to perform HEP as tolerated and educated to have PCA assist with use of theraband exercises to progress/maintain strength; pt. able to demo good tech. with ambulation today.  Pt. educated in walking program with PCA; pt's goal is to be able to walk outside again with PCA once the weather becomes nice; discussed getting pt. a new 4ww to tolerate uneven surfaces and also have even push due to wheels deteriorating; pt. agreeable to this.  Pt. appears appropriate for PT d/c at this time but can reassess and extend if appropriate.  Please let me know if you have any questions/concerns.  Thanks!

## 2021-06-15 NOTE — PATIENT INSTRUCTIONS - HE
DISCHARGE INSTRUCTIONS    During office hours (8:00 a.m.- 4:00 p.m.) questions or concerns may be answered  by calling [facility] Navigation Nurses at  [phone].  Messages received after hours will be returned the following business day.      In the case of an emergency, please dial 911 or seek assistance at the nearest Emergency Room/Urgent Care facility.     All Patients:    ? You may experience an increase in your symptoms for the first 2 days (It may take anywhere between 2 days- 2 weeks for the steroid to have maximum effect).    ? You may use ice on the injection site, as frequently as 20 minutes each hour if needed.    ? You may take your pain medicine.    ? You may continue taking your regular medication after your injection.     ? You may shower. No swimming, tub bath or hot tub for 48 hours.  You may remove your bandaid/bandage as soon as you are home.    ? You may resume light activities, as tolerated.    ? Resume your usual diet as tolerated.      POSSIBLE STEROID SIDE EFFECTS (If steroid/cortisone was used for your procedure)    -If you experience these symptoms, it should only last for a short period      Swelling of the legs                Skin redness (flushing)       Mouth (oral) irritation     Blood sugar (glucose) levels              Sweats                      Mood changes    Headache    Sleeplessness    Weakened immune system for up to 14 days, which could increase the risk of amaury the COVID-19 virus and/or experiencing more severe symptoms of the disease, if exposed.    Decreased effectiveness of the flu vaccine if given within 2 weeks of the steroid.         POSSIBLE PROCEDURE SIDE EFFECTS  -Call the Spine Center if you are concerned    Increased Pain             Increased numbness/tingling        Nausea/Vomiting            Bruising/bleeding at site        Redness or swelling                                                Difficulty walking        Weakness             Fever  greater than 100.5

## 2021-06-15 NOTE — TELEPHONE ENCOUNTER
[facility] Care Coordination    Order placed with [facility] (p: [phone]; f: [phone]) for walker bag.  Order placed on 03/05/2021. JESUS updated.  As required, authorization submitted to health plan.    Verna Bocanegra  Care Management Specialist  [facility]   [phone]

## 2021-06-15 NOTE — TELEPHONE ENCOUNTER
Requesting continued home care visits for recertification:    Nurse visits 1 x wk 2/28/21 for 60 days  For medication set up weekly and management/assessment of symptoms.     Thank you.

## 2021-06-15 NOTE — PROGRESS NOTES
Thank you for the message.  Could you please specify what supplements you are referring to/elaborate on your message to me?  Thank you

## 2021-06-15 NOTE — PROGRESS NOTES
[facility] Care Coordination    2/19/21 Emailed from AUBREY Montes assistant with picture of walker that mbr currently has and would work for member.      Phone call to Kelly PAULINO to see if walker would be covered under MA.  She reports that particular walker may not be covered under MA.  She will look into it and let care coordinator know.     Ruddy Alexander RN, PHN  [facility]  [phone]

## 2021-06-15 NOTE — PROGRESS NOTES
Jef Centeno Jr. is a 70 y.o. male who is being evaluated via a billable video visit.      How would you like to obtain your AVS? Mail a copy.  If dropped from the video visit, the video invitation should be resent by: Text to cell phone: [phone]  Will anyone else be joining your video visit? No          Subjective   Jef Centeno Jr. is 70 y.o. and presents today for the following health issues   HPI       Additional provider notes:     Review of Systems        Objective       Vitals:  No vitals were obtained today due to virtual visit.    Physical Exam              Video-Visit Details    Type of service:  Video Visit      Originating Location (pt. Location): Home    Distant Location (provider location):  [facility]     Platform used for Video Visit: Vascular Therapies  Patient reports:    Change in status of the wound:  yes improved    Change of drainage- color, amount, odor:  no drainage    Change of swelling:  no    Change in Pain status:  no    New wounds developed:  no    Dressing Supplies Sufficient:  yes    Reviewed with patient that I will contact them with scheduling a follow up appointment, supply needs and any further teaching that is identified by the provider during the call. I will also send out an AVS with all education, a wound measuring tape and their next scheduled visit. I will include instructions to assist with installing the application on their mobile device if appropriate for future video visits.

## 2021-06-15 NOTE — TELEPHONE ENCOUNTER
Home care is needing clarification regarding a couple medications. Patient is not able to give information.  Patient had a phone visit with Dr. Hernández on 2/23. Please review this note.  Bear told Dr. Hernández that he is checking BG which he is not and also told him that he was taking Metformin which was d/c'd by Dr. Shafer a couple weeks ago.    Who is managing the diabetes and which med orders should home care follow?    Bear is stating that his Prednisone has been d/c'd but it is still on the med list and no note is found saying this is d/c'd.    Please clarify is he is to still be on Prednisone.    FYI: Home care sets meds on a weekly basis and patient is compliant with taking from med box. Recently Home care has noticed some bottles are running out before their refill date. Patient denies taking from bottles. Meds are in a covered bin in patient's home but others could have access if they tried. To date, prednisone, Meloxicam and Flomax have run out early.Home care is trying to determine if someone is taking but have not been able to pinpoint yet.     Please clarify metformin and Prednisone as above .    Thank you.

## 2021-06-15 NOTE — TELEPHONE ENCOUNTER
I am a care coordinator for [facility].  We contract with [facility] to provide care coordination for member. PT has recommended a new walker as currently one is worn out.    I have submitted this order request for your approval and signature.  This item is covered by member's insurance.     If you approve this order, please complete, sign , and route back to me.  I will complete the order process through [facility].    Thank you.

## 2021-06-15 NOTE — PROGRESS NOTES
[facility] Care Coordination    Phone call from AUBREY Montes assistant inquiring if can obtain new walker as current one is worn out and pretty wide which doesn't work well for member.  Standard wheelchair may work but will check with member on visit this week to ensure he doesn't have other specifications before ordering.  PT will discharging member this week.    Ruddy Alexander RN, PHN  [facility]  [phone]

## 2021-06-15 NOTE — TELEPHONE ENCOUNTER
Controlled Substance Refill Request  Medication Name:   Requested Prescriptions     Pending Prescriptions Disp Refills     oxyCODONE (ROXICODONE) 30 MG immediate release tablet 90 tablet 0     Sig: Take 1 tablet (30 mg total) by mouth 2 (two) times a day.     Date Last Fill: 03/03/2021  Requested Pharmacy: [facility]. [place], [place]  Submit electronically to pharmacy  Controlled Substance Agreement on file:   Encounter-Level CSA Scan Date:    There are no encounter-level csa scan date.        Last office visit:  02/10/2021 with PCP.

## 2021-06-15 NOTE — PATIENT INSTRUCTIONS - HE
I have ordered an injection for your left shoulder.     I ordered an MRI of your lumbar spine.  Once I reviewed images upon my nurses give you a call so we can bring you back in to look at the images.    Please continue with physical therapy for your left shoulder.  Recommend physical therapy for low back and lower extremities.    ~Please call Nurse Navigation line [phone] with any questions or concerns about your treatment plan, if symptoms worsen and you would like to be seen urgently, or if you have problems controlling bladder and bowel function.

## 2021-06-15 NOTE — TELEPHONE ENCOUNTER
Last Office Visit  2/10/2021 David Shafer MD    Notes:  Colon cancer screening  - Ambulatory referral for Colonoscopy     Lumbar stenosis with neurogenic claudication  Referred to spine clinic. Will remain on scheduled oxycodone 30 mg twice daily.  Reviewed , with 90 tablets filled on 2/4/2021  - Ambulatory referral to Spine Care     Arthritis of left shoulder region  Patient has severe degenerative changes in his left glenohumeral joint and degenerative change in the AC joint.  We will inquire from patient whether he would like to pursue injection in clinic versus Ortho clinic  - XR Shoulder Left 2 or More VWS     Essential hypertension  BP well controlled.  Only on Lopressor 25 mg twice daily.  - Basic Metabolic Panel     Tobacco abuse  In the precontemplation stage.  Review of external notes as documented in note      28 minutes spent on the date of the encounter doing chart review, history and exam, documentation and further activities as noted above     Tobacco Cessation:   reports that he has been smoking cigarettes. He has a 460.00 pack-year smoking history. He has never used smokeless tobacco.       Last Filled:  oxyCODONE (ROXICODONE) 30 MG immediate release tablet 90 tablet 0 2/4/2021  No   Sig - Route: Take 1 tablet (30 mg total) by mouth 2 (two) times a day. - Oral   Sent to pharmacy as: oxyCODONE 30 mg tablet (ROXICODONE)   Earliest Fill Date: 2/4/2021   Notes to Pharmacy: Chronic pain   E-Prescribing Status: Receipt confirmed by pharmacy (2/1/2021  7:45 AM CST)       Lab Results   Component Value Date    CREAUR 170.1 12/24/2019         Next OV:  Visit date not found      Encounter-Level CSA Scan Date:    5/8/2019          reviewed and results are as follows:    Not a delegate for PCP     Medication teed up for provider signature - please adjust as appropriate.

## 2021-06-15 NOTE — TELEPHONE ENCOUNTER
Aida RN with [facility] home care called the clinic.    FYI:  Patient was exposed to COVID-19 by an in home provider on 1/26/21.  Patient denies any other symptoms or concerns at this time.    Call [facility] home care at [phone] with additional questions or concerns.

## 2021-06-15 NOTE — TELEPHONE ENCOUNTER
Last Office Visit 2/10/2021- Dr. Shafer  Notes:  Colon cancer screening  - Ambulatory referral for Colonoscopy     Lumbar stenosis with neurogenic claudication  Referred to spine clinic. Will remain on scheduled oxycodone 30 mg twice daily.  Reviewed , with 90 tablets filled on 2/4/2021  - Ambulatory referral to Spine Care     Arthritis of left shoulder region  Patient has severe degenerative changes in his left glenohumeral joint and degenerative change in the AC joint.  We will inquire from patient whether he would like to pursue injection in clinic versus Ortho clinic  - XR Shoulder Left 2 or More VWS     Essential hypertension  BP well controlled.  Only on Lopressor 25 mg twice daily.  - Basic Metabolic Panel     Tobacco abuse  In the precontemplation stage.  Review of external notes as documented in note     Last Filled:  ferrous sulfate 325 (65 FE) MG tablet 90 tablet 3 12/24/2019  No   Sig - Route: Take 1 tablet (325 mg total) by mouth daily with breakfast. - Oral   Sent to pharmacy as: ferrous sulfate 325 mg (65 mg iron) tablet   E-Prescribing Status: Receipt confirmed by pharmacy (12/24/2019 12:12 PM CST)     riluzole (RILUTEK) 50 mg tablet   3/11/2019  --   Sig: TAKE 1 TABLET BY MOUTH TWO TIMES A DAY BEFORE MEALS   Class: Historical Med         Next OV:  Visit date not found        Medication teed up for provider signature

## 2021-06-15 NOTE — PROGRESS NOTES
The POC signature page and REA were received and saved in member folder.     Juan Carlos Tavarez  Care Management Specialist  [facility]  [phone]

## 2021-06-16 PROBLEM — N40.0 BPH (BENIGN PROSTATIC HYPERPLASIA): Status: ACTIVE | Noted: 2019-06-06

## 2021-06-16 PROBLEM — M21.372 FOOT DROP, LEFT: Status: ACTIVE | Noted: 2018-01-17

## 2021-06-16 PROBLEM — E46 MALNUTRITION (H): Status: ACTIVE | Noted: 2018-06-27

## 2021-06-16 PROBLEM — R94.131 ABNORMAL EMG: Status: ACTIVE | Noted: 2017-11-15

## 2021-06-16 PROBLEM — G60.8 IDIOPATHIC SENSORIMOTOR AXONAL NEUROPATHY: Status: ACTIVE | Noted: 2017-11-15

## 2021-06-16 PROBLEM — R26.9 IMPAIRED GAIT: Status: ACTIVE | Noted: 2018-01-17

## 2021-06-16 PROBLEM — N52.01 ERECTILE DYSFUNCTION DUE TO ARTERIAL INSUFFICIENCY: Status: ACTIVE | Noted: 2017-08-17

## 2021-06-16 PROBLEM — Z74.09 IMPAIRED MOBILITY AND ACTIVITIES OF DAILY LIVING: Status: ACTIVE | Noted: 2017-12-20

## 2021-06-16 PROBLEM — N52.9 ERECTILE DYSFUNCTION: Status: ACTIVE | Noted: 2017-07-17

## 2021-06-16 PROBLEM — R41.89 IMPAIRED COGNITION: Status: ACTIVE | Noted: 2018-01-17

## 2021-06-16 PROBLEM — K59.2 NEUROGENIC BOWEL: Status: ACTIVE | Noted: 2017-12-20

## 2021-06-16 PROBLEM — E78.49 OTHER HYPERLIPIDEMIA: Status: ACTIVE | Noted: 2020-05-13

## 2021-06-16 PROBLEM — R53.1 WEAKNESS: Status: ACTIVE | Noted: 2017-12-20

## 2021-06-16 PROBLEM — F09 COGNITIVE DISORDER: Status: ACTIVE | Noted: 2018-12-19

## 2021-06-16 PROBLEM — R25.3 FASCICULATIONS OF MUSCLE: Status: ACTIVE | Noted: 2017-11-15

## 2021-06-16 PROBLEM — M79.2 NEUROPATHIC PAIN SYNDROME (NON-HERPETIC): Status: ACTIVE | Noted: 2018-04-18

## 2021-06-16 PROBLEM — R47.1 DYSARTHRIA: Status: ACTIVE | Noted: 2017-11-15

## 2021-06-16 PROBLEM — Z78.9 IMPAIRED MOBILITY AND ACTIVITIES OF DAILY LIVING: Status: ACTIVE | Noted: 2017-12-20

## 2021-06-16 PROBLEM — L89.43: Status: ACTIVE | Noted: 2019-02-13

## 2021-06-16 PROBLEM — H25.13 AGE-RELATED NUCLEAR CATARACT OF BOTH EYES: Status: ACTIVE | Noted: 2019-04-12

## 2021-06-16 NOTE — TELEPHONE ENCOUNTER
RN cannot approve Refill Request    RN can NOT refill this medication med is not covered by policy/route to provider. Last office visit: 2/10/2021 David Shafer MD Last Physical: Visit date not found Last MTM visit: Visit date not found Last visit same specialty: 2/10/2021 David Shafer MD.  Next visit within 3 mo: Visit date not found  Next physical within 3 mo: Visit date not found      Ghislaine Kern, Care Connection Triage/Med Refill 4/21/2021    Requested Prescriptions   Pending Prescriptions Disp Refills     predniSONE (DELTASONE) 5 MG tablet [Pharmacy Med Name: PREDNISONE 5MG TABS] 90 tablet 0     Sig: TAKE ONE-HALF TABLET  (2.5MG) BY MOUTH EVERY DAY       There is no refill protocol information for this order

## 2021-06-16 NOTE — TELEPHONE ENCOUNTER
Telephone Encounter by Geovanna Nunes CMA at 4/30/2019 10:15 AM     Author: Geovanna Nunes CMA Service: -- Author Type: Medical Assistant    Filed: 4/30/2019 10:17 AM Encounter Date: 4/29/2019 Status: Signed    : Geovanna Nunes CMA (Medical Assistant)       Marco Patiño for the following:  Trudy Morris, OT  [facility] Support Pool 4 minutes ago (10:08 AM)      OT orders to tx for cognition, ADL/IADL retraining, energy conservation, Home safety, Fxn mob. 6 visits/30 days      Geovanna PRICE CMA/MIA....................10:16 AM

## 2021-06-16 NOTE — TELEPHONE ENCOUNTER
Hello,    Patient new pharmacy is    [facility]  [address]  [address] [place] [address]      Please call in new Rx for metoprolol 25mg and for his upcoming refill needed for oxycodone. Patient does still have some in the home but wants to make sure they are filled on time so he does not run out.      Thank you,    Esther Urena RN

## 2021-06-16 NOTE — TELEPHONE ENCOUNTER
Call pt-  He should plan to establish care with a new provider in the next 4-5 months since Dr. Shafer is no longer at this clinic.

## 2021-06-16 NOTE — TELEPHONE ENCOUNTER
Last Office Visit  4/6/2021 David Shafer MD  Notes:  Colon cancer screening  We will send message to our care support team to reach out to patient regarding setting up an appointment with GI/[facility] for screening colonoscopy.  I counseled the patient that the necessary medications, including the prep, will be managed by the GI team.  I recommended that if he thinks he will be anxious from the procedure, then he should let them know when scheduling the appointment. I would not be prescribing any lorazepam for him today.     Review of external notes as documented in note  8 minutes spent on the date of the encounter doing chart review, history and exam, documentation and further activities per the note     Tobacco Cessation:   reports that he has been smoking cigarettes. He has a 460.00 pack-year smoking history. He has never used smokeless tobacco.  Return if symptoms worsen or fail to improve.       Last Filled:  multivitamin (DAILY-HIEN) per tablet 90 tablet 3 10/26/2020  No   Sig - Route: Take 1 tablet by mouth daily. - Oral   Sent to pharmacy as: multivitamin tablet (Daily-Hien)   Notes to Pharmacy: OV due in Dec.   E-Prescribing Status: Receipt confirmed by pharmacy (10/26/2020  8:29 AM CDT)       Next OV:  Visit date not found        Medication teed up for provider signature

## 2021-06-16 NOTE — TELEPHONE ENCOUNTER
Telephone Encounter by Belinda Espinosa at 1/2/2020 11:37 AM     Author: Belinda Espinosa Service: -- Author Type: --    Filed: 1/2/2020 11:37 AM Encounter Date: 12/28/2019 Status: Signed    : Belinda Espinosa APPROVED:    Approval start date: 11/30/19  Approval end date:  12/29/2020    Pharmacy has been notified of approval and will contact patient when medication is ready for pickup.

## 2021-06-16 NOTE — PROGRESS NOTES
The 4 wheel walker with seat (Walker Bag) delivered to member p/APA.    Juan Carlos Tavarez  Care Management Specialist  [facility]  [phone]

## 2021-06-16 NOTE — TELEPHONE ENCOUNTER
Telephone Encounter by Preeti Don LPN at 10/28/2019  9:57 AM     Author: Preeti Don LPN Service: -- Author Type: Licensed Nurse    Filed: 10/28/2019 10:01 AM Encounter Date: 10/28/2019 Status: Signed    : Preeti Don LPN (Licensed Nurse)       Medication Request  Medication name:           Sig: TAKE 1 CAPSULE(75 MG) BY MOUTH THREE TIMES DAILY   Patient taking differently: TAKE 1 CAPSULE(75 MG) BY MOUTH TWO TIMES DAILY        Sent to pharmacy as: LYRICA 75 mg capsule   Reason for Discontinue: Therapy completed   E-Prescribing Status: Receipt confirmed by pharmacy (1/14/2019 10:06 AM CST)   Renewals     Renewal provider: Vineet Javed MD      LYRICA 75 mg capsule [[id number]]     Electronically signed by: Geoff Crabtree MD on 01/14/19 1006 Status: Discontinued   Ordering user: Geoff Crabtree MD 01/14/19 1006 Authorized by: Geoff Crabtree MD   Frequency:  01/14/19 - 08/28/19  Discontinued by: Geoff Crabtree MD 08/28/19 1146 [Therapy completed]   Diagnoses  Lumbar stenosis with neurogenic claudication [M48.062]   Order Transmission Method     E-Prescribed   Associated Diagnoses     Lumbar stenosis with neurogenic claudication [M48.062]       Pharmacy     [id number] - [place], [place] - [address]   Order Reconciliation Actions        Order Reconciliation Actions   Warnings History     Total number of overridden warnings: 2   Full Warnings History   E-Prescribing Status     Outpatient Medication Detail     LYRICA 75 mg capsule (Discontinued)        Event History        Event History   Tracking Links      Cosign Tracking Order Transmittal Tracking   This Order Has Been Discontinued     Order Status Reason By On   Discontinued Therapy completed Geoff Crabtree MD 8/28/19 1146       Pharmacy Name and Location: [facility] # [id number]  Reason for request: Patient requsting .a refill, says that he finished all  that he had.  Did question patient as this medication has been discontinued.  When did you use medication last?:  Couple days ago  Patient offered appointment:  patient declined  Okay to leave a detailed message: yes

## 2021-06-16 NOTE — TELEPHONE ENCOUNTER
Last visit: Virtual- 2021-Dr. Shafer   Notes:  Colon cancer screening  We will send message to our care support team to reach out to patient regarding setting up an appointment with GI/[facility] for screening colonoscopy.  I counseled the patient that the necessary medications, including the prep, will be managed by the GI team.  I recommended that if he thinks he will be anxious from the procedure, then he should let them know when scheduling the appointment. I would not be prescribing any lorazepam for him today.     Review of external notes as documented in note  8 minutes spent on the date of the encounter doing chart review, history and exam, documentation and further activities per the note     Tobacco Cessation:   reports that he has been smoking cigarettes. He has a 460.00 pack-year smoking history. He has never used smokeless tobacco.  Return if symptoms worsen or fail to improve.    Last Filled:  meloxicam (MOBIC) 7.5 MG tablet 90 tablet 3 2020  No   Si tab p.o. daily   Sent to pharmacy as: meloxicam 7.5 mg tablet (MOBIC)   E-Prescribing Status: Receipt confirmed by pharmacy (2020 10:28 AM CST)     predniSONE (DELTASONE) 5 MG tablet 90 tablet 2 2021  No   Sig - Route: Take 2.5 mg by mouth daily. - Oral   Sent to pharmacy as: predniSONE 5 mg tablet (DELTASONE)   E-Prescribing Status: Receipt confirmed by pharmacy (2021  7:45 AM CST)     gabapentin (NEURONTIN) 300 MG capsule 180 capsule 0 2020  No   Sig: TAKE 1 CAPSULE BY MOUTH TWICE DAILY   Patient taking differently: TAKE 1 CAPSULE BY MOUTH 3x DAILY        Sent to pharmacy as: gabapentin 300 mg capsule (NEURONTIN)   E-Prescribing Status: Receipt confirmed by pharmacy (2020  9:38 AM CDT)     LORazepam (ATIVAN) 1 MG tablet 1 tablet 0 2021  --   Sig - Route: Take 1 tablet (1 mg total) by mouth once in imaging for anxiety. Fill at preferred pharmacy and bring to MRI appointment. Do not take prior to arriving.  You will need a  to bring you home after your appointment. - Oral   Sent to pharmacy as: LORazepam 1 mg tablet (ATIVAN)   E-Prescribing Status: Receipt confirmed by pharmacy (2/19/2021  3:00 PM CST)       Next OV:  Visit date not found        Medication teed up for provider signature

## 2021-06-16 NOTE — TELEPHONE ENCOUNTER
Reason for Call:  Medication or medication refill:    Do you use a [facility]?  Name of the pharmacy and phone number for the current request: [facility] on file    Name of the medication requested:     Meloxicam 7.5 mg  Prednisone 5 mg  Gabapentin 300 mg  Lorazepam 1 mg    Other request: Patient requesting Lorazepam for upcoming procedure.  Not for ongoing.    Can we leave a detailed message on this number? Yes    Phone number patient can be reached at: Home number on file [phone] (home)    Best Time: Any time    Call taken on 4/21/2021 at 10:16 AM by Houston Munoz

## 2021-06-16 NOTE — TELEPHONE ENCOUNTER
Telephone Encounter by Geovanna Nunes CMA at 5/29/2019 10:53 AM     Author: Geovanna Nunes CMA Service: -- Author Type: Medical Assistant    Filed: 5/29/2019 10:54 AM Encounter Date: 5/24/2019 Status: Signed    : Geovanna Nunes CMA (Medical Assistant)       Per the following message from Dr. Gallardo:  Ronny Gallardo MD  You; [facility] Support Pool 14 hours ago (8:05 PM)      Ok     Ronny Gallardo MD   Internal medicine   [facility]   [phone]   [email or web address]    Routing comment      Geovanna PRICE CMA/MIA....................10:53 AM

## 2021-06-16 NOTE — PROGRESS NOTES
2nd attempted: POC Sig page mailed to member with a stamped return envelope.    Juan Carlos Tavarez  Care Management Specialist  [facility]  [phone]

## 2021-06-16 NOTE — PROGRESS NOTES
[facility] Care Coordination    Completed following tasks:    Updated services in access and Submitted referrals/auths for one time Wheeled Rollator $145.00. EW cover.      Juan Carlos Tavarez  Care Management Specialist  [facility]  [phone]

## 2021-06-16 NOTE — TELEPHONE ENCOUNTER
Pt is sleeping now per PCA. I will call back later.     __________________________________    Chart review:   --Last OV 2/19/21 with Dr. Wick  --Injection 3/1/21  --MRI lumbar ordered by pt NO SHOW to appt on 3/8/21  --No future appt with Spine Center nor did pt reschedule MRI unless going outside of [facility].

## 2021-06-16 NOTE — PROGRESS NOTES
[facility] Care Coordination    Email from [facility] reports that insurance will not cover as has received one in the past five year.  Cost is $145 with EW.  Tasked CMS to submit auth to cover under EW.    Ruddy Alexander RN, N  [facility]  [phone]

## 2021-06-16 NOTE — PROGRESS NOTES
[facility] Care Coordination     CHW, contacted member about annual physical and diabetic eye exam. Member noted he had last exam last year at clinic on [address] but could not remember if it was [facility] [place] member asked if CHW could check. Member will schedule annual exam. CHW will follow up on eye clinic for member.    TIFFANY Pearl  [facility]  [phone]

## 2021-06-16 NOTE — PROGRESS NOTES
Jef Centeno Jr. is a 70 y.o. male who is being evaluated via a billable telephone visit.      What phone number would you like to be contacted at? [phone]  How would you like to obtain your AVS? AVS Preference: Mail a copy.    Assessment & Plan     Colon cancer screening  We will send message to our care support team to reach out to patient regarding setting up an appointment with [facility] for screening colonoscopy.  I counseled the patient that the necessary medications, including the prep, will be managed by the GI team.  I recommended that if he thinks he will be anxious from the procedure, then he should let them know when scheduling the appointment. I would not be prescribing any lorazepam for him today.    Review of external notes as documented in note  8 minutes spent on the date of the encounter doing chart review, history and exam, documentation and further activities per the note     Tobacco Cessation:   reports that he has been smoking cigarettes. He has a 460.00 pack-year smoking history. He has never used smokeless tobacco.  Return if symptoms worsen or fail to improve.    David Shafer MD  [facility]   Jef Centeno Jr. is 70 y.o. and presents today for the following health issues   HPI Mr Centeno reports doing well.  He was referred to have a screening colonoscopy back in February 2021 but for some reason this was not scheduled.  Today he wants to undergo screening colonoscopy and is requesting the prep and the dose of anxiolytic pretreatment for.  I ordered for him to undergo an MRI of his lumbar back which was originally scheduled for 2/26/2021.  He filled a 1 mg atorvastatin at that time but reports never using it because he did not undergo the MRI.  He denies any specific GI symptoms today.    Review of Systems  ROS A very limited review of systems was performed and was otherwise negative      Objective       Vitals:  No vitals were  obtained today due to virtual visit.    Physical Exam  Not applicable    Phone call duration: 8 minutes

## 2021-06-16 NOTE — TELEPHONE ENCOUNTER
Refill Approved    Rx renewed per Medication Renewal Policy. Medication was last renewed on 12/24/2019.    [facility] Triage/Med Refill 3/31/2021   Last office visit 2/10/2021  Controlled Substance Refill Request  Medication Name:   Requested Prescriptions     Pending Prescriptions Disp Refills     oxyCODONE (ROXICODONE) 30 MG immediate release tablet 90 tablet 0     Sig: Take 1 tablet (30 mg total) by mouth 3 (three) times a day.     Signed Prescriptions Disp Refills     metoprolol tartrate (LOPRESSOR) 25 MG tablet 180 tablet 3     Sig: TAKE 1 TABLET(25 MG) BY MOUTH TWICE DAILY     Authorizing Provider: NANCY JOHNSON     Ordering User: JEANETTE DANIELS     Date Last Fill: 3/4/2021  Requested Pharmacy: [facility]  Submit electronically to pharmacy  Controlled Substance Agreement on file:   Encounter-Level CSA Scan Date:    There are no encounter-level csa scan date.        Last office visit:  2/10/2021      Requested Prescriptions   Pending Prescriptions Disp Refills     metoprolol tartrate (LOPRESSOR) 25 MG tablet 180 tablet 3     Sig: TAKE 1 TABLET(25 MG) BY MOUTH TWICE DAILY       Beta-Blockers Refill Protocol Passed - 3/31/2021 10:54 AM        Passed - PCP or prescribing provider visit in past 12 months or next 3 months     Last office visit with prescriber/PCP: Visit date not found OR same dept: Visit date not found OR same specialty: Visit date not found  Last physical: Visit date not found Last MTM visit: Visit date not found   Next visit within 3 mo: Visit date not found  Next physical within 3 mo: Visit date not found  Prescriber OR PCP: Jeanette Daniels RN  Last diagnosis associated with med order: 1. ASHD (arteriosclerotic heart disease)  - metoprolol tartrate (LOPRESSOR) 25 MG tablet; TAKE 1 TABLET(25 MG) BY MOUTH TWICE DAILY  Dispense: 180 tablet; Refill: 3    2. Chronic neck pain  - oxyCODONE (ROXICODONE) 30 MG immediate release tablet; Take 1 tablet (30  mg total) by mouth 3 (three) times a day.  Dispense: 90 tablet; Refill: 0    If protocol passes may refill for 12 months if within 3 months of last provider visit (or a total of 15 months).             Passed - Blood pressure filed in past 12 months     BP Readings from Last 1 Encounters:   03/01/21 92/62                oxyCODONE (ROXICODONE) 30 MG immediate release tablet 90 tablet 0     Sig: Take 1 tablet (30 mg total) by mouth 3 (three) times a day.       Controlled Substances Refill Protocol Failed - 3/31/2021 10:54 AM        Failed - Route all Controlled Substance Requests to Provider        Passed - Patient has controlled substance agreement in past 12 months     Encounter-Level CSA Scan Date:    There are no encounter-level csa scan date.               Passed - Visit with PCP or prescribing provider visit in past 12 months      Last office visit with prescriber/PCP: Visit date not found OR same dept: Visit date not found OR same specialty: Visit date not found Last physical: Visit date not found Last MTM visit: Visit date not found    Next visit within 3 mo: Visit date not found  Next physical within 3 mo: Visit date not found  Prescriber OR PCP: Nan Bradley RN  Last diagnosis associated with med order: 1. ASHD (arteriosclerotic heart disease)  - metoprolol tartrate (LOPRESSOR) 25 MG tablet; TAKE 1 TABLET(25 MG) BY MOUTH TWICE DAILY  Dispense: 180 tablet; Refill: 3    2. Chronic neck pain  - oxyCODONE (ROXICODONE) 30 MG immediate release tablet; Take 1 tablet (30 mg total) by mouth 3 (three) times a day.  Dispense: 90 tablet; Refill: 0

## 2021-06-16 NOTE — TELEPHONE ENCOUNTER
Telephone Encounter by Belinda Espinosa at 2/28/2019  2:30 PM     Author: Belinda Espinosa Service: -- Author Type: --    Filed: 2/28/2019  2:36 PM Encounter Date: 2/25/2019 Status: Signed    : Belinda Espinosa APPROVED:    Approval start date: 1/29/2019  Approval end date: 2/28/2020    Pharmacy has been notified of approval and will contact patient when medication is ready for pickup.

## 2021-06-16 NOTE — TELEPHONE ENCOUNTER
Telephone Encounter by Geovanna Nunes CMA at 6/7/2019  3:06 PM     Author: Geovanna Nunes CMA Service: -- Author Type: Medical Assistant    Filed: 6/7/2019  3:07 PM Encounter Date: 6/3/2019 Status: Signed    : Geovanna Nunes CMA (Medical Assistant)       Per Dr. Gallardo:  Ronny Gallardo MD  You 5 hours ago (9:40 AM)      Ok     Ronny Gallardo MD   Internal medicine   [facility]   [phone]   [email or web address]    Routing comment      Geovanna PRICE CMA/MIA....................3:07 PM

## 2021-06-17 ENCOUNTER — RECORDS - HEALTHEAST (OUTPATIENT)
Dept: ADMINISTRATIVE | Facility: OTHER | Age: 71
End: 2021-06-17

## 2021-06-17 NOTE — TELEPHONE ENCOUNTER
Telephone Encounter by Otilia Almeida at 9/9/2020  1:34 PM     Author: Otilia Almeida Service: -- Author Type: Patient Access    Filed: 9/9/2020  1:35 PM Encounter Date: 9/9/2020 Status: Signed    : Otilia Almeida (Patient Access)

## 2021-06-17 NOTE — TELEPHONE ENCOUNTER
Okay for orders.     Let patient/nurse know that I sent in a 30-day supply of prednisone and meloxicam to his pharmacy to buy him time to find the misplaced medications.

## 2021-06-17 NOTE — TELEPHONE ENCOUNTER
Telephone Encounter by Otilia Frey RN at 11/23/2020 12:54 PM     Author: Otilia Frey RN Service: -- Author Type: Registered Nurse    Filed: 11/23/2020 12:56 PM Encounter Date: 11/23/2020 Status: Signed    : Otilia Frey RN (Registered Nurse)       Maya from [facility] calling stating Jef has developed a right shin scabbed area with some whiteness to the top of it. Maya thinks it is from Jef rubbing the bed to stand for his buttocks dressing changes.     Maya is wondering if she can have new dressing change orders and if Obdulia wants antibiotics

## 2021-06-17 NOTE — TELEPHONE ENCOUNTER
Controlled Substance Refill Request  Medication Name:   Requested Prescriptions     Pending Prescriptions Disp Refills     oxyCODONE (ROXICODONE) 30 MG immediate release tablet [Pharmacy Med Name: OXYCODONE HCL 30MG TABS] 90 tablet 0     Sig: TAKE 1 TABLET (30 MG TOTAL) BY MOUTH 3 (THREE) TIMES A DAY.     Date Last Fill: 3/31/21  [facility]ed [facility]  Submit electronically to pharmacy  Controlled Substance Agreement on file:   Encounter-Level CSA Scan Date:    There are no encounter-level csa scan date.        Last office visit:  4/6/21  Robyn Marin RN, MA  [facility]    Triage Nurse Advisor

## 2021-06-17 NOTE — TELEPHONE ENCOUNTER
Telephone Encounter by Otilia Almeida at 6/26/2020  1:16 PM     Author: Otilia Almeida Service: -- Author Type: Patient Access    Filed: 6/26/2020  1:17 PM Encounter Date: 6/26/2020 Status: Signed    : Otilia Almeida (Patient Access)

## 2021-06-17 NOTE — TELEPHONE ENCOUNTER
Telephone Encounter by Ilene Ortiz CMA at 10/9/2020  8:26 AM     Author: Ilene Ortiz CMA Service: -- Author Type: Certified Medical Assistant    Filed: 10/9/2020  8:28 AM Encounter Date: 10/9/2020 Status: Signed    : Ilene Ortiz CMA (Certified Medical Assistant)       Buttock wound

## 2021-06-17 NOTE — TELEPHONE ENCOUNTER
Telephone Encounter by Rashmi Centeno LPN at 5/11/2020 10:27 AM     Author: Rashmi Centeno LPN Service: -- Author Type: Licensed Nurse    Filed: 5/11/2020 10:28 AM Encounter Date: 5/11/2020 Status: Signed    : Rashmi Centeno LPN (Licensed Nurse)

## 2021-06-17 NOTE — TELEPHONE ENCOUNTER
Telephone Encounter by Rashmi Centeno LPN at 4/13/2020  9:41 AM     Author: Rashmi Centeno LPN Service: -- Author Type: Licensed Nurse    Filed: 4/13/2020  9:42 AM Encounter Date: 4/13/2020 Status: Signed    : Rashmi Centeno LPN (Licensed Nurse)

## 2021-06-17 NOTE — TELEPHONE ENCOUNTER
Telephone Encounter by Otilia Almeida at 8/24/2020  8:08 AM     Author: Otilia Almeida Service: -- Author Type: Patient Access    Filed: 8/24/2020  8:08 AM Encounter Date: 8/24/2020 Status: Signed    : Otilia Almeida (Patient Access)

## 2021-06-17 NOTE — TELEPHONE ENCOUNTER
Telephone Encounter by Wilda Torres LPN at 2/17/2021 12:26 PM     Author: Wilda Torres LPN Service: -- Author Type: Licensed Nurse    Filed: 2/17/2021 12:28 PM Encounter Date: 2/17/2021 Status: Signed    : Wilda Torres LPN (Licensed Nurse)

## 2021-06-17 NOTE — PROGRESS NOTES
Attempt 2-Care Guide called patient.  If this patient is returning our call please transfer to Tara at ext. [phone].

## 2021-06-17 NOTE — TELEPHONE ENCOUNTER
Hello,    Patient was seen yesterday for Home Care re-certification.  Can we please have orders for SN once weekly for 9 weeks and 3 PRN's for general assessment, medication management and set-up.    The following medications were not in the home at time of visit:  Prednisone and Meloxicam.  Writer called the pharmacy and was informed that the patient had a 90 day supply of prednisone picked up on 3/22 and Meloxicam was just picked up on 4/24.  Patient informed of this but he does not know where the medications are.      Thank you    Jenna Aldana RN

## 2021-06-17 NOTE — TELEPHONE ENCOUNTER
Last visit: 4/6/2021-Dr. Shafer  Notes:  Colon cancer screening  We will send message to our care support team to reach out to patient regarding setting up an appointment with [facility] for screening colonoscopy.  I counseled the patient that the necessary medications, including the prep, will be managed by the GI team.  I recommended that if he thinks he will be anxious from the procedure, then he should let them know when scheduling the appointment. I would not be prescribing any lorazepam for him today.     Review of external notes as documented in note  8 minutes spent on the date of the encounter doing chart review, history and exam, documentation and further activities per the note     Tobacco Cessation:   reports that he has been smoking cigarettes. He has a 460.00 pack-year smoking history. He has never used smokeless tobacco.  Return if symptoms worsen or fail to improve.    Last Filled:  hydrOXYzine HCl (ATARAX) 25 MG tablet 100 tablet 1 12/24/2019  No   Sig - Route: Take 1 tablet (25 mg total) by mouth every 8 (eight) hours as needed for itching. - Oral   Sent to pharmacy as: hydrOXYzine HCl 25 mg tablet (ATARAX)   E-Prescribing Status: Receipt confirmed by pharmacy (12/24/2019 12:12 PM CST)       Next OV: 5/6/2021        Medication teed up for provider signature

## 2021-06-17 NOTE — TELEPHONE ENCOUNTER
Telephone Encounter by Otilia Almeida at 4/8/2020 12:23 PM     Author: Otilia Almeida Service: -- Author Type: Patient Access    Filed: 4/8/2020 12:26 PM Encounter Date: 4/8/2020 Status: Addendum    : Otilia Almeida (Patient Access)    Related Notes: Original Note by Otilia Almeida (Patient Access) filed at 4/8/2020 12:23 PM

## 2021-06-17 NOTE — TELEPHONE ENCOUNTER
Last Office Visit  4/6/2021-Dr. Shafer    Notes:  Colon cancer screening  We will send message to our care support team to reach out to patient regarding setting up an appointment with GI/[facility] for screening colonoscopy.  I counseled the patient that the necessary medications, including the prep, will be managed by the GI team.  I recommended that if he thinks he will be anxious from the procedure, then he should let them know when scheduling the appointment. I would not be prescribing any lorazepam for him today.     Review of external notes as documented in note  8 minutes spent on the date of the encounter doing chart review, history and exam, documentation and further activities per the note     Tobacco Cessation:   reports that he has been smoking cigarettes. He has a 460.00 pack-year smoking history. He has never used smokeless tobacco.  Return if symptoms worsen or fail to improve.       Last Filled:  oxyCODONE (ROXICODONE) 30 MG immediate release tablet 90 tablet 0 4/6/2021  No   Sig - Route: Take 1 tablet (30 mg total) by mouth 3 (three) times a day. - Oral   Sent to pharmacy as: oxyCODONE 30 mg tablet (ROXICODONE)   Earliest Fill Date: 4/6/2021   Notes to Pharmacy: Chronic pain   E-Prescribing Status: Receipt confirmed by pharmacy (3/31/2021 11:25 AM CDT)           Next OV:  Visit date not found      Encounter-Level CSA Scan Date:    There are no encounter-level csa scan date.         Medication teed up for provider signature - please adjust as appropriate.

## 2021-06-17 NOTE — PATIENT INSTRUCTIONS - HE
"Patient Instructions by Obdulia Prieto NP at 8/9/2019  3:40 PM     Author: Obdulia Prieto NP Service: -- Author Type: Nurse Practitioner    Filed: 8/9/2019  3:47 PM Encounter Date: 8/9/2019 Status: Signed    : Obdulia Prieto NP (Nurse Practitioner)         Apply Triad paste to your tailbone area 1-2 times per day  No dressings      To reorder supplies or if you have any questions about your order please call Mike at [phone]      or Concerns Contact:      You will need to reposition frequently; keep direct pressure off of the wound or reddened area    Reposition Every 1-2 hours while in bed; left, right; supine; repeat    Reposition Every 15 minutes while up in a chair; chair push ups    Stand every 30 minutes while in a chair if able    Obtain a seat cushion; can get a 4\" high density foam at [facility]; or [employer]Mat or J-cushion from [employer]; or we can order a ROHO cushion if you qualify for this type of cushion        4\" high density foam cushion    GeoMat Cushion      Randall Cushion        Roho Cushion        [facility]   [address]             [address]                 [address]     [place] [address]      [place] [address]         [place] [address]  [phone]  [phone]     [phone]               Fax: [phone]  [email or web address]                                                     [facility]  [address] [place] [address]  [phone]  [phone]  [email or web address]    Per Christie  [phone]  [email or web address]    [facility]   [phone]    [facility]  [address] [place] [address]    [phone]          Chair Pushups        Bed Positioning                     "

## 2021-06-17 NOTE — PROGRESS NOTES
Attempt 2: Care Guide called patient.  If this patient is returning my call, please transfer to [facility] at ext [phone].  
WDL

## 2021-06-17 NOTE — PROGRESS NOTES
[facility] Care Coordination    Voice message from RAYMOND Resendez [facility] reports that checked with [facility] and has not received auth for homemaking this year.    Tasked CMS to resubmit auth for homemaking services.    Ruddy Alexander RN, PHN  [facility]  [phone]

## 2021-06-17 NOTE — TELEPHONE ENCOUNTER
Telephone Encounter by Ilene Ortiz CMA at 1/20/2021 10:35 AM     Author: Ilene Ortiz CMA Service: -- Author Type: Certified Medical Assistant    Filed: 1/20/2021 10:37 AM Encounter Date: 1/20/2021 Status: Signed    : Ilene Ortiz CMA (Certified Medical Assistant)       Right knee     Left shin

## 2021-06-17 NOTE — TELEPHONE ENCOUNTER
Telephone Encounter by Isabella Silverman LPN at 3/10/2021  2:43 PM     Author: Isabella Silverman LPN Service: -- Author Type: Licensed Nurse    Filed: 3/10/2021  2:45 PM Encounter Date: 3/10/2021 Status: Signed    : Isabella Silverman LPN (Licensed Nurse)       Rogelio Morrell, PharmD  [facility] Care Team Pool 1 hour ago (12:51 PM)     Patient is requesting refills of his Ferrous Sulfate and Riluzole, which are both out of refills. Please send new rx to [facility]. Please call with questions. Thank you!     Rogelio Morrell   [facility]   [phone]

## 2021-06-17 NOTE — TELEPHONE ENCOUNTER
Telephone Encounter by Otilia Almeida at 12/3/2020  8:30 AM     Author: Otilia Almeida Service: -- Author Type: Patient Access    Filed: 12/3/2020  8:31 AM Encounter Date: 12/3/2020 Status: Signed    : Otilia Almeida (Patient Access)

## 2021-06-17 NOTE — TELEPHONE ENCOUNTER
PSP:  Dr. Wick  Last clinic visit:  2/19/21 [place]  Reason for call: Calling through his PCA Thien (patient next to him) wanting to schedule injection appointment for both his shoulder and his back.  Clinical information:  Chart reviewed. Patient has not had the MRI as ordered. X-ray of shoulder completed.   Advice given to patient: Explained he needs to have the MRI first before injections can be discussed/recommended as it has been 5 years since last one. Noted that patient is claustrophobic and Ativan sent in before; patient states another needs to be sent in. Instructed to call back once MRI appointment made to schedule a follow up with PSP to review imaging and treatment options. Stated understanding.   Provider to address: Reauthorize Rx for Ativan for imaging

## 2021-06-17 NOTE — TELEPHONE ENCOUNTER
Telephone Encounter by Rashmi Centeno LPN at 4/13/2020  9:42 AM     Author: Rashmi Centeno LPN Service: -- Author Type: Licensed Nurse    Filed: 4/13/2020  9:42 AM Encounter Date: 4/13/2020 Status: Signed    : Rashmi Centeno LPN (Licensed Nurse)

## 2021-06-17 NOTE — TELEPHONE ENCOUNTER
Telephone Encounter by Otilia Almeida at 11/6/2020  8:22 AM     Author: Otilia Almeida Service: -- Author Type: Patient Access    Filed: 11/6/2020  8:28 AM Encounter Date: 11/6/2020 Status: Signed    : Otilia Almeida (Patient Access)

## 2021-06-17 NOTE — PROGRESS NOTES
[facility] Care Coordination    Voice message from RAYMOND Resendez reports that member's homemaking auth ended 4/31/20 and inquires if will continue auth.      Left voice message from Dipti that has submitted auth for homemaking to [facility] 3/1/21-2/28/22.  Request that he check with [facility] and if doesn't have on file, can contact care coordinator to resubmit.    Ruddy Alexander RN, PHN  [facility]  [phone]

## 2021-06-17 NOTE — PROGRESS NOTES
Assessment & Plan     ALS (amyotrophic lateral sclerosis) (H)    He has a neurologist that he has been seen for this, and they are managing this for him with the medications listed in his med list.    Type 2 diabetes mellitus with diabetic neuropathy, without long-term current use of insulin (H)    He is not fasting today, so we will have him come back in a bit to come back and do some fasting labs so we can see where he is at on his diabetes, but he states he really does not eat much but he also does not really exercise at all and his sugars have not been checked all that often but when they are checked it does not all that bad.    Essential hypertension    His blood pressure is quite good here today if not a bit low, we will have him keep an eye on that and see how he is trending with his blood pressures.    Chronic obstructive pulmonary disease with acute exacerbation (H)    Breathing is actually not too bad today.  His lungs sound pretty clear today.  We can keep an eye on this going forward as well.      - food supplemt, lactose-reduced (ENSURE ACTIVE PROTEIN-MUSCLE) Liqd; DRINK 1 BOTTLE TWICE DAILY    Protein-calorie malnutrition, unspecified severity (H)    I called in The ensures as above for him as he maintains his nutritional status of it that way.    Other iron deficiency anemia    Again when he comes back in for blood work in a month or so with, we can draw this and see where he has had with his anemia but has been pretty stable and he is on iron replacement.    Dysarthria    Is a bit difficult understand because he is just speaking so quietly which is probably part of his ALS, but he is easily understandable when you can get him to speak up a bit and he does not seem to have any trouble with too much slurring of his speech today.    Impaired mobility and activities of daily living    He has a PCA who sounds like his live-in PCA takes care of him all of the time which she is very happy with him and  that seems to be working out well.      Review of external notes as documented in note  Diagnosis or treatment significantly limited by social determinants of health - mobility and financial issues   Prescription drug management  60 minutes spent on the date of the encounter doing chart review, review of outside records, review of test results, interpretation of tests, patient visit and documentation        Tobacco Cessation:   reports that he has been smoking cigarettes. He has a 460.00 pack-year smoking history. He has never used smokeless tobacco.    No follow-ups on file.    Dallin Sanchez MD  [facility]   Jef Centeno Jr. is 70 y.o. and presents today for the following health issues   HPI     Patient is here today with his PCA for establishing care.  They were seen internal medicine but this seems to be more convenient for them and they were able to get in with us pretty quickly.  He has a very long medical history and has ALS as his pain diagnosis.  He is in a wheelchair because of this and finds it very difficult to get up and around.  He has a full-time PCA that helps take care of him.  He has quite a bit of pain in his shoulders and in his hips which I think is probably due mostly to his ALS and the fact that he is in some pain with that and debilitated and spends a lot of time in a wheelchair.  He does get chronic oxycodone therapy but it sounds like he gets that from another provider.  We talked about that and if they would be willing to continue to write pain medication for him that would be great, otherwise we can consider doing that for him until we can get him into the pain clinic for chronic pain management.    He has apparently history of diabetes with some neuropathy but we do not have any data on labs that he has had recently or sugars that he has taken at home.    He finds it difficult to get adequate amount of calorie intake and so uses ensures on a  regular basis.  He often finds himself just not hungry and continues to progressively lose weight.    He has hypertension and is on medications for that but actually his blood pressures is quite low and so that seems to be either under good control or possibly overmedicated but we can see how that goes going forward.    We reviewed the rest of the medications today as well as his chronic problem list today.  Review of Systems          Objective    /56 (Patient Site: Left Arm, Patient Position: Sitting, Cuff Size: Adult Regular)   Pulse 70   SpO2 97%   There is no height or weight on file to calculate BMI.  Physical Exam    Is extremely debilitated and cachectic sitting in a wheelchair hunched over.  His shoulder shows crepitus with range of motion to believe that left 1.  He is very weak in his musculature overall.  His chest is showing just barely air movement and some wheezing with expiration.  Heart is distant heart sounds.

## 2021-06-17 NOTE — TELEPHONE ENCOUNTER
Telephone Encounter by Halle Cherry at 6/8/2020 10:06 AM     Author: Halle Cherry Service: -- Author Type: --    Filed: 6/8/2020 10:07 AM Encounter Date: 6/8/2020 Status: Signed    : Halle Cherry

## 2021-06-17 NOTE — TELEPHONE ENCOUNTER
Telephone Encounter by Otilia Almeida at 5/4/2020  8:30 AM     Author: Otilia Almeida Service: -- Author Type: Patient Access    Filed: 5/4/2020  8:32 AM Encounter Date: 5/4/2020 Status: Signed    : Otilia Almeida (Patient Access)

## 2021-06-17 NOTE — PATIENT INSTRUCTIONS - HE
"Patient Instructions by Obdulia Prieto NP at 6/25/2019  7:40 AM     Author: Obdulia Prieto NP Service: -- Author Type: Nurse Practitioner    Filed: 6/25/2019  7:56 AM Encounter Date: 6/25/2019 Status: Addendum    : Obdulia Prieto NP (Nurse Practitioner)    Related Notes: Original Note by Obdulia Prieto NP (Nurse Practitioner) filed at 6/25/2019  7:55 AM       Referral has already been sent to:     [facility]    [address]    [address]    [place] [address]    [phone]        Apply Triad paste to your tailbone area 1-2 times per day  No dressings      To reorder supplies or if you have any questions about your order please call [employer] at [phone]      or Concerns Contact:      Delia Stout  Wound Team Care Coordinator   [address] [place] [address]  t [phone] Ext. [phone]  f [phone]  [email or web address]  [email or web address]    You will need to reposition frequently; keep direct pressure off of the wound or reddened area    Reposition Every 1-2 hours while in bed; left, right; supine; repeat    Reposition Every 15 minutes while up in a chair; chair push ups    Stand every 30 minutes while in a chair if able    Obtain a seat cushion; can get a 4\" high density foam at [facility]; or GeoMat or J-cushion from [employer]; or we can order a ROHO cushion if you qualify for this type of cushion        4\" high density foam cushion    GeoMat Cushion      Randall Cushion        Roho Cushion        [facility]   [address]             [address]                 [address]     [place] [address]      [place] [address]         [place], [place] [id number] [phone]  [phone]     [phone]               Fax: [phone]  [email or web address]                                                     [facility]  [address] [facility] [place] [address]  [phone]  [phone]  [email or web address]    Per Christie  [phone]  [email or web address]    [facility]   [phone]    [address]  [place], [place] [id number] [phone]          Chair Pushups        Bed Positioning

## 2021-06-17 NOTE — TELEPHONE ENCOUNTER
RN cannot approve Refill Request    RN can NOT refill this medication med is not covered by policy/route to provider. Last office visit: Visit date not found Last Physical: Visit date not found Last MTM visit: Visit date not found Last visit same specialty: Visit date not found.  Next visit within 3 mo: Visit date not found  Next physical within 3 mo: Visit date not found      Robyn Marin, [facility] Triage/Med Refill 5/15/2021    Requested Prescriptions   Pending Prescriptions Disp Refills     predniSONE (DELTASONE) 5 MG tablet [Pharmacy Med Name: PREDNISONE 5MG TABS] 15 tablet 0     Sig: TAKE ONE-HALF TABLET  (2.5MG) BY MOUTH EVERY DAY.       There is no refill protocol information for this order

## 2021-06-17 NOTE — PATIENT INSTRUCTIONS - HE
"Patient Instructions by Obdulia Prieto NP at 5/29/2019  7:40 AM     Author: Obdulia Prieto NP Service: -- Author Type: Nurse Practitioner    Filed: 5/29/2019  7:59 AM Encounter Date: 5/29/2019 Status: Signed    : Obdulia Prieto NP (Nurse Practitioner)       We will order you a new w/c cushion    We will stop the dressings to the buttocks    We will order Triad paste through [employer] this will be delivered to your home    You will need to reposition frequently; keep direct pressure off of the wound or reddened area    Reposition Every 1-2 hours while in bed; left, right; supine; repeat    Reposition Every 15 minutes while up in a chair; chair push ups    Stand every 30 minutes while in a chair if able    Obtain a seat cushion; can get a 4\" high density foam at [facility]; or GeoMat or J-cushion from [employer]; or we can order a ROHO cushion if you qualify for this type of cushion        4\" high density foam cushion    GeoMat Cushion      Randall Cushion        Roho Cushion        [facility]   [address]             [address]                 [address]     [place] [address]      [place], [place] [id number] [place] [address]  [phone]  [phone]     [phone]               Fax: [phone]  [email or web address]                                                     [facility]  [address] [place] [address]  [phone]  [phone]  [email or web address]    Per Christie  [phone]  [email or web address]    [facility]   [phone]    [facility]  [address] [place] [address]    [phone]          Chair Pushups        Bed Positioning          High Protein Foods  Chicken  -Chicken breast, 3.5oz.-30 grams protein  -Chicken thigh-10 grams(average size)  -Drumstick-11 grams  -Wing- 6 " grams  -Chicken meat, cooked, 4 oz.  Beef  -Hamburger dayana, 4 oz-28 grams protein  -Steak, 6 oz-42 grams  -Most cuts of beef- 7 grams of protein per ounce  Fish  -Most fish fillets or steaks are about 22 grams of protein for 3 1/2 oz(100 grams) of cooked fish, or 6 grams per ounce  -Tuna, 6 oz can-40 grams of protein  Pork  -Pork chop, average-22 grams protein  -Pork loin or tenderloin, 4 oz.-29 grams  -Ham, 3oz serving- 19 grams  -Ground pork 3oz cooked-22 grams  -Soriano, 1 slice-3 grams  -[place]-style soriano(black soriano), slice-5-6 grams  Eggs and Dairy  -Egg, large-7 grams  -Milk, 1 cup-8 grams  -Cottage cheese, 1/2 cup-15 grams  -Greek yogurt, 1 cup-usually 8-12 grams, check label  -Soft cheeses (Mozzarella, Brie, Camembert)- 6 grams  -Medium cheeses(cheddar, swiss)- 7 or 8 grams per oz  -Hard cheeses(parmesan)- 10 grams per oz  Beans  -Tofu, 1/2 cup 20 grams  -Tofu, 1 oz., 2.3 grams  -Soy milk, 1 cup-6-10 grams  -Most beans(black, moran, lentils, etc.) about 7-10 grams protein per half cup of cooked beans  -soy beans, 1/2 cup cooked-14 grams  -Split peas, 1/2 cup cooked- 8 grams  Nuts and Seeds  -Peanut butter, 2 Tablespoons- 8 grams protein  -Almonds, 1/4 cup- 8 grams  -Peanuts, 1/4 cup-9 grams  -Cashews, 1/4 cup- 5 grams  -Pecans, 1/4 cup- 2.5 grams  -Sunflower seeds, 1/4 cup- 6 grams  -Pumpkin seeds, 1/4 cup-8 grams  -Flax seeds- 1/4 cup- 8 grams  Protein Supplements  -Ensure  -Boost  -Glucerna, if diabetic  When you have an open ulcer, your bodies protein needs are much higher, so it is recommended eat good sources of protein

## 2021-06-17 NOTE — TELEPHONE ENCOUNTER
Telephone Encounter by Rashmi Centeno LPN at 4/13/2020  9:40 AM     Author: Rashmi Centeno LPN Service: -- Author Type: Licensed Nurse    Filed: 4/13/2020  9:42 AM Encounter Date: 4/13/2020 Status: Signed    : Rashmi Centeno LPN (Licensed Nurse)

## 2021-06-18 NOTE — PATIENT INSTRUCTIONS - HE
"Patient Instructions by Obdulia Prieto NP at 2/17/2021 10:40 AM     Author: Obdulia Prieto NP Service: -- Author Type: Nurse Practitioner    Filed: 2/17/2021 10:56 AM Encounter Date: 2/17/2021 Status: Addendum    : Obdulia Prieto NP (Nurse Practitioner)    Related Notes: Original Note by Obdulia Prieto NP (Nurse Practitioner) filed at 2/17/2021 10:49 AM         Apply ointment to the buttocks 1-3 times per day as needed    No cover dressing needed    Wear compression stockings every day; remove at bedtime    Cut down smoking; this will impact your circulation and healing      You will need to reposition frequently; keep direct pressure off of the wound or reddened area    Reposition Every 1-2 hours while in bed; left, right; supine; repeat    Reposition Every 15 minutes while up in a chair; chair push ups    Stand every 30 minutes while in a chair if able    Wound Care  Bilateral shins    Cover with bordered foam; change twice a week and as needed; when you feel comfortable can leave open to the air    Secure with non-sterile roll gauze and tape as needed; avoid adhesive directly on the skin    Compression: compression stockings    It is not ok to get your wound wet in the bath or shower    SEEK MEDICAL CARE IF:    You have an increase in swelling, pain, or redness around the wound.    You have an increase in the amount of pus coming from the wound.    There is a bad smell coming from the wound.    The wound appears to be worsening/enlarging    You have a fever greater than 101.5 F        It is ok to continue current wound care treatment/products for the next 2-3 days until new wound care supplies are ordered and arrive. If longer than this please contact our office at [phone].      Obdulia Prieto DNP, RN, CNP, [facility]  [facility]  [phone]          Obtain a seat cushion; can get a 4\" high density foam at [employer]; or GeoMat or J-cushion from medical supply company; or " "we can order a ROHO cushion if you qualify for this type of cushion        4\" high density foam cushion    GeoMat Cushion      Randall Cushion        Roho Cushion        [facility]   [address]             [address]                 [address]     [place] [address]      [place] [address]         [place] [address]  [phone]  [phone]     [phone]               Fax: [phone]  [email or web address]                                                     [facility]  [address] [place] [address]  [phone]  [phone]  [email or web address]    Per Christie  [phone]  [email or web address]    [facility]   [phone]    [facility]  [address] [place] [address]    [phone]          Chair Pushups        Bed Positioning          High Protein Foods  Chicken  -Chicken breast, 3.5oz.-30 grams protein  -Chicken thigh-10 grams(average size)  -Drumstick-11 grams  -Wing- 6 grams  -Chicken meat, cooked, 4 oz.  Beef  -Hamburger dayana, 4 oz-28 grams protein  -Steak, 6 oz-42 grams  -Most cuts of beef- 7 grams of protein per ounce  Fish  -Most fish fillets or steaks are about 22 grams of protein for 3 1/2 oz(100 grams) of cooked fish, or 6 grams per ounce  -Tuna, 6 oz can-40 grams of protein  Pork  -Pork chop, average-22 grams protein  -Pork loin or tenderloin, 4 oz.-29 grams  -Ham, 3oz serving- 19 grams  -Ground pork 3oz cooked-22 grams  -Soriano, 1 slice-3 grams  -[place]-style soriano(black soriano), slice-5-6 grams  Eggs and Dairy  -Egg, large-7 grams  -Milk, 1 cup-8 grams  -Cottage cheese, 1/2 cup-15 grams  -Greek yogurt, 1 cup-usually 8-12 grams, check label  -Soft cheeses (Mozzarella, Brie, Camembert)- 6 grams  -Medium cheeses(cheddar, swiss)- 7 or 8 grams per oz  -Hard cheeses(parmesan)- 10 grams per " oz  Beans  -Tofu, 1/2 cup 20 grams  -Tofu, 1 oz., 2.3 grams  -Soy milk, 1 cup-6-10 grams  -Most beans(black, moran, lentils, etc.) about 7-10 grams protein per half cup of cooked beans  -soy beans, 1/2 cup cooked-14 grams  -Split peas, 1/2 cup cooked- 8 grams  Nuts and Seeds  -Peanut butter, 2 Tablespoons- 8 grams protein  -Almonds, 1/4 cup- 8 grams  -Peanuts, 1/4 cup-9 grams  -Cashews, 1/4 cup- 5 grams  -Pecans, 1/4 cup- 2.5 grams  -Sunflower seeds, 1/4 cup- 6 grams  -Pumpkin seeds, 1/4 cup-8 grams  -Flax seeds- 1/4 cup- 8 grams  Protein Supplements  -Ensure  -Boost  -Glucerna, if diabetic  When you have an open ulcer, your bodies protein needs are much higher, so it is recommended eat good sources of protein

## 2021-06-18 NOTE — PATIENT INSTRUCTIONS - HE
Patient Instructions by Obdulia Prieto NP at 4/13/2020 10:30 AM     Author: Obdulia Prieto NP Service: -- Author Type: Nurse Practitioner    Filed: 4/13/2020 10:39 AM Encounter Date: 4/13/2020 Status: Signed    : Obdulia Prieto NP (Nurse Practitioner)       To reorder supplies or if you have any questions about your order please call [employer] at [phone]     We have ordered Triad paste    Apply to the buttocks wounds 1-2 times per day  Do not need to scrub off in between applications; just gently cleanse the area with soap and water or perineal wipes    He needs a better wheelchair cushion; we will order ROHO cushion for him; sent to [employer]; please help him get this    Ok to shower    Patient is having issues with leg swelling  Help him don/doff compression stockings every day  Needs to elevate the legs throughout the day        You will need to reposition frequently; keep direct pressure off of the wound or reddened area    Reposition Every 1-2 hours while in bed; left, right; supine; repeat    Reposition Every 15 minutes while up in a chair; chair push ups    Stand every 30 minutes while in a chair if able         Chair Pushups        Bed Positioning

## 2021-06-18 NOTE — PATIENT INSTRUCTIONS - HE
Patient Instructions by Obdulia Prieto NP at 1/20/2021 10:20 AM     Author: Obdulia Prieto NP Service: -- Author Type: Nurse Practitioner    Filed: 1/20/2021  4:57 PM Encounter Date: 1/20/2021 Status: Addendum    : Obdulia Prieto NP (Nurse Practitioner)    Related Notes: Original Note by Obdulia Prieto NP (Nurse Practitioner) filed at 1/20/2021 10:38 AM       Continue home care, nursing 1 day per week; Aide 2 days per week    Apply ointment to the buttocks 1-3 times per day as needed    No cover dressing needed    Wear compression stockings every day; remove at bedtime    Cut down smoking; this will impact your circulation and healing      You will need to reposition frequently; keep direct pressure off of the wound or reddened area    Reposition Every 1-2 hours while in bed; left, right; supine; repeat    Reposition Every 15 minutes while up in a chair; chair push ups    Stand every 30 minutes while in a chair if able    Wound Care Instructions    Every 2-3 days HHA to Cleanse your Bilateral leg wound(s) with Normal Saline    Pat Dry with non-sterile gauze    Apply bactroban ointment into/onto the wounds    Cover with bordered foam    Secure with non-sterile roll gauze and tape as needed; avoid adhesive directly on the skin    Compression: compression stockings    It is not ok to get your wound wet in the bath or shower    SEEK MEDICAL CARE IF:    You have an increase in swelling, pain, or redness around the wound.    You have an increase in the amount of pus coming from the wound.    There is a bad smell coming from the wound.    The wound appears to be worsening/enlarging    You have a fever greater than 101.5 F        It is ok to continue current wound care treatment/products for the next 2-3 days until new wound care supplies are ordered and arrive. If longer than this please contact our office at [phone].      Obdulia Prieto DNP, RN, CNP, CWOCN  [facility]  "[place]  [phone]          Obtain a seat cushion; can get a 4\" high density foam at [employer]; or GeoMat or J-cushion from medical [employer]; or we can order a ROHO cushion if you qualify for this type of cushion        4\" high density foam cushion    GeoMat Cushion      Randall Cushion        Roho Cushion        [facility]   [address]             [address]                 [address]     [place] [address]      [place] [address]         [place] [address]  [phone]  [phone]     [phone]               Fax: [phone]  [email or web address]  [address] [place] [address]  [phone]  [phone]  [email or web address] [facility]ending    Per Christie  [phone]  [employer]    [facility]   [phone]    [facility]  [address] [place] [address]    [phone]          Chair Pushups        Bed Positioning          High Protein Foods  Chicken  -Chicken breast, 3.5oz.-30 grams protein  -Chicken thigh-10 grams(average size)  -Drumstick-11 grams  -Wing- 6 grams  -Chicken meat, cooked, 4 oz.  Beef  -Hamburger dayana, 4 oz-28 grams protein  -Steak, 6 oz-42 grams  -Most cuts of beef- 7 grams of protein per ounce  Fish  -Most fish fillets or steaks are about 22 grams of protein for 3 1/2 oz(100 grams) of cooked fish, or 6 grams per ounce  -Tuna, 6 oz can-40 grams of protein  Pork  -Pork chop, average-22 grams protein  -Pork loin or tenderloin, 4 oz.-29 grams  -Ham, 3oz serving- 19 grams  -Ground pork 3oz cooked-22 grams  -Soriano, 1 slice-3 grams  -[place]-style soriano(black soriano), slice-5-6 grams  Eggs and Dairy  -Egg, large-7 grams  -Milk, 1 cup-8 grams  -Cottage cheese, 1/2 cup-15 grams  -Greek yogurt, 1 cup-usually 8-12 grams, check " label  -Soft cheeses (Mozzarella, Brie, Camembert)- 6 grams  -Medium cheeses(cheddar, swiss)- 7 or 8 grams per oz  -Hard cheeses(parmesan)- 10 grams per oz  Beans  -Tofu, 1/2 cup 20 grams  -Tofu, 1 oz., 2.3 grams  -Soy milk, 1 cup-6-10 grams  -Most beans(black, moran, lentils, etc.) about 7-10 grams protein per half cup of cooked beans  -soy beans, 1/2 cup cooked-14 grams  -Split peas, 1/2 cup cooked- 8 grams  Nuts and Seeds  -Peanut butter, 2 Tablespoons- 8 grams protein  -Almonds, 1/4 cup- 8 grams  -Peanuts, 1/4 cup-9 grams  -Cashews, 1/4 cup- 5 grams  -Pecans, 1/4 cup- 2.5 grams  -Sunflower seeds, 1/4 cup- 6 grams  -Pumpkin seeds, 1/4 cup-8 grams  -Flax seeds- 1/4 cup- 8 grams  Protein Supplements  -Ensure  -Boost  -Glucerna, if diabetic  When you have an open ulcer, your bodies protein needs are much higher, so it is recommended eat good sources of protein

## 2021-06-18 NOTE — PROGRESS NOTES
Patient was referred to Clinic Care Coordination. Multiple attempts were made to reach patient for enrollment with no response. No further attempts will be made.

## 2021-06-18 NOTE — PATIENT INSTRUCTIONS - HE
"Patient Instructions by Obdulia Prieto NP at 9/10/2020  8:15 AM     Author: Obdulia Prieto NP Service: -- Author Type: Nurse Practitioner    Filed: 9/10/2020  8:37 AM Encounter Date: 9/10/2020 Status: Signed    : Obdulia Prieto NP (Nurse Practitioner)       Sent prescription for bactroban to your pharmacy    Apply ointment to the buttocks 1-3 times per day  No cover dressing needed    You will need to reposition frequently; keep direct pressure off of the wound or reddened area    Reposition Every 1-2 hours while in bed; left, right; supine; repeat    Reposition Every 15 minutes while up in a chair; chair push ups    Stand every 30 minutes while in a chair if able    Obtain a seat cushion; can get a 4\" high density foam at [employer]; or GeoMat or J-cushion from [employer]; or we can order a ROHO cushion if you qualify for this type of cushion        4\" high density foam cushion    GeoMat Cushion      Randall Cushion        Roho Cushion        [facility]   [address]             [address]                 [address]     [place] [address]      [place] [address]         [place] [address]  [phone]  [phone]     [phone]               Fax: [phone]  [email or web address]                                                     [facility]  [address] [place] [address]  [phone]  [phone]  [email or web address]  [phone]  [email or web address]    [facility]   [phone]    [facility]  [address] [place] [address]    [phone]          Chair Pushups        Bed Positioning          High Protein Foods  Chicken  -Chicken breast, 3.5oz.-30 grams protein  -Chicken thigh-10 grams(average size)  -Drumstick-11 grams  -Wing- 6 grams  -Chicken meat, cooked, 4 " oz.  Beef  -Hamburger dayana, 4 oz-28 grams protein  -Steak, 6 oz-42 grams  -Most cuts of beef- 7 grams of protein per ounce  Fish  -Most fish fillets or steaks are about 22 grams of protein for 3 1/2 oz(100 grams) of cooked fish, or 6 grams per ounce  -Tuna, 6 oz can-40 grams of protein  Pork  -Pork chop, average-22 grams protein  -Pork loin or tenderloin, 4 oz.-29 grams  -Ham, 3oz serving- 19 grams  -Ground pork 3oz cooked-22 grams  -Soriano, 1 slice-3 grams  -[place]-style soriano(black soriano), slice-5-6 grams  Eggs and Dairy  -Egg, large-7 grams  -Milk, 1 cup-8 grams  -Cottage cheese, 1/2 cup-15 grams  -Greek yogurt, 1 cup-usually 8-12 grams, check label  -Soft cheeses (Mozzarella, Brie, Camembert)- 6 grams  -Medium cheeses(cheddar, swiss)- 7 or 8 grams per oz  -Hard cheeses(parmesan)- 10 grams per oz  Beans  -Tofu, 1/2 cup 20 grams  -Tofu, 1 oz., 2.3 grams  -Soy milk, 1 cup-6-10 grams  -Most beans(black, moran, lentils, etc.) about 7-10 grams protein per half cup of cooked beans  -soy beans, 1/2 cup cooked-14 grams  -Split peas, 1/2 cup cooked- 8 grams  Nuts and Seeds  -Peanut butter, 2 Tablespoons- 8 grams protein  -Almonds, 1/4 cup- 8 grams  -Peanuts, 1/4 cup-9 grams  -Cashews, 1/4 cup- 5 grams  -Pecans, 1/4 cup- 2.5 grams  -Sunflower seeds, 1/4 cup- 6 grams  -Pumpkin seeds, 1/4 cup-8 grams  -Flax seeds- 1/4 cup- 8 grams  Protein Supplements  -Ensure  -Boost  -Glucerna, if diabetic  When you have an open ulcer, your bodies protein needs are much higher, so it is recommended eat good sources of protein

## 2021-06-18 NOTE — PATIENT INSTRUCTIONS - HE
Patient Instructions by Obdulia Prieto NP at 3/4/2020  9:00 AM     Author: Obdulia Prieto NP Service: -- Author Type: Nurse Practitioner    Filed: 3/4/2020  9:22 AM Encounter Date: 3/4/2020 Status: Addendum    : Ilene Ortiz CMA (Certified Medical Assistant)    Related Notes: Original Note by Obdulia Prieto NP (Nurse Practitioner) filed at 3/4/2020  9:13 AM       To reorder supplies or if you have any questions about your order please call [facility] at [phone]     We have ordered Triad paste    Apply to the buttocks wounds 1-2 times per day  Do not need to scrub off in between applications; just gently cleanse the area with soap and water or perineal wipes    He needs a better wheelchair cushion; we will order ROHO cushion for him; [facility]; please help him get this    Ok to shower    Patient is having issues with leg swelling  Help him don/doff compression stockings every day  Needs to elevate the legs throughout the day        You will need to reposition frequently; keep direct pressure off of the wound or reddened area    Reposition Every 1-2 hours while in bed; left, right; supine; repeat    Reposition Every 15 minutes while up in a chair; chair push ups    Stand every 30 minutes while in a chair if able         Chair Pushups        Bed Positioning

## 2021-06-18 NOTE — PATIENT INSTRUCTIONS - HE
Patient Instructions by Obdulia Prieto NP at 7/7/2020  9:45 AM     Author: Obdulia Prieto NP Service: -- Author Type: Nurse Practitioner    Filed: 7/7/2020  9:53 AM Encounter Date: 7/7/2020 Status: Signed    : Obdulia Prieto NP (Nurse Practitioner)       To reorder supplies or if you have any questions about your order please call [facility] at [phone]       Home care daily  Apply Triad to the buttocks wounds 1-2 times per day  Do not need to scrub off in between applications; just gently cleanse the area with soap and water or perineal wipes      Ok to shower    Patient is having issues with leg swelling  Help him don/doff compression stockings every day; put on first thing in the morning and remove at bedtime when his PCA Thien returns  Monitor swelling daily; if this is worsening please alert the vascular clinic and we will write for layered compression wraps   Needs to elevate the legs throughout the day        You will need to reposition frequently; keep direct pressure off of the wound or reddened area    Reposition Every 1-2 hours while in bed; left, right; supine; repeat    Reposition Every 15 minutes while up in a chair; chair push ups    Stand every 30 minutes while in a chair if able         Chair Pushups        Bed Positioning

## 2021-06-18 NOTE — PATIENT INSTRUCTIONS - HE
Patient Instructions by Obdulia Prieto NP at 1/29/2020  7:40 AM     Author: Obdulia Prieto NP Service: -- Author Type: Nurse Practitioner    Filed: 1/29/2020  8:28 AM Encounter Date: 1/29/2020 Status: Addendum    : Ilene Ortiz CMA (Certified Medical Assistant)    Related Notes: Original Note by Obdulia Prieto NP (Nurse Practitioner) filed at 1/29/2020  8:07 AM       To reorder supplies or if you have any questions about your order please call [facility] at [phone]     We have ordered Triad paste    Apply to the buttocks wounds 1-2 times per day  Do not need to scrub off in between applications; just gently cleanse the area with soap and water or perineal wipes    Ok to shower    Patient is having issues with leg swelling  We need to reduce this down with tubular compression and short stretch bilaterally needs to be rewrapped daily   Once swelling is down transition back into compression stockings    You will need to reposition frequently; keep direct pressure off of the wound or reddened area    Reposition Every 1-2 hours while in bed; left, right; supine; repeat    Reposition Every 15 minutes while up in a chair; chair push ups    Stand every 30 minutes while in a chair if able         Chair Pushups        Bed Positioning            Comprilan Wrapping Instructions(short stretch Bandaging)          1. Before bandaging, carefully      Apply lotion into the skin.   Avoid any open sores  You may be instructed to use cotton padding,stockinette, or tubigrip as a first layer.    2.  Begin with two turns of the        short-stretch bandage(like a [place])       (Comprilan ) foot     No bandage tension...Do not pull it tight.        3.  Take the bandage into a         extended figure  eight          4. The bandage runs in figures of       eight 2-3 times around the upper       ankle and the foot.      5. Bandage the rest of the lower leg      In a circular method up to the knee.  Between the  individual turns of bandage is especially important. The bandage should  be smoothed down well and creasing avoided.      The Comprilan bandages should NOT be placed in the washer or dryer but washed by hand and hung to dry.  Compression bandages (and compression garments - see below) used in the management of lymphedema should be properly washed on a regular basis, so skin cells and oils wont become trapped in the fibers of the bandages and damage the integrity of the textile. Compression bandages may be machine or hand washed; machine wash is generally the preferred method. Once the bandages go through the spin cycle they are easy to hang and will dry much faster.  Daily washing is recommended, especially if lotions or creams are being used. If the bandages are machine washed it is recommended to place the unrolled bandages in a mesh laundry bag in order to protect the fabric during the washing cycle (the gentle cycle should be utilized).  Bandages are best washed in warm water (between 108 - 140oF); if the bandages are very dirty, they may be boil-washed up to 203oF.  It is best to have more than one set of bandages - one to wear, one to wash. The sets should be applied alternately to allow the build in elasticity of the bandages to recover and to prolong their effectiveness.  Tips for hand washing procedures:  1. Start by filling a bowl, bucket, sink, or small tub with water.   2. The compression bandages should be dipped gently into the water to dampen.   3. Add a small amount of washing solution (see below).   4. Let the compression bandages soak for a few minutes.   5. For better cleaning, gently rub the fibers of the compression bandages together without stretching them excessively.   6. Then, empty the tub and refill with water - dip or rinse the clean compression bandages thoroughly to rid the bandages of residual salts and oils from perspiration.   7. Gently squeeze the compression bandages to remove  excess water.   8. Refer to the drying options below

## 2021-06-18 NOTE — PATIENT INSTRUCTIONS - HE
"Patient Instructions by Obdulia Prieto NP at 12/9/2020 11:00 AM     Author: Obdulia Prieto NP Service: -- Author Type: Nurse Practitioner    Filed: 12/9/2020 11:21 AM Encounter Date: 12/9/2020 Status: Signed    : Obdulia Prieto NP (Nurse Practitioner)       Continue home care, nursing 1 day per week; Aide 2 days per week    Apply ointment to the buttocks 1-3 times per day as needed    No cover dressing needed    Wear compression stockings every day; remove at bedtime    Cut down smoking; this will impact your circulation and healing      You will need to reposition frequently; keep direct pressure off of the wound or reddened area    Reposition Every 1-2 hours while in bed; left, right; supine; repeat    Reposition Every 15 minutes while up in a chair; chair push ups    Stand every 30 minutes while in a chair if able    Wound Care Instructions    Every 5 days HHA to Cleanse your right leg wound(s) with Normal Saline    Pat Dry with non-sterile gauze    Apply honey alginate into/onto the wounds    Cover with bordered foam    Secure with non-sterile roll gauze and tape as needed; avoid adhesive directly on the skin    Compression: compression stockings    It is not ok to get your wound wet in the bath or shower    SEEK MEDICAL CARE IF:    You have an increase in swelling, pain, or redness around the wound.    You have an increase in the amount of pus coming from the wound.    There is a bad smell coming from the wound.    The wound appears to be worsening/enlarging    You have a fever greater than 101.5 F        It is ok to continue current wound care treatment/products for the next 2-3 days until new wound care supplies are ordered and arrive. If longer than this please contact our office at [phone].      Obdulia Prieto DNP, RN, CNP, [facility]  [facility]  [phone]          Obtain a seat cushion; can get a 4\" high density foam at [employer]; or [employer]Mat or J-cushion from " "medical supply company; or we can order a ROHO cushion if you qualify for this type of cushion        4\" high density foam cushion    GeoMat Cushion      Randall Cushion        Roho Cushion        [facility]   [address]             [address]                 [address]     [place] [address]      [place] [address]         [place] [address]  [phone]  [phone]     [phone]               Fax: [phone]  [email or web address]                                                     [facility]  [address]  [place], [place] [address] [phone]  [phone]  [email or web address]    Per Christie  [phone]  [email or web address]    [facility]   [phone]    [facility]  [address] [place] [address]    [phone]          Chair Pushups        Bed Positioning          High Protein Foods  Chicken  -Chicken breast, 3.5oz.-30 grams protein  -Chicken thigh-10 grams(average size)  -Drumstick-11 grams  -Wing- 6 grams  -Chicken meat, cooked, 4 oz.  Beef  -Hamburger dayana, 4 oz-28 grams protein  -Steak, 6 oz-42 grams  -Most cuts of beef- 7 grams of protein per ounce  Fish  -Most fish fillets or steaks are about 22 grams of protein for 3 1/2 oz(100 grams) of cooked fish, or 6 grams per ounce  -Tuna, 6 oz can-40 grams of protein  Pork  -Pork chop, average-22 grams protein  -Pork loin or tenderloin, 4 oz.-29 grams  -Ham, 3oz serving- 19 grams  -Ground pork 3oz cooked-22 grams  -Soriano, 1 slice-3 grams  -[place]-style soriano(black soriano), slice-5-6 grams  Eggs and Dairy  -Egg, large-7 grams  -Milk, 1 cup-8 grams  -Cottage cheese, 1/2 cup-15 grams  -Greek yogurt, 1 cup-usually 8-12 grams, check label  -Soft cheeses (Mozzarella, Brie, Camembert)- 6 grams  -Medium cheeses(cheddar, swiss)- 7 or 8 grams per oz  -Hard cheeses(parmesan)- 10 " grams per oz  Beans  -Tofu, 1/2 cup 20 grams  -Tofu, 1 oz., 2.3 grams  -Soy milk, 1 cup-6-10 grams  -Most beans(black, moran, lentils, etc.) about 7-10 grams protein per half cup of cooked beans  -soy beans, 1/2 cup cooked-14 grams  -Split peas, 1/2 cup cooked- 8 grams  Nuts and Seeds  -Peanut butter, 2 Tablespoons- 8 grams protein  -Almonds, 1/4 cup- 8 grams  -Peanuts, 1/4 cup-9 grams  -Cashews, 1/4 cup- 5 grams  -Pecans, 1/4 cup- 2.5 grams  -Sunflower seeds, 1/4 cup- 6 grams  -Pumpkin seeds, 1/4 cup-8 grams  -Flax seeds- 1/4 cup- 8 grams  Protein Supplements  -Ensure  -Boost  -Glucerna, if diabetic  When you have an open ulcer, your bodies protein needs are much higher, so it is recommended eat good sources of protein

## 2021-06-18 NOTE — LETTER
Letter by Geoff Crabtree MD at      Author: Geoff Crabtree MD Service: -- Author Type: --    Filed:  Encounter Date: 3/13/2019 Status: (Other)       Jef Centeno  [address]  [address] [address]      March 13, 2019      To Whom it May Concern:    Jef Centeno was seen in the office today and evaluated for his ability to operate a motor vehicle. He is cleared to operate a motor vehicle and should be reevaluated in 2 years.    Sincerely,        Electronically signed by Geoff Crabtree MD

## 2021-06-18 NOTE — PROGRESS NOTES
OFFICE VISIT NOTE    Subjective:   Chief Complaint:  Follow-up    67-year-old male with multiple problems including ASHD COPD chronic neck pain requiring narcotics.  He also has diabetes mellitus type 2 and history of congestive heart failure.  Unfortunately, his wife recently  following some routine surgery.  She had a pulmonary embolism.  He is now being cared for by family and a PCA.  He still has aches and pains in his right hip as well as his left shoulder.  Denies any increasing shortness of breath.  No chest pain.  He is obviously grieving.    Current Outpatient Prescriptions   Medication Sig     albuterol (PROVENTIL) 2.5 mg /3 mL (0.083 %) nebulizer solution Take 2.5 mg by nebulization every 4 (four) hours as needed for shortness of breath.      budesonide (PULMICORT) 1 mg/2 mL nebulizer solution Take 2 mL by nebulization 2 (two) times a day. Mix with Duoneb     budesonide-formoterol (SYMBICORT) 160-4.5 mcg/actuation inhaler Inhale 2 puffs 2 (two) times a day.     calcium carbonate-vitamin D3 (CALCIUM 600 + D,3,) 600 mg(1,500mg) -400 unit per tablet Take 1 tablet by mouth daily.     cetirizine (ZYRTEC) 10 MG tablet Take 10 mg by mouth daily.     cetirizine (ZYRTEC) 5 MG tablet Take 1 tablet (5 mg total) by mouth daily.     cholecalciferol, vitamin D3, 1,000 unit tablet Take 2,000 Units by mouth daily.     cyanocobalamin 1000 MCG tablet Take 1,000 mcg by mouth daily.     diphenhydrAMINE (BENADRYL) 25 mg capsule Take 50 mg by mouth every 6 (six) hours as needed for itching.     docusate sodium (COLACE) 100 MG capsule Take 100 mg by mouth 2 (two) times a day as needed for constipation.     ergocalciferol (ERGOCALCIFEROL) 50,000 unit capsule Take 50,000 Units by mouth once a week.     folic acid (FOLVITE) 1 MG tablet Take 1 mg by mouth daily.     food supplemt, lactose-reduced (ENSURE HIGH PROTEIN) Liqd Take 1 Can by mouth 2 (two) times a day.     furosemide (LASIX) 20 MG tablet Take 20 mg by mouth daily.      furosemide (LASIX) 20 MG tablet TAKE 1 TABLET(20 MG) BY MOUTH DAILY     gabapentin (NEURONTIN) 300 MG capsule Take 300 mg by mouth 2 (two) times a day.     guaiFENesin (ROBITUSSIN) 100 mg/5 mL syrup Take 10 mL (200 mg total) by mouth every 4 (four) hours as needed for cough or congestion.     hydrOXYzine (ATARAX) 25 MG tablet Take 1 tablet (25 mg total) by mouth every 8 (eight) hours as needed for itching.     ipratropium-albuterol (DUO-NEB) 0.5-2.5 mg/3 mL nebulizer Take 3 mL by nebulization 2 (two) times a day. Mix with budesonide     ipratropium-albuterol (DUO-NEB) 0.5-2.5 mg/3 mL nebulizer Inhale 3 mL every 6 (six) hours as needed.     levalbuterol (XOPENEX HFA) 45 mcg/actuation inhaler INHALE 2 PUFFS BY MOUTH EVERY 4 HOURS AS NEEDED FOR WHEEZING     LYRICA 75 mg capsule TAKE 1 CAPSULE(75 MG) BY MOUTH THREE TIMES DAILY     meloxicam (MOBIC) 7.5 MG tablet Take 1 tablet (7.5 mg total) by mouth daily.     metFORMIN (GLUCOPHAGE) 1000 MG tablet Take 1,000 mg by mouth 2 (two) times a day with meals.     metFORMIN (GLUCOPHAGE) 1000 MG tablet TAKE 1 TABLET BY MOUTH TWICE DAILY     metoprolol tartrate (LOPRESSOR) 25 MG tablet Take 25 mg by mouth 2 (two) times a day.     multivitamin (TAB-A-HIEN) per tablet Take 1 tablet by mouth daily.     naloxone (NARCAN) 4 mg/actuation nasal spray 1 spray (4 mg dose) into one nostril for opioid reversal. Call 911. May repeat if no response in 3 minutes.     nicotine (NICOTROL) 10 mg inhaler Inhale 1 puff as needed for smoking cessation.     nystatin (MYCOSTATIN) 100,000 unit/mL suspension SHAKE LIQUID AND TAKE 5 ML BY MOUTH FOUR TIMES DAILY     omeprazole (PRILOSEC) 20 MG capsule Take 20 mg by mouth daily before breakfast.      oxyCODONE (ROXICODONE) 30 MG immediate release tablet Take 1 tablet (30 mg total) by mouth 3 (three) times a day.     potassium chloride SA (K-DUR,KLOR-CON) 20 MEQ tablet Take 1 tablet (20 mEq total) by mouth 2 (two) times a day.     predniSONE (DELTASONE)  10 mg tablet Take 1 tablet (10 mg total) by mouth daily.     predniSONE (DELTASONE) 5 MG tablet Take 1 tablet (5 mg total) by mouth daily.     pregabalin (LYRICA) 75 MG capsule Take 75 mg by mouth 3 (three) times a day.     tamsulosin (FLOMAX) 0.4 mg Cp24 Take 1 capsule (0.4 mg total) by mouth Daily after breakfast.     tiotropium (SPIRIVA) 18 mcg inhalation capsule Place 18 mcg into inhaler and inhale daily.       PSFHx: Tobacco Status:  He  reports that he has been smoking Cigarettes.  He has been smoking about 1.00 pack per day. He has never used smokeless tobacco.    Review of Systems:  A comprehensive review of systems is negative except for the comments above    Objective:    There were no vitals taken for this visit.  GENERAL: No acute distress.  In no acute distress.  Blood pressure 142/70.  Oxygen saturations 96%.  Pulse 76.  No edema.  Lungs bilateral rhonchi.  He continues to smoke.  Heart does show a sinus rhythm without murmur.  No JVD.  Abdomen is nontender.  Slightly decreased range of motion of the shoulders.  Some pain with standing.  He does use a walker for ambulation.  Monofilament fiber sensation seems normal.  He says his blood sugars are normal.    Assessment & Plan   Jef Centeno is a 67 y.o. male.    Continues to have discomfort in back shoulders hips etc.  Kenalog 40 was given in the left arm.  He is going to have difficulties now that his wife is passed away.  He will need PCA for several hours a day.  I am checking a CBC as well as basic metabolic profile.  Pain medicines as needed.    Diagnoses and all orders for this visit:    Screen for colon cancer    ASHD (arteriosclerotic heart disease)  -     Basic Metabolic Panel    Chronic obstructive pulmonary disease with acute exacerbation  -     HM2(CBC w/o Differential)    Neck pain of over 3 months duration    Type 2 diabetes mellitus with hyperglycemia, without long-term current use of insulin  -     Glycosylated Hemoglobin A1c    Other  orders  -     Cancel: Ambulatory referral for Colonoscopy    ALS     new diagnosis per neurology.  Leg weakness has been increasing.  Increasing difficulty with swallowing.        Geoff Crabtree MD  Transcription using voice recognition software, may contain typographical errors.

## 2021-06-18 NOTE — PATIENT INSTRUCTIONS - HE
Patient Instructions by Obdulia Prieto NP at 6/8/2020 10:15 AM     Author: Obdulia Prieto NP Service: -- Author Type: Nurse Practitioner    Filed: 6/8/2020 10:36 AM Encounter Date: 6/8/2020 Status: Addendum    : Obdulia Prieto NP (Nurse Practitioner)    Related Notes: Original Note by Obdulia Prieto NP (Nurse Practitioner) filed at 6/8/2020 10:11 AM       To reorder supplies or if you have any questions about your order please call [facility] at [phone]       Home care daily  Apply Triad to the buttocks wounds 1-2 times per day  Do not need to scrub off in between applications; just gently cleanse the area with soap and water or perineal wipes      Ok to shower    Patient is having issues with leg swelling  Help him don/doff compression stockings every day; put on first thing in the morning and remove at bedtime when his PCA Thien returns  Monitor swelling daily; if this is worsening please alert the vascular clinic and we will write for layered compression wraps   Needs to elevate the legs throughout the day        You will need to reposition frequently; keep direct pressure off of the wound or reddened area    Reposition Every 1-2 hours while in bed; left, right; supine; repeat    Reposition Every 15 minutes while up in a chair; chair push ups    Stand every 30 minutes while in a chair if able         Chair Pushups        Bed Positioning

## 2021-06-18 NOTE — LETTER
Letter by Geoff Crabtree MD at      Author: Geoff Crabtree MD Service: -- Author Type: --    Filed:  Encounter Date: 2019 Status: (Other)       Jef Centeno    : 1950  [address]  [facility] [address]     RE: Letter ID [id number]      2019      To Whom it May Concern:    Jef Centeno can continue to drive. He will need to be evaluated on a yearly basis, as he has several medical conditions.     Sincerely,        Electronically signed by Geoff Crabtree MD

## 2021-06-18 NOTE — PATIENT INSTRUCTIONS - HE
"Patient Instructions by Obdulia Prieto NP at 10/9/2020  8:20 AM     Author: Obdulia Prieto NP Service: -- Author Type: Nurse Practitioner    Filed: 10/9/2020  8:31 AM Encounter Date: 10/9/2020 Status: Signed    : Obdulia Prieto NP (Nurse Practitioner)       Continue home care, nursing 1 day per week; Aide 2 days per week    Apply ointment to the buttocks 1-3 times per day    No cover dressing needed    Wear compression stockings every day; remove at bedtime    Cut down smoking; this will impact your circulation and healing    Keep blood sugars under 150        You will need to reposition frequently; keep direct pressure off of the wound or reddened area    Reposition Every 1-2 hours while in bed; left, right; supine; repeat    Reposition Every 15 minutes while up in a chair; chair push ups    Stand every 30 minutes while in a chair if able    Obtain a seat cushion; can get a 4\" high density foam at [facility]; or GeoMat or J-cushion from [employer]; or we can order a ROHO cushion if you qualify for this type of cushion        4\" high density foam cushion    GeoMat Cushion      Randall Cushion        Roho Cushion        [facility]   [address]             [address]                 [address]     [place] [address]      [place] [address]         [place] [address]  [phone]  [phone]     [phone]               Fax: [phone]  [email or web address]                                                     [facility]  [address] [place] [address]  [phone]  [phone]  [email or web address]    Per Christie  [phone]  [email or web address]    [facility]   [phone]    [facility]  [address]  [place], [place] [address] [phone]          Chair Pushups        Bed " Positioning          High Protein Foods  Chicken  -Chicken breast, 3.5oz.-30 grams protein  -Chicken thigh-10 grams(average size)  -Drumstick-11 grams  -Wing- 6 grams  -Chicken meat, cooked, 4 oz.  Beef  -Hamburger dayana, 4 oz-28 grams protein  -Steak, 6 oz-42 grams  -Most cuts of beef- 7 grams of protein per ounce  Fish  -Most fish fillets or steaks are about 22 grams of protein for 3 1/2 oz(100 grams) of cooked fish, or 6 grams per ounce  -Tuna, 6 oz can-40 grams of protein  Pork  -Pork chop, average-22 grams protein  -Pork loin or tenderloin, 4 oz.-29 grams  -Ham, 3oz serving- 19 grams  -Ground pork 3oz cooked-22 grams  -Soriano, 1 slice-3 grams  -[place]-style soriano(black soriano), slice-5-6 grams  Eggs and Dairy  -Egg, large-7 grams  -Milk, 1 cup-8 grams  -Cottage cheese, 1/2 cup-15 grams  -Greek yogurt, 1 cup-usually 8-12 grams, check label  -Soft cheeses (Mozzarella, Brie, Camembert)- 6 grams  -Medium cheeses(cheddar, swiss)- 7 or 8 grams per oz  -Hard cheeses(parmesan)- 10 grams per oz  Beans  -Tofu, 1/2 cup 20 grams  -Tofu, 1 oz., 2.3 grams  -Soy milk, 1 cup-6-10 grams  -Most beans(black, moran, lentils, etc.) about 7-10 grams protein per half cup of cooked beans  -soy beans, 1/2 cup cooked-14 grams  -Split peas, 1/2 cup cooked- 8 grams  Nuts and Seeds  -Peanut butter, 2 Tablespoons- 8 grams protein  -Almonds, 1/4 cup- 8 grams  -Peanuts, 1/4 cup-9 grams  -Cashews, 1/4 cup- 5 grams  -Pecans, 1/4 cup- 2.5 grams  -Sunflower seeds, 1/4 cup- 6 grams  -Pumpkin seeds, 1/4 cup-8 grams  -Flax seeds- 1/4 cup- 8 grams  Protein Supplements  -Ensure  -Boost  -Glucerna, if diabetic  When you have an open ulcer, your bodies protein needs are much higher, so it is recommended eat good sources of protein

## 2021-06-18 NOTE — PATIENT INSTRUCTIONS - HE
Patient Instructions by Obdulia Prieto NP at 5/11/2020  9:30 AM     Author: Obdulia Prieto NP Service: -- Author Type: Nurse Practitioner    Filed: 5/11/2020 10:45 AM Encounter Date: 5/11/2020 Status: Signed    : Obdulia Prieto NP (Nurse Practitioner)       To reorder supplies or if you have any questions about your order please call [place] at [phone]         Apply to the buttocks wounds 1-2 times per day  Do not need to scrub off in between applications; just gently cleanse the area with soap and water or perineal wipes      Ok to shower    Patient is having issues with leg swelling  Help him don/doff compression stockings every day; put on first thing in the morning and remove at bedtime  Needs to elevate the legs throughout the day        You will need to reposition frequently; keep direct pressure off of the wound or reddened area    Reposition Every 1-2 hours while in bed; left, right; supine; repeat    Reposition Every 15 minutes while up in a chair; chair push ups    Stand every 30 minutes while in a chair if able         Chair Pushups        Bed Positioning

## 2021-06-19 NOTE — LETTER
Letter by Obdulia Prieto NP at      Author: Obdulia Prieto NP Service: -- Author Type: --    Filed:  Encounter Date: 2019 Status: (Other)       2019    [facility]  Fax: [phone] Wound Dressing Rx and Order Form  Customer Service: [phone] Order Status: New Order   Verbal: Ilene      Patient Info:  Name: Jef Centeno Jr.  : 1950  Address:   [address] [place] [address]  Phone: [phone]    Insurance Info:  Primary: Payor: MEDICARE / Plan: MEDICARE A AND B / Product Type: Medicare /    Secondary: N/A N/A  [id number] - (Medicare)    Physician Info:   Name:  Obdulia Prieto NP   Dept Address/Phones:   [address] [place] [address], [facility] [address]  [facility] [address] [phone]  Fax: [phone]    Lymphedema circumferential measurements (in cm):  Right just above MTP: 24.5    Right Ankle: 25.5    Right Widest Calf: 32    Right Thigh Up 10cm: 37    Left - just above MTP: 26    Left Ankle: 28    Left Widest Calf: 33.5    Left Thigh Up 10cm: 38          Wound info:  Encounter Diagnoses   Name Primary?   ? ALS (amyotrophic lateral sclerosis) (H) Yes   ? Long term systemic steroid user    ? Cigarette nicotine dependence without complication    ? Type 2 diabetes mellitus with hyperglycemia, without long-term current use of insulin (H)    ? Leg swelling    ? 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 Lumbar stenosis with neurogenic claudication    ? Wheelchair bound    ? Sweet syndrome    ? Pressure ulcer of right buttock, stage 2    ? Pressure ulcer of left buttock, stage 2      VASC Wound 19 Rt buttocks (Active)   Pre Size Length 0.3 2019  7:00 AM   Pre Size Width 1.2 2019  7:00 AM   Pre Size Depth 0.2 2019  7:00 AM   Pre Total Sq cm 0.36 2019  7:00 AM   Post Size Length 0.5 2019  8:00 AM   Post Size Width 0.7 2019  8:00 AM   Description diagonal .8 2019  8:00 AM        VASC Wound 05/29/19 coccyx (Active)   Pre Size Length 0.7 5/29/2019  7:00 AM   Pre Size Width 0.3 5/29/2019  7:00 AM   Pre Size Depth 0.1 5/29/2019  7:00 AM   Pre Total Sq cm 0.21 5/29/2019  7:00 AM           Drainage: None  Thickness:  Full  Duration of Need: 30  Days Supply: 30  Start Date: 5/29/19  Starter Kit:  Saline, gloves  Qualifying wound/Debridement Yes/Yes     Dressing Type Brand Size Number of pieces Frequency of change    Primary Triad paste   1.76oz 1  Daily       Note: If total out of pocket is more than $50.00 please contact the patient before processing order.     OK to forward to covered supplier.    Electronically Signed Physician: Obdulia Prieto NP Date: 5/29/2019

## 2021-06-19 NOTE — LETTER
Letter by Geoff Crabtree MD at      Author: Geoff Crabtree MD Service: -- Author Type: --    Filed:  Encounter Date: 5/7/2019 Status: (Other)         [facility] INTERNAL MEDICINE  05/07/19    Patient: Jef Centeno Jr.  YOB: 1950  Medical Record Number: [id number]                                                                  Opioid / Opioid Plus Controlled Substance Agreement    I understand that my care provider has prescribed an opioid (narcotic) controlled substance to help manage my condition(s). I am taking this medicine to help me function or work. I know this is strong medicine, and that it can cause serious side effects. Opioid medicine can be sedating, addicting and may cause a dependency on the drug. They can affect my ability to drive or think, and cause depression. They need to be taken exactly as prescribed. Combining opioids with certain medicines or chemicals (such as cocaine, sedatives and tranquilizers, sleeping pills, meth) can be dangerous or even fatal. Also, if I stop opioids suddenly, I may have severe withdrawal symptoms. Last, I understand that opioids do not work for all types of pain nor for all patients. If not helpful, I may be asked to stop them.        The risks, benefits, and side effects of these medicine(s) were explained to me. I agree that:    1. I will take part in other treatments as advised by my care team. This may be psychiatry or counseling, physical therapy, behavioral therapy, group treatment or a referral to a pain clinic. I will reduce or stop my medicine when my care team tells me to do so.  2. I will take my medicines as prescribed. I will not change the dose or schedule unless my care team tells me to. There will be no refills if I run out early.  I may be contactedwithout warning and asked to complete a urine drug test or pill count at any time.   3. I will keep all my appointments, and understand this is part of the monitoring of  opioids. My care team may require an office visit for EVERY opioid/controlled substance refill. If I miss appointments or dont follow instructions, my care team may stop my medicine.  4. I will not ask other providers to prescribe controlled substances, and I will not accept controlled substances from other people. If I need another prescribed controlled substance for a new reason, I will tell my care team within 1 business day.  5. I will use one pharmacy to fill all of my controlled substance prescriptions, and it is up to me to make sure that I do not run out of my medicines on weekends or holidays. If my care team is willing to refill my opioid prescription without a visit, I must request refills only during office hours, refills may take up to 3 days to process, and it may take up to 5 to 7 days for my medicine to be mailed and ready at my pharmacy. Prescriptions will not be mailed anywhere except my pharmacy.        [id number]  Rev 12/18         Registration to scan to EHR                             Page 1 of 2               Controlled Substance Agreement Opioid        [facility] INTER[facility] MEDICINE  05/07/19  Patient: Jef Centeno Jr.  YOB: 1950  Medical Record Number: [id number]                                                                  6. I am responsible for my prescriptions. If the medicine/prescription is lost or stolen, it will not be replaced. I also agree not to share controlled substance medicines with anyone.  7. I agree to not use ANY illegal or recreational drugs. This includes marijuana, cocaine, bath salts or other drugs. I agree not to use alcohol unless my care team says I may.          I agree to give urine samples whenever asked. If I dont give a urine sample, the care team may stop my medicine.    8. If I enroll in the [place] Medical Marijuana program, I will tell my care team. I will also sign an agreement to share my medical records with my care team.   9. I  will bring in my list of medicines (or my medicine bottles) each time I come to the clinic.   10. I will tell my care team right away if I become pregnant or have a new medical problem treated outside of my regular clinic.  11. I understand that this medicine can affect my thinking and judgment. It may be unsafe for me to drive, use machinery and do dangerous tasks. I will not do any of these things until I know how the medicine affects me. If my dose changes, I will wait to see how it affects me. I will contact my care team if I have concerns about medicine side effects.    I understand that if I do not follow any of the conditions above, my prescriptions or treatment may be stopped.      I agree that my provider, clinic care team, and pharmacy may work with any city, state or federal law enforcement agency that investigates the misuse, sale, or other diversion of my controlled medicine. I will allow my provider to discuss my care with or share a copy of this agreement with any other treating provider, pharmacy or emergency room where I receive care. I agree to give up (waive) any right of privacy or confidentiality with respect to these consents.     I have read this agreement and have asked questions about anything I did not understand.      ________________________________________________________________________  Patient signature - Date/Time -  Jef Centeno Jr.                                      ________________________________________________________________________  Witness signature                                                            ________________________________________________________________________  Provider signature - Geoff Crabtree MD      [id number]  Rev 12/18         Registration to scan to EHR                         Page 2 of 2                   Controlled Substance Agreement Opioid           Page 1 of 2  Opioid Pain Medicines (also known as Narcotics)  What You Need to Know    What  are opioids?   Opioids are pain medicines that must be prescribed by a doctor.  They are also known as narcotics.    Examples are:     morphine (MS Contin, Ya)    oxycodone (Oxycontin)    oxycodone and acetaminophen (Percocet)    hydrocodone and acetaminophen (Vicodin, Norco)     fentanyl patch (Duragesic)     hydromorphone (Dilaudid)     methadone     What do opioids do well?   Opioids are best for short-term pain after a surgery or injury. They also work well for cancer pain. Unlike other pain medicines, they do not cause liver or kidney failure or ulcers. They may help some people with long-lasting (chronic) pain.     What do opioids NOT do well?   Opioids never get rid of pain entirely, and they do not work well for most patients with chronic pain. Opioids do not reduce swelling, one of the causes of pain. They also dont work well for nerve pain.                           For informational purposes only.  Not to replace the advice of your care provider.  Copyright [facility]. All right reserved. M-DAQ 720600-Acz 02/18.      Page 2 of 2    Risks and side effects   Talk to your doctor before you start or decide to keep taking one of these medicines. Side effects include:    Lowering your breathing rate enough to cause death    Overdose, including death, especially if taking higher than prescribed doses    Long-term opioid use    Worse depression symptoms; less pleasure in things you usually enjoy    Feeling tired or sluggish    Slower thoughts or cloudy thinking    Being more sensitive to pain over time; pain is harder to control    Trouble sleeping or restless sleep    Changes in hormone levels (for example, less testosterone)    Changes in sex drive or ability to have sex    Constipation    Unsafe driving    Itching and sweating    Feeling dizzy    Nausea, vomiting and dry mouth    What else should I know about opioids?  When someone takes opioids for too long or too often, they become  dependent. This means that if you stop or reduce the medicine too quickly, you will have withdrawal symptoms.    Dependence is not the same as addiction. Addiction is when people keep using a substance that harms their body, their mind or their relations with others. If you have a history of drug or alcohol abuse, taking opioids can cause a relapse.    Over time, opioids dont work as well. Most people will need higher and higher doses. The higher the dose, the more serious the side effects. We dont know the long-term effects of opioids.      Prescribed opioids aren't the best way to manage chronic pain    Other ways to manage pain include:      Ibuprofen or acetaminophen.  You should always try this first.      Treat health problems that may be causing pain.      acupuncture or massage, deep breathing, meditation, visual imagery, aromatherapy.      Use heat or ice at the pain site      Physical therapy and exercise      Stop smoking      See a counselor or therapist                                                  People who have used opioids for a long time may have a lower quality of life, worse depression, higher levels of pain and more visits to doctors.    Never share your opioids with others. Be sure to store opioids in a secure place, locked if possible.Young children can easily swallow them and overdose.     You can overdose on opioids.  Signs of overdose include decrease or loss of consciousness, slowed breathing, trouble waking and blue lips.  If someone is worried about overdose, they should call 911.    If you are at risk for overdose, you may get naloxone (Narcan, a medicine that reverses the effects of opioids.  If you overdose, a friend or family member can give you Narcan while waiting for the ambulance.  They need to know the signs of overdose and how to give Narcan.    While you're taking opioids:    Don't use alcohol or street drugs. Taking them together can cause death.    Don't take any of these  medicines unless your doctor says its okay.  Taking these with opioids can cause death.    Benzodiazepines (such as lorazepam         or diazepam)    Muscle relaxers (such as cyclobenzaprine)    sleeping pills    other opioids    Safe disposal of opioids  Find your area drug take-back program, your pharmacy mail-back program, buy a special disposal bag (such as Deterra) from your pharmacy or flush them down the toilet.  Use the guidelines at:  www.fda.gov/drugs/resourcesforyou

## 2021-06-19 NOTE — LETTER
Letter by Geoff Crabtree MD at      Author: Geoff Crabtree MD Service: -- Author Type: --    Filed:  Encounter Date: 8/29/2019 Status: (Other)         Jef Centeno Jr.  [address] [address]             August 29, 2019         Dear Mr. Centeno,    Below are the results from your recent visit:    Resulted Orders   Comprehensive Metabolic Panel   Result Value Ref Range    Sodium 140 136 - 145 mmol/L    Potassium 4.4 3.5 - 5.0 mmol/L    Chloride 105 98 - 107 mmol/L    CO2 26 22 - 31 mmol/L    Anion Gap, Calculation 9 5 - 18 mmol/L    Glucose 75 70 - 125 mg/dL    BUN 11 8 - 22 mg/dL    Creatinine 0.67 (L) 0.70 - 1.30 mg/dL    GFR MDRD Af Amer >60 >60 mL/min/1.73m2    GFR MDRD Non Af Amer >60 >60 mL/min/1.73m2    Bilirubin, Total 0.3 0.0 - 1.0 mg/dL    Calcium 8.6 8.5 - 10.5 mg/dL    Protein, Total 6.0 6.0 - 8.0 g/dL    Albumin 3.3 (L) 3.5 - 5.0 g/dL    Alkaline Phosphatase 83 45 - 120 U/L    AST 14 0 - 40 U/L    ALT 15 0 - 45 U/L    Narrative    Fasting Glucose reference range is 70-99 mg/dL per  American Diabetes Association (ADA) guidelines.   HM2(CBC w/o Differential)   Result Value Ref Range    WBC 6.0 4.0 - 11.0 thou/uL    RBC 4.27 (L) 4.40 - 6.20 mill/uL    Hemoglobin 8.7 (L) 14.0 - 18.0 g/dL    Hematocrit 28.6 (L) 40.0 - 54.0 %    MCV 67 (L) 80 - 100 fL    MCH 20.4 (L) 27.0 - 34.0 pg    MCHC 30.5 (L) 32.0 - 36.0 g/dL    RDW 15.0 (H) 11.0 - 14.5 %    Platelets 449 (H) 140 - 440 thou/uL    MPV 6.9 (L) 7.0 - 10.0 fL       Bear, recent labs are reviewed.  Your electrolytes as well as blood sugar and kidney functions all looked okay.  Liver functions were normal.  Your hemoglobin remains somewhat low at 8.7.  There is a suggestion of iron deficiency here.  I would like you to hold off on taking the medication Mobic (meloxicam).  Also, make sure you continue to use the medication on omeprazole.  I think by making these changes, if there is any gastritis or ulceration present you would  heal the stomach and raise your hemoglobin.  I will be checking your hemoglobin next time you are in the office.    Please call with questions or contact us using [employer].    Sincerely,        Electronically signed by Geoff Crabtree MD

## 2021-06-19 NOTE — PROGRESS NOTES
OFFICE VISIT NOTE    Subjective:   Chief Complaint:  Follow-up (left shoulder injection)    68-year-old man with ALS as well as COPD ASHD.  Is in today with a complaint of having left shoulder pain.  He has had this in the past.  We felt was a rotator cuff tendinitis and he did respond to cortisone injections in the past.  Second problem is pain and points to the tailbone region.  This man is being followed by a neurologist.  He does have a history of ALS    Current Outpatient Prescriptions   Medication Sig     albuterol (PROVENTIL) 2.5 mg /3 mL (0.083 %) nebulizer solution Take 2.5 mg by nebulization every 4 (four) hours as needed for shortness of breath.      budesonide (PULMICORT) 1 mg/2 mL nebulizer solution Take 2 mL by nebulization 2 (two) times a day. Mix with Duoneb     budesonide-formoterol (SYMBICORT) 160-4.5 mcg/actuation inhaler Inhale 2 puffs 2 (two) times a day.     calcium carbonate-vitamin D3 (CALCIUM 600 + D,3,) 600 mg(1,500mg) -400 unit per tablet Take 1 tablet by mouth daily.     cetirizine (ZYRTEC) 10 MG tablet Take 10 mg by mouth daily.     cetirizine (ZYRTEC) 5 MG tablet Take 1 tablet (5 mg total) by mouth daily.     cholecalciferol, vitamin D3, 1,000 unit tablet Take 2,000 Units by mouth daily.     cyanocobalamin 1000 MCG tablet Take 1,000 mcg by mouth daily.     diphenhydrAMINE (BENADRYL) 25 mg capsule Take 50 mg by mouth every 6 (six) hours as needed for itching.     docusate sodium (COLACE) 100 MG capsule Take 100 mg by mouth 2 (two) times a day as needed for constipation.     ergocalciferol (ERGOCALCIFEROL) 50,000 unit capsule Take 50,000 Units by mouth once a week.     folic acid (FOLVITE) 1 MG tablet Take 1 mg by mouth daily.     food supplemt, lactose-reduced (ENSURE HIGH PROTEIN) Liqd Take 1 Can by mouth 2 (two) times a day.     furosemide (LASIX) 20 MG tablet Take 20 mg by mouth daily.     furosemide (LASIX) 20 MG tablet TAKE 1 TABLET(20 MG) BY MOUTH DAILY     gabapentin (NEURONTIN)  300 MG capsule Take 300 mg by mouth 2 (two) times a day.     guaiFENesin (ROBITUSSIN) 100 mg/5 mL syrup Take 10 mL (200 mg total) by mouth every 4 (four) hours as needed for cough or congestion.     hydrOXYzine (ATARAX) 25 MG tablet Take 1 tablet (25 mg total) by mouth every 8 (eight) hours as needed for itching.     ipratropium-albuterol (DUO-NEB) 0.5-2.5 mg/3 mL nebulizer Take 3 mL by nebulization 2 (two) times a day. Mix with budesonide     ipratropium-albuterol (DUO-NEB) 0.5-2.5 mg/3 mL nebulizer Inhale 3 mL every 6 (six) hours as needed.     levalbuterol (XOPENEX HFA) 45 mcg/actuation inhaler INHALE 2 PUFFS BY MOUTH EVERY 4 HOURS AS NEEDED FOR WHEEZING     LYRICA 75 mg capsule TAKE 1 CAPSULE(75 MG) BY MOUTH THREE TIMES DAILY     meloxicam (MOBIC) 7.5 MG tablet Take 1 tablet (7.5 mg total) by mouth daily.     metFORMIN (GLUCOPHAGE) 1000 MG tablet Take 1,000 mg by mouth 2 (two) times a day with meals.     metFORMIN (GLUCOPHAGE) 1000 MG tablet TAKE 1 TABLET BY MOUTH TWICE DAILY     metoprolol tartrate (LOPRESSOR) 25 MG tablet Take 25 mg by mouth 2 (two) times a day.     multivitamin (TAB-A-HIEN) per tablet Take 1 tablet by mouth daily.     naloxone (NARCAN) 4 mg/actuation nasal spray 1 spray (4 mg dose) into one nostril for opioid reversal. Call 911. May repeat if no response in 3 minutes.     nicotine (NICOTROL) 10 mg inhaler Inhale 1 puff as needed for smoking cessation.     nystatin (MYCOSTATIN) 100,000 unit/mL suspension SHAKE LIQUID AND TAKE 5 ML BY MOUTH FOUR TIMES DAILY     omeprazole (PRILOSEC) 20 MG capsule Take 20 mg by mouth daily before breakfast.      oxyCODONE (ROXICODONE) 30 MG immediate release tablet Take 1 tablet (30 mg total) by mouth 3 (three) times a day.     potassium chloride SA (K-DUR,KLOR-CON) 20 MEQ tablet Take 1 tablet (20 mEq total) by mouth 2 (two) times a day.     predniSONE (DELTASONE) 10 mg tablet TAKE 1 TABLET(10 MG) BY MOUTH DAILY     predniSONE (DELTASONE) 5 MG tablet Take 1  tablet (5 mg total) by mouth daily.     pregabalin (LYRICA) 75 MG capsule Take 75 mg by mouth 3 (three) times a day.     tamsulosin (FLOMAX) 0.4 mg Cp24 Take 1 capsule (0.4 mg total) by mouth Daily after breakfast.     tiotropium (SPIRIVA) 18 mcg inhalation capsule Place 18 mcg into inhaler and inhale daily.     wound dressings (TRIAD WOUND DRESSING) Pste Apply 1 Tube topically 2 (two) times a day.       Review of Systems:  A comprehensive review of systems is negative except for the comments above    Objective:    There were no vitals taken for this visit.  GENERAL: No acute distress.  There is decreased range of motion left shoulder related to a probable rotator cuff tendinitis.  No joint effusion.  Circulation sensation are intact.  Lungs have bilateral rhonchi.  He does have an 8 mm diameter decubitus ulcer over the sacrum-coccygeal region.  There is a second smaller 4 mm diameter lesion in the same region.  These are shallow, pink, nonpurulent.    Assessment & Plan   Jef Centeno is a 68 y.o. male.    Rotator cuff tendinitis left shoulder  Decubitus ulcer overlying sacrum-coccyx region  With his permission I did go ahead with a cortisone shot.  I cleansed the skin with alcohol and Betadine.  Then injected 2 cc of 1% Xylocaine 40 mg of Kenalog 40 into the left shoulder using a posterior approach.  A 27-gauge needle was used without any application.  Regarding decubitus ulcer I recommended he use a gel pad or an inflatable donut to get pressure off the coccyx.  Urged him not to sit in a wheelchair all day.  He needs to spend more time lying down.  I suspect this is going to be more a problem as he gets along with his ALS.    Diagnoses and all orders for this visit:    Rotator cuff tendinitis, left    Decubitus ulcer of sacral region    ALS (amyotrophic lateral sclerosis) (H)        Geoff Crabtree MD  Transcription using voice recognition software, may contain typographical errors.

## 2021-06-19 NOTE — LETTER
Letter by Obdulia Prieto NP at      Author: Obdulia Prieto NP Service: -- Author Type: --    Filed:  Encounter Date: 2019 Status: (Other)       2019    [facility]  Fax: [phone] Wound Dressing Rx and Order Form  Customer Service: [phone] Order Status: New Order   Verbal: brain            Patient Info:  Name: Jef Centeno Jr.  : 1950  Address:   [address] [place] [id number]  Phone: [phone]      Insurance Info:  Primary: Payor: MEDICARE / Plan: MEDICARE A AND B / Product Type: Medicare /    Secondary: N/A N/A  [id number] - (Medicare)    Physician Info:   Name:  Obdulia Prieto NP   Dept Address/Phones:   [address] [facility] [address]  [facility] [address] [phone]  Fax: [phone]    Lymphedema circumferential measurements (in cm):  No data recorded  No data recorded  No data recorded  No data recorded  No data recorded  No data recorded  No data recorded  No data recorded  Right just above MTP: 24.5    Right Ankle: 25.5    Right Widest Calf: 32    Right Thigh Up 10cm: 37    Left - just above MTP: 26    Left Ankle: 28    Left Widest Calf: 33.5    Left Thigh Up 10cm: 38      Wound info:  Encounter Diagnoses   Name Primary?   ? ALS (amyotrophic lateral sclerosis) (H) Yes   ? Long term systemic steroid user    ? Cigarette nicotine dependence without complication    ? Type 2 diabetes mellitus with hyperglycemia, without long-term current use of insulin (H)    ? Pressure ulcer of coccygeal region, unstageable (H)      VASC Wound 19 Rt buttocks (Active)   Pre Size Length 0 2019  8:00 AM   Pre Size Width 1.2 2019  7:00 AM   Pre Size Depth 0.2 2019  7:00 AM   Pre Total Sq cm 0.36 2019  7:00 AM   Post Size Length 0.5 2019  8:00 AM   Post Size Width 0.7 2019  8:00 AM   Description diagonal .8 2019  8:00 AM       VASC Wound 19 coccyx (Active)   Pre Size Length 0 2019  8:00 AM    Pre Size Width 0.3 5/29/2019  7:00 AM   Pre Size Depth 0.1 5/29/2019  7:00 AM   Pre Total Sq cm 0.21 5/29/2019  7:00 AM       VASC Wound 06/25/19 crease of buttock (Active)   Pre Size Length 1 6/25/2019  8:00 AM   Pre Size Width 0.8 6/25/2019  8:00 AM   Pre Size Depth 0.1 6/25/2019  8:00 AM   Undermined n 6/25/2019  8:00 AM   Tunneling n 6/25/2019  8:00 AM   Description pale pink wound bed 6/25/2019  8:00 AM       Pressure Ulcer/Injury 06/05/19 Buttocks Left Stage 2 (Active)       Wound 04/25/19 Non-pressure related ulcer Buttocks Right (Active)       Wound 05/31/19 Other wound type (comment) Coccyx Medial (center/inner) (Active)     Drainage: Light  Thickness:  Partial  Duration of Need: 60  Days Supply: 30  Start Date: 6/25/19  Starter Kit: Ancillary Kit (saline, gloves, gauze)  Qualifying wound/Debridement No      Dressing Type Brand Size Number of pieces Frequency of change    Primary Triad paste    Every other day                                           Secondary                                        Tape                            Note: If total out of pocket is more than $50.00 please contact the patient before processing order.     OK to forward to covered supplier.    Electronically Signed Physician: Obdulia Prieto NP Date: 6/25/2019

## 2021-06-19 NOTE — LETTER
Letter by Ruddy Alexander RN at      Author: Ruddy Alexander RN Service: -- Author Type: --    Filed:  Encounter Date: 5/13/2019 Status: (Other)       May 13, 2019      JEF BURK JR.  [address]  [facility] [address]      Dear Jef:    At [facility], we are dedicated to improving your health and well-being. Enclosed is the Comprehensive Care Plan that we developed with you on 4/22/19. Please review the Care Plan carefully.    As a reminder, some of the things we discussed at your visit include:    Your physical and mental health    Ways to reduce falls    Health care needs you may have    Dont forget to contact your care coordinator if you:    Have been hospitalized or plan to be hospitalized     Have had a fall     Have experienced a change in physical health    Are experiencing emotional problems     If you do not agree with your Care Plan, have questions about it, or have experienced a change in your needs, please call me at [phone]. If you are hearing impaired, please call the [place] [employer] at [phone] or [phone] ([email or web address] relay service).    Sincerely,      Ruddy Alexander RN    E-mail: [email or web address]  Phone: [phone]    Care Manager  [facility] ([facility]) is a health plan that contracts with both Medicare and the [place] Medical Assistance (Medicaid) program to provide benefits of both programs to enrollees. Enrollment in [facility] depends on contract renewal.    [id number]     [id number] (11/18)

## 2021-06-19 NOTE — LETTER
Letter by Marguerite Mcmahan CNP at      Author: Marguerite Mcmahan CNP Service: -- Author Type: --    Filed:  Encounter Date: 6/13/2019 Status: (Other)         Patient: Jef Centeno Jr.   MR Number: [id number]   YOB: 1950   Date of Visit: 6/13/2019     [facility]      Facility:    [facility] [[id number]]  Code Status: DNR      Chief Complaint/Reason for Visit:  Chief Complaint   Patient presents with   ? Follow Up       HPI:   Jef is a 68 y.o. male who was seen today for TCU follow up visit.  He has a past medical history for HTN, BPH, DM2, ALS, CAD, chronic low back pain, COPD, spinal cervical stenosis. He was recently hospitalized at [facility] 6/5/2019 to 6/9/2019.  He had a fall at his [employer] 4-5 days prior in which he became wedged in between his stove and cabinets.  He is typically in a wheelchair and does receive PCA hours.  AFter the fall the pain of his left leg progressively got worse and he was seen in the ER and found to have a closed Tibia/fibula fracture.  Orthopedics was consulted and he was put in a soft cast.  He was found to have a stage II coccyx wound.  His discharging hgb was 8.7 which was down from baseline of 11.1.  He was discharged to TCU.    CBC was drawn yesterday and showed a hemoglobin of 8.1.  Redraw today continues to be 8.1.  He denies any symptoms of anemia of headaches, dizziness or increased fatigue.  He reports his pain is well managed when he is getting the Roxicodone unscheduled times along with the hydroxyzine.  He reports that he has an allergy to Tylenol and has been refusing to take his scheduled Tylenol.  He also is using a lidocaine patch, cut and gabapentin at this time. most of his pain complaints are chronic related to his ALS.  His left lower extremity remains in a soft cast he has good CMS and denies any symptoms of DVTs.  He will follow-up with orthopedics next Tuesday.    His blood sugars have been checked  once a day on various times and they have all been stable.    His lungs sound better today clear and no adventitious lung sounds.  He denies any shortness of breath.  He denies any bleeding or bleeding issues with increased bruising after restarting aspirin 325 mg daily.    Past Medical History:  Past Medical History:   Diagnosis Date   ? Acute low back pain    ? ALS (amyotrophic lateral sclerosis) (H)    ? Asthma    ? BPH (benign prostatic hyperplasia)    ? CAD (coronary artery disease)    ? Closed fracture of tibia and fibula, left, initial encounter    ? COPD (chronic obstructive pulmonary disease) (H)    ? Decubitus ulcer, buttock    ? Emphysema lung (H)    ? Encephalopathy    ? Erectile dysfunction associated with type 2 diabetes mellitus (H)    ? HTN (hypertension)    ? Obstructive sleep apnea    ? Osteoarthritis    ? Postherpetic neuralgia    ? Spinal stenosis, cervical region    ? Type 2 diabetes mellitus with diabetic neuropathy (H)            Surgical History:  Past Surgical History:   Procedure Laterality Date   ? CHOLECYSTECTOMY     ? LAPAROSCOPIC GASTROTOMY W/ REPAIR OF ULCER     ? POSTERIOR FUSION CERVICAL SPINE      posterior fusion C2-C4 with laminnectomy; excision cervical lipoma    ? SIGMOIDOSCOPY  06/29/2008       Family History:   Family History   Problem Relation Age of Onset   ? Diabetes Mother    ? Diabetes Father    ? Cancer Father    ? Diabetes Sister    ? Diabetes Brother        Social History:    Social History     Socioeconomic History   ? Marital status:      Spouse name: Not on file   ? Number of children: Not on file   ? Years of education: Not on file   ? Highest education level: Not on file   Occupational History   ? Not on file   Social Needs   ? Financial resource strain: Not on file   ? Food insecurity:     Worry: Not on file     Inability: Not on file   ? Transportation needs:     Medical: Not on file     Non-medical: Not on file   Tobacco Use   ? Smoking status: Current  Every Day Smoker     Packs/day: 0.25     Years: 46.00     Pack years: 11.50     Types: Cigarettes   ? Smokeless tobacco: Never Used   ? Tobacco comment: currently attempting to quit   Substance and Sexual Activity   ? Alcohol use: No     Comment: Quit drinking in 2014. Prior to that he drank excessively.   ? Drug use: No   ? Sexual activity: Not on file   Lifestyle   ? Physical activity:     Days per week: Not on file     Minutes per session: Not on file   ? Stress: Not on file   Relationships   ? Social connections:     Talks on phone: Not on file     Gets together: Not on file     Attends Yazidism service: Not on file     Active member of club or organization: Not on file     Attends meetings of clubs or organizations: Not on file     Relationship status: Not on file   ? Intimate partner violence:     Fear of current or ex partner: Not on file     Emotionally abused: Not on file     Physically abused: Not on file     Forced sexual activity: Not on file   Other Topics Concern   ? Not on file   Social History Narrative    Patient is , 5 children by previous wife are alive and well. Patient has social security disability.          Review of Systems   Patient denies fever, chills, headache, lightheadedness, dizziness, rhinorrhea, cough, congestion, shortness of breath, chest pain, palpitations, abdominal pain, n/v, diarrhea, constipation, change in appetite, dysuria, frequency, burning or pain with urination.  Other than stated in HPI all other review of systems is negative.             Physical Exam   Vital signs: /57, heart rate 79, respiratory 12, temp 97.5.  GENERAL APPEARANCE: thin, frail elderly male in no acute distress.  HEENT: normocephalic, atraumatic  PERRL, sclerae anicteric, conjunctivae clear and moist, EOM intact  LUNGS: CTA, no adventitious lung sounds, respiratory effort normal.  CARD: RRR, S1, S2, without murmurs, gallops, rubs,   ABD: Soft and nontender with normal bowel sounds.    EXTREMITIES: LLE in cast, no edema of toes, no edema of right LE, CMS good.  No s/s of DVT.   NEURO: Alert and oriented x3, normal affect.  Face is symmetric.  SKIN: Inspection of the skin reveals no rashes, ulcerations or petechiae.  PSYCH: euthymic          Medication List:  Current Outpatient Medications   Medication Sig   ? acetaminophen (TYLENOL) 500 MG tablet Take 1,000 mg by mouth 3 (three) times a day.   ? albuterol (PROAIR HFA;PROVENTIL HFA;VENTOLIN HFA) 90 mcg/actuation inhaler Inhale 2 puffs every 4 (four) hours as needed for wheezing.   ? albuterol (PROVENTIL) 2.5 mg /3 mL (0.083 %) nebulizer solution Take 2.5 mg by nebulization every 4 (four) hours as needed for shortness of breath.    ? b complex vitamins tablet Take 1 tablet by mouth daily.   ? baclofen (LIORESAL) 10 MG tablet TAKE 1/2 TABLET(5 MG) BY MOUTH THREE TIMES DAILY (Patient taking differently: TAKE 1 TABLET(10 MG) BY MOUTH THREE TIMES DAILY)   ? budesonide (PULMICORT) 1 mg/2 mL nebulizer solution Take 1 mg by nebulization 2 (two) times a day.   ? budesonide-formoterol (SYMBICORT) 160-4.5 mcg/actuation inhaler INHALE 2 PUFFS BY MOUTH TWICE DAILY   ? calcium carbonate-vitamin D3 (CALTRATE 600 PLUS D3) 600 mg(1,500mg) -400 unit per tablet TAKE 1 TABLET BY MOUTH DAILY   ? cetirizine (ZYRTEC) 10 MG tablet TAKE 1 TABLET(10 MG) BY MOUTH DAILY   ? cholecalciferol, vitamin D3, 1,000 unit tablet Take 4,000 Units by mouth daily.          ? docusate sodium (COLACE) 100 MG capsule Take 100 mg by mouth as needed for constipation.          ? [START ON 6/17/2019] DULoxetine (CYMBALTA) 20 MG capsule Take 60 mg by mouth daily.          ? ergocalciferol (ERGOCALCIFEROL) 50,000 unit capsule Take 50,000 Units by mouth once a week.   ? ferrous sulfate 325 (65 FE) MG tablet Take 1 tablet by mouth daily with breakfast.   ? folic acid (FOLVITE) 1 MG tablet TAKE 1 TABLET BY MOUTH EVERY DAY   ? furosemide (LASIX) 20 MG tablet Take 40 mg by mouth 2 (two) times a day.           ? gabapentin (NEURONTIN) 300 MG capsule TAKE 1 CAPSULE BY MOUTH TWICE DAILY   ? hydrOXYzine HCl (ATARAX) 25 MG tablet Take 12.5 mg by mouth 3 (three) times a day.   ? ipratropium-albuterol (DUO-NEB) 0.5-2.5 mg/3 mL nebulizer Inhale 3 mL every 4 (four) hours as needed.          ? levalbuterol (XOPENEX HFA) 45 mcg/actuation inhaler INHALE 2 PUFFS BY MOUTH EVERY 4 HOURS AS NEEDED FOR WHEEZING (Patient taking differently: INHALE 1-2 PUFFS BY MOUTH EVERY 4 HOURS AS NEEDED FOR WHEEZING)   ? lidocaine (LIDODERM) 5 % REMOVE AND DISCARD PATCH WITHIN 12 HOURS OR AS DIRECTED BY MD   ? LYRICA 75 mg capsule TAKE 1 CAPSULE(75 MG) BY MOUTH THREE TIMES DAILY (Patient taking differently: TAKE 1 CAPSULE(75 MG) BY MOUTH TWO TIMES DAILY)   ? meloxicam (MOBIC) 7.5 MG tablet TAKE 1 TABLET(7.5 MG) BY MOUTH DAILY (Patient taking differently: TAKE 1 TABLET(7.5 MG) BY MOUTH DAILY AS NEEDED)   ? metFORMIN (GLUCOPHAGE) 1000 MG tablet TAKE 1 TABLET BY MOUTH TWICE DAILY (Patient taking differently: TAKE 1/2 TABLET (500 MG) BY MOUTH DAILY WITH BREAKFAST)   ? metoprolol tartrate (LOPRESSOR) 25 MG tablet Take 1 tablet (25 mg total) by mouth 2 (two) times a day.   ? naloxone (NARCAN) 4 mg/actuation nasal spray 1 spray (4 mg dose) into one nostril for opioid reversal. Call 911. May repeat if no response in 3 minutes.   ? omeprazole (PRILOSEC) 20 MG capsule Take 20 mg by mouth daily before breakfast.    ? oxyCODONE (ROXICODONE) 30 MG immediate release tablet Take 1 tablet (30 mg total) by mouth 3 (three) times a day.   ? potassium chloride (K-DUR,KLOR-CON) 20 MEQ tablet TAKE 1 TABLET(20 MEQ) BY MOUTH TWICE DAILY   ? predniSONE (DELTASONE) 5 MG tablet Take 5 mg by mouth daily.   ? riluzole (RILUTEK) 50 mg tablet TAKE 1 TABLET BY MOUTH TWO TIMES A DAY BEFORE MEALS   ? tamsulosin (FLOMAX) 0.4 mg Cp24 Take 1 capsule (0.4 mg total) by mouth Daily after breakfast.   ? tiotropium (SPIRIVA) 18 mcg inhalation capsule Place 18 mcg into inhaler and  inhale daily.       Labs:  Recent Results (from the past 240 hour(s))   Basic Metabolic Panel   Result Value Ref Range    Sodium 135 (L) 136 - 145 mmol/L    Potassium 3.6 3.5 - 5.0 mmol/L    Chloride 97 (L) 98 - 107 mmol/L    CO2 33 (H) 22 - 31 mmol/L    Anion Gap, Calculation 5 5 - 18 mmol/L    Glucose 158 (H) 70 - 125 mg/dL    Calcium 8.7 8.5 - 10.5 mg/dL    BUN 9 8 - 22 mg/dL    Creatinine 0.65 (L) 0.70 - 1.30 mg/dL    GFR MDRD Af Amer >60 >60 mL/min/1.73m2    GFR MDRD Non Af Amer >60 >60 mL/min/1.73m2   HM1 (CBC with Diff)   Result Value Ref Range    WBC 7.1 4.0 - 11.0 thou/uL    RBC 3.57 (L) 4.40 - 6.20 mill/uL    Hemoglobin 8.1 (L) 14.0 - 18.0 g/dL    Hematocrit 28.1 (L) 40.0 - 54.0 %    MCV 79 (L) 80 - 100 fL    MCH 22.7 (L) 27.0 - 34.0 pg    MCHC 28.8 (L) 32.0 - 36.0 g/dL    RDW 17.1 (H) 11.0 - 14.5 %    Platelets 441 (H) 140 - 440 thou/uL    MPV 9.3 8.5 - 12.5 fL    Neutrophils % 62 50 - 70 %    Lymphocytes % 20 20 - 40 %    Monocytes % 11 (H) 2 - 10 %    Eosinophils % 7 (H) 0 - 6 %    Basophils % 1 0 - 2 %    Neutrophils Absolute 4.4 2.0 - 7.7 thou/uL    Lymphocytes Absolute 1.4 0.8 - 4.4 thou/uL    Monocytes Absolute 0.8 0.0 - 0.9 thou/uL    Eosinophils Absolute 0.5 (H) 0.0 - 0.4 thou/uL    Basophils Absolute 0.1 0.0 - 0.2 thou/uL   Morphology,Smear Review (MORP)   Result Value Ref Range    Pathology, Smear Review See Separate Pathology Report (!) (none)    WBC 7.1 4.0 - 11.0 thou/uL    RBC 3.57 (L) 4.40 - 6.20 mill/uL    Hemoglobin 8.1 (L) 14.0 - 18.0 g/dL    Hematocrit 28.1 (L) 40.0 - 54.0 %    MCV 79 (L) 80 - 100 fL    MCH 22.7 (L) 27.0 - 34.0 pg    MCHC 28.8 (L) 32.0 - 36.0 g/dL    RDW 17.1 (H) 11.0 - 14.5 %    Platelets 441 (H) 140 - 440 thou/uL    MPV 9.3 8.5 - 12.5 fL    Neutrophils % 62 50 - 70 %    Lymphocytes % 20 20 - 40 %    Monocytes % 11 (H) 2 - 10 %    Eosinophils % 7 (H) 0 - 6 %    Basophils % 1 0 - 2 %    Neutrophils Absolute 4.4 2.0 - 7.7 thou/uL    Lymphocytes Absolute 1.4 0.8 - 4.4  thou/uL    Monocytes Absolute 0.8 0.0 - 0.9 thou/uL    Eosinophils Absolute 0.5 (H) 0.0 - 0.4 thou/uL    Basophils Absolute 0.1 0.0 - 0.2 thou/uL   Manual Differential   Result Value Ref Range    Platelet Estimate Increased (!) Normal    Polychromasia 1+ (!) Negative         Assessment:    ICD-10-CM    1. Tibia/fibula fracture, left, closed, with delayed healing, subsequent encounter S82.202G     S82.402G    2. Lumbar stenosis with neurogenic claudication M48.062    3. ALS (amyotrophic lateral sclerosis) (H) G12.21    4. Panlobular emphysema (H) J43.1    5. Microcytic anemia D50.9    6. Chronic pain syndrome G89.4        Plan:    Tib-fib fracture: Stable continue with therapies and follow-up with orthopedics next Tuesday.  Tinea with Fosamax 70 mg every other week.    Lumbar stenosis: Stable continue with previous home pain medications of meloxicam 7.5 mg, gabapentin, Lyrica    ALS: Continue with prednisone 5 mg daily.    Emphysema, continue with Symbicort, as needed DuoNeb's, PRN albuterol nebs, Spiriva, and albuterol inhaler.    Microcytic anemia: Peripheral smear to be done, will recheck hemoglobin next Monday, continue with ferrous sulfate 325 mg and folic acid 1 mg daily.    Chronic pain: Continue with Roxicodone 30 mg 3 times a day and does this with hydroxyzine 12.5 mg 3 times a day.  Discontinue acetaminophen and add as an allergy.          Electronically signed by: Marguerite Mcmahan CNP

## 2021-06-19 NOTE — LETTER
Letter by Geoff Crabtree MD at      Author: Geoff Crabtree MD Service: -- Author Type: --    Filed:  Encounter Date: 3/13/2019 Status: (Other)       Jef Centeno  [address] [place] [address]             March 14, 2019         Dear Mr. Centeno,    Below are the results from your recent visit:    Resulted Orders   Glycosylated Hemoglobin A1c   Result Value Ref Range    Hemoglobin A1c 5.4 3.5 - 6.0 %   Basic Metabolic Panel   Result Value Ref Range    Sodium 140 136 - 145 mmol/L    Potassium 4.1 3.5 - 5.0 mmol/L    Chloride 106 98 - 107 mmol/L    CO2 24 22 - 31 mmol/L    Anion Gap, Calculation 10 5 - 18 mmol/L    Glucose 112 70 - 125 mg/dL    Calcium 8.7 8.5 - 10.5 mg/dL    BUN 16 8 - 22 mg/dL    Creatinine 0.80 0.70 - 1.30 mg/dL    GFR MDRD Af Amer >60 >60 mL/min/1.73m2    GFR MDRD Non Af Amer >60 >60 mL/min/1.73m2    Narrative    Fasting Glucose reference range is 70-99 mg/dL per  American Diabetes Association (ADA) guidelines.       Bear, recent labs are reviewed.  Your hemoglobin A1c, which is a measure of blood sugar control the past 3 months, was excellent at 5.4.  Your electrolytes as well as kidney functions were also normal.    Please call with questions or contact us using [employer].    Sincerely,        Electronically signed by Geoff Crabtree MD

## 2021-06-19 NOTE — LETTER
Letter by Evie Lo MD at      Author: Evie Lo MD Service: -- Author Type: --    Filed:  Encounter Date: 6/11/2019 Status: (Other)         Patient: Jef Centeno Jr.   MR Number: [id number]   YOB: 1950   Date of Visit: 6/11/2019                               [facility]        Visit Type: H & P (Distal left tib fib fracture after a fall)    Code Status:  DNR  Facility:  [facility] [[id number]]          PCP: Geoff Crabtree MD       PHONE: [phone]     FAX:[phone]        ASSESSMENT/PLAN:  1. Tibia/fibula fracture, left, closed, with delayed healing, subsequent encounter   continue APAP and Roxicodone.  Continue PT/OT.  Ortho follow-up.  Since the patient is on prednisone chronically, likelihood of osteoporosis is high.  Will start Fosamax 75 mg q. other week for a total of 3 years- 5 years.  Will check vitamin D and TSH levels.  Discontinue daily cholecalciferol since the patient is on weekly vitamin D.  We will also start aspirin 325 mg daily for 30 days for DVT prophylaxis   2. Type 2 diabetes mellitus with hyperglycemia, without long-term current use of insulin (H)   will decrease blood sugar checks to once a day, alternating a.m. and p.m.  Most recent hemoglobin A1c is 5.2, blood sugar levels stable with range 114-133   3. Lumbar stenosis with neurogenic claudication   as above, patient is on long-term narcotics.  Will defer to primary MD regarding narcotics taper   4. Panlobular emphysema (H)   continue nebulizers, will discontinue levalbuterol, Pulmicort due to redundancy.  A very long discussion was had with the patient regarding smoking cessation   5. Electrolyte imbalance   from the hospital, potassium was 3.1 on admit.  Now on potassium supplement since patient is on diuretic.  Will recheck BMP   6. Essential hypertension   stable on metoprolol/Lasix   7. Microcytic anemia   hemoglobin is low at 9.4, this is  chronic but will recheck hemogram to trend   8. ALS (amyotrophic lateral sclerosis) (H)   stable         HISTORY OF PRESENT ILLNESS:   Jef Centeno Jr. is a 68 y.o. male with a history of ALS, CAD, spinal stenosis of the lumbar spine with chronic low back pain, chronic microcytic anemia, COPD, and type 2 diabetes who lives in assisted living facility and fell 4 days ago and twisted his ankle.  Since then he complained of left leg pain and eventually was admitted for distal left tib-fib fracture.  He was seen by orthopedics and his left leg was placed in a cast.  He was then admitted to our facility for strengthening and rehab.  He will follow-up with orthopedics as an outpatient.    Currently, the patient states that he is doing much better although there are still episodes of stabbing pain on his left leg especially with movement.  He wants to make sure that he gets his narcotics on a timely basis to prevent onset of pain.  He states that he has been taking Roxicodone 30 mg 3 times daily for very long time and does not want this changed.  His appetite is good and he sleeps fairly well.  He denies any abdominal pain, difficulty in solving, nausea or vomiting.  He has occasional constipation.  He denies any fevers or chills, cough, chest pains or shortness of breath.  He denies any palpitations, dizziness or lightheadedness.      Other review of systems are negative.  He is wheelchair-bound.      PAST MEDICAL/SURGICAL HISTORY:  Past Medical History:   Diagnosis Date   ? Acute low back pain    ? ALS (amyotrophic lateral sclerosis) (H)    ? Asthma    ? BPH (benign prostatic hyperplasia)    ? CAD (coronary artery disease)    ? Closed fracture of tibia and fibula, left, initial encounter    ? COPD (chronic obstructive pulmonary disease) (H)    ? Decubitus ulcer, buttock    ? Emphysema lung (H)    ? Encephalopathy    ? Erectile dysfunction associated with type 2 diabetes mellitus (H)    ? HTN (hypertension)    ?  Obstructive sleep apnea    ? Osteoarthritis    ? Postherpetic neuralgia    ? Spinal stenosis, cervical region    ? Type 2 diabetes mellitus with diabetic neuropathy (H)      Past Surgical History:   Procedure Laterality Date   ? CHOLECYSTECTOMY     ? LAPAROSCOPIC GASTROTOMY W/ REPAIR OF ULCER     ? POSTERIOR FUSION CERVICAL SPINE      posterior fusion C2-C4 with laminnectomy; excision cervical lipoma    ? SIGMOIDOSCOPY  06/29/2008       SOCIAL HISTORY:  Social History     Socioeconomic History   ? Marital status:      Spouse name: Not on file   ? Number of children: Not on file   ? Years of education: Not on file   ? Highest education level: Not on file   Occupational History   ? Not on file   Social Needs   ? Financial resource strain: Not on file   ? Food insecurity:     Worry: Not on file     Inability: Not on file   ? Transportation needs:     Medical: Not on file     Non-medical: Not on file   Tobacco Use   ? Smoking status: Current Every Day Smoker     Packs/day: 0.25     Years: 46.00     Pack years: 11.50     Types: Cigarettes   ? Smokeless tobacco: Never Used   ? Tobacco comment: currently attempting to quit   Substance and Sexual Activity   ? Alcohol use: No     Comment: Quit drinking in 2014. Prior to that he drank excessively.   ? Drug use: No   ? Sexual activity: Not on file   Lifestyle   ? Physical activity:     Days per week: Not on file     Minutes per session: Not on file   ? Stress: Not on file   Relationships   ? Social connections:     Talks on phone: Not on file     Gets together: Not on file     Attends Faith service: Not on file     Active member of club or organization: Not on file     Attends meetings of clubs or organizations: Not on file     Relationship status: Not on file   ? Intimate partner violence:     Fear of current or ex partner: Not on file     Emotionally abused: Not on file     Physically abused: Not on file     Forced sexual activity: Not on file   Other Topics  Concern   ? Not on file   Social History Narrative    Patient is , 5 children by previous wife are alive and well. Patient has social security disability.       FAMILY HISTORY:  Family History   Problem Relation Age of Onset   ? Diabetes Mother    ? Diabetes Father    ? Cancer Father    ? Diabetes Sister    ? Diabetes Brother        MEDICATIONS:  Current Outpatient Medications on File Prior to Visit   Medication Sig   ? acetaminophen (TYLENOL) 500 MG tablet Take 1,000 mg by mouth 3 (three) times a day.   ? albuterol (PROAIR HFA;PROVENTIL HFA;VENTOLIN HFA) 90 mcg/actuation inhaler Inhale 2 puffs every 4 (four) hours as needed for wheezing.   ? albuterol (PROVENTIL) 2.5 mg /3 mL (0.083 %) nebulizer solution Take 2.5 mg by nebulization every 4 (four) hours as needed for shortness of breath.    ? b complex vitamins tablet Take 1 tablet by mouth daily.   ? baclofen (LIORESAL) 10 MG tablet TAKE 1/2 TABLET(5 MG) BY MOUTH THREE TIMES DAILY (Patient taking differently: TAKE 1 TABLET(10 MG) BY MOUTH THREE TIMES DAILY)   ? budesonide (PULMICORT) 1 mg/2 mL nebulizer solution Take 1 mg by nebulization 2 (two) times a day.   ? budesonide-formoterol (SYMBICORT) 160-4.5 mcg/actuation inhaler INHALE 2 PUFFS BY MOUTH TWICE DAILY   ? calcium carbonate-vitamin D3 (CALTRATE 600 PLUS D3) 600 mg(1,500mg) -400 unit per tablet TAKE 1 TABLET BY MOUTH DAILY   ? cetirizine (ZYRTEC) 10 MG tablet TAKE 1 TABLET(10 MG) BY MOUTH DAILY   ? cholecalciferol, vitamin D3, 1,000 unit tablet Take 4,000 Units by mouth daily.          ? docusate sodium (COLACE) 100 MG capsule Take 100 mg by mouth as needed for constipation.          ? [START ON 6/17/2019] DULoxetine (CYMBALTA) 20 MG capsule Take 60 mg by mouth daily.          ? ergocalciferol (ERGOCALCIFEROL) 50,000 unit capsule Take 50,000 Units by mouth once a week.   ? ferrous sulfate 325 (65 FE) MG tablet Take 1 tablet by mouth daily with breakfast.   ? folic acid (FOLVITE) 1 MG tablet TAKE 1  TABLET BY MOUTH EVERY DAY   ? furosemide (LASIX) 20 MG tablet Take 40 mg by mouth 2 (two) times a day.          ? gabapentin (NEURONTIN) 300 MG capsule TAKE 1 CAPSULE BY MOUTH TWICE DAILY   ? hydrOXYzine HCl (ATARAX) 25 MG tablet Take 12.5 mg by mouth 3 (three) times a day.   ? ipratropium-albuterol (DUO-NEB) 0.5-2.5 mg/3 mL nebulizer Inhale 3 mL every 4 (four) hours as needed.          ? levalbuterol (XOPENEX HFA) 45 mcg/actuation inhaler INHALE 2 PUFFS BY MOUTH EVERY 4 HOURS AS NEEDED FOR WHEEZING (Patient taking differently: INHALE 1-2 PUFFS BY MOUTH EVERY 4 HOURS AS NEEDED FOR WHEEZING)   ? lidocaine (LIDODERM) 5 % REMOVE AND DISCARD PATCH WITHIN 12 HOURS OR AS DIRECTED BY MD   ? LYRICA 75 mg capsule TAKE 1 CAPSULE(75 MG) BY MOUTH THREE TIMES DAILY (Patient taking differently: TAKE 1 CAPSULE(75 MG) BY MOUTH TWO TIMES DAILY)   ? meloxicam (MOBIC) 7.5 MG tablet TAKE 1 TABLET(7.5 MG) BY MOUTH DAILY (Patient taking differently: TAKE 1 TABLET(7.5 MG) BY MOUTH DAILY AS NEEDED)   ? metFORMIN (GLUCOPHAGE) 1000 MG tablet TAKE 1 TABLET BY MOUTH TWICE DAILY (Patient taking differently: TAKE 1/2 TABLET (500 MG) BY MOUTH DAILY WITH BREAKFAST)   ? metoprolol tartrate (LOPRESSOR) 25 MG tablet Take 1 tablet (25 mg total) by mouth 2 (two) times a day.   ? naloxone (NARCAN) 4 mg/actuation nasal spray 1 spray (4 mg dose) into one nostril for opioid reversal. Call 911. May repeat if no response in 3 minutes.   ? omeprazole (PRILOSEC) 20 MG capsule Take 20 mg by mouth daily before breakfast.    ? oxyCODONE (ROXICODONE) 30 MG immediate release tablet Take 1 tablet (30 mg total) by mouth 3 (three) times a day.   ? potassium chloride (K-DUR,KLOR-CON) 20 MEQ tablet TAKE 1 TABLET(20 MEQ) BY MOUTH TWICE DAILY   ? predniSONE (DELTASONE) 5 MG tablet Take 5 mg by mouth daily.   ? riluzole (RILUTEK) 50 mg tablet TAKE 1 TABLET BY MOUTH TWO TIMES A DAY BEFORE MEALS   ? [] sodium phosphates 133 mL (FOR FLEET) 19-7 gram/118 mL Enem rectal  enema Insert 1 enema into the rectum once for 1 dose.   ? tamsulosin (FLOMAX) 0.4 mg Cp24 Take 1 capsule (0.4 mg total) by mouth Daily after breakfast.   ? tiotropium (SPIRIVA) 18 mcg inhalation capsule Place 18 mcg into inhaler and inhale daily.     No current facility-administered medications on file prior to visit.        ALLERGIES:  No Known Allergies      PHYSICAL EXAMINATION:  Vital signs 116/69, 98.1, 93, 150 pounds, 18, 94% room air, wheelchair-bound  General: Awake, Alert, oriented x3, not in any form of acute distress, answers questions appropriately, follows simple commands, conversant thin  HEENT: Slightly pale conjunctiva, anicteric sclerae, oral mucosa is moist  NECK: Supple, without any lymphadenopathy, thyromegaly or any masses  LUNG: Creased breath sounds with poor chest expansion. There are no crackles, no wheezes, normal AP diameter  BACK: No kyphosis of the thoracic spine  CVS: There is good S1  S2, there are no murmurs, no heaves, rhythm is regular  ABDOMEN: Globular and soft, nontender to palpation, no organomegaly, good bowel sounds  EXTREMITIES: Good range of motion on both upper and lower extremities except on the left lower extremity which is in a cast but able to move toes with good capillary refill, no pedal edema, stasis dermatitis changes bilateral. no cyanosis or clubbing, no calf tenderness  SKIN: Warm and dry, no rashes, sacral decubitus ulcer noted    LABS: Hospital labs and imaging reviewed          >45 minutes of total time spent, greater than 55% of the time spent in coordination of care and counseling regarding the above medical issues and plan of care including a long discussion with the patient regarding smoking cessation, multiple questions answered. I have reviewed the patient's medical records, labs and medications.       Electronically signed by:Evie Lo MD

## 2021-06-19 NOTE — LETTER
Letter by Carito Sutherland CNP at      Author: Carito Sutherland CNP Service: -- Author Type: --    Filed:  Encounter Date: 2019 Status: (Other)       2019    [facility]  Fax: [phone] Wound Dressing Rx and Order Form  Customer Service: [phone] Order Status: New Order   Verbal: Raymond Zayas LPN            Patient Info:  Name: Jef Centeno Jr.  : 1950  Address:   [address] [place] [address]  Phone: [phone]      Insurance Info:  Primary: Payor: UCARE / Plan: UCARE DUAL/MSHO / Product Type: MA/MEDICARE DUAL /    Secondary: N/A N/A  [id number] - (Medicare)      Physician Info:   Name:  Carito Sutherland CNP   Dept Address/Phones:   [address] [place] [address], [address]  [facility] [address] [phone]  Fax: [phone]    Lymphedema circumferential measurements (in cm):  No data recorded  No data recorded  No data recorded  No data recorded  No data recorded  No data recorded  No data recorded  No data recorded  No data recorded  No data recorded  No data recorded  No data recorded  No data recorded  No data recorded  No data recorded  No data recorded    Wound info:  Encounter Diagnoses   Name Primary?   ? 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 ALS (amyotrophic lateral sclerosis) (H)    ? Long term systemic steroid user    ? Cigarette nicotine dependence without complication    ? Type 2 diabetes mellitus with hyperglycemia, without long-term current use of insulin (H)    ? Local infection of wound      VASC Wound 19 Rt buttocks (Active)   Pre Size Length 0.6 2019  2:00 PM   Pre Size Width 0.6 2019  2:00 PM   Pre Size Depth 0.1 2019  2:00 PM   Pre Total Sq cm 0.36 2019  2:00 PM   Description diagonal 1.4 2019  2:00 PM       Pressure Ulcer 18 Coccyx Unstagable (Active)     Drainage: Moderate  Thickness:  Partial  Duration of  "Need: 30  Days Supply: 30  Start Date: 04/19/19  Starter Kit: Ancillary Kit (saline, gloves, gauze)  Qualifying wound/Debridement Yes      Dressing Type Brand Size Number of pieces Frequency of change   Primary Alginate AG Silvercell [id number] 10 Every other day    Allevyn Adheseive Gentle Border Sanchez&nephew 3\"x3\" 15 Every other day    Gauze  4\"x4\" 1 loaf Every other day                           Secondary                                        Tape                          Note: If total out of pocket is more than $50.00 please contact the patient before processing order.     OK to forward to covered supplier.     Electronically Signed Physician: Carito Sutherland CNP Date: 4/19/2019       "

## 2021-06-19 NOTE — LETTER
Letter by Carito Sutherland CNP at      Author: Carito Sutherland CNP Service: -- Author Type: --    Filed:  Encounter Date: 2019 Status: (Other)       2019    [facility]  Fax: [phone] Wound Dressing Rx and Order Form  Customer Service: [phone] Order Status: New Order   Verbal: Raymond Zayas LPN            Patient Info:  Name: Jef Centeno  : 1950  Address:   [address] [place] [address]  Phone: [phone]      Insurance Info:  Primary: Payor: UCARE / Plan: HOME [facility] MEDICARE / Product Type: MA/MEDICARE DUAL /    Secondary: N/A N/A  [id number] - ([facility])      Physician Info:   Name:  Carito Sutherland CNP   Dept Address/Phones:   [address] [place] [address], [address]  [facility] [address] [phone]  Fax: [phone]    Lymphedema circumferential measurements (in cm):  No data recorded  No data recorded  No data recorded  No data recorded  No data recorded  No data recorded  No data recorded  No data recorded  No data recorded  No data recorded  No data recorded  No data recorded  No data recorded  No data recorded  No data recorded  No data recorded    Wound info:  No diagnosis found.  Wound 18 rt buttock (Active)   Pre Size Length 0.9 3/29/2019  9:00 AM   Pre Size Width 0.5 3/29/2019  9:00 AM   Pre Size Depth 0.2 3/29/2019  9:00 AM   Pre Total Sq cm 0.45 3/29/2019  9:00 AM       Wound 18 lt buttock (Active)   Pre Size Length 0.5 3/29/2019  9:00 AM   Pre Size Width 0.3 3/29/2019  9:00 AM   Pre Size Depth 0.1 3/29/2019  9:00 AM   Pre Total Sq cm 0.15 3/29/2019  9:00 AM       Wound 18 superior rt buttock (Active)   Pre Size Length 0.2 2018  1:00 PM   Pre Size Width 0.3 2018  1:00 PM   Pre Size Depth 0 2018  1:00 PM   Pre Total Sq cm 0.06 2018  1:00 PM       Pressure Ulcer 18 Coccyx Unstagable (Active)     Drainage: Moderate  Thickness:   "Full  Duration of Need: 30  Days Supply: 30  Start Date: 04/09/19   Starter Kit: Ancillary Kit (saline, gloves, gauze)  Qualifying wound/Debridement Yes      Dressing Type Brand Size Number of pieces Frequency of change   Primary  Allevyn Adhesive 5\"x5\" 15 Every other day    Gauze  4\"x4\" 1 loaf Every other day                                   Secondary                                        Tape                          Note: If total out of pocket is more than $50.00 please contact the patient before processing order.     OK to forward to covered supplier.     Electronically Signed Physician: Carito Sutherland CNP Date: 4/9/2019       "

## 2021-06-19 NOTE — LETTER
Letter by Geoff Crabtree MD at      Author: Geoff Crabtree MD Service: -- Author Type: --    Filed:  Encounter Date: 7/1/2019 Status: (Other)         Jef Centeno Jr.  [address] [address]      July 1, 2019      To Whom it May Concern:     Jef Centeno (1950) has multiple problems including a recent left ankle fracture. He needs a PCA to assist with his care.     Sincerely,        Electronically signed by Geoff Crabtree MD

## 2021-06-19 NOTE — LETTER
Letter by Carito Sutherland CNP at      Author: Carito Sutherland CNP Service: -- Author Type: --    Filed:  Encounter Date: 2019 Status: (Other)       2019    [facility]  Fax: [phone] Wound Dressing Rx and Order Form  Customer Service: [phone] Order Status: New Order   Verbal: Raymond Zayas LPN            Patient Info:  Name: Jef Centeno  : 1950  Address:   [address] [place] [address]  Phone: [phone]      Insurance Info:  Primary: Payor: UCARE / Plan: HOME [facility] MEDICARE / Product Type: MA/MEDICARE DUAL /    Secondary: N/A N/A  [id number] - ([facility])      Physician Info:   Name:  Carito Sutherland CNP   Dept Address/Phones:   [address] [place] [address], [address]  [facility] [address] [phone]  Fax: [phone]    Lymphedema circumferential measurements (in cm):  No data recorded  No data recorded  No data recorded  No data recorded  No data recorded  No data recorded  No data recorded  No data recorded  No data recorded  No data recorded  No data recorded  No data recorded  No data recorded  No data recorded  No data recorded  No data recorded    Wound info:  No diagnosis found.  Wound 18 rt buttock (Active)   Pre Size Length 0.9 3/29/2019  9:00 AM   Pre Size Width 0.5 3/29/2019  9:00 AM   Pre Size Depth 0.2 3/29/2019  9:00 AM   Pre Total Sq cm 0.45 3/29/2019  9:00 AM       Wound 18 lt buttock (Active)   Pre Size Length 0.5 3/29/2019  9:00 AM   Pre Size Width 0.3 3/29/2019  9:00 AM   Pre Size Depth 0.1 3/29/2019  9:00 AM   Pre Total Sq cm 0.15 3/29/2019  9:00 AM       Wound 18 superior rt buttock (Active)   Pre Size Length 0.2 2018  1:00 PM   Pre Size Width 0.3 2018  1:00 PM   Pre Size Depth 0 2018  1:00 PM   Pre Total Sq cm 0.06 2018  1:00 PM       Pressure Ulcer 18 Coccyx Unstagable (Active)     Drainage: Moderate  Thickness:   "Full  Duration of Need: 30  Days Supply: 30  Start Date: 04/09/19  Starter Kit: Ancillary Kit (saline, gloves, gauze)  Qualifying wound/Debridement Yes      Dressing Type Brand Size Number of pieces Frequency of change   Primary  Allevyn Adhesive 5\"x5\" 15 Every other day        {Change Frequency (Optional):16655}        {Change Frequency (Optional):65828}        {Change Frequency (Optional):25948}        {Change Frequency (Optional):83106}        {Change Frequency (Optional):17984}   Secondary     {Change Frequency (Optional):15152}        {Change Frequency (Optional):16269}        {Change Frequency (Optional):99937}        {Change Frequency (Optional):22426}        {Change Frequency (Optional):38272}   Tape     {Change Frequency (Optional):96038}        {Change Frequency (Optional):60923}        {Change Frequency (Optional):25399}     Note: If total out of pocket is more than $50.00 please contact the patient before processing order.     OK to forward to covered supplier.     Electronically Signed Physician: Carito Sutherland CNP Date: 4/9/2019       "

## 2021-06-19 NOTE — LETTER
Letter by Marguerite Mcmahan CNP at      Author: Marguerite Mcmahan CNP Service: -- Author Type: --    Filed:  Encounter Date: 6/10/2019 Status: (Other)         Patient: Jef Centeno Jr.   MR Number: [id number]   YOB: 1950   Date of Visit: 6/10/2019     [facility]      Facility:    [facility] [[id number]]  Code Status: DNR      Chief Complaint/Reason for Visit:  Chief Complaint   Patient presents with   ? Follow Up       HPI:   Jef is a 68 y.o. male who was seen today for initial TCU follow up visit.  He has a past medical history for HTN, BPH, DM2, ALS, CAD, chronic low back pain, COPD, spinal cervical stenosis. He was recently hospitalized at [facility] 6/5/2019 to 6/9/2019.  He had a fall at his senior living 4-5 days prior in which he became wedged in between his stove and cabinets.  He is typically in a wheelchair and does receive PCA hours.  AFter the fall the pain of his left leg progressively got worse and he was seen in the ER and found to have a closed Tibia/fibula fracture.  Orthopedics was consulted and he was put in a soft cast.  He was found to have a stage II coccyx wound.  His discharging hgb was 8.7 which was down from baseline of 11.1.  He was discharged to TCU.    Today, he complains of pain however later in the conversation states he does not want his pain meds adjusted and his pain is managed.  He take roxycodone 30mg three times a day and hydroxyzine for chronic pain.  His LLE is in a soft cast wrapped with ace.  He has good CMS to his toes and is able to move them.  He denies any calf pain.  He is on benadryl scheduled however he is not sure what he takes this for.  He has not been getting his hydroxyzine as this is prn.  He does have coarse rhonchi of his right upper and lower lungs.  He continues on neb treatments and inhalers.  He denies feeling anymore shortness of than he is typically. He reports he continues to smoke cigarettes about 1  pack/day.      Past Medical History:  Past Medical History:   Diagnosis Date   ? ALS (amyotrophic lateral sclerosis) (H)    ? Asthma    ? COPD (chronic obstructive pulmonary disease) (H)    ? Encephalopathy    ? Erectile dysfunction associated with type 2 diabetes mellitus (H)    ? Obstructive sleep apnea    ? Osteoarthritis    ? Postherpetic neuralgia            Surgical History:  Past Surgical History:   Procedure Laterality Date   ? CHOLECYSTECTOMY     ? LAPAROSCOPIC GASTROTOMY W/ REPAIR OF ULCER         Family History:   Family History   Problem Relation Age of Onset   ? Diabetes Mother    ? Diabetes Sister    ? Diabetes Brother        Social History:    Social History     Socioeconomic History   ? Marital status:      Spouse name: Not on file   ? Number of children: Not on file   ? Years of education: Not on file   ? Highest education level: Not on file   Occupational History   ? Not on file   Social Needs   ? Financial resource strain: Not on file   ? Food insecurity:     Worry: Not on file     Inability: Not on file   ? Transportation needs:     Medical: Not on file     Non-medical: Not on file   Tobacco Use   ? Smoking status: Current Every Day Smoker     Packs/day: 0.25     Types: Cigarettes   ? Smokeless tobacco: Never Used   ? Tobacco comment: currently attempting to quit   Substance and Sexual Activity   ? Alcohol use: No     Comment: Quit drinking in 2014. Prior to that he drank excessively.   ? Drug use: No   ? Sexual activity: Not on file   Lifestyle   ? Physical activity:     Days per week: Not on file     Minutes per session: Not on file   ? Stress: Not on file   Relationships   ? Social connections:     Talks on phone: Not on file     Gets together: Not on file     Attends Yazidi service: Not on file     Active member of club or organization: Not on file     Attends meetings of clubs or organizations: Not on file     Relationship status: Not on file   ? Intimate partner violence:     Fear  of current or ex partner: Not on file     Emotionally abused: Not on file     Physically abused: Not on file     Forced sexual activity: Not on file   Other Topics Concern   ? Not on file   Social History Narrative    Patient is , 5 children by previous wife are alive and well. Patient has social security disability.          Review of Systems   Patient denies fever, chills, headache, lightheadedness, dizziness, rhinorrhea, cough, congestion, shortness of breath, chest pain, palpitations, abdominal pain, n/v, diarrhea, constipation, change in appetite, dysuria, frequency, burning or pain with urination.  Other than stated in HPI all other review of systems is negative.             Physical Exam   Vital signs: Bp 104/60, HR 97, resp 20, 98.1 for temp  GENERAL APPEARANCE: thin, frail elderly male in no acute distress.  HEENT: normocephalic, atraumatic  PERRL, sclerae anicteric, conjunctivae clear and moist, EOM intact  LUNGS:coarse inspiratory rhonchi of right lung, respiratory effort normal.  CARD: RRR, S1, S2, without murmurs, gallops, rubs, no JVD, peripheral pulses 2+ and symmetric  ABD: Soft and nontender with normal bowel sounds.   EXTREMITIES: LLE in cast, no edema of toes, no edema of right LE, CMS good with good capillary refill.  No s/s of DVT.   NEURO: sleepy but arousable and conversational. Normal affect.  Face is symmetric.  SKIN: Inspection of the skin reveals no rashes, ulcerations or petechiae.  PSYCH: euthymic          Medication List:  Current Outpatient Medications   Medication Sig   ? hydrOXYzine HCl (ATARAX) 25 MG tablet Take 12.5 mg by mouth 3 (three) times a day.   ? albuterol (PROVENTIL) 2.5 mg /3 mL (0.083 %) nebulizer solution Take 2.5 mg by nebulization every 4 (four) hours as needed for shortness of breath.    ? baclofen (LIORESAL) 10 MG tablet TAKE 1/2 TABLET(5 MG) BY MOUTH THREE TIMES DAILY   ? budesonide-formoterol (SYMBICORT) 160-4.5 mcg/actuation inhaler INHALE 2 PUFFS BY MOUTH  TWICE DAILY   ? calcium carbonate-vitamin D3 (CALTRATE 600 PLUS D3) 600 mg(1,500mg) -400 unit per tablet TAKE 1 TABLET BY MOUTH DAILY   ? cetirizine (ZYRTEC) 10 MG tablet TAKE 1 TABLET(10 MG) BY MOUTH DAILY   ? cholecalciferol, vitamin D3, 1,000 unit tablet Take 2,000 Units by mouth daily.   ? cyanocobalamin 1000 MCG tablet Take 1,000 mcg by mouth daily.   ? DAILY-HIEN tablet TAKE 1 TABLET BY MOUTH EVERY DAY   ? diphenhydrAMINE (BENADRYL) 25 mg capsule Take 50 mg by mouth every 6 (six) hours as needed for itching.   ? docusate sodium (COLACE) 100 MG capsule Take 100 mg by mouth 2 (two) times a day as needed for constipation.   ? DULoxetine (CYMBALTA) 20 MG capsule Take 20 mg by mouth.   ? ENSURE ENLIVE 0.08 gram-1.5 kcal/mL Liqd DRINK 1 BOTTLE BY MOUTH TWICE DAILY   ? ergocalciferol (ERGOCALCIFEROL) 50,000 unit capsule Take 50,000 Units by mouth once a week.   ? folic acid (FOLVITE) 1 MG tablet TAKE 1 TABLET BY MOUTH EVERY DAY   ? furosemide (LASIX) 20 MG tablet Take 20 mg by mouth daily.   ? gabapentin (NEURONTIN) 300 MG capsule TAKE 1 CAPSULE BY MOUTH TWICE DAILY   ? ipratropium-albuterol (DUO-NEB) 0.5-2.5 mg/3 mL nebulizer Inhale 3 mL every 6 (six) hours as needed.   ? levalbuterol (XOPENEX HFA) 45 mcg/actuation inhaler INHALE 2 PUFFS BY MOUTH EVERY 4 HOURS AS NEEDED FOR WHEEZING   ? lidocaine (LIDODERM) 5 % REMOVE AND DISCARD PATCH WITHIN 12 HOURS OR AS DIRECTED BY MD   ? LYRICA 75 mg capsule TAKE 1 CAPSULE(75 MG) BY MOUTH THREE TIMES DAILY   ? meloxicam (MOBIC) 7.5 MG tablet TAKE 1 TABLET(7.5 MG) BY MOUTH DAILY   ? metFORMIN (GLUCOPHAGE) 1000 MG tablet TAKE 1 TABLET BY MOUTH TWICE DAILY   ? metoprolol tartrate (LOPRESSOR) 25 MG tablet Take 1 tablet (25 mg total) by mouth 2 (two) times a day.   ? multivitamin (TAB-A-HIEN) per tablet Take 1 tablet by mouth daily.   ? naloxone (NARCAN) 4 mg/actuation nasal spray 1 spray (4 mg dose) into one nostril for opioid reversal. Call 911. May repeat if no response in 3  minutes.   ? nicotine (NICOTROL) 10 mg inhaler Inhale 1 puff as needed for smoking cessation.   ? omeprazole (PRILOSEC) 20 MG capsule Take 20 mg by mouth daily before breakfast.    ? oxyCODONE (ROXICODONE) 30 MG immediate release tablet Take 1 tablet (30 mg total) by mouth 3 (three) times a day.   ? potassium chloride (K-DUR,KLOR-CON) 20 MEQ tablet TAKE 1 TABLET(20 MEQ) BY MOUTH TWICE DAILY   ? predniSONE (DELTASONE) 5 MG tablet Take 5 mg by mouth daily.   ? riluzole (RILUTEK) 50 mg tablet TAKE 1 TABLET BY MOUTH EVERY 12 HOURS(1 HOUR BEFORE MEALS OR 2 HOURS AFTER MEALS)   ? tamsulosin (FLOMAX) 0.4 mg Cp24 Take 1 capsule (0.4 mg total) by mouth Daily after breakfast.   ? tiotropium (SPIRIVA) 18 mcg inhalation capsule Place 18 mcg into inhaler and inhale daily.       Labs:  Recent Results (from the past 240 hour(s))   Basic Metabolic Panel   Result Value Ref Range    Sodium 135 (L) 136 - 145 mmol/L    Potassium 3.6 3.5 - 5.0 mmol/L    Chloride 97 (L) 98 - 107 mmol/L    CO2 33 (H) 22 - 31 mmol/L    Anion Gap, Calculation 5 5 - 18 mmol/L    Glucose 158 (H) 70 - 125 mg/dL    Calcium 8.7 8.5 - 10.5 mg/dL    BUN 9 8 - 22 mg/dL    Creatinine 0.65 (L) 0.70 - 1.30 mg/dL    GFR MDRD Af Amer >60 >60 mL/min/1.73m2    GFR MDRD Non Af Amer >60 >60 mL/min/1.73m2         Assessment:    ICD-10-CM    1. Tibia/fibula fracture, left, closed, with delayed healing, subsequent encounter S82.202G     S82.402G    2. Panlobular emphysema (H) J43.1    3. Chronic pain syndrome G89.4    4. ALS (amyotrophic lateral sclerosis) (H) G12.21    5. Type 2 diabetes mellitus with hyperglycemia, without long-term current use of insulin (H) E11.65    6. Hypertension, unspecified type I10    7. ASHD (arteriosclerotic heart disease) I25.10    8. Benign prostatic hyperplasia with lower urinary tract symptoms, symptom details unspecified N40.1    9. Pressure injury of sacral region, stage 2 L89.152        Plan:  Tib/fib fracture: continue NWB, therapies, will  set up f/u with ortho in the next 2 weeks. Use lidocaine patch and chronic medications for pain management per his request. I will schedule acetaminophen 1000mg three times a day.     Emphysema: will need to closely monitor for pneumonia as he is not moving much.  Counseled on quitting smoking and he declines any cessation resources and states he prefers to smoke.  At this time he is not smoking as he is unable to get outside.  Continue spiriva, symbicort, albuterol, xopenex, pulmicort. Discontinue benadryl as I am unsure of what this is for.     Chronic pain: I discussed the option to decrease his chronic roxicodone as so we had a little more options then to treat his acute pain more frequent.  He refused to have any changes and states he feels his pain is fine on the current roxicodone and prednisone. He reports his concern is he not getting the roxycodone at the same times he does at home.  I will schedule this with hydroxyzine at 8am, 2pm and 8pm.   He is also taking meloxicam and duloxetine.  He does take baclofen 10mg three times a day.  We could consider adding in prn oxycodone however he is quite sleepy when I am speaking with him.  He will have narcan 4mg spray onsite for respirations<10.     ALS: continue prednisone 5mg, meloxicam and roxycodone and gabapentin two times a day and Lyrica BiD. Continue baclofen 10mg three times a day.  He will need PT to map a wheelchair as he spends most of his time in the wheelchair and he reports his wheelchair is upwards of 10 years old.  I am concerned about his swallowing so I will order ST to eval and treat for swallowing.     DM: stable; continue meformin 1000mg two times a day     HTN: stable; continue metoprolol 25mg BiD and lasix 40mg two times a day BMP done today showed low Cr and low sodium likely due to some dehydration and malnutrition.  Will continue monitor as they are slightly low.     BPH: stable; continue with tamsulosin    ASHD: not on ASA.  Contue with  metoprolol 25mg two times a day and cardiac diet.     Pressure ulcer: tape from dressing is tearing skin so will apply A&D ointment two times a day and with brief changes, pressure reducing mattress.     Anemia: Check CBC, continue FESO4 325mg daily and folic acid    40 minutes total time spent with 25 minutes spent with patient and nursing in counseling and coordination of the above plan care re: pain management, pressure reduction, anemia, smoking cessation.            Electronically signed by: Marguerite Mcmahan CNP

## 2021-06-20 NOTE — LETTER
Letter by Geoff Crabtree MD at      Author: Geoff Crabtree MD Service: -- Author Type: --    Filed:  Encounter Date: 12/31/2019 Status: Signed         Jef Centeno Jr.  [address] [place] [id number]             December 31, 2019         Dear Mr. Centeno,    Below are the results from your recent visit:    Resulted Orders   Glycosylated Hemoglobin A1c   Result Value Ref Range    Hemoglobin A1c 5.3 3.5 - 6.0 %   Comprehensive Metabolic Panel   Result Value Ref Range    Sodium 140 136 - 145 mmol/L    Potassium 3.8 3.5 - 5.0 mmol/L    Chloride 102 98 - 107 mmol/L    CO2 28 22 - 31 mmol/L    Anion Gap, Calculation 10 5 - 18 mmol/L    Glucose 136 (H) 70 - 125 mg/dL    BUN 11 8 - 22 mg/dL    Creatinine 0.71 0.70 - 1.30 mg/dL    GFR MDRD Af Amer >60 >60 mL/min/1.73m2    GFR MDRD Non Af Amer >60 >60 mL/min/1.73m2    Bilirubin, Total 0.2 0.0 - 1.0 mg/dL    Calcium 9.0 8.5 - 10.5 mg/dL    Protein, Total 6.5 6.0 - 8.0 g/dL    Albumin 3.6 3.5 - 5.0 g/dL    Alkaline Phosphatase 85 45 - 120 U/L    AST 12 0 - 40 U/L    ALT 11 0 - 45 U/L    Narrative    Fasting Glucose reference range is 70-99 mg/dL per  American Diabetes Association (ADA) guidelines.   Lipid Cascade   Result Value Ref Range    Cholesterol 97 <=199 mg/dL    Triglycerides 64 <=149 mg/dL    HDL Cholesterol 48 >=40 mg/dL    LDL Calculated 36 <=129 mg/dL    Patient Fasting > 8hrs? Yes    Microalbumin, Random Urine   Result Value Ref Range    Microalbumin, Random Urine 1.81 0.00 - 1.99 mg/dL    Creatinine, Urine 170.1 mg/dL    Microalbumin/Creatinine Ratio Random Urine 10.6 <=19.9 mg/g    Narrative    Microalbumin, Random Urine  <2.0 mg/dL . . . . . . . . Normal  3.0-30.0 mg/dL . . . . . . Microalbuminuria  >30.0 mg/dL . . . . . .  . Clinical Proteinuria    Microalbumin/Creatinine Ratio, Random Urine  <20 mg/g . . . . .. . . . Normal   mg/g . . . . . . . Microalbuminuria  >300 mg/g . . . . . . . . Clinical Proteinuria       PSA  (Prostatic-Specific Antigen), Annual Screen   Result Value Ref Range    PSA 3.5 0.0 - 4.5 ng/mL    Narrative    Method is Abbott Prostate-Specific Antigen (PSA)  Standard-WHO 1st International (90:10)   HM1 (CBC with Diff)   Result Value Ref Range    WBC 5.0 4.0 - 11.0 thou/uL    RBC 4.43 4.40 - 6.20 mill/uL    Hemoglobin 8.7 (L) 14.0 - 18.0 g/dL    Hematocrit 30.4 (L) 40.0 - 54.0 %    MCV 69 (L) 80 - 100 fL    MCH 19.6 (L) 27.0 - 34.0 pg    MCHC 28.6 (L) 32.0 - 36.0 g/dL    RDW 19.3 (H) 11.0 - 14.5 %    Platelets 390 140 - 440 thou/uL    MPV 8.9 8.5 - 12.5 fL    Neutrophils % 78 (H) 50 - 70 %    Lymphocytes % 14 (L) 20 - 40 %    Monocytes % 6 2 - 10 %    Eosinophils % 1 0 - 6 %    Basophils % 0 0 - 2 %    Neutrophils Absolute 3.9 2.0 - 7.7 thou/uL    Lymphocytes Absolute 0.7 (L) 0.8 - 4.4 thou/uL    Monocytes Absolute 0.3 0.0 - 0.9 thou/uL    Eosinophils Absolute 0.1 0.0 - 0.4 thou/uL    Basophils Absolute 0.0 0.0 - 0.2 thou/uL       Bear, recent labs are reviewed.  Hemoglobin A1c, a test for diabetes, was normal.  Your electrolytes, kidney functions, and liver function tests, were all normal.  Cholesterol levels were excellent.  PSA, the blood test for prostate cancer, remains in a normal range.  Hemoglobin unfortunately is low.  I suspect there is a problem with iron deficiency.  You will need to start taking iron daily.  In 4 or 5 weeks we should recheck your hemoglobin and see if it is coming up.  You may also require an endoscopy of the colon as well as upper intestinal tract to make sure there is not an ulcer, polyp, or anything else that might be bleeding..  In summary, labs look okay except for a low hemoglobin which will require follow-up    Please call with questions or contact us using [employer].    Sincerely,        Electronically signed by Geoff Crabtree MD

## 2021-06-20 NOTE — LETTER
Letter by Obdulia Prieto NP at      Author: Obdulia Prieto NP Service: -- Author Type: --    Filed:  Encounter Date: 2020 Status: (Other)       2020    [facility]  Fax: [phone] Wound Dressing Rx and Order Form  Customer Service: [phone] Order Status: New Order   Verbal: Rashmi             Patient Info:  Name: Jef Centeno Jr.  : 1950  Address:   [address] [place], [place] [address] [address]  Phone: [phone]      Insurance Info:  Primary: Payor: UCARE / Plan: UCARE DUAL/MSHO / Product Type: MA/MEDICARE DUAL /    Secondary: N/A N/A  [id number] - ([facility])    Physician Info:   Name:  Obdulia Prieto NP   Dept Address/Phones:   [address] [place] [address], [facility] [address]  [facility] [address] [phone]  Fax: [phone]    Lymphedema circumferential measurements (in cm):  No data recorded  No data recorded  No data recorded  No data recorded  No data recorded  No data recorded  No data recorded  No data recorded  No data recorded  No data recorded  No data recorded  No data recorded  No data recorded  No data recorded  No data recorded  No data recorded    Wound info:  Encounter Diagnoses   Name Primary?   ? Pressure ulcer of coccygeal region, unstageable (H) Yes   ? Type 2 diabetes mellitus with hyperglycemia, without long-term current use of insulin (H)    ? Long term systemic steroid user    ? Leg swelling    ? Wheelchair bound    ? Cigarette nicotine dependence without complication    ? ALS (amyotrophic lateral sclerosis) (H)      VASC Wound 20 Rt buttocks (Active)   Pre Size Length 4 20 1000   Pre Size Width 2 20 1000   Pre Size Depth 0.1 20 1000   Pre Total Sq cm 8 20 1000   Description telephone visit estimation  20 1100       VASC Wound 20 right buttock inferior (Active)   Pre Size Length 1 20 1000   Pre Size Width 0.5 20 1000   Pre Size Depth 0.1 20 1000   Pre Total Sq cm 0.5 20  1000       Pressure Ulcer/Injury 04/19/20 Buttocks Right Stage 2 (Active)       Pressure Ulcer/Injury 05/25/20 Buttocks Left Stage 2 (Active)     Drainage: Moderate  Thickness:  Full  Duration of Need: 30  Days Supply: 30  Start Date: 6/8/2020  Starter Kit: Ancillary Kit (saline, gloves, gauze)  Qualifying wound/Debridement Yes     Dressing Type Brand Size Number of pieces Frequency of change    Primary Triad Paste   2.5oz 1 bottle  Daily           Note: If total out of pocket is more than $50.00 please contact the patient before processing order.     OK to forward to covered supplier.      Patient has been evaluated via virtual visit due to patient's high risk of amaury COVID-19. New measurements may not be obtained due to this.     The wound(s) that were previously prescribed dressing(s) are still active. There are no new wounds.     The patient is at or near exhaustion of supplies.     The wound care instructions are :       Home care daily  Apply Triad to the buttocks wounds 1-2 times per day  Do not need to scrub off in between applications; just gently cleanse the area with soap and water or perineal wipes      Ok to shower    Patient is having issues with leg swelling  Help him don/doff compression stockings every day; put on first thing in the morning and remove at bedtime when his PCA Thien returns  Monitor swelling daily; if this is worsening please alert the vascular clinic and we will write for layered compression wraps   Needs to elevate the legs throughout the day        You will need to reposition frequently; keep direct pressure off of the wound or reddened area    Reposition Every 1-2 hours while in bed; left, right; supine; repeat    Reposition Every 15 minutes while up in a chair; chair push ups    Stand every 30 minutes while in a chair if able      Electronically Signed Physician: Obdulia Prieto NP Date: 6/8/2020

## 2021-06-20 NOTE — LETTER
Letter by Obdulia Prieto NP at      Author: Obdulia Prieto NP Service: -- Author Type: --    Filed:  Encounter Date: 2020 Status: (Other)       2020    [facility]  Fax: [phone] Wound Dressing Rx and Order Form  Customer Service: [phone] Order Status: New Order   Verbal: Rashmi            Patient Info:  Name: Jef Centeno Jr.  : 1950  Address:   [address] [place], [place] [address] [address]  Phone: [phone]      Insurance Info:  Primary: Payor: UCARE / Plan: UCARE DUAL/MSHO / Product Type: MA/MEDICARE DUAL /    Secondary: N/A N/A  [id number] - ([facility])    Physician Info:   Name:  Obdulia Prieto NP   Dept Address/Phones:   [address] [place] [address], [facility] [address]  [facility] [address] [phone]  Fax: [phone]    Lymphedema circumferential measurements (in cm):  No data recorded  No data recorded  No data recorded  No data recorded  No data recorded  No data recorded  No data recorded  No data recorded  Right just above MTP: 24.1    Right Ankle: 28    Right Widest Calf: 35.7    No data recorded  Left - just above MTP: 24.3    Left Ankle: 28.9    Left Widest Calf: 35.2    No data recorded    Wound info:  Encounter Diagnoses   Name Primary?   ? Pressure ulcer of coccygeal region, unstageable (H) Yes   ? Type 2 diabetes mellitus with hyperglycemia, without long-term current use of insulin (H)    ? Long term systemic steroid user    ? ALS (amyotrophic lateral sclerosis) (H)    ? Leg swelling    ? Wheelchair bound    ? Cigarette nicotine dependence without complication      VASC Wound 20 Lt buttock (Active)   Pre Size Length 0.3 20 0800   Pre Size Width 0.3 20 0800   Pre Size Depth 0.1 20 0800   Pre Total Sq cm 2 20 0800   Description scab 20 0800       VASC Wound 20 Rt buttocks (Active)   Pre Size Length 1.5 20 1100   Pre Size Width 1 20 1100   Pre Size Depth 0.1 20 1100   Pre Total Sq  cm 1.5 04/13/20 1100   Description telephone visit estimation  04/13/20 1100     Drainage: Light  Thickness:  Full  Duration of Need: 30  Days Supply: 30  Start Date: 4/13/2020  Starter Kit: Ancillary Kit (saline, gloves, gauze)  Qualifying wound/Debridement Yes        Dressing Type Brand Size Number of pieces Frequency of change    Primary Triad Paste   2.5oz 1 bottle  Daily            Note: If total out of pocket is more than $50.00 please contact the patient before processing order.     OK to forward to covered supplier.    Patient has been evaluated via virtual visit due to patient's high risk of amaury COVID-19.    The wound(s) that were previously prescribed dressing(s) are still active. There are no new wounds.     The patient is at or near exhaustion of supplies.     The wound care instructions are :   We have ordered Triad paste    Apply to the buttocks wounds 1-2 times per day  Do not need to scrub off in between applications; just gently cleanse the area with soap and water or perineal wipes    He needs a better wheelchair cushion; we will order ROHO cushion for him; sent to [facility]; please help him get this    Ok to shower    Patient is having issues with leg swelling  Help him don/doff compression stockings every day  Needs to elevate the legs throughout the day            Electronically Signed Physician: Obdulia Prieto NP Date: 4/13/2020

## 2021-06-20 NOTE — LETTER
Letter by Ruddy Alexander RN at      Author: Ruddy Alexander RN Service: -- Author Type: --    Filed:  Encounter Date: 3/17/2020 Status: (Other)       [address], [address]  [place] [address]  Phone:  [phone]  Fax:  [phone]        March 17, 2020    CARLOZ BURK JR.  [address] [place] [address]    Dear Mely Fuchs is a copy of your completed PCA Assessment and Service Plan.  This is for your records and no action is required by you.  If you have additional questions regarding your assessment please contact me at [phone]. If you feel that your needs are not being met, please contact the Clinical Supervisor at [phone].    Sincerely,      Ruddy Alexander RN    E-mail: [email or web address]  Phone: [phone]    Care Manager  [facility]            Enclosure:  Completed PCA assessment

## 2021-06-20 NOTE — LETTER
Letter by Rashmi Centeno LPN at      Author: Rashmi Centeno LPN Service: -- Author Type: --    Filed:  Encounter Date: 2020 Status: (Other)       2020    [facility]  Fax: [phone] Wound Dressing Rx and Order Form  Customer Service: [phone] Order Status: Reorder   Verbal: Rashmi             Patient Info:  Name: Jef Centeno Jr.  : 1950  Address:   [address] [place] [address]  Phone: [phone]      Insurance Info:  Primary: Payor: UCARE / Plan: UCARE DUAL/MSHO / Product Type: MA/MEDICARE DUAL /    Secondary: N/A N/A  [id number] - ([facility])    Physician Info:   Name:  Rashmi Centeno LPN   Dept Address/Phones:   [address] [place] [address], [facility] [address]  [facility] [address] [phone]  Fax: [phone]    Lymphedema circumferential measurements (in cm):  No data recorded  No data recorded  No data recorded  No data recorded  No data recorded  No data recorded  No data recorded  No data recorded  No data recorded  No data recorded  No data recorded  No data recorded  No data recorded  No data recorded  No data recorded  No data recorded    Wound info:  No diagnosis found.  VASC Wound 20 Rt buttocks (Active)   Pre Size Length 4 20 1000   Pre Size Width 2 20 1000   Pre Size Depth 0.1 20 1000   Pre Total Sq cm 8 20 1000   Description telephone visit estimation  20 1100       VASC Wound 20 right buttock inferior (Active)   Pre Size Length 1 20 1000   Pre Size Width 0.5 20 1000   Pre Size Depth 0.1 20 1000   Pre Total Sq cm 0.5 20 1000       Pressure Ulcer/Injury 20 Buttocks Right Stage 2 (Active)       Pressure Ulcer/Injury 20 Buttocks Left Stage 2 (Active)     Drainage: Moderate  Thickness:  Full  Duration of Need: 30  Days Supply: 30  Start Date: 2020  Starter Kit: Ancillary Kit (saline, gloves, gauze)  Qualifying wound/Debridement Yes      Dressing  "Type Brand Size Number of pieces Frequency of change    Primary hydrocolloid tegaderm 4\"x4.75\" 10 Every 3 days            Note: If total out of pocket is more than $50.00 please contact the patient before processing order.     OK to forward to covered supplier.    Electronically Signed Physician: Rashmi Centeno LPN Date: 7/2/2020         "

## 2021-06-20 NOTE — PROGRESS NOTES
OFFICE VISIT NOTE    Subjective:   Chief Complaint:  No chief complaint on file.    This is a very complicated 68-year-old male with multiple problems including ASHD, ALOS, COPD with emphysema, borderline diabetes mellitus type 2, chronic neck pain requiring narcotics, sweets syndrome.  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  Is in today with complaint of increasing difficulty in the left shoulder.  This is been a problem for him the past 2 or 3 years.  He has significant arthritis and tendinitis of that shoulder.  He has had one fall with no fractures.  He thinks his breathing is about the same.  He can still ambulate using a walker.  He eats 3 meals a day.    Current Outpatient Prescriptions   Medication Sig     albuterol (PROVENTIL) 2.5 mg /3 mL (0.083 %) nebulizer solution Take 2.5 mg by nebulization every 4 (four) hours as needed for shortness of breath.      budesonide (PULMICORT) 1 mg/2 mL nebulizer solution Take 2 mL by nebulization 2 (two) times a day. Mix with Duoneb     budesonide-formoterol (SYMBICORT) 160-4.5 mcg/actuation inhaler Inhale 2 puffs 2 (two) times a day.     calcium carbonate-vitamin D3 (CALCIUM 600 + D,3,) 600 mg(1,500mg) -400 unit per tablet Take 1 tablet by mouth daily.     cetirizine (ZYRTEC) 10 MG tablet Take 10 mg by mouth daily.     cetirizine (ZYRTEC) 10 MG tablet Take 1 tablet (10 mg total) by mouth daily.     cetirizine (ZYRTEC) 5 MG tablet Take 1 tablet (5 mg total) by mouth daily.     cholecalciferol, vitamin D3, 1,000 unit tablet Take 2,000 Units by mouth daily.     cyanocobalamin 1000 MCG tablet Take 1,000 mcg by mouth daily.     diphenhydrAMINE (BENADRYL) 25 mg capsule Take 50 mg by mouth every 6 (six) hours as needed for itching.     docusate sodium (COLACE) 100 MG capsule Take 100 mg by mouth 2 (two) times a day as needed for constipation.     ENSURE ACTIVE PROTEIN-MUSCLE Liqd DRINK 1 BOTTLE BY MOUTH TWICE DAILY     ergocalciferol  (ERGOCALCIFEROL) 50,000 unit capsule Take 50,000 Units by mouth once a week.     folic acid (FOLVITE) 1 MG tablet Take 1 mg by mouth daily.     furosemide (LASIX) 20 MG tablet Take 20 mg by mouth daily.     furosemide (LASIX) 20 MG tablet TAKE 1 TABLET(20 MG) BY MOUTH DAILY     gabapentin (NEURONTIN) 300 MG capsule Take 300 mg by mouth 2 (two) times a day.     guaiFENesin (ROBITUSSIN) 100 mg/5 mL syrup Take 10 mL (200 mg total) by mouth every 4 (four) hours as needed for cough or congestion.     hydrOXYzine HCl (ATARAX) 25 MG tablet TAKE 1 TABLET(25 MG) BY MOUTH EVERY 8 HOURS AS NEEDED FOR ITCHING     ipratropium-albuterol (DUO-NEB) 0.5-2.5 mg/3 mL nebulizer Take 3 mL by nebulization 2 (two) times a day. Mix with budesonide     ipratropium-albuterol (DUO-NEB) 0.5-2.5 mg/3 mL nebulizer Inhale 3 mL every 6 (six) hours as needed.     levalbuterol (XOPENEX HFA) 45 mcg/actuation inhaler INHALE 2 PUFFS BY MOUTH EVERY 4 HOURS AS NEEDED FOR WHEEZING     LYRICA 75 mg capsule TAKE 1 CAPSULE(75 MG) BY MOUTH THREE TIMES DAILY     meloxicam (MOBIC) 7.5 MG tablet Take 1 tablet (7.5 mg total) by mouth daily.     metFORMIN (GLUCOPHAGE) 1000 MG tablet Take 1,000 mg by mouth 2 (two) times a day with meals.     metFORMIN (GLUCOPHAGE) 1000 MG tablet TAKE 1 TABLET BY MOUTH TWICE DAILY     metoprolol tartrate (LOPRESSOR) 25 MG tablet Take 25 mg by mouth 2 (two) times a day.     metoprolol tartrate (LOPRESSOR) 25 MG tablet TAKE 1 TABLET BY MOUTH TWICE DAILY     multivitamin (TAB-A-HIEN) per tablet Take 1 tablet by mouth daily.     naloxone (NARCAN) 4 mg/actuation nasal spray 1 spray (4 mg dose) into one nostril for opioid reversal. Call 911. May repeat if no response in 3 minutes.     nicotine (NICOTROL) 10 mg inhaler Inhale 1 puff as needed for smoking cessation.     nystatin (MYCOSTATIN) 100,000 unit/mL suspension SHAKE LIQUID AND TAKE 5 ML BY MOUTH FOUR TIMES DAILY     omeprazole (PRILOSEC) 20 MG capsule Take 20 mg by mouth daily  before breakfast.      oxyCODONE (ROXICODONE) 30 MG immediate release tablet Take 1 tablet (30 mg total) by mouth 3 (three) times a day.     potassium chloride SA (K-DUR,KLOR-CON) 20 MEQ tablet Take 1 tablet (20 mEq total) by mouth 2 (two) times a day.     predniSONE (DELTASONE) 10 mg tablet Take 1 tablet (10 mg total) by mouth daily.     predniSONE (DELTASONE) 5 MG tablet Take 1 tablet (5 mg total) by mouth daily.     pregabalin (LYRICA) 75 MG capsule Take 75 mg by mouth 3 (three) times a day.     tamsulosin (FLOMAX) 0.4 mg Cp24 Take 1 capsule (0.4 mg total) by mouth Daily after breakfast.     tiotropium (SPIRIVA) 18 mcg inhalation capsule Place 18 mcg into inhaler and inhale daily.     wound dressings (TRIAD WOUND DRESSING) Pste Apply 1 Tube topically 2 (two) times a day.       PSFHx: Tobacco Status:  He  reports that he has been smoking Cigarettes.  He has been smoking about 1.00 pack per day. He has never used smokeless tobacco.    Review of Systems:  A comprehensive review of systems is negative except for the comments above    Objective:    There were no vitals taken for this visit.  GENERAL: No acute distress.  In a wheelchair.  He is obviously lost some weight.  Blood pressure 122/64.  Pulse is 96.  He is awake alert and is.  Markedly decreased range of motion of the left shoulder.  There is some muscle atrophy now in his arms and legs.  I do not see any fasciculations today.  Lungs have rhonchi but no rales.  Heart shows a slight tachycardia but no gallop.  Decreased range of motion the neck.  This is a chronic finding.    Assessment & Plan   Jef Centeno is a 68 y.o. male.    Shoulder pain which is exacerbated recently.  I went ahead and injected the left shoulder.  I cleansed the skin with alcohol and Betadine.  I then injected 2 cc of 1% lidocaine and 40 mg of Kenalog 40 into the left shoulder using a posterior approach.  A 27-gauge needle was used without complications.  Check a hemoglobin and a basic  metabolic profile.  I would like and reduce the prednisone to 5 mg daily.  He continues to use oxycodone for chronic pain which is not going to improve with time.  Obviously with a diagnosis of ALS, prognosis is poor.    Diagnoses and all orders for this visit:    ALS (amyotrophic lateral sclerosis) (H)    COPD (chronic obstructive pulmonary disease) (H)    Neck pain of over 3 months duration    Sweet syndrome    Chronic left shoulder pain            Geoff Crabtree MD  Transcription using voice recognition software, may contain typographical errors.

## 2021-06-20 NOTE — LETTER
Letter by Obdulia Prieto NP at      Author: Obdulia Prieto NP Service: -- Author Type: --    Filed:  Encounter Date: 2020 Status: (Other)       2020    [facility]  Fax: [phone] Wound Dressing Rx and Order Form  Customer Service: [phone] Order Status: New Order   Verbal: Ilene      Patient Info:  Name: Jef Centeno Jr.  : 1950  Address:   [address] [place] [address]  Phone: [phone]      Insurance Info:  Primary: Payor: UCARE / Plan: UCARE DUAL/MSHO / Product Type: MA/MEDICARE DUAL /    Secondary: N/A N/A  [id number] - ([facility])    Physician Info:   Name:  Obdulia Prieto NP   Dept Address/Phones:   [address] [place] [address], [address]  [facility] [address] [phone]  Fax: [phone]    Lymphedema circumferential measurements (in cm):  No data recorded  No data recorded  No data recorded  No data recorded  No data recorded  No data recorded  No data recorded  No data recorded  No data recorded  No data recorded  No data recorded  No data recorded  No data recorded  No data recorded  No data recorded  No data recorded    Wound info:  Encounter Diagnoses   Name Primary?   ? Pressure ulcer of coccygeal region, unstageable (H) Yes   ? Type 2 diabetes mellitus with hyperglycemia, without long-term current use of insulin (H)    ? Long term systemic steroid user    ? ALS (amyotrophic lateral sclerosis) (H)    ? Leg swelling    ? Wheelchair bound      VASC Wound 19 Rt buttocks (Active)   Pre Size Length 2 2020  7:00 AM   Pre Size Width 1 2020  7:00 AM   Pre Size Depth 0.1 2020  7:00 AM   Pre Total Sq cm 2 2020  7:00 AM   Post Size Length 0.5 2019  8:00 AM   Post Size Width 0.7 2019  8:00 AM   Description scattered area surrounding 2019  3:00 PM           VASC Wound 20 Lt buttock (Active)   Pre Size Length 2 2020  8:00 AM   Pre Size Width 1 2020  8:00 AM   Pre Size Depth  0.1 1/29/2020  8:00 AM   Pre Total Sq cm 2 1/29/2020  8:00 AM         Drainage: Light  Thickness:  Full  Duration of Need: 30  Days Supply: 30  Start Date: 1/29/2020  Starter Kit: Ancillary Kit (saline, gloves, gauze)  Qualifying wound/Debridement Yes      Dressing Type Brand Size Number of pieces Frequency of change    Primary Triad Paste  2.5oz 1 bottle  Daily       Note: If total out of pocket is more than $50.00 please contact the patient before processing order.     OK to forward to covered supplier.    Electronically Signed Physician: Obdulia Prieto NP Date: 1/29/2020

## 2021-06-20 NOTE — LETTER
Letter by Obdulia Prieto NP at      Author: Obdulia Prieto NP Service: -- Author Type: --    Filed:  Encounter Date: 2020 Status: (Other)       2020    [facility]  Fax: [phone] Wound Dressing Rx and Order Form  Customer Service: [phone] Order Status: New Order   Verbal: Ilene             Patient Info:  Name: Jef Centeno Jr.  : 1950  Address:   [address] [place] [address]  Phone: [phone]      Insurance Info:  Primary: Payor: UCARE / Plan: UCARE DUAL/MSHO / Product Type: MA/MEDICARE DUAL /    Secondary: N/A N/A  [id number] - ([facility])    Physician Info:   Name:  Obdulia Prieto NP   Dept Address/Phones:   [address] [place] [address], [address]  [facility] [address] [phone]  Fax: [phone]    Lymphedema circumferential measurements (in cm):  No data recorded  No data recorded  No data recorded  No data recorded  No data recorded  No data recorded  No data recorded  No data recorded  No data recorded  No data recorded  No data recorded  No data recorded  No data recorded  No data recorded  No data recorded  No data recorded    Wound info:  No diagnosis found.  VASC Wound 20 Rt buttocks (Active)   Pre Size Length 3.5 20 0900   Pre Size Width 2 20 0900   Pre Size Depth 0.1 20 0900   Pre Total Sq cm 8 20 1000   Description telephone visit estimation  20 1100       VASC Wound 20 right buttock inferior (Active)   Pre Size Length 1 20 1000   Pre Size Width 0.5 20 1000   Pre Size Depth 0.1 20 1000   Pre Total Sq cm 0.5 20 1000         Drainage: Moderate  Thickness:  Full  Duration of Need: 30  Days Supply: 30  Start Date: 2020  Starter Kit: Ancillary Kit (saline, gloves, gauze)  Qualifying wound/Debridement Yes      Dressing Type Brand Size Number of pieces Frequency of change    Primary Hydrocolloid  Tegaderm 4''x4.75'' 10 Every three days    Silvercel    4.25''x4.25'' 10 Every three days       Note: If total out of pocket is more than $50.00 please contact the patient before processing order.     OK to forward to covered supplier.    Electronically Signed Physician: Obdulia Prieto NP Date: 7/17/2020

## 2021-06-20 NOTE — LETTER
Letter by Ruddy Alexander RN at      Author: Ruddy Alexander RN Service: -- Author Type: --    Filed:  Encounter Date: 4/2/2020 Status: (Other)       April 2, 2020      JEF BURK JR.  [address]  [facility] [address]      Dear Jef:    At [facility], we are dedicated to improving your health and well-being. Enclosed is the Comprehensive Care Plan that we developed with you on 3/5/20. Please review the Care Plan carefully.    As a reminder, some of the things we discussed at your visit include:    Your physical and mental health    Ways to reduce falls    Health care needs you may have    Dont forget to contact your care coordinator if you:    Have been hospitalized or plan to be hospitalized     Have had a fall     Have experienced a change in physical health    Are experiencing emotional problems     If you do not agree with your Care Plan, have questions about it, or have experienced a change in your needs, please call me at [phone]. If you are hearing impaired, please call the [place] [employer] at 886 or [phone] (fqfkfd-ca-nkrdel relay service).    Sincerely,      Ruddy Alexander RN    E-mail: [email or web address]  Phone: [phone]    Care Manager  [facility] ([facility]) is a health plan that contracts with both Medicare and the [place] Medical Assistance (Medicaid) program to provide benefits of both programs to enrollees. Enrollment in [facility] depends on contract renewal.    [id number])     [id number] (11/18)

## 2021-06-20 NOTE — LETTER
Letter by Obdulia Prieto NP at      Author: Obdulia Preito NP Service: -- Author Type: --    Filed:  Encounter Date: 2020 Status: (Other)       2020    [facility]  Fax: [phone] Wound Dressing Rx and Order Form  Customer Service: [phone] Order Status: Reorder   Verbal: Shelby HURLEY            Patient Info:  Name: Jef Centeno Jr.  : 1950  Address:   [address] [place] [address]  Phone: [phone]      Insurance Info:  Primary: Payor: UCARE / Plan: UCARE DUAL/MSHO / Product Type: MA/MEDICARE DUAL /    Secondary: N/A N/A  [id number] - (Medicare)    Physician Info:   Name:  Obdulia Prieto NP   Dept Address/Phones:   [address] [place] [address], [address]  [facility] [address] [phone]  Fax: [phone]    Lymphedema circumferential measurements (in cm):  No data recorded  No data recorded  No data recorded  No data recorded  No data recorded  No data recorded  No data recorded  No data recorded  No data recorded  No data recorded  No data recorded  No data recorded  No data recorded  No data recorded  No data recorded  No data recorded    Wound info:  Encounter Diagnoses   Name Primary?   ? Pressure ulcer of coccygeal region, unstageable (H) Yes   ? Type 2 diabetes mellitus with hyperglycemia, without long-term current use of insulin (H)    ? Long term systemic steroid user    ? Leg swelling    ? Wheelchair bound    ? Cigarette nicotine dependence without complication    ? ALS (amyotrophic lateral sclerosis) (H)      Pressure Ulcer/Injury 20 Buttocks Right Stage 2 (Active)       Pressure Ulcer/Injury 20 Buttocks Left Stage 2 (Active)     Drainage: Moderate  Thickness:  Full  Duration of Need: 30  Days Supply: 30  Start Date: 20  Starter Kit: Ancillary Kit (saline, gloves, gauze)  Qualifying wound/Debridement Yes      Dressing Type Brand Size Number of pieces Frequency of change    Primary hydrocolloid Tegaderm 4 x  "4.75\" 10 (15-20) if okay to send that amount Every 3 days and PRN                                           Secondary                                        Tape                            Note: If total out of pocket is more than $50.00 please contact the patient before processing order.     OK to forward to covered supplier.    Patient has been evaluated via virtual visit due to patient's high risk of amaury COVID-19. New measurements may not be obtained due to this.     The wound(s) that were previously prescribed dressing(s) are still active. There are no new wounds.     The patient is at or near exhaustion of supplies.     The wound care instructions are :     Every 3 days and PRN Cleanse your buttocks wound(s) with Normal Saline or Wound Cleanser      Pat dry with non-sterile gauze  Cover with hydrocolloid dressing. If wound is not improving it is ok to switch back to triad paste.     It is ok to continue current wound care treatment/products for the next 2-3 days until new wound care supplies are ordered and arrive. If longer than this please contact our office at [phone].    Electronically Signed Physician: Obdulia Prieto NP Date: 8/18/2020         "

## 2021-06-20 NOTE — LETTER
Letter by Obdulia Prieto NP at      Author: Obdulia Prieto NP Service: -- Author Type: --    Filed:  Encounter Date: 2020 Status: (Other)       2020  [facility]  Fax: [phone] Wound Dressing Rx and Order Form  Customer Service: [phone] Order Status: New Order   Verbal: Venessa            Patient Info:  Name: Jef Centeno Jr.  : 1950  Address:   [address] [place] [address] [address]  Phone: [phone]      Insurance Info:  Primary: Payor: UCARE / Plan: UCARE DUAL/MSHO / Product Type: MA/MEDICARE DUAL /    Secondary: N/A N/A  [id number] - ([facility])    Physician Info:   Name:  Obdulia Prieto NP   Dept Address/Phones:   [address] [place] [address], [address]  [facility] [address] [phone]  Fax: [phone]    Lymphedema circumferential measurements (in cm):  No data recorded    Wound info:  Encounter Diagnoses   Name Primary?   ? Pressure ulcer of coccygeal region, unstageable (H) Yes   ? Type 2 diabetes mellitus with hyperglycemia, without long-term current use of insulin (H)    ? Long term systemic steroid user    ? Leg swelling    ? Wheelchair bound    ? Cigarette nicotine dependence without complication    ? ALS (amyotrophic lateral sclerosis) (H)      VASC Wound 20 Rt buttocks (Active)   Pre Size Length 4 20 1000   Pre Size Width 2 20 1000   Pre Size Depth 0.1 20 1000   Pre Total Sq cm 8 20 1000   Description telephone visit estimation  20 1100       VASC Wound 20 right buttock inferior (Active)   Pre Size Length 1 20 1000   Pre Size Width 0.5 20 1000   Pre Size Depth 0.1 20 1000   Pre Total Sq cm 0.5 20 1000       Pressure Ulcer/Injury 20 Buttocks Right Stage 2 (Active)   Site Assessment White;[place] 20 1000   Surrounding Tissue Assessment Clean;Dry;Intact 20 1000   Drainage Amount Small 20 1000   Drainage Description Serosanguineous 20 1000  "  Site Care Cleansed;Cream / Ointment 06/08/20 1000       Pressure Ulcer/Injury 05/25/20 Buttocks Left Stage 2 (Active)   Site Assessment White;[place] 06/08/20 1000   Surrounding Tissue Assessment Clean;Dry;Fragile 06/08/20 1000   Drainage Amount Moderate 06/08/20 1000   Drainage Description Serosanguineous 06/08/20 1000   Site Care Cleansed;Cream / Ointment 06/08/20 1000     Drainage: moderate  Thickness:  Full  Duration of Need: 30  Days Supply: 30  Start Date: 6/19/2020  Starter Kit: Ancillary Kit (saline, gloves, gauze)  Qualifying wound/Debridement Yes      Dressing Type Brand Size Number of pieces Frequency of change    Primary hydrocolloid tegaderm 4\"x4.75\" 10 Weekly and PRN       Patient has been evaluated via virtual visit due to patient's high risk of amaury COVID-19. New measurements may not be obtained due to this.     The wound(s) that were previously prescribed dressing(s) are still active. There are no new wounds.     The patient is at or near exhaustion of supplies.     The wound care instructions are : *  Scheduling Instructions: Wound Care Instructions     Weekly and PRN Cleanse your buttocks wound(s) with Normal Saline or Wound Cleanser      Pat dry with non-sterile gauze  Cover with hydrocolloid dressing. If wound is not improving it is ok to switch back to triad paste.     It is ok to continue current wound care treatment/products for the next 2-3 days until new wound care supplies are ordered and arrive. If longer than this please contact our office at [phone].     SEEK MEDICAL CARE IF:  You have an increase in swelling, pain, or redness around the wound.  You have an increase in the amount of pus coming from the wound.  There is a bad smell coming from the wound.  The wound appears to be worsening/enlarging  You have a fever greater than 101.5 F      Ordered by: Wilda Torres LPN  Authorized by:  Obdulia Prieto NP   (NPI: [id number])     **              Note: If total out " of pocket is more than $50.00 please contact the patient before processing order.     OK to forward to covered supplier.    Electronically Signed Physician: Obdulia Prieto NP Date: 6/19/2020

## 2021-06-21 NOTE — LETTER
Letter by Ruddy Alexander RN at      Author: Ruddy Alexander RN Service: -- Author Type: --    Filed:  Encounter Date: 2/17/2021 Status: (Other)       [facility]  [address], [address]  [place] [address]  Phone:  [phone]  Fax:  [phone]        February 17, 2021    CARLOZ BURK JR.  [address] [place] [address]    Dear Mely Fuchs is a copy of your completed PCA Assessment and Service Plan.  This is for your records and no action is required by you.  If you have additional questions regarding your assessment please contact me at [phone]. If you feel that your needs are not being met, please contact the Clinical Supervisor at [phone].    Sincerely,      Ruddy Alexander RN    E-mail: [email or web address]  Phone: [phone]    Care Manager  [facility]            Enclosure:  Completed PCA assessment

## 2021-06-21 NOTE — PROGRESS NOTES
OFFICE VISIT NOTE    Subjective:   Chief Complaint:  Follow-up (has pressure ulcer on his bottom from sitting was given a foam patch that sticks on which worked well. having alot of pain and spasms again. was in ER yesterday. wants refill on medications for this. )    This is a very complicated 68-year-old man with multiple problems including amyotrophic lateral sclerosis, COPD with emphysema, chronic severe back pain, postherpetic neuralgia, borderline diabetes mellitus type 2.  His wife  several months ago so is now being tested by a personal care attendant several times a week.  He still having a lot of trouble with pain and spasm in his legs.  I wonder if some of that is related to his ALS.  He has had occasional falls.  He rolled out of bed yesterday was taken to the emergency room.  No serious injuries were noted.  There is no polyuria no polydipsia.  He tries to eat.  He does not choke on secretions.    Current Outpatient Medications   Medication Sig     albuterol (PROVENTIL) 2.5 mg /3 mL (0.083 %) nebulizer solution Take 2.5 mg by nebulization every 4 (four) hours as needed for shortness of breath.      baclofen (LIORESAL) 10 MG tablet Take 0.5 tablets (5 mg total) by mouth 3 (three) times a day.     budesonide (PULMICORT) 1 mg/2 mL nebulizer solution Take 2 mL by nebulization 2 (two) times a day. Mix with Duoneb     budesonide-formoterol (SYMBICORT) 160-4.5 mcg/actuation inhaler Inhale 2 puffs 2 (two) times a day.     calcium carbonate-vitamin D3 (CALCIUM 600 + D,3,) 600 mg(1,500mg) -400 unit per tablet Take 1 tablet by mouth daily.     cetirizine (ZYRTEC) 10 MG tablet Take 10 mg by mouth daily.     cetirizine (ZYRTEC) 10 MG tablet Take 1 tablet (10 mg total) by mouth daily.     cetirizine (ZYRTEC) 5 MG tablet Take 1 tablet (5 mg total) by mouth daily.     cholecalciferol, vitamin D3, 1,000 unit tablet Take 2,000 Units by mouth daily.     cyanocobalamin 1000 MCG tablet Take 1,000 mcg by mouth daily.      diphenhydrAMINE (BENADRYL) 25 mg capsule Take 50 mg by mouth every 6 (six) hours as needed for itching.     docusate sodium (COLACE) 100 MG capsule Take 100 mg by mouth 2 (two) times a day as needed for constipation.     ENSURE ACTIVE PROTEIN-MUSCLE Liqd DRINK 1 BOTTLE BY MOUTH TWICE DAILY     ergocalciferol (ERGOCALCIFEROL) 50,000 unit capsule Take 50,000 Units by mouth once a week.     folic acid (FOLVITE) 1 MG tablet Take 1 mg by mouth daily.     folic acid (FOLVITE) 1 MG tablet TAKE 1 TABLET BY MOUTH EVERY DAY     furosemide (LASIX) 20 MG tablet Take 20 mg by mouth daily.     furosemide (LASIX) 20 MG tablet TAKE 1 TABLET(20 MG) BY MOUTH DAILY     gabapentin (NEURONTIN) 300 MG capsule Take 300 mg by mouth 2 (two) times a day.     guaiFENesin (ROBITUSSIN) 100 mg/5 mL syrup Take 10 mL (200 mg total) by mouth every 4 (four) hours as needed for cough or congestion.     hydrOXYzine HCl (ATARAX) 25 MG tablet TAKE 1 TABLET(25 MG) BY MOUTH EVERY 8 HOURS AS NEEDED FOR ITCHING     ipratropium-albuterol (DUO-NEB) 0.5-2.5 mg/3 mL nebulizer Inhale 3 mL every 6 (six) hours as needed.     levalbuterol (XOPENEX HFA) 45 mcg/actuation inhaler INHALE 2 PUFFS BY MOUTH EVERY 4 HOURS AS NEEDED FOR WHEEZING     LYRICA 75 mg capsule TAKE 1 CAPSULE(75 MG) BY MOUTH THREE TIMES DAILY     meloxicam (MOBIC) 7.5 MG tablet Take 1 tablet (7.5 mg total) by mouth daily.     metFORMIN (GLUCOPHAGE) 1000 MG tablet Take 1,000 mg by mouth 2 (two) times a day with meals.     metoprolol tartrate (LOPRESSOR) 25 MG tablet TAKE 1 TABLET BY MOUTH TWICE DAILY     multivitamin (TAB-A-HIEN) per tablet Take 1 tablet by mouth daily.     naloxone (NARCAN) 4 mg/actuation nasal spray 1 spray (4 mg dose) into one nostril for opioid reversal. Call 911. May repeat if no response in 3 minutes.     nicotine (NICOTROL) 10 mg inhaler Inhale 1 puff as needed for smoking cessation.     omeprazole (PRILOSEC) 20 MG capsule Take 20 mg by mouth daily before breakfast.       oxyCODONE (ROXICODONE) 30 MG immediate release tablet Take 1 tablet (30 mg total) by mouth 3 (three) times a day.     potassium chloride SA (K-DUR,KLOR-CON) 20 MEQ tablet Take 1 tablet (20 mEq total) by mouth 2 (two) times a day.     predniSONE (DELTASONE) 5 MG tablet Take 5 mg by mouth daily.     pregabalin (LYRICA) 75 MG capsule Take 75 mg by mouth 3 (three) times a day.     tamsulosin (FLOMAX) 0.4 mg Cp24 Take 1 capsule (0.4 mg total) by mouth Daily after breakfast.     tiotropium (SPIRIVA) 18 mcg inhalation capsule Place 18 mcg into inhaler and inhale daily.     wound dressings (TRIAD WOUND DRESSING) Pste Apply 1 Tube topically 2 (two) times a day.       PSFHx: Tobacco Status:  He  reports that he has been smoking cigarettes.  He has been smoking about 1.00 pack per day. he has never used smokeless tobacco.    Review of Systems:  A comprehensive review of systems is negative except for the comments above    Objective:    There were no vitals taken for this visit.  GENERAL: No acute distress.  This is a man who is somewhat unkempt.  He does answer questions appropriately.  He says he does not have trouble swallowing but he has a cup that he spits saliva and.  His blood pressure is 126/68.  Pulse is 88 and regular.  Oxygen saturations normal at 98%.  Trace edema of the legs at 10:30 in the morning.  Lungs have bilateral squeaks and rhonchi.  Respiratory rate is 16.  Heart does show a sinus rhythm without significant murmur or gallop.  Mouth and throat seems normal.  Do not see any fasciculations legs etc.  He is in a wheelchair.  He moves his leg.  Joints seem okay no active synovitis or inflammation.  Handgrip is excellent.  Chronic minor decubitus ulcer over coccyx.    Assessment & Plan   Jef Centeno is a 68 y.o. male.    He is about the same.  I expect he is going to have a decline with his neurologic problem here in the future.  Going to check his CBC as well as a chemistry survey check an A1c though it has  been quite normal the past year.  Flu shot is given today.  I will see him again in 3 months.    Diagnoses and all orders for this visit:    ALS (amyotrophic lateral sclerosis) (H)    Lumbar stenosis with neurogenic claudication  -     oxyCODONE (ROXICODONE) 30 MG immediate release tablet; Take 1 tablet (30 mg total) by mouth 3 (three) times a day.  Dispense: 90 tablet; Refill: 0    Sweet syndrome  -     predniSONE (DELTASONE) 5 MG tablet; Take 5 mg by mouth daily.  Dispense: 30 tablet; Refill: 3    Panlobular emphysema (H)  -     HM2(CBC w/o Differential)    Type 2 diabetes mellitus with hyperglycemia, without long-term current use of insulin (H)  -     Glycosylated Hemoglobin A1c  -     Comprehensive Metabolic Panel    Other orders  -     baclofen (LIORESAL) 10 MG tablet; Take 0.5 tablets (5 mg total) by mouth 3 (three) times a day.  Dispense: 30 tablet; Refill: 0  -     Influenza High Dose, Seasonal 65+ yrs        The following high BMI interventions were performed this visit: weight monitoring    Geoff Crabtree MD  Transcription using voice recognition software, may contain typographical errors.

## 2021-06-21 NOTE — LETTER
Letter by Obdulia Prieto NP at      Author: Obdulia Prieto NP Service: -- Author Type: --    Filed:  Encounter Date: 2021 Status: (Other)       2021    [facility]  Fax: [phone] Wound Dressing Rx and Order Form  Customer Service: [phone] Order Status: New Order   Verbal: Ilene             Patient Info:  Name: Jef Centeno Jr.  : 1950  [facility]   [address] [address] [address]  Phone: [phone]      Insurance Info:  Primary: Payor: UCARE / Plan: UCARE DUAL/MSHO / Product Type: MA/MEDICARE DUAL /    Secondary: N/A N/A  [id number] - ([facility])      Physician Info:   Name:  Obdulia Prieto NP   Dept Address/Phones:   [facility] [facility] [place] [facility], [address]  [facility] [address] [phone]  Fax: [phone]    Lymphedema circumferential measurements (in cm):    Wound info:  Encounter Diagnoses   Name Primary?   ? Pressure ulcer of coccygeal region, unstageable (H)    ? Type 2 diabetes mellitus with hyperglycemia, without long-term current use of insulin (H) Yes   ? Long term systemic steroid user    ? Leg swelling    ? Wheelchair bound    ? Ulcer of right shin with fat layer exposed (H)    ? Skin ulcer of left pretibial region with fat layer exposed (H)      VASC Wound 20 right shin (Active)   Pre Size Length 2 21 1000   Pre Size Width 2 21 1000   Pre Size Depth 0.1 21 1000   Pre Total Sq cm 4 21 1000       VASC Wound 21 left shin (Active)   Pre Size Length 6 21 1000   Pre Size Width 2 21 1000   Pre Size Depth 0.1 21 1000   Pre Total Sq cm 12 21 1000         Drainage: Moderate  Thickness:  Full  Duration of Need: 30  Days Supply: 30  Start Date: 2021  Starter Kit: Ancillary Kit (saline, gloves, gauze)  Qualifying wound/Debridement Yes      Dressing Type Brand Size Number of pieces Frequency of change   Primary Mepilex bordered adhesive dressing   3''x3'' 12  Weekly    Mepilex bordered adhesive dressing   6''x6'' 12 Weekly     Note: If total out of pocket is more than $50.00 please contact the patient before processing order.     OK to forward to covered supplier.     Electronically Signed Physician: Obdulia Prieto NP Date: 1/20/2021

## 2021-06-21 NOTE — LETTER
Letter by Ruddy Alexander RN at      Author: Ruddy Alexander RN Service: -- Author Type: --    Filed:  Encounter Date: 2/17/2021 Status: (Other)       March 1, 2021      JEF BURK JRJennifer  [address]  [facility] [address]      Dear Jef:    At [facility], we are dedicated to improving your health and well-being. Enclosed is the Comprehensive Care Plan that we developed with you on 2/8/21. Please review the Care Plan carefully.    As a reminder, some of the things we discussed at your visit include:    Your physical and mental health    Ways to reduce falls    Health care needs you may have    Dont forget to contact your care coordinator if you:    Have been hospitalized or plan to be hospitalized     Have had a fall     Have experienced a change in physical health    Are experiencing emotional problems     If you do not agree with your Care Plan, have questions about it, or have experienced a change in your needs, please call me at [phone]. If you are hearing impaired, please call the [place] [employer] at 154 or [phone] (fbtacm-op-radicf relay service).    Sincerely,      Ruddy Alexander RN    E-mail: [email or web address]  Phone: [phone]    Care Manager  [facility] ([facility]) is a health plan that contracts with both Medicare and the [place] Medical Assistance (Medicaid) program to provide benefits of both programs to enrollees. Enrollment in [employer] depends on contract renewal.    [id number])     [id number] (11/18)

## 2021-06-21 NOTE — LETTER
Letter by David Shafer MD at      Author: David Shafer MD Service: -- Author Type: --    Filed:  Encounter Date: 4/21/2021 Status: (Other)       Jef Centeno Jr.  [address]  [facility] [address]      04/22/21      Dear Lady Fuchs writing to let you know that effective April 13 2021, Ill no longer provide care at [facility].  Considering the continuing coronavirus pandemic, I will be moving to [place] to be closer to my family. Thank you for the trust youve put in me; it has been a privilege to be your healthcare provider.    I encourage you to continue your care with one of my colleagues:    [facility]  ? Jackie Gutierrez CNP  ? Hanna Ozuna MD  ? Jannette Khalil MD    [facility]  ? Kathy Condon MD  ? Mercy Concepcion NP    Please note: If you currently have a prescription for medication and have no refills remaining, you need to establish care with a new provider before getting your prescription refilled.    If you have any questions or want more information about providers at our clinic or other [facility], please visit [email or web address] or call [phone] (toll-free).     Thank you for choosing [facility] for your healthcare needs.    I wish you and your family the very best of health.    Sincerely,    David Shafer MD   [facility]

## 2021-06-21 NOTE — LETTER
Letter by Obdulia Prieto NP at      Author: Obdulia Prieto NP Service: -- Author Type: --    Filed:  Encounter Date: 2020 Status: (Other)       2020    [facility]  Fax: [phone] Wound Dressing Rx and Order Form  Customer Service: [phone] Order Status: New Order   Verbal: Ilene             Patient Info:  Name: Jef Centeno Jr.  : 1950  Address:   [address] [place], [place] [address] [place] [address] [address]  Phone: [phone]      Insurance Info:  Primary: Payor: UCARE / Plan: UCARE DUAL/MSHO / Product Type: MA/MEDICARE DUAL /    Secondary: N/A N/A  [id number] - ([facility])    Physician Info:   Name:  Obdulia Prieto NP   Dept Address/Phones:   [facility] [facility], [address]  [facility] [address] [phone]  Fax: [phone]    Lymphedema circumferential measurements (in cm):  No data recorded  No data recorded  No data recorded  No data recorded  No data recorded  No data recorded  No data recorded  No data recorded  No data recorded  No data recorded  No data recorded  No data recorded  No data recorded  No data recorded  No data recorded  No data recorded    Wound info:  Encounter Diagnoses   Name Primary?   ? Pressure ulcer of coccygeal region, unstageable (H) Yes   ? Type 2 diabetes mellitus with hyperglycemia, without long-term current use of insulin (H)    ? Long term systemic steroid user    ? Leg swelling    ? Wheelchair bound    ? Ulcer of right shin with fat layer exposed (H)      VASC Wound 20 right shin (Active)   Pre Size Length 3 20 1100   Pre Size Width 1 20 1100   Pre Size Depth 0.1 20 1100   Pre Total Sq cm 3 20 1100         Drainage: Moderate  Thickness:  Full  Duration of Need: 30  Days Supply: 30  Start Date: 2020  Starter Kit: Ancillary Kit (saline, gloves, gauze)  Qualifying wound/Debridement Yes      Dressing Type Brand Size Number of pieces Frequency of change    Primary Mepilex bordered adhesive  dressing   4''x4'' 12 Twice a week        Note: If total out of pocket is more than $50.00 please contact the patient before processing order.     OK to forward to covered supplier.    Electronically Signed Physician: Obdulia Prieto NP Date: 12/9/2020

## 2021-06-22 NOTE — PROGRESS NOTES
OFFICE VISIT NOTE    Subjective:   Chief Complaint:  Follow-up    68-year-old man with multiple problems including ALS, COPD with continued smoking, borderline diabetes mellitus, congestive heart failure, chronic severe back and neck pain.  Also has a painful decubitus over the sacrum-coccyx region.  He is more more wheelchair-bound.  He continues to be plagued by pain.  Most the pain is in the buttock region as well as the back and neck.  He has been seen at the pain clinic.  He had epidural steroids.  He is not getting much relief.  He still uses oxycodone 30 mg 3 times daily.    Current Outpatient Medications   Medication Sig     albuterol (PROVENTIL) 2.5 mg /3 mL (0.083 %) nebulizer solution Take 2.5 mg by nebulization every 4 (four) hours as needed for shortness of breath.      baclofen (LIORESAL) 10 MG tablet Take 0.5 tablets (5 mg total) by mouth 3 (three) times a day.     budesonide (PULMICORT) 1 mg/2 mL nebulizer solution Take 2 mL by nebulization 2 (two) times a day. Mix with Duoneb     budesonide-formoterol (SYMBICORT) 160-4.5 mcg/actuation inhaler Inhale 2 puffs 2 (two) times a day.     calcium carbonate-vitamin D3 (CALCIUM 600 + D,3,) 600 mg(1,500mg) -400 unit per tablet Take 1 tablet by mouth daily.     calcium carbonate-vitamin D3 (CALTRATE 600 PLUS D3) 600 mg(1,500mg) -400 unit per tablet TAKE 1 TABLET BY MOUTH DAILY     cetirizine (ZYRTEC) 10 MG tablet Take 1 tablet (10 mg total) by mouth daily.     cholecalciferol, vitamin D3, 1,000 unit tablet Take 2,000 Units by mouth daily.     cyanocobalamin 1000 MCG tablet Take 1,000 mcg by mouth daily.     DAILY-HIEN tablet TAKE 1 TABLET BY MOUTH EVERY DAY.     diphenhydrAMINE (BENADRYL) 25 mg capsule Take 50 mg by mouth every 6 (six) hours as needed for itching.     docusate sodium (COLACE) 100 MG capsule Take 100 mg by mouth 2 (two) times a day as needed for constipation.     ENSURE ACTIVE PROTEIN-MUSCLE Liqd DRINK 1 BOTTLE BY MOUTH TWICE DAILY      ergocalciferol (ERGOCALCIFEROL) 50,000 unit capsule Take 50,000 Units by mouth once a week.     folic acid (FOLVITE) 1 MG tablet Take 1 mg by mouth daily.     furosemide (LASIX) 20 MG tablet Take 20 mg by mouth daily.     gabapentin (NEURONTIN) 300 MG capsule Take 300 mg by mouth 2 (two) times a day.     guaiFENesin (ROBITUSSIN) 100 mg/5 mL syrup Take 10 mL (200 mg total) by mouth every 4 (four) hours as needed for cough or congestion.     hydrOXYzine HCl (ATARAX) 25 MG tablet TAKE 1 TABLET(25 MG) BY MOUTH EVERY 8 HOURS AS NEEDED FOR ITCHING     ipratropium-albuterol (DUO-NEB) 0.5-2.5 mg/3 mL nebulizer Inhale 3 mL every 6 (six) hours as needed.     levalbuterol (XOPENEX HFA) 45 mcg/actuation inhaler INHALE 2 PUFFS BY MOUTH EVERY 4 HOURS AS NEEDED FOR WHEEZING     LYRICA 75 mg capsule TAKE 1 CAPSULE(75 MG) BY MOUTH THREE TIMES DAILY     meloxicam (MOBIC) 7.5 MG tablet Take 1 tablet (7.5 mg total) by mouth daily.     metFORMIN (GLUCOPHAGE) 1000 MG tablet Take 1,000 mg by mouth 2 (two) times a day with meals.     metoprolol tartrate (LOPRESSOR) 25 MG tablet Take 1 tablet (25 mg total) by mouth 2 (two) times a day.     multivitamin (TAB-A-HIEN) per tablet Take 1 tablet by mouth daily.     naloxone (NARCAN) 4 mg/actuation nasal spray 1 spray (4 mg dose) into one nostril for opioid reversal. Call 911. May repeat if no response in 3 minutes.     nicotine (NICOTROL) 10 mg inhaler Inhale 1 puff as needed for smoking cessation.     omeprazole (PRILOSEC) 20 MG capsule Take 20 mg by mouth daily before breakfast.      oxyCODONE (ROXICODONE) 30 MG immediate release tablet Take 1 tablet (30 mg total) by mouth 3 (three) times a day.     potassium chloride SA (K-DUR,KLOR-CON) 20 MEQ tablet Take 1 tablet (20 mEq total) by mouth 2 (two) times a day.     predniSONE (DELTASONE) 5 MG tablet Take 5 mg by mouth daily.     pregabalin (LYRICA) 75 MG capsule Take 75 mg by mouth 3 (three) times a day.     tamsulosin (FLOMAX) 0.4 mg Cp24 Take 1  capsule (0.4 mg total) by mouth Daily after breakfast.     tiotropium (SPIRIVA) 18 mcg inhalation capsule Place 18 mcg into inhaler and inhale daily.     wound dressings (TRIAD WOUND DRESSING) Pste Apply 1 Tube topically 2 (two) times a day.     lidocaine (LIDODERM) 5 % Remove & Discard patch within 12 hours or as directed by MD Jimenez: Tobacco Status:  He  reports that he has been smoking cigarettes.  He has been smoking about 1.00 pack per day. he has never used smokeless tobacco.    Review of Systems:  A comprehensive review of systems is negative except for the comments above    Objective:    Pulse 83   SpO2 96%   GENERAL: No acute distress.  Is in a wheelchair.  He can barely stand up.  He is a wide awake alert and answers questions appropriately.  Blood pressure 108/60.  Pulse is 84.  Oxygen saturations 97% on room air.  1+ edema of the feet.  Lungs have rhonchi which partially clear with coughing.  Heart does show continued sinus rhythm.  No gallop.  No JVD.  No ascites.  He is wearing a pad over the small decubitus ulcer overlying the coccyx.  There is no evidence of abscess.    Assessment & Plan   Jef Centeno is a 68 y.o. male.    Clinically he is stable but he has multiple severe chronic problems.  He also has a LS which may be the most severe problem of all.  His major complaint is continued pain in the back and buttock region.  He seen the pain clinic.  He is continued with Roxicodone.  We will try a Lidoderm patch on his lower back since he says that does help him.  I will check a CBC and a basic metabolic profile.  He takes low-dose prednisone for history of sweet syndrome.  That was diagnosed was made years ago before I first saw him.  He has any anginal  Still smokes 1/2 pack cigarettes per day.  He is trying to cut it back.  He still requires a home health aide.  Eventually, he may need to get into a care facility.    Diagnoses and all orders for this visit:    ALS (amyotrophic lateral  sclerosis) (H)  -     Basic Metabolic Panel  -     HM2(CBC w/o Differential)    Sweet syndrome  -     predniSONE (DELTASONE) 5 MG tablet  Dispense: 30 tablet; Refill: 3    ASHD (arteriosclerotic heart disease)    Chronic obstructive pulmonary disease with acute exacerbation (H)    Lumbar stenosis with neurogenic claudication    Postherpetic neuralgia  -     lidocaine (LIDODERM) 5 %  Dispense: 30 patch; Refill: 2            Geoff Crabtree MD  Transcription using voice recognition software, may contain typographical errors.

## 2021-06-23 ENCOUNTER — COMMUNICATION - HEALTHEAST (OUTPATIENT)
Dept: HOME HEALTH SERVICES | Facility: HOME HEALTH | Age: 71
End: 2021-06-23

## 2021-06-23 ENCOUNTER — RECORDS - HEALTHEAST (OUTPATIENT)
Dept: PHARMACY | Facility: HOSPITAL | Age: 71
End: 2021-06-23

## 2021-06-23 DIAGNOSIS — I50.9 EDEMA DUE TO CONGESTIVE HEART FAILURE (H): ICD-10-CM

## 2021-06-23 NOTE — TELEPHONE ENCOUNTER
RN cannot approve Refill Request: Metformin    RN can NOT refill this medication PCP messaged that patient is overdue for Labs. Last office visit: 4/22/2016 Vineet Javed MD Last Physical: Visit date not found Last MTM visit: Visit date not found Last visit same specialty: 1/8/2019 Geoff Crabtree MD.  Next visit within 3 mo: Visit date not found  Next physical within 3 mo: Visit date not found      Ghislaine Kern, [facility] Triage/Med Refill 1/12/2019    Requested Prescriptions   Pending Prescriptions Disp Refills     metFORMIN (GLUCOPHAGE) 1000 MG tablet [Pharmacy Med Name: METFORMIN 1000MG TABLETS] 180 tablet 0     Sig: TAKE 1 TABLET BY MOUTH TWICE DAILY    Metformin Refill Protocol Failed - 1/12/2019 11:30 AM       Failed - Microalbumin in last year     Microalbumin, Random Urine   Date Value Ref Range Status   12/29/2015 0.93 0.00 - 1.99 mg/dL Final                 Passed - Blood pressure in last 12 months    BP Readings from Last 1 Encounters:   01/08/19 108/60            Passed - LFT or AST or ALT in last 12 months    Albumin   Date Value Ref Range Status   11/14/2018 3.1 (L) 3.5 - 5.0 g/dL Final     Bilirubin, Total   Date Value Ref Range Status   11/14/2018 0.4 0.0 - 1.0 mg/dL Final     Bilirubin, Direct   Date Value Ref Range Status   02/07/2018 0.2 <=0.5 mg/dL Final     Alkaline Phosphatase   Date Value Ref Range Status   11/14/2018 73 45 - 120 U/L Final     AST   Date Value Ref Range Status   11/14/2018 13 0 - 40 U/L Final     ALT   Date Value Ref Range Status   11/14/2018 <9 0 - 45 U/L Final     Protein, Total   Date Value Ref Range Status   11/14/2018 5.8 (L) 6.0 - 8.0 g/dL Final               Passed - GFR or Serum Creatinine in last 6 months    GFR MDRD Non Af Amer   Date Value Ref Range Status   01/08/2019 >60 >60 mL/min/1.73m2 Final     GFR MDRD Af Amer   Date Value Ref Range Status   01/08/2019 >60 >60 mL/min/1.73m2 Final            Passed - Visit with PCP or prescribing provider  visit in last 6 months or next 3 months    Last office visit with prescriber/PCP: Visit date not found OR same dept: 1/8/2019 Geoff Crabtree MD OR same specialty: 1/8/2019 Geoff Crabtree MD Last physical: Visit date not found Last MTM visit: Visit date not found         Next appt within 3 mo: Visit date not found  Next physical within 3 mo: Visit date not found  Prescriber OR PCP: Vineet Javed MD  Last diagnosis associated with med order: 1. Type 2 diabetes mellitus with hyperglycemia, without long-term current use of insulin (H)  - metFORMIN (GLUCOPHAGE) 1000 MG tablet [Pharmacy Med Name: METFORMIN 1000MG TABLETS]; TAKE 1 TABLET BY MOUTH TWICE DAILY  Dispense: 180 tablet; Refill: 0    2. Lumbar stenosis with neurogenic claudication  - LYRICA 75 mg capsule [Pharmacy Med Name: LYRICA 75MG CAPSULES]; TAKE 1 CAPSULE(75 MG) BY MOUTH THREE TIMES DAILY  Dispense: 90 capsule; Refill: 0     If protocol passes may refill for 12 months if within 3 months of last provider visit (or a total of 15 months).          Passed - A1C in last 6 months    Hemoglobin A1c   Date Value Ref Range Status   11/14/2018 5.7 3.5 - 6.0 % Final               LYRICA 75 mg capsule [Pharmacy Med Name: LYRICA 75MG CAPSULES] 90 capsule 0     Sig: TAKE 1 CAPSULE(75 MG) BY MOUTH THREE TIMES DAILY    Controlled Substances Refill Protocol Failed - 1/12/2019 11:30 AM       Failed - Route all Controlled Substance Requests to Provider       Failed - Patient has controlled substance agreement in past 12 months    Encounter-Level CSA Scan Date:    There are no encounter-level csa scan date.              Passed - Visit with PCP or prescribing provider visit in past 12 months     Last office visit with prescriber/PCP: 4/22/2016 Vineet Javed MD OR same dept: 1/8/2019 Geoff Crabtree MD OR same specialty: 1/8/2019 Geoff Crabtree MD Last physical: Visit date not found Last MTM visit: Visit date not found    Next visit within 3 mo: Visit date  not found  Next physical within 3 mo: Visit date not found  Prescriber OR PCP: Vineet Javed MD  Last diagnosis associated with med order: 1. Type 2 diabetes mellitus with hyperglycemia, without long-term current use of insulin (H)  - metFORMIN (GLUCOPHAGE) 1000 MG tablet [Pharmacy Med Name: METFORMIN 1000MG TABLETS]; TAKE 1 TABLET BY MOUTH TWICE DAILY  Dispense: 180 tablet; Refill: 0    2. Lumbar stenosis with neurogenic claudication  - LYRICA 75 mg capsule [Pharmacy Med Name: LYRICA 75MG CAPSULES]; TAKE 1 CAPSULE(75 MG) BY MOUTH THREE TIMES DAILY  Dispense: 90 capsule; Refill: 0

## 2021-06-23 NOTE — TELEPHONE ENCOUNTER
RN cannot approve Refill Request    RN can NOT refill this medication med is not covered by policy/route to provider and historical medication requested.     Last office visit: 4/22/2016 Vineet Javed MD Last Physical: Visit date not found Last MTM visit: Visit date not found Last visit same specialty: 1/8/2019 Geoff Crabtree MD.  Next visit within 3 mo: Visit date not found  Next physical within 3 mo: Visit date not found      Brian Emanuel, [facility] Connection Triage/Med Refill 1/16/2019    Requested Prescriptions   Pending Prescriptions Disp Refills     folic acid (FOLVITE) 1 MG tablet [Pharmacy Med Name: FOLIC ACID 1MG TABLETS] 90 tablet 0     Sig: TAKE 1 TABLET BY MOUTH EVERY DAY    There is no refill protocol information for this order

## 2021-06-23 NOTE — TELEPHONE ENCOUNTER
Refill Approved for potassium and Ensure.  However gabapentin appears to be initiated by an external provider -> not refillable per RN protocol -> needs Dr Crabtree's order to initiate into current med list if intended for continuation.  Thank you-    Medications last renewed on 12/5/2017.  Last OV 1/8/2019.    Leilani Lester, [facility] Triage/Med Refill 2/11/2019     Requested Prescriptions   Pending Prescriptions Disp Refills     gabapentin (NEURONTIN) 300 MG capsule [Pharmacy Med Name: GABAPENTIN 300MG CAPSULES] 180 capsule 0     Sig: TAKE 1 CAPSULE BY MOUTH TWICE DAILY    Gabapentin/Levetiracetam/Tiagabine Refill Protocol  Passed - 2/8/2019  5:02 AM       Passed - PCP or prescribing provider visit in past 12 months or next 3 months    Last office visit with prescriber/PCP: 1/8/2019 Geoff Crabtree MD OR same dept: 1/8/2019 Geoff Crabtree MD OR same specialty: 1/8/2019 Geoff Crabtree MD  Last physical: Visit date not found Last MTM visit: Visit date not found   Next visit within 3 mo: Visit date not found  Next physical within 3 mo: Visit date not found  Prescriber OR PCP: Geoff Crabtree MD  Last diagnosis associated with med order: 1. Neck pain  - gabapentin (NEURONTIN) 300 MG capsule [Pharmacy Med Name: GABAPENTIN 300MG CAPSULES]; TAKE 1 CAPSULE BY MOUTH TWICE DAILY  Dispense: 180 capsule; Refill: 0    2. Chronic obstructive pulmonary disease with acute exacerbation (H)  - ENSURE ENLIVE 0.08 gram-1.5 kcal/mL Liqd [Pharmacy Med Name: ENSURE ENLIVE STRAWBERRY LIQUID]; DRINK 1 BOTTLE BY MOUTH TWICE DAILY  Dispense: 14,220 mL; Refill: 0    3. Edema due to congestive heart failure (H)  - potassium chloride (K-DUR,KLOR-CON) 20 MEQ tablet [Pharmacy Med Name: POTASSIUM CL 20MEQ ER TABLETS]; TAKE 1 TABLET(20 MEQ) BY MOUTH TWICE DAILY  Dispense: 180 tablet; Refill: 0    If protocol passes may refill for 12 months if within 3 months of last provider visit (or a total of 15 months).              ENSURE ENLIVE 0.08 gram-1.5 kcal/mL Liqd [Pharmacy Med Name: ENSURE ENLIVE STRAWBERRY LIQUID] 19525 mL 0     Sig: DRINK 1 BOTTLE BY MOUTH TWICE DAILY    There is no refill protocol information for this order        potassium chloride (K-DUR,KLOR-CON) 20 MEQ tablet [Pharmacy Med Name: POTASSIUM CL 20MEQ ER TABLETS] 180 tablet 0     Sig: TAKE 1 TABLET(20 MEQ) BY MOUTH TWICE DAILY    Potassium Supplements Refill Protocol Passed - 2/8/2019  5:02 AM       Passed - PCP or prescribing provider visit in past 12 months      Last office visit with prescriber/PCP: 1/8/2019 Geoff Crabtree MD OR same dept: 1/8/2019 Geoff Crabtree MD OR same specialty: 1/8/2019 Geoff Crabtree MD  Last physical: Visit date not found Last MTM visit: Visit date not found   Next visit within 3 mo: Visit date not found  Next physical within 3 mo: Visit date not found  Prescriber OR PCP: Geoff Crabtree MD  Last diagnosis associated with med order: 1. Neck pain  - gabapentin (NEURONTIN) 300 MG capsule [Pharmacy Med Name: GABAPENTIN 300MG CAPSULES]; TAKE 1 CAPSULE BY MOUTH TWICE DAILY  Dispense: 180 capsule; Refill: 0    2. Chronic obstructive pulmonary disease with acute exacerbation (H)  - ENSURE ENLIVE 0.08 gram-1.5 kcal/mL Liqd [Pharmacy Med Name: ENSURE ENLIVE STRAWBERRY LIQUID]; DRINK 1 BOTTLE BY MOUTH TWICE DAILY  Dispense: 14,220 mL; Refill: 0    3. Edema due to congestive heart failure (H)  - potassium chloride (K-DUR,KLOR-CON) 20 MEQ tablet [Pharmacy Med Name: POTASSIUM CL 20MEQ ER TABLETS]; TAKE 1 TABLET(20 MEQ) BY MOUTH TWICE DAILY  Dispense: 180 tablet; Refill: 0    If protocol passes may refill for 12 months if within 3 months of last provider visit (or a total of 15 months).            Passed - Potassium level in last 12 months    Lab Results   Component Value Date    Potassium 3.5 01/08/2019

## 2021-06-23 NOTE — TELEPHONE ENCOUNTER
Controlled Substance Refill Request: Lyrica  Medication:   Requested Prescriptions     Pending Prescriptions Disp Refills     metFORMIN (GLUCOPHAGE) 1000 MG tablet [Pharmacy Med Name: METFORMIN 1000MG TABLETS] 180 tablet 0     Sig: TAKE 1 TABLET BY MOUTH TWICE DAILY     LYRICA 75 mg capsule [Pharmacy Med Name: LYRICA 75MG CAPSULES] 90 capsule 0     Sig: TAKE 1 CAPSULE(75 MG) BY MOUTH THREE TIMES DAILY     Date Last Fill: 10/17/2018 #90  Pharmacy:[facility] [place] [address]   Submit electronically to pharmacy  Controlled Substance Agreement on File:   Encounter-Level CSA Scan Date:    There are no encounter-level csa scan date.       Last office visit:1/8/2019. Last office visit pertaining to requested medication was ? Chart reviewed to 1/17/2018 and it was not found.,

## 2021-06-23 NOTE — TELEPHONE ENCOUNTER
Controlled Substance Refill Request  Medication Name:   Requested Prescriptions     Pending Prescriptions Disp Refills     oxyCODONE (ROXICODONE) 30 MG immediate release tablet 90 tablet 0     Sig: Take 1 tablet (30 mg total) by mouth 3 (three) times a day.     Date Last Fill: 1/8/19  Pharmacy: [facility]      Submit electronically to pharmacy  Controlled Substance Agreement Date Scanned:   Encounter-Level CSA Scan Date:    There are no encounter-level csa scan date.       Last office visit with prescriber/PCP: 1/8/2019 Geoff Crabtree MD OR same dept: 1/8/2019 Geoff Crabtree MD OR same specialty: 1/8/2019 Geoff Crabtree MD  Last physical: Visit date not found Last MTM visit: Visit date not found

## 2021-06-23 NOTE — TELEPHONE ENCOUNTER
Prescription Monitoring Program activity reviewed with no discrepancies noted.  Last fill per : 1/8/19 #90    Controlled Substance Agreement on file: No  Date:     Last office visit with provider:  1/8/2019 Geoff Crabtree MD    Please advise.

## 2021-06-23 NOTE — TELEPHONE ENCOUNTER
RN cannot approve Refill Request    RN can NOT refill this medication historical medication requested. Last office visit: 1/8/2019 Geoff Crabtree MD Last Physical: Visit date not found Last MTM visit: Visit date not found Last visit same specialty: 1/8/2019 Geoff Crabtree MD.  Next visit within 3 mo: Visit date not found  Next physical within 3 mo: Visit date not found  Last OV 1/8/2019    Mony Fox, [facility] Triage/Med Refill 1/31/2019    Requested Prescriptions   Pending Prescriptions Disp Refills     budesonide-formoterol (SYMBICORT) 160-4.5 mcg/actuation inhaler [Pharmacy Med Name: SYMBICORT 160/4.5MCG (120 ORAL INH)]  0     Sig: INHALE 2 PUFFS BY MOUTH TWICE DAILY    There is no refill protocol information for this order            Cartilage Graft Text: The defect edges were debeveled with a #15 scalpel blade.  Given the location of the defect, shape of the defect, the fact the defect involved a full thickness cartilage defect a cartilage graft was deemed most appropriate.  An appropriate donor site was identified, cleansed, and anesthetized. The cartilage graft was then harvested and transferred to the recipient site, oriented appropriately and then sutured into place.  The secondary defect was then repaired using a primary closure.

## 2021-06-24 ENCOUNTER — COMMUNICATION - HEALTHEAST (OUTPATIENT)
Dept: FAMILY MEDICINE | Facility: CLINIC | Age: 71
End: 2021-06-24

## 2021-06-24 NOTE — TELEPHONE ENCOUNTER
RN cannot approve Refill Request    RN can NOT refill this medication med is not covered by policy/route to provider. Last office visit: 2/13/2019 Geoff Crabtree MD Last Physical: Visit date not found Last MTM visit: Visit date not found Last visit same specialty: Visit date not found.  Next visit within 3 mo: Visit date not found  Next physical within 3 mo: Visit date not found      Obdulia Noguera, [facility] Triage/Med Refill 2/27/2019    Requested Prescriptions   Pending Prescriptions Disp Refills     nystatin (MYCOSTATIN) 100,000 unit/mL suspension [Pharmacy Med Name: NYSTATIN ORAL SUSP 460938Z/ML 60ML] 60 mL 0     Sig: SHAKE LIQUID AND TAKE 5 ML BY MOUTH FOUR TIMES DAILY    There is no refill protocol information for this order

## 2021-06-24 NOTE — TELEPHONE ENCOUNTER
Patient notified and will just hang on to the medication until he needs another joint injection.    Tarah Davila, CMA

## 2021-06-24 NOTE — TELEPHONE ENCOUNTER
Refill Request  Did you contact pharmacy: No  Medication name:   oxyCODONE (ROXICODONE) 30 MG immediate release tablet  predniSONE (DELTASONE) 5 MG tablet  Who prescribed the medication: Dr. Geoff Crabtree  Pharmacy Name and Location: [id number] - [place], [place] - [address] AT [facility]  Is patient out of medication: No, has enough oxycodone to last until March 1 and has 3 left of his prednisone  Patient notified refills processed in 72 hours:  yes  Okay to leave a detailed message: yes

## 2021-06-24 NOTE — PROGRESS NOTES
OFFICE VISIT NOTE    Subjective:   Chief Complaint:  No chief complaint on file.    This is a 68-year-old male with multiple problems including COPD, ASHD, possible ACL as per neurology, chronic neck pain from postherpetic neuralgia as well as cervical myelopathy.  Also has a small decubitus ulcer overlying the buttock region.  This is slowly improving with application of dressings daily.  No polyuria polydipsia.  No hypoglycemia.  He is trying to cut down on smoking and is down to the third of a pack of cigarettes per day.  He denies any chest pain.  Denies increasing dyspnea.    Current Outpatient Medications   Medication Sig     albuterol (PROVENTIL) 2.5 mg /3 mL (0.083 %) nebulizer solution Take 2.5 mg by nebulization every 4 (four) hours as needed for shortness of breath.      baclofen (LIORESAL) 10 MG tablet Take 0.5 tablets (5 mg total) by mouth 3 (three) times a day.     budesonide (PULMICORT) 1 mg/2 mL nebulizer solution Take 2 mL by nebulization 2 (two) times a day. Mix with Duoneb     budesonide-formoterol (SYMBICORT) 160-4.5 mcg/actuation inhaler Inhale 2 puffs 2 (two) times a day.     budesonide-formoterol (SYMBICORT) 160-4.5 mcg/actuation inhaler INHALE 2 PUFFS BY MOUTH TWICE DAILY     calcium carbonate-vitamin D3 (CALCIUM 600 + D,3,) 600 mg(1,500mg) -400 unit per tablet Take 1 tablet by mouth daily.     calcium carbonate-vitamin D3 (CALTRATE 600 PLUS D3) 600 mg(1,500mg) -400 unit per tablet TAKE 1 TABLET BY MOUTH DAILY     cetirizine (ZYRTEC) 10 MG tablet Take 1 tablet (10 mg total) by mouth daily.     cholecalciferol, vitamin D3, 1,000 unit tablet Take 2,000 Units by mouth daily.     cyanocobalamin 1000 MCG tablet Take 1,000 mcg by mouth daily.     DAILY-HIEN tablet TAKE 1 TABLET BY MOUTH EVERY DAY.     diphenhydrAMINE (BENADRYL) 25 mg capsule Take 50 mg by mouth every 6 (six) hours as needed for itching.     docusate sodium (COLACE) 100 MG capsule Take 100 mg by mouth 2 (two) times a day as needed  for constipation.     ENSURE ACTIVE PROTEIN-MUSCLE Liqd DRINK 1 BOTTLE BY MOUTH TWICE DAILY     ENSURE ENLIVE 0.08 gram-1.5 kcal/mL Liqd DRINK 1 BOTTLE BY MOUTH TWICE DAILY     ergocalciferol (ERGOCALCIFEROL) 50,000 unit capsule Take 50,000 Units by mouth once a week.     folic acid (FOLVITE) 1 MG tablet Take 1 mg by mouth daily.     folic acid (FOLVITE) 1 MG tablet TAKE 1 TABLET BY MOUTH EVERY DAY     furosemide (LASIX) 20 MG tablet Take 20 mg by mouth daily.     gabapentin (NEURONTIN) 300 MG capsule Take 300 mg by mouth 2 (two) times a day.     gabapentin (NEURONTIN) 300 MG capsule TAKE 1 CAPSULE BY MOUTH TWICE DAILY     guaiFENesin (ROBITUSSIN) 100 mg/5 mL syrup Take 10 mL (200 mg total) by mouth every 4 (four) hours as needed for cough or congestion.     hydrOXYzine HCl (ATARAX) 25 MG tablet TAKE 1 TABLET(25 MG) BY MOUTH EVERY 8 HOURS AS NEEDED FOR ITCHING     ipratropium-albuterol (DUO-NEB) 0.5-2.5 mg/3 mL nebulizer Inhale 3 mL every 6 (six) hours as needed.     levalbuterol (XOPENEX HFA) 45 mcg/actuation inhaler INHALE 2 PUFFS BY MOUTH EVERY 4 HOURS AS NEEDED FOR WHEEZING     lidocaine (LIDODERM) 5 % Remove & Discard patch within 12 hours or as directed by MD     LYPETEY 75 mg capsule TAKE 1 CAPSULE(75 MG) BY MOUTH THREE TIMES DAILY     meloxicam (MOBIC) 7.5 MG tablet Take 1 tablet (7.5 mg total) by mouth daily.     metFORMIN (GLUCOPHAGE) 1000 MG tablet Take 1,000 mg by mouth 2 (two) times a day with meals.     metFORMIN (GLUCOPHAGE) 1000 MG tablet TAKE 1 TABLET BY MOUTH TWICE DAILY     metoprolol tartrate (LOPRESSOR) 25 MG tablet Take 1 tablet (25 mg total) by mouth 2 (two) times a day.     multivitamin (TAB-A-HIEN) per tablet Take 1 tablet by mouth daily.     naloxone (NARCAN) 4 mg/actuation nasal spray 1 spray (4 mg dose) into one nostril for opioid reversal. Call 911. May repeat if no response in 3 minutes.     nicotine (NICOTROL) 10 mg inhaler Inhale 1 puff as needed for smoking cessation.     nystatin  (MYCOSTATIN) 100,000 unit/mL suspension SHAKE LIQUID AND TAKE 5 ML BY MOUTH FOUR TIMES DAILY     omeprazole (PRILOSEC) 20 MG capsule Take 20 mg by mouth daily before breakfast.      oxyCODONE (ROXICODONE) 30 MG immediate release tablet Take 1 tablet (30 mg total) by mouth 3 (three) times a day.     potassium chloride (K-DUR,KLOR-CON) 20 MEQ tablet TAKE 1 TABLET(20 MEQ) BY MOUTH TWICE DAILY     predniSONE (DELTASONE) 5 MG tablet Take 5 mg by mouth daily.     pregabalin (LYRICA) 75 MG capsule Take 75 mg by mouth 3 (three) times a day.     tamsulosin (FLOMAX) 0.4 mg Cp24 Take 1 capsule (0.4 mg total) by mouth Daily after breakfast.     tiotropium (SPIRIVA) 18 mcg inhalation capsule Place 18 mcg into inhaler and inhale daily.     wound dressings (TRIAD WOUND DRESSING) Pste Apply 1 Tube topically 2 (two) times a day.       PSFHx: Tobacco Status:  He  reports that he has been smoking cigarettes.  He has been smoking about 1.00 pack per day. he has never used smokeless tobacco.    Review of Systems:  A comprehensive review of systems is negative except for the comments above    Objective:    There were no vitals taken for this visit.  GENERAL: No acute distress.  Weight is stable.  Vital signs are stable.  Pulse 76.  Oxygen saturation 96% on.  Blood pressure 1/26/1980.  1+ edema of the legs.  Decubitus over the coccyx is slowly getting smaller.  Lungs have decreased breath sounds and some rhonchi consistent with COPD.  Heart shows a persistent sinus rhythm.  No significant gallop.  Decreased range of motion of the cervical osteoarthritis and myelopathy.  He has had neck surgery in the past.  Normal monofilament fiber sensation.  Assessment & Plan   Jef Centeno is a 68 y.o. male.    Clinically he is stable.  He would like to continue driving and I think this is okay.  Vision is excellent.  He has good motion and strength of the upper and lower extremities.  Monofilament fiber sensation is normal feet suggesting no  significant neuropathy.  Check hemoglobin A1c.  Continue to wean off tobacco.  Continue to apply dressings daily to the decubitus ulcer over the coccyx.  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

## 2021-06-24 NOTE — TELEPHONE ENCOUNTER
Controlled Substance Refill Request  Medication Name:   Requested Prescriptions     Pending Prescriptions Disp Refills     oxyCODONE (ROXICODONE) 30 MG immediate release tablet 90 tablet 0     Sig: Take 1 tablet (30 mg total) by mouth 3 (three) times a day.     Date Last Fill: 1/30/2019  Pharmacy: [facility]      Submit electronically to pharmacy  Controlled Substance Agreement Date Scanned:   Encounter-Level CSA Scan Date:    There are no encounter-level csa scan date.       Last office visit with prescriber/PCP: 2/13/2019 Geoff Crabtree MD OR same dept: 2/13/2019 Geoff Crabtree MD OR same specialty: 2/13/2019 Geoff Crabtree MD  Last physical: Visit date not found Last MTM visit: Visit date not found

## 2021-06-24 NOTE — TELEPHONE ENCOUNTER
Prescription Monitoring Program activity reviewed with no discrepancies noted.  Last fill per : 2/1/19    Controlled Substance Agreement on file: No  Date: NA     Last office visit with provider:  2/13/2019 Geoff Crabtree MD    Please advise.

## 2021-06-24 NOTE — TELEPHONE ENCOUNTER
Central PA team  [phone]  Pool: HE PA MED ([id number])          PA has been initiated.       PA form completed and faxed insurance via Cover My Meds     Key:  [id number]    Medication:  OXYCODONE 30MG IR  Insurance:   [employer]         Response will be received via fax and may take up to 5-10 business days depending on plan

## 2021-06-24 NOTE — TELEPHONE ENCOUNTER
That order must be a mistake.  I gave him an injection in his shoulder.  Not sure how the pharmacist got the order.  Triamcinolone was injected intra-articular.

## 2021-06-24 NOTE — PROGRESS NOTES
OFFICE VISIT NOTE    Subjective:   Chief Complaint:  Follow-up (paperwork and shoulder pain)    88-year-old man with multiple problems including ALS and COPD.  Also has a decubitus ulcer over the sacrum.  He is in today with complaint of persistent pain in the left shoulder.  He had a rotator cuff tendinitis in this region in the past.  He is requesting a cortisone shot which has given him past.  He otherwise is doing well.  He says that the decubitus ulcer slowly healing he has less pain and pressure.  He is down to 5 cigarettes/day.  He is still driving his car.    Current Outpatient Medications   Medication Sig     albuterol (PROVENTIL) 2.5 mg /3 mL (0.083 %) nebulizer solution Take 2.5 mg by nebulization every 4 (four) hours as needed for shortness of breath.      baclofen (LIORESAL) 10 MG tablet Take 0.5 tablets (5 mg total) by mouth 3 (three) times a day.     budesonide (PULMICORT) 1 mg/2 mL nebulizer solution Take 2 mL by nebulization 2 (two) times a day. Mix with Duoneb     budesonide-formoterol (SYMBICORT) 160-4.5 mcg/actuation inhaler Inhale 2 puffs 2 (two) times a day.     budesonide-formoterol (SYMBICORT) 160-4.5 mcg/actuation inhaler INHALE 2 PUFFS BY MOUTH TWICE DAILY     calcium carbonate-vitamin D3 (CALCIUM 600 + D,3,) 600 mg(1,500mg) -400 unit per tablet Take 1 tablet by mouth daily.     calcium carbonate-vitamin D3 (CALTRATE 600 PLUS D3) 600 mg(1,500mg) -400 unit per tablet TAKE 1 TABLET BY MOUTH DAILY     cetirizine (ZYRTEC) 10 MG tablet Take 1 tablet (10 mg total) by mouth daily.     cholecalciferol, vitamin D3, 1,000 unit tablet Take 2,000 Units by mouth daily.     cyanocobalamin 1000 MCG tablet Take 1,000 mcg by mouth daily.     DAILY-HIEN tablet TAKE 1 TABLET BY MOUTH EVERY DAY.     diphenhydrAMINE (BENADRYL) 25 mg capsule Take 50 mg by mouth every 6 (six) hours as needed for itching.     docusate sodium (COLACE) 100 MG capsule Take 100 mg by mouth 2 (two) times a day as needed for  constipation.     ENSURE ACTIVE PROTEIN-MUSCLE Liqd DRINK 1 BOTTLE BY MOUTH TWICE DAILY     ENSURE ENLIVE 0.08 gram-1.5 kcal/mL Liqd DRINK 1 BOTTLE BY MOUTH TWICE DAILY     ergocalciferol (ERGOCALCIFEROL) 50,000 unit capsule Take 50,000 Units by mouth once a week.     folic acid (FOLVITE) 1 MG tablet Take 1 mg by mouth daily.     folic acid (FOLVITE) 1 MG tablet TAKE 1 TABLET BY MOUTH EVERY DAY     furosemide (LASIX) 20 MG tablet Take 20 mg by mouth daily.     gabapentin (NEURONTIN) 300 MG capsule Take 300 mg by mouth 2 (two) times a day.     gabapentin (NEURONTIN) 300 MG capsule TAKE 1 CAPSULE BY MOUTH TWICE DAILY     guaiFENesin (ROBITUSSIN) 100 mg/5 mL syrup Take 10 mL (200 mg total) by mouth every 4 (four) hours as needed for cough or congestion.     hydrOXYzine HCl (ATARAX) 25 MG tablet TAKE 1 TABLET(25 MG) BY MOUTH EVERY 8 HOURS AS NEEDED FOR ITCHING     ipratropium-albuterol (DUO-NEB) 0.5-2.5 mg/3 mL nebulizer Inhale 3 mL every 6 (six) hours as needed.     levalbuterol (XOPENEX HFA) 45 mcg/actuation inhaler INHALE 2 PUFFS BY MOUTH EVERY 4 HOURS AS NEEDED FOR WHEEZING     lidocaine (LIDODERM) 5 % Remove & Discard patch within 12 hours or as directed by MD     LYPETEY 75 mg capsule TAKE 1 CAPSULE(75 MG) BY MOUTH THREE TIMES DAILY     meloxicam (MOBIC) 7.5 MG tablet Take 1 tablet (7.5 mg total) by mouth daily.     metFORMIN (GLUCOPHAGE) 1000 MG tablet Take 1,000 mg by mouth 2 (two) times a day with meals.     metFORMIN (GLUCOPHAGE) 1000 MG tablet TAKE 1 TABLET BY MOUTH TWICE DAILY     metoprolol tartrate (LOPRESSOR) 25 MG tablet Take 1 tablet (25 mg total) by mouth 2 (two) times a day.     multivitamin (TAB-A-HIEN) per tablet Take 1 tablet by mouth daily.     naloxone (NARCAN) 4 mg/actuation nasal spray 1 spray (4 mg dose) into one nostril for opioid reversal. Call 911. May repeat if no response in 3 minutes.     nicotine (NICOTROL) 10 mg inhaler Inhale 1 puff as needed for smoking cessation.     omeprazole  (PRILOSEC) 20 MG capsule Take 20 mg by mouth daily before breakfast.      oxyCODONE (ROXICODONE) 30 MG immediate release tablet Take 1 tablet (30 mg total) by mouth 3 (three) times a day.     potassium chloride (K-DUR,KLOR-CON) 20 MEQ tablet TAKE 1 TABLET(20 MEQ) BY MOUTH TWICE DAILY     predniSONE (DELTASONE) 5 MG tablet Take 5 mg by mouth daily.     pregabalin (LYRICA) 75 MG capsule Take 75 mg by mouth 3 (three) times a day.     tamsulosin (FLOMAX) 0.4 mg Cp24 Take 1 capsule (0.4 mg total) by mouth Daily after breakfast.     tiotropium (SPIRIVA) 18 mcg inhalation capsule Place 18 mcg into inhaler and inhale daily.     wound dressings (TRIAD WOUND DRESSING) Pste Apply 1 Tube topically 2 (two) times a day.       Review of Systems:  A comprehensive review of systems is negative except for the comments above    Objective:    There were no vitals taken for this visit.  GENERAL: No acute distress.  He is in no distress.  Lungs have bilateral rhonchi.  Pulse is 76.  He is afebrile.  Plus edema of the legs.  Decreased range of motion of the left shoulder.  He has pain with abduction.  Peripheral circulation and sensation seem okay.  He uses a motorized wheelchair and gets around well.    Assessment & Plan   Jef Centeno is a 68 y.o. male.    Symptomatic tendinitis of the left shoulder.  I went ahead with a cortisone injection.  Skin was cleansed with alcohol and Betadine.  2 cc of 1% Xylocaine along with 40 mg of Kenalog was injected into the left shoulder using a posterior approach.  A 27-gauge needle was used for the injection.  There were no complications.  Regarding his edema I told him to increase the Lasix to 40 mg twice daily for 5 days then go back to 40 mg daily.  I okayed his driving for another year.  He should be evaluated on a yearly basis.    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

## 2021-06-24 NOTE — TELEPHONE ENCOUNTER
Question following Office Visit  When did you see your provider: 2/13/19  What is your question:   triamcinolone acetonide 40 mg/mL (KENALOG) 40 mg/mL injection 1 mL 0 2/13/2019 2/13/2019 --   Sig - Route: Inject 1 mL (40 mg total) into the joint once for 1 dose. - Intra-articular     Patient reports The above medication was picked up at the  pharmacy by my PCA due to the pharmacy calling me stating I had a medication for . This medication came without needles and I do not know how to inject myself.  I think this was ordered by error but they charged me $11.11 please advise what I should do with it and how I can get my money back.   Okay to leave a detailed message: Yes

## 2021-06-24 NOTE — TELEPHONE ENCOUNTER
Prior Authorization Request  Who s requesting:  Pharmacy  Pharmacy Name and Location: [facility]   Medication Name: Oxycodone 30 mg   Insurance Plan: [employer]   Insurance Member ID Number:  [id number]  Informed patient that prior authorizations can take up to 10 business days for response:   No  Okay to leave a detailed message: Yes

## 2021-06-24 NOTE — TELEPHONE ENCOUNTER
Patient is on very high dosages of oxycodone.  He seen in the clinic every 4-8 weeks.  He has progressive problems including ALS.  Wife  suddenly last year; he has been making more visits to the emergency room.

## 2021-06-24 NOTE — TELEPHONE ENCOUNTER
Who is calling:  Ruddy GONSAL[facility] Case Managment  Reason for Call:   Case management is concerned at the amout of ED visits that the patient has had in the last year for pain control and wound care.        Is the patient getting adequate medication for pain control.  Is the patient utilizing the clinic for his needs.  Is the provider working with the patient should the patient be working with a care guide?      Please reach out to the patient    Date of last appointment with primary care:   2/18/2019  Has the patient been recently seen:  Yes  Okay to leave a detailed message: Yes

## 2021-06-24 NOTE — TELEPHONE ENCOUNTER
Marco Dawkins for wound care orders as requested below?    Please advise.  Thank you.  Geovanna PRICE CMA/MIA....................3:50 PM

## 2021-06-24 NOTE — TELEPHONE ENCOUNTER
RN cannot approve Refill Request    RN can NOT refill this medication med is not covered by policy/route to provider. Last office visit: 2/13/2019 Geoff Crabtree MD Last Physical: Visit date not found Last MTM visit: Visit date not found Last visit same specialty: 2/13/2019 Geoff Crabtree MD.  Next visit within 3 mo: Visit date not found  Next physical within 3 mo: Visit date not found      Ida Kim, [facility] Triage/Med Refill 2/22/2019    Requested Prescriptions   Pending Prescriptions Disp Refills     predniSONE (DELTASONE) 5 MG tablet 30 tablet 3     Sig: Take 5 mg by mouth daily.    There is no refill protocol information for this order

## 2021-06-24 NOTE — TELEPHONE ENCOUNTER
Orders being requested: Wound Care  Reason service is needed/diagnosis: Patient was seen at [facility] and instructed to reach out to his provider to obtain orders to treat the two ulcers on patient's buttocks.  When are orders needed by: As Able  Where to send Orders: Please place orders for a Wound Care Clinic that provider recommends.  Okay to leave detailed message?  No      Please reach out to patient to help facilitate appointment.

## 2021-06-25 NOTE — TELEPHONE ENCOUNTER
Refill Request  Did you contact pharmacy: Yes  Medication name:   Requested Prescriptions     Pending Prescriptions Disp Refills     oxyCODONE (ROXICODONE) 30 MG immediate release tablet 90 tablet 0     Sig: Take 1 tablet (30 mg total) by mouth 3 (three) times a day.     Who prescribed the medication: Yuko Bañuelos  Requested Pharmacy: [facility]  Is patient out of medication: Yes  Patient notified refills processed in 3 business days:  yes  Okay to leave a detailed message: yes

## 2021-06-25 NOTE — PROGRESS NOTES
[facility] Care Coordination    Voice message from [employer] would like effective dates of auth span and number of meals since hasn't received [employer] auth.    Left voice mail message for time of span and # of meals per week.    Ruddy Alexander RN, PHN  [facility]  [phone]

## 2021-06-25 NOTE — TELEPHONE ENCOUNTER
Controlled Substance Refill Request  Medication Name:   Requested Prescriptions     Pending Prescriptions Disp Refills     oxyCODONE (ROXICODONE) 30 MG immediate release tablet 90 tablet 0     Sig: Take 1 tablet (30 mg total) by mouth 3 (three) times a day.     Date Last Fill: 2/22/2019  Pharmacy: [facility] #[id number]      Submit electronically to pharmacy  Controlled Substance Agreement Date Scanned:   Encounter-Level CSA Scan Date:    There are no encounter-level csa scan date.       Last office visit with prescriber/PCP: 3/13/2019 Geoff Crabtree MD OR same dept: 3/13/2019 Geoff Crabtree MD OR same specialty: 3/13/2019 Geoff Crabtree MD  Last physical: Visit date not found Last MTM visit: Visit date not found         Refill Request  Did you contact pharmacy: Yes  Medication name:   Requested Prescriptions     Pending Prescriptions Disp Refills     predniSONE (DELTASONE) 5 MG tablet 30 tablet 3     Sig: Take 5 mg by mouth daily.     Who prescribed the medication: Dr Martinez   Pharmacy Name and Location: [facility] #[id number]  Is patient out of medication: No  Patient notified refills processed in 72 hours:  yes  Okay to leave a detailed message: no        Last prescription for patient's prednisone was sent to [facility]. He is asking if this can be sent in again to [facility] #[id number].

## 2021-06-25 NOTE — TELEPHONE ENCOUNTER
Call pt- he needs to establish care with a new provider for further refills of this controlled substance medication.

## 2021-06-25 NOTE — TELEPHONE ENCOUNTER
Prescription Monitoring Program activity reviewed with no discrepancies noted.      Last fill per : 02/28/19  Quantity/days supply: #90 30 day supply    Controlled Substance Agreement on file: No  Date:     Last office visit with provider:  3/13/2019 Geoff Crabtree MD    Please advise.

## 2021-06-25 NOTE — PROGRESS NOTES
[facility] Care Coordination    Voice message from [employer] inquiring about authorization.     Left him voice message that will resubmit auth to [facility] so to continue with meals.    Ruddy Alexander RN, PHN  [facility]  [phone]

## 2021-06-25 NOTE — PROGRESS NOTES
[facility] Care Coordination  Voice message from [employer] reported that has not received authorization from [facility] for meals.    Left voice mail message for Maik that will resubmit auth to [facility] for meals.  If doesn't receive within two weeks to call again.    Ruddy Alexander RN, PHN  [facility]  [phone]

## 2021-06-25 NOTE — TELEPHONE ENCOUNTER
RN cannot approve Refill Request    RN can NOT refill this medication med is not covered by policy/route to provider. Last office visit: Visit date not found Last Physical: Visit date not found Last MTM visit: Visit date not found Last visit same specialty: Visit date not found.  Next visit within 3 mo: Visit date not found  Next physical within 3 mo: Visit date not found      Robyn Marin, [facility] Triage/Med Refill 5/26/2021    Requested Prescriptions   Pending Prescriptions Disp Refills     predniSONE (DELTASONE) 5 MG tablet [Pharmacy Med Name: PREDNISONE 5MG TABS] 15 tablet 0     Sig: TAKE ONE-HALF TABLET  (2.5MG) BY MOUTH EVERY DAY.       There is no refill protocol information for this order

## 2021-06-26 RX ORDER — FUROSEMIDE 20 MG
40 TABLET ORAL 2 TIMES DAILY
Qty: 180 TABLET | Refills: 1 | Status: SHIPPED | OUTPATIENT
Start: 2021-06-26 | End: 2021-09-01

## 2021-06-26 NOTE — PROGRESS NOTES
Re-submitted MOWs to [facility] as provider did not received confirmation on the original auth sent.     Juan Carlos Tavarez  Care Management Specialist  [facility]  [phone]

## 2021-06-26 NOTE — TELEPHONE ENCOUNTER
Last Office Visit  6/18/2021-Dr Avina    Note is not complete yet     Last Filled:  furosemide (LASIX) 40 MG tablet   5/20/2021  --   Class: Historical Med       Next OV:  Visit date not found        Medication teed up for provider signature

## 2021-06-26 NOTE — TELEPHONE ENCOUNTER
Hello,  Patient was seen today by [facility] for recertification.  Can we please have orders for SN once weekly for 9 weeks and 3 prn's for general assessment, [employer] data collection, medication management and set up.  Thank you  Jenna Aldana RN

## 2021-06-26 NOTE — PROGRESS NOTES
"Progress Notes by Obdulia Prieto NP at 7/24/2018  1:00 PM     Author: Obdulia Prieto NP Service: -- Author Type: Nurse Practitioner    Filed: 7/24/2018  1:37 PM Encounter Date: 7/24/2018 Status: Signed    : Obdulia Prieto NP (Nurse Practitioner)       [facility] Consult Note    Date of Service:  7/24/2018    Requesting Provider: Dr. Geoff Crabtree    Chief Complaint: buttocks ulcers    History of Present Illness: Jef Centeno is being seen at the Vascular Clinic today regarding buttocks ulcers. They arrive to the clinic today alone; was brought by a PCA however they stayed in the lobby. The patient reports that the wounds on the buttocks started 2 years ago. He is minimally ambulatory; has ALS; can take a few step independently but can walk about a block with a walker. Was previously using some type of cream; no dressings; he believes the cream is helping; he also likes to soak in a an epsom salt bath; bathes every 2 days. Reports pain of 5/10 worse when sitting directly on the wounds, constant \"real hurt\"; currently using oxycodone, apap for pain.  Denies any fevers, chills, or generalized ill feeling; feels cold all the times. Denies history of cancer. Sleeps in a bed and wheelchair with legs elevated. He has a hospital with standard mattress. He has a motorized w/c; it has some type of cushion; but he does not know what type this is. Takes 1-2 boost protein supplements per day.  Wife passed away 3 months ago; mother passed away 4 months ago. Lives alone in a home; has PCA help twice per day for approx 10 hours. Arrives today in a w/c. He smokes 1-2 ppd. +terrance; cannot tolerate cpap.      Review of Systems:   Constitutional:  negative  for fever, chills or night sweats  EENTM: negative for glasses;  positive [place]  GI:  negative for nausea/vomiting; + for constipation  negative diarrhea;  rare for fecal incontinence negative weight loss  :  negative dysuria, rare incontinence  MS: " patient is not ambulatory;  does not use assistive devices  Cardiac:  negative   Respiratory:  positive shortness of breath; +terrance   Endocrine:  positive diabetes; checks fs daily running   Psych:  negative depression/anxiety    Past Medical History:    Past Medical History:   Diagnosis Date   ? ALS (amyotrophic lateral sclerosis) (H)    ? Asthma    ? COPD (chronic obstructive pulmonary disease) (H)    ? Diabetes mellitus (H)    ? Erectile dysfunction associated with type 2 diabetes mellitus (H)    ? Hypertension    ? Obstructive sleep apnea    ? Osteoarthritis    ? Postherpetic neuralgia         Surgical History:   Past Surgical History:   Procedure Laterality Date   ? CHOLECYSTECTOMY     ? LAPAROSCOPIC GASTROTOMY W/ REPAIR OF ULCER          Medications:    Current Outpatient Prescriptions:   ?  albuterol (PROVENTIL) 2.5 mg /3 mL (0.083 %) nebulizer solution, Take 2.5 mg by nebulization every 4 (four) hours as needed for shortness of breath. , Disp: , Rfl:   ?  budesonide (PULMICORT) 1 mg/2 mL nebulizer solution, Take 2 mL by nebulization 2 (two) times a day. Mix with Duoneb, Disp: , Rfl: 11  ?  budesonide-formoterol (SYMBICORT) 160-4.5 mcg/actuation inhaler, Inhale 2 puffs 2 (two) times a day., Disp: , Rfl:   ?  calcium carbonate-vitamin D3 (CALCIUM 600 + D,3,) 600 mg(1,500mg) -400 unit per tablet, Take 1 tablet by mouth daily., Disp: 90 tablet, Rfl: 3  ?  cetirizine (ZYRTEC) 10 MG tablet, Take 10 mg by mouth daily., Disp: , Rfl:   ?  cetirizine (ZYRTEC) 5 MG tablet, Take 1 tablet (5 mg total) by mouth daily., Disp: 30 tablet, Rfl: 4  ?  cholecalciferol, vitamin D3, 1,000 unit tablet, Take 2,000 Units by mouth daily., Disp: , Rfl:   ?  cyanocobalamin 1000 MCG tablet, Take 1,000 mcg by mouth daily., Disp: , Rfl:   ?  diphenhydrAMINE (BENADRYL) 25 mg capsule, Take 50 mg by mouth every 6 (six) hours as needed for itching., Disp: , Rfl:   ?  docusate sodium (COLACE) 100 MG capsule, Take 100 mg by mouth 2 (two)  times a day as needed for constipation., Disp: , Rfl:   ?  ergocalciferol (ERGOCALCIFEROL) 50,000 unit capsule, Take 50,000 Units by mouth once a week., Disp: , Rfl:   ?  folic acid (FOLVITE) 1 MG tablet, Take 1 mg by mouth daily., Disp: , Rfl:   ?  food supplemt, lactose-reduced (ENSURE HIGH PROTEIN) Liqd, Take 1 Can by mouth 2 (two) times a day., Disp: 60 Can, Rfl: 4  ?  furosemide (LASIX) 20 MG tablet, Take 20 mg by mouth daily., Disp: , Rfl:   ?  furosemide (LASIX) 20 MG tablet, TAKE 1 TABLET(20 MG) BY MOUTH DAILY, Disp: 90 tablet, Rfl: 2  ?  gabapentin (NEURONTIN) 300 MG capsule, Take 300 mg by mouth 2 (two) times a day., Disp: , Rfl:   ?  guaiFENesin (ROBITUSSIN) 100 mg/5 mL syrup, Take 10 mL (200 mg total) by mouth every 4 (four) hours as needed for cough or congestion., Disp: , Rfl: 0  ?  hydrOXYzine (ATARAX) 25 MG tablet, Take 1 tablet (25 mg total) by mouth every 8 (eight) hours as needed for itching., Disp: 100 tablet, Rfl: 2  ?  ipratropium-albuterol (DUO-NEB) 0.5-2.5 mg/3 mL nebulizer, Take 3 mL by nebulization 2 (two) times a day. Mix with budesonide, Disp: , Rfl: 11  ?  ipratropium-albuterol (DUO-NEB) 0.5-2.5 mg/3 mL nebulizer, Inhale 3 mL every 6 (six) hours as needed., Disp: , Rfl:   ?  levalbuterol (XOPENEX HFA) 45 mcg/actuation inhaler, INHALE 2 PUFFS BY MOUTH EVERY 4 HOURS AS NEEDED FOR WHEEZING, Disp: 1 Inhaler, Rfl: 5  ?  LYRICA 75 mg capsule, TAKE 1 CAPSULE(75 MG) BY MOUTH THREE TIMES DAILY, Disp: 90 capsule, Rfl: 0  ?  meloxicam (MOBIC) 7.5 MG tablet, Take 1 tablet (7.5 mg total) by mouth daily., Disp: 90 tablet, Rfl: 3  ?  metFORMIN (GLUCOPHAGE) 1000 MG tablet, Take 1,000 mg by mouth 2 (two) times a day with meals., Disp: , Rfl:   ?  metFORMIN (GLUCOPHAGE) 1000 MG tablet, TAKE 1 TABLET BY MOUTH TWICE DAILY, Disp: 180 tablet, Rfl: 0  ?  metoprolol tartrate (LOPRESSOR) 25 MG tablet, Take 25 mg by mouth 2 (two) times a day., Disp: , Rfl:   ?  multivitamin (TAB-A-HIEN) per tablet, Take 1 tablet  by mouth daily., Disp: , Rfl:   ?  naloxone (NARCAN) 4 mg/actuation nasal spray, 1 spray (4 mg dose) into one nostril for opioid reversal. Call 911. May repeat if no response in 3 minutes., Disp: 1 Box, Rfl: 0  ?  nicotine (NICOTROL) 10 mg inhaler, Inhale 1 puff as needed for smoking cessation., Disp: , Rfl:   ?  nystatin (MYCOSTATIN) 100,000 unit/mL suspension, SHAKE LIQUID AND TAKE 5 ML BY MOUTH FOUR TIMES DAILY, Disp: 60 mL, Rfl: 0  ?  omeprazole (PRILOSEC) 20 MG capsule, Take 20 mg by mouth daily before breakfast. , Disp: , Rfl:   ?  oxyCODONE (ROXICODONE) 30 MG immediate release tablet, Take 1 tablet (30 mg total) by mouth 3 (three) times a day., Disp: 90 tablet, Rfl: 0  ?  potassium chloride SA (K-DUR,KLOR-CON) 20 MEQ tablet, Take 1 tablet (20 mEq total) by mouth 2 (two) times a day., Disp: 180 tablet, Rfl: 1  ?  predniSONE (DELTASONE) 10 mg tablet, TAKE 1 TABLET(10 MG) BY MOUTH DAILY, Disp: 30 tablet, Rfl: 0  ?  predniSONE (DELTASONE) 5 MG tablet, Take 1 tablet (5 mg total) by mouth daily., Disp: 30 tablet, Rfl: 0  ?  pregabalin (LYRICA) 75 MG capsule, Take 75 mg by mouth 3 (three) times a day., Disp: , Rfl:   ?  tamsulosin (FLOMAX) 0.4 mg Cp24, Take 1 capsule (0.4 mg total) by mouth Daily after breakfast., Disp: 90 capsule, Rfl: 3  ?  tiotropium (SPIRIVA) 18 mcg inhalation capsule, Place 18 mcg into inhaler and inhale daily., Disp: , Rfl:     Allergies: No Known Allergies    Family history:   Family History   Problem Relation Age of Onset   ? Diabetes Mother    ? Diabetes Sister    ? Diabetes Brother         Social History:   Social History     Social History   ? Marital status:      Spouse name: N/A   ? Number of children: N/A   ? Years of education: N/A     Occupational History   ? Not on file.     Social History Main Topics   ? Smoking status: Current Every Day Smoker     Packs/day: 1.00     Types: Cigarettes   ? Smokeless tobacco: Never Used   ? Alcohol use No      Comment: Quit drinking in 2014.  Prior to that he drank excessively.   ? Drug use: No   ? Sexual activity: Not on file     Other Topics Concern   ? Not on file     Social History Narrative    Patient is , 5 children by previous wife are alive and well. Patient has social security disability.        Physical Exam  Vitals: Blood pressure 110/60, pulse 72, temperature 99.1  F (37.3  C), temperature source Oral, resp. rate 18.  General: This is a 68 y.o. male who appears their reported age, not in acute distress  Head: normocephalic, Atraumatic; not wearing glasses; non-icteric sclera; no exudate; mild hearing loss   Respiratory: Clear throughout all lung fields; unlabored breathing; no cough   Cardiac: Regular, Rate and Rhythm, no murmurs appreciated   Skin: Uniformly warm and dry  Psych: Alert and oriented x4; calm and cooperative throughout exam; mumbles; hard to hear at time.  Abdomen: Normal bowel sounds. Soft, symmetric, no guarding or rigidity, nontender with palpation.  No organomegaly or masses palpated.   GI: no fecal incontinence during exam  : no urinary incontinence during exam  Buttock: bilateral buttock with linear areas of partial thickness tissue loss; see photo and measures below; minimal surrounding induration; the wound beds were pink and overly dry; there was tenderness with palpation; there is surrounding ragged dry skin         Circumferential volume measures:    No flowsheet data found.    Ulceration(s)/Wound(s):     Wound 07/24/18 rt buttock (Active)   Pre Size Length 1.1 7/24/2018  1:00 PM   Pre Size Width 1.3 7/24/2018  1:00 PM   Pre Size Depth 0.1 7/24/2018  1:00 PM   Pre Total Sq cm 1.43 7/24/2018  1:00 PM       Wound 07/24/18 lt buttock (Active)   Pre Size Length 0.5 7/24/2018  1:00 PM   Pre Size Width 0.3 7/24/2018  1:00 PM   Pre Size Depth 0.1 7/24/2018  1:00 PM   Pre Total Sq cm 0.15 7/24/2018  1:00 PM       Wound 07/24/18 superior rt buttock (Active)   Pre Size Length 0.2 7/24/2018  1:00 PM   Pre Size Width  0.3 7/24/2018  1:00 PM   Pre Size Depth 0 7/24/2018  1:00 PM   Pre Total Sq cm 0.06 7/24/2018  1:00 PM       Pressure Ulcer 02/07/18 Coccyx Unstagable (Active)       Laboratory studies:   No results found for: SEDRATE  Lab Results   Component Value Date    CREATININE 0.63 (L) 05/23/2018     Lab Results   Component Value Date    HGBA1C 5.4 05/23/2018     Lab Results   Component Value Date    BUN 8 05/23/2018     Lab Results   Component Value Date    ALBUMIN 3.3 (L) 02/07/2018     No results found for: [id number]    7/24/18 bilateral buttock            Impression:   Stage 2 pressure ulcer to right buttock with friction shear component  Stage 2 pressure ulcer to left buttock with friction shear component  ALS  Type 2 diabetes with skin complication  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 Excisional debridement of all the ulcer(s) was recommended today however the patient did not tolerate; will utilize hydrophilic paste to help clean up the wounds    2. Edema. na    3. Treatment due to the location of the wounds and friction and shear component will opt not to utlize dressings; will instead apply Triad paste to the open areas 1-3 times per day; can use underwear or brief to cover; unfortunately due to the ALS, this is a progressive disease and will be difficult to prevent future ulcerations; see below for offloading    4. Offloading: will send back to reliable to see if a different cushion can be used; needs to be repositioned every 15 while up in the chair; should be positioned at a 30-40 side tilt in bed and avoid direct pressure on the wounds    5. Nutrition: continue boost twice per day    Patient to return to clinic PRN if the wounds are worsening  for re-evaluation. They were instructed to call the clinic sooner with any further questions or concerns. Answered all questions.    Obdulia Prieto DNP, RN, CNP, CWOCN  [facility]  [phone]      This  note was electronically signed by Obdulia Prieto

## 2021-06-26 NOTE — TELEPHONE ENCOUNTER
Patient is in need of a refill on his furosemide. Please send new rx to [facility]. Thank you!     Rogelio Morrell, Pharm.D.   [phone]   [facility]

## 2021-06-27 ENCOUNTER — HOSPITAL ENCOUNTER (OUTPATIENT)
Facility: CLINIC | Age: 71
End: 2021-06-27
Attending: INTERNAL MEDICINE | Admitting: INTERNAL MEDICINE
Payer: COMMERCIAL

## 2021-06-27 DIAGNOSIS — Z11.59 ENCOUNTER FOR SCREENING FOR OTHER VIRAL DISEASES: ICD-10-CM

## 2021-06-27 NOTE — PROGRESS NOTES
Progress Notes by Obdulia Prieto NP at 6/25/2019  7:40 AM     Author: Obdulia Prieto NP Service: -- Author Type: Nurse Practitioner    Filed: 6/25/2019  9:36 AM Encounter Date: 6/25/2019 Status: Signed    : Obdulia Prieto NP (Nurse Practitioner)       Follow up Vascular Visit       Date of Service:6/25/2019    Date Last Seen: 5/21/2019; 5/21/2019    Chief Complaint: buttock ulcer; leg swelling    History:   Past Medical History:   Diagnosis Date   ? Acute low back pain    ? ALS (amyotrophic lateral sclerosis) (H)    ? Asthma    ? BPH (benign prostatic hyperplasia)    ? CAD (coronary artery disease)    ? Closed fracture of tibia and fibula, left, initial encounter    ? COPD (chronic obstructive pulmonary disease) (H)    ? Decubitus ulcer, buttock    ? Emphysema lung (H)    ? Encephalopathy    ? Erectile dysfunction associated with type 2 diabetes mellitus (H)    ? HTN (hypertension)    ? Obstructive sleep apnea    ? Osteoarthritis    ? Postherpetic neuralgia    ? Spinal stenosis, cervical region    ? Type 2 diabetes mellitus with diabetic neuropathy (H)        Pt returns to the [facility] with regards to their buttock ulcer and leg swelling. Pt arrives alone today. Last seen 1 month ago. Pt continues with home care although at his last appt he told us they were no longer coming out; we were later contacted by home care stating that he was open to their services. He is primarily w/c bound due to ALS; he is on chronic prednisone for this. He developed the wounds to the buttocks 2-3 years ago after scooting on his buttocks after surgery. He has an offloading cushion in his w/c this is very old; bottoming out; does not fit the size of his w/c/ scooter seat; we sent an order for a new cushion he has not received this. He has been wearing his compression stockings.  He is trying to focus on protein in his diet. They were previously applying endoform and allevyn  "to the buttocks; this was not working; the dressing was only staying in place from a few hours to a day at most; we switched him to Triad paste this was  working well; however he has stopped applying.  Pt had recent lab work done; this was reviewed; his CBC was abnormal; peripheral smear done they are currently working him up to identify the source of bleeding. He was recently hospitalized after a fall he fractured his left foot; this is now soft casted; he states that he does not have an appt for Ortho follow up on chart review it appears they have been trying to contact him. He has appt for colonoscopy consultation today; pain and his pcp. After his hospitalization he was d/c to [facility] \"they were doing some brain testing\" he did not like the care he was receiving and he left AMA back to home.       Allergies: Acetaminophen    Physical Exam:    /58   Pulse 80   Temp 98.1  F (36.7  C)     General:  Patient presents to clinic in no apparent distress.  Head: normocephalic atraumatic  Psychiatric:  Alert and oriented x3.   Respiratory: unlabored breathing; no cough  Integumentary:  Skin is uniformly warm, dry and pink.    Wound #1 Location: coccyx  Size: 1x0.8x0.1cm depth.  No sinus tract present, Wound base: slough covered Periwound: no denudement, erythema, induration, maceration or warmth.      The other wounds on the buttocks are now healed      Circumferential volume measures:    Vasc Edema 4/30/2019   Right just above MTP 24.5   Right Ankle 25.5   Right Widest Calf 32   Right Thigh Up 10cm 37   Left - just above MTP 26   Left Ankle 28   Left Widest Calf 33.5   Left Thigh Up 10cm 38       Ulceration(s)/Wound(s):     VASC Wound 03/29/19 Rt buttocks (Active)   Pre Size Length 0.3 5/29/2019  7:00 AM   Pre Size Width 1.2 5/29/2019  7:00 AM   Pre Size Depth 0.2 5/29/2019  7:00 AM   Pre Total Sq cm 0.36 5/29/2019  7:00 AM   Post Size Length 0.5 4/30/2019  8:00 AM   Post Size Width 0.7 4/30/2019  " 8:00 AM   Description diagonal .8 4/30/2019  8:00 AM       VASC Wound 05/29/19 coccyx (Active)   Pre Size Length 0.7 5/29/2019  7:00 AM   Pre Size Width 0.3 5/29/2019  7:00 AM   Pre Size Depth 0.1 5/29/2019  7:00 AM   Pre Total Sq cm 0.21 5/29/2019  7:00 AM       Pressure Ulcer/Injury 06/05/19 Buttocks Left Stage 2 (Active)       Wound 04/25/19 Non-pressure related ulcer Buttocks Right (Active)       Wound 05/31/19 Other wound type (comment) Coccyx Medial (center/inner) (Active)        Lab Values    No results found for: SEDRATE  Lab Results   Component Value Date    CREATININE 0.63 (L) 06/14/2019     Lab Results   Component Value Date    HGBA1C 5.4 03/13/2019     Lab Results   Component Value Date    BUN 14 06/14/2019     Lab Results   Component Value Date    ALBUMIN 3.1 (L) 11/14/2018     Vitamin D, Total (25-Hydroxy)   Date Value Ref Range Status   06/14/2019 34.7 30.0 - 80.0 ng/mL Final             Impression:  1. ALS (amyotrophic lateral sclerosis) (H)     2. Long term systemic steroid user     3. Cigarette nicotine dependence without complication     4. Type 2 diabetes mellitus with hyperglycemia, without long-term current use of insulin (H)     5. Pressure ulcer of coccygeal region, unstageable (H)         4/30/19 buttock             Are any of these wounds new today: No; Location: na    Assessment/Plan:          1. Debridement: na             2. Edema: continue compression stockings. The compression wraps were applied today in clinic. He reports that he is able to don/doff these independently; pt declined to have legs evaluated today in clinic           3.  Wound treatment: wound treatment will include irrigation and dressings to promote autolytic debridement which will include: triad paste 1-3 times per day; no dressings;           4. Nutrition: focus on           5. Offloading: w/c cushion is inadequate and in poor repair will send another order for new cushion; he has difficulty getting to these other  [employer]; will see if they can mail this out to him; we contacted [facility] medical they report that the patient canceled his w/c pressure mapping and did not rescheduled so did not get his cushion     6. Anemia: following with PCP; has appt with GI later today for future colonoscopy     7. Tib/fib fracture: given the address and phone number to get followup appt scheduled; we called the ortho clinic and pt missed his last appt with them and did not call to reschedule     Patient will follow up with me in 3-4 weeks for reevaluation. They were instructed to call the clinic sooner with any signs or symptoms of infection or any further questions/concerns. Answered all questions.    Obdulia Prieto DNP, RN, CNP, [facility]  [phone]        This note was electronically signed by Obdulia Prieto

## 2021-06-27 NOTE — PROGRESS NOTES
Progress Notes by Carito Sutherland CNP at 4/30/2019  8:40 AM     Author: Carito Sutherland CNP Service: -- Author Type: Nurse Practitioner    Filed: 4/30/2019  9:58 AM Encounter Date: 4/30/2019 Status: Signed    : Carito Sutherland CNP (Nurse Practitioner)       Date of Service:4/30/2019    Provider's initial consult visit:  3/29/2019    Chief Complaint:   Leg swelling and buttock ulcer    History:   Patient presents to clinic in regards to his buttock ulcer.  He reports that he developed his buttock ulcers about 3 years ago after his neck surgery.  They occurred because he was scooting around and on his stair following the surgery.  He is now primarily wheelchair bound secondary to leg weakness.  At home, he has an electric wheelchair that has a seat cushion in it.  The  patient was last seen by me on 4/19/2019 when silvercel and Allevyn Life was continued.  He reports that he is sleeping on his back.  A couple of weeks ago, he started developing more swelling in his legs bilaterally.  He reports that he is wearing his compression stockings, but presents to clinic without them.  Home care is coming a couple of times per week to change his buttock dressing.  The drainage level is unchanged.      Current Outpatient Medications   Medication Sig Dispense Refill   ? albuterol (PROVENTIL) 2.5 mg /3 mL (0.083 %) nebulizer solution Take 2.5 mg by nebulization every 4 (four) hours as needed for shortness of breath.      ? baclofen (LIORESAL) 10 MG tablet TAKE 1/2 TABLET(5 MG) BY MOUTH THREE TIMES DAILY 135 tablet 0   ? budesonide (PULMICORT) 1 mg/2 mL nebulizer solution Take 2 mL by nebulization 2 (two) times a day. Mix with Duoneb  11   ? budesonide-formoterol (SYMBICORT) 160-4.5 mcg/actuation inhaler Inhale 2 puffs 2 (two) times a day.     ? budesonide-formoterol (SYMBICORT) 160-4.5 mcg/actuation inhaler INHALE 2 PUFFS BY MOUTH TWICE DAILY 1 Inhaler 3   ? calcium carbonate-vitamin D3 (CALCIUM 600 +  D,3,) 600 mg(1,500mg) -400 unit per tablet Take 1 tablet by mouth daily. 90 tablet 3   ? calcium carbonate-vitamin D3 (CALTRATE 600 PLUS D3) 600 mg(1,500mg) -400 unit per tablet TAKE 1 TABLET BY MOUTH DAILY 90 tablet 0   ? cetirizine (ZYRTEC) 10 MG tablet Take 1 tablet (10 mg total) by mouth daily. 90 tablet 3   ? cholecalciferol, vitamin D3, 1,000 unit tablet Take 2,000 Units by mouth daily.     ? cyanocobalamin 1000 MCG tablet Take 1,000 mcg by mouth daily.     ? DAILY-HIEN tablet TAKE 1 TABLET BY MOUTH EVERY  tablet 0   ? diphenhydrAMINE (BENADRYL) 25 mg capsule Take 50 mg by mouth every 6 (six) hours as needed for itching.     ? docusate sodium (COLACE) 100 MG capsule Take 100 mg by mouth 2 (two) times a day as needed for constipation.     ? ENSURE ACTIVE PROTEIN-MUSCLE Liqd DRINK 1 BOTTLE BY MOUTH TWICE DAILY 01288 mL 0   ? ENSURE ENLIVE 0.08 gram-1.5 kcal/mL Liqd DRINK 1 BOTTLE BY MOUTH TWICE DAILY 52211 mL 3   ? ergocalciferol (ERGOCALCIFEROL) 50,000 unit capsule Take 50,000 Units by mouth once a week.     ? folic acid (FOLVITE) 1 MG tablet TAKE 1 TABLET BY MOUTH EVERY DAY 90 tablet 3   ? furosemide (LASIX) 20 MG tablet Take 20 mg by mouth daily.     ? gabapentin (NEURONTIN) 300 MG capsule TAKE 1 CAPSULE BY MOUTH TWICE DAILY 180 capsule 0   ? guaiFENesin (ROBITUSSIN) 100 mg/5 mL syrup Take 10 mL (200 mg total) by mouth every 4 (four) hours as needed for cough or congestion.  0   ? hydrOXYzine HCl (ATARAX) 25 MG tablet TAKE 1 TABLET(25 MG) BY MOUTH EVERY 8 HOURS AS NEEDED FOR ITCHING 100 tablet 3   ? ipratropium-albuterol (DUO-NEB) 0.5-2.5 mg/3 mL nebulizer Inhale 3 mL every 6 (six) hours as needed.     ? levalbuterol (XOPENEX HFA) 45 mcg/actuation inhaler INHALE 2 PUFFS BY MOUTH EVERY 4 HOURS AS NEEDED FOR WHEEZING 5 Inhaler 0   ? lidocaine (LIDODERM) 5 % REMOVE AND DISCARD PATCH WITHIN 12 HOURS OR AS DIRECTED BY MD 30 patch 0   ? LYRICA 75 mg capsule TAKE 1 CAPSULE(75 MG) BY MOUTH THREE TIMES DAILY 90  capsule 0   ? meloxicam (MOBIC) 7.5 MG tablet TAKE 1 TABLET(7.5 MG) BY MOUTH DAILY 90 tablet 0   ? metFORMIN (GLUCOPHAGE) 1000 MG tablet TAKE 1 TABLET BY MOUTH TWICE DAILY 180 tablet 0   ? metoprolol tartrate (LOPRESSOR) 25 MG tablet Take 1 tablet (25 mg total) by mouth 2 (two) times a day. 180 tablet 3   ? midazolam, PF, (VERSED) 1 mg/mL Soln injection Infuse 0.5-6 mg into a venous catheter.     ? multivitamin (TAB-A-HIEN) per tablet Take 1 tablet by mouth daily.     ? naloxone (NARCAN) 4 mg/actuation nasal spray 1 spray (4 mg dose) into one nostril for opioid reversal. Call 911. May repeat if no response in 3 minutes. 1 Box 0   ? nicotine (NICOTROL) 10 mg inhaler Inhale 1 puff as needed for smoking cessation.     ? nystatin (MYCOSTATIN) 100,000 unit/mL suspension SHAKE LIQUID AND TAKE 5 ML BY MOUTH FOUR TIMES DAILY 60 mL 0   ? omeprazole (PRILOSEC) 20 MG capsule Take 20 mg by mouth daily before breakfast.      ? oxyCODONE (ROXICODONE) 30 MG immediate release tablet Take 1 tablet (30 mg total) by mouth 3 (three) times a day. 90 tablet 0   ? potassium chloride (K-DUR,KLOR-CON) 20 MEQ tablet TAKE 1 TABLET(20 MEQ) BY MOUTH TWICE DAILY 180 tablet 3   ? predniSONE (DELTASONE) 5 MG tablet Take 5 mg by mouth daily. 30 tablet 3   ? riluzole (RILUTEK) 50 mg tablet TAKE 1 TABLET BY MOUTH EVERY 12 HOURS(1 HOUR BEFORE MEALS OR 2 HOURS AFTER MEALS)     ? tamsulosin (FLOMAX) 0.4 mg Cp24 Take 1 capsule (0.4 mg total) by mouth Daily after breakfast. 90 capsule 3   ? tiotropium (SPIRIVA) 18 mcg inhalation capsule Place 18 mcg into inhaler and inhale daily.     ? wound dressings (TRIAD WOUND DRESSING) Pste Apply 1 Tube topically 2 (two) times a day.  3     Current Facility-Administered Medications   Medication Dose Route Frequency Provider Last Rate Last Dose   ? lidocaine 2 % jelly (XYLOCAINE)   Topical PRN Carito Sutherland, CNP   1 application at 04/30/19 0858       No Known Allergies    Physical Exam:    Vitals:    04/30/19  0848   Pulse: 96   Resp: 16   Temp: 97.7  F (36.5  C)    Body mass index is 21.38 kg/m .    General:  68 y.o. male in no apparent distress.    Head:  Normocephalic atraumatic  Psychiatric: Cooperative.   Respiratory: unlabored breathing.  Lower extremity, +2-3 pitting edema bilaterally      Integumentary:      Buttock Ulceration(s):      Wound bed: %100 loose slough          Undermining no, Tunneling no   Wound Edge: attached and Epibole   Periwound:  There is no roney wound erythema, induration, maceration, denudement fluctuance or warmth surrounding the ulcer(s).  Exudate: moderate serous    Odor:  absent    Wound Shape oval      VASC Wound 03/29/19 Rt buttocks (Active)   Pre Size Length 0.6 4/30/2019  8:00 AM   Pre Size Width 0.8 4/30/2019  8:00 AM   Pre Size Depth 0.1 4/30/2019  8:00 AM   Pre Total Sq cm 0.48 4/30/2019  8:00 AM   Post Size Length 0.5 4/30/2019  8:00 AM   Post Size Width 0.7 4/30/2019  8:00 AM   Description diagonal .8 4/30/2019  8:00 AM       Wound 04/25/19 Non-pressure related ulcer Buttocks Right (Active)   Site Assessment Pink 4/29/2019 11:01 AM   Surrounding Tissue Assessment Denuded (stripped of surface layer);Fragile 4/29/2019 11:01 AM   Drainage Amount Moderate 4/29/2019 11:01 AM   Drainage Description Serosanguineous 4/29/2019 11:01 AM   Site Care Cleansed 4/29/2019 11:01 AM   Dressing Silver dressing 4/29/2019 11:01 AM   Dressing Status  Dressing changed 4/29/2019 11:01 AM   Wound Measurement (L x W x D cm) 0.6 x0.6 x 0.1cm 4/29/2019 11:01 AM       Wound 04/25/19 Non-pressure related ulcer Buttocks Right;Distal (further from center) (Active)   Site Assessment Pink 4/29/2019 11:01 AM   Surrounding Tissue Assessment Intact;Fragile 4/29/2019 11:01 AM   Drainage Amount Scant 4/29/2019 11:01 AM   Drainage Description Serosanguineous 4/29/2019 11:01 AM   Site Care Cleansed 4/29/2019 11:01 AM   Dressing Status  Dressing changed 4/29/2019 11:01 AM   Wound Measurement (L x W x D cm) 0.2 x 0.2 x  0.1cm 4/29/2019 11:01 AM       Photo:       Assessment:    Images:  none pertinent    Labs:    The following labs were reviewed by me today.    Lab Results   Component Value Date    WBC 5.3 01/08/2019    HGB 10.9 (L) 01/08/2019    HCT 33.4 (L) 01/08/2019    MCV 77 (L) 01/08/2019     01/08/2019               Invalid input(s): EOSPC    Lab Results   Component Value Date    CREATININE 0.80 03/13/2019         Lab Results   Component Value Date    BUN 16 03/13/2019     No results found for: PREALBUMINESUFAST(LABPROT,LABALBU,albumin)@  Invalid input(s): LABALBU  No results found for: PREALBUMIN    No results found for: CRP  No results found for: SEDRATE    Lab Results   Component Value Date    HGBA1C 5.4 03/13/2019     No results found for: TSH        No results found for: [id number]  Lab Results   Component Value Date    BNP 68 (H) 04/24/2014        1. 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 ALS (amyotrophic lateral sclerosis) (H)     3. Long term systemic steroid user     4. Cigarette nicotine dependence without complication     5. Type 2 diabetes mellitus with hyperglycemia, without long-term current use of insulin (H)     6. Leg swelling         A new wound was identified today: no.    Plan:  1.  Debridement of the Right buttock ulcer was recommended.  After consent was obtained and topical 2% Xylocaine was applied under clean conditions, and using a #15 blade,the devitalized dermal tissue (partial thickness) was debrided for a total square centimeters of 0.48.  Following debridement, there was a decrease in the nonviable tissue. The patient tolerated the procedure without any difficulty.     2.  Non pressure ulcer of buttock, secondary to trauma from scooting himself on the floor. Ulcer is larger. Improved.    3.   Offloading: seat cushion.  Recommended that he not sleep in his back    4.  Leg swelling, recommended he continue to wear his compression stockings and follow up  with his primary for possible increase in his diuretic.     5. Nutrition: Encouraged high protein foods and/or nutritional supplements, including protein powder     6.   Treatment: Wound treatment will include irrigation and dressings to promote autolytic debridement.   Will apply discontinue with silvercel and Allevyn Life.  Endoform and Allevyn adhesive will be started and changed every three days.  This will be changed  every other day.     7.   I will transfer care to Obdulia Prieto, ALETHA, CNP, CWOCN following my resignation from [facility].  He will follow up with her in three weeks for reevaluation of his buttock ulcer.        Carito Sutherland, APRN, CNP,  CWCN  [facility]  [phone]

## 2021-06-27 NOTE — PROGRESS NOTES
"Progress Notes by Obdulia Prieto NP at 8/9/2019  3:40 PM     Author: Obdulia Prieto NP Service: -- Author Type: Nurse Practitioner    Filed: 8/9/2019  3:54 PM Encounter Date: 8/9/2019 Status: Signed    : Obdulia Prieto NP (Nurse Practitioner)       Follow up Vascular Visit       Date of Service:8/9/2019    Date Last Seen: 5/21/2019; 5/21/2019    Chief Complaint: buttock ulcer    History:   Past Medical History:   Diagnosis Date   ? ALS (amyotrophic lateral sclerosis) (H)    ? BPH (benign prostatic hyperplasia)    ? CAD (coronary artery disease)    ? Closed fracture of tibia and fibula, left, initial encounter    ? COPD (chronic obstructive pulmonary disease) (H)    ? Decubitus ulcer, buttock    ? Encephalopathy    ? Erectile dysfunction associated with type 2 diabetes mellitus (H)    ? HTN (hypertension)    ? Obstructive sleep apnea    ? Osteoarthritis    ? Postherpetic neuralgia    ? Type 2 diabetes mellitus with diabetic neuropathy (H)        Pt returns to the [facility] with regards to their buttock ulcer and leg swelling. Pt arrives alone today. Last seen 1 month ago. Pt continues with home care. He is primarily w/c bound due to ALS; he is on chronic prednisone for this. He developed the wounds to the buttocks 2-3 years ago after scooting on his buttocks after surgery. He has an offloading cushion in his w/c this is very old; bottoming out; we sent an order for a new cushion he has not received this. He has been wearing his compression stockings.  He is trying to focus on protein in his diet. He is supposed to be having Triad paste applied daily however he arrives with mepilex border in place.      Allergies: Acetaminophen    Physical Exam:    /54   Pulse 72   Temp 97.7  F (36.5  C) (Oral)   Resp 16   Ht 5' 10\" (1.778 m) Comment: per pt report  Wt 149 lb (67.6 kg) Comment: per pt report  BMI 21.38 kg/m      General:  Patient presents to " clinic in no apparent distress.  Head: normocephalic atraumatic  Psychiatric:  Alert and oriented x3.   Respiratory: unlabored breathing; no cough  Integumentary:  Skin is uniformly warm, dry and pink.    Wound #1 Location: coccyx  Size: 5x5cm area of scattered full and partial thickness ulcers No sinus tract present, Wound base: slough covered Periwound: no denudement, erythema, induration, maceration or warmth.      The other wounds on the buttocks are now healed      Circumferential volume measures:    Vasc Edema 4/30/2019   Right just above MTP 24.5   Right Ankle 25.5   Right Widest Calf 32   Right Thigh Up 10cm 37   Left - just above MTP 26   Left Ankle 28   Left Widest Calf 33.5   Left Thigh Up 10cm 38       Ulceration(s)/Wound(s):     VASC Wound 03/29/19 Rt buttocks (Active)   Pre Size Length 0.5 8/9/2019  3:00 PM   Pre Size Width 1 8/9/2019  3:00 PM   Pre Size Depth 0.1 8/9/2019  3:00 PM   Pre Total Sq cm 0.5 8/9/2019  3:00 PM   Post Size Length 0.5 4/30/2019  8:00 AM   Post Size Width 0.7 4/30/2019  8:00 AM   Description scattered area surrounding 8/9/2019  3:00 PM       VASC Wound 05/29/19 coccyx (Active)   Pre Size Length 0 8/9/2019  3:00 PM   Pre Size Width 0 8/9/2019  3:00 PM   Pre Size Depth 0 8/9/2019  3:00 PM   Pre Total Sq cm 0 8/9/2019  3:00 PM       VASC Wound 06/25/19 crease of buttock (Active)   Pre Size Length 0 8/9/2019  3:00 PM   Pre Size Width 0 8/9/2019  3:00 PM   Pre Size Depth 0 8/9/2019  3:00 PM   Pre Total Sq cm 0 8/9/2019  3:00 PM   Undermined n 6/25/2019  8:00 AM   Tunneling n 6/25/2019  8:00 AM   Description pale pink wound bed 6/25/2019  8:00 AM       Pressure Ulcer/Injury 06/05/19 Buttocks Left Stage 2 (Active)       Pressure Ulcer/Injury 07/21/19 Heel Left Stage 3 (Active)       Pressure Ulcer/Injury 07/21/19 Leg Left;Lateral (side/outer) Stage 1 (Active)       Wound 04/25/19 Non-pressure related ulcer Buttocks Right (Active)       Wound 05/31/19 Other wound type (comment) Coccyx  Medial (center/inner) (Active)        Lab Values    No results found for: SEDRATE  Lab Results   Component Value Date    CREATININE 0.66 (L) 06/25/2019     Lab Results   Component Value Date    HGBA1C 5.1 06/25/2019     Lab Results   Component Value Date    BUN 11 06/25/2019     Lab Results   Component Value Date    ALBUMIN 3.1 (L) 11/14/2018     Vitamin D, Total (25-Hydroxy)   Date Value Ref Range Status   06/14/2019 34.7 30.0 - 80.0 ng/mL Final             Impression:  1. Pressure ulcer of coccygeal region, unstageable (H)     2. Type 2 diabetes mellitus with hyperglycemia, without long-term current use of insulin (H)     3. Cigarette nicotine dependence without complication     4. Long term systemic steroid user     5. ALS (amyotrophic lateral sclerosis) (H)         4/30/19 buttock             Are any of these wounds new today: No; Location: na    Assessment/Plan:          1. Debridement: na             2. Edema: continue compression stockings. The compression wraps were applied today in clinic. He reports that he is able to don/doff these independently; pt declined to have legs evaluated today in clinic           3.  Wound treatment: wound treatment will include irrigation and dressings to promote autolytic debridement which will include: triad paste 1-3 times per day; no dressings           4. Nutrition: focus on           5. Offloading: w/c cushion is inadequate and in poor repair; he has difficulty getting to these other businesses; will see if they can mail this out to him; we contacted [facility] medical they report that the patient canceled his w/c pressure mapping and did not rescheduled so did not get his cushion         Patient will follow up with me in 4-6 weeks for reevaluation. They were instructed to call the clinic sooner with any signs or symptoms of infection or any further questions/concerns. Answered all questions.    Obdulia Prieto DNP, RN, CNP, CWOCN  [facility]  [place]  [phone]        This note was electronically signed by Obdulia Prieto

## 2021-06-27 NOTE — PROGRESS NOTES
Progress Notes by Obdulia Prieto NP at 5/29/2019  7:40 AM     Author: Obdulia Prieto NP Service: -- Author Type: Nurse Practitioner    Filed: 5/29/2019  8:26 AM Encounter Date: 5/29/2019 Status: Signed    : Obdulia Prieto NP (Nurse Practitioner)       Follow up Vascular Visit       Date of Service:5/29/2019    Date Last Seen: 5/21/2019; 5/21/2019    Chief Complaint: buttock ulcer; leg swelling    History:   Past Medical History:   Diagnosis Date   ? ALS (amyotrophic lateral sclerosis) (H)    ? Asthma    ? COPD (chronic obstructive pulmonary disease) (H)    ? Encephalopathy    ? Erectile dysfunction associated with type 2 diabetes mellitus (H)    ? Obstructive sleep apnea    ? Osteoarthritis    ? Postherpetic neuralgia        Pt returns to the [facility] with regards to their buttock ulcer and leg swelling. Pt arrives alone today. Previously seen and treated by Carito Sutherland CNP I am taking over the care of this patient. He is primarily w/c bound due to ALS. He developed the wounds to the buttocks 2-3 years ago after scooting on his buttocks after surgery. He has an offloading cushion in his w/c this is very old; bottoming out; does not fit the size of his w/c/ scooter seat. He has been wearing his compression stockings. It was recommended that he f/u with his pcp to talk about increasing his diuretic; he did not do this. He is trying to focus on protein in his diet. They are applying endoform and allevyn to the buttocks; home care was changing every 3 days; no longer has home care; he is no longer dressing the wound. He does not know why they stopped coming out.       Allergies: Patient has no known allergies.    Physical Exam:    BP 98/60   Pulse 68   Temp 98.5  F (36.9  C)   Resp 18     General:  Patient presents to clinic in no apparent distress.  Head: normocephalic atraumatic  Psychiatric:  Alert and oriented x3.   Respiratory: unlabored  breathing; no cough  Integumentary:  Skin is uniformly warm, dry and pink.    Wound #1 Location: right buttocks  Size: 0.3L x 1.2W x 0.2cmdepth.  No sinus tract present, Wound base: slough covered this was debrided to reveal 90% viable wound bed; edges are dry and desicated  No undermining present. Periwound: no denudement, erythema, induration, maceration or warmth.      Wound #2 Location: midline coccyx  Size: 0.7L x 0.3W x 0.1depth.  No sinus tract present, Wound base: superficial; pink; viable  No undermining present. Periwound: no denudement, erythema, induration, maceration or warmth.        Circumferential volume measures:    Vasc Edema 4/30/2019   Right just above MTP 24.5   Right Ankle 25.5   Right Widest Calf 32   Right Thigh Up 10cm 37   Left - just above MTP 26   Left Ankle 28   Left Widest Calf 33.5   Left Thigh Up 10cm 38       Ulceration(s)/Wound(s):     VASC Wound 03/29/19 Rt buttocks (Active)   Pre Size Length 0.3 5/29/2019  7:00 AM   Pre Size Width 1.2 5/29/2019  7:00 AM   Pre Size Depth 0.2 5/29/2019  7:00 AM   Pre Total Sq cm 0.36 5/29/2019  7:00 AM   Post Size Length 0.5 4/30/2019  8:00 AM   Post Size Width 0.7 4/30/2019  8:00 AM   Description diagonal .8 4/30/2019  8:00 AM       VASC Wound 05/29/19 coccyx (Active)   Pre Size Length 0.7 5/29/2019  7:00 AM   Pre Size Width 0.3 5/29/2019  7:00 AM   Pre Size Depth 0.1 5/29/2019  7:00 AM   Pre Total Sq cm 0.21 5/29/2019  7:00 AM       Wound 04/25/19 Non-pressure related ulcer Buttocks Right (Active)       Wound 05/09/19 Other wound type (comment) Toe Right (Active)        Lab Values    No results found for: SEDRATE  Lab Results   Component Value Date    CREATININE 0.80 03/13/2019     Lab Results   Component Value Date    HGBA1C 5.4 03/13/2019     Lab Results   Component Value Date    BUN 16 03/13/2019     Lab Results   Component Value Date    ALBUMIN 3.1 (L) 11/14/2018     No results found for: [id number]          Impression:  1. ALS (amyotrophic  lateral sclerosis) (H)     2. Long term systemic steroid user     3. Cigarette nicotine dependence without complication     4. Type 2 diabetes mellitus with hyperglycemia, without long-term current use of insulin (H)     5. Leg swelling     6. 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 Lumbar stenosis with neurogenic claudication     8. Wheelchair bound     9. Sweet syndrome         4/30/19 buttock             Are any of these wounds new today: No; Location: na    Assessment/Plan:          1. Debridement: After discussion of risk factors and verbal consent was obtained 2% Lidocaine HCL jelly was applied, under clean conditions, the right buttock ulceration(s) were debrided using currette. Devitalized and nonviable tissue, along with any fibrin and slough, was removed to improve granulation tissue formation, stimulate wound healing, decrease overall bacteria load, disrupt biofilm formation and decrease edge senescence.  Total excisional debridement was 0.36 sq cm from the epidermis/dermis area and into the subcutaneous tissue with a depth of 0.2 cm.   Ulcers were improved afterwards and .  Measures were unchanged after debridement.               2. Edema: continue compression stockings. The compression wraps were applied today in clinic. He reports that he is able to don/doff these independently; pt declined to have legs evaluated today in clinic           3.  Wound treatment: wound treatment will include irrigation and dressings to promote autolytic debridement which will include:no longer has home care; will stop the dressing; instead go with triad paste 1-3 times per day; no dressings;           4. Nutrition: focus on           5. Offloading: w/c cushion is inadequate and in poor repair will send order for new cushion; he has difficulty getting to these other businesses; will see if they can mail this out to him     Patient will follow up with me in 3-4 weeks for reevaluation.  They were instructed to call the clinic sooner with any signs or symptoms of infection or any further questions/concerns. Answered all questions.    Obdulia Prieto DNP, RN, CNP, [facility]  [phone]        This note was electronically signed by Obdulia Prieto

## 2021-06-27 NOTE — PROGRESS NOTES
Progress Notes by Carito Sutherland CNP at 4/19/2019  3:00 PM     Author: Carito Sutherland CNP Service: -- Author Type: Nurse Practitioner    Filed: 4/19/2019  5:11 PM Encounter Date: 4/19/2019 Status: Signed    : Carito Sutherland CNP (Nurse Practitioner)       Date of Service:4/19/2019    Provider's initial consult visit:  3/29/2019    Chief Complaint:   Chief Complaint   Patient presents with   ? Wound Check     coccyx ulcer       History:   Patient presents to clinic in regards to his buttock ulcer.  He reports that he developed his buttock ulcers about 3 years ago after his neck surgery.  They occurred because he was scooting around and on his stair following the surgery.  He is now primarily wheelchair bound secondary to leg weakness.  At home, he has an electric wheelchair that has a seat cushion in it.  The  patient was last seen by me on 3/29/2019 when silvercel was started.  No debridement was done secondary to pain. He states that his pain continues to be significant. The drainage level is unchanged.      Current Outpatient Medications   Medication Sig Dispense Refill   ? albuterol (PROVENTIL) 2.5 mg /3 mL (0.083 %) nebulizer solution Take 2.5 mg by nebulization every 4 (four) hours as needed for shortness of breath.      ? baclofen (LIORESAL) 10 MG tablet TAKE 1/2 TABLET(5 MG) BY MOUTH THREE TIMES DAILY 135 tablet 0   ? budesonide (PULMICORT) 1 mg/2 mL nebulizer solution Take 2 mL by nebulization 2 (two) times a day. Mix with Duoneb  11   ? budesonide-formoterol (SYMBICORT) 160-4.5 mcg/actuation inhaler Inhale 2 puffs 2 (two) times a day.     ? budesonide-formoterol (SYMBICORT) 160-4.5 mcg/actuation inhaler INHALE 2 PUFFS BY MOUTH TWICE DAILY 1 Inhaler 3   ? calcium carbonate-vitamin D3 (CALCIUM 600 + D,3,) 600 mg(1,500mg) -400 unit per tablet Take 1 tablet by mouth daily. 90 tablet 3   ? calcium carbonate-vitamin D3 (CALTRATE 600 PLUS D3) 600 mg(1,500mg) -400 unit per tablet TAKE 1  TABLET BY MOUTH DAILY 90 tablet 0   ? cetirizine (ZYRTEC) 10 MG tablet Take 1 tablet (10 mg total) by mouth daily. 90 tablet 3   ? cholecalciferol, vitamin D3, 1,000 unit tablet Take 2,000 Units by mouth daily.     ? cyanocobalamin 1000 MCG tablet Take 1,000 mcg by mouth daily.     ? DAILY-HIEN tablet TAKE 1 TABLET BY MOUTH EVERY  tablet 0   ? diphenhydrAMINE (BENADRYL) 25 mg capsule Take 50 mg by mouth every 6 (six) hours as needed for itching.     ? docusate sodium (COLACE) 100 MG capsule Take 100 mg by mouth 2 (two) times a day as needed for constipation.     ? ENSURE ACTIVE PROTEIN-MUSCLE Liqd DRINK 1 BOTTLE BY MOUTH TWICE DAILY 53501 mL 0   ? ENSURE ENLIVE 0.08 gram-1.5 kcal/mL Liqd DRINK 1 BOTTLE BY MOUTH TWICE DAILY 45028 mL 3   ? ergocalciferol (ERGOCALCIFEROL) 50,000 unit capsule Take 50,000 Units by mouth once a week.     ? folic acid (FOLVITE) 1 MG tablet Take 1 mg by mouth daily.     ? folic acid (FOLVITE) 1 MG tablet TAKE 1 TABLET BY MOUTH EVERY DAY 90 tablet 3   ? furosemide (LASIX) 20 MG tablet Take 20 mg by mouth daily.     ? gabapentin (NEURONTIN) 300 MG capsule Take 300 mg by mouth 2 (two) times a day.     ? gabapentin (NEURONTIN) 300 MG capsule TAKE 1 CAPSULE BY MOUTH TWICE DAILY 180 capsule 0   ? guaiFENesin (ROBITUSSIN) 100 mg/5 mL syrup Take 10 mL (200 mg total) by mouth every 4 (four) hours as needed for cough or congestion.  0   ? hydrOXYzine HCl (ATARAX) 25 MG tablet TAKE 1 TABLET(25 MG) BY MOUTH EVERY 8 HOURS AS NEEDED FOR ITCHING 100 tablet 3   ? ipratropium-albuterol (DUO-NEB) 0.5-2.5 mg/3 mL nebulizer Inhale 3 mL every 6 (six) hours as needed.     ? levalbuterol (XOPENEX HFA) 45 mcg/actuation inhaler INHALE 2 PUFFS BY MOUTH EVERY 4 HOURS AS NEEDED FOR WHEEZING 5 Inhaler 0   ? lidocaine (LIDODERM) 5 % REMOVE AND DISCARD PATCH WITHIN 12 HOURS OR AS DIRECTED BY MD 30 patch 0   ? LYRICA 75 mg capsule TAKE 1 CAPSULE(75 MG) BY MOUTH THREE TIMES DAILY 90 capsule 0   ? meloxicam (MOBIC)  7.5 MG tablet TAKE 1 TABLET(7.5 MG) BY MOUTH DAILY 90 tablet 0   ? metFORMIN (GLUCOPHAGE) 1000 MG tablet Take 1,000 mg by mouth 2 (two) times a day with meals.     ? metFORMIN (GLUCOPHAGE) 1000 MG tablet TAKE 1 TABLET BY MOUTH TWICE DAILY 180 tablet 0   ? metoprolol tartrate (LOPRESSOR) 25 MG tablet Take 1 tablet (25 mg total) by mouth 2 (two) times a day. 180 tablet 3   ? midazolam, PF, (VERSED) 1 mg/mL Soln injection Infuse 0.5-6 mg into a venous catheter.     ? multivitamin (TAB-A-HIEN) per tablet Take 1 tablet by mouth daily.     ? multivitamin therapeutic (THERA-TABS) tablet Take 1 tablet by mouth.     ? naloxone (NARCAN) 4 mg/actuation nasal spray 1 spray (4 mg dose) into one nostril for opioid reversal. Call 911. May repeat if no response in 3 minutes. 1 Box 0   ? nicotine (NICOTROL) 10 mg inhaler Inhale 1 puff as needed for smoking cessation.     ? nystatin (MYCOSTATIN) 100,000 unit/mL suspension SHAKE LIQUID AND TAKE 5 ML BY MOUTH FOUR TIMES DAILY 60 mL 0   ? omeprazole (PRILOSEC) 20 MG capsule Take 20 mg by mouth daily before breakfast.      ? oxyCODONE (ROXICODONE) 30 MG immediate release tablet Take 1 tablet (30 mg total) by mouth 3 (three) times a day. 90 tablet 0   ? potassium chloride (K-DUR,KLOR-CON) 20 MEQ tablet TAKE 1 TABLET(20 MEQ) BY MOUTH TWICE DAILY 180 tablet 3   ? predniSONE (DELTASONE) 5 MG tablet Take 5 mg by mouth daily. 30 tablet 3   ? pregabalin (LYRICA) 75 MG capsule Take 75 mg by mouth 3 (three) times a day.     ? riluzole (RILUTEK) 50 mg tablet TAKE 1 TABLET BY MOUTH EVERY 12 HOURS(1 HOUR BEFORE MEALS OR 2 HOURS AFTER MEALS)     ? tamsulosin (FLOMAX) 0.4 mg Cp24 Take 1 capsule (0.4 mg total) by mouth Daily after breakfast. 90 capsule 3   ? tiotropium (SPIRIVA) 18 mcg inhalation capsule Place 18 mcg into inhaler and inhale daily.     ? wound dressings (TRIAD WOUND DRESSING) Pste Apply 1 Tube topically 2 (two) times a day.  3   ? doxycycline (VIBRA-TABS) 100 MG tablet Take 1 tablet  (100 mg total) by mouth 2 (two) times a day for 10 days. 20 tablet 0     No current facility-administered medications for this visit.        No Known Allergies    Physical Exam:    Vitals:    04/19/19 1438   BP: 116/56   Pulse: 92   Resp: 16   Temp: 98.4  F (36.9  C)    There is no height or weight on file to calculate BMI.    General:  68 y.o. male in no apparent distress.    Head:  Normocephalic atraumatic  Psychiatric: Cooperative.   Respiratory: unlabored breathing.      Integumentary:      Buttock Ulceration(s):      Wound bed: %100 loose slough          Undermining no, Tunneling no   Wound Edge: attached and Epibole   Periwound:  There is no roney wound erythema, induration, maceration, denudement fluctuance or warmth surrounding the ulcer(s).  Exudate: moderate serous    Odor:  absent   Wound Shape oval      VASC Wound 03/29/19 Rt buttocks (Active)   Pre Size Length 0.6 4/19/2019  2:00 PM   Pre Size Width 0.6 4/19/2019  2:00 PM   Pre Size Depth 0.1 4/19/2019  2:00 PM   Pre Total Sq cm 0.36 4/19/2019  2:00 PM   Description diagonal 1.4 4/19/2019  2:00 PM       Pressure Ulcer 02/07/18 Coccyx Unstagable (Active)       Photo:         Assessment:    Images:  none pertinent    Labs:    The following labs were reviewed by me today.    Lab Results   Component Value Date    WBC 5.3 01/08/2019    HGB 10.9 (L) 01/08/2019    HCT 33.4 (L) 01/08/2019    MCV 77 (L) 01/08/2019     01/08/2019               Invalid input(s): EOSPC    Lab Results   Component Value Date    CREATININE 0.80 03/13/2019         Lab Results   Component Value Date    BUN 16 03/13/2019     No results found for: PREALBUMINESUFAST(LABPROT,LABALBU,albumin)@  Invalid input(s): LABALBU  No results found for: PREALBUMIN    No results found for: CRP  No results found for: SEDRATE    Lab Results   Component Value Date    HGBA1C 5.4 03/13/2019     No results found for: TSH        No results found for: [id number]  Lab Results   Component Value Date     BNP 68 (H) 04/24/2014        1. 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 ALS (amyotrophic lateral sclerosis) (H)  Change dressing   3. Long term systemic steroid user  Change dressing   4. Cigarette nicotine dependence without complication  Change dressing   5. Type 2 diabetes mellitus with hyperglycemia, without long-term current use of insulin (H)  Change dressing   6. Local infection of wound  doxycycline (VIBRA-TABS) 100 MG tablet    Culture/Gram Stain: Wound    Change dressing       A new wound was identified today: no.    Plan:  1.  No debridement secondary to pain. Wound is dermal thickness    2.  Non pressure ulcer of buttock, secondary to trauma from scooting himself on the floor. Ulcer is larger.  I wrote a prescription for Doxycycline.  Using the Bender Technique, I obtained an aerobic culture and sensitivity today.     3.   Offloading: seat cushion.     4. Nutrition: Encouraged high protein foods and/or nutritional supplements, including protein powder     5.   Treatment: Wound treatment will include irrigation and dressings to promote autolytic debridement.   Will apply continue with silvercel and Allevyn Life.  This will be changed  every other day.     6.   Patient will follow up with me in three weeks for reevaluation.   Instructions were given to call the clinic sooner with any further questions or concerns. Answered all questions.          Carito Sutherland, APRN, CNP,  [facility]  [phone]

## 2021-06-27 NOTE — PROGRESS NOTES
Progress Notes by Carito Sutherland CNP at 3/29/2019  9:00 AM     Author: Carito Sutherland CNP Service: -- Author Type: Nurse Practitioner    Filed: 3/29/2019 10:39 AM Encounter Date: 3/29/2019 Status: Signed    : Carito Sutherland CNP (Nurse Practitioner)       [facility] Consult Note    Date of Service:  3/29/2019    Requesting Provider: Ronny Gallardo MD    History of Present Illness:     Ronny Gallardo MD referred Jef Centeno for buttock ulcer.   The patient presents to clinic alone.  He reports that he developed his buttock ulcers about 3 years ago after his neck surgery.  They occurred because he was scooting around and on his stair following the surgery.  He is now primarily wheelchair bound secondary to leg weakness.  At home, he has an electric wheelchair that has a seat cushion in it.  Currently the PCA is applying an ABD over it and he believes that it is now improving.  He states that he has a large amount of drainage from it.       Review of Symptoms:    GI: Appetite is good,  Weight:  no change     Cardiovascular:  denies Chest pain or discomfort;      Respiratory:  denies unusual shortness of breath    Bowels: regular     : No concerns.     MSK: Patient is ambulatory; does use an assistive device :Wheelchair       Off loading:  Chair cushion    Neurological:   reports weakness in bilateral legs    Endocrine: is diabetic since 2004.  A1c: unknown and Diabetic management by Endocrinology .  Just taken off of all diabetic medication.    All other systems were reviewed are not pertinent to the presenting problem.    Past Medical History:    Past Medical History:   Diagnosis Date   ? ALS (amyotrophic lateral sclerosis) (H)    ? Asthma    ? COPD (chronic obstructive pulmonary disease) (H)    ? Encephalopathy    ? Erectile dysfunction associated with type 2 diabetes mellitus (H)    ? Hypertension    ? Obstructive sleep apnea    ? Osteoarthritis    ? Postherpetic  neuralgia         Surgical History:   Past Surgical History:   Procedure Laterality Date   ? CHOLECYSTECTOMY     ? LAPAROSCOPIC GASTROTOMY W/ REPAIR OF ULCER         Allergies: No Known Allergies       Medications:    Current Outpatient Medications:   ?  albuterol (PROVENTIL) 2.5 mg /3 mL (0.083 %) nebulizer solution, Take 2.5 mg by nebulization every 4 (four) hours as needed for shortness of breath. , Disp: , Rfl:   ?  baclofen (LIORESAL) 10 MG tablet, Take 0.5 tablets (5 mg total) by mouth 3 (three) times a day., Disp: 30 tablet, Rfl: 0  ?  budesonide (PULMICORT) 1 mg/2 mL nebulizer solution, Take 2 mL by nebulization 2 (two) times a day. Mix with Duoneb, Disp: , Rfl: 11  ?  budesonide-formoterol (SYMBICORT) 160-4.5 mcg/actuation inhaler, Inhale 2 puffs 2 (two) times a day., Disp: , Rfl:   ?  budesonide-formoterol (SYMBICORT) 160-4.5 mcg/actuation inhaler, INHALE 2 PUFFS BY MOUTH TWICE DAILY, Disp: 1 Inhaler, Rfl: 3  ?  calcium carbonate-vitamin D3 (CALCIUM 600 + D,3,) 600 mg(1,500mg) -400 unit per tablet, Take 1 tablet by mouth daily., Disp: 90 tablet, Rfl: 3  ?  calcium carbonate-vitamin D3 (CALTRATE 600 PLUS D3) 600 mg(1,500mg) -400 unit per tablet, TAKE 1 TABLET BY MOUTH DAILY, Disp: 90 tablet, Rfl: 0  ?  cetirizine (ZYRTEC) 10 MG tablet, Take 1 tablet (10 mg total) by mouth daily., Disp: 90 tablet, Rfl: 3  ?  cholecalciferol, vitamin D3, 1,000 unit tablet, Take 2,000 Units by mouth daily., Disp: , Rfl:   ?  cyanocobalamin 1000 MCG tablet, Take 1,000 mcg by mouth daily., Disp: , Rfl:   ?  DAILY-HIEN tablet, TAKE 1 TABLET BY MOUTH EVERY DAY, Disp: 120 tablet, Rfl: 0  ?  diphenhydrAMINE (BENADRYL) 25 mg capsule, Take 50 mg by mouth every 6 (six) hours as needed for itching., Disp: , Rfl:   ?  docusate sodium (COLACE) 100 MG capsule, Take 100 mg by mouth 2 (two) times a day as needed for constipation., Disp: , Rfl:   ?  ENSURE ACTIVE PROTEIN-MUSCLE Liqd, DRINK 1 BOTTLE BY MOUTH TWICE DAILY, Disp: 41007 mL, Rfl:  0  ?  ENSURE ENLIVE 0.08 gram-1.5 kcal/mL Liqd, DRINK 1 BOTTLE BY MOUTH TWICE DAILY, Disp: 80266 mL, Rfl: 3  ?  ergocalciferol (ERGOCALCIFEROL) 50,000 unit capsule, Take 50,000 Units by mouth once a week., Disp: , Rfl:   ?  folic acid (FOLVITE) 1 MG tablet, Take 1 mg by mouth daily., Disp: , Rfl:   ?  folic acid (FOLVITE) 1 MG tablet, TAKE 1 TABLET BY MOUTH EVERY DAY, Disp: 90 tablet, Rfl: 3  ?  furosemide (LASIX) 20 MG tablet, Take 20 mg by mouth daily., Disp: , Rfl:   ?  gabapentin (NEURONTIN) 300 MG capsule, Take 300 mg by mouth 2 (two) times a day., Disp: , Rfl:   ?  gabapentin (NEURONTIN) 300 MG capsule, TAKE 1 CAPSULE BY MOUTH TWICE DAILY, Disp: 180 capsule, Rfl: 0  ?  guaiFENesin (ROBITUSSIN) 100 mg/5 mL syrup, Take 10 mL (200 mg total) by mouth every 4 (four) hours as needed for cough or congestion., Disp: , Rfl: 0  ?  hydrOXYzine HCl (ATARAX) 25 MG tablet, TAKE 1 TABLET(25 MG) BY MOUTH EVERY 8 HOURS AS NEEDED FOR ITCHING, Disp: 100 tablet, Rfl: 3  ?  ipratropium-albuterol (DUO-NEB) 0.5-2.5 mg/3 mL nebulizer, Inhale 3 mL every 6 (six) hours as needed., Disp: , Rfl:   ?  levalbuterol (XOPENEX HFA) 45 mcg/actuation inhaler, INHALE 2 PUFFS BY MOUTH EVERY 4 HOURS AS NEEDED FOR WHEEZING, Disp: 5 Inhaler, Rfl: 0  ?  lidocaine (LIDODERM) 5 %, Remove & Discard patch within 12 hours or as directed by MD, Disp: 30 patch, Rfl: 2  ?  LYRICA 75 mg capsule, TAKE 1 CAPSULE(75 MG) BY MOUTH THREE TIMES DAILY, Disp: 90 capsule, Rfl: 0  ?  meloxicam (MOBIC) 7.5 MG tablet, Take 1 tablet (7.5 mg total) by mouth daily., Disp: 90 tablet, Rfl: 3  ?  metFORMIN (GLUCOPHAGE) 1000 MG tablet, Take 1,000 mg by mouth 2 (two) times a day with meals., Disp: , Rfl:   ?  metFORMIN (GLUCOPHAGE) 1000 MG tablet, TAKE 1 TABLET BY MOUTH TWICE DAILY, Disp: 180 tablet, Rfl: 0  ?  metoprolol tartrate (LOPRESSOR) 25 MG tablet, Take 1 tablet (25 mg total) by mouth 2 (two) times a day., Disp: 180 tablet, Rfl: 3  ?  midazolam, PF, (VERSED) 1 mg/mL Soln  injection, Infuse 0.5-6 mg into a venous catheter., Disp: , Rfl:   ?  multivitamin (TAB-A-HIEN) per tablet, Take 1 tablet by mouth daily., Disp: , Rfl:   ?  multivitamin therapeutic (THERA-TABS) tablet, Take 1 tablet by mouth., Disp: , Rfl:   ?  naloxone (NARCAN) 4 mg/actuation nasal spray, 1 spray (4 mg dose) into one nostril for opioid reversal. Call 911. May repeat if no response in 3 minutes., Disp: 1 Box, Rfl: 0  ?  nicotine (NICOTROL) 10 mg inhaler, Inhale 1 puff as needed for smoking cessation., Disp: , Rfl:   ?  nystatin (MYCOSTATIN) 100,000 unit/mL suspension, SHAKE LIQUID AND TAKE 5 ML BY MOUTH FOUR TIMES DAILY, Disp: 60 mL, Rfl: 0  ?  omeprazole (PRILOSEC) 20 MG capsule, Take 20 mg by mouth daily before breakfast. , Disp: , Rfl:   ?  oxyCODONE (ROXICODONE) 30 MG immediate release tablet, Take 1 tablet (30 mg total) by mouth 3 (three) times a day., Disp: 90 tablet, Rfl: 0  ?  potassium chloride (K-DUR,KLOR-CON) 20 MEQ tablet, TAKE 1 TABLET(20 MEQ) BY MOUTH TWICE DAILY, Disp: 180 tablet, Rfl: 3  ?  predniSONE (DELTASONE) 5 MG tablet, Take 5 mg by mouth daily., Disp: 30 tablet, Rfl: 3  ?  pregabalin (LYRICA) 75 MG capsule, Take 75 mg by mouth 3 (three) times a day., Disp: , Rfl:   ?  riluzole (RILUTEK) 50 mg tablet, TAKE 1 TABLET BY MOUTH EVERY 12 HOURS(1 HOUR BEFORE MEALS OR 2 HOURS AFTER MEALS), Disp: , Rfl:   ?  tamsulosin (FLOMAX) 0.4 mg Cp24, Take 1 capsule (0.4 mg total) by mouth Daily after breakfast., Disp: 90 capsule, Rfl: 3  ?  tiotropium (SPIRIVA) 18 mcg inhalation capsule, Place 18 mcg into inhaler and inhale daily., Disp: , Rfl:   ?  wound dressings (TRIAD WOUND DRESSING) Pste, Apply 1 Tube topically 2 (two) times a day., Disp: , Rfl: 3    Family history:   Family History   Problem Relation Age of Onset   ? Diabetes Mother    ? Diabetes Sister    ? Diabetes Brother          Social History:   Social History     Socioeconomic History   ? Marital status:      Spouse name: Not on file   ?  "Number of children: Not on file   ? Years of education: Not on file   ? Highest education level: Not on file   Occupational History   ? Not on file   Social Needs   ? Financial resource strain: Not on file   ? Food insecurity:     Worry: Not on file     Inability: Not on file   ? Transportation needs:     Medical: Not on file     Non-medical: Not on file   Tobacco Use   ? Smoking status: Current Every Day Smoker     Packs/day: 0.25     Types: Cigarettes   ? Smokeless tobacco: Never Used   ? Tobacco comment: currently attempting to quit   Substance and Sexual Activity   ? Alcohol use: No     Comment: Quit drinking in 2014. Prior to that he drank excessively.   ? Drug use: No   ? Sexual activity: Not on file   Lifestyle   ? Physical activity:     Days per week: Not on file     Minutes per session: Not on file   ? Stress: Not on file   Relationships   ? Social connections:     Talks on phone: Not on file     Gets together: Not on file     Attends Restoration service: Not on file     Active member of club or organization: Not on file     Attends meetings of clubs or organizations: Not on file     Relationship status: Not on file   ? Intimate partner violence:     Fear of current or ex partner: Not on file     Emotionally abused: Not on file     Physically abused: Not on file     Forced sexual activity: Not on file   Other Topics Concern   ? Not on file   Social History Narrative    Patient is , 5 children by previous wife are alive and well. Patient has social security disability.        Physical Exam    General: This is a 68 y.o. male who appears their reported age, not in acute distress    Constitution:  Vital Signs: /70 (Patient Site: Left Arm, Patient Position: Sitting, Cuff Size: Adult Regular)   Pulse 72   Temp 98.5  F (36.9  C) (Oral)   Resp 16   Ht 5' 10\" (1.778 m)   Wt 130 lb (59 kg)   BMI 18.65 kg/m   Body mass index is 18.65 kg/m .    Psychiatric: Alert and oriented X 3, in no apparent " distress. Calm and cooperative throughout exam    Head/Neck:  Normocephalic, atraumatic    Cardiovascular:  Rate and Rhythm is regular    Respiratory: clear to auscultation, no wheezes, rales or rhonchi, symmetric air entry    Abdomen:  Normal bowel sounds. Soft, symmetric, no guarding or rigidity, non tender with palpation.  No organomegaly or masses palpated.     Integumentary/Hair/Nails:  Turgor and texture are diminished.     MSK:  Bilateral leg weakness bilaterally    Neurological:  Grossly intact.    Ulceration(s)/Wound(s):     Buttock Ulceration(s):     Wound bed: %100 loose slough          Undermining no, Tunneling no   Wound Edge: attached and Epibole   Periwound:  There is no roney wound erythema, induration, maceration, denudement fluctuance or warmth surrounding the ulcer(s).  Exudate: moderate serous    Odor:  absent   Wound Shape oval    Wound 07/24/18 rt buttock (Active)   Pre Size Length 0.9 3/29/2019  9:00 AM   Pre Size Width 0.5 3/29/2019  9:00 AM   Pre Size Depth 0.2 3/29/2019  9:00 AM   Pre Total Sq cm 0.45 3/29/2019  9:00 AM       Wound 07/24/18 lt buttock (Active)   Pre Size Length 0.5 3/29/2019  9:00 AM   Pre Size Width 0.3 3/29/2019  9:00 AM   Pre Size Depth 0.1 3/29/2019  9:00 AM   Pre Total Sq cm 0.15 3/29/2019  9:00 AM       Wound 07/24/18 superior rt buttock (Active)   Pre Size Length 0.2 7/24/2018  1:00 PM   Pre Size Width 0.3 7/24/2018  1:00 PM   Pre Size Depth 0 7/24/2018  1:00 PM   Pre Total Sq cm 0.06 7/24/2018  1:00 PM       Pressure Ulcer 02/07/18 Coccyx Unstagable (Active)       Photo       Laboratory studies:     Lab Results   Component Value Date    WBC 5.3 01/08/2019    HGB 10.9 (L) 01/08/2019    HCT 33.4 (L) 01/08/2019    MCV 77 (L) 01/08/2019     01/08/2019     Lab Results   Component Value Date    CREATININE 0.80 03/13/2019     Lab Results   Component Value Date    BUN 16 03/13/2019     No results found for: CRP  No results found for: SEDRATE  Lab Results   Component  Value Date    ALBUMIN 3.1 (L) 11/14/2018     Lab Results   Component Value Date    HGBA1C 5.4 03/13/2019       No results found for: TSH      Lab Results   Component Value Date    BNP 68 (H) 04/24/2014     Results for orders placed or performed in visit on 11/14/18   Comprehensive Metabolic Panel   Result Value Ref Range    Sodium 141 136 - 145 mmol/L    Potassium 3.2 (L) 3.5 - 5.0 mmol/L    Chloride 106 98 - 107 mmol/L    CO2 26 22 - 31 mmol/L    Anion Gap, Calculation 9 5 - 18 mmol/L    Glucose 89 70 - 125 mg/dL    BUN 8 8 - 22 mg/dL    Creatinine 0.66 (L) 0.70 - 1.30 mg/dL    GFR MDRD Af Amer >60 >60 mL/min/1.73m2    GFR MDRD Non Af Amer >60 >60 mL/min/1.73m2    Bilirubin, Total 0.4 0.0 - 1.0 mg/dL    Calcium 8.7 8.5 - 10.5 mg/dL    Protein, Total 5.8 (L) 6.0 - 8.0 g/dL    Albumin 3.1 (L) 3.5 - 5.0 g/dL    Alkaline Phosphatase 73 45 - 120 U/L    AST 13 0 - 40 U/L    ALT <9 0 - 45 U/L     No results found for: [id number]    Imaging:  None pertinent     Assessment:  1. 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 ALS (amyotrophic lateral sclerosis) (H)     3. Long term systemic steroid user     4. Cigarette nicotine dependence without complication     5. Lumbar stenosis with neurogenic claudication     6. Type 2 diabetes mellitus with hyperglycemia, without long-term current use of insulin (H)     7. Iron deficiency anemia, unspecified iron deficiency anemia type         Assessment/Plan:  1.  No debridement secondary to pain.  Silver nitrated the Epibole superior edge.  If I am unable to debride the wound at his next visit, I will consider prescribing EMLA prior to his next appointment.    2.  Non pressure ulcer of buttock, secondary to trauma from scooting himself on the floor.  No signs of infection, but I will treat for bacterial overload.  If the ulcer fails to improve, I will consider culturing.     3.   Offloading: seat cushion.    4. Nutrition: Encouraged high protein foods  and/or nutritional supplements, including protein powder    5.   Treatment: Wound treatment will include irrigation and dressings to promote autolytic debridement.   Will apply silvercel and Allevyn Life.  This will be changed  every other day.    6.   Patient will follow up with me in three weeks for reevaluation.   Instructions were given to call the clinic sooner with any further questions or concerns. Answered all questions.      Carito Sutherland, APRN, CNP, [facility]  [phone]

## 2021-06-28 ENCOUNTER — COMMUNICATION - HEALTHEAST (OUTPATIENT)
Dept: INTERNAL MEDICINE | Facility: CLINIC | Age: 71
End: 2021-06-28

## 2021-06-28 DIAGNOSIS — G89.29 CHRONIC NECK PAIN: ICD-10-CM

## 2021-06-28 DIAGNOSIS — M54.2 CHRONIC NECK PAIN: ICD-10-CM

## 2021-06-28 RX ORDER — OXYCODONE HYDROCHLORIDE 30 MG/1
30 TABLET ORAL 3 TIMES DAILY
Qty: 90 TABLET | Refills: 0 | Status: SHIPPED | OUTPATIENT
Start: 2021-06-28 | End: 2021-09-01

## 2021-06-28 NOTE — PROGRESS NOTES
"Progress Notes by Obdulia Prieto NP at 3/4/2020  9:00 AM     Author: Obdulia Prieto NP Service: -- Author Type: Nurse Practitioner    Filed: 3/4/2020  9:21 AM Encounter Date: 3/4/2020 Status: Signed    : Obdulia Prieto NP (Nurse Practitioner)       Follow up Vascular Visit       Date of Service:3/4/2020    Date Last Seen: 1/29/2020; 1/29/2020    Chief Complaint: buttocks wound; BLE swelling        Pt returns to [facility] with regards to their buttocks wound and BLE swelling.  They arrive today alone. They are currently using nothing to the wounds; arrives with no dressing on and no evidence of triad paste. Triad paste was recommended to be applied 1-2 times per day. This is supposed being done by home care staff; has PT/OT/RN and PCA services daily. They are using either short stretch or compression stockings for compression. They are feeling well today. Denies fevers, chills. No shortness of breath. He has a new w/c with 4\" foam cushion; however he arrives today in an old w/c with a flattened out pillow he is sitting on covered with a plastic bag. He spends much of his time in the w/c. He is up minimally.       Allergies: Acetaminophen    Medications:   Current Outpatient Medications:   ?  acetaminophen-codeine (TYLENOL #3) 300-30 mg per tablet, Take 1 tablet by mouth 3 (three) times a day as needed for pain. Indications: pain, Disp: , Rfl:   ?  acetaminophen-codeine (TYLENOL-CODEINE #3) 300-30 mg per tablet, Take 1 tablet by mouth 3 (three) times a day as needed for pain., Disp: 20 tablet, Rfl: 0  ?  albuterol (PROAIR HFA;PROVENTIL HFA;VENTOLIN HFA) 90 mcg/actuation inhaler, Inhale 2 puffs 3 (three) times a day., Disp: 1 Inhaler, Rfl: 12  ?  aspirin 81 MG EC tablet, Take 81 mg by mouth daily., Disp: , Rfl:   ?  atorvastatin (LIPITOR) 10 MG tablet, Take 1 tablet (10 mg total) by mouth daily., Disp: 90 tablet, Rfl: 3  ?  b complex vitamins tablet, Take 1 tablet by mouth daily., Disp: " , Rfl:   ?  baclofen (LIORESAL) 10 MG tablet, TAKE 1/2 TABLET(5 MG) BY MOUTH THREE TIMES DAILY (Patient taking differently: TAKE 1 TABLET(10 MG) BY MOUTH THREE TIMES DAILY), Disp: 135 tablet, Rfl: 0  ?  budesonide (PULMICORT) 1 mg/2 mL nebulizer solution, Take 1 mg by nebulization 2 (two) times a day., Disp: , Rfl:   ?  budesonide-formoterol (SYMBICORT) 160-4.5 mcg/actuation inhaler, INHALE 2 PUFFS BY MOUTH TWICE DAILY, Disp: 2 Inhaler, Rfl: 6  ?  calcium carbonate-vitamin D3 (CALTRATE 600 PLUS D3) 600 mg(1,500mg) -400 unit per tablet, TAKE 1 TABLET BY MOUTH DAILY, Disp: 90 tablet, Rfl: 3  ?  cetirizine (ZYRTEC) 10 MG tablet, Take 1 tablet (10 mg total) by mouth daily., Disp: 90 tablet, Rfl: 3  ?  cholecalciferol, vitamin D3, 1,000 unit tablet, Take 4,000 Units by mouth daily.    , Disp: , Rfl:   ?  DAILY-HIEN tablet, TAKE 1 TABLET BY MOUTH DAILY, Disp: 120 tablet, Rfl: 3  ?  docusate sodium (COLACE) 100 MG capsule, Take 100 mg by mouth as needed for constipation.    , Disp: , Rfl:   ?  DULoxetine (CYMBALTA) 20 MG capsule, Take 3 capsules (60 mg total) by mouth daily., Disp: 270 capsule, Rfl: 3  ?  ENSURE ACTIVE PROTEIN-MUSCLE Liqd, DRINK 1 BOTTLE TWICE DAILY, Disp: 60 Bottle, Rfl: 2  ?  ferrous sulfate 325 (65 FE) MG tablet, Take 1 tablet (325 mg total) by mouth daily with breakfast., Disp: 90 tablet, Rfl: 3  ?  folic acid (FOLVITE) 1 MG tablet, TAKE 1 TABLET BY MOUTH EVERY DAY, Disp: 90 tablet, Rfl: 3  ?  furosemide (LASIX) 20 MG tablet, Take 2 tablets (40 mg total) by mouth 2 (two) times a day., Disp: 180 tablet, Rfl: 3  ?  gabapentin (NEURONTIN) 300 MG capsule, Take 300 mg by mouth., Disp: , Rfl:   ?  hydrOXYzine HCl (ATARAX) 25 MG tablet, Take 1 tablet (25 mg total) by mouth every 8 (eight) hours as needed for itching., Disp: 100 tablet, Rfl: 1  ?  ipratropium-albuterol (DUO-NEB) 0.5-2.5 mg/3 mL nebulizer, Inhale 3 mL every 4 (four) hours as needed., Disp: 30 vial, Rfl: 3  ?  levalbuterol (XOPENEX HFA) 45  mcg/actuation inhaler, Inhale 2 puffs every 4 (four) hours as needed for wheezing., Disp: 5 Inhaler, Rfl: 3  ?  lidocaine (LIDODERM) 5 %, Apply once daily  remove & Discard patch within 12 hours or as directed by MD, Disp: 7 patch, Rfl: 0  ?  loratadine-pseudoephedrine (CLARITIN-D 12 HOUR) 5-120 mg Tb12, Take 1 tablet by mouth 2 (two) times a day., Disp: 60 tablet, Rfl: 0  ?  magnesium oxide (MAG-OX) 400 mg (241.3 mg magnesium) tablet, Take 1 tablet (400 mg total) by mouth daily for 20 days., Disp: 20 tablet, Rfl: 0  ?  meloxicam (MOBIC) 7.5 MG tablet, 1 tab p.o. daily, Disp: 90 tablet, Rfl: 3  ?  metFORMIN (GLUCOPHAGE) 500 MG tablet, Take 1 tablet (500 mg total) by mouth daily with breakfast., Disp: 90 tablet, Rfl: 3  ?  metoprolol tartrate (LOPRESSOR) 25 MG tablet, TAKE 1 TABLET(25 MG) BY MOUTH TWICE DAILY, Disp: 180 tablet, Rfl: 3  ?  midazolam, PF, (VERSED) 1 mg/mL Soln injection, Infuse 0.5-6 mg into a venous catheter., Disp: , Rfl:   ?  naloxone (NARCAN) 4 mg/actuation nasal spray, 1 spray (4 mg dose) into one nostril for opioid reversal. Call 911. May repeat if no response in 3 minutes., Disp: 1 Box, Rfl: 0  ?  nystatin (MYCOSTATIN) 100,000 unit/mL suspension, Take 5 mL (500,000 Units total) by mouth 4 (four) times a day. Swish and spit., Disp: 200 mL, Rfl: 0  ?  omeprazole (PRILOSEC) 20 MG capsule, Take 1 capsule (20 mg total) by mouth daily before breakfast., Disp: 90 capsule, Rfl: 3  ?  oxyCODONE (ROXICODONE) 15 MG immediate release tablet, Take 1 tablet (15 mg total) by mouth 3 (three) times a day., Disp: 90 tablet, Rfl: 0  ?  potassium chloride (K-DUR,KLOR-CON) 20 MEQ tablet, TAKE 1 TABLET(20 MEQ) BY MOUTH TWICE DAILY, Disp: 180 tablet, Rfl: 3  ?  pregabalin (LYRICA) 75 MG capsule, Take 1 capsule (75 mg total) by mouth 2 (two) times a day., Disp: 180 capsule, Rfl: 3  ?  riluzole (RILUTEK) 50 mg tablet, TAKE 1 TABLET BY MOUTH TWO TIMES A DAY BEFORE MEALS, Disp: , Rfl:   ?  senna-docusate (PERICOLACE)  8.6-50 mg tablet, Take 2 tablets by mouth daily as needed for constipation., Disp: 30 tablet, Rfl: 3  ?  sodium phosphates 133 mL (FOR FLEET) 19-7 gram/118 mL Enem rectal enema, INSERT 1 ENEMA INTO THE RECTUM ONCE FOR 1 DOSE, Disp: 133 mL, Rfl: 0  ?  SPIRIVA WITH HANDIHALER 18 mcg inhalation capsule, Place 1 capsule (2 puffs total) into inhaler and inhale daily., Disp: 90 capsule, Rfl: 3  ?  tamsulosin (FLOMAX) 0.4 mg cap, Take 1 capsule (0.4 mg total) by mouth Daily after breakfast., Disp: 90 capsule, Rfl: 3  ?  vitamin B complex (B COMPLEX 1 ORAL), Take 1 capsule by mouth., Disp: , Rfl:   ?  wound dressings (TRIAD WOUND DRESSING) Pste, Apply 71 g topically 2 (two) times a day., Disp: , Rfl: 3  ?  predniSONE (DELTASONE) 5 MG tablet, Take 2.5 mg by mouth daily For 2 weeks then stop., Disp: 7 tablet, Rfl: 0    Current Facility-Administered Medications:   ?  lidocaine 2 % jelly (XYLOCAINE), , Topical, PRN, Obdulia Prieto NP, 1 application at 08/09/19 1530    History:   Past Medical History:   Diagnosis Date   ? ALS (amyotrophic lateral sclerosis) (H)    ? BPH (benign prostatic hyperplasia)    ? Closed fracture of tibia and fibula, left, initial encounter    ? COPD (chronic obstructive pulmonary disease) (H)    ? Decubitus ulcer, buttock    ? Erectile dysfunction associated with type 2 diabetes mellitus (H)    ? HTN (hypertension)    ? Obstructive sleep apnea    ? Postherpetic neuralgia    ? Type 2 diabetes mellitus with diabetic neuropathy (H)        Physical Exam:    /60   Pulse 72   Temp 98.2  F (36.8  C)   Resp 16     General:  Patient presents to clinic in no apparent distress.  Head: normocephalic atraumatic  Psychiatric:  Lethargic; difficult to understand; rouses easily to verbal stimuli; complaining of pain  Respiratory: unlabored breathing; no cough  Integumentary:  Skin is uniformly warm, dry and pink.    Wound #1 Location: right buttocks  Size: 1.4L x 0.8W x 0.1depth.  No sinus tract present,  Wound base: pink viable  No undermining present. Wound is full thickness. There is moderate drainage. Periwound: no denudement, erythema, induration, maceration or warmth.      Circumferential volume measures:    [facility] Edema 4/30/2019 3/4/2020   Right just above MTP 24.5 24.1   Right Ankle 25.5 28   Right Widest Calf 32 35.7   Right Thigh Up 10cm 37 -   Left - just above MTP 26 24.3   Left Ankle 28 28.9   Left Widest Calf 33.5 35.2   Left Thigh Up 10cm 38 -       Ulceration(s)/Wound(s):     [facility] Wound 01/29/20 Lt buttock (Active)   Pre Size Length 0.3 3/4/2020  8:00 AM   Pre Size Width 0.3 3/4/2020  8:00 AM   Pre Size Depth 0.1 1/29/2020  8:00 AM   Pre Total Sq cm 2 1/29/2020  8:00 AM   Post Size Length 1.5 3/3/2020 10:02 AM   Post Size Width 1.8 3/3/2020 10:02 AM   Post Size Depth 0.2 3/3/2020 10:02 AM   Description scab 3/4/2020  8:00 AM   Prodcut Used Wash w/ normal saline 3/3/2020 10:02 AM       [facility] Wound 03/04/20 Rt buttocks (Active)   Pre Size Length 1.4 3/4/2020  8:00 AM   Pre Size Width 0.8 3/4/2020  8:00 AM   Pre Size Depth 0.1 3/4/2020  8:00 AM   Pre Total Sq cm 1.12 3/4/2020  8:00 AM        Lab Values    No results found for: SEDRATE  Lab Results   Component Value Date    CREATININE 0.64 (L) 02/25/2020     Lab Results   Component Value Date    HGBA1C 5.3 12/24/2019     Lab Results   Component Value Date    BUN 9 02/25/2020     Lab Results   Component Value Date    ALBUMIN 3.6 12/24/2019     Vitamin D, Total (25-Hydroxy)   Date Value Ref Range Status   06/14/2019 34.7 30.0 - 80.0 ng/mL Final             Impression:  1. Pressure ulcer of coccygeal region, unstageable (H)  wound dressings (TRIAD WOUND DRESSING) Pste    Supply - Other   2. Type 2 diabetes mellitus with hyperglycemia, without long-term current use of insulin (H)  wound dressings (TRIAD WOUND DRESSING) Pste    Supply - Other   3. Long term systemic steroid user  wound dressings (TRIAD WOUND DRESSING) Pste    Supply - Other   4. ALS (amyotrophic  lateral sclerosis) (H)  wound dressings (TRIAD WOUND DRESSING) Pste    Supply - Other   5. Leg swelling  wound dressings (TRIAD WOUND DRESSING) Pste    Supply - Other   6. Wheelchair bound  wound dressings (TRIAD WOUND DRESSING) Pste    Supply - Other   3/4/2020 right buttock             Are any of these wounds new today: No; Location: na    Assessment/Plan:          1. Debridement: not done     2.  Wound treatment: wound treatment will include irrigation and dressings to promote autolytic debridement which will include:will continue triad paste two times a day; this was reordered through [place]; no dressings due to location and continence status and friction/shear forces Stable wound           3. Edema: swelling improved; now wearing his stockings again; has staff to help don/doff; needs to elevate more. The compression wraps were applied today in clinic. Improved swelling           4. Nutrition: type 2 diabetes poorly controlled; drinking coke for breakfast; however last A1C was <6%           5. Offloading: his new w/c is being repaired; arrives in substandard chair with poor cushion; will order roho cushion; needs to stay off his buttocks pt is not willing to do this     Patient will follow up with me in 5 weeks for reevaluation. They were instructed to call the clinic sooner with any signs or symptoms of infection or any further questions/concerns. Answered all questions.          Obdulia Prieto DNP, RN, CNP, CWOCN, CFCN, CLT  [facility]   [phone]        This note was electronically signed by Obdulia Prieto

## 2021-06-28 NOTE — PROGRESS NOTES
Progress Notes by Obdulia Prieto NP at 4/13/2020 10:30 AM     Author: Obdulia Prieto NP Service: -- Author Type: Nurse Practitioner    Filed: 4/13/2020 10:47 AM Encounter Date: 4/13/2020 Status: Signed    : Obdulia Prieto NP (Nurse Practitioner)       Follow up Vascular Visit       Date of Service:4/13/2020    Date Last Seen: 4/10/2020; 4/13/2020    Chief Complaint: buttocks wound and BLE swelling        Pt returns to [facility] with regards to their buttocks wounds and BLE swelling; this visit is being conducted virtually (telephone or video visit) due to the Covid-19 pandemic.  They arrive/on the phone today alone; his nurse Esther could not be present. They are currently using triad paste to the wounds. This is being done by home staff; he has PCA, RN care, applying 1-2 times per day. Previously pt w/c was being repaired and he showed up to clinic with a flattened pillow in a plastic bag  Which he was using for cushion; we ordered roho cushion again for him and he did not receive this. He spends much of his time in the w/c during the day. They are using compression stockings for compression. They are feeling well today. Denies fevers, chills. No shortness of breath.     Allergies: Patient has no known allergies.    Medications:   Current Outpatient Medications:   ?  acetaminophen-codeine (TYLENOL-CODEINE #3) 300-30 mg per tablet, Take 1 tablet by mouth 3 (three) times a day as needed for pain., Disp: 20 tablet, Rfl: 0  ?  albuterol (PROAIR HFA;PROVENTIL HFA;VENTOLIN HFA) 90 mcg/actuation inhaler, Inhale 2 puffs 3 (three) times a day., Disp: 1 Inhaler, Rfl: 12  ?  aspirin 81 MG EC tablet, Take 81 mg by mouth daily., Disp: , Rfl:   ?  atorvastatin (LIPITOR) 10 MG tablet, Take 1 tablet (10 mg total) by mouth daily., Disp: 90 tablet, Rfl: 3  ?  b complex vitamins tablet, Take 1 tablet by mouth daily., Disp: , Rfl:   ?  baclofen (LIORESAL) 10 MG tablet, TAKE 1/2 TABLET(5 MG) BY MOUTH THREE  TIMES DAILY (Patient taking differently: TAKE 1 TABLET(10 MG) BY MOUTH THREE TIMES DAILY), Disp: 135 tablet, Rfl: 0  ?  budesonide (PULMICORT) 1 mg/2 mL nebulizer solution, Take 1 mg by nebulization 2 (two) times a day., Disp: , Rfl:   ?  budesonide-formoterol (SYMBICORT) 160-4.5 mcg/actuation inhaler, INHALE 2 PUFFS BY MOUTH TWICE DAILY, Disp: 2 Inhaler, Rfl: 6  ?  calcium carbonate-vitamin D3 (CALTRATE 600 PLUS D3) 600 mg(1,500mg) -400 unit per tablet, TAKE 1 TABLET BY MOUTH DAILY, Disp: 90 tablet, Rfl: 3  ?  cholecalciferol, vitamin D3, 1,000 unit tablet, Take 4,000 Units by mouth daily.    , Disp: , Rfl:   ?  DAILY-HIEN tablet, TAKE 1 TABLET BY MOUTH DAILY, Disp: 120 tablet, Rfl: 3  ?  docusate sodium (COLACE) 100 MG capsule, Take 100 mg by mouth as needed for constipation.    , Disp: , Rfl:   ?  DULoxetine (CYMBALTA) 20 MG capsule, Take 3 capsules (60 mg total) by mouth daily., Disp: 270 capsule, Rfl: 3  ?  ENSURE ACTIVE PROTEIN-MUSCLE Liqd, DRINK 1 BOTTLE TWICE DAILY, Disp: 60 Bottle, Rfl: 2  ?  ferrous sulfate 325 (65 FE) MG tablet, Take 1 tablet (325 mg total) by mouth daily with breakfast., Disp: 90 tablet, Rfl: 3  ?  folic acid (FOLVITE) 1 MG tablet, TAKE 1 TABLET BY MOUTH EVERY DAY, Disp: 90 tablet, Rfl: 3  ?  furosemide (LASIX) 20 MG tablet, Take 2 tablets (40 mg total) by mouth 2 (two) times a day., Disp: 180 tablet, Rfl: 3  ?  gabapentin (NEURONTIN) 300 MG capsule, Take 300 mg by mouth., Disp: , Rfl:   ?  hydrOXYzine HCl (ATARAX) 25 MG tablet, Take 1 tablet (25 mg total) by mouth every 8 (eight) hours as needed for itching., Disp: 100 tablet, Rfl: 1  ?  ipratropium-albuterol (DUO-NEB) 0.5-2.5 mg/3 mL nebulizer, Inhale 3 mL every 4 (four) hours as needed., Disp: 30 vial, Rfl: 3  ?  levalbuterol (XOPENEX HFA) 45 mcg/actuation inhaler, Inhale 2 puffs every 4 (four) hours as needed for wheezing., Disp: 5 Inhaler, Rfl: 3  ?  lidocaine (LIDODERM) 5 %, Apply once daily  remove & Discard patch within 12 hours  or as directed by MD, Disp: 7 patch, Rfl: 0  ?  loratadine-pseudoephedrine (CLARITIN-D 12 HOUR) 5-120 mg Tb12, Take 1 tablet by mouth 2 (two) times a day., Disp: 60 tablet, Rfl: 0  ?  meloxicam (MOBIC) 7.5 MG tablet, 1 tab p.o. daily, Disp: 90 tablet, Rfl: 3  ?  metFORMIN (GLUCOPHAGE) 500 MG tablet, Take 1 tablet (500 mg total) by mouth daily with breakfast., Disp: 90 tablet, Rfl: 3  ?  metoprolol tartrate (LOPRESSOR) 25 MG tablet, TAKE 1 TABLET(25 MG) BY MOUTH TWICE DAILY, Disp: 180 tablet, Rfl: 3  ?  naloxone (NARCAN) 4 mg/actuation nasal spray, 1 spray (4 mg dose) into one nostril for opioid reversal. Call 911. May repeat if no response in 3 minutes., Disp: 1 Box, Rfl: 0  ?  nystatin (MYCOSTATIN) 100,000 unit/mL suspension, Take 5 mL (500,000 Units total) by mouth 4 (four) times a day. Swish and spit., Disp: 200 mL, Rfl: 0  ?  omeprazole (PRILOSEC) 20 MG capsule, Take 1 capsule (20 mg total) by mouth daily before breakfast., Disp: 90 capsule, Rfl: 3  ?  oxyCODONE (ROXICODONE) 15 MG immediate release tablet, Take 1 tablet (15 mg total) by mouth 3 (three) times a day., Disp: 90 tablet, Rfl: 0  ?  potassium chloride (K-DUR,KLOR-CON) 20 MEQ tablet, TAKE 1 TABLET(20 MEQ) BY MOUTH TWICE DAILY, Disp: 180 tablet, Rfl: 3  ?  predniSONE (DELTASONE) 5 MG tablet, TAKE 1/2 TABLET BY MOUTH DAILY FOR 2 WEEKS THEN STOP., Disp: 7 tablet, Rfl: 0  ?  pregabalin (LYRICA) 75 MG capsule, Take 1 capsule (75 mg total) by mouth 2 (two) times a day., Disp: 180 capsule, Rfl: 3  ?  riluzole (RILUTEK) 50 mg tablet, TAKE 1 TABLET BY MOUTH TWO TIMES A DAY BEFORE MEALS, Disp: , Rfl:   ?  senna-docusate (PERICOLACE) 8.6-50 mg tablet, Take 2 tablets by mouth daily as needed for constipation. (Patient taking differently: Take 2 tablets by mouth daily as needed for constipation. pt isn't taking this med, only docusate  Indications: constipation), Disp: 30 tablet, Rfl: 3  ?  sodium phosphates 133 mL (FOR FLEET) 19-7 gram/118 mL Enem rectal enema,  INSERT 1 ENEMA INTO THE RECTUM ONCE FOR 1 DOSE, Disp: 133 mL, Rfl: 0  ?  SPIRIVA WITH HANDIHALER 18 mcg inhalation capsule, Place 1 capsule (2 puffs total) into inhaler and inhale daily., Disp: 90 capsule, Rfl: 3  ?  tamsulosin (FLOMAX) 0.4 mg cap, Take 1 capsule (0.4 mg total) by mouth Daily after breakfast., Disp: 90 capsule, Rfl: 3  ?  wound dressings (TRIAD WOUND DRESSING) Pste, Apply 71 g topically 2 (two) times a day., Disp: , Rfl: 3    Current Facility-Administered Medications:   ?  lidocaine 2 % jelly (XYLOCAINE), , Topical, PRN, Obdulia Prieto NP, 1 application at 08/09/19 1530    History:   Past Medical History:   Diagnosis Date   ? ALS (amyotrophic lateral sclerosis) (H)    ? BPH (benign prostatic hyperplasia)    ? Closed fracture of tibia and fibula, left, initial encounter    ? COPD (chronic obstructive pulmonary disease) (H)    ? Decubitus ulcer, buttock    ? Erectile dysfunction associated with type 2 diabetes mellitus (H)    ? HTN (hypertension)    ? Obstructive sleep apnea    ? Postherpetic neuralgia    ? Type 2 diabetes mellitus with diabetic neuropathy (H)        Physical Exam:    There were no vitals taken for this visit.    General:  Patient presents in no apparent distress.  Psychiatric:  Alert and oriented x3.   Respiratory: unlabored breathing; no cough, able to speak in full sentences without becoming breathless  Integumentary:  Skin is uniformly warm, dry and pink.    Wound #1 Location: buttocks wound  Size: 1.5L x 1W x 0.1depth.  approx from photos No sinus tract present, Wound base: 10% slough  No undermining present. Wound is full thickness. There is moderate drainage. Periwound: no denudement, erythema, induration, maceration or warmth.  NOTE: wound assessment is done through either visual inspection from video call or through picture along with patient/HHC nurse description    Circumferential volume measures:    Vasc Edema 4/30/2019 3/4/2020   Right just above MTP 24.5 24.1   Right  Ankle 25.5 28   Right Widest Calf 32 35.7   Right Thigh Up 10cm 37 -   Left - just above MTP 26 24.3   Left Ankle 28 28.9   Left Widest Calf 33.5 35.2   Left Thigh Up 10cm 38 -       Ulceration(s)/Wound(s):     [facility] Wound 01/29/20 Lt buttock (Active)   Pre Size Length 0.3 03/04/20 0800   Pre Size Width 0.3 03/04/20 0800   Pre Size Depth 0.1 01/29/20 0800   Pre Total Sq cm 2 01/29/20 0800   Description scab 03/04/20 0800       [facility] Wound 03/04/20 Rt buttocks (Active)   Pre Size Length 1.4 03/04/20 0800   Pre Size Width 0.8 03/04/20 0800   Pre Size Depth 0.1 03/04/20 0800   Pre Total Sq cm 1.12 03/04/20 0800        Lab Values    No results found for: SEDRATE  Lab Results   Component Value Date    CREATININE 0.64 (L) 02/25/2020     Lab Results   Component Value Date    HGBA1C 5.3 12/24/2019     Lab Results   Component Value Date    BUN 9 02/25/2020     Lab Results   Component Value Date    ALBUMIN 3.6 12/24/2019     Vitamin D, Total (25-Hydroxy)   Date Value Ref Range Status   06/14/2019 34.7 30.0 - 80.0 ng/mL Final             Impression:  1. Pressure ulcer of coccygeal region, unstageable (H)     2. Type 2 diabetes mellitus with hyperglycemia, without long-term current use of insulin (H)     3. Long term systemic steroid user     4. ALS (amyotrophic lateral sclerosis) (H)     5. Leg swelling     6. Wheelchair bound     7. Cigarette nicotine dependence without complication       4/12/2020 right buttocks       Are any of these wounds new today: No; Location: na    Assessment/Plan:          1. Debridement: na     2.  Wound treatment: wound treatment will include irrigation and dressings to promote autolytic debridement which will include:continue triad paste 1-2 times per day; no dressings indicated Stable wound           3. Edema: I have received reports from [facility] that the pt at times declines compression; he needs to be consistent with this; use short to reduce down and compression stockings for maintenance; pt has  legs down dependent for most hours of the day. Stable swelling           4. Nutrition: focus on protein           5. Offloading: pt needs to limit time in chair to 30 minute intervals; he is not willing to do this; all we can do is continue to educate the importance of repositioning; I had ordered roho cushion he did not receive; I will reach out to his nurse and see where this is     Patient will follow up with me in 4 weeks for reevaluation. They were instructed to call the clinic sooner with any signs or symptoms of infection or any further questions/concerns. Answered all questions.          Time spent on the phone/video chat 8 minutes    Obdulia Prieto DNP, RN, CNP, CWOCN, CFCN, CLT  [facility]   [phone]        This note was electronically signed by Obdulia Prieto          Past 24 hrs

## 2021-06-29 NOTE — PROGRESS NOTES
Progress Notes by Obdulia Prieto NP at 5/11/2020 10:30 AM     Author: Obdulia Prieto NP Service: -- Author Type: Nurse Practitioner    Filed: 5/11/2020 10:45 AM Encounter Date: 5/11/2020 Status: Signed    : Obdulia Prieto NP (Nurse Practitioner)       Follow up Vascular Visit       Date of Service:5/11/2020    Date Last Seen: 5/4/2020; 5/4/2020    Chief Complaint: right buttocks wound and BLE swelling        Pt returns to [facility] with regards to their right buttocks wound and BLE swelling; this visit is being conducted virtually (telephone or video visit) due to the Covid-19 pandemic.  They arrive/on the phone today with [facility] nurse. They are currently using triad paste  to the wounds. This is being done by HHC and PCA twice per day. They are using compression stockings for compression. They are feeling well today. Denies fevers, chills. No shortness of breath.     Allergies: Patient has no known allergies.    Medications:   Current Outpatient Medications:   ?  acetaminophen-codeine (TYLENOL-CODEINE #3) 300-30 mg per tablet, Take 1 tablet by mouth 3 (three) times a day as needed for pain., Disp: 20 tablet, Rfl: 0  ?  albuterol (PROAIR HFA;PROVENTIL HFA;VENTOLIN HFA) 90 mcg/actuation inhaler, Inhale 2 puffs 3 (three) times a day., Disp: 1 Inhaler, Rfl: 12  ?  aspirin 81 MG EC tablet, Take 81 mg by mouth daily., Disp: , Rfl:   ?  atorvastatin (LIPITOR) 10 MG tablet, Take 1 tablet (10 mg total) by mouth daily., Disp: 90 tablet, Rfl: 3  ?  b complex vitamins tablet, Take 1 tablet by mouth daily., Disp: , Rfl:   ?  baclofen (LIORESAL) 10 MG tablet, TAKE 1/2 TABLET(5 MG) BY MOUTH THREE TIMES DAILY (Patient taking differently: TAKE 1 TABLET(10 MG) BY MOUTH THREE TIMES DAILY), Disp: 135 tablet, Rfl: 0  ?  budesonide (PULMICORT) 1 mg/2 mL nebulizer solution, Take 1 mg by nebulization 2 (two) times a day., Disp: , Rfl:   ?  budesonide-formoterol (SYMBICORT) 160-4.5 mcg/actuation  inhaler, INHALE 2 PUFFS BY MOUTH TWICE DAILY, Disp: 2 Inhaler, Rfl: 6  ?  calcium carbonate-vitamin D3 (CALTRATE 600 PLUS D3) 600 mg(1,500mg) -400 unit per tablet, TAKE 1 TABLET BY MOUTH DAILY, Disp: 90 tablet, Rfl: 3  ?  cetirizine (ZYRTEC) 10 MG tablet, , Disp: , Rfl:   ?  cholecalciferol, vitamin D3, 1,000 unit tablet, Take 4,000 Units by mouth daily.    , Disp: , Rfl:   ?  DAILY-HIEN tablet, TAKE 1 TABLET BY MOUTH DAILY, Disp: 120 tablet, Rfl: 3  ?  docusate sodium (COLACE) 100 MG capsule, Take 100 mg by mouth as needed for constipation.    , Disp: , Rfl:   ?  DULoxetine (CYMBALTA) 20 MG capsule, Take 3 capsules (60 mg total) by mouth daily., Disp: 270 capsule, Rfl: 3  ?  ENSURE ACTIVE PROTEIN-MUSCLE Liqd, DRINK 1 BOTTLE TWICE DAILY, Disp: 60 Bottle, Rfl: 2  ?  ferrous sulfate 325 (65 FE) MG tablet, Take 1 tablet (325 mg total) by mouth daily with breakfast., Disp: 90 tablet, Rfl: 3  ?  folic acid (FOLVITE) 1 MG tablet, TAKE 1 TABLET BY MOUTH EVERY DAY, Disp: 90 tablet, Rfl: 3  ?  furosemide (LASIX) 20 MG tablet, Take 2 tablets (40 mg total) by mouth 2 (two) times a day., Disp: 180 tablet, Rfl: 3  ?  gabapentin (NEURONTIN) 300 MG capsule, Take 300 mg by mouth., Disp: , Rfl:   ?  hydrOXYzine HCl (ATARAX) 25 MG tablet, Take 1 tablet (25 mg total) by mouth every 8 (eight) hours as needed for itching., Disp: 100 tablet, Rfl: 1  ?  ipratropium-albuterol (DUO-NEB) 0.5-2.5 mg/3 mL nebulizer, Inhale 3 mL every 4 (four) hours as needed., Disp: 30 vial, Rfl: 3  ?  levalbuterol (XOPENEX HFA) 45 mcg/actuation inhaler, Inhale 2 puffs every 4 (four) hours as needed for wheezing., Disp: 5 Inhaler, Rfl: 3  ?  lidocaine (LIDODERM) 5 %, Apply once daily remove & Discard patch within 12 hours or as directed by MD, Disp: 7 patch, Rfl: 0  ?  loratadine-pseudoephedrine (CLARITIN-D 12 HOUR) 5-120 mg Tb12, Take 1 tablet by mouth 2 (two) times a day., Disp: 60 tablet, Rfl: 0  ?  meloxicam (MOBIC) 7.5 MG tablet, 1 tab p.o. daily, Disp:  90 tablet, Rfl: 3  ?  metFORMIN (GLUCOPHAGE) 500 MG tablet, Take 1 tablet (500 mg total) by mouth daily with breakfast., Disp: 90 tablet, Rfl: 3  ?  metoprolol tartrate (LOPRESSOR) 25 MG tablet, TAKE 1 TABLET(25 MG) BY MOUTH TWICE DAILY, Disp: 180 tablet, Rfl: 3  ?  naloxone (NARCAN) 4 mg/actuation nasal spray, 1 spray (4 mg dose) into one nostril for opioid reversal. Call 911. May repeat if no response in 3 minutes., Disp: 1 Box, Rfl: 0  ?  nystatin (MYCOSTATIN) 100,000 unit/mL suspension, Take 5 mL (500,000 Units total) by mouth 4 (four) times a day. Swish and spit., Disp: 200 mL, Rfl: 0  ?  omeprazole (PRILOSEC) 20 MG capsule, Take 1 capsule (20 mg total) by mouth daily before breakfast., Disp: 90 capsule, Rfl: 3  ?  potassium chloride (K-DUR,KLOR-CON) 20 MEQ tablet, TAKE 1 TABLET(20 MEQ) BY MOUTH TWICE DAILY, Disp: 180 tablet, Rfl: 3  ?  pregabalin (LYRICA) 75 MG capsule, Take 1 capsule (75 mg total) by mouth 2 (two) times a day., Disp: 180 capsule, Rfl: 3  ?  riluzole (RILUTEK) 50 mg tablet, TAKE 1 TABLET BY MOUTH TWO TIMES A DAY BEFORE MEALS, Disp: , Rfl:   ?  senna-docusate (PERICOLACE) 8.6-50 mg tablet, Take 2 tablets by mouth daily as needed for constipation. (Patient taking differently: Take 2 tablets by mouth daily as needed for constipation. pt isn't taking this med, only docusate  Indications: constipation), Disp: 30 tablet, Rfl: 3  ?  sodium phosphates 133 mL (FOR FLEET) 19-7 gram/118 mL Enem rectal enema, INSERT 1 ENEMA INTO THE RECTUM ONCE FOR 1 DOSE, Disp: 133 mL, Rfl: 0  ?  SPIRIVA WITH HANDIHALER 18 mcg inhalation capsule, Place 1 capsule (2 puffs total) into inhaler and inhale daily., Disp: 90 capsule, Rfl: 3  ?  tamsulosin (FLOMAX) 0.4 mg cap, Take 1 capsule (0.4 mg total) by mouth Daily after breakfast., Disp: 90 capsule, Rfl: 3  ?  triamcinolone (KENALOG) 0.1 % cream, Apply thin layer two times a day to rash on neck and arm, Disp: 30 g, Rfl: 0  ?  wound dressings (TRIAD WOUND DRESSING) Pste,  Apply 71 g topically 2 (two) times a day., Disp: , Rfl: 3  ?  oxyCODONE (ROXICODONE) 15 MG immediate release tablet, Take 1 tablet (15 mg total) by mouth 3 (three) times a day., Disp: 90 tablet, Rfl: 0    Current Facility-Administered Medications:   ?  lidocaine 2 % jelly (XYLOCAINE), , Topical, PRN, Obdulia Prieto NP, 1 application at 08/09/19 1530    History:   Past Medical History:   Diagnosis Date   ? ALS (amyotrophic lateral sclerosis) (H)    ? BPH (benign prostatic hyperplasia)    ? Closed fracture of tibia and fibula, left, initial encounter    ? COPD (chronic obstructive pulmonary disease) (H)    ? Decubitus ulcer, buttock    ? Erectile dysfunction associated with type 2 diabetes mellitus (H)    ? HTN (hypertension)    ? Obstructive sleep apnea    ? Postherpetic neuralgia    ? Type 2 diabetes mellitus with diabetic neuropathy (H)        Physical Exam:    BP 98/60   Pulse 93   Temp 99  F (37.2  C)   SpO2 95%     General:  Patient presents in no apparent distress.  Psychiatric:  Alert and oriented x3.   Respiratory: unlabored breathing; no cough, able to speak in full sentences without becoming breathless  Integumentary:  Skin is uniformly warm, dry and pink.    Wound #1 Location: right buttocks superior  Size: 2L x 2W x 0.1depth.  No sinus tract present, Wound base: 50% yellow; 50% red; viable  No undermining present. Wound is full thickness. There is moderate drainage. Periwound: no denudement, erythema, induration, maceration or warmth.  NOTE: wound assessment is done through either visual inspection from video call or through picture along with patient/HHC nurse description      Wound #2Location: right buttock inferior  Size: 1L x 0.5W x 0.1depth.  No sinus tract present, Wound base: 50% yellow; 50% red   No undermining present. Wound is full thickness. There is moderate drainage. Periwound: no denudement, erythema, induration, maceration or warmth.  NOTE: wound assessment is done through either  visual inspection from video call or through picture along with patient/HHC nurse description  Circumferential volume measures:    Vasc Edema 4/30/2019 3/4/2020   Right just above MTP 24.5 24.1   Right Ankle 25.5 28   Right Widest Calf 32 35.7   Right Thigh Up 10cm 37 -   Left - just above MTP 26 24.3   Left Ankle 28 28.9   Left Widest Calf 33.5 35.2   Left Thigh Up 10cm 38 -       Ulceration(s)/Wound(s):     VASC Wound 03/04/20 Rt buttocks (Active)   Pre Size Length 2 05/11/20 1000   Pre Size Width 2 05/11/20 1000   Pre Size Depth 0.1 05/11/20 1000   Pre Total Sq cm 4 05/11/20 1000   Description telephone visit estimation  04/13/20 1100       VASC Wound 05/11/20 right buttock inferior (Active)   Pre Size Length 1 05/11/20 1000   Pre Size Width 0.5 05/11/20 1000   Pre Size Depth 0.1 05/11/20 1000   Pre Total Sq cm 0.5 05/11/20 1000       Pressure Ulcer/Injury 04/19/20 Buttocks Right Stage 2 (Active)   Site Assessment Yellow 05/10/20 0940   Site Care Cleansed;Cream / Ointment 05/10/20 0940        Lab Values    No results found for: SEDRATE  Lab Results   Component Value Date    CREATININE 0.64 (L) 02/25/2020     Lab Results   Component Value Date    HGBA1C 5.3 12/24/2019     Lab Results   Component Value Date    BUN 9 02/25/2020     Lab Results   Component Value Date    ALBUMIN 3.6 12/24/2019     Vitamin D, Total (25-Hydroxy)   Date Value Ref Range Status   06/14/2019 34.7 30.0 - 80.0 ng/mL Final             Impression:  1. Pressure ulcer of coccygeal region, unstageable (H)     2. Type 2 diabetes mellitus with hyperglycemia, without long-term current use of insulin (H)     3. Long term systemic steroid user     4. ALS (amyotrophic lateral sclerosis) (H)     5. Leg swelling     6. Wheelchair bound     7. Cigarette nicotine dependence without complication           4/30/2020 right buttocks           4/30/2020 BLE swelling        Are any of these wounds new today: No; Location: na    Assessment/Plan:          1.  Debridement: na     2.  Wound treatment: wound treatment will include irrigation and dressings to promote autolytic debridement which will include:continue triad paste to the buttocks 1-2 times per day; no dressing; of for hhc to come out twice per week and PCA to do the cares twice per day; Esther RN will check with his PCA to see if this will work out. Stable            3. Edema: continue elevation and compression stockings and diuretics. Stable swelling           4. Nutrition: focus on protein           5. Offloading: we  Have discussed the importance of the pt staying off his buttocks to not sit in w/c all day; he is not willing to do this; we have ordered him a roho cushion multiple times; he has obtained a different type of cushion ride design; which is a gel foam cushion hopefully will be adequate for his needs; previously when he has been seen in the clinic he would arrive with a small pillow wrapped in a plastic bag; ideally he would limit time up in w/c to 1 hour increments; utlize tilt function to relieve pressure off the buttocks; recommendations and handout provided          6. Nicotine abuse: pt continues to smoke; this will negatively impact his circulation and wound healing potential     Patient will follow up with me in 4 weeks for reevaluation. They were instructed to call the clinic sooner with any signs or symptoms of infection or any further questions/concerns. Answered all questions.          Time spent on the phone/video chat 8 minutes    Obdulia Prieto DNP, RN, CNP, CWOCN, CFCN, CLT  [facility]   [phone]    Type of service: Video Visit     Video Start and End Time : 1032-1040a    Originating Location (pt. Location): pt home    Distant Location (provider location): [facility]     Mode of Communication: Video, Doximity    This note was electronically signed by Obdulia Prieto

## 2021-06-29 NOTE — PROGRESS NOTES
Progress Notes by Obdulia Prieto NP at 6/8/2020 10:15 AM     Author: Obdulia Prieto NP Service: -- Author Type: Nurse Practitioner    Filed: 6/8/2020 11:13 AM Encounter Date: 6/8/2020 Status: Signed    : Obdulia Prieto NP (Nurse Practitioner)       Follow up Vascular Visit       Date of Service:6/8/2020    Date Last Seen: 5/4/2020; 5/11/2020    Chief Complaint: BLE swelling; buttocks ulcers        Pt returns to [facility] with regards to their BLE swelling and buttocks ulcers; this visit is being conducted virtually (telephone or video visit) due to the Covid-19 pandemic.  They arrive/on the phone today with Etsher ROWAN RN. He has hx of ALS and is minimally ambulatory; primarily w/c bound. Recieves several hours per day with PCA, PT/OT cares however currently his night PCA is off on FMLA family emergency; so he is not getting the paste put on and not wearing his compression. Home care is currently only coming over twice per week.  They are currently using triad paste or barrier cream to the wounds. This is being done by the patient or PCA staff 1-2 times per day before his PCA went out on leave. They are using compression stockings for compression before his pca went out on leave now using nothing. He spends most hours of the day in his w/c with the legs down dependent. FS running 120s. He is having pain in his buttocks and is wanting pain medication prescribed today. They are feeling well today. Denies fevers, chills. No shortness of breath.     Allergies: Patient has no known allergies.    Medications:   Current Outpatient Medications:   ?  acetaminophen-codeine (TYLENOL-CODEINE #3) 300-30 mg per tablet, Take 1 tablet by mouth 3 (three) times a day as needed for pain., Disp: 20 tablet, Rfl: 0  ?  albuterol (PROAIR HFA;PROVENTIL HFA;VENTOLIN HFA) 90 mcg/actuation inhaler, Inhale 2 puffs 3 (three) times a day., Disp: 1 Inhaler, Rfl: 12  ?  aspirin 81 MG EC tablet, Take 81 mg by mouth  daily., Disp: , Rfl:   ?  atorvastatin (LIPITOR) 10 MG tablet, Take 1 tablet (10 mg total) by mouth daily., Disp: 90 tablet, Rfl: 3  ?  b complex vitamins tablet, Take 1 tablet by mouth daily., Disp: , Rfl:   ?  baclofen (LIORESAL) 10 MG tablet, TAKE 1/2 TABLET(5 MG) BY MOUTH THREE TIMES DAILY (Patient taking differently: TAKE 1 TABLET(10 MG) BY MOUTH THREE TIMES DAILY), Disp: 135 tablet, Rfl: 0  ?  budesonide (PULMICORT) 1 mg/2 mL nebulizer solution, Take 1 mg by nebulization 2 (two) times a day., Disp: , Rfl:   ?  budesonide-formoterol (SYMBICORT) 160-4.5 mcg/actuation inhaler, INHALE 2 PUFFS BY MOUTH TWICE DAILY, Disp: 2 Inhaler, Rfl: 6  ?  calcium carbonate-vitamin D3 (CALTRATE 600 PLUS D3) 600 mg(1,500mg) -400 unit per tablet, TAKE 1 TABLET BY MOUTH DAILY, Disp: 90 tablet, Rfl: 3  ?  cetirizine (ZYRTEC) 10 MG tablet, , Disp: , Rfl:   ?  cholecalciferol, vitamin D3, 1,000 unit tablet, Take 4,000 Units by mouth daily.    , Disp: , Rfl:   ?  DAILY-HEIN tablet, TAKE 1 TABLET BY MOUTH DAILY, Disp: 120 tablet, Rfl: 3  ?  docusate sodium (COLACE) 100 MG capsule, Take 100 mg by mouth as needed for constipation.    , Disp: , Rfl:   ?  DULoxetine (CYMBALTA) 20 MG capsule, Take 3 capsules (60 mg total) by mouth daily., Disp: 270 capsule, Rfl: 3  ?  ENSURE ACTIVE PROTEIN-MUSCLE Liqd, DRINK 1 BOTTLE TWICE DAILY, Disp: 60 Bottle, Rfl: 2  ?  ferrous sulfate 325 (65 FE) MG tablet, Take 1 tablet (325 mg total) by mouth daily with breakfast., Disp: 90 tablet, Rfl: 3  ?  folic acid (FOLVITE) 1 MG tablet, TAKE 1 TABLET BY MOUTH EVERY DAY, Disp: 90 tablet, Rfl: 3  ?  furosemide (LASIX) 20 MG tablet, Take 2 tablets (40 mg total) by mouth 2 (two) times a day., Disp: 180 tablet, Rfl: 3  ?  gabapentin (NEURONTIN) 300 MG capsule, Take 300 mg by mouth., Disp: , Rfl:   ?  hydrOXYzine HCl (ATARAX) 25 MG tablet, Take 1 tablet (25 mg total) by mouth every 8 (eight) hours as needed for itching., Disp: 100 tablet, Rfl: 1  ?   ipratropium-albuterol (DUO-NEB) 0.5-2.5 mg/3 mL nebulizer, Inhale 3 mL every 4 (four) hours as needed., Disp: 30 vial, Rfl: 3  ?  levalbuterol (XOPENEX HFA) 45 mcg/actuation inhaler, Inhale 2 puffs every 4 (four) hours as needed for wheezing., Disp: 5 Inhaler, Rfl: 3  ?  lidocaine (LIDODERM) 5 %, Apply once daily remove & Discard patch within 12 hours or as directed by MD, Disp: 7 patch, Rfl: 0  ?  LORATADINE-PSEUDOEPHEDRINE 5-120 mg per tablet, TAKE 1 TABLET BY MOUTH TWICE DAILY, Disp: 60 tablet, Rfl: 0  ?  meloxicam (MOBIC) 7.5 MG tablet, 1 tab p.o. daily, Disp: 90 tablet, Rfl: 3  ?  metFORMIN (GLUCOPHAGE) 500 MG tablet, Take 1 tablet (500 mg total) by mouth daily with breakfast., Disp: 90 tablet, Rfl: 3  ?  metoprolol tartrate (LOPRESSOR) 25 MG tablet, TAKE 1 TABLET(25 MG) BY MOUTH TWICE DAILY, Disp: 180 tablet, Rfl: 3  ?  naloxone (NARCAN) 4 mg/actuation nasal spray, 1 spray (4 mg dose) into one nostril for opioid reversal. Call 911. May repeat if no response in 3 minutes., Disp: 1 Box, Rfl: 0  ?  nystatin (MYCOSTATIN) 100,000 unit/mL suspension, Take 5 mL (500,000 Units total) by mouth 4 (four) times a day. Swish and spit., Disp: 200 mL, Rfl: 0  ?  omeprazole (PRILOSEC) 20 MG capsule, Take 1 capsule (20 mg total) by mouth daily before breakfast., Disp: 90 capsule, Rfl: 3  ?  oxyCODONE (ROXICODONE) 15 MG immediate release tablet, Take 1 tablet (15 mg total) by mouth 3 (three) times a day., Disp: 90 tablet, Rfl: 0  ?  potassium chloride (K-DUR,KLOR-CON) 20 MEQ tablet, TAKE 1 TABLET(20 MEQ) BY MOUTH TWICE DAILY, Disp: 180 tablet, Rfl: 3  ?  pregabalin (LYRICA) 75 MG capsule, Take 1 capsule (75 mg total) by mouth 2 (two) times a day., Disp: 180 capsule, Rfl: 3  ?  riluzole (RILUTEK) 50 mg tablet, TAKE 1 TABLET BY MOUTH TWO TIMES A DAY BEFORE MEALS, Disp: , Rfl:   ?  senna-docusate (PERICOLACE) 8.6-50 mg tablet, Take 2 tablets by mouth daily as needed for constipation. (Patient taking differently: Take 2 tablets by  mouth daily as needed for constipation. pt isn't taking this med, only docusate  Indications: constipation), Disp: 30 tablet, Rfl: 3  ?  sodium phosphates 133 mL (FOR FLEET) 19-7 gram/118 mL Enem rectal enema, INSERT 1 ENEMA INTO THE RECTUM ONCE FOR 1 DOSE, Disp: 133 mL, Rfl: 0  ?  SPIRIVA WITH HANDIHALER 18 mcg inhalation capsule, Place 1 capsule (2 puffs total) into inhaler and inhale daily., Disp: 90 capsule, Rfl: 3  ?  tamsulosin (FLOMAX) 0.4 mg cap, Take 1 capsule (0.4 mg total) by mouth Daily after breakfast., Disp: 90 capsule, Rfl: 3  ?  triamcinolone (KENALOG) 0.1 % cream, Apply thin layer two times a day to rash on neck and arm, Disp: 30 g, Rfl: 0  ?  wound dressings (TRIAD WOUND DRESSING) Pste, Apply 71 g topically 2 (two) times a day., Disp: , Rfl: 3    Current Facility-Administered Medications:   ?  lidocaine 2 % jelly (XYLOCAINE), , Topical, PRN, Obdulia Prieto NP, 1 application at 08/09/19 1530    History:   Past Medical History:   Diagnosis Date   ? ALS (amyotrophic lateral sclerosis) (H)    ? BPH (benign prostatic hyperplasia)    ? Closed fracture of tibia and fibula, left, initial encounter    ? COPD (chronic obstructive pulmonary disease) (H)    ? Decubitus ulcer, buttock    ? Erectile dysfunction associated with type 2 diabetes mellitus (H)    ? HTN (hypertension)    ? Obstructive sleep apnea    ? Postherpetic neuralgia    ? Type 2 diabetes mellitus with diabetic neuropathy (H)        Physical Exam:    There were no vitals taken for this visit.    General:  Patient presents in no apparent distress.  Psychiatric:  Alert and oriented x3.   Respiratory: unlabored breathing; no cough, able to speak in full sentences without becoming breathless  Integumentary:  Skin is uniformly warm, dry and pink.    Wound #1 Location: buttocks; right  Size: 4L x 2W x 0.1depth.  The 2 wounds have merged into one wound; no sinus tract present, Wound base: 90% yellow slough; no evidence of triad paste present on the  skin  No undermining present. Wound is full thickness. There is moderate drainage. Periwound: no denudement, erythema, induration, maceration or warmth.  NOTE: wound assessment is done through either visual inspection from video call or through picture along with patient/HHC nurse description    Circumferential volume measures:    [facility] Edema 4/30/2019 3/4/2020   Right just above MTP 24.5 24.1   Right Ankle 25.5 28   Right Widest Calf 32 35.7   Right Thigh Up 10cm 37 -   Left - just above MTP 26 24.3   Left Ankle 28 28.9   Left Widest Calf 33.5 35.2   Left Thigh Up 10cm 38 -       Ulceration(s)/Wound(s):     VASC Wound 03/04/20 Rt buttocks (Active)   Pre Size Length 2 05/11/20 1000   Pre Size Width 2 05/11/20 1000   Pre Size Depth 0.1 05/11/20 1000   Pre Total Sq cm 4 05/11/20 1000   Description telephone visit estimation  04/13/20 1100       VASC Wound 05/11/20 right buttock inferior (Active)   Pre Size Length 1 05/11/20 1000   Pre Size Width 0.5 05/11/20 1000   Pre Size Depth 0.1 05/11/20 1000   Pre Total Sq cm 0.5 05/11/20 1000       Pressure Ulcer/Injury 04/19/20 Buttocks Right Stage 2 (Active)       Pressure Ulcer/Injury 05/25/20 Buttocks Left Stage 2 (Active)        Lab Values    No results found for: SEDRATE  Lab Results   Component Value Date    CREATININE 0.64 (L) 02/25/2020     Lab Results   Component Value Date    HGBA1C 5.3 12/24/2019     Lab Results   Component Value Date    BUN 9 02/25/2020     Lab Results   Component Value Date    ALBUMIN 3.6 12/24/2019     Vitamin D, Total (25-Hydroxy)   Date Value Ref Range Status   06/14/2019 34.7 30.0 - 80.0 ng/mL Final             Impression:  1. Pressure ulcer of coccygeal region, unstageable (H)     2. Type 2 diabetes mellitus with hyperglycemia, without long-term current use of insulin (H)     3. Long term systemic steroid user     4. Leg swelling     5. Wheelchair bound     6. Cigarette nicotine dependence without complication     7. ALS (amyotrophic lateral  sclerosis) (H)         5/2020 buttocks        6/8/2020 buttocks         Are any of these wounds new today: No; Location: na    Assessment/Plan:          1. Debridement: na     2.  Wound treatment: wound treatment will include irrigation and dressings to promote autolytic debridement which will include:wounds are worse; does not have his daily PCA currently; there was no evidence of paste on the wound; will increase frequency of home care so the paste is applied at least daily; when his PCA returns then twice a day; will see if patient can apply daily; he wanted pain medication for this; will defer to his PCP Worsened            3. Edema: he currently does not have his nightly pca care; he cannot doff his stockings; he cannot wear his stockings currently; will have [facility] monitor this and if worsens will have to wrap with short stretch and re-wrap daily when they are they are there for wound cares. Stable            4. Nutrition: focus on protein           5. Offloading: again we spoke about positioning; tilt function on w/c; standing several times a day; he has friction and shear injury when he is transferring, losing his upper body strength; he has limited help right now     Patient will follow up with me in 4 weeks for reevaluation. They were instructed to call the clinic sooner with any signs or symptoms of infection or any further questions/concerns. Answered all questions.          Time spent on the phone/video chat 13 minutes    Obdulia Prieto DNP, RN, CNP, CWOCN, CFCN, CLT  [facility]   [phone]    Type of service: Video Visit     Video Start and End Time : 1117-0932    Originating Location (pt. Location): pt apt    Distant Location (provider location): [facility]     Mode of Communication: Video, Doximity    This note was electronically signed by Obdulia Prieto

## 2021-06-29 NOTE — PROGRESS NOTES
"Progress Notes by Obdulia Prieto NP at 9/10/2020  8:15 AM     Author: Obdulia Prieto NP Service: -- Author Type: Nurse Practitioner    Filed: 9/10/2020  8:37 AM Encounter Date: 9/10/2020 Status: Addendum    : Obdulia Prieto NP (Nurse Practitioner)    Related Notes: Original Note by Obdulia Prieto NP (Nurse Practitioner) filed at 9/10/2020  8:33 AM       Follow up Vascular Visit       Date of Service:9/10/2020    Date Last Seen: 9/9/2020; 9/9/2020    Chief Complaint: right buttocks; BLE swelling        Pt returns to [facility] with regards to their buttocks wound and BLE edema; this visit is being conducted virtually (telephone or video visit) due to the Covid-19 pandemic.  They arrive/on the phone today with [facility]. They are currently using bacitracin to the wounds; he is declining to have the hydrocolloid dressing applied. This is being done by the patient every day. They are using compression stockings for compression. They are feeling well today. Denies fevers, chills. No shortness of breath. FS running 120s. Weight down 10 pounds over the past year. Has new power chair with tilt function; with ride design cushion 4\" foam. Sleeping in his bed positioning off the wound.    Allergies: Patient has no known allergies.    Medications:   Current Outpatient Medications:   ?  albuterol (PROAIR HFA;PROVENTIL HFA;VENTOLIN HFA) 90 mcg/actuation inhaler, Inhale 2 puffs 3 (three) times a day., Disp: 1 Inhaler, Rfl: 12  ?  aspirin 81 MG EC tablet, Take 81 mg by mouth daily., Disp: , Rfl:   ?  atorvastatin (LIPITOR) 10 MG tablet, Take 1 tablet (10 mg total) by mouth daily., Disp: 90 tablet, Rfl: 3  ?  b complex vitamins tablet, Take 1 tablet by mouth daily., Disp: , Rfl:   ?  baclofen (LIORESAL) 10 MG tablet, TAKE 1/2 TABLET(5 MG) BY MOUTH THREE TIMES DAILY (Patient taking differently: TAKE 1 TABLET(10 MG) BY MOUTH THREE TIMES DAILY), Disp: 135 tablet, Rfl: 0  ?  budesonide " (PULMICORT) 1 mg/2 mL nebulizer solution, Take 1 mg by nebulization 2 (two) times a day., Disp: , Rfl:   ?  budesonide-formoterol (SYMBICORT) 160-4.5 mcg/actuation inhaler, INHALE 2 PUFFS BY MOUTH TWICE DAILY, Disp: 2 Inhaler, Rfl: 6  ?  calcium carbonate-vitamin D3 (CALTRATE 600 PLUS D3) 600 mg(1,500mg) -400 unit per tablet, TAKE 1 TABLET BY MOUTH DAILY, Disp: 90 tablet, Rfl: 3  ?  cetirizine (ZYRTEC) 10 MG tablet, Take 1 tablet (10 mg total) by mouth daily., Disp: 30 tablet, Rfl: 3  ?  cholecalciferol, vitamin D3, 1,000 unit (25 mcg) tablet, Take 4 tablets (4,000 Units total) by mouth daily., Disp: , Rfl: 0  ?  DAILY-HIEN tablet, TAKE 1 TABLET BY MOUTH DAILY, Disp: 120 tablet, Rfl: 3  ?  docusate sodium (COLACE) 100 MG capsule, Take 100 mg by mouth as needed for constipation. Takes 1 cap in pm, Disp: , Rfl:   ?  DULoxetine (CYMBALTA) 20 MG capsule, Take 3 capsules (60 mg total) by mouth daily., Disp: 270 capsule, Rfl: 3  ?  ferrous sulfate 325 (65 FE) MG tablet, Take 1 tablet (325 mg total) by mouth daily with breakfast., Disp: 90 tablet, Rfl: 3  ?  folic acid (FOLVITE) 1 MG tablet, TAKE 1 TABLET BY MOUTH EVERY DAY, Disp: 90 tablet, Rfl: 3  ?  food supplemt, lactose-reduced (ENSURE ACTIVE PROTEIN-MUSCLE) Liqd, DRINK 1 BOTTLE TWICE DAILY, Disp: 88461 mL, Rfl: 0  ?  furosemide (LASIX) 20 MG tablet, Take 2 tablets (40 mg total) by mouth 2 (two) times a day., Disp: 180 tablet, Rfl: 3  ?  gabapentin (NEURONTIN) 300 MG capsule, TAKE 1 CAPSULE BY MOUTH TWICE DAILY, Disp: 180 capsule, Rfl: 0  ?  hydrOXYzine HCl (ATARAX) 25 MG tablet, Take 1 tablet (25 mg total) by mouth every 8 (eight) hours as needed for itching., Disp: 100 tablet, Rfl: 1  ?  ipratropium-albuterol (DUO-NEB) 0.5-2.5 mg/3 mL nebulizer, Inhale 3 mL every 4 (four) hours as needed., Disp: 30 vial, Rfl: 3  ?  levalbuterol (XOPENEX HFA) 45 mcg/actuation inhaler, Inhale 2 puffs every 4 (four) hours as needed for wheezing., Disp: 5 Inhaler, Rfl: 3  ?  lidocaine  (LIDODERM) 5 %, Apply once daily remove & Discard patch within 12 hours or as directed by MD, Disp: 7 patch, Rfl: 0  ?  meloxicam (MOBIC) 7.5 MG tablet, 1 tab p.o. daily, Disp: 90 tablet, Rfl: 3  ?  metFORMIN (GLUCOPHAGE) 500 MG tablet, Take 1 tablet (500 mg total) by mouth daily with breakfast., Disp: 90 tablet, Rfl: 3  ?  metoprolol tartrate (LOPRESSOR) 25 MG tablet, TAKE 1 TABLET(25 MG) BY MOUTH TWICE DAILY, Disp: 180 tablet, Rfl: 3  ?  naloxone (NARCAN) 4 mg/actuation nasal spray, 1 spray (4 mg dose) into one nostril for opioid reversal. Call 911. May repeat if no response in 3 minutes., Disp: 1 Box, Rfl: 0  ?  nystatin (MYCOSTATIN) 100,000 unit/mL suspension, Take 5 mL (500,000 Units total) by mouth 4 (four) times a day. Swish and spit. (Patient taking differently: Take 5 mL by mouth 4 (four) times a day. Swish and spit. Taking PRN  Indications: Candidiasis Prophylaxis), Disp: 200 mL, Rfl: 0  ?  omeprazole (PRILOSEC) 20 MG capsule, Take 1 capsule (20 mg total) by mouth daily before breakfast., Disp: 90 capsule, Rfl: 3  ?  oxyCODONE (ROXICODONE) 30 MG immediate release tablet, Take 1 tablet (30 mg total) by mouth every 8 (eight) hours as needed for pain., Disp: 90 tablet, Rfl: 0  ?  potassium chloride (K-DUR,KLOR-CON) 20 MEQ tablet, TAKE 1 TABLET(20 MEQ) BY MOUTH TWICE DAILY, Disp: 180 tablet, Rfl: 1  ?  predniSONE (DELTASONE) 5 MG tablet, Take 5 mg by mouth daily., Disp: 90 tablet, Rfl: 0  ?  pregabalin (LYRICA) 75 MG capsule, Take 1 capsule (75 mg total) by mouth 3 (three) times a day., Disp: 90 capsule, Rfl: 2  ?  riluzole (RILUTEK) 50 mg tablet, TAKE 1 TABLET BY MOUTH TWO TIMES A DAY BEFORE MEALS, Disp: , Rfl:   ?  senna-docusate (PERICOLACE) 8.6-50 mg tablet, Take 2 tablets by mouth daily as needed for constipation. (Patient taking differently: Take 2 tablets by mouth daily as needed for constipation. pt isn't taking this med, only docusate  Indications: constipation), Disp: 30 tablet, Rfl: 3  ?  sodium  "phosphates 133 mL (FOR FLEET) 19-7 gram/118 mL Enem rectal enema, INSERT 1 ENEMA INTO THE RECTUM ONCE FOR 1 DOSE, Disp: 133 mL, Rfl: 0  ?  SPIRIVA WITH HANDIHALER 18 mcg inhalation capsule, Place 1 capsule (2 puffs total) into inhaler and inhale daily., Disp: 90 capsule, Rfl: 3  ?  tamsulosin (FLOMAX) 0.4 mg cap, Take 1 capsule (0.4 mg total) by mouth Daily after breakfast., Disp: 90 capsule, Rfl: 3  ?  triamcinolone (KENALOG) 0.1 % cream, Apply thin layer two times a day to rash on neck and arm, Disp: 30 g, Rfl: 0  ?  wound dressings (TRIAD WOUND DRESSING) Pste, Apply 71 g topically 2 (two) times a day., Disp: , Rfl: 3  ?  mupirocin (BACTROBAN) 2 % ointment, Apply topically to wound every day, Disp: 22 g, Rfl: 0    Current Facility-Administered Medications:   ?  lidocaine 2 % jelly (XYLOCAINE), , Topical, PRN, Obdulia Prieto NP, 1 application at 08/09/19 1530    History:   Past Medical History:   Diagnosis Date   ? ALS (amyotrophic lateral sclerosis) (H)    ? BPH (benign prostatic hyperplasia)    ? Closed fracture of tibia and fibula, left, initial encounter    ? COPD (chronic obstructive pulmonary disease) (H)    ? Decubitus ulcer, buttock    ? Erectile dysfunction associated with type 2 diabetes mellitus (H)    ? HTN (hypertension)    ? Obstructive sleep apnea    ? Postherpetic neuralgia    ? Type 2 diabetes mellitus with diabetic neuropathy (H)        Physical Exam:    /58   Pulse 75   Temp 98.7  F (37.1  C)   Resp 16   Ht 5' 10\" (1.778 m)   Wt 139 lb (63 kg)   SpO2 96%   BMI 19.94 kg/m      General:  Patient presents in no apparent distress.  Psychiatric:  Alert and oriented x3.   Respiratory: unlabored breathing; no cough, able to speak in full sentences without becoming breathless  Integumentary:  Skin is uniformly warm, dry and pink.    Wound #1 Location: right buttocks  Size: 2L x 2W x 0.2depth.  No sinus tract present, Wound base: red; viable  noundermining present. Wound is full thickness. " There is moderate drainage. Periwound: no denudement, erythema, induration, maceration or warmth.  NOTE: wound assessment is done through either visual inspection from video call or through picture along with patient/HHC nurse description    Circumferential volume measures:    Vasc Edema 4/30/2019 3/4/2020   Right just above MTP 24.5 24.1   Right Ankle 25.5 28   Right Widest Calf 32 35.7   Right Thigh Up 10cm 37 -   Left - just above MTP 26 24.3   Left Ankle 28 28.9   Left Widest Calf 33.5 35.2   Left Thigh Up 10cm 38 -       Ulceration(s)/Wound(s):     Pressure Ulcer/Injury 04/19/20 Buttocks Right Stage 2 (Active)       Pressure Ulcer/Injury 05/25/20 Buttocks Left Stage 2 (Active)        Lab Values    No results found for: SEDRATE  Lab Results   Component Value Date    CREATININE 0.64 (L) 02/25/2020     Lab Results   Component Value Date    HGBA1C 5.3 12/24/2019     Lab Results   Component Value Date    BUN 9 02/25/2020     Lab Results   Component Value Date    ALBUMIN 3.6 12/24/2019     Vitamin D, Total (25-Hydroxy)   Date Value Ref Range Status   06/14/2019 34.7 30.0 - 80.0 ng/mL Final             Impression:  1. Pressure ulcer of coccygeal region, unstageable (H)  mupirocin (BACTROBAN) 2 % ointment   2. Type 2 diabetes mellitus with hyperglycemia, without long-term current use of insulin (H)     3. Long term systemic steroid user     4. Leg swelling     5. Wheelchair bound     6. Cigarette nicotine dependence without complication     7. ALS (amyotrophic lateral sclerosis) (H)        6/24/2020 right buttock        9/9/2020 right buttock         Are any of these wounds new today: No; Location: na    Assessment/Plan:          1. Debridement: na     2.  Wound treatment: wound treatment will include irrigation and dressings to promote autolytic debridement which will include:pt does not want dressing; would like to use ointment; I am concerned about developing sensitivity to the OTC antibiotic oitnment; will  prescribe bactroban instead; no dressing; apply 1-3 times per day Improved            3. Edema: continue compression stockings. Stable            4. Nutrition: continue to have good glucose control; low sodium           5. Offloading: has new chair; new cushion; positioning off wound while in bed     Patient will follow up with me in 4 weeks for reevaluation. They were instructed to call the clinic sooner with any signs or symptoms of infection or any further questions/concerns. Answered all questions.          Time spent on the phone/video chat 9 minutes    Obdulia Prieto DNP, RN, CNP, CWOCN, CFCN, CLT  [facility]   [phone]    Type of service: Video Visit     Video Start and End Time : 810-819    Originating Location (pt. Location): pt apt    Distant Location (provider location): [facility]     Mode of Communication: Video, Doximity    This note was electronically signed by Obdulia Prieto

## 2021-06-29 NOTE — PROGRESS NOTES
Progress Notes by Obdulia Prieto NP at 10/9/2020  8:20 AM     Author: Obdulia Prieto NP Service: -- Author Type: Nurse Practitioner    Filed: 10/9/2020  8:41 AM Encounter Date: 10/9/2020 Status: Signed    : Obdulia Prieto NP (Nurse Practitioner)       Follow up Vascular Visit       Date of Service:10/9/2020    Date Last Seen: 9/9/2020; 9/9/2020    Chief Complaint: right buttocks ulcer; BLE swelling        Pt returns to [facility] with regards to their right buttocks ulcer and BLE swelling; this visit is being conducted virtually (telephone or video visit) due to the Covid-19 pandemic. Last visit patient declined to use the recommended dressings or paste; instead he was using OTC bacitracin; we switched this to bactroban to reduce risk of developing sensitivity or resistance.  They arrive/on the phone today with [facility]. They are currently using bactroban ointment to the wounds. This is being done by the patient, his PCA, or home care staff. He has RN visit weekly and aide visits twice per week. They are using compression stockings for compression. They are feeling well today. Denies fevers, chills. No shortness of breath. He has a new w/c with a new cushion; his staff report that he patient is sitting up on his wound all day long; however the pt reports that he is up and about in his apartment throughout the day. He continues to smoke. His sugars have been running under 150 per the patient.    Allergies: Patient has no known allergies.    Medications:   Current Outpatient Medications:   ?  aspirin 81 MG EC tablet, Take 81 mg by mouth daily., Disp: , Rfl:   ?  atorvastatin (LIPITOR) 10 MG tablet, Take 1 tablet (10 mg total) by mouth daily., Disp: 90 tablet, Rfl: 3  ?  b complex vitamins tablet, Take 1 tablet by mouth daily., Disp: , Rfl:   ?  baclofen (LIORESAL) 10 MG tablet, TAKE 1/2 TABLET(5 MG) BY MOUTH THREE TIMES DAILY (Patient taking differently: TAKE 1 TABLET(10 MG) BY  MOUTH THREE TIMES DAILY), Disp: 135 tablet, Rfl: 0  ?  budesonide (PULMICORT) 1 mg/2 mL nebulizer solution, Take 1 mg by nebulization 2 (two) times a day., Disp: , Rfl:   ?  budesonide-formoterol (SYMBICORT) 160-4.5 mcg/actuation inhaler, INHALE 2 PUFFS BY MOUTH TWICE DAILY, Disp: 2 Inhaler, Rfl: 6  ?  calcium carbonate-vitamin D3 (CALTRATE 600 PLUS D3) 600 mg(1,500mg) -400 unit per tablet, Take 1 tablet by mouth daily., Disp: 90 tablet, Rfl: 3  ?  cetirizine (ZYRTEC) 10 MG tablet, Take 1 tablet (10 mg total) by mouth daily., Disp: 30 tablet, Rfl: 3  ?  cholecalciferol, vitamin D3, 1,000 unit (25 mcg) tablet, Take 4 tablets (4,000 Units total) by mouth daily., Disp: , Rfl: 0  ?  DAILY-HIEN tablet, TAKE 1 TABLET BY MOUTH DAILY, Disp: 120 tablet, Rfl: 3  ?  docusate sodium (COLACE) 100 MG capsule, Take 100 mg by mouth as needed for constipation. Takes 1 cap in pm, Disp: , Rfl:   ?  DULoxetine (CYMBALTA) 20 MG capsule, Take 3 capsules (60 mg total) by mouth daily., Disp: 270 capsule, Rfl: 3  ?  ferrous sulfate 325 (65 FE) MG tablet, Take 1 tablet (325 mg total) by mouth daily with breakfast., Disp: 90 tablet, Rfl: 3  ?  folic acid (FOLVITE) 1 MG tablet, TAKE 1 TABLET BY MOUTH EVERY DAY, Disp: 90 tablet, Rfl: 3  ?  food supplemt, lactose-reduced (ENSURE ACTIVE PROTEIN-MUSCLE) Liqd, DRINK 1 BOTTLE TWICE DAILY, Disp: 75045 mL, Rfl: 0  ?  furosemide (LASIX) 20 MG tablet, Take 2 tablets (40 mg total) by mouth 2 (two) times a day., Disp: 180 tablet, Rfl: 3  ?  gabapentin (NEURONTIN) 300 MG capsule, TAKE 1 CAPSULE BY MOUTH TWICE DAILY, Disp: 180 capsule, Rfl: 0  ?  hydrOXYzine HCl (ATARAX) 25 MG tablet, Take 1 tablet (25 mg total) by mouth every 8 (eight) hours as needed for itching., Disp: 100 tablet, Rfl: 1  ?  ipratropium-albuterol (DUO-NEB) 0.5-2.5 mg/3 mL nebulizer, Inhale 3 mL every 4 (four) hours as needed., Disp: 30 vial, Rfl: 3  ?  levalbuterol (XOPENEX HFA) 45 mcg/actuation inhaler, Inhale 2 puffs every 4 (four)  hours as needed for wheezing., Disp: 5 Inhaler, Rfl: 3  ?  lidocaine (LIDODERM) 5 %, Apply once daily remove & Discard patch within 12 hours or as directed by MD, Disp: 7 patch, Rfl: 0  ?  meloxicam (MOBIC) 7.5 MG tablet, 1 tab p.o. daily, Disp: 90 tablet, Rfl: 3  ?  metFORMIN (GLUCOPHAGE) 500 MG tablet, Take 1 tablet (500 mg total) by mouth daily with breakfast., Disp: 90 tablet, Rfl: 3  ?  metoprolol tartrate (LOPRESSOR) 25 MG tablet, TAKE 1 TABLET(25 MG) BY MOUTH TWICE DAILY, Disp: 180 tablet, Rfl: 3  ?  mupirocin (BACTROBAN) 2 % ointment, Apply topically to wound every day, Disp: 22 g, Rfl: 0  ?  naloxone (NARCAN) 4 mg/actuation nasal spray, 1 spray (4 mg dose) into one nostril for opioid reversal. Call 911. May repeat if no response in 3 minutes., Disp: 1 Box, Rfl: 0  ?  nystatin (MYCOSTATIN) 100,000 unit/mL suspension, Take 5 mL (500,000 Units total) by mouth 4 (four) times a day. Swish and spit. (Patient taking differently: Take 5 mL by mouth 4 (four) times a day. Swish and spit. Taking PRN  Indications: Candidiasis Prophylaxis), Disp: 200 mL, Rfl: 0  ?  omeprazole (PRILOSEC) 20 MG capsule, Take 1 capsule (20 mg total) by mouth daily before breakfast., Disp: 90 capsule, Rfl: 3  ?  oxyCODONE (ROXICODONE) 30 MG immediate release tablet, Take 1 tablet (30 mg total) by mouth every 8 (eight) hours as needed for pain., Disp: 90 tablet, Rfl: 0  ?  potassium chloride (K-DUR,KLOR-CON) 20 MEQ tablet, TAKE 1 TABLET(20 MEQ) BY MOUTH TWICE DAILY, Disp: 180 tablet, Rfl: 1  ?  predniSONE (DELTASONE) 5 MG tablet, Take 5 mg by mouth daily., Disp: 90 tablet, Rfl: 0  ?  riluzole (RILUTEK) 50 mg tablet, TAKE 1 TABLET BY MOUTH TWO TIMES A DAY BEFORE MEALS, Disp: , Rfl:   ?  senna-docusate (PERICOLACE) 8.6-50 mg tablet, Take 2 tablets by mouth daily as needed for constipation. (Patient taking differently: Take 2 tablets by mouth daily as needed for constipation. pt isn't taking this med, only docusate  Indications: constipation),  Disp: 30 tablet, Rfl: 3  ?  sodium phosphates 133 mL (FOR FLEET) 19-7 gram/118 mL Enem rectal enema, INSERT 1 ENEMA INTO THE RECTUM ONCE FOR 1 DOSE, Disp: 133 mL, Rfl: 0  ?  SPIRIVA WITH HANDIHALER 18 mcg inhalation capsule, Place 1 capsule (2 puffs total) into inhaler and inhale daily., Disp: 90 capsule, Rfl: 3  ?  tamsulosin (FLOMAX) 0.4 mg cap, Take 1 capsule (0.4 mg total) by mouth Daily after breakfast., Disp: 90 capsule, Rfl: 3  ?  triamcinolone (KENALOG) 0.1 % cream, Apply thin layer two times a day to rash on neck and arm, Disp: 30 g, Rfl: 0  ?  wound dressings (TRIAD WOUND DRESSING) Pste, Apply 71 g topically 2 (two) times a day., Disp: , Rfl: 3    Current Facility-Administered Medications:   ?  lidocaine 2 % jelly (XYLOCAINE), , Topical, PRN, Obdulia Prieto NP, 1 application at 08/09/19 1530    History:   Past Medical History:   Diagnosis Date   ? ALS (amyotrophic lateral sclerosis) (H)    ? BPH (benign prostatic hyperplasia)    ? Closed fracture of tibia and fibula, left, initial encounter    ? COPD (chronic obstructive pulmonary disease) (H)    ? Decubitus ulcer, buttock    ? Erectile dysfunction associated with type 2 diabetes mellitus (H)    ? HTN (hypertension)    ? Obstructive sleep apnea    ? Postherpetic neuralgia    ? Type 2 diabetes mellitus with diabetic neuropathy (H)        Physical Exam:    There were no vitals taken for this visit.    General:  Patient presents in no apparent distress.  Psychiatric:  Alert and oriented x3.   Respiratory: unlabored breathing; no cough, able to speak in full sentences without becoming breathless  Integumentary:  Skin is uniformly warm, dry and pink.    Wound #1 Location: right buttock  Size: 1L x 0.5W x 0.1depth.  No sinus tract present, Wound base: residual dressing or callous formation; central portion is red  No undermining present. Wound is full thickness. There is scant drainage. Periwound: no denudement, erythema, induration, maceration or warmth.   NOTE: wound assessment is done through either visual inspection from video call or through picture along with patient/HHC nurse description    Circumferential volume measures:    [facility] Edema 4/30/2019 3/4/2020   Right just above MTP 24.5 24.1   Right Ankle 25.5 28   Right Widest Calf 32 35.7   Right Thigh Up 10cm 37 -   Left - just above MTP 26 24.3   Left Ankle 28 28.9   Left Widest Calf 33.5 35.2   Left Thigh Up 10cm 38 -       Ulceration(s)/Wound(s):     Pressure Ulcer/Injury 04/19/20 Buttocks Right Stage 2 (Active)       Pressure Ulcer/Injury 05/25/20 Buttocks Left Stage 2 (Active)        Lab Values    No results found for: SEDRATE  Lab Results   Component Value Date    CREATININE 0.64 (L) 02/25/2020     Lab Results   Component Value Date    HGBA1C 5.3 12/24/2019     Lab Results   Component Value Date    BUN 9 02/25/2020     Lab Results   Component Value Date    ALBUMIN 3.6 12/24/2019     Vitamin D, Total (25-Hydroxy)   Date Value Ref Range Status   06/14/2019 34.7 30.0 - 80.0 ng/mL Final             Impression:  1. Pressure ulcer of coccygeal region, unstageable (H)     2. Type 2 diabetes mellitus with hyperglycemia, without long-term current use of insulin (H)     3. Long term systemic steroid user     4. Leg swelling     5. Wheelchair bound     6. Cigarette nicotine dependence without complication     7. ALS (amyotrophic lateral sclerosis) (H)         10/9/2020 right buttock         Are any of these wounds new today: No; Location: na    Assessment/Plan:          1. Debridement: na     2.  Wound treatment: wound treatment will include irrigation and dressings to promote autolytic debridement which will include:continue bactroban ointment apply 1-3 times per day Stable to improved           3. Edema: doing well with compression stockings; has new chair with tilt function; encouraged to elevate; has PCA or home care to assist donning and pt can doff on his own. Stable            4. Nutrition: weight is  stabilized; continue to focus on protein; sugars are under 150 per patient report; provided hand out on           5. Offloading: has new cushion; has tilt function on his new chair; encouraged him to continue to reposition and stay off his wounds     Patient will follow up with me in 4 weeks for reevaluation. They were instructed to call the clinic sooner with any signs or symptoms of infection or any further questions/concerns. Answered all questions.          Time spent on the phone/video chat 6 minutes    Obdulia Prieto DNP, RN, CNP, CWOCN, CFCN, CLT  [facility]   [phone]    Type of service: Video Visit     Video Start and End Time : 824-830    Originating Location (pt. Location): pt apt    Distant Location (provider location): [facility]     Mode of Communication: Video, Doximity    This note was electronically signed by Obdulia Prieto

## 2021-06-29 NOTE — PROGRESS NOTES
Progress Notes by Obdulia Prieto NP at 7/7/2020  9:45 AM     Author: Obdulia Prieto NP Service: -- Author Type: Nurse Practitioner    Filed: 7/7/2020 10:14 AM Encounter Date: 7/7/2020 Status: Signed    : Obdulia Prieto NP (Nurse Practitioner)       Follow up Vascular Visit       Date of Service:7/7/2020    Date Last Seen: 6/26/2020; 6/26/2020    Chief Complaint: buttocks pressure ulcer; BLE swelling        Pt returns to [facility] with regards to their buttocks pressure ulcer and BLE swelling; this visit is being conducted virtually (telephone or video visit) due to the Covid-19 pandemic.  They arrive/on the phone today with [facility] home care aide. They were using triad to the buttocks to the wounds. This is being done by home care. We received a message that the pca which come to help him daily did not feel comfortable applying this paste to his wounds as they felt it was out of scope for them. So we switched him to hydrocolloid which is being changed every 3 days.  They are using compression stockings for compression. He bumped his right shin on the bed frame; this is now scabbed not draining not bothering him. He has a new w/c; he has a formed foam cushion on the chair but he insists on adding old pillows; bags; and towels to sit on he has urinary incontinence at times.  They are feeling well today. Denies fevers, chills. No shortness of breath. Pt is minimally ambulatory and spends the bulk of his day in his w/c. He has worked with PT in the past to help with strength. He continues to smoke. FS running 120s.     Allergies: Patient has no known allergies.    Medications:   Current Outpatient Medications:   ?  albuterol (PROAIR HFA;PROVENTIL HFA;VENTOLIN HFA) 90 mcg/actuation inhaler, Inhale 2 puffs 3 (three) times a day., Disp: 1 Inhaler, Rfl: 12  ?  aspirin 81 MG EC tablet, Take 81 mg by mouth daily., Disp: , Rfl:   ?  atorvastatin (LIPITOR) 10 MG tablet, Take 1 tablet (10 mg  total) by mouth daily., Disp: 90 tablet, Rfl: 3  ?  b complex vitamins tablet, Take 1 tablet by mouth daily., Disp: , Rfl:   ?  baclofen (LIORESAL) 10 MG tablet, TAKE 1/2 TABLET(5 MG) BY MOUTH THREE TIMES DAILY (Patient taking differently: TAKE 1 TABLET(10 MG) BY MOUTH THREE TIMES DAILY), Disp: 135 tablet, Rfl: 0  ?  budesonide (PULMICORT) 1 mg/2 mL nebulizer solution, Take 1 mg by nebulization 2 (two) times a day., Disp: , Rfl:   ?  budesonide-formoterol (SYMBICORT) 160-4.5 mcg/actuation inhaler, INHALE 2 PUFFS BY MOUTH TWICE DAILY, Disp: 2 Inhaler, Rfl: 6  ?  calcium carbonate-vitamin D3 (CALTRATE 600 PLUS D3) 600 mg(1,500mg) -400 unit per tablet, TAKE 1 TABLET BY MOUTH DAILY, Disp: 90 tablet, Rfl: 3  ?  cetirizine (ZYRTEC) 10 MG tablet, Take 1 tablet (10 mg total) by mouth daily., Disp: 30 tablet, Rfl: 3  ?  cholecalciferol, vitamin D3, 1,000 unit (25 mcg) tablet, Take 4 tablets (4,000 Units total) by mouth daily., Disp: , Rfl: 0  ?  DAILY-HIEN tablet, TAKE 1 TABLET BY MOUTH DAILY, Disp: 120 tablet, Rfl: 3  ?  docusate sodium (COLACE) 100 MG capsule, Take 100 mg by mouth as needed for constipation.    , Disp: , Rfl:   ?  DULoxetine (CYMBALTA) 20 MG capsule, Take 3 capsules (60 mg total) by mouth daily., Disp: 270 capsule, Rfl: 3  ?  ENSURE ACTIVE PROTEIN-MUSCLE Liqd, DRINK 1 BOTTLE TWICE DAILY, Disp: 60 Bottle, Rfl: 2  ?  ferrous sulfate 325 (65 FE) MG tablet, Take 1 tablet (325 mg total) by mouth daily with breakfast., Disp: 90 tablet, Rfl: 3  ?  folic acid (FOLVITE) 1 MG tablet, TAKE 1 TABLET BY MOUTH EVERY DAY, Disp: 90 tablet, Rfl: 3  ?  furosemide (LASIX) 20 MG tablet, Take 2 tablets (40 mg total) by mouth 2 (two) times a day., Disp: 180 tablet, Rfl: 3  ?  gabapentin (NEURONTIN) 300 MG capsule, TAKE 1 CAPSULE BY MOUTH TWICE DAILY, Disp: 180 capsule, Rfl: 0  ?  hydrOXYzine HCl (ATARAX) 25 MG tablet, Take 1 tablet (25 mg total) by mouth every 8 (eight) hours as needed for itching., Disp: 100 tablet, Rfl: 1  ?   ipratropium-albuterol (DUO-NEB) 0.5-2.5 mg/3 mL nebulizer, Inhale 3 mL every 4 (four) hours as needed., Disp: 30 vial, Rfl: 3  ?  levalbuterol (XOPENEX HFA) 45 mcg/actuation inhaler, Inhale 2 puffs every 4 (four) hours as needed for wheezing., Disp: 5 Inhaler, Rfl: 3  ?  lidocaine (LIDODERM) 5 %, Apply once daily remove & Discard patch within 12 hours or as directed by MD, Disp: 7 patch, Rfl: 0  ?  meloxicam (MOBIC) 7.5 MG tablet, 1 tab p.o. daily, Disp: 90 tablet, Rfl: 3  ?  metFORMIN (GLUCOPHAGE) 500 MG tablet, Take 1 tablet (500 mg total) by mouth daily with breakfast., Disp: 90 tablet, Rfl: 3  ?  metoprolol tartrate (LOPRESSOR) 25 MG tablet, TAKE 1 TABLET(25 MG) BY MOUTH TWICE DAILY, Disp: 180 tablet, Rfl: 3  ?  naloxone (NARCAN) 4 mg/actuation nasal spray, 1 spray (4 mg dose) into one nostril for opioid reversal. Call 911. May repeat if no response in 3 minutes., Disp: 1 Box, Rfl: 0  ?  nystatin (MYCOSTATIN) 100,000 unit/mL suspension, Take 5 mL (500,000 Units total) by mouth 4 (four) times a day. Swish and spit., Disp: 200 mL, Rfl: 0  ?  omeprazole (PRILOSEC) 20 MG capsule, Take 1 capsule (20 mg total) by mouth daily before breakfast., Disp: 90 capsule, Rfl: 3  ?  oxyCODONE (ROXICODONE) 30 MG immediate release tablet, Take 1 tablet (30 mg total) by mouth every 8 (eight) hours as needed for pain., Disp: 90 tablet, Rfl: 0  ?  potassium chloride (K-DUR,KLOR-CON) 20 MEQ tablet, TAKE 1 TABLET(20 MEQ) BY MOUTH TWICE DAILY, Disp: 180 tablet, Rfl: 3  ?  riluzole (RILUTEK) 50 mg tablet, TAKE 1 TABLET BY MOUTH TWO TIMES A DAY BEFORE MEALS, Disp: , Rfl:   ?  senna-docusate (PERICOLACE) 8.6-50 mg tablet, Take 2 tablets by mouth daily as needed for constipation. (Patient taking differently: Take 2 tablets by mouth daily as needed for constipation. pt isn't taking this med, only docusate  Indications: constipation), Disp: 30 tablet, Rfl: 3  ?  sodium phosphates 133 mL (FOR FLEET) 19-7 gram/118 mL Enem rectal enema, INSERT 1  ENEMA INTO THE RECTUM ONCE FOR 1 DOSE, Disp: 133 mL, Rfl: 0  ?  SPIRIVA WITH HANDIHALER 18 mcg inhalation capsule, Place 1 capsule (2 puffs total) into inhaler and inhale daily., Disp: 90 capsule, Rfl: 3  ?  tamsulosin (FLOMAX) 0.4 mg cap, Take 1 capsule (0.4 mg total) by mouth Daily after breakfast., Disp: 90 capsule, Rfl: 3  ?  triamcinolone (KENALOG) 0.1 % cream, Apply thin layer two times a day to rash on neck and arm, Disp: 30 g, Rfl: 0  ?  wound dressings (TRIAD WOUND DRESSING) Pste, Apply 71 g topically 2 (two) times a day., Disp: , Rfl: 3    Current Facility-Administered Medications:   ?  lidocaine 2 % jelly (XYLOCAINE), , Topical, PRN, Obdulia Prieto NP, 1 application at 08/09/19 1530    History:   Past Medical History:   Diagnosis Date   ? ALS (amyotrophic lateral sclerosis) (H)    ? BPH (benign prostatic hyperplasia)    ? Closed fracture of tibia and fibula, left, initial encounter    ? COPD (chronic obstructive pulmonary disease) (H)    ? Decubitus ulcer, buttock    ? Erectile dysfunction associated with type 2 diabetes mellitus (H)    ? HTN (hypertension)    ? Obstructive sleep apnea    ? Postherpetic neuralgia    ? Type 2 diabetes mellitus with diabetic neuropathy (H)        Physical Exam:    There were no vitals taken for this visit.    General:  Patient presents in no apparent distress.  Psychiatric:  Alert and oriented x3.   Respiratory: unlabored breathing; no cough, able to speak in full sentences without becoming breathless  Integumentary:  Skin is uniformly warm, dry and pink.    Wound #1 Location: buttocks  Size: 3.5L x 2W x 0.1depth.  No sinus tract present, Wound base: red; pink; irregular shaped  No undermining present. Wound is full thickness. There is scant drainage. Periwound: no denudement, erythema, induration, maceration or warmth.  NOTE: wound assessment is done through either visual inspection from video call or through picture along with patient/HHC nurse  description    Circumferential volume measures:    Vasc Edema 4/30/2019 3/4/2020   Right just above MTP 24.5 24.1   Right Ankle 25.5 28   Right Widest Calf 32 35.7   Right Thigh Up 10cm 37 -   Left - just above MTP 26 24.3   Left Ankle 28 28.9   Left Widest Calf 33.5 35.2   Left Thigh Up 10cm 38 -       Ulceration(s)/Wound(s):     VASC Wound 03/04/20 Rt buttocks (Active)   Pre Size Length 4 06/08/20 1000   Pre Size Width 2 06/08/20 1000   Pre Size Depth 0.1 06/08/20 1000   Pre Total Sq cm 8 06/08/20 1000   Description telephone visit estimation  04/13/20 1100       VASC Wound 05/11/20 right buttock inferior (Active)   Pre Size Length 1 05/11/20 1000   Pre Size Width 0.5 05/11/20 1000   Pre Size Depth 0.1 05/11/20 1000   Pre Total Sq cm 0.5 05/11/20 1000       Pressure Ulcer/Injury 04/19/20 Buttocks Right Stage 2 (Active)       Pressure Ulcer/Injury 05/25/20 Buttocks Left Stage 2 (Active)        Lab Values    No results found for: SEDRATE  Lab Results   Component Value Date    CREATININE 0.64 (L) 02/25/2020     Lab Results   Component Value Date    HGBA1C 5.3 12/24/2019     Lab Results   Component Value Date    BUN 9 02/25/2020     Lab Results   Component Value Date    ALBUMIN 3.6 12/24/2019     Vitamin D, Total (25-Hydroxy)   Date Value Ref Range Status   06/14/2019 34.7 30.0 - 80.0 ng/mL Final             Impression:  1. Pressure ulcer of coccygeal region, unstageable (H)     2. Type 2 diabetes mellitus with hyperglycemia, without long-term current use of insulin (H)     3. Long term systemic steroid user     4. Leg swelling     5. Wheelchair bound     6. Cigarette nicotine dependence without complication     7. ALS (amyotrophic lateral sclerosis) (H)       7/7/2020 buttocks       Are any of these wounds new today: No; Location: na    Assessment/Plan:          1. Debridement: na     2.  Wound treatment: wound treatment will include irrigation and dressings to promote autolytic debridement which will include:will  continue either triad paste or hydrocolloid to the buttocks wound Stable            3. Edema: continue compression stockings during the day; PCA to help don and doff these. Stable            4. Nutrition: continue to focus on protein           5. Offloading: needs to get off his buttocks periodically throughout the day; pt resistent to this; feels that he is very active in his apartment throughout the day; different reports from his staff     Patient will follow up with me in 4 weeks for reevaluation. They were instructed to call the clinic sooner with any signs or symptoms of infection or any further questions/concerns. Answered all questions.          Time spent on the phone/video chat 12 minutes    Obdulia Prieto DNP, RN, CNP, CWOCN, CFCN, CLT  [facility]   [phone]    Type of service: Video Visit     Video Start and End Time : 5541-8069    Originating Location (pt. Location): pt home    Distant Location (provider location): [facility]     Mode of Communication: Video, Doximity    This note was electronically signed by Obdulia Prieto

## 2021-06-30 ENCOUNTER — HOSPITAL ENCOUNTER (OUTPATIENT)
Dept: PHYSICAL MEDICINE AND REHAB | Facility: CLINIC | Age: 71
Discharge: HOME | End: 2021-06-30
Attending: PAIN MEDICINE
Payer: COMMERCIAL

## 2021-06-30 ENCOUNTER — RECORDS - HEALTHEAST (OUTPATIENT)
Dept: HOME HEALTH SERVICES | Facility: HOME HEALTH | Age: 71
End: 2021-06-30

## 2021-06-30 DIAGNOSIS — M51.26 LUMBAR DISC HERNIATION: ICD-10-CM

## 2021-06-30 DIAGNOSIS — M48.062 SPINAL STENOSIS OF LUMBAR REGION WITH NEUROGENIC CLAUDICATION: ICD-10-CM

## 2021-06-30 DIAGNOSIS — D50.9 IRON DEFICIENCY ANEMIA: ICD-10-CM

## 2021-06-30 DIAGNOSIS — G12.21 ALS (AMYOTROPHIC LATERAL SCLEROSIS) (H): ICD-10-CM

## 2021-06-30 DIAGNOSIS — M79.18 MYOFASCIAL PAIN: ICD-10-CM

## 2021-06-30 DIAGNOSIS — M19.012 PRIMARY OSTEOARTHRITIS OF LEFT SHOULDER: ICD-10-CM

## 2021-06-30 DIAGNOSIS — M54.16 LUMBAR RADICULITIS: ICD-10-CM

## 2021-06-30 NOTE — PROGRESS NOTES
Progress Notes by Obdulia Prieto NP at 1/20/2021 10:20 AM     Author: Obdulia Prieto NP Service: -- Author Type: Nurse Practitioner    Filed: 1/20/2021 10:56 AM Encounter Date: 1/20/2021 Status: Signed    : Obdulia Prieto NP (Nurse Practitioner)       Follow up Vascular Visit       Date of Service:1/20/2021    Date Last Seen: 12/3/2020; Visit date not found    Chief Complaint: new shin wounds; chronic buttocks ulcer        Pt returns to [facility] with regards to their new shin wounds; chronic buttocks ulcer; this visit is being conducted virtually (telephone or video visit) due to the Covid-19 pandemic.  They arrive/on the phone today with [facility] home care. Pt reports he fell out of his chair last week; paramedics came and got him up; caused shin wounds. They are currently using bactroban to the buttocks wounds; pt feels this is healed; using mepilex border to the wounds. This is being done by home care every 2 days. They are using compression stockings for compression. They are feeling well today. Denies fevers, chills. No shortness of breath. Has new w/c.     Allergies: Patient has no known allergies.    Medications:   Current Outpatient Medications:   ?  acetaminophen-codeine (TYLENOL #3) 300-30 mg per tablet, acetaminophen 300 mg-codeine 30 mg tablet  TK 1 T PO TID AS NEEDED FOR PAIN, Disp: , Rfl:   ?  albuterol (PROAIR HFA;PROVENTIL HFA;VENTOLIN HFA) 90 mcg/actuation inhaler, Inhale 2 puffs every 6 (six) hours as needed for wheezing., Disp: 1 each, Rfl: 0  ?  aspirin 81 MG EC tablet, Take 1 tablet (81 mg total) by mouth daily., Disp: 100 tablet, Rfl: 3  ?  atorvastatin (LIPITOR) 10 MG tablet, Take 1 tablet (10 mg total) by mouth daily., Disp: 90 tablet, Rfl: 3  ?  b complex vitamins tablet, Take 1 tablet by mouth daily. Per PCP patient does not need to take vitamin B complex anymore., Disp: , Rfl:   ?  baclofen (LIORESAL) 10 MG tablet, TAKE 1/2 TABLET(5 MG) BY MOUTH THREE  TIMES DAILY (Patient taking differently: TAKE 1 TABLET(10 MG) BY MOUTH THREE TIMES DAILY), Disp: 135 tablet, Rfl: 0  ?  budesonide (PULMICORT) 1 mg/2 mL nebulizer solution, Take 1 mg by nebulization 2 (two) times a day., Disp: , Rfl:   ?  budesonide-formoteroL (SYMBICORT) 160-4.5 mcg/actuation inhaler, INHALE 2 PUFFS BY MOUTH TWICE DAILY (Patient not taking: Reported on 12/30/2020), Disp: 2 Inhaler, Rfl: 6  ?  calcium carbonate-vitamin D3 (CALTRATE 600 PLUS D3) 600 mg(1,500mg) -400 unit per tablet, Take 1 tablet by mouth daily., Disp: 90 tablet, Rfl: 3  ?  cetirizine (ZYRTEC) 10 MG tablet, Take 1 tablet (10 mg total) by mouth daily., Disp: 90 tablet, Rfl: 3  ?  cholecalciferol, vitamin D3, 1,000 unit (25 mcg) tablet, Take 4 tablets (4,000 Units total) by mouth daily., Disp: , Rfl: 0  ?  docusate sodium (COLACE) 100 MG capsule, Take 100 mg by mouth as needed for constipation. Takes 1 cap in pm, Disp: , Rfl:   ?  doxycycline (MONODOX) 100 MG capsule, Take 100 mg by mouth 2 (two) times a day. Take 1 capsule two times daily  Indications: Urinary Tract Infection, Disp: , Rfl:   ?  DULoxetine (CYMBALTA) 20 MG capsule, Take 3 capsules (60 mg total) by mouth daily., Disp: 270 capsule, Rfl: 3  ?  ferrous sulfate 325 (65 FE) MG tablet, Take 1 tablet (325 mg total) by mouth daily with breakfast., Disp: 90 tablet, Rfl: 3  ?  folic acid (FOLVITE) 1 MG tablet, TAKE 1 TABLET BY MOUTH EVERY DAY, Disp: 90 tablet, Rfl: 3  ?  food supplemt, lactose-reduced (ENSURE ACTIVE PROTEIN-MUSCLE) Liqd, DRINK 1 BOTTLE TWICE DAILY, Disp: 01250 mL, Rfl: 0  ?  furosemide (LASIX) 20 MG tablet, Take 2 tablets (40 mg total) by mouth 2 (two) times a day., Disp: 180 tablet, Rfl: 3  ?  gabapentin (NEURONTIN) 300 MG capsule, TAKE 1 CAPSULE BY MOUTH TWICE DAILY (Patient taking differently: TAKE 1 CAPSULE BY MOUTH 3x DAILY), Disp: 180 capsule, Rfl: 0  ?  hydrOXYzine HCl (ATARAX) 25 MG tablet, Take 1 tablet (25 mg total) by mouth every 8 (eight) hours as  needed for itching., Disp: 100 tablet, Rfl: 1  ?  ipratropium-albuterol (DUO-NEB) 0.5-2.5 mg/3 mL nebulizer, Inhale 3 mL every 4 (four) hours as needed., Disp: 30 vial, Rfl: 3  ?  levalbuterol (XOPENEX HFA) 45 mcg/actuation inhaler, Inhale 2 puffs every 4 (four) hours as needed for wheezing., Disp: 5 Inhaler, Rfl: 3  ?  lidocaine (LIDODERM) 5 %, Apply once daily remove & Discard patch within 12 hours or as directed by MD, Disp: 7 patch, Rfl: 0  ?  loratadine-pseudoephedrine (CLARITIN D 12-HR) 5-120 mg Tb12, Loratadine-D 5 mg-120 mg tablet,extended release 12 hr  TK 1 T PO BID, Disp: , Rfl:   ?  meloxicam (MOBIC) 7.5 MG tablet, 1 tab p.o. daily, Disp: 90 tablet, Rfl: 3  ?  metFORMIN (GLUCOPHAGE) 500 MG tablet, Take 1 tablet (500 mg total) by mouth daily with breakfast., Disp: 90 tablet, Rfl: 3  ?  metoprolol tartrate (LOPRESSOR) 25 MG tablet, TAKE 1 TABLET(25 MG) BY MOUTH TWICE DAILY, Disp: 180 tablet, Rfl: 3  ?  multivitamin (DAILY-HIEN) per tablet, Take 1 tablet by mouth daily., Disp: 90 tablet, Rfl: 3  ?  mupirocin (BACTROBAN) 2 % ointment, Apply topically to wound every day, Disp: 22 g, Rfl: 0  ?  naloxone (NARCAN) 4 mg/actuation nasal spray, 1 spray (4 mg dose) into one nostril for opioid reversal. Call 911. May repeat if no response in 3 minutes., Disp: 1 Box, Rfl: 0  ?  nystatin (MYCOSTATIN) 100,000 unit/mL suspension, Take 5 mL (500,000 Units total) by mouth 4 (four) times a day. Swish and spit. (Patient taking differently: Take 5 mL by mouth 4 (four) times a day. Swish and spit. Taking PRN  Indications: Candidiasis Prophylaxis), Disp: 200 mL, Rfl: 0  ?  omeprazole (PRILOSEC) 20 MG capsule, Take 1 capsule (20 mg total) by mouth daily before breakfast., Disp: 90 capsule, Rfl: 3  ?  oxyCODONE (ROXICODONE) 30 MG immediate release tablet, Take 1 tablet (30 mg total) by mouth every 8 (eight) hours as needed for pain., Disp: 90 tablet, Rfl: 0  ?  potassium chloride (K-DUR,KLOR-CON) 20 MEQ tablet, TAKE 1 TABLET(20  MEQ) BY MOUTH TWICE DAILY, Disp: 180 tablet, Rfl: 1  ?  predniSONE (DELTASONE) 5 MG tablet, Take 2.5 mg by mouth daily., Disp: 90 tablet, Rfl: 0  ?  pregabalin (LYRICA) 75 MG capsule, pregabalin 75 mg capsule, Disp: , Rfl:   ?  riluzole (RILUTEK) 50 mg tablet, TAKE 1 TABLET BY MOUTH TWO TIMES A DAY BEFORE MEALS, Disp: , Rfl:   ?  senna-docusate (PERICOLACE) 8.6-50 mg tablet, Take 2 tablets by mouth daily as needed for constipation. (Patient taking differently: Take 2 tablets by mouth daily as needed for constipation. pt isn't taking this med, only docusate  Indications: constipation), Disp: 30 tablet, Rfl: 3  ?  sodium phosphates 133 mL (FOR FLEET) 19-7 gram/118 mL Enem rectal enema, INSERT 1 ENEMA INTO THE RECTUM ONCE FOR 1 DOSE, Disp: 133 mL, Rfl: 0  ?  SPIRIVA WITH HANDIHALER 18 mcg inhalation capsule, Place 1 capsule (2 puffs total) into inhaler and inhale daily., Disp: 90 capsule, Rfl: 3  ?  tamsulosin (FLOMAX) 0.4 mg cap, Take 1 capsule (0.4 mg total) by mouth Daily after breakfast., Disp: 90 capsule, Rfl: 3  ?  triamcinolone (KENALOG) 0.1 % cream, Apply thin layer two times a day to rash on neck and arm, Disp: 30 g, Rfl: 0  ?  wound dressings (TRIAD WOUND DRESSING) Pste, Apply 71 g topically 2 (two) times a day., Disp: , Rfl: 3    Current Facility-Administered Medications:   ?  lidocaine 2 % jelly (XYLOCAINE), , Topical, PRN, Obdulia Prieto NP, 1 application at 08/09/19 1530    History:   Past Medical History:   Diagnosis Date   ? ALS (amyotrophic lateral sclerosis) (H)    ? BPH (benign prostatic hyperplasia)    ? Closed fracture of tibia and fibula, left, initial encounter    ? COPD (chronic obstructive pulmonary disease) (H)    ? Decubitus ulcer, buttock    ? Erectile dysfunction associated with type 2 diabetes mellitus (H)    ? HTN (hypertension)    ? Obstructive sleep apnea    ? Postherpetic neuralgia    ? Type 2 diabetes mellitus with diabetic neuropathy (H)        Physical Exam:    /60   Pulse  84   Temp 98.5  F (36.9  C)   Resp 16   SpO2 91%     General:  Patient presents in no apparent distress.  Psychiatric:  Alert and oriented x3.   Respiratory: unlabored breathing; no cough, able to speak in full sentences without becoming breathless  Integumentary:  Skin is uniformly warm, dry and pink.    Wound #1 Location: right shin  Size: 2L x 2W x 0.1depth.  No sinus tract present, Wound base: slough  No undermining present. Wound is full thickness. There is moderate drainage. Periwound: no denudement, erythema, induration, maceration or warmth.  NOTE: wound assessment is done through either visual inspection from video call or through picture along with patient/HHC nurse description    Wound #2 Location: left shin  Size: 6L x 2W x 0.1depth.  No sinus tract present, Wound base: slough  No undermining present. Wound is full thickness. There is moderate drainage. Periwound: no denudement, erythema, induration, maceration or warmth.  NOTE: wound assessment is done through either visual inspection from video call or through picture along with patient/HHC nurse description    Buttocks: pt reports this is healed; would not let the nurse look at this      Circumferential volume measures:    Vasc Edema 4/30/2019 3/4/2020   Right just above MTP 24.5 24.1   Right Ankle 25.5 28   Right Widest Calf 32 35.7   Right Thigh Up 10cm 37 -   Left - just above MTP 26 24.3   Left Ankle 28 28.9   Left Widest Calf 33.5 35.2   Left Thigh Up 10cm 38 -       Ulceration(s)/Wound(s):     VASC Wound 12/09/20 right shin (Active)   Pre Size Length 2 01/20/21 1000   Pre Size Width 2 01/20/21 1000   Pre Size Depth 0.1 01/20/21 1000   Pre Total Sq cm 4 01/20/21 1000       VASC Wound 01/20/21 left shin (Active)   Pre Size Length 6 01/20/21 1000   Pre Size Width 2 01/20/21 1000   Pre Size Depth 0.1 01/20/21 1000   Pre Total Sq cm 12 01/20/21 1000       Wound 11/25/20 Other wound type (comment) Leg Right (Active)       Wound 01/10/21 Denudement  (stripped of surface layer) Leg Left;Anterior (front) (Active)       Wound 01/10/21 Denudement (stripped of surface layer) Leg Right;Anterior (front) (Active)        Lab Values    No results found for: SEDRATE  Lab Results   Component Value Date    CREATININE 0.64 (L) 02/25/2020     Lab Results   Component Value Date    HGBA1C 5.3 12/24/2019     Lab Results   Component Value Date    BUN 9 02/25/2020     Lab Results   Component Value Date    ALBUMIN 3.6 12/24/2019     Vitamin D, Total (25-Hydroxy)   Date Value Ref Range Status   06/14/2019 34.7 30.0 - 80.0 ng/mL Final             Impression:  1. Type 2 diabetes mellitus with hyperglycemia, without long-term current use of insulin (H)     2. Pressure ulcer of coccygeal region, unstageable (H)  mupirocin (BACTROBAN) 2 % ointment   3. Long term systemic steroid user     4. Leg swelling     5. Wheelchair bound     6. Ulcer of right shin with fat layer exposed (H)     7. Skin ulcer of left pretibial region with fat layer exposed (H)              Are any of these wounds new today: Yes; Location: bilateral shins    Assessment/Plan:          1. Debridement: na     2.  Wound treatment: wound treatment will include irrigation and dressings to promote autolytic debridement which will include:new wounds to the shins after a fall out of w/c; the wounds appear on the drier side; will have home care apply bactroban ointment and cover with mepilex border change every 2-3 days; ok if A does this as well; will order supplies; sent refill; pt reports that buttocks wound is healed; can use bactroban prn to this area Stable            3. Edema: continue compression stockings; elevation as able; spends most hours of the day in w/c with legs dependent. Stable            4. Nutrition: focus on protein; not checking fs           5. Offloading: has new w/c cushion; knows to reposition     Patient will follow up with me in 4 weeks for reevaluation. They were instructed to call the clinic  sooner with any signs or symptoms of infection or any further questions/concerns. Answered all questions.          Time spent on the phone/video chat 12 minutes    Obdulia Prieto DNP, RN, CNP, CWOCN, CFCN, CLT  [facility]   [phone]    Type of service: Video Visit     Video Start and End Time : 1195-9390    Originating Location (pt. Location): pt home    Distant Location (provider location): [facility]     Mode of Communication: Video, Doximity    This note was electronically signed by Obdulia Prieto

## 2021-06-30 NOTE — PROGRESS NOTES
Progress Notes by Obdulia Prieto NP at 12/9/2020 11:00 AM     Author: Obdulia Prieto NP Service: -- Author Type: Nurse Practitioner    Filed: 12/9/2020 12:34 PM Encounter Date: 12/9/2020 Status: Signed    : Obdulia Prieto NP (Nurse Practitioner)       Follow up Vascular Visit       Date of Service:12/9/2020    Date Last Seen: 12/3/2020; Visit date not found    Chief Complaint: coccyx wound; new leg wound        Pt returns to [facility] with regards to their coccyx wound; new leg wound; this visit is being conducted virtually (telephone or video visit) due to the Covid-19 pandemic.  They arrive/on the phone today with [facility] home care nurse. They are currently using honey alginate; bordered foam to the wounds. This is being done by A every 5 days. The wound was sustained 3 weeks ago when he bumped it while getting up.  They are using compression stockings for compression. They are feeling well today. Denies fevers, chills. No shortness of breath. Not checking fs; states his pcp gave him a good report and decreased his diabetes medication. He states that the buttocks wound is essentially resolved; applying bactroban ointment prn. Has new scooter chair and has tilt function.     Allergies: Patient has no known allergies.    Medications:   Current Outpatient Medications:   ?  albuterol (PROAIR HFA;PROVENTIL HFA;VENTOLIN HFA) 90 mcg/actuation inhaler, Inhale 2 puffs every 6 (six) hours as needed for wheezing., Disp: 1 each, Rfl: 0  ?  aspirin 81 MG EC tablet, Take 1 tablet (81 mg total) by mouth daily., Disp: 100 tablet, Rfl: 3  ?  atorvastatin (LIPITOR) 10 MG tablet, Take 1 tablet (10 mg total) by mouth daily., Disp: 90 tablet, Rfl: 3  ?  b complex vitamins tablet, Take 1 tablet by mouth daily. Per PCP patient does not need to take vitamin B complex anymore., Disp: , Rfl:   ?  baclofen (LIORESAL) 10 MG tablet, TAKE 1/2 TABLET(5 MG) BY MOUTH THREE TIMES DAILY (Patient taking differently:  TAKE 1 TABLET(10 MG) BY MOUTH THREE TIMES DAILY), Disp: 135 tablet, Rfl: 0  ?  budesonide (PULMICORT) 1 mg/2 mL nebulizer solution, Take 1 mg by nebulization 2 (two) times a day., Disp: , Rfl:   ?  budesonide-formoteroL (SYMBICORT) 160-4.5 mcg/actuation inhaler, INHALE 2 PUFFS BY MOUTH TWICE DAILY, Disp: 2 Inhaler, Rfl: 6  ?  calcium carbonate-vitamin D3 (CALTRATE 600 PLUS D3) 600 mg(1,500mg) -400 unit per tablet, Take 1 tablet by mouth daily., Disp: 90 tablet, Rfl: 3  ?  cetirizine (ZYRTEC) 10 MG tablet, Take 1 tablet (10 mg total) by mouth daily., Disp: 90 tablet, Rfl: 3  ?  cholecalciferol, vitamin D3, 1,000 unit (25 mcg) tablet, Take 4 tablets (4,000 Units total) by mouth daily., Disp: , Rfl: 0  ?  docusate sodium (COLACE) 100 MG capsule, Take 100 mg by mouth as needed for constipation. Takes 1 cap in pm, Disp: , Rfl:   ?  DULoxetine (CYMBALTA) 20 MG capsule, Take 3 capsules (60 mg total) by mouth daily., Disp: 270 capsule, Rfl: 3  ?  ferrous sulfate 325 (65 FE) MG tablet, Take 1 tablet (325 mg total) by mouth daily with breakfast., Disp: 90 tablet, Rfl: 3  ?  folic acid (FOLVITE) 1 MG tablet, TAKE 1 TABLET BY MOUTH EVERY DAY, Disp: 90 tablet, Rfl: 3  ?  food supplemt, lactose-reduced (ENSURE ACTIVE PROTEIN-MUSCLE) Liqd, DRINK 1 BOTTLE TWICE DAILY, Disp: 62851 mL, Rfl: 0  ?  furosemide (LASIX) 20 MG tablet, Take 2 tablets (40 mg total) by mouth 2 (two) times a day., Disp: 180 tablet, Rfl: 3  ?  gabapentin (NEURONTIN) 300 MG capsule, TAKE 1 CAPSULE BY MOUTH TWICE DAILY (Patient taking differently: TAKE 1 CAPSULE BY MOUTH 3x DAILY), Disp: 180 capsule, Rfl: 0  ?  hydrOXYzine HCl (ATARAX) 25 MG tablet, Take 1 tablet (25 mg total) by mouth every 8 (eight) hours as needed for itching., Disp: 100 tablet, Rfl: 1  ?  ipratropium-albuterol (DUO-NEB) 0.5-2.5 mg/3 mL nebulizer, Inhale 3 mL every 4 (four) hours as needed., Disp: 30 vial, Rfl: 3  ?  levalbuterol (XOPENEX HFA) 45 mcg/actuation inhaler, Inhale 2 puffs every 4  (four) hours as needed for wheezing., Disp: 5 Inhaler, Rfl: 3  ?  lidocaine (LIDODERM) 5 %, Apply once daily remove & Discard patch within 12 hours or as directed by MD, Disp: 7 patch, Rfl: 0  ?  meloxicam (MOBIC) 7.5 MG tablet, 1 tab p.o. daily, Disp: 90 tablet, Rfl: 3  ?  metFORMIN (GLUCOPHAGE) 500 MG tablet, Take 1 tablet (500 mg total) by mouth daily with breakfast., Disp: 90 tablet, Rfl: 3  ?  metoprolol tartrate (LOPRESSOR) 25 MG tablet, TAKE 1 TABLET(25 MG) BY MOUTH TWICE DAILY, Disp: 180 tablet, Rfl: 3  ?  multivitamin (DAILY-HIEN) per tablet, Take 1 tablet by mouth daily., Disp: 90 tablet, Rfl: 3  ?  mupirocin (BACTROBAN) 2 % ointment, Apply topically to wound every day, Disp: 22 g, Rfl: 0  ?  naloxone (NARCAN) 4 mg/actuation nasal spray, 1 spray (4 mg dose) into one nostril for opioid reversal. Call 911. May repeat if no response in 3 minutes., Disp: 1 Box, Rfl: 0  ?  nystatin (MYCOSTATIN) 100,000 unit/mL suspension, Take 5 mL (500,000 Units total) by mouth 4 (four) times a day. Swish and spit. (Patient taking differently: Take 5 mL by mouth 4 (four) times a day. Swish and spit. Taking PRN  Indications: Candidiasis Prophylaxis), Disp: 200 mL, Rfl: 0  ?  omeprazole (PRILOSEC) 20 MG capsule, Take 1 capsule (20 mg total) by mouth daily before breakfast., Disp: 90 capsule, Rfl: 3  ?  oxyCODONE (ROXICODONE) 30 MG immediate release tablet, Take 1 tablet (30 mg total) by mouth every 8 (eight) hours as needed for pain., Disp: 90 tablet, Rfl: 0  ?  potassium chloride (K-DUR,KLOR-CON) 20 MEQ tablet, TAKE 1 TABLET(20 MEQ) BY MOUTH TWICE DAILY, Disp: 180 tablet, Rfl: 1  ?  predniSONE (DELTASONE) 5 MG tablet, Take 2.5 mg by mouth daily., Disp: 90 tablet, Rfl: 0  ?  riluzole (RILUTEK) 50 mg tablet, TAKE 1 TABLET BY MOUTH TWO TIMES A DAY BEFORE MEALS, Disp: , Rfl:   ?  senna-docusate (PERICOLACE) 8.6-50 mg tablet, Take 2 tablets by mouth daily as needed for constipation. (Patient taking differently: Take 2 tablets by  mouth daily as needed for constipation. pt isn't taking this med, only docusate  Indications: constipation), Disp: 30 tablet, Rfl: 3  ?  sodium phosphates 133 mL (FOR FLEET) 19-7 gram/118 mL Enem rectal enema, INSERT 1 ENEMA INTO THE RECTUM ONCE FOR 1 DOSE, Disp: 133 mL, Rfl: 0  ?  SPIRIVA WITH HANDIHALER 18 mcg inhalation capsule, Place 1 capsule (2 puffs total) into inhaler and inhale daily., Disp: 90 capsule, Rfl: 3  ?  tamsulosin (FLOMAX) 0.4 mg cap, Take 1 capsule (0.4 mg total) by mouth Daily after breakfast., Disp: 90 capsule, Rfl: 3  ?  triamcinolone (KENALOG) 0.1 % cream, Apply thin layer two times a day to rash on neck and arm, Disp: 30 g, Rfl: 0  ?  wound dressings (TRIAD WOUND DRESSING) Pste, Apply 71 g topically 2 (two) times a day., Disp: , Rfl: 3    Current Facility-Administered Medications:   ?  lidocaine 2 % jelly (XYLOCAINE), , Topical, PRN, Obdulia Prieto NP, 1 application at 08/09/19 1530    History:   Past Medical History:   Diagnosis Date   ? ALS (amyotrophic lateral sclerosis) (H)    ? BPH (benign prostatic hyperplasia)    ? Closed fracture of tibia and fibula, left, initial encounter    ? COPD (chronic obstructive pulmonary disease) (H)    ? Decubitus ulcer, buttock    ? Erectile dysfunction associated with type 2 diabetes mellitus (H)    ? HTN (hypertension)    ? Obstructive sleep apnea    ? Postherpetic neuralgia    ? Type 2 diabetes mellitus with diabetic neuropathy (H)        Physical Exam:    There were no vitals taken for this visit.    General:  Patient presents in no apparent distress.  Psychiatric:  Alert and oriented x3.   Respiratory: unlabored breathing; no cough, able to speak in full sentences without becoming breathless  Integumentary:  Skin is uniformly warm, dry and pink.    Wound #1 Location: right shin  Size: 3L x 1W x 0.1depth.  No sinus tract present, Wound base: eschar and slough  No undermining present. Wound is full thickness. There is moderate drainage. Periwound:  no denudement, erythema, induration, maceration or warmth.  NOTE: wound assessment is done through either visual inspection from video call or through picture along with patient/HHC nurse description    Circumferential volume measures:    [facility] Edema 4/30/2019 3/4/2020   Right just above MTP 24.5 24.1   Right Ankle 25.5 28   Right Widest Calf 32 35.7   Right Thigh Up 10cm 37 -   Left - just above MTP 26 24.3   Left Ankle 28 28.9   Left Widest Calf 33.5 35.2   Left Thigh Up 10cm 38 -       Ulceration(s)/Wound(s):     VASC Wound 12/09/20 right shin (Active)   Pre Size Length 3 12/09/20 1100   Pre Size Width 1 12/09/20 1100   Pre Size Depth 0.1 12/09/20 1100   Pre Total Sq cm 3 12/09/20 1100       Pressure Ulcer/Injury 04/19/20 Buttocks Right Stage 2 (Active)       Wound 11/25/20 Other wound type (comment) Leg Right (Active)        Lab Values    No results found for: SEDRATE  Lab Results   Component Value Date    CREATININE 0.64 (L) 02/25/2020     Lab Results   Component Value Date    HGBA1C 5.3 12/24/2019     Lab Results   Component Value Date    BUN 9 02/25/2020     Lab Results   Component Value Date    ALBUMIN 3.6 12/24/2019     Vitamin D, Total (25-Hydroxy)   Date Value Ref Range Status   06/14/2019 34.7 30.0 - 80.0 ng/mL Final             Impression:  1. Pressure ulcer of coccygeal region, unstageable (H)     2. Type 2 diabetes mellitus with hyperglycemia, without long-term current use of insulin (H)     3. Long term systemic steroid user     4. Leg swelling     5. Wheelchair bound     6. Ulcer of right shin with fat layer exposed (H)         .         Are any of these wounds new today: No; Location: na    Assessment/Plan:          1. Debridement: na     2.  Wound treatment: wound treatment will include irrigation and dressings to promote autolytic debridement which will include:will continue with home care; antibiotic ointment to the buttocks 1-3 times per day prn; for the right shin; honey alginate; bordered  foam; we will order supplies; change every 5 days; ok for [facility] to do this Improved            3. Edema: continue compression stockings; elevation as able. Stable            4. Nutrition: continue to maintain weight; low sodium; high protein; having good glucose control           5. Offloading: has new scooter with tilt function; told to use this to help offload buttocks continue to be as active as he is able     Patient will follow up with me in 4 weeks for reevaluation. They were instructed to call the clinic sooner with any signs or symptoms of infection or any further questions/concerns. Answered all questions.          Time spent on the phone/video chat 7 minutes    Obdulia Prieto DNP, RN, CNP, CWOCN, CFCN, CLT  [facility]   [phone]    Type of service: Video Visit     Video Start and End Time : 8337-3819    Originating Location (pt. Location): pt home    Distant Location (provider location): [facility]     Mode of Communication: Video, Doximity    This note was electronically signed by Obdulia Prieto

## 2021-06-30 NOTE — PROGRESS NOTES
Progress Notes by Obdulia Prieto NP at 2/17/2021 10:40 AM     Author: Obdulia Prieto NP Service: -- Author Type: Nurse Practitioner    Filed: 2/17/2021 12:18 PM Encounter Date: 2/17/2021 Status: Signed    : Obdulia Prieto NP (Nurse Practitioner)       Follow up Vascular Visit       Date of Service:2/17/2021    Date Last Seen: 1/20/2021; 1/20/2021    Chief Complaint: buttocks wound; bilateral leg ulcers; chronic        Pt returns to [facility] with regards to their buttocks wound; bilateral leg ulcers; chronic; this visit is being conducted virtually (telephone or video visit) due to the Covid-19 pandemic.  They arrive/on the phone today with [facility] home care nurse. They are currently using nothing to the buttocks he feels this healed; he is using bactroban and bordered foam to the shins to the wounds these wounds were from him bumping into things in his apartment. This is being done by home care 2-3 times per week. They are using compression stockings for compression. They are feeling well today. Denies fevers, chills. No shortness of breath.     Allergies: Patient has no known allergies.    Medications:   Current Outpatient Medications:   ?  albuterol (PROAIR HFA;PROVENTIL HFA;VENTOLIN HFA) 90 mcg/actuation inhaler, Inhale 2 puffs every 6 (six) hours as needed for wheezing., Disp: 1 each, Rfl: 2  ?  aspirin 81 MG EC tablet, Take 1 tablet (81 mg total) by mouth daily., Disp: 100 tablet, Rfl: 3  ?  atorvastatin (LIPITOR) 10 MG tablet, Take 1 tablet (10 mg total) by mouth daily., Disp: 90 tablet, Rfl: 3  ?  b complex vitamins tablet, Take 1 tablet by mouth daily. Per PCP patient does not need to take vitamin B complex anymore., Disp: , Rfl:   ?  baclofen (LIORESAL) 10 MG tablet, TAKE 1/2 TABLET(5 MG) BY MOUTH THREE TIMES DAILY (Patient taking differently: 10 mg 3 (three) times a day. ), Disp: 135 tablet, Rfl: 0  ?  budesonide (PULMICORT) 1 mg/2 mL nebulizer solution, Take 1 mg by  nebulization 2 (two) times a day., Disp: , Rfl:   ?  budesonide-formoteroL (SYMBICORT) 160-4.5 mcg/actuation inhaler, INHALE 2 PUFFS BY MOUTH TWICE DAILY, Disp: 2 Inhaler, Rfl: 6  ?  calcium carbonate-vitamin D3 (CALTRATE 600 PLUS D3) 600 mg(1,500mg) -400 unit per tablet, Take 1 tablet by mouth daily., Disp: 90 tablet, Rfl: 3  ?  cetirizine (ZYRTEC) 10 MG tablet, Take 1 tablet (10 mg total) by mouth daily., Disp: 90 tablet, Rfl: 3  ?  cholecalciferol, vitamin D3, 1,000 unit (25 mcg) tablet, Take 4 tablets (4,000 Units total) by mouth daily., Disp: , Rfl: 0  ?  docusate sodium (COLACE) 100 MG capsule, Take 100 mg by mouth as needed for constipation. Takes 1 cap in pm, Disp: , Rfl:   ?  doxycycline (MONODOX) 100 MG capsule, Take 100 mg by mouth 2 (two) times a day. Take 1 capsule two times daily  Indications: Urinary Tract Infection, Disp: , Rfl:   ?  DULoxetine (CYMBALTA) 20 MG capsule, Take 3 capsules (60 mg total) by mouth daily., Disp: 270 capsule, Rfl: 3  ?  ferrous sulfate 325 (65 FE) MG tablet, Take 1 tablet (325 mg total) by mouth daily with breakfast., Disp: 90 tablet, Rfl: 3  ?  folic acid (FOLVITE) 1 MG tablet, Take 1 tablet (1,000 mcg total) by mouth daily., Disp: 90 tablet, Rfl: 3  ?  food supplemt, lactose-reduced (ENSURE ACTIVE PROTEIN-MUSCLE) Liqd, DRINK 1 BOTTLE TWICE DAILY, Disp: 94194 mL, Rfl: 0  ?  furosemide (LASIX) 20 MG tablet, Take 2 tablets (40 mg total) by mouth 2 (two) times a day., Disp: 180 tablet, Rfl: 3  ?  gabapentin (NEURONTIN) 300 MG capsule, TAKE 1 CAPSULE BY MOUTH TWICE DAILY (Patient taking differently: TAKE 1 CAPSULE BY MOUTH 3x DAILY), Disp: 180 capsule, Rfl: 0  ?  hydrOXYzine HCl (ATARAX) 25 MG tablet, Take 1 tablet (25 mg total) by mouth every 8 (eight) hours as needed for itching., Disp: 100 tablet, Rfl: 1  ?  ipratropium-albuterol (DUO-NEB) 0.5-2.5 mg/3 mL nebulizer, Inhale 3 mL every 4 (four) hours as needed., Disp: 30 vial, Rfl: 3  ?  levalbuterol (XOPENEX HFA) 45  mcg/actuation inhaler, Inhale 2 puffs every 4 (four) hours as needed for wheezing., Disp: 5 Inhaler, Rfl: 3  ?  meloxicam (MOBIC) 7.5 MG tablet, 1 tab p.o. daily, Disp: 90 tablet, Rfl: 3  ?  metoprolol tartrate (LOPRESSOR) 25 MG tablet, TAKE 1 TABLET(25 MG) BY MOUTH TWICE DAILY, Disp: 180 tablet, Rfl: 3  ?  multivitamin (DAILY-HIEN) per tablet, Take 1 tablet by mouth daily., Disp: 90 tablet, Rfl: 3  ?  multivitamin therapeutic w/minerals (THERADEX M) 27-0.4 mg Tab tablet, Take 1 tablet by mouth daily., Disp: , Rfl:   ?  mupirocin (BACTROBAN) 2 % ointment, Apply topically to wound every day, Disp: 22 g, Rfl: 0  ?  naloxone (NARCAN) 4 mg/actuation nasal spray, 1 spray (4 mg dose) into one nostril for opioid reversal. Call 911. May repeat if no response in 3 minutes., Disp: 1 Box, Rfl: 0  ?  nystatin (MYCOSTATIN) 100,000 unit/mL suspension, Take 5 mL (500,000 Units total) by mouth 4 (four) times a day. Swish and spit. (Patient taking differently: Take 5 mL by mouth 4 (four) times a day. Swish and spit. Taking PRN  Indications: Candidiasis Prophylaxis), Disp: 200 mL, Rfl: 0  ?  omeprazole (PRILOSEC) 20 MG capsule, Take 1 capsule (20 mg total) by mouth daily before breakfast., Disp: 90 capsule, Rfl: 3  ?  oxyCODONE (ROXICODONE) 30 MG immediate release tablet, Take 1 tablet (30 mg total) by mouth 2 (two) times a day., Disp: 90 tablet, Rfl: 0  ?  potassium chloride (K-DUR,KLOR-CON) 20 MEQ tablet, TAKE 1 TABLET(20 MEQ) BY MOUTH TWICE DAILY, Disp: 180 tablet, Rfl: 1  ?  predniSONE (DELTASONE) 5 MG tablet, Take 2.5 mg by mouth daily., Disp: 90 tablet, Rfl: 2  ?  riluzole (RILUTEK) 50 mg tablet, TAKE 1 TABLET BY MOUTH TWO TIMES A DAY BEFORE MEALS, Disp: , Rfl:   ?  senna-docusate (PERICOLACE) 8.6-50 mg tablet, Take 2 tablets by mouth daily as needed for constipation. (Patient taking differently: Take 2 tablets by mouth daily as needed for constipation. pt isn't taking this med, only docusate  Indications: constipation), Disp:  30 tablet, Rfl: 3  ?  sodium phosphates 133 mL (FOR FLEET) 19-7 gram/118 mL Enem rectal enema, INSERT 1 ENEMA INTO THE RECTUM ONCE FOR 1 DOSE, Disp: 133 mL, Rfl: 0  ?  SPIRIVA WITH HANDIHALER 18 mcg inhalation capsule, Place 1 capsule (2 puffs total) into inhaler and inhale daily., Disp: 90 capsule, Rfl: 3  ?  tamsulosin (FLOMAX) 0.4 mg cap, Take 1 capsule (0.4 mg total) by mouth Daily after breakfast., Disp: 90 capsule, Rfl: 3  ?  triamcinolone (KENALOG) 0.1 % cream, Apply thin layer two times a day to rash on neck and arm, Disp: 30 g, Rfl: 0  ?  wound dressings (TRIAD WOUND DRESSING) Pste, Apply 71 g topically 2 (two) times a day., Disp: , Rfl: 3    Current Facility-Administered Medications:   ?  lidocaine 2 % jelly (XYLOCAINE), , Topical, PRN, Obdulia Prieto NP, 1 application at 08/09/19 1530    History:   Past Medical History:   Diagnosis Date   ? ALS (amyotrophic lateral sclerosis) (H)    ? BPH (benign prostatic hyperplasia)    ? Closed fracture of tibia and fibula, left, initial encounter    ? COPD (chronic obstructive pulmonary disease) (H)    ? Decubitus ulcer, buttock    ? Erectile dysfunction associated with type 2 diabetes mellitus (H)    ? HTN (hypertension)    ? Obstructive sleep apnea    ? Postherpetic neuralgia    ? Type 2 diabetes mellitus with diabetic neuropathy (H)        Physical Exam:    There were no vitals taken for this visit.    General:  Patient presents in no apparent distress.  Psychiatric:  Alert and oriented x3.   Respiratory: unlabored breathing; no cough, able to speak in full sentences without becoming breathless  Integumentary:  Skin is uniformly warm, dry and pink.    Buttocks: intact per patient  Extremities: scattered stable eschar to the legs; very small; no drainage; no surrounding erythema       Circumferential volume measures:    Vasc Edema 4/30/2019 3/4/2020   Right just above MTP 24.5 24.1   Right Ankle 25.5 28   Right Widest Calf 32 35.7   Right Thigh Up 10cm 37 -    Left - just above MTP 26 24.3   Left Ankle 28 28.9   Left Widest Calf 33.5 35.2   Left Thigh Up 10cm 38 -       Ulceration(s)/Wound(s):     VASC Wound 12/09/20 right shin (Active)   Pre Size Length 2 01/20/21 1000   Pre Size Width 2 01/20/21 1000   Pre Size Depth 0.1 01/20/21 1000   Pre Total Sq cm 4 01/20/21 1000       VASC Wound 01/20/21 left shin (Active)   Pre Size Length 6 01/20/21 1000   Pre Size Width 2 01/20/21 1000   Pre Size Depth 0.1 01/20/21 1000   Pre Total Sq cm 12 01/20/21 1000       Wound 01/10/21 Denudement (stripped of surface layer) Leg Left;Anterior (front) (Active)       Wound 01/10/21 Denudement (stripped of surface layer) Leg Right;Anterior (front) (Active)        Lab Values    No results found for: SEDRATE  Lab Results   Component Value Date    CREATININE 0.64 (L) 02/25/2020     Lab Results   Component Value Date    HGBA1C 5.3 12/24/2019     Lab Results   Component Value Date    BUN 9 02/25/2020     Lab Results   Component Value Date    ALBUMIN 3.6 12/24/2019     Vitamin D, Total (25-Hydroxy)   Date Value Ref Range Status   06/14/2019 34.7 30.0 - 80.0 ng/mL Final             Impression:  1. Pressure ulcer of coccygeal region, unstageable (H)     2. Type 2 diabetes mellitus with hyperglycemia, without long-term current use of insulin (H)     3. Long term systemic steroid user     4. Leg swelling     5. Wheelchair bound     6. Ulcer of right shin with fat layer exposed (H)     7. Skin ulcer of left pretibial region with fat layer exposed (H)     8. Cigarette nicotine dependence without complication     9. ALS (amyotrophic lateral sclerosis) (H)              Are any of these wounds new today: No; Location: na    Assessment/Plan:          1. Debridement: na     2.  Wound treatment: wound treatment will include irrigation and dressings to promote autolytic debridement which will include:wounds are essentially closed ok to keep covered with bordered foam for protection or leave mariluz   Improved             3. Edema: continue compression stockings. Stable            4. Nutrition: focus on protein           5. Offloading: limit time up in chair     Patient will follow up with me PRN  for reevaluation. They were instructed to call the clinic sooner with any signs or symptoms of infection or any further questions/concerns. Answered all questions.          Time spent on the phone/video chat 10 minutes    Obdulia Prieto DNP, RN, CNP, CWOCN, CFCN, CLT  [facility]   [phone]    Type of service: Video Visit     Video Start and End Time : 1612-1564    Originating Location (pt. Location): pt homoe    Distant Location (provider location): [facility]     Mode of Communication: Cinthya, Caleb    This note was electronically signed by Obdulia Prieto

## 2021-07-01 ENCOUNTER — COMMUNICATION - HEALTHEAST (OUTPATIENT)
Dept: INTERNAL MEDICINE | Facility: CLINIC | Age: 71
End: 2021-07-01

## 2021-07-01 ENCOUNTER — RECORDS - HEALTHEAST (OUTPATIENT)
Dept: INTERNAL MEDICINE | Facility: CLINIC | Age: 71
End: 2021-07-01

## 2021-07-01 DIAGNOSIS — N40.1 BENIGN PROSTATIC HYPERPLASIA WITH URINARY FREQUENCY: ICD-10-CM

## 2021-07-01 DIAGNOSIS — R35.0 BENIGN PROSTATIC HYPERPLASIA WITH URINARY FREQUENCY: ICD-10-CM

## 2021-07-01 DIAGNOSIS — I50.9 EDEMA DUE TO CONGESTIVE HEART FAILURE (H): ICD-10-CM

## 2021-07-01 RX ORDER — POTASSIUM CHLORIDE 1500 MG/1
TABLET, EXTENDED RELEASE ORAL
Qty: 180 TABLET | Refills: 1 | Status: SHIPPED | OUTPATIENT
Start: 2021-07-01 | End: 2021-09-01

## 2021-07-01 RX ORDER — TAMSULOSIN HYDROCHLORIDE 0.4 MG/1
0.4 CAPSULE ORAL
Qty: 90 CAPSULE | Refills: 3 | Status: SHIPPED | OUTPATIENT
Start: 2021-07-01 | End: 2021-09-01

## 2021-07-03 NOTE — ADDENDUM NOTE
Addendum Note by Obdulia Briscoe, NP at 1/20/2021 10:20 AM     Author: Obdulia Briscoe NP Service: -- Author Type: Nurse Practitioner    Filed: 1/20/2021  4:59 PM Encounter Date: 1/20/2021 Status: Signed    : Obdulia Briscoe NP (Nurse Practitioner)    Addended by: OBDULIA BRISCOE on: 1/20/2021 04:59 PM        Modules accepted: Orders

## 2021-07-03 NOTE — ADDENDUM NOTE
Addendum Note by Syl Gamino CMA at 2/13/2019  7:40 AM     Author: Syl Gamino CMA Service: -- Author Type: Certified Medical Assistant    Filed: 2/13/2019  9:09 AM Encounter Date: 2/13/2019 Status: Signed    : Syl Gamino CMA (Certified Medical Assistant)    Addended by: SYL GAMINO on: 2/13/2019 09:09 AM        Modules accepted: Orders

## 2021-07-03 NOTE — ADDENDUM NOTE
Addendum Note by Obdulia Briscoe, NP at 7/24/2018  1:39 PM     Author: Obdulia Briscoe NP Service: -- Author Type: Nurse Practitioner    Filed: 7/24/2018  1:39 PM Encounter Date: 7/24/2018 Status: Signed    : Obdulia Briscoe NP (Nurse Practitioner)    Addended by: OBDULIA BRISCOE on: 7/24/2018 01:39 PM        Modules accepted: Orders

## 2021-07-03 NOTE — ADDENDUM NOTE
Addendum Note by Halie Agee RN at 6/25/2019  7:40 AM     Author: Halie Agee RN Service: -- Author Type: Registered Nurse    Filed: 6/25/2019 10:26 AM Encounter Date: 6/25/2019 Status: Signed    : Halie Agee RN (Registered Nurse)    Addended by: HALIE AGEE on: 6/25/2019 10:26 AM        Modules accepted: Orders

## 2021-07-03 NOTE — ADDENDUM NOTE
Addendum Note by Geoff Crabtree MD at 10/8/2019 10:00 AM     Author: Geoff Crabtree MD Service: -- Author Type: Physician    Filed: 12/18/2019  2:48 PM Encounter Date: 10/8/2019 Status: Signed    : Geoff Crabtree MD (Physician)    Addended by: GEOFF CRABTREE on: 12/18/2019 02:48 PM        Modules accepted: Orders

## 2021-07-03 NOTE — ADDENDUM NOTE
Addendum Note by Ilene Ortiz CMA at 3/4/2020  9:00 AM     Author: Ilene Ortiz CMA Service: -- Author Type: Certified Medical Assistant    Filed: 3/4/2020  9:23 AM Encounter Date: 3/4/2020 Status: Signed    : Ilene Ortiz CMA (Certified Medical Assistant)    Addended by: ILENE ORTIZ on: 3/4/2020 09:23 AM        Modules accepted: Orders

## 2021-07-03 NOTE — ADDENDUM NOTE
Addendum Note by Raymond Zayas LPN at 3/29/2019  9:00 AM     Author: Raymond Zayas LPN Service: -- Author Type: Licensed Nurse    Filed: 3/29/2019 12:31 PM Encounter Date: 3/29/2019 Status: Signed    : Raymond Zayas LPN (Licensed Nurse)    Addended by: RAYMOND ZAYAS on: 3/29/2019 12:31 PM        Modules accepted: Orders

## 2021-07-03 NOTE — ADDENDUM NOTE
Addendum Note by Edward Centeno LPN at 9/10/2020  8:15 AM     Author: Edward Centeno LPN Service: -- Author Type: Licensed Nurse    Filed: 9/10/2020  8:37 AM Encounter Date: 9/10/2020 Status: Signed    : Edward Centeno LPN (Licensed Nurse)    Addended by: EDWARD BURK on: 9/10/2020 08:37 AM        Modules accepted: Orders

## 2021-07-03 NOTE — ADDENDUM NOTE
Addendum Note by Alisia Julian LPN at 6/8/2020 10:15 AM     Author: Alisia Julian LPN Service: -- Author Type: Licensed Nurse    Filed: 6/19/2020 10:21 AM Encounter Date: 6/8/2020 Status: Signed    : Alisia Julian LPN (Licensed Nurse)    Addended by: ALISIA JULIAN on: 6/19/2020 10:21 AM        Modules accepted: Orders

## 2021-07-03 NOTE — ADDENDUM NOTE
Addendum Note by Edward Centeno LPN at 5/11/2020  9:30 AM     Author: Edward Centeno LPN Service: -- Author Type: Licensed Nurse    Filed: 5/11/2020 11:04 AM Encounter Date: 5/11/2020 Status: Signed    : Edward Centeno LPN (Licensed Nurse)    Addended by: EDWARD BURK on: 5/11/2020 11:04 AM        Modules accepted: Orders

## 2021-07-03 NOTE — ADDENDUM NOTE
Addendum Note by Raymond Zayas LPN at 4/30/2019  8:40 AM     Author: Raymond Zayas LPN Service: -- Author Type: Licensed Nurse    Filed: 4/30/2019 10:30 AM Encounter Date: 4/30/2019 Status: Signed    : Raymond Zayas LPN (Licensed Nurse)    Addended by: RAYMOND ZAYAS on: 4/30/2019 10:30 AM        Modules accepted: Orders

## 2021-07-04 NOTE — PROGRESS NOTES
Progress Notes by Travis Wick DO at 6/30/2021 10:40 AM     Author: Travis Wick DO Service: -- Author Type: Physician    Filed: 6/30/2021 12:57 PM Date of Service: 6/30/2021 10:40 AM Status: Signed    : Travis Wick DO (Physician)         Assessment:     Diagnoses and all orders for this visit:    Primary osteoarthritis of left shoulder  -     OPS US Large Joint Injection Unilateral; Future; Expected date: 07/19/2021    Spinal stenosis of lumbar region with neurogenic claudication    Lumbar radiculitis    Lumbar disc herniation    Myofascial pain    ALS (amyotrophic lateral sclerosis) (H)       Jef Centeno Jr. is a 70 y.o. y.o. male with past medical history significant for postherpetic neuralgia, iron deficiency anemia, type 2 diabetes, neck pain, cervical stenosis of the spine with myelopathy, lumbar stenosis with neurogenic claudication, nicotine addiction, ALS, pressure injury of the back and buttock stage III, left lower extremity wound, and abnormal EMG, age-related cataract of both eyes, anemia, BPH, cognitive disorder, rectal dysfunction, hypertension, left foot drop, tib-fib fracture of the left lower extremity, abdominal pain, idiopathic sensorimotor axonal neuropathy, impaired cognition, impaired gait, impaired mobility and activities of daily living, malnutrition, neurogenic bowel, neuropathic pain syndrome, hyperlipidemia, persistent asthma, weakness, lower urinary tract infection who presents today for follow-up regarding low back pain and left shoulder pain:    -Patient continues to have left shoulder pain likely secondary to adhesive capsulitis.  He also has spasms pain throughout his body likely a component of his ALS.  He does also have severe spinal canal stenosis and also lumbar spine which could also be contributory to his pain.     Plan:     A shared decision making plan was used. The patient's values and choices were respected. Prior  medical records from our last visit on 2/19/2021 were reviewed today. The following represents what was discussed and decided upon by the provider and the patient.        -DIAGNOSTIC TESTS: Images were personally reviewed and interpreted.   --MRI of the lumbar spine dated 5/19/2021 is personally viewed images interpreted discussed with patient.  At L4-5 there is severe spinal canal stenosis with moderate severe right and severe left foraminal stenosis.  L3-4 there is moderate spinal canal stenosis with moderate to severe right and moderate left foraminal stenosis.  At L5-S1 there is moderate right and mild left lateral recess stenosis and severe bilateral foraminal stenosis.  At L2-3 there is moderate to severe spinal canal stenosis with moderate mild to moderate left foraminal stenosis.  There is facet arthropathy throughout the lumbar spine.    -INTERVENTIONS: No interventions at this time.    -MEDICATIONS: Like him to increase his baclofen to 10 mg 3 times daily and his gabapentin to 900 mg 3 times daily.  -  Discussed side effects of medications and proper use. Patient verbalized understanding.    -PHYSICAL THERAPY: He has had extensive physical therapy.  He notes now that physical therapy is very painful for him.    -PATIENT EDUCATION:  40 minutes of total visit time was spent with the patient either face-to-face or paperwork.    -FOLLOW UP: Patient will follow up in 4 weeks in person.  Advised to contact clinic if symptoms worsen or change.    Subjective:     Jef Centeno Jr. is a 70 y.o. male who presents today for follow-up regarding left shoulder pain and low back and lower extremity pain as well as pain throughout his body.  Patient reports that his pain is worse than when I saw him last.  He did receive about a week's worth of relief with the injection into his left shoulder.  His spasms have been worsening.  He is currently taking 5 mg of baclofen 3 times daily as well as gabapentin 600 mg 3 times  daily.  He is here with his son who is helpful in the planning process.  He does have a history of ALS and is currently seeing neurology through [facility] for this.  His pain is worse with almost every movement and improved with medicine.  Oxycodone seems to be helpful, however he is run out of it early.  His primary care provider has written a prescription that can be picked up on 7/2/2021.  He denies any bowel or bladder changes, fevers, chills, unintentional weight loss.    -Treatment to Date: MRI of the lumbar spine dated 2/10/2016.  X-ray of the left shoulder dated 2/10/2021.  Left shoulder injection.  MRI of the lumbar spine dated 5/19/2021.     Patient Active Problem List   Diagnosis   ? Postherpetic neuralgia   ? ASHD (arteriosclerotic heart disease)   ? Stenosis of cervical spine with myelopathy (H)   ? Lumbar stenosis with neurogenic claudication   ? Nicotine addiction   ? Iron deficiency anemia   ? ALS (amyotrophic lateral sclerosis) (H)   ? 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, stage 3 (H)   ? Abnormal EMG   ? Age-related nuclear cataract of both eyes   ? BPH (benign prostatic hyperplasia)   ? Dysarthria   ? Cognitive disorder   ? Erectile dysfunction due to arterial insufficiency   ? Essential hypertension   ? Fasciculations of muscle   ? Foot drop, left   ? Idiopathic sensorimotor axonal neuropathy   ? Impaired cognition   ? Impaired gait   ? Impaired mobility and activities of daily living   ? Malnutrition (H)   ? Neurogenic bowel   ? Neuropathic pain syndrome (non-herpetic)   ? Other hyperlipidemia   ? Weakness   ? Erectile dysfunction   ? Type 2 diabetes mellitus with diabetic neuropathy, without long-term current use of insulin (H)       Current Outpatient Medications on File Prior to Encounter   Medication Sig Dispense Refill   ? albuterol (PROAIR HFA;PROVENTIL HFA;VENTOLIN HFA) 90 mcg/actuation inhaler Inhale 2 puffs every 6 (six) hours as needed for wheezing. 1  each 2   ? aspirin 81 MG EC tablet Take 1 tablet (81 mg total) by mouth daily. 100 tablet 3   ? atorvastatin (LIPITOR) 10 MG tablet Take 1 tablet (10 mg total) by mouth daily. 90 tablet 3   ? b complex vitamins tablet Take 1 tablet by mouth daily. Per PCP patient does not need to take vitamin B complex anymore.     ? baclofen (LIORESAL) 10 MG tablet TAKE 1/2 TABLET(5 MG) BY MOUTH THREE TIMES DAILY (Patient taking differently: 10 mg 3 (three) times a day. ) 135 tablet 0   ? bisacodyL (DULCOLAX) 5 mg EC tablet      ? budesonide (PULMICORT) 1 mg/2 mL nebulizer solution Take 1 mg by nebulization 2 (two) times a day.     ? budesonide-formoteroL (SYMBICORT) 160-4.5 mcg/actuation inhaler INHALE 2 PUFFS BY MOUTH TWICE DAILY 2 Inhaler 6   ? calcium carbonate-vitamin D3 (CALTRATE 600 PLUS D3) 600 mg(1,500mg) -400 unit per tablet Take 1 tablet by mouth daily. 90 tablet 3   ? cetirizine (ZYRTEC) 10 MG tablet Take 1 tablet (10 mg total) by mouth daily. 90 tablet 3   ? cholecalciferol, vitamin D3, 1,000 unit (25 mcg) tablet Take 4 tablets (4,000 Units total) by mouth daily.  0   ? docusate sodium (COLACE) 100 MG capsule Take 100 mg by mouth as needed for constipation. Takes 1 cap in pm     ? DULoxetine (CYMBALTA) 20 MG capsule Take 3 capsules (60 mg total) by mouth daily. 270 capsule 3   ? ferrous sulfate 325 (65 FE) MG tablet Take 1 tablet (325 mg total) by mouth daily with breakfast. 90 tablet 3   ? fexofenadine (ALLEGRA) 180 MG tablet      ? folic acid (FOLVITE) 1 MG tablet Take 1 tablet (1,000 mcg total) by mouth daily. 90 tablet 3   ? food supplemt, lactose-reduced (ENSURE ACTIVE PROTEIN-MUSCLE) Liqd DRINK 1 BOTTLE TWICE DAILY 28763 mL 3   ? furosemide (LASIX) 20 MG tablet Take 2 tablets (40 mg total) by mouth 2 (two) times a day. 180 tablet 1   ? furosemide (LASIX) 40 MG tablet      ? gabapentin (NEURONTIN) 300 MG capsule TAKE 1 CAPSULE BY MOUTH two times a day 180 capsule 0   ? hydrOXYzine HCL (ATARAX) 25 MG tablet Take 1  tablet (25 mg total) by mouth every 8 (eight) hours as needed for itching. 100 tablet 1   ? ipratropium-albuterol (DUO-NEB) 0.5-2.5 mg/3 mL nebulizer Inhale 3 mL every 4 (four) hours as needed. 30 vial 3   ? levalbuterol (XOPENEX HFA) 45 mcg/actuation inhaler Inhale 2 puffs every 4 (four) hours as needed for wheezing. 5 Inhaler 3   ? LORazepam (ATIVAN) 1 MG tablet Take 1 tablet (1 mg total) by mouth once in imaging for anxiety. Fill at preferred pharmacy and bring to MRI appointment. Do not take prior to arriving. You will need a  to bring you home after your appointment. 1 tablet 0   ? magnesium citrate solution      ? meloxicam (MOBIC) 7.5 MG tablet 1 tab p.o. daily 30 tablet 0   ? metoprolol tartrate (LOPRESSOR) 25 MG tablet TAKE 1 TABLET(25 MG) BY MOUTH TWICE DAILY 180 tablet 3   ? multivitamin (ONE A DAY) per tablet TAKE 1 TABLET BY MOUTH DAILY 90 tablet 3   ? mupirocin (BACTROBAN) 2 % ointment Apply topically to wound every day 22 g 0   ? naloxone (NARCAN) 4 mg/actuation nasal spray 1 spray (4 mg dose) into one nostril for opioid reversal. Call 911. May repeat if no response in 3 minutes. 1 Box 0   ? nystatin (MYCOSTATIN) 100,000 unit/mL suspension Take 5 mL (500,000 Units total) by mouth 4 (four) times a day. Swish and spit. (Patient taking differently: Take 5 mL by mouth 4 (four) times a day. Swish and spit. Taking PRN  Indications: Candidiasis Prophylaxis) 200 mL 0   ? omeprazole (PRILOSEC) 20 MG capsule Take 1 capsule (20 mg total) by mouth daily before breakfast. 90 capsule 1   ? oxyCODONE (ROXICODONE) 30 MG immediate release tablet Take 1 tablet (30 mg total) by mouth 3 (three) times a day. 90 tablet 0   ? polyethylene glycol (GLYCOLAX) 17 gram/dose powder      ? potassium chloride (K-DUR,KLOR-CON) 20 MEQ tablet TAKE 1 TABLET(20 MEQ) BY MOUTH TWICE DAILY 180 tablet 1   ? predniSONE (DELTASONE) 5 MG tablet TAKE ONE-HALF TABLET  (2.5MG) BY MOUTH EVERY DAY. 15 tablet 0   ? riluzole (RILUTEK) 50 mg  tablet TAKE 1 TABLET BY MOUTH TWO TIMES A DAY BEFORE MEALS     ? senna-docusate (PERICOLACE) 8.6-50 mg tablet Take 2 tablets by mouth daily as needed for constipation. (Patient taking differently: Take 2 tablets by mouth daily as needed for constipation. pt isn't taking this med, only docusate  Indications: constipation) 30 tablet 3   ? sodium phosphates 133 mL (FOR FLEET) 19-7 gram/118 mL Enem rectal enema INSERT 1 ENEMA INTO THE RECTUM ONCE FOR 1 DOSE 133 mL 0   ? SPIRIVA WITH HANDIHALER 18 mcg inhalation capsule Place 1 capsule (2 puffs total) into inhaler and inhale daily. 90 capsule 3   ? tamsulosin (FLOMAX) 0.4 mg cap Take 1 capsule (0.4 mg total) by mouth Daily after breakfast. 90 capsule 3   ? triamcinolone (KENALOG) 0.1 % cream Apply thin layer two times a day to rash on neck and arm 30 g 0     Current Facility-Administered Medications on File Prior to Encounter   Medication Dose Route Frequency Provider Last Rate Last Admin   ? lidocaine 2 % jelly (XYLOCAINE)   Topical PRN Obdulia Prieto, NP   1 application at 08/09/19 1530       No Known Allergies    Past Medical History:   Diagnosis Date   ? ALS (amyotrophic lateral sclerosis) (H)    ? BPH (benign prostatic hyperplasia)    ? Closed fracture of tibia and fibula, left, initial encounter    ? COPD (chronic obstructive pulmonary disease) (H)    ? Decubitus ulcer, buttock    ? Erectile dysfunction associated with type 2 diabetes mellitus (H)    ? Fracture tibia/fibula, left, closed, initial encounter 6/6/2019   ? HTN (hypertension)    ? Obstructive sleep apnea    ? Postherpetic neuralgia    ? Type 2 diabetes mellitus with diabetic neuropathy (H)         Review of Systems  ROS:  Specifically negative for bowel/bladder dysfunction, balance changes, headache, dizziness, fevers, chills, appetite changes, nausea/vomiting, unexplained weight loss. Otherwise 13 systems reviewed are negative. Please see the patient's intake questionnaire from today for  details.    Reviewed Social, Family, Past Medical and Past Surgical history with patient, no significant changes noted since prior visit.     Objective:     BP 97/59 (Patient Site: Right Arm, Patient Position: Sitting)   Pulse 74   SpO2 97%     PHYSICAL EXAMINATION:    --CONSTITUTIONAL: Well developed, well nourished, healthy appearing individual.  --PSYCHIATRIC: Appropriate mood and affect. No difficulty interacting due to temper, social withdrawal, or memory issues.  --SKIN:   --RESPIRATORY: Normal rhythm and effort. No abnormal accessory muscle breathing patterns noted.   --MUSCULOSKELETAL: Increased anterior head carriage.  --GROSS MOTOR: Unable to stand.  --LUMBAR SPINE: Tenderness palpation along the low lumbar paraspinal muscles.  Straight leg raising in the seated position is negative to radicular pain. Sciatic notch non-tender.   --SACROILIAC JOINT:  One Finger point test negative.  --LOWER EXTREMITY MOTOR TESTING:  Plantar flexion left 5/5, right 4/5   Dorsiflexion left 5/5, right 4/5   Great toe MTP extension left 5/5, right 5/5  Knee flexion left 5/5, right 5/5  Knee extension left 5/5, right 5/5   Hip flexion left 5/5, right 5/5  Hip abduction left 5/5, right 5/5  Hip adduction left 5/5, right 5/5   --NEUROLOGIC:  Sensation to light touch is intact in the bilateral L4, L5, and S1 dermatomes.    RESULTS:   Imaging: Lumbar spine imaging was reviewed today. The images were shown to the patient and the findings were explained using a spine model.

## 2021-07-04 NOTE — TELEPHONE ENCOUNTER
Telephone Encounter by Robyn Marin RN at 7/1/2021  8:00 PM     Author: Robyn Marin RN Service: -- Author Type: Registered Nurse    Filed: 7/1/2021  8:02 PM Encounter Date: 7/1/2021 Status: Signed    : Robyn Marin RN (Registered Nurse)       RN cannot approve Refill Request    RN can NOT refill this medication Sig needed. Last office visit: 6/18/2021 Shen Avina MD Last Physical: Visit date not found Last MTM visit: Visit date not found Last visit same specialty: 2/10/2021 David Shafer MD.  Next visit within 3 mo: Visit date not found  Next physical within 3 mo: Visit date not found      Robyn aMrin, [facility] Triage/Med Refill 7/1/2021    Requested Prescriptions   Pending Prescriptions Disp Refills   ? potassium chloride (K-DUR,KLOR-CON) 20 MEQ tablet 180 tablet 1       Potassium Supplements Refill Protocol Passed - 7/1/2021  4:16 PM        Passed - PCP or prescribing provider visit in past 12 months       Last office visit with prescriber/PCP: 6/18/2021 Shen Avina MD OR same dept: 2/10/2021 David Shafer MD OR same specialty: 2/10/2021 David Shafer MD  Last physical: Visit date not found Last MTM visit: Visit date not found   Next visit within 3 mo: Visit date not found  Next physical within 3 mo: Visit date not found  Prescriber OR PCP: Shen Avina MD  Last diagnosis associated with med order: 1. Edema due to congestive heart failure (H)  - potassium chloride (K-DUR,KLOR-CON) 20 MEQ tablet  Dispense: 180 tablet; Refill: 1    If protocol passes may refill for 12 months if within 3 months of last provider visit (or a total of 15 months).             Passed - Potassium level in last 12 months     Lab Results   Component Value Date    Potassium 3.9 06/18/2021

## 2021-07-04 NOTE — PATIENT INSTRUCTIONS - HE
Patient Instructions by Travis Wick DO at 6/30/2021 10:40 AM     Author: Travis Wick DO Service: -- Author Type: Physician    Filed: 6/30/2021 12:24 PM Date of Service: 6/30/2021 10:40 AM Status: Signed    : Travis Wick DO (Physician)       Recommend that you increase your baclofen to 10 mg 3 times daily.    I also recommend that you increase your gabapentin to 900 mg 3 times a day this will be 3 tablets 3 times a day.  When you are getting close to running out of these medication please call our office so that we can refill both the baclofen and gabapentin.    I ordered an injection for your left shoulder.    ~Please call Nurse Navigation line [phone] with any questions or concerns about your treatment plan, if symptoms worsen and you would like to be seen urgently, or if you have problems controlling bladder and bowel function.

## 2021-07-04 NOTE — TELEPHONE ENCOUNTER
Telephone Encounter by Robyn Marin RN at 7/1/2021  7:29 PM     Author: Robyn Marin RN Service: -- Author Type: Registered Nurse    Filed: 7/1/2021  7:30 PM Encounter Date: 7/1/2021 Status: Signed    : Robyn Marin RN (Registered Nurse)       Refill Approved    Rx renewed per Medication Renewal Policy. Medication was last renewed on 11/17/20, last OV 6/18/21.    Robyn Marin, [facility] Triage/Med Refill 7/1/2021     Requested Prescriptions   Pending Prescriptions Disp Refills   ? tamsulosin (FLOMAX) 0.4 mg cap 90 capsule 3     Sig: Take 1 capsule (0.4 mg total) by mouth Daily after breakfast.       Alfuzosin/Tamsulosin/Silodosin Refill Protocol  Passed - 7/1/2021  5:04 PM        Passed - PCP or prescribing provider visit in past 12 months       Last office visit with prescriber/PCP: 6/18/2021 Shen Avina MD OR same dept: 2/10/2021 David Shafer MD OR same specialty: 2/10/2021 David Shafer MD  Last physical: Visit date not found Last MTM visit: Visit date not found   Next visit within 3 mo: Visit date not found  Next physical within 3 mo: Visit date not found  Prescriber OR PCP: Shen Avina MD  Last diagnosis associated with med order: 1. Benign prostatic hyperplasia with urinary frequency  - tamsulosin (FLOMAX) 0.4 mg cap; Take 1 capsule (0.4 mg total) by mouth Daily after breakfast.  Dispense: 90 capsule; Refill: 3    If protocol passes may refill for 12 months if within 3 months of last provider visit (or a total of 15 months).

## 2021-07-04 NOTE — LETTER
Letter by Shen Avina MD at      Author: Shen Avina MD Service: -- Author Type: --    Filed:  Encounter Date: 6/24/2021 Status: (Other)         Jef Centeno JrJennifer  [address] [place] [id number]             June 24, 2021         Dear Mr. Centeno,    Below are the results from your recent visit:    Resulted Orders   Comprehensive Metabolic Panel   Result Value Ref Range    Sodium 137 136 - 145 mmol/L    Potassium 3.9 3.5 - 5.0 mmol/L    Chloride 99 98 - 107 mmol/L    CO2 27 22 - 31 mmol/L    Anion Gap, Calculation 11 5 - 18 mmol/L    Glucose 124 70 - 125 mg/dL    BUN 9 8 - 28 mg/dL    Creatinine 0.71 0.70 - 1.30 mg/dL    GFR MDRD Af Amer >60 >60 mL/min/1.73m2    GFR MDRD Non Af Amer >60 >60 mL/min/1.73m2    Bilirubin, Total 0.4 0.0 - 1.0 mg/dL    Calcium 8.5 8.5 - 10.5 mg/dL    Protein, Total 6.4 6.0 - 8.0 g/dL    Albumin 3.7 3.5 - 5.0 g/dL    Alkaline Phosphatase 104 45 - 120 U/L    AST 23 0 - 40 U/L    ALT 12 0 - 45 U/L    Narrative    Fasting Glucose reference range is 70-99 mg/dL per  American Diabetes Association (ADA) guidelines.   Glycosylated Hemoglobin A1c   Result Value Ref Range    Hemoglobin A1c 5.5 <=5.6 %   Lipid Profile   Result Value Ref Range    Triglycerides 49 <=149 mg/dL    Cholesterol 108 <=199 mg/dL    LDL Calculated 61 <=129 mg/dL    HDL Cholesterol 37 (L) >=40 mg/dL    Patient Fasting > 8hrs? Unknown        Your labs are all normal expect for a low HDL.    Please call with questions or contact us using [employer].    Sincerely,        Electronically signed by Shen Avina MD

## 2021-07-04 NOTE — TELEPHONE ENCOUNTER
Telephone Encounter by Janie Sanchez CMA at 7/1/2021  5:03 PM     Author: Janie Sanchez CMA Service: -- Author Type: Certified Medical Assistant    Filed: 7/1/2021  5:04 PM Encounter Date: 7/1/2021 Status: Signed    : Janie Sanchez CMA (Certified Medical Assistant)       Medication: Flomax 0.4 mg   Last Date Filled: 11/17/2020  Last appointment addressing medication:   Last B/P:  BP Readings from Last 3 Encounters:   06/30/21 97/59   06/18/21 118/56   05/06/21 102/56     Last labs pertaining to refill:      Pend medication and associate diagnosis before routing to Provider for review.       If patient has not been seen in over 1 year, pend 30 day supply and notify patient they are due for an appointment before any further refills.

## 2021-07-04 NOTE — TELEPHONE ENCOUNTER
Telephone Encounter by Marilee Ford RN at 6/30/2021  3:51 PM     Author: Marilee Ford RN Service: -- Author Type: Registered Nurse    Filed: 6/30/2021  3:51 PM Encounter Date: 6/30/2021 Status: Signed    : Marilee Ford RN (Registered Nurse)       RN cannot approve Refill Request    RN can NOT refill this medication med is not covered by policy/route to provider. Last office visit: Visit date not found Last Physical: Visit date not found Last MTM visit: Visit date not found Last visit same specialty: Visit date not found.  Next visit within 3 mo: Visit date not found  Next physical within 3 mo: Visit date not found      [facility] Triage/Med Refill 6/30/2021    Requested Prescriptions   Pending Prescriptions Disp Refills   ? omeprazole (PRILOSEC) 20 MG capsule [Pharmacy Med Name: OMEPRAZOLE 20MG CPDR] 90 capsule 1     Sig: TAKE 1 CAPSULE (20 MG TOTAL) BY MOUTH DAILY BEFORE BREAKFAST.       There is no refill protocol information for this order

## 2021-07-04 NOTE — TELEPHONE ENCOUNTER
Telephone Encounter by Janie Sanchez CMA at 7/1/2021  4:15 PM     Author: Janie Sanchez CMA Service: -- Author Type: Certified Medical Assistant    Filed: 7/1/2021  4:16 PM Encounter Date: 7/1/2021 Status: Signed    : Janie Sanchez CMA (Certified Medical Assistant)       Medication: KLOR-CON M20 20 meq TBCR  Last Date Filled: 8/18/2020  Last appointment addressing medication:   Last B/P:  BP Readings from Last 3 Encounters:   06/30/21 97/59   06/18/21 118/56   05/06/21 102/56     Last labs pertaining to refill:      Pend medication and associate diagnosis before routing to Provider for review.       If patient has not been seen in over 1 year, pend 30 day supply and notify patient they are due for an appointment before any further refills.

## 2021-07-13 ENCOUNTER — PATIENT OUTREACH (OUTPATIENT)
Dept: GERIATRIC MEDICINE | Facility: CLINIC | Age: 71
End: 2021-07-13

## 2021-07-13 NOTE — PROGRESS NOTES
[facility] Care Coordination Contact    Phone call to member, per phone call last week from [employer] nurse reports that has been trying to get a hold of member and is not returning calls.  Currently has no pca caregiver.  Agency was trying to staff but has not been able to contact member.     Jef reports that plan was for son's finance to provide PCA services but didn't have good experience with scott and didn't want to continue having her has PCA.  He reports that son, Darnell is not involved any longer and don't need to contact him.  He reports that he no longer wants to work with [employer] as agency as he feels they never came through with another PCA and only had one PCA for all this time.  He understands the challenges of locating a caregiver during this time but chooses to not to continue with [employer].  He would like assistance to find another agency to work with.  Discussed lack of caregivers at this time and inquires if he would be open to moving into assisted living.  He declined, states just need minimal help and would like to remain in his own apartment.  He reports that has been managing tasks on his own.  He states that has taken baths on his own.  He reports that he does on his own as well with PCA here, only needs supervision to ensure that he doesn't fall.  I informed him that will let [employer] know that his no longer wants to continue services through agency.  Will try to find another provider.      Ruddy Alexander RN, N  [facility]  [phone]    Left voice message for RAYMOND Trujillo nurse to return call to discuss member's case.    Ruddy Alexander RN, N  [facility]  [phone]

## 2021-07-14 PROBLEM — J45.901 PERSISTENT ASTHMA WITH ACUTE EXACERBATION: Status: RESOLVED | Noted: 2017-12-20 | Resolved: 2021-05-06

## 2021-07-14 PROBLEM — I50.9 CHF (CONGESTIVE HEART FAILURE) (H): Status: RESOLVED | Noted: 2017-09-07 | Resolved: 2021-01-20

## 2021-07-14 NOTE — PROGRESS NOTES
[facility] Care Coordination Contact    7/13/21  Received voice mail message from RAYMOND Trujillo let member know that was a potential caregiver waiting for background clearance who could probably provide some hours.  She also informed him that in regards to errands, caregiver would not be able to drive as agency doesn't have liable motor vehicle policy for that.  He then states that would probably declined services at that point.      Ruddy Alexander RN, N  [facility]  [phone]    7/14/21  Phone call to RAYMOND Womack reports that has potential worker who could provide some hours. He seem opened but when informed him that worker couldn't take his cash card etc to make purchases he just didn't want to discuss further.  She tried to call him back to end on more positive note but he didn't answer.  She states he reports that no longer wants to work with previous PCA.  I informed her that will try other agencies for staffing possibly [employer] worker as can run errands and provide transportation for him.  Will update next week.      Left voice message for member to return call to discuss services.    Ruddy Alexander RN, N  [facility]  [phone]

## 2021-07-15 NOTE — PROGRESS NOTES
[facility] Care Coordination Contact    Phone call from member reports that has found provider that would like to work with.  He has given care coordinator contact to call.  He reports that is no longer interested in working with [employer] even if they find staffing.  He reports that he is managing cares well on his own.  His nephew was here to help him with some things today.  Inquired how often nursing visits are, he reports that nurse sets up his medications everywhere Wednesday. He reports that wound hasn't healed but isn't as large now.  He reports that he is able to take baths on his own.  When PCA is here, only supervises him.  Offered home health aide visits to supervise bathing until can find PCA.  He was agreeable to having home health aide.  Also discussed transportation issue and caregivers not able to use his bankcard for requested errands.  Explained to him that having [employer] worker may be able to help with those tasks.  He was open to plan.     Phone call to [facility], informed her that would like to have HHA visits to help with bathing as member currently doesn't have caregiver.  She reports that will forward email to  and nurses working with his case.    Phone call from [facility] reports that member had called him about needing care giver.  He reports that only contracts with [facility] for EW services, unable to provide PCA services.  He inquires if can roll PCA into ICLS services.  I informed him that he currently has high PCA hours and is not appropriate.  Discussed the possibility of homemaking and [employer] which he reports that does have some floating workers.  He reports that can integrate homemaking and ICLS but [employer]CL[employer] is daily authorization and needs to be adequate for worker to accept, he states most of clients he served has 5+ hours daily.  I informed him that will not rollover PCA hours into ICLS at this time.     Ruddy Alexander RN, PHN  [facility]  [phone]

## 2021-07-16 ENCOUNTER — VIRTUAL VISIT (OUTPATIENT)
Dept: FAMILY MEDICINE | Facility: CLINIC | Age: 71
End: 2021-07-16
Payer: COMMERCIAL

## 2021-07-16 ENCOUNTER — TELEPHONE (OUTPATIENT)
Dept: INTERNAL MEDICINE | Facility: CLINIC | Age: 71
End: 2021-07-16

## 2021-07-16 DIAGNOSIS — Z20.822 EXPOSURE TO COVID-19 VIRUS: Primary | ICD-10-CM

## 2021-07-16 DIAGNOSIS — Z79.52 LONG TERM SYSTEMIC STEROID USER: ICD-10-CM

## 2021-07-16 PROCEDURE — 99212 OFFICE O/P EST SF 10 MIN: CPT | Mod: 95 | Performed by: FAMILY MEDICINE

## 2021-07-16 RX ORDER — PREGABALIN 75 MG/1
CAPSULE ORAL
COMMUNITY
End: 2024-03-20

## 2021-07-16 RX ORDER — PREDNISONE 5 MG/1
TABLET ORAL
Qty: 15 TABLET | Refills: 0 | Status: SHIPPED | OUTPATIENT
Start: 2021-07-16 | End: 2022-05-23

## 2021-07-16 RX ORDER — OSELTAMIVIR PHOSPHATE 75 MG/1
CAPSULE ORAL
COMMUNITY
End: 2021-09-28

## 2021-07-16 RX ORDER — LIDOCAINE 50 MG/G
PATCH TOPICAL
COMMUNITY
End: 2021-08-13

## 2021-07-16 NOTE — PATIENT INSTRUCTIONS
Given that you have not noticed any symptoms that would suggest COVID-19 infection currently, I would not recommend COVID-19 testing.     Please let me know if you do develop any symptoms over the next 5 days, as it may be worth testing in that case.

## 2021-07-16 NOTE — PROGRESS NOTES
"Jef is a 71 year old who is being evaluated via a billable telephone visit.      What phone number would you like to be contacted at? [phone]  How would you like to obtain your AVS? Mail a copy    Assessment & Plan       ICD-10-CM    1. Exposure to COVID-19 virus  Z20.822    2. Long term systemic steroid user  Z79.52 predniSONE (DELTASONE) 5 MG tablet     Patient's home care nurse and patient requests refill for oral prednisone, which patient is reported to have been taking in the long-term for diagnosis of \"sweets syndrome.\"  It looks like this was last filled by Dr. Lo approximately 1 month ago.  Refill sent to pharmacy as requested.    We reviewed the current recommendations for COVID-19 vaccinated persons who have been exposed to persons diagnosed with active COVID-19 infections.  Patient has been fully vaccinated with J&J vaccine in March of this year.    6}  Tobacco Cessation:   reports that he has been smoking cigarettes. He has a 460.00 pack-year smoking history. He has never used smokeless tobacco.  Tobacco Cessation Action Plan: Information offered: Patient not interested at this time    Return in about 4 weeks (around 8/13/2021) for routine fu visit with Dr Avina.    Subjective   Jef is a 71 year old with history of ALS, lumbar stenosis with neurogenic claudication, impaired gait, wheelchair dependent and chronic pain  who presents for telephone visit.  He notes that he was recently notified that one of his home care nurses, with whom he had last worked on Wednesday July 7, 2021, had subsequently tested positive for COVID-19.  The home care company had advised him to schedule an appointment to review the potential exposure as well as provide recommendations for COVID-19 screening.  He denies any concerning symptoms for COVID-19 infection, including cough, body aches, sore throat, fevers/chills, diarrhea, fatigue, or changes in sense of taste or smell.  He was vaccinated against " "COVID-19 with the Democravise preparation on 3/12/2021.     Review of Systems   Negative except as noted above in HPI.      Objective    Vitals - Patient Reported  Systolic (Patient Reported): 122  Diastolic (Patient Reported): 82  Weight (Patient Reported): 145 lb (65.8 kg)  Height (Patient Reported): 5' 10\" (177.8 cm)  BMI (Based on Pt Reported Ht/Wt): 20.81  Temperature (Patient Reported): 99.3  F (37.4  C)  Pulse (Patient Reported): 83    Physical Exam   Objective    General: alert/oriented to person/place. Answers questions appropriately.   Affect: pleasant, cooperative.   Respiratory: no labored breathing noted.  No coughing during visit.  Remainder of exam unable to be completed due to telephone visits    Phone call duration: 13  Minutes    Prem Rush MD   Family medicine physician  [facility]   "

## 2021-07-16 NOTE — TELEPHONE ENCOUNTER
Reason for Call:  Other Pt has been exposed to Covid    Detailed comments: [facility] is calling.  Pt has been exposed to Covid 19 from a staff member.  The exact date could not be provided the date range is 7/6 - 7/9/21 for the exposure.     Pt has no symptoms    Phone Number to Contact caller -  can be reached at: [phone]    Best Time: Any    Can we leave a detailed message on this number? YES    Call taken on 7/16/2021 at 9:31 AM by Keyanna Velazquez

## 2021-07-19 ENCOUNTER — PATIENT OUTREACH (OUTPATIENT)
Dept: GERIATRIC MEDICINE | Facility: CLINIC | Age: 71
End: 2021-07-19

## 2021-07-19 NOTE — PROGRESS NOTES
[facility] Care Coordination Contact    Phone call from Carlyn Morales, [facility] inquiring about request for HHA.  Informed her that currently has no PCA caregiver and would like for HHA to assist with bathing once/wk.  She reports that will contact member and is agreeable then will coordinate staffing.    Ruddy Alexander RN, N  [facility]  [phone]    Phone call to member to informed that [facility] will contact him regarding HHA to assist with bathing.  Also discussed agency that member had requested to contact me regarding PCA services.  Informed him that agency is not contracted with [facility] to provide PCA services just EW services (Homemaking, ICLS).  Staff requesting to roll PCA hours over to [employer]CL[employer] which is not appropriate, will have CMS look for another agency that contracts with [facility] for PCA services.    Member reports that he is doing well and managing cares.  Had recently stopped Meals on wheels and inquire with no caregiver if he is interested in restarting meals.  He declined states that he is able to manage meal prep.  He denies having other concerns.      Ruddy Alexander RN, N  [facility]  [phone]

## 2021-07-20 ENCOUNTER — ANCILLARY PROCEDURE (OUTPATIENT)
Dept: PHYSICAL MEDICINE AND REHAB | Facility: CLINIC | Age: 71
End: 2021-07-20
Payer: COMMERCIAL

## 2021-07-20 VITALS
RESPIRATION RATE: 16 BRPM | OXYGEN SATURATION: 94 % | HEART RATE: 78 BPM | TEMPERATURE: 99.4 F | DIASTOLIC BLOOD PRESSURE: 50 MMHG | SYSTOLIC BLOOD PRESSURE: 106 MMHG

## 2021-07-20 DIAGNOSIS — M25.512 CHRONIC PAIN IN LEFT SHOULDER: ICD-10-CM

## 2021-07-20 DIAGNOSIS — G89.29 CHRONIC PAIN IN LEFT SHOULDER: ICD-10-CM

## 2021-07-20 PROCEDURE — 20611 DRAIN/INJ JOINT/BURSA W/US: CPT | Mod: LT | Performed by: PAIN MEDICINE

## 2021-07-20 RX ORDER — ROPIVACAINE HYDROCHLORIDE 5 MG/ML
INJECTION, SOLUTION EPIDURAL; INFILTRATION; PERINEURAL
Status: COMPLETED | OUTPATIENT
Start: 2021-07-20 | End: 2021-07-20

## 2021-07-20 RX ORDER — LIDOCAINE HYDROCHLORIDE 10 MG/ML
INJECTION, SOLUTION EPIDURAL; INFILTRATION; INTRACAUDAL; PERINEURAL
Status: COMPLETED | OUTPATIENT
Start: 2021-07-20 | End: 2021-07-20

## 2021-07-20 RX ORDER — METHYLPREDNISOLONE ACETATE 40 MG/ML
INJECTION, SUSPENSION INTRA-ARTICULAR; INTRALESIONAL; INTRAMUSCULAR; SOFT TISSUE
Status: COMPLETED | OUTPATIENT
Start: 2021-07-20 | End: 2021-07-20

## 2021-07-20 RX ADMIN — LIDOCAINE HYDROCHLORIDE 2 ML: 10 INJECTION, SOLUTION EPIDURAL; INFILTRATION; INTRACAUDAL; PERINEURAL at 11:26

## 2021-07-20 RX ADMIN — METHYLPREDNISOLONE ACETATE 40 MG: 40 INJECTION, SUSPENSION INTRA-ARTICULAR; INTRALESIONAL; INTRAMUSCULAR; SOFT TISSUE at 11:26

## 2021-07-20 RX ADMIN — ROPIVACAINE HYDROCHLORIDE 3 ML: 5 INJECTION, SOLUTION EPIDURAL; INFILTRATION; PERINEURAL at 11:26

## 2021-07-20 ASSESSMENT — PAIN SCALES - GENERAL
PAINLEVEL: WORST PAIN (10)
PAINLEVEL: MODERATE PAIN (5)

## 2021-07-22 ENCOUNTER — PATIENT OUTREACH (OUTPATIENT)
Dept: GERIATRIC MEDICINE | Facility: CLINIC | Age: 71
End: 2021-07-22

## 2021-07-22 NOTE — PROGRESS NOTES
[facility] Care Coordination Contact    Received a request to submit a DTR for the terminated of Meals. Documentation completed and faxed to the health plan. Care Coordinator aware.    Janee Duggan RN  Utilization   [facility]  [phone]

## 2021-07-26 ENCOUNTER — PATIENT OUTREACH (OUTPATIENT)
Dept: GERIATRIC MEDICINE | Facility: CLINIC | Age: 71
End: 2021-07-26

## 2021-07-26 NOTE — PROGRESS NOTES
[facility] Care Coordination Contact     CHWleandro to remind mbr to schedule diabetic eye exam and annual wellness exams. CHW has left previous message for mbr on these appts.   Left contact info to call if questions.

## 2021-07-27 ENCOUNTER — TELEPHONE (OUTPATIENT)
Dept: FAMILY MEDICINE | Facility: CLINIC | Age: 71
End: 2021-07-27

## 2021-07-27 NOTE — TELEPHONE ENCOUNTER
Dr. MAX    Patient said that he has a colonoscopy scheduled for 8/2/21.  He said that you were going to send a rx for gatorade and Miralax to his pharmacy.  He uses [facility] pharmacy in his chart.  He would like a call back at

## 2021-07-28 ENCOUNTER — PATIENT OUTREACH (OUTPATIENT)
Dept: GERIATRIC MEDICINE | Facility: CLINIC | Age: 71
End: 2021-07-28

## 2021-07-28 ENCOUNTER — TELEPHONE (OUTPATIENT)
Dept: FAMILY MEDICINE | Facility: CLINIC | Age: 71
End: 2021-07-28

## 2021-07-28 DIAGNOSIS — M54.2 CHRONIC NECK PAIN: ICD-10-CM

## 2021-07-28 DIAGNOSIS — D50.9 IRON DEFICIENCY ANEMIA: ICD-10-CM

## 2021-07-28 DIAGNOSIS — M48.062 LUMBAR STENOSIS WITH NEUROGENIC CLAUDICATION: Primary | ICD-10-CM

## 2021-07-28 DIAGNOSIS — G89.29 CHRONIC NECK PAIN: ICD-10-CM

## 2021-07-28 NOTE — TELEPHONE ENCOUNTER
Reason for Call:  Medication or medication refill:    Do you use a [facility]?  Name of the pharmacy and phone number for the current request:      [place] [facility]    Name of the medication requested:     Oxycodone 30 mg    Other request: Patient reports he has about a week of meds left at this time.    Can we leave a detailed message on this number? YES    Phone number patient can be reached at: Home number on file [phone] (home)    Best Time: Any time    Call taken on 7/28/2021 at 11:50 AM by Houston Munoz

## 2021-07-28 NOTE — PROGRESS NOTES
[facility] Care Coordination Contact    Left voice mail message for member to return call to check in with member.    Ruddy Alexander RN, PHN  [facility]  [phone]

## 2021-07-28 NOTE — TELEPHONE ENCOUNTER
Controlled Substance Refill Request for Oxycodone 30 mg    Last refill:   oxyCODONE (ROXICODONE) 30 MG immediate release tablet 90 tablet 0 6/28/2021  No   Sig - Route: [OXYCODONE (ROXICODONE) 30 MG IMMEDIATE RELEASE TABLET] Take 1 tablet (30 mg total) by mouth 3 (three) times a day. - Oral   Class: Normal   Order: [id number]         Last clinic visit: 6/18/2021     Clinic visit frequency required: Q 3 months    Next appt: Nothing scheduled at this time     Controlled substance agreement on file: No.    Documentation in problem list reviewed:  Yes    Processing:  Rx to be electronically transmitted to pharmacy by provider

## 2021-07-28 NOTE — TELEPHONE ENCOUNTER
.Reason for Call:  Home Health Care    Carlyn with [facility] called regarding (reason for call): Verbal     Orders are needed for this patient.     PT: NONE     OT: NONE     Skilled Nursing:   Home health aid to help with bathing   2x a week 4 weeks     Pt Provider: Marco A Avina MD    Phone Number Homecare Nurse can be reached at: [phone]    Can we leave a detailed message on this number? YES      Call taken on 7/28/2021 at 11:41 AM by GLADIS Campos

## 2021-07-29 ENCOUNTER — LAB (OUTPATIENT)
Dept: LAB | Facility: CLINIC | Age: 71
End: 2021-07-29
Attending: INTERNAL MEDICINE
Payer: COMMERCIAL

## 2021-07-29 DIAGNOSIS — J44.1 CHRONIC OBSTRUCTIVE PULMONARY DISEASE WITH ACUTE EXACERBATION (H): Primary | ICD-10-CM

## 2021-07-29 DIAGNOSIS — Z11.59 ENCOUNTER FOR SCREENING FOR OTHER VIRAL DISEASES: ICD-10-CM

## 2021-07-29 DIAGNOSIS — Z91.09 ENVIRONMENTAL ALLERGIES: Primary | ICD-10-CM

## 2021-07-29 DIAGNOSIS — E11.40 TYPE 2 DIABETES MELLITUS WITH DIABETIC NEUROPATHY, WITHOUT LONG-TERM CURRENT USE OF INSULIN (H): ICD-10-CM

## 2021-07-29 DIAGNOSIS — E11.9 DIABETES MELLITUS, TYPE 2 (H): ICD-10-CM

## 2021-07-29 DIAGNOSIS — J43.1 PANLOBULAR EMPHYSEMA (H): ICD-10-CM

## 2021-07-29 DIAGNOSIS — J44.9 COPD (CHRONIC OBSTRUCTIVE PULMONARY DISEASE) (H): ICD-10-CM

## 2021-07-29 LAB — SARS-COV-2 RNA RESP QL NAA+PROBE: NEGATIVE

## 2021-07-29 PROCEDURE — U0005 INFEC AGEN DETEC AMPLI PROBE: HCPCS

## 2021-07-29 PROCEDURE — U0003 INFECTIOUS AGENT DETECTION BY NUCLEIC ACID (DNA OR RNA); SEVERE ACUTE RESPIRATORY SYNDROME CORONAVIRUS 2 (SARS-COV-2) (CORONAVIRUS DISEASE [COVID-19]), AMPLIFIED PROBE TECHNIQUE, MAKING USE OF HIGH THROUGHPUT TECHNOLOGIES AS DESCRIBED BY CMS-2020-01-R: HCPCS

## 2021-07-29 RX ORDER — OXYCODONE HYDROCHLORIDE 30 MG/1
30 TABLET ORAL 3 TIMES DAILY
Qty: 90 TABLET | Refills: 0 | Status: SHIPPED | OUTPATIENT
Start: 2021-07-29 | End: 2021-09-01

## 2021-07-29 NOTE — TELEPHONE ENCOUNTER
Pending Prescriptions:                       Disp   Refills    cetirizine (ZYRTEC) 10 MG tablet          90 tab*3            Sig: Take 1 tablet (10 mg) by mouth daily    aspirin (ASA) 81 MG EC tablet             100 ta*3            Sig: Take 1 tablet (81 mg) by mouth daily      Last filled  5/22/2021

## 2021-08-02 ENCOUNTER — ANESTHESIA EVENT (OUTPATIENT)
Dept: SURGERY | Facility: CLINIC | Age: 71
End: 2021-08-02

## 2021-08-02 ENCOUNTER — PATIENT OUTREACH (OUTPATIENT)
Dept: GERIATRIC MEDICINE | Facility: CLINIC | Age: 71
End: 2021-08-02

## 2021-08-02 ENCOUNTER — ANESTHESIA (OUTPATIENT)
Dept: SURGERY | Facility: CLINIC | Age: 71
End: 2021-08-02

## 2021-08-02 PROCEDURE — G0179 MD RECERTIFICATION HHA PT: HCPCS | Performed by: FAMILY MEDICINE

## 2021-08-02 RX ORDER — SODIUM CHLORIDE, SODIUM LACTATE, POTASSIUM CHLORIDE, CALCIUM CHLORIDE 600; 310; 30; 20 MG/100ML; MG/100ML; MG/100ML; MG/100ML
INJECTION, SOLUTION INTRAVENOUS CONTINUOUS
Status: CANCELLED | OUTPATIENT
Start: 2021-08-02

## 2021-08-02 RX ORDER — LIDOCAINE 40 MG/G
CREAM TOPICAL
Status: CANCELLED | OUTPATIENT
Start: 2021-08-02

## 2021-08-02 ASSESSMENT — COPD QUESTIONNAIRES: COPD: 1

## 2021-08-02 NOTE — PROGRESS NOTES
[facility] Care Coordination Contact    CMS updated care coordinator  that has not been able to find home care provider that able to staff PCA/homemaking/ICLS services at this time due to staff shortage.  Will continue to make calls in the next couple of days.      Left voice mail message for member to call care coordinator for home care services staffing update.    Left voice mail message for sonDarnell as not been able to contact member and hasn't return calls.    Ruddy Alexander RN, PHN  [facility]  [phone]

## 2021-08-02 NOTE — TELEPHONE ENCOUNTER
"Routing refill request to provider for review/approval because:  Labs not current:  CBC  Age warning    Last Written Prescription Date:  4/30/21  Last Fill Quantity: 30,  # refills: 0   Last office visit provider:  6/18/21     Requested Prescriptions   Pending Prescriptions Disp Refills     meloxicam (MOBIC) 7.5 MG tablet [Pharmacy Med Name: MELOXICAM 7.5MG TABS] 30 tablet 0     Sig: TAKE ONE TABLET BY MOUTH ONCE DAILY       NSAID Medications Failed - 7/28/2021 11:40 AM        Failed - Patient is age 6-64 years        Failed - Normal CBC on file in past 12 months     Recent Labs   Lab Test 02/25/20  1314   WBC 4.5   RBC 4.71   HGB 10.8*   HCT 35.6*                    Passed - Blood pressure under 140/90 in past 12 months     BP Readings from Last 3 Encounters:   07/20/21 106/50   06/18/21 118/56   05/06/21 102/56                 Passed - Normal ALT on file in past 12 months     Recent Labs   Lab Test 06/18/21  1131   ALT 12             Passed - Normal AST on file in past 12 months     Recent Labs   Lab Test 06/18/21  1131   AST 23             Passed - Recent (12 mo) or future (30 days) visit within the authorizing provider's specialty     Patient has had an office visit with the authorizing provider or a provider within the authorizing providers department within the previous 12 mos or has a future within next 30 days. See \"Patient Info\" tab in inbasket, or \"Choose Columns\" in Meds & Orders section of the refill encounter.              Passed - Medication is active on med list        Passed - Normal serum creatinine on file in past 12 months     Recent Labs   Lab Test 06/18/21  1131   CR 0.71       Ok to refill medication if creatinine is low               Ar Cope RN 08/02/21 7:24 AM  "

## 2021-08-02 NOTE — ANESTHESIA PREPROCEDURE EVALUATION
Anesthesia Pre-Procedure Evaluation    Patient: Jef Centeno Jr.   MRN: [id number] : 1950        Preoperative Diagnosis: Personal history of colonic polyps [Z86.010]   Procedure : Procedure(s):  COLONOSCOPY     Past Medical History:   Diagnosis Date     ALS (amyotrophic lateral sclerosis) (H)      BPH (benign prostatic hyperplasia)      Closed fracture of tibia and fibula, left, initial encounter      COPD (chronic obstructive pulmonary disease) (H)      Decubitus ulcer, buttock      Erectile dysfunction associated with type 2 diabetes mellitus (H)      Fracture tibia/fibula, left, closed, initial encounter 2019     HTN (hypertension)      Obstructive sleep apnea      Postherpetic neuralgia      Type 2 diabetes mellitus with diabetic neuropathy (H)       Past Surgical History:   Procedure Laterality Date     CHOLECYSTECTOMY       LAPAROSCOPIC GASTROTOMY W/ REPAIR OF ULCER       POSTERIOR FUSION CERVICAL SPINE      posterior fusion C2-C4 with laminnectomy; excision cervical lipoma      SIGMOIDOSCOPY FLEXIBLE  2008      No Known Allergies   Social History     Tobacco Use     Smoking status: Current Every Day Smoker     Packs/day: 10.00     Years: 46.00     Pack years: 460.00     Types: Cigarettes     Smokeless tobacco: Never Used   Substance Use Topics     Alcohol use: No     Comment: Alcoholic Drinks/day: Quit drinking in . Prior to that he drank excessively.      Wt Readings from Last 1 Encounters:   21 60.8 kg (134 lb)        Anesthesia Evaluation   Pt has had prior anesthetic.         ROS/MED HX  ENT/Pulmonary:     (+) sleep apnea, COPD,     Neurologic:     (+) peripheral neuropathy, Spinal cord injury,     Cardiovascular:     (+) hypertension--CAD ---    METS/Exercise Tolerance:     Hematologic:       Musculoskeletal:       GI/Hepatic:       Renal/Genitourinary:       Endo:     (+) type I DM,     Psychiatric/Substance Use:       Infectious Disease:       Malignancy:       Other:                OUTSIDE LABS:  CBC:   Lab Results   Component Value Date    WBC 4.5 02/25/2020    WBC 5.0 12/24/2019    HGB 10.8 (L) 02/25/2020    HGB 8.7 (L) 12/24/2019    HCT 35.6 (L) 02/25/2020    HCT 30.4 (L) 12/24/2019     02/25/2020     12/24/2019     BMP:   Lab Results   Component Value Date     06/18/2021     02/25/2020    POTASSIUM 3.9 06/18/2021    POTASSIUM 3.7 02/25/2020    CHLORIDE 99 06/18/2021    CHLORIDE 108 (H) 02/25/2020    CO2 27 06/18/2021    CO2 28 02/25/2020    BUN 9 06/18/2021    BUN 9 02/25/2020    CR 0.71 06/18/2021    CR 0.64 (L) 02/25/2020     06/18/2021    GLC 88 02/25/2020     COAGS:   Lab Results   Component Value Date    PTT 34 02/07/2018    INR 1.18 (H) 02/07/2018     POC: No results found for: BGM, HCG, HCGS  HEPATIC:   Lab Results   Component Value Date    ALBUMIN 3.7 06/18/2021    PROTTOTAL 6.4 06/18/2021    ALT 12 06/18/2021    AST 23 06/18/2021    ALKPHOS 104 06/18/2021    BILITOTAL 0.4 06/18/2021     OTHER:   Lab Results   Component Value Date    LACT 1.0 02/07/2018    A1C 5.5 06/18/2021    CT 8.5 06/18/2021    MAG 1.7 (L) 02/25/2020    LIPASE 22 02/07/2018    TSH 0.67 06/14/2019    CRP 0.6 05/08/2019       Anesthesia Plan    ASA Status:  4      Anesthesia Type: MAC.      Maintenance: TIVA.        Consents    Anesthesia Plan(s) and associated risks, benefits, and realistic alternatives discussed. Questions answered and patient/representative(s) expressed understanding.     - Discussed with:  Patient      - Extended Intubation/Ventilatory Support Discussed: No.      - Patient is DNR/DNI Status: No    Use of blood products discussed: No .     Postoperative Care       PONV prophylaxis: Ondansetron (or other 5HT-3), Dexamethasone or Solumedrol     Comments:                Vineet Lee MD

## 2021-08-02 NOTE — PROGRESS NOTES
[facility] Care Coordination Contact    Phone call from Darnell Centeno, Emergency contact/son reports that spoke with member yesterday and is doing well.  Discuss potential of not being able to find PCA staff for member as staff has called multiple agencies with no response. Informed him that may not have many options and recommend Assisted living if not able to find agency to be able to work with him soon and want family to be aware of potential risks of injuries without caregiver.  Darnell states that he understands and he tried to be PCA but didn't work out since member didn't want him involved. Inquire if has other family members who can be PCA may be another option. Darnell reports that has a friend that may be able to be PCA.  He will contact her and also discuss with member for agreement.  He will have friend call care coordinator.  He will let member know care coordinator has been trying to reach him as sometimes he doesn't  his phone.    Ruddy Alexander RN, PHN  [facility]  [phone]

## 2021-08-03 NOTE — PROGRESS NOTES
[facility] Care Coordination Contact    Phone call from member he reports that he is doing well.  He is able to cook his meals and has help from his nephew and girlfriend to meet current needs.  He reports that goes to stay with girlfriend sometimes and doesn't take his phone so didn't return my calls.  I informed him that is quite challenging to find a caregiver and case management staff will continue to make calls.  Discuss risks of him living on his own without caregiver and strongly recommend moving into Assisted living for more support.  He declined states that he is managing well on his own, was even with caregiver.  He states that has girlfriend and nephew involved so he is not concerned. He states that son had discussed having his friend provide Personal care services but he is not interested in having her as caregiver.  He reports that Home health aide did come by and assist with bathing today.  He understands risk of injuries living on his own without caregiver and feels even without caregiver will continue to live in apartment.  Informed him that will update him as Case Management Specialist continues to contact other agencies.      Ruddy Alexander RN, PHN  [facility]  [phone]

## 2021-08-03 NOTE — TELEPHONE ENCOUNTER
"Routing refill request to provider for review/approval because:  Drug not on the G refill protocol     Last Written Prescription Date:  11/2/20  Last Fill Quantity: 90 (Zyrtec) 100 (ASA),  # refills: 3  Last office visit provider:  6/18/21    Requested Prescriptions   Pending Prescriptions Disp Refills     cetirizine (ZYRTEC) 10 MG tablet 90 tablet 3     Sig: Take 1 tablet (10 mg) by mouth daily       Antihistamines Protocol Failed - 7/29/2021  3:08 PM        Failed - Patient is 3-64 years of age     Apply weight-based dosing for peds patients age 3 - 12 years of age.    Forward request to provider for patients under the age of 3 or over the age of 64.          Passed - Recent (12 mo) or future (30 days) visit within the authorizing provider's specialty     Patient has had an office visit with the authorizing provider or a provider within the authorizing providers department within the previous 12 mos or has a future within next 30 days. See \"Patient Info\" tab in inbasket, or \"Choose Columns\" in Meds & Orders section of the refill encounter.              Passed - Medication is active on med list           aspirin (ASA) 81 MG EC tablet 100 tablet 3     Sig: Take 1 tablet (81 mg) by mouth daily       Analgesics (Non-Narcotic Tylenol and ASA Only) Passed - 7/29/2021  3:08 PM        Passed - Recent (12 mo) or future (30 days) visit within the authorizing provider's specialty     Patient has had an office visit with the authorizing provider or a provider within the authorizing providers department within the previous 12 mos or has a future within next 30 days. See \"Patient Info\" tab in inbasket, or \"Choose Columns\" in Meds & Orders section of the refill encounter.              Passed - Patient is age 20 years or older     If ASA is flagged for ages under 20 years old. Forward to provider for confirmation Ryes Syndrome is not a concern.              Passed - Medication is active on med list             Marilee montenegro, " RN 08/02/21 10:26 PM

## 2021-08-04 ENCOUNTER — MEDICAL CORRESPONDENCE (OUTPATIENT)
Dept: HEALTH INFORMATION MANAGEMENT | Facility: CLINIC | Age: 71
End: 2021-08-04

## 2021-08-04 DIAGNOSIS — Z53.9 DIAGNOSIS NOT YET DEFINED: Primary | ICD-10-CM

## 2021-08-04 RX ORDER — ALBUTEROL SULFATE 90 UG/1
1-2 AEROSOL, METERED RESPIRATORY (INHALATION) EVERY 6 HOURS PRN
Qty: 18 G | Refills: 3 | Status: SHIPPED | OUTPATIENT
Start: 2021-08-04 | End: 2021-09-03

## 2021-08-04 RX ORDER — CETIRIZINE HYDROCHLORIDE 10 MG/1
10 TABLET ORAL DAILY
Qty: 90 TABLET | Refills: 3 | Status: SHIPPED | OUTPATIENT
Start: 2021-08-04

## 2021-08-06 RX ORDER — MELOXICAM 7.5 MG/1
7.5 TABLET ORAL DAILY PRN
Qty: 30 TABLET | Refills: 6 | Status: SHIPPED | OUTPATIENT
Start: 2021-08-06 | End: 2021-09-03

## 2021-08-06 NOTE — PATIENT INSTRUCTIONS - HE
Patient Instructions by Geoff Crabtree MD at 12/24/2019  9:00 AM     Author: Geoff Crabtree MD Service: -- Author Type: Physician    Filed: 12/24/2019  9:32 AM Encounter Date: 12/24/2019 Status: Signed    : Geoff Crabtree MD (Physician)         Patient Education     Your Health Risk Assessment indicates you feel you are not in good physical health.    A healthy lifestyle helps keep the body fit and the mind alert. It helps protect you from disease, helps you fight disease, and helps prevent chronic disease (disease that doesn't go away) from getting worse. This is important as you get older and begin to notice twinges in muscles and joints and a decline in the strength and stamina you once took for granted. A healthy lifestyle includes good healthcare, good nutrition, weight control, recreation, and regular exercise. Avoid harmful substances and do what you can to keep safe. Another part of a healthy lifestyle is stay mentally active and socially involved.    Good healthcare     Have a wellness visit every year.     If you have new symptoms, let us know right away. Don't wait until the next checkup.     Take medicines exactly as prescribed and keep your medicines in a safe place. Tell us if your medicine causes problems.   Healthy diet and weight control     Eat 3 or 4 small, nutritious, low-fat, high-fiber meals a day. Include a variety of fruits, vegetables, and whole-grain foods.     Make sure you get enough calcium in your diet. Calcium, vitamin D, and exercise help prevent osteoporosis (bone thinning).     If you live alone, try eating with others when you can. That way you get a good meal and have company while you eat it.     Try to keep a healthy weight. If you eat more calories than your body uses for energy, it will be stored as fat and you will gain weight.     Recreation   Recreation is not limited to sports and team events. It includes any activity that provides relaxation, interest,  enjoyment, and exercise. Recreation provides an outlet for physical, mental, and social energy. It can give a sense of worth and achievement. It can help you stay healthy.       Patient Education   Instrumental Activities of Daily Living  Your Health Risk Assessment indicates you have difficulties with instrumental activities of daily living which include laundry, housekeeping, banking, shopping, using the telephone, food preparation, transportation, or taking your own medications. Please make a follow up appointment for us to address this issue in more detail.    [place] has resources available on the following website: [email or web address].org/caregivers.[email or web address]     Also, here is a local agency that provides help with meals and other assistance:   [facility] Line: [phone]     Patient Education   Your Health Risk Assessment indicates you feel you are not in good emotional health.    Recreation   Recreation is not limited to sports and team events. It includes any activity that provides relaxation, interest, enjoyment, and exercise. Recreation provides an outlet for physical, mental, and social energy. It can give a sense of worth and achievement. It can help you stay healthy.    Mental Exercise and Social Involvement  Mental and emotional health is as important as physical health. Keep in touch with friends and family. Stay as active as possible. Continue to learn and challenge yourself.   Things you can do to stay mentally active are:    Learn something new, like a foreign language or musical instrument.     Play SCRABBLE or do crossword puzzles. If you cannot find people to play these games with you at home, you can play them with others on your computer through the Internet.     Join a games club--anything from card games to chess or checkers or lawn bowling.     Start a new hobby.     Go back to school.     Volunteer.     Read.     Keep up with world events.       Patient Education   Depression and  Suicide in Older Adults  Nearly 2 million older Americans have some type of depression. Sadly, some of them even take their own lives. Yet depression among older adults is often ignored. Learn the warning signs. You may help spare a loved one needless pain. You may also save a life.       What Is Depression?  Depression is a mood disorder that affects the way you think and feel. The most common symptom is a feeling of deep sadness. People who are depressed also may seem tired and listless. And nothing seems to give them pleasure. Its normal to grieve or be sad sometimes. But sadness lessens or passes with time. Depression rarely goes away or improves on its own. Other symptoms of depression are:    Sleeping more or less than normal    Eating more or less than normal    Having headaches, stomachaches, or other pains that dont go away    Feeling nervous, empty, or worthless    Crying a great deal    Thinking or talking about suicide or death    Feeling confused or forgetful  What Causes It?  The causes of depression arent fully known. Certain chemicals in the brain play a role. Depression does run in families. And life stresses can also trigger depression in some people. That may be the case with older adults. They often face great burdens, such as the death of friends or a spouse. They may have failing health. And they are more likely to be alone, lonely, or poor.  How You Can Help  Often, depressed people may not want to ask for help. When they do, they may be ignored. Or, they may receive the wrong treatment. You can help by showing parents and older friends love and support. If they seem depressed, help them find the right treatment. Talk to your doctor. Or contact a local mental health center, social service agency, or hospital. With modern treatment, no one has to suffer from depression.  Resources:    National [place] of Mental Health  272.822.3306  www.nimh.nih.gov    National [place] on Mental  Illness  133.121.8643  www.marcus.org    Mental Health [place]  370.273.8676  www.[facility].org    National Suicide Hotline  822.986.6760 (800-SUICIDE)      3537-2658 [employer]. [address] [place] [address] 50538. All rights reserved. This information is not intended as a substitute for professional medical care. Always follow your healthcare professional's instructions.         Patient Education   Preventing Falls in the Home  As you get older, falls are more likely. Thats because your reaction time slows. Your muscles and joints may also get stiffer, making them less flexible. Illness, medications, and vision changes can also affect your balance. A fall could leave you unable to live on your own. To make your home safer, follow these tips:    Floors    Put nonskid pads under area rugs.    Remove throw rugs.    Replace worn floor coverings.    Tack carpets firmly to each step on carpeted stairs. Put nonskid strips on the edges of uncarpeted stairs.    Keep floors and stairs free of clutter and cords.    Arrange furniture so there are clear pathways.    Clean up any spills right away.    Bathrooms    Install grab bars in the tub or shower.    Apply nonskid strips or put a nonskid rubber mat in the tub or shower.    Sit on a bath chair to bathe.    Use bathmats with nonskid backing.    Lighting    Keep a flashlight in each room.    Put a nightlight along the pathway between the bedroom and the bathroom.    8510-4297 [employer]. [address] [place] [address] 82172. All rights reserved. This information is not intended as a substitute for professional medical care. Always follow your healthcare professional's instructions.           Advance Directive  Patients advance directive was discussed and I am comfortable with the patients wishes.  Patient Education   Personalized Prevention Plan  You are due for the preventive services outlined below.  Your care team is available to assist  you in scheduling these services.  If you have already completed any of these items, please share that information with your care team to update in your medical record.  Health Maintenance   Topic Date Due   ? HEPATITIS C SCREENING  1950   ? HEART FAILURE ACTION PLAN  1950   ? SPIROMETRY  1950   ? COPD ACTION PLAN  1950   ? DIABETIC FOOT EXAM  1950   ? LIPID  1950   ? DIABETIC EYE EXAM  1950   ? COLONOSCOPY  07/02/2000   ? ZOSTER VACCINES (2 of 3) 11/02/2012   ? MEDICARE ANNUAL WELLNESS VISIT  07/02/2015   ? MICROALBUMIN  12/29/2016   ? PNEUMOCOCCAL IMMUNIZATION 65+ LOW/MEDIUM RISK (2 of 2 - PPSV23) 09/04/2017   ? A1C  02/07/2020   ? BMP  02/28/2020   ? ALT  08/28/2020   ? CBC  08/28/2020   ? FALL RISK ASSESSMENT  08/28/2020   ? ADVANCE CARE PLANNING  11/15/2024   ? TD 18+ HE  06/19/2027   ? TSH  Completed   ? INFLUENZA VACCINE RULE BASED  Completed

## 2021-08-06 NOTE — TELEPHONE ENCOUNTER
Patient calling to check status on the below refill request.  Patient stating he has been out of the below for about a week now.  Please look into this and call patient with status update as soon as possible.    [phone]. Okay to leave VM if needed.

## 2021-08-07 ENCOUNTER — TRANSFERRED RECORDS (OUTPATIENT)
Dept: HEALTH INFORMATION MANAGEMENT | Facility: CLINIC | Age: 71
End: 2021-08-07

## 2021-08-10 NOTE — TELEPHONE ENCOUNTER
Patient came into clinic requesting lidocaine patches.     Last seen 6/25/2021    Medication listed as historical.  lidocaine (LIDODERM) 5 % patch     --   Sig: lidocaine 5 % topical patch    PLACE 1 PATCH ON A CLEAN SKIN ONCE D. REMOVE AND DISCARD PATCH WITHIN 12 H   Class: Historical   Order: [id number]

## 2021-08-12 ENCOUNTER — PATIENT OUTREACH (OUTPATIENT)
Dept: GERIATRIC MEDICINE | Facility: CLINIC | Age: 71
End: 2021-08-12

## 2021-08-12 RX ORDER — LIDOCAINE 50 MG/G
PATCH TOPICAL
Status: CANCELLED | OUTPATIENT
Start: 2021-08-12

## 2021-08-12 NOTE — PROGRESS NOTES
[facility] Care Coordination Contact    Phone call to [employer] to discuss possibility of providing ICLS services for member.  Clarified with him to make sure that is ICLS.  He reports that will verify.  He reports that currently has staff that is only able to provide 3 hrs/wk.  I informed him that is fine as long as it is ICLS and he is contracted with [facility] to provide that.  He discussed if can also put in 24 hr emergency assistance. Informed him that unsure how to authorized for that.  He reports that will check with how other care coordinator have authorized for that.  It may be only  specific to ILS. He  Will reach out to me next week.      Ruddy Alexander RN, PHN  [facility]  [phone]

## 2021-08-13 ENCOUNTER — MEDICAL CORRESPONDENCE (OUTPATIENT)
Dept: HEALTH INFORMATION MANAGEMENT | Facility: CLINIC | Age: 71
End: 2021-08-13

## 2021-08-13 ENCOUNTER — VIRTUAL VISIT (OUTPATIENT)
Dept: FAMILY MEDICINE | Facility: CLINIC | Age: 71
End: 2021-08-13
Payer: COMMERCIAL

## 2021-08-13 DIAGNOSIS — Z91.09 ENVIRONMENTAL ALLERGIES: ICD-10-CM

## 2021-08-13 DIAGNOSIS — B37.0 THRUSH: ICD-10-CM

## 2021-08-13 DIAGNOSIS — G89.29 CHRONIC NECK PAIN: Primary | ICD-10-CM

## 2021-08-13 DIAGNOSIS — M54.2 CHRONIC NECK PAIN: Primary | ICD-10-CM

## 2021-08-13 PROCEDURE — 99213 OFFICE O/P EST LOW 20 MIN: CPT | Mod: 95 | Performed by: FAMILY MEDICINE

## 2021-08-13 RX ORDER — LIDOCAINE 50 MG/G
PATCH TOPICAL
Qty: 60 PATCH | Refills: 5 | Status: SHIPPED | OUTPATIENT
Start: 2021-08-13 | End: 2022-02-24

## 2021-08-13 RX ORDER — NYSTATIN 100000/ML
500000 SUSPENSION, ORAL (FINAL DOSE FORM) ORAL 4 TIMES DAILY
Qty: 200 ML | Refills: 1 | Status: SHIPPED | OUTPATIENT
Start: 2021-08-13 | End: 2024-03-20

## 2021-08-13 RX ORDER — FEXOFENADINE HCL 180 MG/1
180 TABLET ORAL DAILY
Qty: 90 TABLET | Refills: 1 | Status: SHIPPED | OUTPATIENT
Start: 2021-08-13

## 2021-08-13 NOTE — TELEPHONE ENCOUNTER
"Patient calling in to check status of lidocaine patches and \"other meds\" the pharmacy has requested.  PCP had inquired about a status update concerning the patches.  Patient wonders if he can see PCP and writer advised telephone visit today to address meds.  Patient agreed and was scheduled for phone visit.  "

## 2021-08-13 NOTE — PROGRESS NOTES
Jef is a 71 year old who is being evaluated via a billable telephone visit.      What phone number would you like to be contacted at? [phone]  How would you like to obtain your AVS? Mail a copy    Assessment & Plan     Thrush  Refill   - nystatin (MYCOSTATIN) 232108 UNIT/ML suspension; Take 5 mLs (500,000 Units) by mouth 4 times daily    Chronic neck pain  Refill and to apply as directed   - lidocaine (LIDODERM) 5 % patch; Apply one patch to affected area for no more than 12 hours, Remove patch, and maintain 12 hours free of Lidocaine before applying the next patch, Apply a new patch for 12 of every 24 hours as needed to increase efficacy    Environmental allergies  Refill   - fexofenadine (ALLEGRA) 180 MG tablet; Take 1 tablet (180 mg) by mouth daily                 No follow-ups on file.    Marco A Avina MD  [facility]   Jef is a 71 year old with history of ALS, lumbar stenosis with neurogenic claudication, impaired gait, wheelchair dependent and chronic pain here for follow up and requesting refills on fexofenadine, nystatin and Lidoderm patch.  He notes that the current therapy is going well without any other issues or concerns but would like to get set up for annual physical in a few months.   He notes that been applying the Lidoderm patch 3 times a day.       Objective           Vitals:  No vitals were obtained today due to virtual visit.    Physical Exam                   Phone call duration: 16 minutes

## 2021-08-17 ENCOUNTER — PATIENT OUTREACH (OUTPATIENT)
Dept: GERIATRIC MEDICINE | Facility: CLINIC | Age: 71
End: 2021-08-17

## 2021-08-17 NOTE — PROGRESS NOTES
[facility] Care Coordination Contact    Phone call to let member know that staff has not been able to find agency to be able to provide caregiver. Encouraged him to find family/friends who may be able to provide services for him.  He reports that may have friend.  He reports that able to manage cares on his own.  Home Health Aide comes to give him baths and his nephew assists with cleaning,  grocery shopping, and other needs. He reports that able to meal prep on his own.  He declined assisted living placement.  Will have staff send list of contracted providers for member to call as well.  He reports that will not work with [employer] even if can't find another provider.  He states that upset that they promised to find another caregiver and never did throughout the course he was with their agency.    Phone call to RAYMOND Trujillo let her know that member no longer wants to work with agency.      Ruddy Alexander RN, PHN  [facility]  [phone]

## 2021-08-17 NOTE — PROGRESS NOTES
[facility] Care Coordination Contact      [facility] Six-Month Telephone Assessment    6 month telephone assessment completed on 8/17/21.    ER visits: No  Hospitalizations: No  TCU stays: No  Significant health status changes: na  Falls/Injuries: No  ADL/IADL changes: No  Changes in services: Yes: No longer has PCA/.  Nephew and girlfriend has been providing cares informally.  He has Home Health Aide for bathes.      Caregiver Assessment follow up:      Goals: See POC in chart for goal progress documentation.      Will see member in 6 months for an annual health risk assessment.   Encouraged member to call CC with any questions or concerns in the meantime.     Ruddy Alexander RN, PHN  [facility]  [phone]

## 2021-08-17 NOTE — PROGRESS NOTES
Contacted the following providers for PCA/hmk/icls. Providers stated staffs are pretty limited to provide services at this time due to the covid pandemic. Some reported staff wants to work with what they are currently serving now.       [facility] [phone]  [facility]    [phone]  [facility]  [phone]  [facility]  [phone]  [facility]  [phone]   [facility]   [phone]   [facility]   [phone]  [facility]   [phone]  [facility]   [phone]  [facility]   [phone]  [facility]  [phone]  [facility]  [phone]    Juan Carlos Tavarez  Care Management Specialist  [facility]  [phone]

## 2021-08-24 DIAGNOSIS — M48.02 STENOSIS OF CERVICAL SPINE WITH MYELOPATHY (H): ICD-10-CM

## 2021-08-24 DIAGNOSIS — M54.2 CHRONIC NECK PAIN: ICD-10-CM

## 2021-08-24 DIAGNOSIS — G99.2 STENOSIS OF CERVICAL SPINE WITH MYELOPATHY (H): ICD-10-CM

## 2021-08-24 DIAGNOSIS — G89.29 CHRONIC NECK PAIN: ICD-10-CM

## 2021-08-24 NOTE — TELEPHONE ENCOUNTER
Controlled Substance Refill Request for Oxycodone 30MG     Last refill:   oxyCODONE (ROXICODONE) 30 MG immediate release tablet 90 tablet 0 6/28/2021  No   Sig - Route: Take 1 tablet (30 mg total) by mouth 3 (three) times a day. - Oral   Sent to pharmacy as: oxyCODONE 30 mg tablet (ROXICODONE)   Earliest Fill Date: 6/28/2021   E-Prescribing Status: Receipt confirmed by pharmacy (6/28/2021 10:48 AM CDT)         Last clinic visit: 6/18/2021-Dr Avina       Clinic visit frequency required: Q 3 months    Next appt: 11/19/2021-Dr Avina      Controlled substance agreement on file: No.    Documentation in problem list reviewed:  Yes    Processing:  Rx to be electronically transmitted to pharmacy by provider

## 2021-08-24 NOTE — TELEPHONE ENCOUNTER
Requesting refill of Oxycodone 30MG sent to [facility] in [place].

## 2021-08-25 ENCOUNTER — TELEPHONE (OUTPATIENT)
Dept: FAMILY MEDICINE | Facility: CLINIC | Age: 71
End: 2021-08-25

## 2021-08-25 DIAGNOSIS — E46 MALNUTRITION, UNSPECIFIED TYPE (H): ICD-10-CM

## 2021-08-25 DIAGNOSIS — G89.29 CHRONIC NECK PAIN: Primary | ICD-10-CM

## 2021-08-25 DIAGNOSIS — M54.2 CHRONIC NECK PAIN: Primary | ICD-10-CM

## 2021-08-25 DIAGNOSIS — E11.9 DIABETES MELLITUS, TYPE 2 (H): ICD-10-CM

## 2021-08-25 NOTE — PROGRESS NOTES
Mailed providers list to member to call for PCA/HMK of member desired. Providers list saved in member's file.    Juan Carlos Tavarez  Care Management Specialist  [facility]  [phone]

## 2021-08-25 NOTE — TELEPHONE ENCOUNTER
Reason for Call:  Home Health Care    Carlyn RN with [facility] called regarding (reason for call): Verbal    Orders are needed for this patient. Skilled nursing     PT: NONE    OT: NONE    Skilled Nursing:  Once every 2 weeks for medication management for 10 weeks with (3) PRN's.     Home health aid:   2x a week for 6 weeks to assist with bathing.     Pt Provider:  Marco A Avina MD    Phone Number Homecare Nurse can be reached at: [phone]    Can we leave a detailed message on this number? YES    Call taken on 8/25/2021 at 10:39 AM by GLADIS Campos

## 2021-08-25 NOTE — TELEPHONE ENCOUNTER
Patient has transferred all RX's to [facility] in [place].    [facility] is calling requesting new RX for:    Duloxetine 20mg  Calcium carbonate-vitamin D3

## 2021-08-25 NOTE — TELEPHONE ENCOUNTER
Last visit: 8/13/2021-Dr Avina  Thrush  Refill   - nystatin (MYCOSTATIN) 780529 UNIT/ML suspension; Take 5 mLs (500,000 Units) by mouth 4 times daily     Chronic neck pain  Refill and to apply as directed   - lidocaine (LIDODERM) 5 % patch; Apply one patch to affected area for no more than 12 hours, Remove patch, and maintain 12 hours free of Lidocaine before applying the next patch, Apply a new patch for 12 of every 24 hours as needed to increase efficacy     Environmental allergies  Refill   - fexofenadine (ALLEGRA) 180 MG tablet; Take 1 tablet (180 mg) by mouth daily       Last filled:   calcium carbonate-vitamin D3 (CALTRATE 600 PLUS D3) 600 mg(1,500mg) -400 unit per tablet 90 tablet 3 9/21/2020  No   Sig - Route: Take 1 tablet by mouth daily. - Oral   Sent to pharmacy as: calcium carbonate 600 mg (1,500 mg)-vitamin D3 400 unit tablet (CALTRATE 600 PLUS D3)   E-Prescribing Status: Receipt confirmed by pharmacy (9/21/2020  3:23 PM CDT)     DULoxetine (CYMBALTA) 20 MG capsule 270 capsule 3 8/11/2020  No   Sig - Route: Take 3 capsules (60 mg total) by mouth daily. - Oral   Sent to pharmacy as: DULoxetine 20 mg capsule,delayed release (Cymbalta)   E-Prescribing Status: Receipt confirmed by pharmacy (8/11/2020  8:27 AM CDT)     Next visit: 11/19/2021-Dr Avina

## 2021-08-26 ENCOUNTER — NURSE TRIAGE (OUTPATIENT)
Dept: NURSING | Facility: CLINIC | Age: 71
End: 2021-08-26

## 2021-08-26 DIAGNOSIS — G89.29 CHRONIC NECK PAIN: ICD-10-CM

## 2021-08-26 DIAGNOSIS — M54.2 CHRONIC NECK PAIN: ICD-10-CM

## 2021-08-26 RX ORDER — OXYCODONE HYDROCHLORIDE 30 MG/1
30 TABLET ORAL 3 TIMES DAILY
Qty: 90 TABLET | Refills: 0 | Status: CANCELLED
Start: 2021-08-26

## 2021-08-26 RX ORDER — OXYCODONE HYDROCHLORIDE 30 MG/1
30 TABLET ORAL 3 TIMES DAILY
Qty: 90 TABLET | Refills: 0 | Status: CANCELLED | OUTPATIENT
Start: 2021-08-26

## 2021-08-26 RX ORDER — DULOXETIN HYDROCHLORIDE 20 MG/1
60 CAPSULE, DELAYED RELEASE ORAL DAILY
Qty: 270 CAPSULE | Refills: 3 | Status: SHIPPED | OUTPATIENT
Start: 2021-08-26

## 2021-08-26 NOTE — TELEPHONE ENCOUNTER
Left message on home phone.  Attempting to relay message from PCP as well as assist in scheduling.

## 2021-08-26 NOTE — TELEPHONE ENCOUNTER
RN Triage:    Requesting refill of oxycodone.    Last refill:  7/29/21 for #90 with 0 refill  Last OV:  8/13/21    Request:  Please send refill to [facility] in [place].    Carole Hameed RN 08/26/21 1:55 PM  [facility] Nurse Advisor

## 2021-08-27 ENCOUNTER — MEDICAL CORRESPONDENCE (OUTPATIENT)
Dept: HEALTH INFORMATION MANAGEMENT | Facility: CLINIC | Age: 71
End: 2021-08-27
Payer: MEDICARE

## 2021-08-27 NOTE — TELEPHONE ENCOUNTER
Left message to call clinic.  See encounter dated 8/24/21.  PCP advised follow up in office before any refill.  Please assist in scheduling when patient calls back.

## 2021-09-01 ENCOUNTER — NURSE TRIAGE (OUTPATIENT)
Dept: NURSING | Facility: CLINIC | Age: 71
End: 2021-09-01

## 2021-09-01 ENCOUNTER — OFFICE VISIT (OUTPATIENT)
Dept: FAMILY MEDICINE | Facility: CLINIC | Age: 71
End: 2021-09-01
Payer: COMMERCIAL

## 2021-09-01 VITALS
OXYGEN SATURATION: 94 % | DIASTOLIC BLOOD PRESSURE: 55 MMHG | TEMPERATURE: 97.9 F | RESPIRATION RATE: 16 BRPM | SYSTOLIC BLOOD PRESSURE: 92 MMHG | HEART RATE: 88 BPM

## 2021-09-01 DIAGNOSIS — M79.18 MYOFASCIAL PAIN: ICD-10-CM

## 2021-09-01 DIAGNOSIS — M54.2 CHRONIC NECK PAIN: Primary | ICD-10-CM

## 2021-09-01 DIAGNOSIS — M51.26 LUMBAR DISC HERNIATION: ICD-10-CM

## 2021-09-01 DIAGNOSIS — M48.062 SPINAL STENOSIS OF LUMBAR REGION WITH NEUROGENIC CLAUDICATION: ICD-10-CM

## 2021-09-01 DIAGNOSIS — M54.16 LUMBAR RADICULITIS: ICD-10-CM

## 2021-09-01 DIAGNOSIS — G89.29 CHRONIC NECK PAIN: Primary | ICD-10-CM

## 2021-09-01 DIAGNOSIS — G12.21 ALS (AMYOTROPHIC LATERAL SCLEROSIS) (H): Chronic | ICD-10-CM

## 2021-09-01 DIAGNOSIS — Z79.899 CONTROLLED SUBSTANCE AGREEMENT SIGNED: ICD-10-CM

## 2021-09-01 LAB
AMPHETAMINES UR QL SCN: ABNORMAL
BARBITURATES UR QL: ABNORMAL
BENZODIAZ UR QL: ABNORMAL
CANNABINOIDS UR QL SCN: ABNORMAL
COCAINE UR QL: ABNORMAL
CREAT UR-MCNC: 66 MG/DL
OPIATES UR QL SCN: ABNORMAL
OXYCODONE UR QL: ABNORMAL
PCP UR QL SCN: ABNORMAL

## 2021-09-01 PROCEDURE — 80307 DRUG TEST PRSMV CHEM ANLYZR: CPT | Performed by: FAMILY MEDICINE

## 2021-09-01 PROCEDURE — 99214 OFFICE O/P EST MOD 30 MIN: CPT | Performed by: FAMILY MEDICINE

## 2021-09-01 RX ORDER — LORAZEPAM 1 MG/1
1 TABLET ORAL ONCE
Qty: 1 TABLET | Refills: 0 | Status: CANCELLED | OUTPATIENT
Start: 2021-09-01 | End: 2021-09-01

## 2021-09-01 RX ORDER — BISACODYL 5 MG/1
TABLET, DELAYED RELEASE ORAL
COMMUNITY
Start: 2021-05-21 | End: 2021-09-01

## 2021-09-01 RX ORDER — VITAMIN B COMPLEX
CAPSULE ORAL
COMMUNITY
Start: 2021-07-22 | End: 2022-05-23

## 2021-09-01 RX ORDER — OXYCODONE HYDROCHLORIDE 30 MG/1
30 TABLET ORAL 3 TIMES DAILY
Qty: 90 TABLET | Refills: 0 | Status: SHIPPED | OUTPATIENT
Start: 2021-09-01 | End: 2024-03-20

## 2021-09-01 NOTE — PROGRESS NOTES
Assessment & Plan     1. Chronic neck pain    2. Spinal stenosis of lumbar region with neurogenic claudication    3. Lumbar radiculitis    4. Lumbar disc herniation    5. Myofascial pain    6. ALS (amyotrophic lateral sclerosis) (H) / wheelchair bound       Plan: oxyCODONE IR (ROXICODONE) 30 MG tablet,   Urine drug testing is positive for cannabinoids (marijuana ), and our pain management can not be compatible with recreational use.   Avoid non prescribed cannabinoids. Will need stricter drug screening to enforce safety.       reviewed with no aberrancies     Plan:   Pain Management Referral,   Urine Drugs of Abuse         Screen Panel 1 - Drug Screen (Full)          I spent a total of 35 minutes on the day of the visit.   Time spent doing chart review, history and exam, documentation and further activities per the note             No follow-ups on file.        Subjective   Jef Centeno Jr. is a 71 year old male here for med check and follow up on chronic pain management.  He states that took the last of the oxy pill 3 days ago and Ativan 2 days ago ( prescribed by PMR provider )   His pain has remained satisfactory taking oxycodone 30 mg tid       Review of Systems         Objective    BP 92/55   Pulse 88   Temp 97.9  F (36.6  C)   Resp 16   SpO2 94%   There is no height or weight on file to calculate BMI.     Physical Exam   GENERAL: wheelchair bound,  alert and no distress  RESP: lungs clear to auscultation - no rales, rhonchi or wheezes  CV: regular rate and rhythm, normal S1 S2, no S3 or S4, no murmur, click or rub, no peripheral edema and peripheral pulses strong            Marco A Avina MD  [facility]

## 2021-09-01 NOTE — LETTER
Opioid / Opioid Plus Controlled Substance Agreement    This is an agreement between you and your provider about the safe and appropriate use of controlled substance/opioids prescribed by your care team. Controlled substances are medicines that can cause physical and mental dependence (abuse).    There are strict laws about having and using these medicines. We here at [facility] are committing to working with you in your efforts to get better. To support you in this work, we ll help you schedule regular office appointments for medicine refills. If we must cancel or change your appointment for any reason, we ll make sure you have enough medicine to last until your next appointment.     As a Provider, I will:    Listen carefully to your concerns and treat you with respect.     Recommend a treatment plan that I believe is in your best interest. This plan may involve therapies other than opioid pain medication.     Talk with you often about the possible benefits, and the risk of harm of any medicine that we prescribe for you.     Provide a plan on how to taper (discontinue or go off) using this medicine if the decision is made to stop its use.    As a Patient, I understand that opioid(s):     Are a controlled substance prescribed by my care team to help me function or work and manage my condition(s).     Are strong medicines and can cause serious side effects such as:    Drowsiness, which can seriously affect my driving ability    A lower breathing rate, enough to cause death    Harm to my thinking ability     Depression     Abuse of and addiction to this medicine    Need to be taken exactly as prescribed. Combining opioids with certain medicines or chemicals (such as illegal drugs, sedatives, sleeping pills, and benzodiazepines) can be dangerous or even fatal. If I stop opioids suddenly, I may have severe withdrawal symptoms.    Do not work for all types of pain nor for all patients. If they re not helpful, I may  be asked to stop them.        The risks, benefits and side effects of these medicine(s) were explained to me. I agree that:  1. I will take part in other treatments as advised by my care team. This may be psychiatry or counseling, physical therapy, behavioral therapy, group treatment or a referral to a specialist.     2. I will keep all my appointments. I understand that this is part of the monitoring of opioids. My care team may require an office visit for EVERY opioid/controlled substance refill. If I miss appointments or don t follow instructions, my care team may stop my medicine.    3. I will take my medicines as prescribed. I will not change the dose or schedule unless my care team tells me to. There will be no refills if I run out early.     4. I may be asked to come to the clinic and complete a urine drug test or complete a pill count at any time. If I don t give a urine sample or participate in a pill count, the care team may stop my medicine.    5. I will only receive prescriptions from this clinic for chronic pain. If I am treated by another provider for acute pain issues, I will tell them that I am taking opioid pain medication for chronic pain and that I have a treatment agreement with this provider. I will inform my [facility] care team within one business day if I am given a prescription for any pain medication by another healthcare provider. My [facility] care team can contact other providers and pharmacists about my use of any medicines.    6. It is up to me to make sure that I don t run out of my medicines on weekends or holidays. If my care team is willing to refill my opioid prescription without a visit, I must request refills only during office hours. Refills may take up to 3 business days to process. I will use one pharmacy to fill all my opioid and other controlled substance prescriptions. I will notify the clinic about any changes to my insurance or medication  availability.    7. I am responsible for my prescriptions. If the medicine/prescription is lost, stolen or destroyed, it will not be replaced. I also agree not to share controlled substance medicines with anyone.    8. I am aware I should not use any illegal or recreational drugs. I agree not to drink alcohol unless my care team says I can.       9. If I enroll in the [place] Medical Cannabis program, I will tell my care team prior to my next refill.     10. I will tell my care team right away if I become pregnant, have a new medical problem treated outside of my regular clinic, or have a change in my medications.    11. I understand that this medicine can affect my thinking, judgment and reaction time. Alcohol and drugs affect the brain and body, which can affect the safety of my driving. Being under the influence of alcohol or drugs can affect my decision-making, behaviors, personal safety, and the safety of others. Driving while impaired (DWI) can occur if a person is driving, operating, or in physical control of a car, motorcycle, boat, snowmobile, ATV, motorbike, off-road vehicle, or any other motor vehicle ([place] Statute 169A.20). I understand the risk if I choose to drive or operate any vehicle or machinery.    I understand that if I do not follow any of the conditions above, my prescriptions or treatment may be stopped or changed.          Opioids  What You Need to Know    What are opioids?   Opioids are pain medicines that must be prescribed by a doctor. They are also known as narcotics.     Examples are:   1. morphine (MS Contin, Ya)  2. oxycodone (Oxycontin)  3. oxycodone and acetaminophen (Percocet)  4. hydrocodone and acetaminophen (Vicodin, Norco)   5. fentanyl patch (Duragesic)   6. hydromorphone (Dilaudid)   7. methadone  8. codeine (Tylenol #3)     What do opioids do well?   Opioids are best for severe short-term pain such as after a surgery or injury. They may work well for cancer pain. They may  help some people with long-lasting (chronic) pain.     What do opioids NOT do well?   Opioids never get rid of pain entirely, and they don t work well for most patients with chronic pain. Opioids don t reduce swelling, one of the causes of pain.                                    Other ways to manage chronic pain and improve function include:       Treat the health problem that may be causing pain    Anti-inflammation medicines, which reduce swelling and tenderness, such as ibuprofen (Advil, Motrin) or naproxen (Aleve)    Acetaminophen (Tylenol)    Antidepressants and anti-seizure medicines, especially for nerve pain    Topical treatments such as patches or creams    Injections or nerve blocks    Chiropractic or osteopathic treatment    Acupuncture, massage, deep breathing, meditation, visual imagery, aromatherapy    Use heat or ice at the pain site    Physical therapy     Exercise    Stop smoking    Take part in therapy       Risks and side effects     Talk to your doctor before you start or decide to keep taking opioids. Possible side effects include:      Lowering your breathing rate enough to cause death    Overdose, including death, especially if taking higher than prescribed doses    Worse depression symptoms; less pleasure in things you usually enjoy    Feeling tired or sluggish    Slower thoughts or cloudy thinking    Being more sensitive to pain over time; pain is harder to control    Trouble sleeping or restless sleep    Changes in hormone levels (for example, less testosterone)    Changes in sex drive or ability to have sex    Constipation    Unsafe driving    Itching and sweating    Dizziness    Nausea, throwing up and dry mouth    What else should I know about opioids?    Opioids may lead to dependence, tolerance, or addiction.      Dependence means that if you stop or reduce the medicine too quickly, you will have withdrawal symptoms. These include loose poop (diarrhea), jitters, flu-like symptoms,  nervousness and tremors. Dependence is not the same as addiction.                       Tolerance means needing higher doses over time to get the same effect. This may increase the chance of serious side effects.      Addiction is when people improperly use a substance that harms their body, their mind or their relations with others. Use of opiates can cause a relapse of addiction if you have a history of drug or alcohol abuse.      People who have used opioids for a long time may have a lower quality of life, worse depression, higher levels of pain and more visits to doctors.    You can overdose on opioids. Take these steps to lower your risk of overdose:    1. Recognize the signs:  Signs of overdose include decrease or loss of consciousness (blackout), slowed breathing, trouble waking up and blue lips. If someone is worried about overdose, they should call 911.    2. Talk to your doctor about Narcan (naloxone).   If you are at risk for overdose, you may be given a prescription for Narcan. This medicine very quickly reverses the effects of opioids.   If you overdose, a friend or family member can give you Narcan while waiting for the ambulance. They need to know the signs of overdose and how to give Narcan.     3. Don't use alcohol or street drugs.   Taking them with opioids can cause death.    4. Do not take any of these medicines unless your doctor says it s OK. Taking these with opioids can cause death:    Benzodiazepines, such as lorazepam (Ativan), alprazolam (Xanax) or diazepam (Valium)    Muscle relaxers, such as cyclobenzaprine (Flexeril)    Sleeping pills like zolpidem (Ambien)     Other opioids      How to keep you and other people safe while taking opioids:    1. Never share your opioids with others.  Opioid medicines are regulated by the Drug Enforcement Agency ([place]). Selling or sharing medications is a criminal act.    2. Be sure to store opioids in a secure place, locked up if possible. Young children  can easily swallow them and overdose.    3. When you are traveling with your medicines, keep them in the original bottles. If you use a pill box, be sure you also carry a copy of your medicine list from your clinic or pharmacy.    4. Safe disposal of opioids    Most pharmacies have places to get rid of medicine, called disposal kiosks. Medicine disposal options are also available in every [facility]. Search your county and  medication disposal  to find more options. You can find more details at:  [email or web address]     I agree that my provider, clinic care team, and pharmacy may work with any city, state or federal law enforcement agency that investigates the misuse, sale, or other diversion of my controlled medicine. I will allow my provider to discuss my care with, or share a copy of, this agreement with any other treating provider, pharmacy or emergency room where I receive care.    I have read this agreement and have asked questions about anything I did not understand.    _______________________________________________________  Patient Signature - Jef Centeno Jr. _____________________                   Date     _______________________________________________________  Provider Signature - Marco A Avina MD   _____________________                   Date     _______________________________________________________  Witness Signature (required if provider not present while patient signing)   _____________________                   Date

## 2021-09-01 NOTE — LETTER
September 13, 2021      Jef Centeno Jr.  [address]  [place] [place] [facility] [address]        Dear ,    We are writing to inform you of your test results.    Test results indicate you may require additional follow up, see comment below.    Urine drug testing is positive for cannabinoids (marijuana ), and our pain management can not be compatible with recreational use.   Avoid non prescribed cannabinoids. Will need stricter drug screening to enforce safety.       Resulted Orders   Drugs of Abuse 1 Panel, Urine ([facility], later [facility])   Result Value Ref Range    Amphetamines Urine Screen Negative Screen Negative    Benzodiazepines Urine Screen Negative Screen Negative    Opiates Urine Screen Negative Screen Negative    PCP Urine Screen Negative Screen Negative    Cannabinoids Urine Screen Positive (A) Screen Negative    Barbiturates Urine Screen Negative Screen Negative    Cocaine Urine Screen Negative Screen Negative    Oxycodone Urine Screen Negative Screen Negative    Creatinine Urine mg/dL 66 mg/dL    Narrative    Drug                  Screening Threshold    Amphetamines           1000 ng/mL  Benzodiazepine          200 ng/mL  Opiates                 300 ng/mL  Phencyclidine            25 ng/mL  THC Metabolite           50 ng/mL  Barbiturates            200 ng/mL  Cocaine Metabolite      150 ng/mL  Oxycodone               100 ng/mL    Screening results are to be used only for medical purposes.  Unconfirmed screening results must not be used for non-  medical purposes.       If you have any questions or concerns, please call the clinic at the number listed above.       Sincerely,      Marco A Avina MD

## 2021-09-01 NOTE — TELEPHONE ENCOUNTER
Telephone Encounter by Geovanna Nunes CMA at 3/6/2019  4:40 PM     Author: Geovanna Nunes CMA Service: -- Author Type: Medical Assistant    Filed: 3/6/2019  4:42 PM Encounter Date: 3/6/2019 Status: Signed    : Geovanna Nunes CMA (Medical Assistant)       Referral to wound care clinic has been placed per the following message from Dr. Gallardo:  Ronny Gallardo MD   You 25 minutes ago (4:13 PM)      Ok     Ronny Gallardo MD   Internal medicine   [facility]   [phone]   [email or web address]    Routing Comment      Spoke with the patient and relayed message from Dr. Gallardo about the referral to the wound clinic.  Patient verbalized understanding and had no further questions at this time.  Geovanna PRICE CMA/MIA....................4:42 PM          Unable to reach pt, left VM

## 2021-09-02 ENCOUNTER — NURSE TRIAGE (OUTPATIENT)
Dept: NURSING | Facility: CLINIC | Age: 71
End: 2021-09-02

## 2021-09-02 DIAGNOSIS — M48.062 LUMBAR STENOSIS WITH NEUROGENIC CLAUDICATION: ICD-10-CM

## 2021-09-02 DIAGNOSIS — J44.1 CHRONIC OBSTRUCTIVE PULMONARY DISEASE WITH ACUTE EXACERBATION (H): ICD-10-CM

## 2021-09-02 DIAGNOSIS — L29.9 ITCHING: ICD-10-CM

## 2021-09-02 DIAGNOSIS — J44.9 COPD (CHRONIC OBSTRUCTIVE PULMONARY DISEASE) (H): ICD-10-CM

## 2021-09-02 NOTE — TELEPHONE ENCOUNTER
Patient calling to request seven medication refills.    1)  Oxycodone  2) Albuterol  3) Spieiva  4) Symbocort  5) Atarax  6) Mobic  7) Ensure (Cholocate flavor)    Message sent to PCP.   Cassandra Jean RN, [facility] Triage Nurse Advisor      Reason for Disposition    [1] Caller has NON-URGENT medication question about med that PCP prescribed AND [2] triager unable to answer question    Protocols used: MEDICATION QUESTION CALL-A-

## 2021-09-03 ENCOUNTER — TELEPHONE (OUTPATIENT)
Dept: FAMILY MEDICINE | Facility: CLINIC | Age: 71
End: 2021-09-03

## 2021-09-03 RX ORDER — TIOTROPIUM BROMIDE 18 UG/1
18 CAPSULE ORAL; RESPIRATORY (INHALATION) DAILY
Qty: 90 CAPSULE | Refills: 3 | Status: SHIPPED | OUTPATIENT
Start: 2021-09-03 | End: 2024-03-20

## 2021-09-03 RX ORDER — BUDESONIDE AND FORMOTEROL FUMARATE DIHYDRATE 160; 4.5 UG/1; UG/1
AEROSOL RESPIRATORY (INHALATION)
Qty: 10.2 G | Refills: 5 | Status: SHIPPED | OUTPATIENT
Start: 2021-09-03 | End: 2022-02-25

## 2021-09-03 RX ORDER — MELOXICAM 7.5 MG/1
7.5 TABLET ORAL DAILY PRN
Qty: 90 TABLET | Refills: 1 | Status: SHIPPED | OUTPATIENT
Start: 2021-09-03 | End: 2022-04-10

## 2021-09-03 RX ORDER — LACTOSE-REDUCED FOOD
1 LIQUID (ML) ORAL 2 TIMES DAILY
Qty: 14220 ML | Refills: 11 | Status: SHIPPED | OUTPATIENT
Start: 2021-09-03 | End: 2021-10-03

## 2021-09-03 RX ORDER — ALBUTEROL SULFATE 90 UG/1
1-2 AEROSOL, METERED RESPIRATORY (INHALATION) EVERY 6 HOURS PRN
Qty: 18 G | Refills: 3 | Status: SHIPPED | OUTPATIENT
Start: 2021-09-03 | End: 2022-02-10

## 2021-09-03 RX ORDER — HYDROXYZINE HYDROCHLORIDE 25 MG/1
25 TABLET, FILM COATED ORAL EVERY 8 HOURS PRN
Qty: 100 TABLET | Refills: 4 | Status: SHIPPED | OUTPATIENT
Start: 2021-09-03 | End: 2022-04-01

## 2021-09-03 NOTE — TELEPHONE ENCOUNTER
Dr. MAX    Patient called and said that he just needs to talk to you.  I asked if he needed a triage nurse and he said no that he just wanted to talk to you.  Patient wouldn't tell me what it was regarding.  Please call patient back at .

## 2021-09-08 ENCOUNTER — TELEPHONE (OUTPATIENT)
Dept: FAMILY MEDICINE | Facility: CLINIC | Age: 71
End: 2021-09-08

## 2021-09-08 NOTE — TELEPHONE ENCOUNTER
A PA has been started for this patient    Hydroxyzine HCL 25 mg tabs, key [id number]    And    Oxycodone HCL 30 mg tabs, key [id number]

## 2021-09-09 NOTE — TELEPHONE ENCOUNTER
Central Prior Authorization Team   Phone: [phone]    PA Initiation    Medication: hydrOXYzine HCl 25MG tablets  Insurance Company: [employer] - Phone [phone] Fax [phone]  Pharmacy Filling the Rx: [facility] [place] [place] - [address] [place] [address]  Filling Pharmacy Phone: [phone]  Filling Pharmacy Fax:    Start Date: 9/9/2021

## 2021-09-09 NOTE — TELEPHONE ENCOUNTER
Prior Authorization Approval    Authorization Effective Date: 8/10/2021  Authorization Expiration Date: 9/9/2022  Medication: hydrOXYzine HCl 25MG tablets  Approved Dose/Quantity:    Reference #:     Insurance Company: [employer] - Phone [phone] Fax [phone]  Expected CoPay:       CoPay Card Available:      Foundation Assistance Needed:    Which Pharmacy is filling the prescription (Not needed for infusion/clinic administered): [facility] [place] - [address]  Pharmacy Notified: Yes  Patient Notified: Yes

## 2021-09-10 PROCEDURE — G0179 MD RECERTIFICATION HHA PT: HCPCS | Performed by: FAMILY MEDICINE

## 2021-09-15 DIAGNOSIS — Z53.9 DIAGNOSIS NOT YET DEFINED: Primary | ICD-10-CM

## 2021-09-16 ENCOUNTER — TELEPHONE (OUTPATIENT)
Dept: FAMILY MEDICINE | Facility: CLINIC | Age: 71
End: 2021-09-16

## 2021-09-16 DIAGNOSIS — L29.9 ITCHING: ICD-10-CM

## 2021-09-16 DIAGNOSIS — J44.1 CHRONIC OBSTRUCTIVE PULMONARY DISEASE WITH ACUTE EXACERBATION (H): ICD-10-CM

## 2021-09-16 RX ORDER — HYDROXYZINE HYDROCHLORIDE 25 MG/1
25 TABLET, FILM COATED ORAL EVERY 8 HOURS PRN
Qty: 100 TABLET | Refills: 4 | Status: CANCELLED | OUTPATIENT
Start: 2021-09-16

## 2021-09-17 NOTE — TELEPHONE ENCOUNTER
Central Prior Authorization Team   Phone: [phone]    PA Initiation    Medication: Budesonide-Formoterol Fumarate 160-4.5MCG/ACT aerosol  Insurance Company: [employer] - Phone [phone] Fax [phone]  Pharmacy Filling the Rx: [facility] [place], [place] - [place] - [address]  Filling Pharmacy Phone: [phone]  Filling Pharmacy Fax:    Start Date: 9/17/2021

## 2021-09-21 NOTE — TELEPHONE ENCOUNTER
Pharmacy provided help desk number to contact if they are having issues processing medication.

## 2021-09-28 ENCOUNTER — TELEPHONE (OUTPATIENT)
Dept: FAMILY MEDICINE | Facility: CLINIC | Age: 71
End: 2021-09-28

## 2021-09-28 ENCOUNTER — PATIENT OUTREACH (OUTPATIENT)
Dept: GERIATRIC MEDICINE | Facility: CLINIC | Age: 71
End: 2021-09-28

## 2021-09-28 NOTE — TELEPHONE ENCOUNTER
Patient presents to clinic for assistance with pain clinic intake paperwork.  Writer sat down and filled out forms with patient's input.  Med list was added into packet and packet was taken to  where staff said they would mail it.  Patient thankful for paperwork completion.

## 2021-09-28 NOTE — PROGRESS NOTES
[facility] Care Coordination Contact    Phone call from RAYMOND Trujillo to inquire if has date when notified that member no longer interested in working with agency.  She is completing discharge papers and unable to recollect date.  Informed her that it was 8/17/21 that notified her.     Ruddy Alexander RN, PHN  [facility]  [phone]

## 2021-09-29 ENCOUNTER — MEDICAL CORRESPONDENCE (OUTPATIENT)
Dept: HEALTH INFORMATION MANAGEMENT | Facility: CLINIC | Age: 71
End: 2021-09-29

## 2021-09-29 NOTE — TELEPHONE ENCOUNTER
Prior Authorization Request  Who s requesting:  Pharmacy  Pharmacy Name and Location: [facility] pharmacy   Medication Name: Lidocaine 5% patch   Insurance Plan: [employer]   Insurance Member ID Number:  [id number]  Informed patient that prior authorizations can take up to 10 business days for response:   No  Okay to leave a detailed message: Yes    
Telephone Encounter by Belinda Espinosa at 1/9/2019  3:46 PM     Author: Belinda Espinosa Service: -- Author Type: --    Filed: 1/9/2019  3:47 PM Encounter Date: 1/9/2019 Status: Signed    : Belinda Espinosa initiated through [employer]             
Telephone Encounter by Belinda Espinosa at 1/9/2019  3:47 PM     Author: Belinda Espinosa Service: -- Author Type: --    Filed: 1/9/2019  3:48 PM Encounter Date: 1/9/2019 Status: Signed    : Belinda Espinosa APPROVED:    Approval start date:12/10/18  Approval end date:01/09/2020    Pharmacy has been notified of approval and will contact patient when medication is ready for pickup.                  
Yes

## 2021-10-06 ENCOUNTER — TELEPHONE (OUTPATIENT)
Dept: FAMILY MEDICINE | Facility: CLINIC | Age: 71
End: 2021-10-06

## 2021-10-06 DIAGNOSIS — G12.21 ALS (AMYOTROPHIC LATERAL SCLEROSIS) (H): ICD-10-CM

## 2021-10-06 RX ORDER — FUROSEMIDE 40 MG
TABLET ORAL
Qty: 90 TABLET | Refills: 2 | Status: SHIPPED | OUTPATIENT
Start: 2021-10-06 | End: 2021-10-15

## 2021-10-06 NOTE — TELEPHONE ENCOUNTER
Calvin with [facility] in [place] calling.     vd RX for Furosemide 40mg.  The RX does not have any instructions.    Please call and provide instructions or send new RX with instructions.

## 2021-10-06 NOTE — TELEPHONE ENCOUNTER
Pending Prescriptions:                       Disp   Refills    furosemide (LASIX) 40 MG tablet                               Last filled 8/25/2021

## 2021-10-06 NOTE — TELEPHONE ENCOUNTER
"  Last office visit provider:  6/18/21     Requested Prescriptions   Pending Prescriptions Disp Refills     furosemide (LASIX) 40 MG tablet         Diuretics (Including Combos) Protocol Passed - 10/6/2021  8:11 AM        Passed - Blood pressure under 140/90 in past 12 months     BP Readings from Last 3 Encounters:   09/01/21 92/55   07/20/21 106/50   06/18/21 118/56                 Passed - Recent (12 mo) or future (30 days) visit within the authorizing provider's specialty     Patient has had an office visit with the authorizing provider or a provider within the authorizing providers department within the previous 12 mos or has a future within next 30 days. See \"Patient Info\" tab in inbasket, or \"Choose Columns\" in Meds & Orders section of the refill encounter.              Passed - Medication is active on med list        Passed - Patient is age 18 or older        Passed - Normal serum creatinine on file in past 12 months     Recent Labs   Lab Test 06/18/21  1131   CR 0.71              Passed - Normal serum potassium on file in past 12 months     Recent Labs   Lab Test 06/18/21  1131   POTASSIUM 3.9                    Passed - Normal serum sodium on file in past 12 months     Recent Labs   Lab Test 06/18/21  1131                      linus arenas RN 10/06/21 8:47 AM  "

## 2021-10-07 DIAGNOSIS — G12.21 ALS (AMYOTROPHIC LATERAL SCLEROSIS) (H): ICD-10-CM

## 2021-10-07 NOTE — TELEPHONE ENCOUNTER
Pending Prescriptions:                       Disp   Refills    furosemide (LASIX) 40 MG tablet           90 tab*2            Sig: [FUROSEMIDE (LASIX) 40 MG TABLET]      Last filled 8/25/2021

## 2021-10-08 DIAGNOSIS — G12.21 ALS (AMYOTROPHIC LATERAL SCLEROSIS) (H): ICD-10-CM

## 2021-10-08 RX ORDER — FUROSEMIDE 40 MG
TABLET ORAL
Qty: 90 TABLET | Refills: 2 | OUTPATIENT
Start: 2021-10-08

## 2021-10-08 NOTE — TELEPHONE ENCOUNTER
Pending Prescriptions:                       Disp   Refills    furosemide (LASIX) 40 MG tablet           90 tab*2            Sig: Take 1 tablet (40 mg) by mouth daily [FUROSEMIDE           (LASIX) 40 MG TABLET]

## 2021-10-08 NOTE — TELEPHONE ENCOUNTER
53yoF seen recently with 3m of feeling of something sitting in her esophagus with associated epi and RUQ pain. Worse with eating and  sitting. Associated N/V, non-bloody. Pain better with eructation and papaya. No regurgitation or heartburn. Lost 20# with sx. PCP gave pantoprazole without improvement.   She had similar symptoms in 2010, given meds and sx resolved. EGD at that time showed gastritis.  EGD 3/2021 2cm HH ow normal, bx neg HP.  Colon 4/2018 Kell 5mm hyperplastic polyp CT 3/3031 gallstones, gastritis + fulid collection in R inguinal region without hernia or adenopathy RUQ US 4/28 gallstones Pelvic US 4/28 fibroids She had a lap papo' 2 weeks ago and no further RUQ pain or vomiting but despite surgery and daily PPI still with daily nausea causing her to not eat and she has lost another 8#. Associated epi/sternal pressure, constant, worse with eating and post-prandial fullness. Was on oxy for pain, now off this and small BM Q2D. MIralax prn helps No NSAID use Instructions sent.

## 2021-10-10 RX ORDER — FUROSEMIDE 40 MG
40 TABLET ORAL DAILY
Qty: 90 TABLET | Refills: 2 | OUTPATIENT
Start: 2021-10-10 | End: 2021-11-09

## 2021-10-14 DIAGNOSIS — G89.29 CHRONIC NECK PAIN: ICD-10-CM

## 2021-10-14 DIAGNOSIS — M54.2 CHRONIC NECK PAIN: ICD-10-CM

## 2021-10-14 NOTE — TELEPHONE ENCOUNTER
Reason for Call:  Medication or medication refill:    Do you use a [facility]?  Name of the pharmacy and phone number for the current request:      [facility] on file    Name of the medication requested:     Oxycodone 30 mg tablet    Other request: Per pharmacy patient will be out in the next day or so.     Can we leave a detailed message on this number? YES    Phone number patient can be reached at: Home number on file [phone] (home)    Best Time: Any time    Call taken on 10/14/2021 at 9:23 AM by Houston Munoz

## 2021-10-14 NOTE — TELEPHONE ENCOUNTER
Routing refill request to provider for review/approval because:  Controlled Substance Request     Last Written Prescription Date:  9/1/21  Last Fill Quantity: 90,  # refills: 0   Last office visit provider:   9/1/21    Requested Prescriptions   Pending Prescriptions Disp Refills     oxyCODONE IR (ROXICODONE) 30 MG tablet 90 tablet 0     Sig: Take 1 tablet (30 mg) by mouth 3 times daily       There is no refill protocol information for this order          Juliet Hernandez RN 10/14/21 10:08 AM

## 2021-10-15 RX ORDER — OXYCODONE HYDROCHLORIDE 30 MG/1
30 TABLET ORAL 3 TIMES DAILY
Qty: 90 TABLET | Refills: 0 | OUTPATIENT
Start: 2021-10-15

## 2021-10-15 RX ORDER — FUROSEMIDE 40 MG
40 TABLET ORAL 2 TIMES DAILY
Qty: 180 TABLET | Refills: 1 | Status: SHIPPED | OUTPATIENT
Start: 2021-10-15 | End: 2022-04-10

## 2021-10-15 NOTE — TELEPHONE ENCOUNTER
Hardik from [facility] pharmacy calling for sig for furosemide Rx.  See below for last fill and sig.        Last fill on 6/26/21-        furosemide (LASIX) 20 MG [id number] - Route: Take 2 tablets (40 mg total) by mouth 2 (two) times a day. - OralSent to pharmacy as: furosemide 20 mg tablet (LASIX)E-Prescribing Status: Receipt confirmed by pharmacy (6/26/2021  3:49 PM CDT)

## 2021-10-22 ENCOUNTER — MEDICAL CORRESPONDENCE (OUTPATIENT)
Dept: HEALTH INFORMATION MANAGEMENT | Facility: CLINIC | Age: 71
End: 2021-10-22
Payer: MEDICARE

## 2021-10-22 ENCOUNTER — TELEPHONE (OUTPATIENT)
Dept: FAMILY MEDICINE | Facility: CLINIC | Age: 71
End: 2021-10-22

## 2021-10-22 NOTE — TELEPHONE ENCOUNTER
Lashonda calling back from [facility]    Also needs to request Home Health Aid for bathing.    1 visit per week for 9 weeks.

## 2021-10-22 NOTE — TELEPHONE ENCOUNTER
Marva calling from [facility] to request verbal orders for the following:    Skilled nursinx every 2 weeks for 9 weeks.  3 PRN.    Please call Marva at [phone].  Secure VM if needed.

## 2021-10-26 ENCOUNTER — MEDICAL CORRESPONDENCE (OUTPATIENT)
Dept: HEALTH INFORMATION MANAGEMENT | Facility: CLINIC | Age: 71
End: 2021-10-26
Payer: MEDICARE

## 2021-10-29 ENCOUNTER — MEDICAL CORRESPONDENCE (OUTPATIENT)
Dept: HEALTH INFORMATION MANAGEMENT | Facility: CLINIC | Age: 71
End: 2021-10-29
Payer: MEDICARE

## 2021-10-29 DIAGNOSIS — E11.40 TYPE 2 DIABETES MELLITUS WITH DIABETIC NEUROPATHY, WITHOUT LONG-TERM CURRENT USE OF INSULIN (H): ICD-10-CM

## 2021-11-08 ENCOUNTER — TELEPHONE (OUTPATIENT)
Dept: FAMILY MEDICINE | Facility: CLINIC | Age: 71
End: 2021-11-08
Payer: MEDICARE

## 2021-11-08 DIAGNOSIS — Z53.9 DIAGNOSIS NOT YET DEFINED: Primary | ICD-10-CM

## 2021-11-08 PROCEDURE — G0179 MD RECERTIFICATION HHA PT: HCPCS | Performed by: FAMILY MEDICINE

## 2021-11-08 NOTE — TELEPHONE ENCOUNTER
..Reason for Call:  Home Health Care    [facility] with [facility] called regarding (reason for call): Verbal     Orders are needed for this patient. Home health aid     PT: NONE     OT: NONE     Home health aid: Increase bathing to 2x a week starting this week till 12/24/21.     Skilled Nursing: NONE     Pt Provider: Marco A Avina MD    Phone Number Homecare Nurse can be reached at: [phone]    Can we leave a detailed message on this number? YES    Call taken on 11/8/2021 at 3:01 PM by GLADIS Campos

## 2021-11-09 ENCOUNTER — MEDICAL CORRESPONDENCE (OUTPATIENT)
Dept: HEALTH INFORMATION MANAGEMENT | Facility: CLINIC | Age: 71
End: 2021-11-09
Payer: MEDICARE

## 2021-11-16 ENCOUNTER — PATIENT OUTREACH (OUTPATIENT)
Dept: GERIATRIC MEDICINE | Facility: CLINIC | Age: 71
End: 2021-11-16
Payer: MEDICARE

## 2021-11-17 NOTE — PROGRESS NOTES
[facility] Care Coordination Contact    Voice mail message from member requesting return call.    Left voice mail message for member to return call.      Ruddy Alexander RN, PHN  [facility]  [phone]

## 2021-11-23 ENCOUNTER — PATIENT OUTREACH (OUTPATIENT)
Dept: GERIATRIC MEDICINE | Facility: CLINIC | Age: 71
End: 2021-11-23
Payer: MEDICARE

## 2021-11-23 NOTE — PROGRESS NOTES
[facility] Care Coordination Contact    Phone call from member which he reports that he is now open to look at other housing options which will provide some assistance with cares for him.  He hasn't had caregiver for past 5 mos since he no longer wanted to work with him.   He reports that has been doing stable and able to manage daily tasks but wants to plan for the future as he notes that sometimes transferring takes more time as his legs don't move as well.  He denies falls or other safety concerns, he states that still able to transfer on his own.  He reports that nutrition needs are met.  He has no concerns regarding safety.  His nephew comes by whenever he calls.  His girlfriend comes over several times a week.  He reports that they both assist him with household chores as well as other needs.  He states that will call nephew if unable to transfer and will not put self at risk for falls.  He also reports that has emergency button to press if he feels he needs assistance.  Recommended that he press emergency response button for assistance if he is unable to transfer or his safety is of concern.   There is assisted living facility within his [facility] community which will be his first preference.  Care coordinator will contact assisted living to see what their level care is.      Ruddy Alexander RN, AYLIN  [facility]  [phone]      Phone call spoke with nurse Nay at [facility] reports that does provide cares as longs only requires one assist and no lifts.  She reports that will have Triston in marketing reach out to member.  Nurse also took care coordinator information to update.      Ruddy Alexander RN, AYLIN  [facility]  [phone]      Phone call to member, relay conversation with Nay to let him know that will receive call from Triston for interview.  He states prefers to stay with [facility] if they are able to accept him.  Will follow up with member next week.     Ruddy Alexander RN,  PHN  [facility]  [phone]

## 2021-11-24 NOTE — PROGRESS NOTES
[facility] Care Coordination Contact    Voice mail message received from [facility]  that has contacted member and has appointment for assessment with nurseNay on 11/30/21 at 1 pm.  They will update care coordinator once has been completed to see if member meets guidelines to live in Assisted Living.      Ruddy Alexander RN, PHN  [facility]  [phone]

## 2021-12-06 DIAGNOSIS — G89.29 CHRONIC NECK PAIN: ICD-10-CM

## 2021-12-06 DIAGNOSIS — M54.2 CHRONIC NECK PAIN: ICD-10-CM

## 2021-12-06 NOTE — TELEPHONE ENCOUNTER
Pending Prescriptions:                       Disp   Refills    oxyCODONE IR (ROXICODONE) 30 MG tablet    90 tab*0            Sig: Take 1 tablet (30 mg) by mouth 3 times daily      Last filled 9/1/2021    No future appointments noted

## 2021-12-07 ENCOUNTER — PATIENT OUTREACH (OUTPATIENT)
Dept: GERIATRIC MEDICINE | Facility: CLINIC | Age: 71
End: 2021-12-07
Payer: MEDICARE

## 2021-12-07 NOTE — PROGRESS NOTES
[facility] Care Coordination Contact    Phone call from Sierra Ernandez to discuss member's interest to move into Assisted Living. We agreed that it would be best to get the Provider Input Worksheet after the 14 day assessment.  The facility will better understand needs after providing for him for a few weeks. Informed him that member is already open to Elderly waiver.  He will discuss with other staff to see if can move anytime within month or should aim for first of month.  Also discussed member's back payment in rent.  Awais reports that there is a state program that will assist with that.  He reports that will have Ana call care coordinator with more information.  He back rent due will not have implications on him moving into Assisted Living.  He will email all involve to plan member's move.      Ruddy Alexander RN, PHN  [facility]  [phone]

## 2021-12-07 NOTE — PROGRESS NOTES
[facility] Care Coordination Contact    Phone call from MEI Tee @[facility] which she reports that did assessed member and does meet eligibility guidelines as for assisted living.  She reports when he requires two assist or a lift then he is no longer able to live there.  She reports that member is interested in moving into AL and does have opening.  She shared that member owes $5000 in back rent.  She states that she spoke with staff in Human Resources and does have some resources to help him.  She gave me contact [phone] to assist member in finding more about resources.      Ruddy Alexander RN, N  [facility]  [phone]    Left voice mail message for staff to call care coordinator to discuss member's housing situation.      Left voice mail message for member to return call.      Ruddy Alexander RN, N  [facility]  [phone]

## 2021-12-08 RX ORDER — OXYCODONE HYDROCHLORIDE 30 MG/1
30 TABLET ORAL 3 TIMES DAILY
Qty: 90 TABLET | Refills: 0 | OUTPATIENT
Start: 2021-12-08

## 2021-12-12 DIAGNOSIS — E11.65 TYPE 2 DIABETES MELLITUS WITH HYPERGLYCEMIA, WITHOUT LONG-TERM CURRENT USE OF INSULIN (H): ICD-10-CM

## 2021-12-13 DIAGNOSIS — G12.21 ALS (AMYOTROPHIC LATERAL SCLEROSIS) (H): ICD-10-CM

## 2021-12-13 DIAGNOSIS — I50.9 EDEMA DUE TO CONGESTIVE HEART FAILURE (H): ICD-10-CM

## 2021-12-14 RX ORDER — ATORVASTATIN CALCIUM 10 MG/1
10 TABLET, FILM COATED ORAL DAILY
Qty: 90 TABLET | Refills: 2 | Status: SHIPPED | OUTPATIENT
Start: 2021-12-14 | End: 2024-09-26

## 2021-12-14 NOTE — TELEPHONE ENCOUNTER
"Last Written Prescription Date:  12/23/20  Last Fill Quantity: 90,  # refills: 3   Last office visit provider:  6/18/21     Requested Prescriptions   Pending Prescriptions Disp Refills     atorvastatin (LIPITOR) 10 MG tablet [Pharmacy Med Name: ATORVASTATIN CALCIUM 10MG TABS] 90 tablet 0     Sig: TAKE 1 TABLET BY MOUTH ONCE DAILY       Statins Protocol Failed - 12/12/2021  3:27 AM        Failed - Recent (12 mo) or future (30 days) visit within the authorizing provider's specialty     Patient has had an office visit with the authorizing provider or a provider within the authorizing providers department within the previous 12 mos or has a future within next 30 days. See \"Patient Info\" tab in inbasket, or \"Choose Columns\" in Meds & Orders section of the refill encounter.              Passed - LDL on file in past 12 months     Recent Labs   Lab Test 06/18/21  1131   LDL 61             Passed - No abnormal creatine kinase in past 12 months     No lab results found.             Passed - Medication is active on med list        Passed - Patient is age 18 or older             Ar Cope RN 12/14/21 7:36 AM  "

## 2021-12-15 RX ORDER — FOLIC ACID 1 MG/1
1000 TABLET ORAL DAILY
Qty: 90 TABLET | Refills: 0 | Status: SHIPPED | OUTPATIENT
Start: 2021-12-15 | End: 2024-09-26

## 2021-12-15 NOTE — TELEPHONE ENCOUNTER
"Routing refill request to provider for review/approval because:  Early refill requested.    Last Written Prescription Date:  1/20/21  Last Fill Quantity: 90,  # refills: 3   Last office visit provider:  9/1/21     Requested Prescriptions   Pending Prescriptions Disp Refills     folic acid (FOLVITE) 1 MG tablet 90 tablet 3     Sig: Take 1 tablet (1,000 mcg) by mouth daily       Vitamin Supplements (Adult) Protocol Passed - 12/14/2021  2:38 PM        Passed - High dose Vitamin D not ordered        Passed - Recent (12 mo) or future (30 days) visit within the authorizing provider's specialty     Patient has had an office visit with the authorizing provider or a provider within the authorizing providers department within the previous 12 mos or has a future within next 30 days. See \"Patient Info\" tab in inbasket, or \"Choose Columns\" in Meds & Orders section of the refill encounter.              Passed - Medication is active on med list             Ar Cope RN 12/15/21 2:49 PM  "

## 2021-12-17 NOTE — TELEPHONE ENCOUNTER
Writer called both of the numbers provided, no answer on either, message left that appointment was needed.

## 2021-12-22 ENCOUNTER — TELEPHONE (OUTPATIENT)
Dept: FAMILY MEDICINE | Facility: CLINIC | Age: 71
End: 2021-12-22
Payer: MEDICARE

## 2021-12-22 ENCOUNTER — PATIENT OUTREACH (OUTPATIENT)
Dept: GERIATRIC MEDICINE | Facility: CLINIC | Age: 71
End: 2021-12-22
Payer: MEDICARE

## 2021-12-22 NOTE — TELEPHONE ENCOUNTER
Refill request via fax for potassium chloride     Last seen by PCP on 09/01/2021    Next appt on 01/10/2022

## 2021-12-22 NOTE — TELEPHONE ENCOUNTER
Lashonda with [facility]     Skilled Nursing visit every two weeks for nine weeks, with 3 PRN's    Home Health Aide   One visit per week for nine weeks.    Okay to leave detailed message when returning call at .  Voice mail is secure and confidential.

## 2021-12-22 NOTE — PROGRESS NOTES
[facility] Care Coordination Contact    Voice mail message received from Diaz Patel, [facility] reports that is almost complete with process of having member move into Assisted Living.  He has to verify Social Security Income and inquires if care coordinator as has information.    Left voice mail message for Diaz that care coordinator doesn't have access to information and would need to reach out to member to get information.      Ruddy Alexander RN, PHN  [facility]  [phone]'

## 2021-12-22 NOTE — TELEPHONE ENCOUNTER
"Routing refill request to provider for review/approval because:  A break in medication    Last Written Prescription Date:  8/18/20  Last Fill Quantity: 180,  # refills: 1   Last office visit provider:  9/1/21     Requested Prescriptions   Pending Prescriptions Disp Refills     potassium chloride ER (KLOR-CON M) 20 MEQ CR tablet 180 tablet 1     Sig: [POTASSIUM CHLORIDE (K-DUR,KLOR-CON) 20 MEQ TABLET] TAKE 1 TABLET(20 MEQ) BY MOUTH TWICE DAILY       Potassium Supplements Protocol Passed - 12/22/2021 11:25 AM        Passed - Recent (12 mo) or future (30 days) visit within the authorizing provider's department     Patient has had an office visit with the authorizing provider or a provider within the authorizing providers department within the previous 12 mos or has a future within next 30 days. See \"Patient Info\" tab in inbasket, or \"Choose Columns\" in Meds & Orders section of the refill encounter.              Passed - Medication is active on med list        Passed - Patient is age 18 or older        Passed - Normal serum potassium in past 12 months     Recent Labs   Lab Test 06/18/21  1131   POTASSIUM 3.9                     Signed Prescriptions Disp Refills    folic acid (FOLVITE) 1 MG tablet 90 tablet 0     Sig: Take 1 tablet (1,000 mcg) by mouth daily       Vitamin Supplements (Adult) Protocol Passed - 12/15/2021  2:50 PM        Passed - High dose Vitamin D not ordered        Passed - Recent (12 mo) or future (30 days) visit within the authorizing provider's specialty     Patient has had an office visit with the authorizing provider or a provider within the authorizing providers department within the previous 12 mos or has a future within next 30 days. See \"Patient Info\" tab in inbasket, or \"Choose Columns\" in Meds & Orders section of the refill encounter.              Passed - Medication is active on med list             Ar Cope RN 12/22/21 11:36 AM  "

## 2021-12-23 RX ORDER — POTASSIUM CHLORIDE 1500 MG/1
TABLET, EXTENDED RELEASE ORAL
Qty: 180 TABLET | Refills: 1 | Status: SHIPPED | OUTPATIENT
Start: 2021-12-23 | End: 2022-06-19

## 2021-12-25 ENCOUNTER — MEDICAL CORRESPONDENCE (OUTPATIENT)
Dept: HEALTH INFORMATION MANAGEMENT | Facility: CLINIC | Age: 71
End: 2021-12-25
Payer: MEDICARE

## 2021-12-26 NOTE — TELEPHONE ENCOUNTER
Okay to initiate skilled nurse visits for wound care.  I agree with her recommendations.  
Request for Orders    Who s Requesting: Home Care Registered Nurse    Orders being requested: SN visit for wound care 3x6w , 5 prn    HHA for simple wound cares 4x6w , 3prn    Per Obdulia Prieto, vascular    Where to send Orders: stacey    Thank you,    Esther Urena      
[id number]

## 2021-12-28 ENCOUNTER — MEDICAL CORRESPONDENCE (OUTPATIENT)
Dept: HEALTH INFORMATION MANAGEMENT | Facility: CLINIC | Age: 71
End: 2021-12-28
Payer: MEDICARE

## 2022-01-06 ENCOUNTER — PATIENT OUTREACH (OUTPATIENT)
Dept: GERIATRIC MEDICINE | Facility: CLINIC | Age: 72
End: 2022-01-06
Payer: COMMERCIAL

## 2022-01-06 NOTE — PROGRESS NOTES
[facility] Care Coordination Contact    Phone call to member to discuss process of moving into Assisted Living.  He reports that still waiting for Social Security Income verification form to come.  He reports that may need some assistance to move into AL.  Informed him that currently has no resources to assist with moving.  Request that he plan ahead and discuss with AL and family members if they are able to provide assistance.  His annual assessment is due and he will let care coordinator know when has moved to coordinate as facility will need several weeks to have good idea of his needs.      Ruddy Alexander RN, PHN  [facility]  [phone]

## 2022-01-10 ENCOUNTER — OFFICE VISIT (OUTPATIENT)
Dept: FAMILY MEDICINE | Facility: CLINIC | Age: 72
End: 2022-01-10
Payer: COMMERCIAL

## 2022-01-10 VITALS
TEMPERATURE: 98.1 F | OXYGEN SATURATION: 94 % | SYSTOLIC BLOOD PRESSURE: 118 MMHG | DIASTOLIC BLOOD PRESSURE: 60 MMHG | RESPIRATION RATE: 16 BRPM | HEART RATE: 84 BPM

## 2022-01-10 DIAGNOSIS — E11.40 TYPE 2 DIABETES MELLITUS WITH DIABETIC NEUROPATHY, WITHOUT LONG-TERM CURRENT USE OF INSULIN (H): ICD-10-CM

## 2022-01-10 DIAGNOSIS — G89.29 OTHER CHRONIC PAIN: Primary | ICD-10-CM

## 2022-01-10 LAB
AMPHETAMINES UR QL SCN: NORMAL
ANION GAP SERPL CALCULATED.3IONS-SCNC: 11 MMOL/L (ref 5–18)
BARBITURATES UR QL: NORMAL
BENZODIAZ UR QL: NORMAL
BUN SERPL-MCNC: 7 MG/DL (ref 8–28)
CALCIUM SERPL-MCNC: 9.2 MG/DL (ref 8.5–10.5)
CANNABINOIDS UR QL SCN: NORMAL
CHLORIDE BLD-SCNC: 104 MMOL/L (ref 98–107)
CO2 SERPL-SCNC: 25 MMOL/L (ref 22–31)
COCAINE UR QL: NORMAL
CREAT SERPL-MCNC: 0.75 MG/DL (ref 0.7–1.3)
CREAT UR-MCNC: 56 MG/DL
CREAT UR-MCNC: 58 MG/DL
ERYTHROCYTE [DISTWIDTH] IN BLOOD BY AUTOMATED COUNT: 13.9 % (ref 10–15)
GFR SERPL CREATININE-BSD FRML MDRD: >90 ML/MIN/1.73M2
GLUCOSE BLD-MCNC: 99 MG/DL (ref 70–125)
HBA1C MFR BLD: 5.2 % (ref 0–5.6)
HCT VFR BLD AUTO: 42.2 % (ref 40–53)
HGB BLD-MCNC: 14 G/DL (ref 13.3–17.7)
MCH RBC QN AUTO: 30.7 PG (ref 26.5–33)
MCHC RBC AUTO-ENTMCNC: 33.2 G/DL (ref 31.5–36.5)
MCV RBC AUTO: 93 FL (ref 78–100)
MICROALBUMIN UR-MCNC: 2.58 MG/DL (ref 0–1.99)
MICROALBUMIN/CREAT UR: 44.5 MG/G CR
OPIATES UR QL SCN: NORMAL
OXYCODONE UR QL: NORMAL
PCP UR QL SCN: NORMAL
PLATELET # BLD AUTO: 279 10E3/UL (ref 150–450)
POTASSIUM BLD-SCNC: 4.1 MMOL/L (ref 3.5–5)
RBC # BLD AUTO: 4.56 10E6/UL (ref 4.4–5.9)
SODIUM SERPL-SCNC: 140 MMOL/L (ref 136–145)
WBC # BLD AUTO: 7.5 10E3/UL (ref 4–11)

## 2022-01-10 PROCEDURE — 82043 UR ALBUMIN QUANTITATIVE: CPT | Performed by: FAMILY MEDICINE

## 2022-01-10 PROCEDURE — 83036 HEMOGLOBIN GLYCOSYLATED A1C: CPT | Performed by: FAMILY MEDICINE

## 2022-01-10 PROCEDURE — 80048 BASIC METABOLIC PNL TOTAL CA: CPT | Performed by: FAMILY MEDICINE

## 2022-01-10 PROCEDURE — 36415 COLL VENOUS BLD VENIPUNCTURE: CPT | Performed by: FAMILY MEDICINE

## 2022-01-10 PROCEDURE — 99214 OFFICE O/P EST MOD 30 MIN: CPT | Performed by: FAMILY MEDICINE

## 2022-01-10 PROCEDURE — 85027 COMPLETE CBC AUTOMATED: CPT | Performed by: FAMILY MEDICINE

## 2022-01-10 PROCEDURE — 80307 DRUG TEST PRSMV CHEM ANLYZR: CPT | Performed by: FAMILY MEDICINE

## 2022-01-10 ASSESSMENT — PATIENT HEALTH QUESTIONNAIRE - PHQ9: SUM OF ALL RESPONSES TO PHQ QUESTIONS 1-9: 15

## 2022-01-10 NOTE — LETTER
January 17, 2022      Jef Centeno   [address]  [facility] [address]        Dear ,    We are writing to inform you of your test results.    Normal       Resulted Orders   CBC with Platelets     Result Value Ref Range    WBC Count 7.5 4.0 - 11.0 10e3/uL    RBC Count 4.56 4.40 - 5.90 10e6/uL    Hemoglobin 14.0 13.3 - 17.7 g/dL    Hematocrit 42.2 40.0 - 53.0 %    MCV 93 78 - 100 fL    MCH 30.7 26.5 - 33.0 pg    MCHC 33.2 31.5 - 36.5 g/dL    RDW 13.9 10.0 - 15.0 %    Platelet Count 279 150 - 450 10e3/uL   HEMOGLOBIN A1C   Result Value Ref Range    Hemoglobin A1C 5.2 0.0 - 5.6 %      Comment:      Normal <5.7%   Prediabetes 5.7-6.4%    Diabetes 6.5% or higher     Note: Adopted from ADA consensus guidelines.   BASIC METABOLIC PANEL   Result Value Ref Range    Sodium 140 136 - 145 mmol/L    Potassium 4.1 3.5 - 5.0 mmol/L    Chloride 104 98 - 107 mmol/L    Carbon Dioxide (CO2) 25 22 - 31 mmol/L    Anion Gap 11 5 - 18 mmol/L    Urea Nitrogen 7 (L) 8 - 28 mg/dL    Creatinine 0.75 0.70 - 1.30 mg/dL    Calcium 9.2 8.5 - 10.5 mg/dL    Glucose 99 70 - 125 mg/dL    GFR Estimate >90 >60 mL/min/1.73m2      Comment:      Effective December 21, 2021 eGFRcr in adults is calculated using the 2021 CKD-EPI creatinine equation which includes age and gender (Andrey et al., NEJ, DOI: 10.1056/XLONsy9573694)   Albumin Random Urine Quantitative with Creat Ratio   Result Value Ref Range    Microalbumin Urine mg/dL 2.58 (H) 0.00 - 1.99 mg/dL    Creatinine Urine mg/dL 58 mg/dL    Microalbumin Urine mg/g Cr 44.5 (H) <=19.9 mg/g Cr    Narrative    Microalbumin, Random Urine   <2.0 mg/dL . . . . . . . . Normal   3.0-30.0 mg/dL . . . . . . Microalbuminuria   >30.0 mg/dL . . . . . .  . Clinical Proteinuria     Microalbumin/Creatinine Ratio, Random Urine   <20 mg/g . . . . .. . . . Normal    mg/g . . . . . . . Microalbuminuria   >300 mg/g . . . . . . . . Clinical Proteinuria   Drugs of Abuse 1 Panel, Urine ( [place]  Only)   Result Value Ref Range    Amphetamines Urine Screen Negative Screen Negative    Benzodiazepines Urine Screen Negative Screen Negative    Opiates Urine Screen Negative Screen Negative    PCP Urine Screen Negative Screen Negative    Cannabinoids Urine Screen Negative Screen Negative    Barbiturates Urine Screen Negative Screen Negative    Cocaine Urine Screen Negative Screen Negative    Oxycodone Urine Screen Negative Screen Negative    Creatinine Urine mg/dL 56 mg/dL    Narrative    Drug                  Screening Threshold    Amphetamines           1000 ng/mL  Benzodiazepine          200 ng/mL  Opiates                 300 ng/mL  Phencyclidine            25 ng/mL  THC Metabolite           50 ng/mL  Barbiturates            200 ng/mL  Cocaine Metabolite      150 ng/mL  Oxycodone               100 ng/mL    Screening results are to be used only for medical purposes.  Unconfirmed screening results must not be used for non-  medical purposes.       If you have any questions or concerns, please call the clinic at the number listed above.       Sincerely,      Marco A Avina MD

## 2022-01-10 NOTE — LETTER
January 14, 2022      Jef Centeno   [address]  [place] [address]        Dear ,    We are writing to inform you of your test results.  Normal         Resulted Orders   CBC with Platelets     Result Value Ref Range    WBC Count 7.5 4.0 - 11.0 10e3/uL    RBC Count 4.56 4.40 - 5.90 10e6/uL    Hemoglobin 14.0 13.3 - 17.7 g/dL    Hematocrit 42.2 40.0 - 53.0 %    MCV 93 78 - 100 fL    MCH 30.7 26.5 - 33.0 pg    MCHC 33.2 31.5 - 36.5 g/dL    RDW 13.9 10.0 - 15.0 %    Platelet Count 279 150 - 450 10e3/uL   HEMOGLOBIN A1C   Result Value Ref Range    Hemoglobin A1C 5.2 0.0 - 5.6 %      Comment:      Normal <5.7%   Prediabetes 5.7-6.4%    Diabetes 6.5% or higher     Note: Adopted from ADA consensus guidelines.   BASIC METABOLIC PANEL   Result Value Ref Range    Sodium 140 136 - 145 mmol/L    Potassium 4.1 3.5 - 5.0 mmol/L    Chloride 104 98 - 107 mmol/L    Carbon Dioxide (CO2) 25 22 - 31 mmol/L    Anion Gap 11 5 - 18 mmol/L    Urea Nitrogen 7 (L) 8 - 28 mg/dL    Creatinine 0.75 0.70 - 1.30 mg/dL    Calcium 9.2 8.5 - 10.5 mg/dL    Glucose 99 70 - 125 mg/dL    GFR Estimate >90 >60 mL/min/1.73m2      Comment:      Effective December 21, 2021 eGFRcr in adults is calculated using the 2021 CKD-EPI creatinine equation which includes age and gender (Andrey et al., NEJ, DOI: 10.1056/EBLMjb3920765)   Albumin Random Urine Quantitative with Creat Ratio   Result Value Ref Range    Microalbumin Urine mg/dL 2.58 (H) 0.00 - 1.99 mg/dL    Creatinine Urine mg/dL 58 mg/dL    Microalbumin Urine mg/g Cr 44.5 (H) <=19.9 mg/g Cr    Narrative    Microalbumin, Random Urine   <2.0 mg/dL . . . . . . . . Normal   3.0-30.0 mg/dL . . . . . . Microalbuminuria   >30.0 mg/dL . . . . . .  . Clinical Proteinuria     Microalbumin/Creatinine Ratio, Random Urine   <20 mg/g . . . . .. . . . Normal    mg/g . . . . . . . Microalbuminuria   >300 mg/g . . . . . . . . Clinical Proteinuria   Drugs of Abuse 1 Panel, Urine ( [place]  Only)   Result Value Ref Range    Amphetamines Urine Screen Negative Screen Negative    Benzodiazepines Urine Screen Negative Screen Negative    Opiates Urine Screen Negative Screen Negative    PCP Urine Screen Negative Screen Negative    Cannabinoids Urine Screen Negative Screen Negative    Barbiturates Urine Screen Negative Screen Negative    Cocaine Urine Screen Negative Screen Negative    Oxycodone Urine Screen Negative Screen Negative    Creatinine Urine mg/dL 56 mg/dL    Narrative    Drug                  Screening Threshold    Amphetamines           1000 ng/mL  Benzodiazepine          200 ng/mL  Opiates                 300 ng/mL  Phencyclidine            25 ng/mL  THC Metabolite           50 ng/mL  Barbiturates            200 ng/mL  Cocaine Metabolite      150 ng/mL  Oxycodone               100 ng/mL    Screening results are to be used only for medical purposes.  Unconfirmed screening results must not be used for non-  medical purposes.       If you have any questions or concerns, please call the clinic at the number listed above.       Sincerely,      Marco A Avina MD

## 2022-01-10 NOTE — LETTER
January 17, 2022      Jef Centeno   [address]  [place] [facility] [place] [address]        Dear ,    We are writing to inform you of your test results.    Please call the clinic at [phone] to schedule a follow up lab appointment and an office visit to discuss your lab results.     Resulted Orders   CBC with Platelets     Result Value Ref Range    WBC Count 7.5 4.0 - 11.0 10e3/uL    RBC Count 4.56 4.40 - 5.90 10e6/uL    Hemoglobin 14.0 13.3 - 17.7 g/dL    Hematocrit 42.2 40.0 - 53.0 %    MCV 93 78 - 100 fL    MCH 30.7 26.5 - 33.0 pg    MCHC 33.2 31.5 - 36.5 g/dL    RDW 13.9 10.0 - 15.0 %    Platelet Count 279 150 - 450 10e3/uL   HEMOGLOBIN A1C   Result Value Ref Range    Hemoglobin A1C 5.2 0.0 - 5.6 %      Comment:      Normal <5.7%   Prediabetes 5.7-6.4%    Diabetes 6.5% or higher     Note: Adopted from ADA consensus guidelines.   BASIC METABOLIC PANEL   Result Value Ref Range    Sodium 140 136 - 145 mmol/L    Potassium 4.1 3.5 - 5.0 mmol/L    Chloride 104 98 - 107 mmol/L    Carbon Dioxide (CO2) 25 22 - 31 mmol/L    Anion Gap 11 5 - 18 mmol/L    Urea Nitrogen 7 (L) 8 - 28 mg/dL    Creatinine 0.75 0.70 - 1.30 mg/dL    Calcium 9.2 8.5 - 10.5 mg/dL    Glucose 99 70 - 125 mg/dL    GFR Estimate >90 >60 mL/min/1.73m2      Comment:      Effective December 21, 2021 eGFRcr in adults is calculated using the 2021 CKD-EPI creatinine equation which includes age and gender (Andrey et al., NEJM, DOI: 10.1056/FGXLrl2760248)   Albumin Random Urine Quantitative with Creat Ratio   Result Value Ref Range    Microalbumin Urine mg/dL 2.58 (H) 0.00 - 1.99 mg/dL    Creatinine Urine mg/dL 58 mg/dL    Microalbumin Urine mg/g Cr 44.5 (H) <=19.9 mg/g Cr    Narrative    Microalbumin, Random Urine   <2.0 mg/dL . . . . . . . . Normal   3.0-30.0 mg/dL . . . . . . Microalbuminuria   >30.0 mg/dL . . . . . .  . Clinical Proteinuria     Microalbumin/Creatinine Ratio, Random Urine   <20 mg/g . . . . .. . . . Normal    mg/g . .  . . . . . Microalbuminuria   >300 mg/g . . . . . . . . Clinical Proteinuria   Drugs of Abuse 1 Panel, Urine ([facility] Only)   Result Value Ref Range    Amphetamines Urine Screen Negative Screen Negative    Benzodiazepines Urine Screen Negative Screen Negative    Opiates Urine Screen Negative Screen Negative    PCP Urine Screen Negative Screen Negative    Cannabinoids Urine Screen Negative Screen Negative    Barbiturates Urine Screen Negative Screen Negative    Cocaine Urine Screen Negative Screen Negative    Oxycodone Urine Screen Negative Screen Negative    Creatinine Urine mg/dL 56 mg/dL    Narrative    Drug                  Screening Threshold    Amphetamines           1000 ng/mL  Benzodiazepine          200 ng/mL  Opiates                 300 ng/mL  Phencyclidine            25 ng/mL  THC Metabolite           50 ng/mL  Barbiturates            200 ng/mL  Cocaine Metabolite      150 ng/mL  Oxycodone               100 ng/mL    Screening results are to be used only for medical purposes.  Unconfirmed screening results must not be used for non-  medical purposes.       If you have any questions or concerns, please call the clinic at the number listed above.       Sincerely,      Marco A Avina MD

## 2022-01-10 NOTE — LETTER
January 21, 2022      Jef Centeno   [address]  [facility] [address]        Dear ,    We are writing to inform you of your test results.    Please call and reschedule a lab appointment [phone]      Resulted Orders   CBC with Platelets     Result Value Ref Range    WBC Count 7.5 4.0 - 11.0 10e3/uL    RBC Count 4.56 4.40 - 5.90 10e6/uL    Hemoglobin 14.0 13.3 - 17.7 g/dL    Hematocrit 42.2 40.0 - 53.0 %    MCV 93 78 - 100 fL    MCH 30.7 26.5 - 33.0 pg    MCHC 33.2 31.5 - 36.5 g/dL    RDW 13.9 10.0 - 15.0 %    Platelet Count 279 150 - 450 10e3/uL   HEMOGLOBIN A1C   Result Value Ref Range    Hemoglobin A1C 5.2 0.0 - 5.6 %      Comment:      Normal <5.7%   Prediabetes 5.7-6.4%    Diabetes 6.5% or higher     Note: Adopted from ADA consensus guidelines.   BASIC METABOLIC PANEL   Result Value Ref Range    Sodium 140 136 - 145 mmol/L    Potassium 4.1 3.5 - 5.0 mmol/L    Chloride 104 98 - 107 mmol/L    Carbon Dioxide (CO2) 25 22 - 31 mmol/L    Anion Gap 11 5 - 18 mmol/L    Urea Nitrogen 7 (L) 8 - 28 mg/dL    Creatinine 0.75 0.70 - 1.30 mg/dL    Calcium 9.2 8.5 - 10.5 mg/dL    Glucose 99 70 - 125 mg/dL    GFR Estimate >90 >60 mL/min/1.73m2      Comment:      Effective December 21, 2021 eGFRcr in adults is calculated using the 2021 CKD-EPI creatinine equation which includes age and gender (Andrey et al., NEJM, DOI: 10.1056/HGGDib3122896)   Albumin Random Urine Quantitative with Creat Ratio   Result Value Ref Range    Microalbumin Urine mg/dL 2.58 (H) 0.00 - 1.99 mg/dL    Creatinine Urine mg/dL 58 mg/dL    Microalbumin Urine mg/g Cr 44.5 (H) <=19.9 mg/g Cr    Narrative    Microalbumin, Random Urine   <2.0 mg/dL . . . . . . . . Normal   3.0-30.0 mg/dL . . . . . . Microalbuminuria   >30.0 mg/dL . . . . . .  . Clinical Proteinuria     Microalbumin/Creatinine Ratio, Random Urine   <20 mg/g . . . . .. . . . Normal    mg/g . . . . . . . Microalbuminuria   >300 mg/g . . . . . . . . Clinical  Proteinuria   Drugs of Abuse 1 Panel, Urine ([facility] Only)   Result Value Ref Range    Amphetamines Urine Screen Negative Screen Negative    Benzodiazepines Urine Screen Negative Screen Negative    Opiates Urine Screen Negative Screen Negative    PCP Urine Screen Negative Screen Negative    Cannabinoids Urine Screen Negative Screen Negative    Barbiturates Urine Screen Negative Screen Negative    Cocaine Urine Screen Negative Screen Negative    Oxycodone Urine Screen Negative Screen Negative    Creatinine Urine mg/dL 56 mg/dL    Narrative    Drug                  Screening Threshold    Amphetamines           1000 ng/mL  Benzodiazepine          200 ng/mL  Opiates                 300 ng/mL  Phencyclidine            25 ng/mL  THC Metabolite           50 ng/mL  Barbiturates            200 ng/mL  Cocaine Metabolite      150 ng/mL  Oxycodone               100 ng/mL    Screening results are to be used only for medical purposes.  Unconfirmed screening results must not be used for non-  medical purposes.       If you have any questions or concerns, please call the clinic at the number listed above.       Sincerely,      Marco A Avina MD

## 2022-01-10 NOTE — LETTER
January 13, 2022      Jef Centeno   [address]  [facility] [address]        Dear ,    We are writing to inform you of your test results.    Normal       Resulted Orders   CBC with Platelets     Result Value Ref Range    WBC Count 7.5 4.0 - 11.0 10e3/uL    RBC Count 4.56 4.40 - 5.90 10e6/uL    Hemoglobin 14.0 13.3 - 17.7 g/dL    Hematocrit 42.2 40.0 - 53.0 %    MCV 93 78 - 100 fL    MCH 30.7 26.5 - 33.0 pg    MCHC 33.2 31.5 - 36.5 g/dL    RDW 13.9 10.0 - 15.0 %    Platelet Count 279 150 - 450 10e3/uL   HEMOGLOBIN A1C   Result Value Ref Range    Hemoglobin A1C 5.2 0.0 - 5.6 %      Comment:      Normal <5.7%   Prediabetes 5.7-6.4%    Diabetes 6.5% or higher     Note: Adopted from ADA consensus guidelines.   BASIC METABOLIC PANEL   Result Value Ref Range    Sodium 140 136 - 145 mmol/L    Potassium 4.1 3.5 - 5.0 mmol/L    Chloride 104 98 - 107 mmol/L    Carbon Dioxide (CO2) 25 22 - 31 mmol/L    Anion Gap 11 5 - 18 mmol/L    Urea Nitrogen 7 (L) 8 - 28 mg/dL    Creatinine 0.75 0.70 - 1.30 mg/dL    Calcium 9.2 8.5 - 10.5 mg/dL    Glucose 99 70 - 125 mg/dL    GFR Estimate >90 >60 mL/min/1.73m2      Comment:      Effective December 21, 2021 eGFRcr in adults is calculated using the 2021 CKD-EPI creatinine equation which includes age and gender (Andrey et al., NEJ, DOI: 10.1056/BTQGld1288071)   Albumin Random Urine Quantitative with Creat Ratio   Result Value Ref Range    Microalbumin Urine mg/dL 2.58 (H) 0.00 - 1.99 mg/dL    Creatinine Urine mg/dL 58 mg/dL    Microalbumin Urine mg/g Cr 44.5 (H) <=19.9 mg/g Cr    Narrative    Microalbumin, Random Urine   <2.0 mg/dL . . . . . . . . Normal   3.0-30.0 mg/dL . . . . . . Microalbuminuria   >30.0 mg/dL . . . . . .  . Clinical Proteinuria     Microalbumin/Creatinine Ratio, Random Urine   <20 mg/g . . . . .. . . . Normal    mg/g . . . . . . . Microalbuminuria   >300 mg/g . . . . . . . . Clinical Proteinuria   Drugs of Abuse 1 Panel, Urine ( [place]  Only)   Result Value Ref Range    Amphetamines Urine Screen Negative Screen Negative    Benzodiazepines Urine Screen Negative Screen Negative    Opiates Urine Screen Negative Screen Negative    PCP Urine Screen Negative Screen Negative    Cannabinoids Urine Screen Negative Screen Negative    Barbiturates Urine Screen Negative Screen Negative    Cocaine Urine Screen Negative Screen Negative    Oxycodone Urine Screen Negative Screen Negative    Creatinine Urine mg/dL 56 mg/dL    Narrative    Drug                  Screening Threshold    Amphetamines           1000 ng/mL  Benzodiazepine          200 ng/mL  Opiates                 300 ng/mL  Phencyclidine            25 ng/mL  THC Metabolite           50 ng/mL  Barbiturates            200 ng/mL  Cocaine Metabolite      150 ng/mL  Oxycodone               100 ng/mL    Screening results are to be used only for medical purposes.  Unconfirmed screening results must not be used for non-  medical purposes.       If you have any questions or concerns, please call the clinic at the number listed above.       Sincerely,      Marco A Avina MD

## 2022-01-10 NOTE — PROGRESS NOTES
"  Assessment & Plan     Other chronic pain  Discussed rescreening, and a referral made to the pain clinic    - Urine Drugs of Abuse Screen Panel 1 - Drug Screen (Full); Future  - Pain Management Referral; Future    We will follow-up to the results of the study and manage accordingly.      Type 2 diabetes mellitus with diabetic neuropathy, without long-term current use of insulin (H)    - OPTOMETRY REFERRAL; Future  - CBC with Platelets  ; Future  - Albumin Random Urine Quantitative with Creat Ratio; Future  - HEMOGLOBIN A1C; Future  - BASIC METABOLIC PANEL; Future  We will follow-up to the results of the study and manage accordingly.                 Depression Screening Follow Up    PHQ 1/10/2022   PHQ-9 Total Score 15   Q9: Thoughts of better off dead/self-harm past 2 weeks Not at all                   No follow-ups on file.    Marco A Avina MD  [facility]    Cathy Fuchs is a 71 year old who presents for the following health issues     HPI   Mr. Centeno comes in follow-up of chronic pain medication management, requesting refill of oxycodone.  He was previously managed by the pain clinic and had a positive drug screen for marijuana which was not being prescribed medical cannabis, and when inquired about it he responded \"he was around someone who has been smoking and he doesn't do that\"    He has also had multiple no-shows to the pain clinic and has not been taking the pain medication for the past 2 months.      Review of Systems         Objective    /60   Pulse 84   Temp 98.1  F (36.7  C)   Resp 16   SpO2 94%   There is no height or weight on file to calculate BMI.     Physical Exam   GENERAL: healthy, alert and no distress  RESP: lungs clear to auscultation - no rales, rhonchi or wheezes  CV: regular rate and rhythm, normal S1 S2, no S3 or S4, no murmur, click or rub, no peripheral edema and peripheral pulses strong                "

## 2022-01-10 NOTE — LETTER
January 13, 2022      Jef Centeno   [address]  [facility] [address]        Dear ,    We are writing to inform you of your test results.    Normal     Resulted Orders   CBC with Platelets     Result Value Ref Range    WBC Count 7.5 4.0 - 11.0 10e3/uL    RBC Count 4.56 4.40 - 5.90 10e6/uL    Hemoglobin 14.0 13.3 - 17.7 g/dL    Hematocrit 42.2 40.0 - 53.0 %    MCV 93 78 - 100 fL    MCH 30.7 26.5 - 33.0 pg    MCHC 33.2 31.5 - 36.5 g/dL    RDW 13.9 10.0 - 15.0 %    Platelet Count 279 150 - 450 10e3/uL   HEMOGLOBIN A1C   Result Value Ref Range    Hemoglobin A1C 5.2 0.0 - 5.6 %      Comment:      Normal <5.7%   Prediabetes 5.7-6.4%    Diabetes 6.5% or higher     Note: Adopted from ADA consensus guidelines.   BASIC METABOLIC PANEL   Result Value Ref Range    Sodium 140 136 - 145 mmol/L    Potassium 4.1 3.5 - 5.0 mmol/L    Chloride 104 98 - 107 mmol/L    Carbon Dioxide (CO2) 25 22 - 31 mmol/L    Anion Gap 11 5 - 18 mmol/L    Urea Nitrogen 7 (L) 8 - 28 mg/dL    Creatinine 0.75 0.70 - 1.30 mg/dL    Calcium 9.2 8.5 - 10.5 mg/dL    Glucose 99 70 - 125 mg/dL    GFR Estimate >90 >60 mL/min/1.73m2      Comment:      Effective December 21, 2021 eGFRcr in adults is calculated using the 2021 CKD-EPI creatinine equation which includes age and gender (Andrey et al., NEJ, DOI: 10.1056/BPTOxm2264610)   Albumin Random Urine Quantitative with Creat Ratio   Result Value Ref Range    Microalbumin Urine mg/dL 2.58 (H) 0.00 - 1.99 mg/dL    Creatinine Urine mg/dL 58 mg/dL    Microalbumin Urine mg/g Cr 44.5 (H) <=19.9 mg/g Cr    Narrative    Microalbumin, Random Urine   <2.0 mg/dL . . . . . . . . Normal   3.0-30.0 mg/dL . . . . . . Microalbuminuria   >30.0 mg/dL . . . . . .  . Clinical Proteinuria     Microalbumin/Creatinine Ratio, Random Urine   <20 mg/g . . . . .. . . . Normal    mg/g . . . . . . . Microalbuminuria   >300 mg/g . . . . . . . . Clinical Proteinuria   Drugs of Abuse 1 Panel, Urine ( [place]  Only)   Result Value Ref Range    Amphetamines Urine Screen Negative Screen Negative    Benzodiazepines Urine Screen Negative Screen Negative    Opiates Urine Screen Negative Screen Negative    PCP Urine Screen Negative Screen Negative    Cannabinoids Urine Screen Negative Screen Negative    Barbiturates Urine Screen Negative Screen Negative    Cocaine Urine Screen Negative Screen Negative    Oxycodone Urine Screen Negative Screen Negative    Creatinine Urine mg/dL 56 mg/dL    Narrative    Drug                  Screening Threshold    Amphetamines           1000 ng/mL  Benzodiazepine          200 ng/mL  Opiates                 300 ng/mL  Phencyclidine            25 ng/mL  THC Metabolite           50 ng/mL  Barbiturates            200 ng/mL  Cocaine Metabolite      150 ng/mL  Oxycodone               100 ng/mL    Screening results are to be used only for medical purposes.  Unconfirmed screening results must not be used for non-  medical purposes.       If you have any questions or concerns, please call the clinic at the number listed above.       Sincerely,      Marco A Avina MD

## 2022-01-14 ENCOUNTER — MEDICAL CORRESPONDENCE (OUTPATIENT)
Dept: HEALTH INFORMATION MANAGEMENT | Facility: CLINIC | Age: 72
End: 2022-01-14
Payer: COMMERCIAL

## 2022-01-14 DIAGNOSIS — Z53.9 DIAGNOSIS NOT YET DEFINED: Primary | ICD-10-CM

## 2022-01-14 PROCEDURE — G0179 MD RECERTIFICATION HHA PT: HCPCS | Performed by: FAMILY MEDICINE

## 2022-01-17 ENCOUNTER — TELEPHONE (OUTPATIENT)
Dept: FAMILY MEDICINE | Facility: CLINIC | Age: 72
End: 2022-01-17
Payer: COMMERCIAL

## 2022-01-17 NOTE — TELEPHONE ENCOUNTER
----- Message from Marco A Avina MD sent at 1/13/2022  9:02 AM CST -----  Microalbumin present.  To ensure its validity, will need to recheck in 1-2 weeks.   Please call and reschedule a lab appointment

## 2022-01-17 NOTE — TELEPHONE ENCOUNTER
----- Message from Marco A Avina MD sent at 1/13/2022  9:06 AM CST -----  No Aberrancies found  Please call patient and assist is scheduling a Virtual Visit to discuss pain treatment.

## 2022-01-20 ENCOUNTER — TELEPHONE (OUTPATIENT)
Dept: FAMILY MEDICINE | Facility: CLINIC | Age: 72
End: 2022-01-20
Payer: COMMERCIAL

## 2022-01-21 ENCOUNTER — TELEPHONE (OUTPATIENT)
Dept: FAMILY MEDICINE | Facility: CLINIC | Age: 72
End: 2022-01-21
Payer: COMMERCIAL

## 2022-01-21 NOTE — TELEPHONE ENCOUNTER
Third call to patient regarding the provider's request for a repeat lab appointment, (see other result notes from labs same day). Letter sent in the mail with results and request for lab appointment.

## 2022-01-27 ENCOUNTER — PATIENT OUTREACH (OUTPATIENT)
Dept: GERIATRIC MEDICINE | Facility: CLINIC | Age: 72
End: 2022-01-27
Payer: COMMERCIAL

## 2022-01-27 NOTE — PROGRESS NOTES
[facility] Care Coordination Contact    [facility] Home Visit Assessment     Home visit for Health Risk Assessment with Jef Centeno Jr. completed on January 27, 2022    Type of residence:: [facility] home  Current living arrangement:: I live alone     Assessment completed with:: Patient    Current Care Plan  Member currently receiving the following home care services: Skilled Nursing,Home Health Aid   Member currently receiving the following community resources: Lifeline,Transportation Services,Other (see comment),Home Care (ALS program provides staff to assist with household chores)      Medication Review  Medication reconciliation completed in Epic: If no, please explain Managed homecare nurse  Medication set-up & administration: RN set up every two weeks.  Self-administers medications.  Medication Risk Assessment Medication (1 or more, place referral to MTM): N/A: No risk factors identified  MTM Referral Placed: No: No risk factors idenified    Mental/Behavioral Health   Depression Screening:   PHQ-2 Total Score (Adult) - Positive if 3 or more points; Administer PHQ-9 if positive: 2       Mental health DX:: Yes (F32.9)   Mental health DX how managed:: Medication    Falls Assessment:   Fallen 2 or more times in the past year?: No   Any fall with injury in the past year?: No    ADL/IADL Dependencies:   Dependent ADLs:: Ambulation-walker,Bathing,Dressing,Grooming,Incontinence,Transfers,Wheelchair-with assist,Toileting  Dependent IADLs:: Cleaning,Cooking,Laundry,Shopping,Meal Preparation,Medication Management,Money Management,Transportation,Incontinence    [facility] Plan sponsored benefits: Shared information re: Silver Sneakers/gym memberships, ASA, Calcium +D.    PCA Assessment completed at visit: No     Elderly Waiver Eligibility: Yes-will continue on EW    Care Plan & Recommendations:  He declined PCA and homemaking services as needs are currently met by family,HHA, and ACLS staff.  Member plans to  move in Assisted Living in Spring 2022 for more supportive environment.     See [facility] for detailed assessment information.    Follow-Up Plan: Member informed of future contact, plan to f/u with member with a 6 month telephone assessment.  Contact information shared with member and family, encouraged member to call with any questions or concerns at any time.    [place] care continuum providers: Please refer to Health Care Home on the Epic Problem List to view this patient's [facility] Care Plan Summary.    Ruddy Alexander RN, PHN  [facility]  [phone]

## 2022-01-31 ENCOUNTER — LAB (OUTPATIENT)
Dept: LAB | Facility: CLINIC | Age: 72
End: 2022-01-31
Payer: COMMERCIAL

## 2022-01-31 ENCOUNTER — PATIENT OUTREACH (OUTPATIENT)
Dept: GERIATRIC MEDICINE | Facility: CLINIC | Age: 72
End: 2022-01-31
Payer: COMMERCIAL

## 2022-01-31 DIAGNOSIS — E11.40 TYPE 2 DIABETES MELLITUS WITH DIABETIC NEUROPATHY, WITHOUT LONG-TERM CURRENT USE OF INSULIN (H): ICD-10-CM

## 2022-01-31 DIAGNOSIS — M54.2 CHRONIC NECK PAIN: ICD-10-CM

## 2022-01-31 DIAGNOSIS — Z11.59 NEED FOR HEPATITIS C SCREENING TEST: Primary | ICD-10-CM

## 2022-01-31 DIAGNOSIS — G89.29 CHRONIC NECK PAIN: ICD-10-CM

## 2022-01-31 LAB
AMPHETAMINES UR QL SCN: NORMAL
BARBITURATES UR QL: NORMAL
BENZODIAZ UR QL: NORMAL
CANNABINOIDS UR QL SCN: NORMAL
COCAINE UR QL: NORMAL
CREAT UR-MCNC: 94 MG/DL
CREAT UR-MCNC: 98 MG/DL
HCV AB SERPL QL IA: NONREACTIVE
HOLD SPECIMEN: NORMAL
MICROALBUMIN UR-MCNC: 1.57 MG/DL (ref 0–1.99)
MICROALBUMIN/CREAT UR: 16 MG/G CR
OPIATES UR QL SCN: NORMAL
OXYCODONE UR QL: NORMAL
PCP UR QL SCN: NORMAL

## 2022-01-31 PROCEDURE — 86803 HEPATITIS C AB TEST: CPT

## 2022-01-31 PROCEDURE — 80307 DRUG TEST PRSMV CHEM ANLYZR: CPT

## 2022-01-31 PROCEDURE — 82043 UR ALBUMIN QUANTITATIVE: CPT

## 2022-01-31 PROCEDURE — 36415 COLL VENOUS BLD VENIPUNCTURE: CPT

## 2022-01-31 NOTE — PROGRESS NOTES
[facility] Care Coordination Contact    Email from Awais Riddle, owner of [facility] reports that the tentative move in date is around 4-22-22 for member.      Ruddy Alexander RN, PHN  [facility]  [phone]

## 2022-02-02 ENCOUNTER — MEDICAL CORRESPONDENCE (OUTPATIENT)
Dept: HEALTH INFORMATION MANAGEMENT | Facility: CLINIC | Age: 72
End: 2022-02-02
Payer: COMMERCIAL

## 2022-02-03 ENCOUNTER — PATIENT OUTREACH (OUTPATIENT)
Dept: GERIATRIC MEDICINE | Facility: CLINIC | Age: 72
End: 2022-02-03
Payer: COMMERCIAL

## 2022-02-03 NOTE — PROGRESS NOTES
[facility] Care Coordination Contact    Received a request to submit a DTR for the terminated of PCA. Documentation completed and faxed to the health plan. Care Coordinator aware.  WESTON Gonzalez  [facility]  [phone]

## 2022-02-09 DIAGNOSIS — J44.1 CHRONIC OBSTRUCTIVE PULMONARY DISEASE WITH ACUTE EXACERBATION (H): ICD-10-CM

## 2022-02-09 NOTE — TELEPHONE ENCOUNTER
Pending Prescriptions:                       Disp   Refills    albuterol (PROAIR HFA/PROVENTIL HFA/BABATUNDE*18 g   3            Sig: Inhale 1-2 puffs into the lungs every 6 hours as           needed for shortness of breath / dyspnea or           wheezing

## 2022-02-10 RX ORDER — ALBUTEROL SULFATE 90 UG/1
1-2 AEROSOL, METERED RESPIRATORY (INHALATION) EVERY 6 HOURS PRN
Qty: 18 G | Refills: 5 | Status: SHIPPED | OUTPATIENT
Start: 2022-02-10 | End: 2022-09-29

## 2022-02-18 DIAGNOSIS — D50.9 IRON DEFICIENCY ANEMIA: ICD-10-CM

## 2022-02-18 DIAGNOSIS — G89.29 CHRONIC NECK PAIN: ICD-10-CM

## 2022-02-18 DIAGNOSIS — I25.10 ASHD (ARTERIOSCLEROTIC HEART DISEASE): ICD-10-CM

## 2022-02-18 DIAGNOSIS — M54.2 CHRONIC NECK PAIN: ICD-10-CM

## 2022-02-18 NOTE — TELEPHONE ENCOUNTER
Received fax from pharmacy requesting refill for     metoprolol tartrate (LOPRESSOR) 25 MG tablet  Last refill: 03/31/2021    lidocaine (LIDODERM) 5 % patch  Last Refill: 08/13/2021    Patient last seen: 09/01/2021    Okay for refill?  Maria Eugenia Ordonez MA on 2/18/2022 at 1:44 PM

## 2022-02-21 RX ORDER — LIDOCAINE 50 MG/G
PATCH TOPICAL
Qty: 60 PATCH | Refills: 5 | OUTPATIENT
Start: 2022-02-21

## 2022-02-21 NOTE — TELEPHONE ENCOUNTER
"Refilled 8/13/21 with 5Routing refill request to provider for review/approval because:  Early refill requested.    Last Written Prescription Date:  3/31/21  Last Fill Quantity: 180,  # refills: 3   Last office visit provider:  1/10/22     Requested Prescriptions   Pending Prescriptions Disp Refills     metoprolol tartrate (LOPRESSOR) 25 MG tablet 180 tablet 3       Beta-Blockers Protocol Passed - 2/18/2022  1:44 PM        Passed - Blood pressure under 140/90 in past 12 months     BP Readings from Last 3 Encounters:   01/10/22 118/60   09/01/21 92/55   07/20/21 106/50                 Passed - Patient is age 6 or older        Passed - Recent (12 mo) or future (30 days) visit within the authorizing provider's specialty     Patient has had an office visit with the authorizing provider or a provider within the authorizing providers department within the previous 12 mos or has a future within next 30 days. See \"Patient Info\" tab in inbasket, or \"Choose Columns\" in Meds & Orders section of the refill encounter.              Passed - Medication is active on med list         Refused Prescriptions Disp Refills     lidocaine (LIDODERM) 5 % patch 60 patch 5     Sig: Apply one patch to affected area for no more than 12 hours, Remove patch, and maintain 12 hours free of Lidocaine before applying the next patch, Apply a new patch for 12 of every 24 hours as needed to increase efficacy       Topical Anesthetic Agents Passed - 2/18/2022  1:44 PM        Passed - Recent (12 mo) or future (30 days) visit within the authorizing provider's specialty     Patient has had an office visit with the authorizing provider or a provider within the authorizing providers department within the previous 12 mos or has a future within next 30 days. See \"Patient Info\" tab in inbasket, or \"Choose Columns\" in Meds & Orders section of the refill encounter.              Passed - Medication is active on med list        Passed - Patient is age 2 or older    "          Ar Cope RN 02/21/22 11:27 AM refills.

## 2022-02-22 ENCOUNTER — PATIENT OUTREACH (OUTPATIENT)
Dept: GERIATRIC MEDICINE | Facility: CLINIC | Age: 72
End: 2022-02-22
Payer: COMMERCIAL

## 2022-02-22 RX ORDER — METOPROLOL TARTRATE 25 MG/1
25 TABLET, FILM COATED ORAL 2 TIMES DAILY
Qty: 180 TABLET | Refills: 1 | Status: SHIPPED | OUTPATIENT
Start: 2022-02-22 | End: 2024-03-20

## 2022-02-22 NOTE — PROGRESS NOTES
[facility] Care Coordination Contact     CHW, leandro to remind mbr to schedule colon screening, diabetic eye and wellness exams. Left contact info if questions.    TIFFANY Pearl  [facility]  [phone]

## 2022-02-22 NOTE — LETTER
February 22, 2022      JEF BURK JR.  [address]  [place] [address]      Dear Jef:    At [facility], we are dedicated to improving your health and well-being. Enclosed is the Comprehensive Care Plan that we developed with you on 1/27/2022. Please review the Care Plan carefully.    As a reminder, some of the things we discussed at your visit include:    Your physical and mental health    Ways to reduce falls    Health care needs you may have    Don t forget to contact your care coordinator if you:    Have been hospitalized or plan to be hospitalized     Have had a fall     Have experienced a change in physical health    Are experiencing emotional problems     If you do not agree with your Care Plan, have questions about it, or have experienced a change in your needs, please call me at [phone]. If you are hearing impaired, please call the [place] Relay at 419 or [phone] (etfazc-[id number] relay service).    Sincerely,    Ruddy Alexander RN  [phone]  [email or web address] ([facility]) is a health plan that contracts with both Medicare and the [place] Medical Assistance (Medicaid) program to provide benefits of both programs to enrollees. Enrollment in [facility] depends on contract renewal.    [id number])     [id number] (11/18)

## 2022-02-22 NOTE — PROGRESS NOTES
[facility] Care Coordination Contact    Received after visit chart from care coordinator.  Completed following tasks: Mailed copy of care plan to client, Updated services in access, Submitted referrals/auths for PERS and mailed POC sig sheet w/SASE.  Mailed medication disposal info.   and Provider Signature - No POC Shared:  Member indicates that they do not want their POC shared with any EW providers.     Alba Barahona  Case Management Specialist  [facility]  [phone]

## 2022-02-23 ENCOUNTER — MEDICAL CORRESPONDENCE (OUTPATIENT)
Dept: HEALTH INFORMATION MANAGEMENT | Facility: CLINIC | Age: 72
End: 2022-02-23
Payer: COMMERCIAL

## 2022-02-23 ENCOUNTER — TELEPHONE (OUTPATIENT)
Dept: FAMILY MEDICINE | Facility: CLINIC | Age: 72
End: 2022-02-23
Payer: COMMERCIAL

## 2022-02-23 DIAGNOSIS — D50.9 IRON DEFICIENCY ANEMIA: ICD-10-CM

## 2022-02-23 DIAGNOSIS — M54.2 CHRONIC NECK PAIN: ICD-10-CM

## 2022-02-23 DIAGNOSIS — G89.29 CHRONIC NECK PAIN: ICD-10-CM

## 2022-02-23 RX ORDER — LIDOCAINE 50 MG/G
PATCH TOPICAL
Qty: 60 PATCH | Refills: 5 | Status: CANCELLED | OUTPATIENT
Start: 2022-02-23

## 2022-02-23 RX ORDER — FERROUS SULFATE 325(65) MG
325 TABLET ORAL
Qty: 90 TABLET | Refills: 3 | Status: CANCELLED | OUTPATIENT
Start: 2022-02-23

## 2022-02-23 NOTE — TELEPHONE ENCOUNTER
[facility] with [facility] requesting verbal orders.     Skilled Nursing 1 visit ever 2 weeks for 60 days with 3 PRN's     Home Health Aide 1 visit per week for 9 weeks.    Call back number is .  Okay to leave message, voice mail is secure and confidential.

## 2022-02-24 RX ORDER — FERROUS SULFATE 325(65) MG
325 TABLET ORAL
Qty: 90 TABLET | Refills: 3 | Status: SHIPPED | OUTPATIENT
Start: 2022-02-24

## 2022-02-24 RX ORDER — LIDOCAINE 50 MG/G
PATCH TOPICAL
Qty: 60 PATCH | Refills: 5 | Status: SHIPPED | OUTPATIENT
Start: 2022-02-24

## 2022-02-24 NOTE — TELEPHONE ENCOUNTER
[facility] calling for update on refill request for Litocaine 5% patch and iron supplement.     Several requests have been sent over the last 5 days per Pharmacist.     Requesting update -

## 2022-02-24 NOTE — TELEPHONE ENCOUNTER
Pending Prescriptions:                       Disp   Refills    ferrous sulfate (FEROSUL) 325 (65 Fe) MG *90 tab*3            Sig: Take 1 tablet (325 mg) by mouth daily (with           breakfast)    lidocaine (LIDODERM) 5 % patch            60 pat*5            Sig: Apply one patch to affected area for no more than           12 hours, Remove patch, and maintain 12 hours           free of Lidocaine before applying the next patch,           Apply a new patch for 12 of every 24 hours as           needed to increase efficacy

## 2022-02-25 DIAGNOSIS — J44.1 CHRONIC OBSTRUCTIVE PULMONARY DISEASE WITH ACUTE EXACERBATION (H): ICD-10-CM

## 2022-02-25 RX ORDER — BUDESONIDE AND FORMOTEROL FUMARATE DIHYDRATE 160; 4.5 UG/1; UG/1
AEROSOL RESPIRATORY (INHALATION)
Qty: 10.2 G | Refills: 3 | Status: SHIPPED | OUTPATIENT
Start: 2022-02-25 | End: 2022-09-26

## 2022-02-25 NOTE — TELEPHONE ENCOUNTER
Pending Prescriptions:                       Disp   Refills    budesonide-formoterol (SYMBICORT) 160-4.5*10.2 g 5            Sig: [BUDESONIDE-FORMOTEROL (SYMBICORT) 160-4.5           MCG/ACTUATION INHALER] INHALE 2 PUFFS BY MOUTH           TWICE DAILY

## 2022-03-21 ENCOUNTER — VIRTUAL VISIT (OUTPATIENT)
Dept: FAMILY MEDICINE | Facility: CLINIC | Age: 72
End: 2022-03-21
Payer: COMMERCIAL

## 2022-03-21 DIAGNOSIS — Z53.9 ERRONEOUS ENCOUNTER--DISREGARD: Primary | ICD-10-CM

## 2022-03-21 RX ORDER — PREDNISOLONE ACETATE 10 MG/ML
SUSPENSION/ DROPS OPHTHALMIC
COMMUNITY
Start: 2022-03-15 | End: 2024-03-20

## 2022-03-21 RX ORDER — OXYCODONE HYDROCHLORIDE 15 MG/1
TABLET ORAL
COMMUNITY
Start: 2022-02-19 | End: 2024-03-20

## 2022-03-21 RX ORDER — FEEDER CONTAINER WITH PUMP SET
EACH MISCELLANEOUS
COMMUNITY
Start: 2022-02-26 | End: 2024-03-20

## 2022-03-21 RX ORDER — POTASSIUM CHLORIDE 1.5 G/1.58G
POWDER, FOR SOLUTION ORAL
COMMUNITY
End: 2022-05-23

## 2022-03-22 ENCOUNTER — TELEPHONE (OUTPATIENT)
Dept: FAMILY MEDICINE | Facility: CLINIC | Age: 72
End: 2022-03-22
Payer: COMMERCIAL

## 2022-03-22 NOTE — TELEPHONE ENCOUNTER
Reason for Call: appointment    Detailed comments: Patient needs Pre-op Physical appointment around March 27th to April 10th. He is getting cataract surgery on April 11th with Dr. Claudette Olson [place] [facility]. I checked Dr. Avina's schedule and there is nothing open until April 14th. I suggested to the patient that maybe he can see someone else for his pre-op but he kindly refused and insisted that he only wanted to see his primary. Please reach back out to patient in getting him fitted into Dr. Avina's schedule.     Phone Number Patient can be reached at: Home number on file [phone] (home)    Best Time: any time throughout the day    Can we leave a detailed message on this number? YES    Call taken on 3/22/2022 at 11:30 AM by Khris Mckinney

## 2022-03-23 NOTE — TELEPHONE ENCOUNTER
Writer called and spoke with the patient and assisted him to schedule an appointment on 4/4 at 2 pm.

## 2022-03-31 DIAGNOSIS — M54.2 CHRONIC NECK PAIN: ICD-10-CM

## 2022-03-31 DIAGNOSIS — L29.9 ITCHING: ICD-10-CM

## 2022-03-31 DIAGNOSIS — G89.29 CHRONIC NECK PAIN: ICD-10-CM

## 2022-03-31 NOTE — TELEPHONE ENCOUNTER
Pending Prescriptions:                       Disp   Refills    oxyCODONE IR (ROXICODONE) 15 MG tablet

## 2022-03-31 NOTE — TELEPHONE ENCOUNTER
pls check on the pain clinic status.   Patient needs a follow up visit for the refill ( if appropriate )

## 2022-04-01 RX ORDER — HYDROXYZINE HYDROCHLORIDE 25 MG/1
TABLET, FILM COATED ORAL
Qty: 270 TABLET | Refills: 3 | Status: SHIPPED | OUTPATIENT
Start: 2022-04-01 | End: 2024-03-20

## 2022-04-01 NOTE — TELEPHONE ENCOUNTER
"Last Written Prescription Date:  9/3/21  Last Fill Quantity: 100,  # refills: 4   Last office visit provider:  3/21/22     Requested Prescriptions   Pending Prescriptions Disp Refills     hydrOXYzine (ATARAX) 25 MG tablet [Pharmacy Med Name: HYDROXYZINE HCL 25MG TABS] 100 tablet 1     Sig: TAKE 1 TABLET BY MOUTH EVERY 8 HOURS AS NEEDED FOR ITCHING       Antihistamines Protocol Passed - 4/1/2022  3:06 PM        Passed - Recent (12 mo) or future (30 days) visit within the authorizing provider's specialty     Patient has had an office visit with the authorizing provider or a provider within the authorizing providers department within the previous 12 mos or has a future within next 30 days. See \"Patient Info\" tab in inbasket, or \"Choose Columns\" in Meds & Orders section of the refill encounter.              Passed - Patient is age 3 or older     Apply age and/or weight-based dosing for peds patients age 3 and older.    Forward request to provider for patients under the age of 3.          Passed - Medication is active on med list             Ar Cope RN 04/01/22 3:06 PM  "

## 2022-04-04 ENCOUNTER — OFFICE VISIT (OUTPATIENT)
Dept: FAMILY MEDICINE | Facility: CLINIC | Age: 72
End: 2022-04-04
Payer: COMMERCIAL

## 2022-04-04 VITALS
HEART RATE: 67 BPM | HEIGHT: 70 IN | TEMPERATURE: 98.7 F | OXYGEN SATURATION: 100 % | WEIGHT: 150 LBS | SYSTOLIC BLOOD PRESSURE: 125 MMHG | DIASTOLIC BLOOD PRESSURE: 54 MMHG | BODY MASS INDEX: 21.47 KG/M2

## 2022-04-04 DIAGNOSIS — I10 ESSENTIAL HYPERTENSION: ICD-10-CM

## 2022-04-04 DIAGNOSIS — Z01.818 PRE-OP EVALUATION: Primary | ICD-10-CM

## 2022-04-04 DIAGNOSIS — E11.40 TYPE 2 DIABETES MELLITUS WITH DIABETIC NEUROPATHY, WITHOUT LONG-TERM CURRENT USE OF INSULIN (H): ICD-10-CM

## 2022-04-04 DIAGNOSIS — J44.9 CHRONIC OBSTRUCTIVE PULMONARY DISEASE, UNSPECIFIED COPD TYPE (H): ICD-10-CM

## 2022-04-04 DIAGNOSIS — G12.21 ALS (AMYOTROPHIC LATERAL SCLEROSIS) (H): ICD-10-CM

## 2022-04-04 DIAGNOSIS — H26.9 CATARACT OF BOTH EYES, UNSPECIFIED CATARACT TYPE: ICD-10-CM

## 2022-04-04 LAB
ANION GAP SERPL CALCULATED.3IONS-SCNC: 13 MMOL/L (ref 5–18)
ATRIAL RATE - MUSE: 79 BPM
BUN SERPL-MCNC: 8 MG/DL (ref 8–28)
CALCIUM SERPL-MCNC: 9.4 MG/DL (ref 8.5–10.5)
CHLORIDE BLD-SCNC: 100 MMOL/L (ref 98–107)
CO2 SERPL-SCNC: 27 MMOL/L (ref 22–31)
CREAT SERPL-MCNC: 0.74 MG/DL (ref 0.7–1.3)
DIASTOLIC BLOOD PRESSURE - MUSE: NORMAL MMHG
ERYTHROCYTE [DISTWIDTH] IN BLOOD BY AUTOMATED COUNT: 14 % (ref 10–15)
GFR SERPL CREATININE-BSD FRML MDRD: >90 ML/MIN/1.73M2
GLUCOSE BLD-MCNC: 83 MG/DL (ref 70–125)
HCT VFR BLD AUTO: 42.6 % (ref 40–53)
HGB BLD-MCNC: 14.2 G/DL (ref 13.3–17.7)
INTERPRETATION ECG - MUSE: NORMAL
MCH RBC QN AUTO: 30.1 PG (ref 26.5–33)
MCHC RBC AUTO-ENTMCNC: 33.3 G/DL (ref 31.5–36.5)
MCV RBC AUTO: 90 FL (ref 78–100)
P AXIS - MUSE: 73 DEGREES
PLATELET # BLD AUTO: 312 10E3/UL (ref 150–450)
POTASSIUM BLD-SCNC: 3.9 MMOL/L (ref 3.5–5)
PR INTERVAL - MUSE: 140 MS
QRS DURATION - MUSE: 92 MS
QT - MUSE: 382 MS
QTC - MUSE: 438 MS
R AXIS - MUSE: 32 DEGREES
RBC # BLD AUTO: 4.72 10E6/UL (ref 4.4–5.9)
SODIUM SERPL-SCNC: 140 MMOL/L (ref 136–145)
SYSTOLIC BLOOD PRESSURE - MUSE: NORMAL MMHG
T AXIS - MUSE: 68 DEGREES
VENTRICULAR RATE- MUSE: 79 BPM
WBC # BLD AUTO: 6.3 10E3/UL (ref 4–11)

## 2022-04-04 PROCEDURE — 80048 BASIC METABOLIC PNL TOTAL CA: CPT | Performed by: FAMILY MEDICINE

## 2022-04-04 PROCEDURE — 36415 COLL VENOUS BLD VENIPUNCTURE: CPT | Performed by: FAMILY MEDICINE

## 2022-04-04 PROCEDURE — 93010 ELECTROCARDIOGRAM REPORT: CPT | Performed by: INTERNAL MEDICINE

## 2022-04-04 PROCEDURE — 93005 ELECTROCARDIOGRAM TRACING: CPT | Performed by: FAMILY MEDICINE

## 2022-04-04 PROCEDURE — 85027 COMPLETE CBC AUTOMATED: CPT | Performed by: FAMILY MEDICINE

## 2022-04-04 PROCEDURE — 99214 OFFICE O/P EST MOD 30 MIN: CPT | Performed by: FAMILY MEDICINE

## 2022-04-04 RX ORDER — RILUZOLE 50 MG/1
50 TABLET, FILM COATED ORAL 2 TIMES DAILY WITH MEALS
COMMUNITY

## 2022-04-04 NOTE — PROGRESS NOTES
[address] [address]  Phone: [phone]  Fax: [phone]  Primary Provider: Marco A Avina  Pre-op Performing Provider: MARCO A AVINA      PREOPERATIVE EVALUATION:  Today's date: 4/4/2022    Jef Centeno Jr. is a 71 year old male who presents for a preoperative evaluation, undergoing cataract surgery.    Surgical Information:  Surgery/Procedure: cataracts right 4/12, left 4/21/2022  Surgery Location: [facility]  Surgeon: Dr Claudette Olson  Surgery Date: 4/12, 4/21/2022  Time of Surgery: 12:35pm, 9:35am   Where patient plans to recover: At home with family  Fax number for surgical facility: [phone]    Type of Anesthesia Anticipated: to be determined    Assessment & Plan     The proposed surgical procedure is considered LOW risk.    (Z01.818) Pre-op evaluation  (primary encounter diagnosis)  Comment:   Plan: EKG 12-lead, tracing only, CBC with platelets,         Basic metabolic panel            (H26.9) Cataract of both eyes, unspecified cataract type      (I10) Essential hypertension  Comment: Normotensive      (E11.40) Type 2 diabetes mellitus with diabetic neuropathy, without long-term current use of insulin (H)  Comment: Diet controlled      (J44.9) Chronic obstructive pulmonary disease, unspecified COPD type (H)  Comment: stable.   Plan: Continue current management     (G12.21) ALS (amyotrophic lateral sclerosis) (H)      Medication Instructions:  continue with current medication/ management up to the day of the procedure, resume per surgeon     RECOMMENDATION:  APPROVAL GIVEN to proceed with proposed procedure, without further diagnostic evaluation.    Review of the result(s) of each unique test - As noted              Subjective       Preop Questions 4/4/2022   1. Have you ever had a heart attack or stroke? No   2. Have you ever had surgery on your heart or blood vessels, such as a stent placement, a coronary artery  bypass, or surgery on an artery in your head, neck, heart, or legs? No   3. Do you have chest pain with activity? No   4. Do you have a history of  heart failure? No   5. Do you currently have a cold, bronchitis or symptoms of other infection? No   6. Do you have a cough, shortness of breath, or wheezing? No   7. Do you or anyone in your family have previous history of blood clots? No   8. Do you or does anyone in your family have a serious bleeding problem such as prolonged bleeding following surgeries or cuts? No   9. Have you ever had problems with anemia or been told to take iron pills? No   10. Have you had any abnormal blood loss such as black, tarry or bloody stools? No   11. Have you ever had a blood transfusion? No   12. Are you willing to have a blood transfusion if it is medically needed before, during, or after your surgery? NO -    13. Have you or any of your relatives ever had problems with anesthesia? No   14. Do you have sleep apnea, excessive snoring or daytime drowsiness? YES -    14a. Do you have a CPAP machine? No   15. Do you have any artifical heart valves or other implanted medical devices like a pacemaker, defibrillator, or continuous glucose monitor? No   16. Do you have artificial joints? No   17. Are you allergic to latex? No         Review of Systems  Constitutional, neuro, ENT, endocrine, pulmonary, cardiac, gastrointestinal, genitourinary, musculoskeletal, integument and psychiatric systems are negative, except as otherwise noted.    Patient Active Problem List    Diagnosis Date Noted     Controlled substance agreement signed 09/01/2021     Priority: Medium     Chronic neck pain 09/01/2021     Priority: Medium     Lumbar radiculitis 09/01/2021     Priority: Medium     Lumbar disc herniation 09/01/2021     Priority: Medium     Myofascial pain 09/01/2021     Priority: Medium     ALS (amyotrophic lateral sclerosis) (H)      Priority: Medium     Other hyperlipidemia 05/13/2020     Priority:  Medium     BPH (benign prostatic hyperplasia) 06/06/2019     Priority: Medium     Age-related nuclear cataract of both eyes 04/12/2019     Priority: Medium     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, stage 3 (H) 02/13/2019     Priority: Medium     Cognitive disorder 12/19/2018     Priority: Medium     Malnutrition (H) 06/27/2018     Priority: Medium     Neuropathic pain syndrome (non-herpetic) 04/18/2018     Priority: Medium     Foot drop, left 01/17/2018     Priority: Medium     Impaired cognition 01/17/2018     Priority: Medium     Impaired gait 01/17/2018     Priority: Medium     Impaired mobility and activities of daily living 12/20/2017     Priority: Medium     Neurogenic bowel 12/20/2017     Priority: Medium     Weakness 12/20/2017     Priority: Medium     Abnormal EMG 11/15/2017     Priority: Medium     Dysarthria 11/15/2017     Priority: Medium     Fasciculations of muscle 11/15/2017     Priority: Medium     Idiopathic sensorimotor axonal neuropathy 11/15/2017     Priority: Medium     Erectile dysfunction due to arterial insufficiency 08/17/2017     Priority: Medium     Erectile dysfunction 07/17/2017     Priority: Medium     Essential hypertension 06/19/2016     Priority: Medium     Iron deficiency anemia 05/10/2016     Priority: Medium     Stenosis of cervical spine with myelopathy (H) 02/23/2016     Priority: Medium     Takes oxycodone 30 mg 3 times daily, managed through his PCP         Lumbar stenosis with neurogenic claudication 02/23/2016     Priority: Medium     Nicotine addiction 02/23/2016     Priority: Medium     Postherpetic neuralgia 12/14/2015     Priority: Medium     ASHD (arteriosclerotic heart disease) 12/14/2015     Priority: Medium     Type 2 diabetes mellitus with diabetic neuropathy, without long-term current use of insulin (H) 02/01/2012     Priority: Medium     Formatting of this note might be different from the original.  Diagnosed in 2004. Generally excellent  control. HbA1C 7.4 on 2/1/12,   eating a lot of sweets at home          Past Medical History:   Diagnosis Date     ALS (amyotrophic lateral sclerosis) (H)      BPH (benign prostatic hyperplasia)      Closed fracture of tibia and fibula, left, initial encounter      COPD (chronic obstructive pulmonary disease) (H)      Decubitus ulcer, buttock      Erectile dysfunction associated with type 2 diabetes mellitus (H)      Fracture tibia/fibula, left, closed, initial encounter 6/6/2019     HTN (hypertension)      Obstructive sleep apnea      Postherpetic neuralgia      Type 2 diabetes mellitus with diabetic neuropathy (H)      Past Surgical History:   Procedure Laterality Date     CHOLECYSTECTOMY       LAPAROSCOPIC GASTROTOMY W/ REPAIR OF ULCER       POSTERIOR FUSION CERVICAL SPINE      posterior fusion C2-C4 with laminnectomy; excision cervical lipoma      SIGMOIDOSCOPY FLEXIBLE  06/29/2008     Current Outpatient Medications   Medication Sig Dispense Refill     albuterol (PROAIR HFA/PROVENTIL HFA/VENTOLIN HFA) 108 (90 Base) MCG/ACT inhaler Inhale 1-2 puffs into the lungs every 6 hours as needed for shortness of breath / dyspnea or wheezing 18 g 5     aspirin (ASA) 81 MG EC tablet Take 1 tablet (81 mg) by mouth daily 100 tablet 3     atorvastatin (LIPITOR) 10 MG tablet Take 1 tablet (10 mg) by mouth daily 90 tablet 2     B Complex CAPS TAKE ONE CAPSULE BY MOUTH ONCE DAILY       b complex vitamins tablet [B COMPLEX VITAMINS TABLET] Take 1 tablet by mouth daily. Per PCP patient does not need to take vitamin B complex anymore.       baclofen (LIORESAL) 10 MG tablet [BACLOFEN (LIORESAL) 10 MG TABLET] TAKE 1/2 TABLET(5 MG) BY MOUTH THREE TIMES DAILY 135 tablet 0     bisacodyL (DULCOLAX) 5 mg EC tablet [BISACODYL (DULCOLAX) 5 MG EC TABLET]        budesonide (PULMICORT) 1 mg/2 mL nebulizer solution [BUDESONIDE (PULMICORT) 1 MG/2 ML NEBULIZER SOLUTION] Take 1 mg by nebulization 2 (two) times a day.       budesonide-formoterol  (SYMBICORT) 160-4.5 MCG/ACT Inhaler [BUDESONIDE-FORMOTEROL (SYMBICORT) 160-4.5 MCG/ACTUATION INHALER] INHALE 2 PUFFS BY MOUTH TWICE DAILY 10.2 g 3     CALCIUM CARB-CHOLECALCIFEROL 600-400 MG-UNIT per tablet Take 1 tablet by mouth daily       calcium carbonate 600 mg-vitamin D 400 units (CALTRATE) 600-400 MG-UNIT per tablet Take 1 tablet by mouth daily 90 tablet 3     cetirizine (ZYRTEC) 10 MG tablet Take 1 tablet (10 mg) by mouth daily 90 tablet 3     cholecalciferol, vitamin D3, 1,000 unit (25 mcg) tablet [CHOLECALCIFEROL, VITAMIN D3, 1,000 UNIT (25 MCG) TABLET] Take 4 tablets (4,000 Units total) by mouth daily.  0     docusate sodium (COLACE) 100 MG capsule [DOCUSATE SODIUM (COLACE) 100 MG CAPSULE] Take 100 mg by mouth as needed for constipation. Takes 1 cap in pm       DULoxetine (CYMBALTA) 20 MG capsule Take 3 capsules (60 mg) by mouth daily 270 capsule 3     ferrous sulfate (FEROSUL) 325 (65 Fe) MG tablet Take 1 tablet (325 mg) by mouth daily (with breakfast) 90 tablet 3     fexofenadine (ALLEGRA) 180 MG tablet Take 1 tablet (180 mg) by mouth daily 90 tablet 1     folic acid (FOLVITE) 1 MG tablet Take 1 tablet (1,000 mcg) by mouth daily 90 tablet 0     furosemide (LASIX) 40 MG tablet Take 1 tablet (40 mg) by mouth 2 times daily 180 tablet 1     gabapentin (NEURONTIN) 300 MG capsule [GABAPENTIN (NEURONTIN) 300 MG CAPSULE] TAKE 1 CAPSULE BY MOUTH two times a day 180 capsule 0     hydrOXYzine (ATARAX) 25 MG tablet TAKE 1 TABLET BY MOUTH EVERY 8 HOURS AS NEEDED FOR ITCHING 270 tablet 3     ipratropium-albuterol (DUO-NEB) 0.5-2.5 mg/3 mL nebulizer [IPRATROPIUM-ALBUTEROL (DUO-NEB) 0.5-2.5 MG/3 ML NEBULIZER] Inhale 3 mL every 4 (four) hours as needed. 30 vial 3     levalbuterol (XOPENEX HFA) 45 mcg/actuation inhaler [LEVALBUTEROL (XOPENEX HFA) 45 MCG/ACTUATION INHALER] Inhale 2 puffs every 4 (four) hours as needed for wheezing. 5 Inhaler 3     lidocaine (LIDODERM) 5 % patch Apply one patch to affected area for no more  than 12 hours, Remove patch, and maintain 12 hours free of Lidocaine before applying the next patch, Apply a new patch for 12 of every 24 hours as needed to increase efficacy 60 patch 5     LORazepam (ATIVAN) 1 MG tablet [LORAZEPAM (ATIVAN) 1 MG TABLET] Take 1 tablet (1 mg total) by mouth once in imaging for anxiety. Fill at preferred pharmacy and bring to MRI appointment. Do not take prior to arriving. You will need a  to bring you home after your appointment. 1 tablet 0     magnesium citrate solution [MAGNESIUM CITRATE SOLUTION]        MEDICATION CANNOT BE REORDERED - PLEASE MANUALLY REORDER AND DISCONTINUE THE OLD ORDER [MULTIVITAMIN WITH MINERALS (SUPER THERA HIEN M) TABLET] Take 1 tablet by mouth.       meloxicam (MOBIC) 7.5 MG tablet Take 1 tablet (7.5 mg) by mouth daily as needed for moderate pain 90 tablet 1     metoprolol tartrate (LOPRESSOR) 25 MG tablet Take 1 tablet (25 mg) by mouth 2 times daily 180 tablet 1     multivitamin (ONE A DAY) per tablet [MULTIVITAMIN (ONE A DAY) PER TABLET] TAKE 1 TABLET BY MOUTH DAILY 90 tablet 3     mupirocin (BACTROBAN) 2 % ointment [MUPIROCIN (BACTROBAN) 2 % OINTMENT] Apply topically to wound every day 22 g 0     naloxone (NARCAN) 4 mg/actuation nasal spray [NALOXONE (NARCAN) 4 MG/ACTUATION NASAL SPRAY] 1 spray (4 mg dose) into one nostril for opioid reversal. Call 911. May repeat if no response in 3 minutes. 1 Box 0     Nutritional Supplements (ENSURE HIGH PROTEIN) DRINK ONE CAN BY MOUTH TWICE A DAY       nystatin (MYCOSTATIN) 348213 UNIT/ML suspension Take 5 mLs (500,000 Units) by mouth 4 times daily 200 mL 1     omeprazole (PRILOSEC) 20 MG capsule [OMEPRAZOLE (PRILOSEC) 20 MG CAPSULE] Take 1 capsule (20 mg total) by mouth daily before breakfast. 90 capsule 1     oxyCODONE IR (ROXICODONE) 15 MG tablet TAKE 1 TABLET BY MOUTH EVERY 8 HOURS FOR 21 DAYS       oxyCODONE IR (ROXICODONE) 30 MG tablet Take 1 tablet (30 mg) by mouth 3 times daily 90 tablet 0      polyethylene glycol (GLYCOLAX) 17 gram/dose powder [POLYETHYLENE GLYCOL (GLYCOLAX) 17 GRAM/DOSE POWDER]        potassium chloride (KLOR-CON) 20 MEQ packet potassium chloride 20 mEq oral packet   TAKE 1 TABLET BY MOUTH TWICE DAILY       potassium chloride ER (KLOR-CON M) 20 MEQ CR tablet [POTASSIUM CHLORIDE (K-DUR,KLOR-CON) 20 MEQ TABLET] TAKE 1 TABLET(20 MEQ) BY MOUTH TWICE DAILY 180 tablet 1     prednisoLONE acetate (PRED FORTE) 1 % ophthalmic suspension        predniSONE (DELTASONE) 5 MG tablet TAKE ONE-HALF TABLET  (2.5MG) BY MOUTH EVERY DAY. 15 tablet 0     pregabalin (LYRICA) 75 MG capsule pregabalin 75 mg capsule       sodium phosphates 133 mL (FOR FLEET) 19-7 gram/118 mL Enem rectal enema [SODIUM PHOSPHATES 133 ML (FOR FLEET) 19-7 GRAM/118 ML ENEM RECTAL ENEMA] INSERT 1 ENEMA INTO THE RECTUM ONCE FOR 1 DOSE 133 mL 0     SPIRIVA HANDIHALER 18 MCG inhaled capsule Inhale 1 capsule (18 mcg) into the lungs daily 90 capsule 3     tamsulosin (FLOMAX) 0.4 mg cap [TAMSULOSIN (FLOMAX) 0.4 MG CAP] Take 1 capsule (0.4 mg total) by mouth Daily after breakfast. 90 capsule 3     triamcinolone (KENALOG) 0.1 % cream [TRIAMCINOLONE (KENALOG) 0.1 % CREAM] Apply thin layer two times a day to rash on neck and arm 30 g 0     wound dressings (TRIAD WOUND DRESSING) Pste [WOUND DRESSINGS (TRIAD WOUND DRESSING) PSTE] Apply 71 g topically 2 (two) times a day.  3     riluzole (RILUTEK) 50 MG tablet Take 50 mg by mouth 2 times daily (with meals)       senna-docusate (PERICOLACE) 8.6-50 mg tablet [SENNA-DOCUSATE (PERICOLACE) 8.6-50 MG TABLET] Take 2 tablets by mouth daily as needed for constipation. (Patient not taking: Reported on 4/4/2022) 30 tablet 3       No Known Allergies     Social History     Tobacco Use     Smoking status: Current Every Day Smoker     Packs/day: 10.00     Years: 46.00     Pack years: 460.00     Types: Cigarettes     Smokeless tobacco: Never Used   Substance Use Topics     Alcohol use: No     Comment: Alcoholic  "Drinks/day: Quit drinking in 2014. Prior to that he drank excessively.       History   Drug Use No         Objective     /54 (BP Location: Right arm, Patient Position: Sitting, Cuff Size: Adult Regular)   Pulse 67   Temp 98.7  F (37.1  C) (Oral)   Ht 1.778 m (5' 10\")   Wt 68 kg (150 lb)   SpO2 100%   BMI 21.52 kg/m      Physical Exam    GENERAL APPEARANCE: healthy, alert and no distress     EYES: EOMI,  PERRL     HENT: nose and mouth without ulcers or lesions     NECK: no adenopathy, no asymmetry, masses, or scars and thyroid normal to palpation     RESP: lungs clear to auscultation - no rales, rhonchi or wheezes     CV: regular rates and rhythm, normal S1 S2, no S3 or S4 and no murmur, click or rub     ABDOMEN:  soft, nontender, no HSM or masses and bowel sounds normal     MS: Wheelchair-bound, no edema      SKIN: no suspicious lesions or rashes    Recent Labs   Lab Test 01/10/22  1311 06/18/21  1131   HGB 14.0  --      --     137   POTASSIUM 4.1 3.9   CR 0.75 0.71   A1C 5.2 5.5        Diagnostics:  Recent Results (from the past 168 hour(s))   EKG 12-lead, tracing only    Collection Time: 04/04/22  1:21 PM   Result Value Ref Range    Systolic Blood Pressure  mmHg    Diastolic Blood Pressure  mmHg    Ventricular Rate 79 BPM    Atrial Rate 79 BPM    TN Interval 140 ms    QRS Duration 92 ms     ms    QTc 438 ms    P Axis 73 degrees    R AXIS 32 degrees    T Axis 68 degrees    Interpretation ECG       Sinus rhythm  Normal ECG  When compared with ECG of 07-JAN-2016 14:28,  Premature ventricular complexes are no longer Present  Confirmed by MOON GODOY MD LOC:KENNY ([id number]) on 4/4/2022 2:32:14 PM     CBC with platelets    Collection Time: 04/04/22  2:35 PM   Result Value Ref Range    WBC Count 6.3 4.0 - 11.0 10e3/uL    RBC Count 4.72 4.40 - 5.90 10e6/uL    Hemoglobin 14.2 13.3 - 17.7 g/dL    Hematocrit 42.6 40.0 - 53.0 %    MCV 90 78 - 100 fL    MCH 30.1 26.5 - 33.0 pg    MCHC 33.3 31.5 - " 36.5 g/dL    RDW 14.0 10.0 - 15.0 %    Platelet Count 312 150 - 450 10e3/uL   Basic metabolic panel    Collection Time: 04/04/22  2:35 PM   Result Value Ref Range    Sodium 140 136 - 145 mmol/L    Potassium 3.9 3.5 - 5.0 mmol/L    Chloride 100 98 - 107 mmol/L    Carbon Dioxide (CO2) 27 22 - 31 mmol/L    Anion Gap 13 5 - 18 mmol/L    Urea Nitrogen 8 8 - 28 mg/dL    Creatinine 0.74 0.70 - 1.30 mg/dL    Calcium 9.4 8.5 - 10.5 mg/dL    Glucose 83 70 - 125 mg/dL    GFR Estimate >90 >60 mL/min/1.73m2          Revised Cardiac Risk Index (RCRI):  The patient has the following serious cardiovascular risks for perioperative complications:   - No serious cardiac risks = 0 points     RCRI Interpretation: 0 points: Class I (very low risk - 0.4% complication rate)           Signed Electronically by: Marco A Avina MD  Copy of this evaluation report is provided to requesting physician.

## 2022-04-08 DIAGNOSIS — M48.062 LUMBAR STENOSIS WITH NEUROGENIC CLAUDICATION: ICD-10-CM

## 2022-04-08 DIAGNOSIS — G12.21 ALS (AMYOTROPHIC LATERAL SCLEROSIS) (H): ICD-10-CM

## 2022-04-08 NOTE — TELEPHONE ENCOUNTER
Pending Prescriptions:                       Disp   Refills    furosemide (LASIX) 40 MG tablet           180 ta*1            Sig: Take 1 tablet (40 mg) by mouth 2 times daily

## 2022-04-10 RX ORDER — FUROSEMIDE 40 MG
40 TABLET ORAL 2 TIMES DAILY
Qty: 180 TABLET | Refills: 0 | Status: SHIPPED | OUTPATIENT
Start: 2022-04-10 | End: 2024-03-20

## 2022-04-10 RX ORDER — MELOXICAM 7.5 MG/1
7.5 TABLET ORAL DAILY PRN
Qty: 90 TABLET | Refills: 1 | Status: SHIPPED | OUTPATIENT
Start: 2022-04-10 | End: 2024-03-20

## 2022-04-13 ENCOUNTER — PATIENT OUTREACH (OUTPATIENT)
Dept: GERIATRIC MEDICINE | Facility: CLINIC | Age: 72
End: 2022-04-13
Payer: COMMERCIAL

## 2022-04-13 ENCOUNTER — TELEPHONE (OUTPATIENT)
Dept: GERIATRIC MEDICINE | Facility: CLINIC | Age: 72
End: 2022-04-13
Payer: COMMERCIAL

## 2022-04-13 NOTE — PROGRESS NOTES
[facility] Care Coordination Contact    Email from Awais, Owner of [facility] reports that Sneha has been unable to pin down Bear for a time to complete her assessment.  Therefore, Bear will not be moving in on April 22nd at this time unless he agrees with an assessment time and actually gets assessed.  He requested that I  help  contact him and discuss this with him.        Phone call to member, he reports that has completed the assessment today.  He reports that will discussed with Assisted Living to see if has assistance with moving.  He will update me once knows date.     Emailed Awais to let him know that had reached out to member and was told that he already completed assessment.       Ruddy Alexander RN, PHN  [facility]  [phone]

## 2022-04-19 ENCOUNTER — PATIENT OUTREACH (OUTPATIENT)
Dept: GERIATRIC MEDICINE | Facility: CLINIC | Age: 72
End: 2022-04-19
Payer: COMMERCIAL

## 2022-04-19 ENCOUNTER — TELEPHONE (OUTPATIENT)
Dept: FAMILY MEDICINE | Facility: CLINIC | Age: 72
End: 2022-04-19
Payer: COMMERCIAL

## 2022-04-19 NOTE — PROGRESS NOTES
[facility] Care Coordination Contact    Email from Awais which he sent copy of assessment completed by nurse for review and agreement.      Email to Awais to let him know to send me the Input Worksheet after he has move in for a two weeks to ensure there are no changes  and will issue authorized at that time.      Ruddy Alexander RN, PHN  [facility]  [phone]

## 2022-04-19 NOTE — TELEPHONE ENCOUNTER
Reason for Call:  Prescription - New RX    Detailed comments: Patient is requesting PCP provide a new medication for neuropathy pain.    Pt declines appt at this time.  States Dr. MAX knows all about it.    [facility] [place] is the preferred pharmacy.     Phone Number Patient can be reached at: Cell number on file:    Telephone Information:   Mobile [phone]       Best Time: Any    Can we leave a detailed message on this number? YES    Call taken on 4/19/2022 at 1:31 PM by Keyanna Velazquez

## 2022-04-19 NOTE — PROGRESS NOTES
[facility] Care Coordination Contact    Phone call from Carlyn Morales, [facility] reports that was told that needs to discharge member as is no longer getting skill care for nursing only [facility]HA for bathing. She was suppose to discharge him to non- skill agency.  However, during visit today, member informed her that will be doing back surgery which likely will need skill care again.  She relayed information to her supervisor and is awaiting response whether to discharge or not. Discussed plans for him to move into Assisted Living but there is not confirmed date set at this time.  Carlyn Morales will update care coordinator if does discharge him.    Ruddy Alexander RN, PHN  [facility]  [phone]

## 2022-04-21 ENCOUNTER — MEDICAL CORRESPONDENCE (OUTPATIENT)
Dept: HEALTH INFORMATION MANAGEMENT | Facility: CLINIC | Age: 72
End: 2022-04-21

## 2022-04-21 NOTE — TELEPHONE ENCOUNTER
Patient already on multiple medications to treat neuropathy, and needs advanced specialist care, for further management.  He has previously been advised to follow up with the pain clinic ( and referral given)      70

## 2022-04-22 ENCOUNTER — TELEPHONE (OUTPATIENT)
Dept: FAMILY MEDICINE | Facility: CLINIC | Age: 72
End: 2022-04-22
Payer: COMMERCIAL

## 2022-04-22 NOTE — TELEPHONE ENCOUNTER
Elo calling to request verbal orders for the following:    Skilled nursing 1x every other week for 8 weeks. 3 PRN.  For medication management, health monitoring and safety.      Please call Elo at [phone]. Secure VM if needed.

## 2022-04-25 NOTE — TELEPHONE ENCOUNTER
The Home Care/Assisted Living/Nursing Facility is calling regarding an established patient.  Has the patient seen Home Care in the past or is currently residing in Assisted Living or Nursing Facility? Yes.     Chitomiguelito calling from [facility] requesting the following orders that are within the Home Care, Assisted Living or Nursing Home Eval and Treatment standing order and can be signed as standing order signature required by RN.    Preferred Call Back Number: [phone]    Home Care Visits Continuation    Any additional Orders:  Any order requested not stated above are outside of the standing order and must be routed to a licensed practitioner for approval.    No    Writer has verified Requestor will send fax to have orders signed.

## 2022-04-27 ENCOUNTER — MEDICAL CORRESPONDENCE (OUTPATIENT)
Dept: HEALTH INFORMATION MANAGEMENT | Facility: CLINIC | Age: 72
End: 2022-04-27
Payer: COMMERCIAL

## 2022-04-27 ENCOUNTER — TELEPHONE (OUTPATIENT)
Dept: FAMILY MEDICINE | Facility: CLINIC | Age: 72
End: 2022-04-27
Payer: COMMERCIAL

## 2022-04-27 NOTE — TELEPHONE ENCOUNTER
Reason for Call:  Home Health Care    Nay with [facility] called regarding (reason for call): orders needed for Jef to move into assisted living -    Jef was supposed to move in on 4/22/22, but was unable to due to the signed orders needed from Dr. Avina. He is currently living in the [facility] on site but would like to move into the building that provides nursing care. His home care can no longer provide staff for his needs. He is cleared to move in as soon as the required documents are received. A fax was sent to the [facility] Medical Records department, however the only response they got back was requesting payment of $57 before anything could be sent back to them. He is 5 days passed his deadline to move in & eagerly awaiting this information. It is unsafe for him to go back to his previous living situation.     Orders are needed for this patient:  1. Signed orders for all current medications  2. Diagnosis list  3. All notes from the past year     Phone Number Nay can be reached at: [phone] extension 3  Fax number for her location is as follows: [phone]    Can we leave a detailed message on this number? YES    Phone number patient can be reached at: NA    Best Time: any time    Call taken on 4/27/2022 at 1:59 PM by Rama Martinez

## 2022-04-28 NOTE — TELEPHONE ENCOUNTER
Nay with [facility] calling in regards to message below.  Per Nay, verbal ok from Dr Avina not appropriate in this application.  They must have the following as listed below in order for patient to be transitioned as they will be managing all his medications and need patient history for their records.  Please send the following signed orders as soon as possible.    Orders are needed for this patient:  1. Signed orders for all current medications  2. Diagnosis list  3. All notes from the past year      Phone Number Nay can be reached at: [phone] extension 3  Fax number for her location is as follows: [phone]

## 2022-04-28 NOTE — TELEPHONE ENCOUNTER
This is quite a bit of information to gather so I am still working on it and waiting for the provider to sign the orders.

## 2022-05-02 DIAGNOSIS — Z53.9 DIAGNOSIS NOT YET DEFINED: Primary | ICD-10-CM

## 2022-05-02 PROCEDURE — 99207 PR MD CERTIFICATION HHA PATIENT: CPT | Performed by: FAMILY MEDICINE

## 2022-05-09 RX ORDER — OXYCODONE HYDROCHLORIDE 15 MG/1
TABLET ORAL
Status: CANCELLED | OUTPATIENT
Start: 2022-05-09

## 2022-05-16 ENCOUNTER — TELEPHONE (OUTPATIENT)
Dept: FAMILY MEDICINE | Facility: CLINIC | Age: 72
End: 2022-05-16
Payer: COMMERCIAL

## 2022-05-16 NOTE — TELEPHONE ENCOUNTER
Nena with [facility] is calling for verbal orders    PT and OT Eval and Treat.    Call back number is .  Okay to leave message.  Voice mail is secure and confidential.

## 2022-05-18 ENCOUNTER — MEDICAL CORRESPONDENCE (OUTPATIENT)
Dept: HEALTH INFORMATION MANAGEMENT | Facility: CLINIC | Age: 72
End: 2022-05-18
Payer: COMMERCIAL

## 2022-05-19 ENCOUNTER — TELEPHONE (OUTPATIENT)
Dept: FAMILY MEDICINE | Facility: CLINIC | Age: 72
End: 2022-05-19
Payer: COMMERCIAL

## 2022-05-19 ENCOUNTER — TRANSFERRED RECORDS (OUTPATIENT)
Dept: HEALTH INFORMATION MANAGEMENT | Facility: CLINIC | Age: 72
End: 2022-05-19

## 2022-05-20 ENCOUNTER — NURSE TRIAGE (OUTPATIENT)
Dept: NURSING | Facility: CLINIC | Age: 72
End: 2022-05-20
Payer: COMMERCIAL

## 2022-05-20 NOTE — TELEPHONE ENCOUNTER
Triage call:   Conflicting orders   Signed current medication list for patient  Multiple medication questions - newly admitted to the facility  Fax number: [phone]  Ruy Esqueda RN    Clinic please send requested medication list with provider signature today. Thank you.     Alyssa Ramirez RN BSN 5/20/2022 12:27 PM        Additional Information    Negative: Nursing judgment    Negative: Nursing judgment    Negative: Nursing judgment    Nursing judgment    Protocols used: INFORMATION ONLY CALL - NO TRIAGE-A-OH

## 2022-05-23 RX ORDER — VITAMIN B COMPLEX
1 CAPSULE ORAL DAILY
COMMUNITY
Start: 2021-07-22

## 2022-05-23 RX ORDER — BACLOFEN 5 MG/1
2 TABLET ORAL 3 TIMES DAILY
COMMUNITY
Start: 2022-04-22 | End: 2024-03-20

## 2022-05-23 RX ORDER — ACETAMINOPHEN AND CODEINE PHOSPHATE 300; 30 MG/1; MG/1
TABLET ORAL
COMMUNITY
End: 2022-05-23

## 2022-05-23 RX ORDER — UBIDECARENONE 100 MG
4 CAPSULE ORAL DAILY
COMMUNITY
Start: 2022-05-05 | End: 2024-03-20

## 2022-05-23 RX ORDER — ZOSTER VACCINE RECOMBINANT, ADJUVANTED 50 MCG/0.5
KIT INTRAMUSCULAR
COMMUNITY
End: 2022-05-23

## 2022-05-23 NOTE — TELEPHONE ENCOUNTER
Please call to review and reconcile med with the patient. If patient is unable to provide or having additional questions, please alaina up referral to Med management.

## 2022-05-24 ENCOUNTER — PATIENT OUTREACH (OUTPATIENT)
Dept: GERIATRIC MEDICINE | Facility: CLINIC | Age: 72
End: 2022-05-24
Payer: COMMERCIAL

## 2022-05-24 NOTE — PROGRESS NOTES
[facility] Care Coordination Contact    Phone call to member to inquire if has moved into Assisted Living.  He reports that has moved about two weeks ago.  He reports that seems to like it there.  He reports staff helped him moved in.      Ruddy Alexander RN, PHN  [facility]  [phone]

## 2022-05-25 NOTE — TELEPHONE ENCOUNTER
Writer called and spoke to Nay HURLEY, she stated that the patient's gabapentin and baclofen were in question however, the Neurologist was addressing this so she had no further questions. The nurse requested another medication list to be faxed to their office and this was done.

## 2022-06-02 ENCOUNTER — PATIENT OUTREACH (OUTPATIENT)
Dept: GERIATRIC MEDICINE | Facility: CLINIC | Age: 72
End: 2022-06-02
Payer: COMMERCIAL

## 2022-06-02 NOTE — PROGRESS NOTES
[facility] Care Coordination Contact    Had emailed Mateo Ernandez regarding CL input which he emailed.  Member will need to be reassessed as needs are being met by Assisted Living now.  Informed him that will need to complete another assessment and will send for review before submitting to [facility].  Has left voice mail message for member with no response.      Ruddy Alexander RN, PHN  [facility]  [phone]

## 2022-06-06 ENCOUNTER — TELEPHONE (OUTPATIENT)
Dept: FAMILY MEDICINE | Facility: CLINIC | Age: 72
End: 2022-06-06
Payer: COMMERCIAL

## 2022-06-06 ENCOUNTER — PATIENT OUTREACH (OUTPATIENT)
Dept: GERIATRIC MEDICINE | Facility: CLINIC | Age: 72
End: 2022-06-06

## 2022-06-06 ASSESSMENT — ACTIVITIES OF DAILY LIVING (ADL)
DEPENDENT_IADLS:: CLEANING;COOKING;LAUNDRY;SHOPPING;MEAL PREPARATION;INCONTINENCE;TRANSPORTATION;MONEY MANAGEMENT;MEDICATION MANAGEMENT

## 2022-06-06 NOTE — TELEPHONE ENCOUNTER
Please initiate prior authorization for xopenex       KEY: [id number]      Thank you,  Geoff Salvador Jr., CMA on 6/6/2022 at 2:47 PM

## 2022-06-07 NOTE — PROGRESS NOTES
[facility] Care Coordination Contact    [facility] Home Visit Assessment     Home visit for Health Risk Assessment with Jef Centeno Jr. completed on June 6, 2022    Type of residence:: Assisted living  Current living arrangement:: I live in assisted living     Assessment completed with:: Patient    Current Care Plan  Member currently receiving the following home care services: Skilled Nursing   Member currently receiving the following community resources: Lifeline, Transportation Services      Medication Review  Medication reconciliation completed in Epic: No  Medication set-up & administration: RN set up daily.  Assisting Living staff administers medications.  Medication Risk Assessment Medication (1 or more, place referral to MTM): N/A: No risk factors identified  MTM Referral Placed: No: No risk factors idenified    Mental/Behavioral Health   Depression Screening:   PHQ-2 Total Score (Adult) - Positive if 3 or more points; Administer PHQ-9 if positive: 0       Mental health DX:: No        Falls Assessment:   Fallen 2 or more times in the past year?: No   Any fall with injury in the past year?: No    ADL/IADL Dependencies:   Dependent ADLs:: Ambulation-walker, Bathing, Dressing, Grooming, Incontinence, Transfers, Wheelchair-independent, Toileting  Dependent IADLs:: Cleaning, Cooking, Laundry, Shopping, Meal Preparation, Incontinence, Transportation, Money Management, Medication Management    [facility] sponsored benefits: Shared information re: Silver Sneakers/gym memberships, ASA, Calcium +D.    PCA Assessment completed at visit: Not Applicable     Elderly Waiver Eligibility: Yes-will continue on EW    Care Plan & Recommendations: Member has moved into Assisted Living in May 2022.  He reports that will be switching PCP to the Assisted Living in house provider.      See [facility] for detailed assessment information.    Follow-Up Plan: Member informed of future contact, plan to f/u with member  with a 6 month telephone assessment.  Contact information shared with member and family, encouraged member to call with any questions or concerns at any time.    [place] care continuum providers: Please see Snapshot and Care Management Flowsheets for Specific details of care plan.    This CC note routed to PCP.    Ruddy Alexander RN, PHN  [facility]  [phone]

## 2022-06-08 DIAGNOSIS — R35.0 BENIGN PROSTATIC HYPERPLASIA WITH URINARY FREQUENCY: ICD-10-CM

## 2022-06-08 DIAGNOSIS — N40.1 BENIGN PROSTATIC HYPERPLASIA WITH URINARY FREQUENCY: ICD-10-CM

## 2022-06-08 DIAGNOSIS — D50.9 IRON DEFICIENCY ANEMIA: ICD-10-CM

## 2022-06-09 RX ORDER — TAMSULOSIN HYDROCHLORIDE 0.4 MG/1
0.4 CAPSULE ORAL
Qty: 90 CAPSULE | Refills: 3 | Status: SHIPPED | OUTPATIENT
Start: 2022-06-09 | End: 2024-09-26

## 2022-06-09 NOTE — TELEPHONE ENCOUNTER
PA was already denied.  This PA request was not submitted by PA team, so our team is unable to follow up any further.

## 2022-06-13 ENCOUNTER — TELEPHONE (OUTPATIENT)
Dept: GERIATRIC MEDICINE | Facility: CLINIC | Age: 72
End: 2022-06-13
Payer: COMMERCIAL

## 2022-06-13 DIAGNOSIS — G12.21 ALS (AMYOTROPHIC LATERAL SCLEROSIS) (H): Primary | ICD-10-CM

## 2022-06-13 NOTE — TELEPHONE ENCOUNTER
I am a care coordinator for [facility].  We contract with [facility] to provide care coordination for member.  I completed his assessment recently.  Member stated a need pull-ups  due to incontinence. He also stated a need for a bathing chair as previous one was discarded.   I have submitted this order request for your approval and signature.  This item is covered by member's insurance.     If you approve this order, please complete, sign, and route back to me.  I will complete the order process through [facility].

## 2022-06-15 ENCOUNTER — PATIENT OUTREACH (OUTPATIENT)
Dept: GERIATRIC MEDICINE | Facility: CLINIC | Age: 72
End: 2022-06-15
Payer: COMMERCIAL

## 2022-06-15 NOTE — PROGRESS NOTES
[facility] Care Coordination Contact    Received after visit chart from care coordinator.  Completed following tasks: Mailed copy of care plan to client.  Updated services in Database.   Submitted referrals/auths for CL 24 hrs $137.38 with [facility] from 5/11/22-5/10/23.  Mailed copy of POC signature sheet for member to sign and return in SASE.  Mailed Consent to Communicate form  and Mailed [employer] Safe Medication Disposal.    Mailed copy of CL tool to member, faxed copy to AL facility, and submitted authorization to health plan.    Provider Signature - Summary:  Member indicates that they would like a summary of their POC shared with the following EW providers:  [facility].  Letter faxed to providers for signature.     No POC Shared:  Member indicates that they do not want their POC shared with any EW providers.    Juan Carlos Tavarez  Care Management Specialist  [facility]  [phone]

## 2022-06-15 NOTE — LETTER
Lena 15, 2022      JEF BURK JR.  [id number]  [place] [address]      Dear Jef:    At [facility], we are dedicated to improving your health and well-being. Enclosed is the Comprehensive Care Plan that we developed with you on 6/6/22. Please review the Care Plan carefully.    As a reminder, some of the things we discussed at your visit include:    Your physical and mental health    Ways to reduce falls    Health care needs you may have    Don t forget to contact your care coordinator if you:    Have been hospitalized or plan to be hospitalized     Have had a fall     Have experienced a change in physical health    Are experiencing emotional problems     If you do not agree with your Care Plan, have questions about it, or have experienced a change in your needs, please call me at [phone]. If you are hearing impaired, please call the [place] Relay at 373 or [phone] (vqiuva-dz-biygjj relay service).    Sincerely,    Ruddy Alexander RN  [phone]  [email or web address]) is a health plan that contracts with both Medicare and the [place] Medical Assistance (Medicaid) program to provide benefits of both programs to enrollees. Enrollment in [facility] depends on contract renewal.    [id number])     [id number] (11/18)

## 2022-06-16 ENCOUNTER — MEDICAL CORRESPONDENCE (OUTPATIENT)
Dept: HEALTH INFORMATION MANAGEMENT | Facility: CLINIC | Age: 72
End: 2022-06-16

## 2022-06-17 NOTE — PROGRESS NOTES
[facility] Care Coordination Contact    Member moved to : [facility], [address], [address], [place] [address]. No Smith with Dr Ele Valdovinos.  CCRF faxed to [facility].     Juan Carlos Tavarez  Care Management Specialist  [facility]  [phone]

## 2022-06-19 DIAGNOSIS — I50.9 EDEMA DUE TO CONGESTIVE HEART FAILURE (H): ICD-10-CM

## 2022-06-19 RX ORDER — POTASSIUM CHLORIDE 1500 MG/1
TABLET, EXTENDED RELEASE ORAL
Qty: 180 TABLET | Refills: 2 | Status: SHIPPED | OUTPATIENT
Start: 2022-06-19 | End: 2024-03-20

## 2022-06-19 NOTE — TELEPHONE ENCOUNTER
"Last Written Prescription Date:  12/23/21  Last Fill Quantity: 180,  # refills: 1   Last office visit provider:  4/4/22     Requested Prescriptions   Pending Prescriptions Disp Refills     potassium chloride ER (KLOR-CON M) 20 MEQ CR tablet [Pharmacy Med Name: POTASSIUM CL ER 20MEQ TAB] 180 tablet 1     Sig: TAKE ONE TABLET BY MOUTH TWICE A DAY       Potassium Supplements Protocol Passed - 6/19/2022  3:31 AM        Passed - Recent (12 mo) or future (30 days) visit within the authorizing provider's department     Patient has had an office visit with the authorizing provider or a provider within the authorizing providers department within the previous 12 mos or has a future within next 30 days. See \"Patient Info\" tab in inbasket, or \"Choose Columns\" in Meds & Orders section of the refill encounter.              Passed - Medication is active on med list        Passed - Patient is age 18 or older        Passed - Normal serum potassium in past 12 months     Recent Labs   Lab Test 04/04/22  1435   POTASSIUM 3.9                         Robyn Marin 06/19/22 4:08 PM  "

## 2022-06-28 ENCOUNTER — MEDICAL CORRESPONDENCE (OUTPATIENT)
Dept: HEALTH INFORMATION MANAGEMENT | Facility: CLINIC | Age: 72
End: 2022-06-28

## 2022-07-07 DIAGNOSIS — Z53.9 DIAGNOSIS NOT YET DEFINED: Primary | ICD-10-CM

## 2022-07-07 PROCEDURE — G0179 MD RECERTIFICATION HHA PT: HCPCS | Performed by: FAMILY MEDICINE

## 2022-07-07 NOTE — PROGRESS NOTES
[facility] Care Coordination Contact    No longer active with [facility] community case management effective 7/1/22.  Reason for community disenrollment: Change Care System/PCC to [facility] p/Jacob. Disenrollment processed.    Juan Carlos Tavarez  Care Management Specialist  [facility]  [phone]

## 2022-07-16 NOTE — TELEPHONE ENCOUNTER
Problem: Adult Inpatient Plan of Care  Goal: Plan of Care Review  Outcome: Ongoing, Progressing  Goal: Patient-Specific Goal (Individualized)  Outcome: Ongoing, Progressing  Goal: Absence of Hospital-Acquired Illness or Injury  Outcome: Ongoing, Progressing  Goal: Optimal Comfort and Wellbeing  Outcome: Ongoing, Progressing  Goal: Readiness for Transition of Care  Outcome: Ongoing, Progressing     Problem: Diabetes Comorbidity  Goal: Blood Glucose Level Within Targeted Range  Outcome: Ongoing, Progressing     Problem: Fluid and Electrolyte Imbalance (Acute Kidney Injury/Impairment)  Goal: Fluid and Electrolyte Balance  Outcome: Ongoing, Progressing     Problem: Oral Intake Inadequate (Acute Kidney Injury/Impairment)  Goal: Optimal Nutrition Intake  Outcome: Ongoing, Progressing     Problem: Renal Function Impairment (Acute Kidney Injury/Impairment)  Goal: Effective Renal Function  Outcome: Ongoing, Progressing     Problem: Skin Injury Risk Increased  Goal: Skin Health and Integrity  Outcome: Ongoing, Progressing     Problem: Gas Exchange Impaired  Goal: Optimal Gas Exchange  Outcome: Ongoing, Progressing     Problem: Fall Injury Risk  Goal: Absence of Fall and Fall-Related Injury  Outcome: Ongoing, Progressing     Problem: Fluid Imbalance (Pneumonia)  Goal: Fluid Balance  Outcome: Ongoing, Progressing     Problem: Infection (Pneumonia)  Goal: Resolution of Infection Signs and Symptoms  Outcome: Ongoing, Progressing     Problem: Respiratory Compromise (Pneumonia)  Goal: Effective Oxygenation and Ventilation  Outcome: Ongoing, Progressing     Problem: Infection  Goal: Absence of Infection Signs and Symptoms  Outcome: Ongoing, Progressing      Dr. Crabtree or covering MD    Request to stop daily foot soaks due to toe nail that fell off and to just continue cleansing daily with ns and apply gauze. Toe is healing nicely. Also Eleno would like him to have a OT eval for new WC fitting. Ok for OT eval and continue nursing 2x per week for 3 weeks. Also would like to have Medical social worker visit, as his PCA services have not been coming and is needing help in the home.  Thanks,  Shara Tan, RN

## 2022-07-18 NOTE — PROGRESS NOTES
The POC signature page and REA were received and saved in member folder.     Juan Carlos Tavarez  Care Management Specialist  [facility]  [phone]

## 2022-07-21 ENCOUNTER — LAB REQUISITION (OUTPATIENT)
Dept: LAB | Facility: CLINIC | Age: 72
End: 2022-07-21
Payer: COMMERCIAL

## 2022-07-21 DIAGNOSIS — Z51.81 ENCOUNTER FOR THERAPEUTIC DRUG LEVEL MONITORING: ICD-10-CM

## 2022-07-28 ENCOUNTER — PATIENT OUTREACH (OUTPATIENT)
Dept: GERIATRIC MEDICINE | Facility: CLINIC | Age: 72
End: 2022-07-28

## 2022-07-28 NOTE — PROGRESS NOTES
The pull ups and bath bench delivered to member 7/19/22 p/APA.     Juan Carlos Tavarez  Care Management Specialist  [facility]  [phone]

## 2022-07-29 ENCOUNTER — TELEPHONE (OUTPATIENT)
Dept: FAMILY MEDICINE | Facility: CLINIC | Age: 72
End: 2022-07-29

## 2022-07-29 NOTE — TELEPHONE ENCOUNTER
Elo calling to request verbal orders for the following:    discharge client from skilled nursing visits.  She is currently residing in nursing home facility where they are providing services to him.    Please call Elo at [phone]. Secure VM if needed.

## 2022-08-01 ENCOUNTER — MEDICAL CORRESPONDENCE (OUTPATIENT)
Dept: HEALTH INFORMATION MANAGEMENT | Facility: CLINIC | Age: 72
End: 2022-08-01

## 2022-08-01 NOTE — TELEPHONE ENCOUNTER
The Home Care/Assisted Living/Nursing Facility is calling regarding an established patient.  Has the patient seen Home Care in the past or is currently residing in Assisted Living or Nursing Facility? Yes.     Elo calling from [facility] requesting the following orders that are within the Home Care, Assisted Living or Nursing Home Eval and Treatment standing order and can be signed as standing order signature required by RN.    Preferred Call Back Number: [phone]    Home Care Visits Continuation- discharge     Any additional Orders:  Are there any orders requested, not stated above, that are outside of the standing order and must be routed to a licensed practitioner for approval?    No    Writer has verified Requestor will send fax to have orders signed.    Left detailed message on confidential voicemail

## 2022-08-08 LAB
ALBUMIN SERPL BCG-MCNC: 3.6 G/DL (ref 3.5–5.2)
ALP SERPL-CCNC: 92 U/L (ref 40–129)
ALT SERPL W P-5'-P-CCNC: 11 U/L (ref 10–50)
ANION GAP SERPL CALCULATED.3IONS-SCNC: 9 MMOL/L (ref 7–15)
AST SERPL W P-5'-P-CCNC: 30 U/L (ref 10–50)
BASOPHILS # BLD AUTO: 0 10E3/UL (ref 0–0.2)
BASOPHILS NFR BLD AUTO: 1 %
BILIRUB SERPL-MCNC: 0.2 MG/DL
BUN SERPL-MCNC: 6.8 MG/DL (ref 8–23)
CALCIUM SERPL-MCNC: 9 MG/DL (ref 8.8–10.2)
CHLORIDE SERPL-SCNC: 100 MMOL/L (ref 98–107)
CREAT SERPL-MCNC: 0.7 MG/DL (ref 0.67–1.17)
DEPRECATED HCO3 PLAS-SCNC: 30 MMOL/L (ref 22–29)
EOSINOPHIL # BLD AUTO: 0.4 10E3/UL (ref 0–0.7)
EOSINOPHIL NFR BLD AUTO: 6 %
ERYTHROCYTE [DISTWIDTH] IN BLOOD BY AUTOMATED COUNT: 14.8 % (ref 10–15)
GFR SERPL CREATININE-BSD FRML MDRD: >90 ML/MIN/1.73M2
GLUCOSE SERPL-MCNC: 81 MG/DL (ref 70–99)
HCT VFR BLD AUTO: 40 % (ref 40–53)
HGB BLD-MCNC: 13 G/DL (ref 13.3–17.7)
IMM GRANULOCYTES # BLD: 0.1 10E3/UL
IMM GRANULOCYTES NFR BLD: 1 %
LYMPHOCYTES # BLD AUTO: 2 10E3/UL (ref 0.8–5.3)
LYMPHOCYTES NFR BLD AUTO: 31 %
MCH RBC QN AUTO: 29.5 PG (ref 26.5–33)
MCHC RBC AUTO-ENTMCNC: 32.5 G/DL (ref 31.5–36.5)
MCV RBC AUTO: 91 FL (ref 78–100)
MONOCYTES # BLD AUTO: 0.5 10E3/UL (ref 0–1.3)
MONOCYTES NFR BLD AUTO: 8 %
NEUTROPHILS # BLD AUTO: 3.5 10E3/UL (ref 1.6–8.3)
NEUTROPHILS NFR BLD AUTO: 53 %
NRBC # BLD AUTO: 0 10E3/UL
NRBC BLD AUTO-RTO: 0 /100
PLATELET # BLD AUTO: 305 10E3/UL (ref 150–450)
POTASSIUM SERPL-SCNC: 3.8 MMOL/L (ref 3.4–5.3)
PROT SERPL-MCNC: 6.5 G/DL (ref 6.4–8.3)
PSA SERPL-MCNC: 9.06 NG/ML (ref 0–6.5)
RBC # BLD AUTO: 4.4 10E6/UL (ref 4.4–5.9)
SODIUM SERPL-SCNC: 139 MMOL/L (ref 136–145)
WBC # BLD AUTO: 6.4 10E3/UL (ref 4–11)

## 2022-08-08 PROCEDURE — 36415 COLL VENOUS BLD VENIPUNCTURE: CPT | Mod: ORL | Performed by: NURSE PRACTITIONER

## 2022-08-08 PROCEDURE — 85025 COMPLETE CBC W/AUTO DIFF WBC: CPT | Mod: ORL | Performed by: NURSE PRACTITIONER

## 2022-08-08 PROCEDURE — 80053 COMPREHEN METABOLIC PANEL: CPT | Mod: ORL | Performed by: NURSE PRACTITIONER

## 2022-08-08 PROCEDURE — G0103 PSA SCREENING: HCPCS | Mod: ORL | Performed by: NURSE PRACTITIONER

## 2022-08-08 PROCEDURE — P9604 ONE-WAY ALLOW PRORATED TRIP: HCPCS | Mod: ORL | Performed by: NURSE PRACTITIONER

## 2022-08-10 ENCOUNTER — LAB REQUISITION (OUTPATIENT)
Dept: LAB | Facility: CLINIC | Age: 72
End: 2022-08-10
Payer: COMMERCIAL

## 2022-08-10 DIAGNOSIS — Z51.81 ENCOUNTER FOR THERAPEUTIC DRUG LEVEL MONITORING: ICD-10-CM

## 2022-08-11 NOTE — TELEPHONE ENCOUNTER
----- Message from Marco A Avina MD sent at 1/13/2022  9:02 AM CST -----  Microalbumin present.  To ensure its validity, will need to recheck in 1-2 weeks.   Please call and reschedule a lab appointment   
Writer called and LMTCB and make lab appointment.   
no

## 2022-08-15 LAB
ALBUMIN SERPL BCG-MCNC: 3.5 G/DL (ref 3.5–5.2)
ALP SERPL-CCNC: 87 U/L (ref 40–129)
ALT SERPL W P-5'-P-CCNC: 16 U/L (ref 10–50)
ANION GAP SERPL CALCULATED.3IONS-SCNC: 7 MMOL/L (ref 7–15)
AST SERPL W P-5'-P-CCNC: 26 U/L (ref 10–50)
BASOPHILS # BLD AUTO: 0 10E3/UL (ref 0–0.2)
BASOPHILS NFR BLD AUTO: 1 %
BILIRUB SERPL-MCNC: 0.2 MG/DL
BUN SERPL-MCNC: 6.4 MG/DL (ref 8–23)
CALCIUM SERPL-MCNC: 8.9 MG/DL (ref 8.8–10.2)
CHLORIDE SERPL-SCNC: 100 MMOL/L (ref 98–107)
CREAT SERPL-MCNC: 0.69 MG/DL (ref 0.67–1.17)
DEPRECATED HCO3 PLAS-SCNC: 33 MMOL/L (ref 22–29)
EOSINOPHIL # BLD AUTO: 0.3 10E3/UL (ref 0–0.7)
EOSINOPHIL NFR BLD AUTO: 4 %
ERYTHROCYTE [DISTWIDTH] IN BLOOD BY AUTOMATED COUNT: 15.5 % (ref 10–15)
GFR SERPL CREATININE-BSD FRML MDRD: >90 ML/MIN/1.73M2
GLUCOSE SERPL-MCNC: 79 MG/DL (ref 70–99)
HBA1C MFR BLD: 5.4 %
HCT VFR BLD AUTO: 37.7 % (ref 40–53)
HGB BLD-MCNC: 11.9 G/DL (ref 13.3–17.7)
IMM GRANULOCYTES # BLD: 0.1 10E3/UL
IMM GRANULOCYTES NFR BLD: 1 %
LYMPHOCYTES # BLD AUTO: 1.9 10E3/UL (ref 0.8–5.3)
LYMPHOCYTES NFR BLD AUTO: 26 %
MCH RBC QN AUTO: 29.4 PG (ref 26.5–33)
MCHC RBC AUTO-ENTMCNC: 31.6 G/DL (ref 31.5–36.5)
MCV RBC AUTO: 93 FL (ref 78–100)
MONOCYTES # BLD AUTO: 0.6 10E3/UL (ref 0–1.3)
MONOCYTES NFR BLD AUTO: 8 %
NEUTROPHILS # BLD AUTO: 4.5 10E3/UL (ref 1.6–8.3)
NEUTROPHILS NFR BLD AUTO: 60 %
NRBC # BLD AUTO: 0 10E3/UL
NRBC BLD AUTO-RTO: 0 /100
PLATELET # BLD AUTO: 304 10E3/UL (ref 150–450)
POTASSIUM SERPL-SCNC: 3.7 MMOL/L (ref 3.4–5.3)
PROT SERPL-MCNC: 6 G/DL (ref 6.4–8.3)
RBC # BLD AUTO: 4.05 10E6/UL (ref 4.4–5.9)
SODIUM SERPL-SCNC: 140 MMOL/L (ref 136–145)
WBC # BLD AUTO: 7.4 10E3/UL (ref 4–11)

## 2022-08-15 PROCEDURE — 85025 COMPLETE CBC W/AUTO DIFF WBC: CPT | Mod: ORL | Performed by: NURSE PRACTITIONER

## 2022-08-15 PROCEDURE — P9604 ONE-WAY ALLOW PRORATED TRIP: HCPCS | Mod: ORL | Performed by: NURSE PRACTITIONER

## 2022-08-15 PROCEDURE — 36415 COLL VENOUS BLD VENIPUNCTURE: CPT | Mod: ORL | Performed by: NURSE PRACTITIONER

## 2022-08-15 PROCEDURE — 80053 COMPREHEN METABOLIC PANEL: CPT | Mod: ORL | Performed by: NURSE PRACTITIONER

## 2022-08-15 PROCEDURE — 83036 HEMOGLOBIN GLYCOSYLATED A1C: CPT | Mod: ORL | Performed by: NURSE PRACTITIONER

## 2022-09-22 ENCOUNTER — LAB REQUISITION (OUTPATIENT)
Dept: LAB | Facility: CLINIC | Age: 72
End: 2022-09-22
Payer: COMMERCIAL

## 2022-09-22 DIAGNOSIS — N18.9 CHRONIC KIDNEY DISEASE, UNSPECIFIED: ICD-10-CM

## 2022-09-22 DIAGNOSIS — G12.21 AMYOTROPHIC LATERAL SCLEROSIS (H): ICD-10-CM

## 2022-09-26 LAB
ANION GAP SERPL CALCULATED.3IONS-SCNC: 10 MMOL/L (ref 7–15)
BASOPHILS # BLD AUTO: 0 10E3/UL (ref 0–0.2)
BASOPHILS NFR BLD AUTO: 1 %
BUN SERPL-MCNC: 5.9 MG/DL (ref 8–23)
CALCIUM SERPL-MCNC: 9.3 MG/DL (ref 8.8–10.2)
CHLORIDE SERPL-SCNC: 96 MMOL/L (ref 98–107)
CK SERPL-CCNC: 328 U/L (ref 39–308)
CREAT SERPL-MCNC: 0.7 MG/DL (ref 0.67–1.17)
DEPRECATED HCO3 PLAS-SCNC: 30 MMOL/L (ref 22–29)
EOSINOPHIL # BLD AUTO: 0.3 10E3/UL (ref 0–0.7)
EOSINOPHIL NFR BLD AUTO: 4 %
ERYTHROCYTE [DISTWIDTH] IN BLOOD BY AUTOMATED COUNT: 14.9 % (ref 10–15)
GFR SERPL CREATININE-BSD FRML MDRD: >90 ML/MIN/1.73M2
GLUCOSE SERPL-MCNC: 83 MG/DL (ref 70–99)
HCT VFR BLD AUTO: 42.1 % (ref 40–53)
HGB BLD-MCNC: 13.6 G/DL (ref 13.3–17.7)
IMM GRANULOCYTES # BLD: 0.1 10E3/UL
IMM GRANULOCYTES NFR BLD: 1 %
LYMPHOCYTES # BLD AUTO: 2 10E3/UL (ref 0.8–5.3)
LYMPHOCYTES NFR BLD AUTO: 29 %
MCH RBC QN AUTO: 29.6 PG (ref 26.5–33)
MCHC RBC AUTO-ENTMCNC: 32.3 G/DL (ref 31.5–36.5)
MCV RBC AUTO: 92 FL (ref 78–100)
MONOCYTES # BLD AUTO: 0.5 10E3/UL (ref 0–1.3)
MONOCYTES NFR BLD AUTO: 8 %
NEUTROPHILS # BLD AUTO: 3.9 10E3/UL (ref 1.6–8.3)
NEUTROPHILS NFR BLD AUTO: 57 %
NRBC # BLD AUTO: 0 10E3/UL
NRBC BLD AUTO-RTO: 0 /100
PLATELET # BLD AUTO: 309 10E3/UL (ref 150–450)
POTASSIUM SERPL-SCNC: 3.8 MMOL/L (ref 3.4–5.3)
RBC # BLD AUTO: 4.59 10E6/UL (ref 4.4–5.9)
SODIUM SERPL-SCNC: 136 MMOL/L (ref 136–145)
WBC # BLD AUTO: 6.8 10E3/UL (ref 4–11)

## 2022-09-26 PROCEDURE — 82550 ASSAY OF CK (CPK): CPT | Mod: ORL | Performed by: NURSE PRACTITIONER

## 2022-09-26 PROCEDURE — P9604 ONE-WAY ALLOW PRORATED TRIP: HCPCS | Mod: ORL | Performed by: NURSE PRACTITIONER

## 2022-09-26 PROCEDURE — 36415 COLL VENOUS BLD VENIPUNCTURE: CPT | Mod: ORL | Performed by: NURSE PRACTITIONER

## 2022-09-26 PROCEDURE — 80048 BASIC METABOLIC PNL TOTAL CA: CPT | Mod: ORL | Performed by: NURSE PRACTITIONER

## 2022-09-26 PROCEDURE — 85025 COMPLETE CBC W/AUTO DIFF WBC: CPT | Mod: ORL | Performed by: NURSE PRACTITIONER

## 2022-09-28 DIAGNOSIS — J44.1 CHRONIC OBSTRUCTIVE PULMONARY DISEASE WITH ACUTE EXACERBATION (H): ICD-10-CM

## 2022-09-29 RX ORDER — ALBUTEROL SULFATE 90 UG/1
1-2 AEROSOL, METERED RESPIRATORY (INHALATION) EVERY 6 HOURS PRN
Qty: 18 G | Refills: 3 | Status: SHIPPED | OUTPATIENT
Start: 2022-09-29 | End: 2024-09-26

## 2022-09-29 NOTE — TELEPHONE ENCOUNTER
"Routing refill request to provider for review/approval because:  ACT score out of date/not on file.    Last Written Prescription Date:  2/10/22  Last Fill Quantity: 18 g,  # refills: 5   Last office visit provider:  4/4/22     Requested Prescriptions   Pending Prescriptions Disp Refills     albuterol (PROAIR HFA/PROVENTIL HFA/VENTOLIN HFA) 108 (90 Base) MCG/ACT inhaler 18 g 5     Sig: Inhale 1-2 puffs into the lungs every 6 hours as needed for shortness of breath / dyspnea or wheezing       Asthma Maintenance Inhalers - Anticholinergics Failed - 9/29/2022  7:54 AM        Failed - Asthma control assessment score within normal limits in last 6 months     Please review ACT score.           Passed - Patient is age 12 years or older        Passed - Medication is active on med list        Passed - Recent (6 mo) or future (30 days) visit within the authorizing provider's specialty     Patient had office visit in the last 6 months or has a visit in the next 30 days with authorizing provider or within the authorizing provider's specialty.  See \"Patient Info\" tab in inbasket, or \"Choose Columns\" in Meds & Orders section of the refill encounter.           Short-Acting Beta Agonist Inhalers Protocol  Failed - 9/29/2022  7:54 AM        Failed - Asthma control assessment score within normal limits in last 6 months     Please review ACT score.           Passed - Patient is age 12 or older        Passed - Medication is active on med list        Passed - Recent (6 mo) or future (30 days) visit within the authorizing provider's specialty     Patient had office visit in the last 6 months or has a visit in the next 30 days with authorizing provider or within the authorizing provider's specialty.  See \"Patient Info\" tab in inbasket, or \"Choose Columns\" in Meds & Orders section of the refill encounter.                 Ar Cope RN 09/29/22 7:55 AM  "

## 2022-11-24 NOTE — TELEPHONE ENCOUNTER
Refill Approved    Rx renewed per Medication Renewal Policy. Medication was last renewed on 9/1/18  #90 R-3.    Last OV 5/9/19    Shelby Rodríguez, Care Connection Triage/Med Refill 5/29/2019     Requested Prescriptions   Pending Prescriptions Disp Refills     cetirizine (ZYRTEC) 10 MG tablet [Pharmacy Med Name: CETIRIZINE 10MG TABLETS] 90 tablet 0     Sig: TAKE 1 TABLET(10 MG) BY MOUTH DAILY       Antihistamine Refill Protocol Passed - 5/28/2019  6:18 PM        Passed - Patient has had office visit/physical in last year     Last office visit with prescriber/PCP: 5/8/2019 Geoff Crabtree MD OR same dept: 5/8/2019 Geoff Crabtree MD OR same specialty: 5/8/2019 Geoff Crabtree MD  Last physical: Visit date not found Last MTM visit: Visit date not found   Next visit within 3 mo: Visit date not found  Next physical within 3 mo: Visit date not found  Prescriber OR PCP: Geoff Crabtree MD  Last diagnosis associated with med order: 1. Chronic obstructive pulmonary disease, unspecified COPD type (H)  - cetirizine (ZYRTEC) 10 MG tablet [Pharmacy Med Name: CETIRIZINE 10MG TABLETS]; TAKE 1 TABLET(10 MG) BY MOUTH DAILY  Dispense: 90 tablet; Refill: 0    If protocol passes may refill for 12 months if within 3 months of last provider visit (or a total of 15 months).                          none Detail Level: Zone

## 2022-11-30 ENCOUNTER — HOSPITAL ENCOUNTER (EMERGENCY)
Facility: HOSPITAL | Age: 72
Discharge: NURSING, REHAB OR LONG-TERM CARE | End: 2022-11-30
Attending: EMERGENCY MEDICINE | Admitting: EMERGENCY MEDICINE
Payer: COMMERCIAL

## 2022-11-30 VITALS
TEMPERATURE: 99.1 F | HEART RATE: 93 BPM | OXYGEN SATURATION: 95 % | DIASTOLIC BLOOD PRESSURE: 62 MMHG | RESPIRATION RATE: 22 BRPM | SYSTOLIC BLOOD PRESSURE: 117 MMHG

## 2022-11-30 DIAGNOSIS — S31.000A SACRAL WOUND, INITIAL ENCOUNTER: ICD-10-CM

## 2022-11-30 PROCEDURE — 99283 EMERGENCY DEPT VISIT LOW MDM: CPT

## 2022-11-30 RX ORDER — ACETAMINOPHEN 325 MG/1
650 TABLET ORAL ONCE
Status: DISCONTINUED | OUTPATIENT
Start: 2022-11-30 | End: 2022-12-01 | Stop reason: HOSPADM

## 2022-12-01 NOTE — ED TRIAGE NOTES
Patient arrives by medics to triage for evaluation of bed sores on his coccyx/ buttocks.  Patient lives in a care facility and they report that he has deep wounds on his buttocks and coccyx that appear infected.  Patient is moaning in pain in triage.

## 2022-12-01 NOTE — ED PROVIDER NOTES
EMERGENCY DEPARTMENT ENCOUNTER      NAME: Jef Centeno Jr.  AGE: 72 year old male  YOB: 1950  MRN: [id number]  EVALUATION DATE & TIME: No admission date for patient encounter.    PCP: Marco A Avina    ED PROVIDER: Lizzeth Esteban M.D.      Chief Complaint   Patient presents with     Wound Infection         FINAL IMPRESSION:  1. Sacral wound, initial encounter          ED COURSE & MEDICAL DECISION MAKING:     10:06 PM Patient with stage 2 sacral wound present without drainage/redness/induration longstanding, mepilex applied and tylenol began and wound care referral provided with no signs of deep infection, cellulitis or sepsis. Patient discharged after being provided with extensive anticipatory guidance and given return precautions, importance of PMD follow-up emphasized.     Pertinent Labs & Imaging studies reviewed. (See chart for details)    N95 worn  A face shield was worn also  COVID PPE      At the conclusion of the encounter I discussed the results of all of the tests and the disposition. The questions were answered. The patient or family acknowledged understanding and was agreeable with the care plan.     MEDICATIONS GIVEN IN THE EMERGENCY:  Medications   acetaminophen (TYLENOL) tablet 650 mg (has no administration in time range)       NEW PRESCRIPTIONS STARTED AT TODAY'S ER VISIT  Discharge Medication List as of 11/30/2022  9:27 PM             =================================================================    HPI      Jef Centeno Jr. is a 72 year old male with PMHx of type 2 diabetes with neuropathic pain syndrome, HTN, ALS, prior sacral wound,  who presents to the ED today via EMS with a buttock wound.    Patient reports buttock sores for the past 2 weeks. States he last had the sores 10 years ago. States his pain is 10/10. Denies taking any medication for his pain. States he used to see a wound doctor, but doesn't anymore. Denies fever.       REVIEW OF SYSTEMS   All other systems  reviewed and are negative except as noted above in HPI.    PAST MEDICAL HISTORY:  Past Medical History:   Diagnosis Date     ALS (amyotrophic lateral sclerosis) (H)      BPH (benign prostatic hyperplasia)      Closed fracture of tibia and fibula, left, initial encounter      COPD (chronic obstructive pulmonary disease) (H)      Decubitus ulcer, buttock      Erectile dysfunction associated with type 2 diabetes mellitus (H)      Fracture tibia/fibula, left, closed, initial encounter 6/6/2019     HTN (hypertension)      Obstructive sleep apnea      Postherpetic neuralgia      Type 2 diabetes mellitus with diabetic neuropathy (H)        PAST SURGICAL HISTORY:  Past Surgical History:   Procedure Laterality Date     CHOLECYSTECTOMY       LAPAROSCOPIC GASTROTOMY W/ REPAIR OF ULCER       POSTERIOR FUSION CERVICAL SPINE      posterior fusion C2-C4 with laminnectomy; excision cervical lipoma      SIGMOIDOSCOPY FLEXIBLE  06/29/2008       CURRENT MEDICATIONS:    albuterol (PROAIR HFA/PROVENTIL HFA/VENTOLIN HFA) 108 (90 Base) MCG/ACT inhaler  aspirin (ASA) 81 MG EC tablet  atorvastatin (LIPITOR) 10 MG tablet  Baclofen (LIORESAL) 5 MG tablet  bisacodyL (DULCOLAX) 5 mg EC tablet  budesonide (PULMICORT) 1 mg/2 mL nebulizer solution  budesonide-formoterol (SYMBICORT) 160-4.5 MCG/ACT Inhaler  calcium carbonate 600 mg-vitamin D 400 units (CALTRATE) 600-400 MG-UNIT per tablet  cetirizine (ZYRTEC) 10 MG tablet  Cholecalciferol (D3-1000) 25 MCG (1000 UT) CAPS  docusate sodium (COLACE) 100 MG capsule  DULoxetine (CYMBALTA) 20 MG capsule  ferrous sulfate (FEROSUL) 325 (65 Fe) MG tablet  fexofenadine (ALLEGRA) 180 MG tablet  folic acid (FOLVITE) 1 MG tablet  furosemide (LASIX) 40 MG tablet  gabapentin (NEURONTIN) 300 MG capsule  hydrOXYzine (ATARAX) 25 MG tablet  ipratropium-albuterol (DUO-NEB) 0.5-2.5 mg/3 mL nebulizer  levalbuterol (XOPENEX HFA) 45 mcg/actuation inhaler  lidocaine (LIDODERM) 5 % patch  LORazepam (ATIVAN) 1 MG  tablet  magnesium citrate solution  MEDICATION CANNOT BE REORDERED - PLEASE MANUALLY REORDER AND DISCONTINUE THE OLD ORDER  meloxicam (MOBIC) 7.5 MG tablet  metoprolol tartrate (LOPRESSOR) 25 MG tablet  multivitamin (ONE A DAY) per tablet  mupirocin (BACTROBAN) 2 % ointment  naloxone (NARCAN) 4 mg/actuation nasal spray  Nutritional Supplements (ENSURE HIGH PROTEIN)  nystatin (MYCOSTATIN) 211253 UNIT/ML suspension  omeprazole (PRILOSEC) 20 MG DR capsule  oxyCODONE IR (ROXICODONE) 15 MG tablet  oxyCODONE IR (ROXICODONE) 30 MG tablet  polyethylene glycol (GLYCOLAX) 17 gram/dose powder  potassium chloride ER (KLOR-CON M) 20 MEQ CR tablet  prednisoLONE acetate (PRED FORTE) 1 % ophthalmic suspension  pregabalin (LYRICA) 75 MG capsule  riluzole (RILUTEK) 50 MG tablet  senna-docusate (PERICOLACE) 8.6-50 mg tablet  SPIRIVA HANDIHALER 18 MCG inhaled capsule  tamsulosin (FLOMAX) 0.4 MG capsule  triamcinolone (KENALOG) 0.1 % cream  vitamin (B COMPLEX) capsule        ALLERGIES:  No Known Allergies    FAMILY HISTORY:  Family History   Problem Relation Age of Onset     Diabetes Mother      Diabetes Father      Cancer Father      Diabetes Sister      Diabetes Brother        SOCIAL HISTORY:   Social History     Socioeconomic History     Marital status:    Tobacco Use     Smoking status: Every Day     Packs/day: 10.00     Years: 46.00     Pack years: 460.00     Types: Cigarettes     Smokeless tobacco: Never   Substance and Sexual Activity     Alcohol use: No     Comment: Alcoholic Drinks/day: Quit drinking in 2014. Prior to that he drank excessively.     Drug use: No   Social History Narrative    . Wife passed away in 2018. 5 children by previous wife are alive and well. Patient has social security disability.  Used to be an  continues to work at [employer].       VITALS:  Patient Vitals for the past 24 hrs:   BP Temp Temp src Pulse Resp SpO2   11/30/22 1946 117/62 99.1  F (37.3  C) Tympanic 93 22 95 %        PHYSICAL EXAM    GENERAL: Awake, alert.  In no acute distress.   HEENT: Normocephalic, atraumatic.  Pupils equal, round and reactive.  Conjunctiva normal.  EOMI.  NECK: No stridor or apparent deformity.  EXTREMITIES: No lower extremity swelling or edema.    NEURO: Alert and oriented to person, place and time.  Cranial nerves grossly intact.  No focal motor deficit.  PSYCH: Normal mood and affect  SKIN: Stage 2 bilateral sacral decubitus ulcerations with purulent drainage. Tender but no redness, induration or warmth      LAB:  All pertinent labs reviewed and interpreted.       RADIOLOGY:  Reviewed all pertinent imaging. Please see official radiology report.  No orders to display                 IJustin, am serving as a scribe to document services personally performed by Dr. Lizzeth Esteban based on my observation and the provider's statements to me. Lizzeth WRAY MD attest that Justin Nolan is acting in a scribe capacity, has observed my performance of the services and has documented them in accordance with my direction.     Lizzeth Esteban MD  11/30/22 0942

## 2022-12-08 ENCOUNTER — TELEPHONE (OUTPATIENT)
Dept: FAMILY MEDICINE | Facility: CLINIC | Age: 72
End: 2022-12-08

## 2022-12-08 NOTE — TELEPHONE ENCOUNTER
We have received form from [employer] on Jef's power wheelchair.  Please advise patient to be seen in order to complete this form

## 2022-12-13 NOTE — TELEPHONE ENCOUNTER
Left message to return call to schedule appointment with PCP in regards of form.    Kelly Robert MA on 12/13/2022 at 8:10 AM

## 2022-12-15 NOTE — TELEPHONE ENCOUNTER
Patient scheduled to see PCP on 01/11/22 at 10:30 am.    Kelly Robert MA on 12/15/2022 at 2:18 PM

## 2023-01-12 ENCOUNTER — TELEPHONE (OUTPATIENT)
Dept: VASCULAR SURGERY | Facility: CLINIC | Age: 73
End: 2023-01-12

## 2023-01-12 NOTE — TELEPHONE ENCOUNTER
Patient was transferred to the [facility] vascular phone line to reschedule a visit with Dr. Avina.  He had a wound care referral that was entered on 11/30/2022.  I confirmed that the wound is not open or draining and is red and sore and that he should continue to follow up with the PCP unless the wound becomes open.  I transferred him to the [facility] primary care clinic.

## 2023-01-26 ENCOUNTER — LAB REQUISITION (OUTPATIENT)
Dept: LAB | Facility: CLINIC | Age: 73
End: 2023-01-26
Payer: COMMERCIAL

## 2023-01-26 DIAGNOSIS — I10 ESSENTIAL (PRIMARY) HYPERTENSION: ICD-10-CM

## 2023-01-30 LAB — POTASSIUM SERPL-SCNC: 3.6 MMOL/L (ref 3.4–5.3)

## 2023-01-30 PROCEDURE — 84132 ASSAY OF SERUM POTASSIUM: CPT | Mod: ORL | Performed by: NURSE PRACTITIONER

## 2023-01-30 PROCEDURE — 36415 COLL VENOUS BLD VENIPUNCTURE: CPT | Mod: ORL | Performed by: NURSE PRACTITIONER

## 2023-01-30 PROCEDURE — P9604 ONE-WAY ALLOW PRORATED TRIP: HCPCS | Mod: ORL | Performed by: NURSE PRACTITIONER

## 2023-02-04 NOTE — TELEPHONE ENCOUNTER
Patient seen in clinic today 9-1-21 and is calling about his Oxycodone prescription he said would be sent to the [facility] in [place].  RN unable to see current new prescription, though notes indicate a change in how he is to take.  Last is dated 6-28-21.    Haylee Villatoro RN  [facility]      Reason for Disposition    Prescription not at pharmacy and was prescribed today by PCP    Protocols used: MEDICATION QUESTION CALL-A-OH       Thank you for visiting the [facility]-In, [place]     It is difficult to recognize all elements of any illness or injury in a single visit. The examination, treatment, and x-rays received are on a preliminary basis only. A radiologist will also review your x-rays for final reading. Call your primary care provider if you have questions or problems before your next appointment. If you are unable to  reach your Primary Care Provider (PCP), please call or return to the Walk-In Clinic.  If symptoms worsen or do not resolve please follow-up with your Primary Care Provider (PCP), Walk-In or the nearest  Emergency Room for emergency symptoms.  If you are unsure of whom to follow up with, call [phone] and ask to speak with either your PCP, the Walk-In nurse or the on-call Provider if you are calling after hours.      If you are referred to a specialist or scheduled for a test, our Referrals department will call you with your appointment date and time within 3 business days. If you have not heard from them in this time frame, please call [phone] and ask for the Referrals department.     Test results: Unless otherwise instructed, you should be notified of test results within one week. Please call our office if you do not hear from us within this time frame at [phone].     Hours:  Monday through Friday: 7:00 am to 7:00 pm  Saturday: 7:00 am to 3:00 pm  Sunday: 7:00 am to 3:00 pm.  Holiday hours may vary, please call [phone] on Holidays.  Walk-In is closed on Thanksgiving Day, Suzie Day and  New Year's Day.      Thank you again for visiting [facility] [place].  Jayden Caballero MD

## 2023-04-14 ENCOUNTER — LAB REQUISITION (OUTPATIENT)
Dept: LAB | Facility: CLINIC | Age: 73
End: 2023-04-14
Payer: COMMERCIAL

## 2023-04-14 DIAGNOSIS — I10 ESSENTIAL (PRIMARY) HYPERTENSION: ICD-10-CM

## 2023-04-17 LAB
ALBUMIN SERPL BCG-MCNC: 4.1 G/DL (ref 3.5–5.2)
ALP SERPL-CCNC: 118 U/L (ref 40–129)
ALT SERPL W P-5'-P-CCNC: 12 U/L (ref 10–50)
ANION GAP SERPL CALCULATED.3IONS-SCNC: 14 MMOL/L (ref 7–15)
AST SERPL W P-5'-P-CCNC: 39 U/L (ref 10–50)
BILIRUB SERPL-MCNC: 0.4 MG/DL
BUN SERPL-MCNC: 4.8 MG/DL (ref 8–23)
CALCIUM SERPL-MCNC: 9.6 MG/DL (ref 8.8–10.2)
CHLORIDE SERPL-SCNC: 97 MMOL/L (ref 98–107)
CHOLEST SERPL-MCNC: 114 MG/DL
CREAT SERPL-MCNC: 0.73 MG/DL (ref 0.67–1.17)
DEPRECATED HCO3 PLAS-SCNC: 27 MMOL/L (ref 22–29)
ERYTHROCYTE [DISTWIDTH] IN BLOOD BY AUTOMATED COUNT: 14.6 % (ref 10–15)
GFR SERPL CREATININE-BSD FRML MDRD: >90 ML/MIN/1.73M2
GLUCOSE SERPL-MCNC: 72 MG/DL (ref 70–99)
HCT VFR BLD AUTO: 43.6 % (ref 40–53)
HDLC SERPL-MCNC: 42 MG/DL
HGB BLD-MCNC: 14 G/DL (ref 13.3–17.7)
LDLC SERPL CALC-MCNC: 49 MG/DL
MCH RBC QN AUTO: 29.3 PG (ref 26.5–33)
MCHC RBC AUTO-ENTMCNC: 32.1 G/DL (ref 31.5–36.5)
MCV RBC AUTO: 91 FL (ref 78–100)
NONHDLC SERPL-MCNC: 72 MG/DL
PLATELET # BLD AUTO: 419 10E3/UL (ref 150–450)
POTASSIUM SERPL-SCNC: 3.2 MMOL/L (ref 3.4–5.3)
PROT SERPL-MCNC: 7.2 G/DL (ref 6.4–8.3)
RBC # BLD AUTO: 4.78 10E6/UL (ref 4.4–5.9)
SODIUM SERPL-SCNC: 138 MMOL/L (ref 136–145)
TRIGL SERPL-MCNC: 115 MG/DL
WBC # BLD AUTO: 7.4 10E3/UL (ref 4–11)

## 2023-04-17 PROCEDURE — 80061 LIPID PANEL: CPT | Mod: ORL | Performed by: NURSE PRACTITIONER

## 2023-04-17 PROCEDURE — P9604 ONE-WAY ALLOW PRORATED TRIP: HCPCS | Mod: ORL | Performed by: NURSE PRACTITIONER

## 2023-04-17 PROCEDURE — 80053 COMPREHEN METABOLIC PANEL: CPT | Mod: ORL | Performed by: NURSE PRACTITIONER

## 2023-04-17 PROCEDURE — 36415 COLL VENOUS BLD VENIPUNCTURE: CPT | Mod: ORL | Performed by: NURSE PRACTITIONER

## 2023-04-17 PROCEDURE — 85027 COMPLETE CBC AUTOMATED: CPT | Mod: ORL | Performed by: NURSE PRACTITIONER

## 2023-04-19 ENCOUNTER — MEDICAL CORRESPONDENCE (OUTPATIENT)
Dept: HEALTH INFORMATION MANAGEMENT | Facility: CLINIC | Age: 73
End: 2023-04-19
Payer: COMMERCIAL

## 2023-04-27 ENCOUNTER — LAB REQUISITION (OUTPATIENT)
Dept: LAB | Facility: CLINIC | Age: 73
End: 2023-04-27
Payer: COMMERCIAL

## 2023-04-27 DIAGNOSIS — E87.6 HYPOKALEMIA: ICD-10-CM

## 2023-05-08 LAB — POTASSIUM SERPL-SCNC: 3.5 MMOL/L (ref 3.4–5.3)

## 2023-05-08 PROCEDURE — P9604 ONE-WAY ALLOW PRORATED TRIP: HCPCS | Mod: ORL | Performed by: NURSE PRACTITIONER

## 2023-05-08 PROCEDURE — 36415 COLL VENOUS BLD VENIPUNCTURE: CPT | Mod: ORL | Performed by: NURSE PRACTITIONER

## 2023-05-08 PROCEDURE — 84132 ASSAY OF SERUM POTASSIUM: CPT | Mod: ORL | Performed by: NURSE PRACTITIONER

## 2023-05-10 ENCOUNTER — MEDICAL CORRESPONDENCE (OUTPATIENT)
Dept: HEALTH INFORMATION MANAGEMENT | Facility: CLINIC | Age: 73
End: 2023-05-10
Payer: COMMERCIAL

## 2023-05-15 ENCOUNTER — MEDICAL CORRESPONDENCE (OUTPATIENT)
Dept: HEALTH INFORMATION MANAGEMENT | Facility: CLINIC | Age: 73
End: 2023-05-15
Payer: COMMERCIAL

## 2023-06-23 ENCOUNTER — APPOINTMENT (OUTPATIENT)
Dept: RADIOLOGY | Facility: HOSPITAL | Age: 73
End: 2023-06-23
Attending: NURSE PRACTITIONER
Payer: COMMERCIAL

## 2023-06-23 ENCOUNTER — HOSPITAL ENCOUNTER (EMERGENCY)
Facility: HOSPITAL | Age: 73
Discharge: HOME | End: 2023-06-23
Admitting: NURSE PRACTITIONER
Payer: COMMERCIAL

## 2023-06-23 VITALS
BODY MASS INDEX: 21.52 KG/M2 | HEART RATE: 73 BPM | SYSTOLIC BLOOD PRESSURE: 107 MMHG | OXYGEN SATURATION: 97 % | DIASTOLIC BLOOD PRESSURE: 66 MMHG | TEMPERATURE: 98.3 F | RESPIRATION RATE: 20 BRPM | HEIGHT: 70 IN

## 2023-06-23 DIAGNOSIS — R53.1 WEAKNESS: ICD-10-CM

## 2023-06-23 DIAGNOSIS — E87.6 HYPOKALEMIA: ICD-10-CM

## 2023-06-23 DIAGNOSIS — J44.9 CHRONIC OBSTRUCTIVE PULMONARY DISEASE, UNSPECIFIED COPD TYPE (H): ICD-10-CM

## 2023-06-23 LAB
ALBUMIN SERPL BCG-MCNC: 3.4 G/DL (ref 3.5–5.2)
ALBUMIN UR-MCNC: NEGATIVE MG/DL
ALP SERPL-CCNC: 102 U/L (ref 40–129)
ALT SERPL W P-5'-P-CCNC: 12 U/L (ref 0–70)
ANION GAP SERPL CALCULATED.3IONS-SCNC: 11 MMOL/L (ref 7–15)
APPEARANCE UR: CLEAR
AST SERPL W P-5'-P-CCNC: 29 U/L (ref 0–45)
BACTERIA #/AREA URNS HPF: ABNORMAL /HPF
BASOPHILS # BLD AUTO: 0 10E3/UL (ref 0–0.2)
BASOPHILS NFR BLD AUTO: 0 %
BILIRUB SERPL-MCNC: 0.6 MG/DL
BILIRUB UR QL STRIP: NEGATIVE
BUN SERPL-MCNC: 6.3 MG/DL (ref 8–23)
CALCIUM SERPL-MCNC: 8.8 MG/DL (ref 8.8–10.2)
CHLORIDE SERPL-SCNC: 96 MMOL/L (ref 98–107)
COLOR UR AUTO: YELLOW
CREAT SERPL-MCNC: 0.71 MG/DL (ref 0.67–1.17)
DEPRECATED HCO3 PLAS-SCNC: 31 MMOL/L (ref 22–29)
EOSINOPHIL # BLD AUTO: 0.1 10E3/UL (ref 0–0.7)
EOSINOPHIL NFR BLD AUTO: 1 %
ERYTHROCYTE [DISTWIDTH] IN BLOOD BY AUTOMATED COUNT: 15.8 % (ref 10–15)
FLUAV RNA SPEC QL NAA+PROBE: NEGATIVE
FLUBV RNA RESP QL NAA+PROBE: NEGATIVE
GFR SERPL CREATININE-BSD FRML MDRD: >90 ML/MIN/1.73M2
GLUCOSE SERPL-MCNC: 90 MG/DL (ref 70–99)
GLUCOSE UR STRIP-MCNC: NEGATIVE MG/DL
HCT VFR BLD AUTO: 41.1 % (ref 40–53)
HGB BLD-MCNC: 13.7 G/DL (ref 13.3–17.7)
HGB UR QL STRIP: NEGATIVE
IMM GRANULOCYTES # BLD: 0.1 10E3/UL
IMM GRANULOCYTES NFR BLD: 1 %
KETONES UR STRIP-MCNC: 20 MG/DL
LACTATE SERPL-SCNC: 0.9 MMOL/L (ref 0.7–2)
LEUKOCYTE ESTERASE UR QL STRIP: NEGATIVE
LYMPHOCYTES # BLD AUTO: 1.5 10E3/UL (ref 0.8–5.3)
LYMPHOCYTES NFR BLD AUTO: 12 %
MCH RBC QN AUTO: 29.3 PG (ref 26.5–33)
MCHC RBC AUTO-ENTMCNC: 33.3 G/DL (ref 31.5–36.5)
MCV RBC AUTO: 88 FL (ref 78–100)
MONOCYTES # BLD AUTO: 0.9 10E3/UL (ref 0–1.3)
MONOCYTES NFR BLD AUTO: 7 %
NEUTROPHILS # BLD AUTO: 10 10E3/UL (ref 1.6–8.3)
NEUTROPHILS NFR BLD AUTO: 79 %
NITRATE UR QL: NEGATIVE
NRBC # BLD AUTO: 0 10E3/UL
NRBC BLD AUTO-RTO: 0 /100
PH UR STRIP: 8 [PH] (ref 5–7)
PLATELET # BLD AUTO: 302 10E3/UL (ref 150–450)
POTASSIUM SERPL-SCNC: 2.8 MMOL/L (ref 3.4–5.3)
PROT SERPL-MCNC: 6.7 G/DL (ref 6.4–8.3)
RBC # BLD AUTO: 4.68 10E6/UL (ref 4.4–5.9)
RBC URINE: 5 /HPF
RSV RNA SPEC NAA+PROBE: NEGATIVE
SARS-COV-2 RNA RESP QL NAA+PROBE: NEGATIVE
SODIUM SERPL-SCNC: 138 MMOL/L (ref 136–145)
SP GR UR STRIP: 1.01 (ref 1–1.03)
UROBILINOGEN UR STRIP-MCNC: 4 MG/DL
WBC # BLD AUTO: 12.6 10E3/UL (ref 4–11)
WBC URINE: 1 /HPF

## 2023-06-23 PROCEDURE — 85025 COMPLETE CBC W/AUTO DIFF WBC: CPT | Performed by: NURSE PRACTITIONER

## 2023-06-23 PROCEDURE — 80053 COMPREHEN METABOLIC PANEL: CPT | Performed by: NURSE PRACTITIONER

## 2023-06-23 PROCEDURE — 87637 SARSCOV2&INF A&B&RSV AMP PRB: CPT | Performed by: NURSE PRACTITIONER

## 2023-06-23 PROCEDURE — 81001 URINALYSIS AUTO W/SCOPE: CPT | Performed by: NURSE PRACTITIONER

## 2023-06-23 PROCEDURE — 99284 EMERGENCY DEPT VISIT MOD MDM: CPT | Mod: 25

## 2023-06-23 PROCEDURE — 250N000013 HC RX MED GY IP 250 OP 250 PS 637: Performed by: NURSE PRACTITIONER

## 2023-06-23 PROCEDURE — 36415 COLL VENOUS BLD VENIPUNCTURE: CPT | Performed by: NURSE PRACTITIONER

## 2023-06-23 PROCEDURE — 71045 X-RAY EXAM CHEST 1 VIEW: CPT

## 2023-06-23 PROCEDURE — 83605 ASSAY OF LACTIC ACID: CPT | Performed by: NURSE PRACTITIONER

## 2023-06-23 RX ORDER — POTASSIUM CHLORIDE 1500 MG/1
20 TABLET, EXTENDED RELEASE ORAL 2 TIMES DAILY
Qty: 14 TABLET | Refills: 0 | Status: SHIPPED | OUTPATIENT
Start: 2023-06-23 | End: 2023-06-30

## 2023-06-23 RX ORDER — POTASSIUM CHLORIDE 1500 MG/1
40 TABLET, EXTENDED RELEASE ORAL ONCE
Status: COMPLETED | OUTPATIENT
Start: 2023-06-23 | End: 2023-06-23

## 2023-06-23 RX ORDER — DOXYCYCLINE 100 MG/1
100 CAPSULE ORAL ONCE
Status: COMPLETED | OUTPATIENT
Start: 2023-06-23 | End: 2023-06-23

## 2023-06-23 RX ORDER — DOXYCYCLINE 100 MG/1
100 CAPSULE ORAL 2 TIMES DAILY
Qty: 14 CAPSULE | Refills: 0 | Status: SHIPPED | OUTPATIENT
Start: 2023-06-23 | End: 2023-06-30

## 2023-06-23 RX ADMIN — POTASSIUM CHLORIDE 40 MEQ: 1500 TABLET, EXTENDED RELEASE ORAL at 11:25

## 2023-06-23 RX ADMIN — DOXYCYCLINE HYCLATE 100 MG: 100 CAPSULE ORAL at 12:38

## 2023-06-23 ASSESSMENT — ENCOUNTER SYMPTOMS
FEVER: 1
WEAKNESS: 1
COUGH: 1
SORE THROAT: 0
SHORTNESS OF BREATH: 1
DYSURIA: 0

## 2023-06-23 ASSESSMENT — ACTIVITIES OF DAILY LIVING (ADL)
ADLS_ACUITY_SCORE: 35
ADLS_ACUITY_SCORE: 41

## 2023-06-23 NOTE — ED NOTES
Bed: JNED-27  Expected date: 6/23/23  Expected time: 9:34 AM  Means of arrival:   Comments:  Weakness/fever/ [place]

## 2023-06-23 NOTE — ED TRIAGE NOTES
Pt arrives via [facility] from [facility].  Pt was reportedly febrile up to 102.7 at [facility] living and experiencing weakness/lethargy.

## 2023-06-23 NOTE — ED NOTES
Pt discharged to assistive living via stretcher with personal belongings and discharge instrucions. Report given to [facility].

## 2023-06-23 NOTE — ED PROVIDER NOTES
EMERGENCY DEPARTMENT ENCOUNTER      NAME: Jef Centeno Jr.  AGE: 72 year old male  YOB: 1950  MRN: [id number]  EVALUATION DATE & TIME: 2023  9:44 AM    PCP: Marco A Avina    ED PROVIDER: ZEKE Corea, CNP      Chief Complaint   Patient presents with     Fever     Generalized Weakness       FINAL IMPRESSION:  1. Weakness    2. Hypokalemia    3. Chronic obstructive pulmonary disease, unspecified COPD type (H)        ED COURSE & MEDICAL DECISION MAKIN:53 AM I met with the patient, obtained history, performed an initial exam, and discussed options and plan for treatment here in the ED.  11:36 AM Updated patient on diagnostic findings. We also discussed the plan for discharge. They are agreeable and comfortable with plan.    Pertinent Labs & Imaging studies reviewed. (See chart for details)  72 year old male presents to the Emergency Department for evaluation of fever and weakness.  Evaluated the patient's sacral decubitus ulcer.  This does not appear to be acutely infected.  There is no evidence for urinary tract infection and his abdomen is benign.  Does endorse some respiratory symptoms.  Though no focal infiltrate noted on x-ray, does have underlying COPD and there may be a bacterial component to his respiratory illness.  COVID and influenza testing were negative.  Oxygen saturations here have been 93 to 92%.  Does not complain of any difficulty breathing.  Vital signs otherwise reassuring and has been afebrile here.  Potassium today was low at 2.8.  Given 40 meq replacement and will continue with 20 mEq twice daily for 7 days.  Doxycycline 100 mg twice daily for 7 days.  Clinic follow-up in 1 week recommended.  Was also given return precautions    Medical Decision Making    History:    Supplemental history from: EMS     External Record(s) reviewed: Documented in chart, if applicable.    Work Up:    Chart documentation includes differential considered and any EKGs or imaging  independently interpreted by provider, where specified.    In additional to work up documented, I considered the following work up: Documented in chart, if applicable.    External consultation:     Discussion of management with another provider: Documented in chart, if applicable    Complicating factors:    Care impacted by chronic illness: Cancer/Chemotherapy, Chronic Lung Disease, Chronic Pain, Diabetes, Heart Disease, Hyperlipidemia, Hypertension, Smoking / Nicotine Use and Other: ALS    Care affected by social determinants of health: Access to Medical Care    Disposition considerations: Discharge. I prescribed additional prescription strength medication(s) as charted. See documentation for any additional details.    At the conclusion of the encounter I discussed the results of all of the tests and the disposition. The questions were answered. The patient or family acknowledged understanding and was agreeable with the care plan.     0 minutes of critical care time     MEDICATIONS GIVEN IN THE EMERGENCY:  Medications   doxycycline hyclate (VIBRAMYCIN) capsule 100 mg (has no administration in time range)   potassium chloride ER (KLOR-CON M) CR tablet 40 mEq (40 mEq Oral $Given 6/23/23 1125)       NEW PRESCRIPTIONS STARTED AT TODAY'S ER VISIT  New Prescriptions    DOXYCYCLINE HYCLATE (VIBRAMYCIN) 100 MG CAPSULE    Take 1 capsule (100 mg) by mouth 2 times daily for 7 days    POTASSIUM CHLORIDE ER (K-TAB) 20 MEQ CR TABLET    Take 1 tablet (20 mEq) by mouth 2 times daily for 7 days       =================================================================    Patient information was obtained from: Patient, EMS    Use of Intrepreter: N/A     Limitations to History: None     HPI    Jef Centeno Jr. is a 72 year old male with a history of ALS with paraparesis, DM II, arteriosclerotic heart disease, BPH with prostate cancer, COPD, HTN, HLD, AJAY, and chronic pain who presents to the ED by EMS for evaluation of generalized  weakness.    Patient reports that he developed a worsening generalized weakness last evening before going to bed. He also complains of a fever (up to 102.7 F per EMS) and a cough with green sputum for the last few weeks. Patient notes some shortness of breath with the cough, however, he denies any at present. He also denies any abdominal pain, dysuria, sore throat, or any acute changes to his chronic pain. No known UTI.     Of note, patient reports that his niece was sick with a cold-like illness.     Review of Systems   Constitutional: Positive for fever (up to 102.7 F).   HENT: Negative for sore throat.    Respiratory: Positive for cough (last few weeks with green sputum) and shortness of breath (resolved).    Genitourinary: Negative for dysuria.   Musculoskeletal:        Negative for any acute pain   Neurological: Positive for weakness (generalized, acute).       PAST MEDICAL HISTORY:  Past Medical History:   Diagnosis Date     ALS (amyotrophic lateral sclerosis) (H)      BPH (benign prostatic hyperplasia)      Closed fracture of tibia and fibula, left, initial encounter      COPD (chronic obstructive pulmonary disease) (H)      Decubitus ulcer, buttock      Erectile dysfunction associated with type 2 diabetes mellitus (H)      Fracture tibia/fibula, left, closed, initial encounter 6/6/2019     HTN (hypertension)      Obstructive sleep apnea      Postherpetic neuralgia      Type 2 diabetes mellitus with diabetic neuropathy (H)        PAST SURGICAL HISTORY:  Past Surgical History:   Procedure Laterality Date     CHOLECYSTECTOMY       LAPAROSCOPIC GASTROTOMY W/ REPAIR OF ULCER       POSTERIOR FUSION CERVICAL SPINE      posterior fusion C2-C4 with laminnectomy; excision cervical lipoma      SIGMOIDOSCOPY FLEXIBLE  06/29/2008       CURRENT MEDICATIONS:    Current Facility-Administered Medications   Medication     doxycycline hyclate (VIBRAMYCIN) capsule 100 mg     Current Outpatient Medications   Medication      doxycycline hyclate (VIBRAMYCIN) 100 MG capsule     potassium chloride ER (K-TAB) 20 MEQ CR tablet     albuterol (PROAIR HFA/PROVENTIL HFA/VENTOLIN HFA) 108 (90 Base) MCG/ACT inhaler     aspirin (ASA) 81 MG EC tablet     atorvastatin (LIPITOR) 10 MG tablet     Baclofen (LIORESAL) 5 MG tablet     bisacodyL (DULCOLAX) 5 mg EC tablet     budesonide (PULMICORT) 1 mg/2 mL nebulizer solution     budesonide-formoterol (SYMBICORT) 160-4.5 MCG/ACT Inhaler     calcium carbonate 600 mg-vitamin D 400 units (CALTRATE) 600-400 MG-UNIT per tablet     cetirizine (ZYRTEC) 10 MG tablet     Cholecalciferol (D3-1000) 25 MCG (1000 UT) CAPS     docusate sodium (COLACE) 100 MG capsule     DULoxetine (CYMBALTA) 20 MG capsule     ferrous sulfate (FEROSUL) 325 (65 Fe) MG tablet     fexofenadine (ALLEGRA) 180 MG tablet     folic acid (FOLVITE) 1 MG tablet     furosemide (LASIX) 40 MG tablet     gabapentin (NEURONTIN) 300 MG capsule     hydrOXYzine (ATARAX) 25 MG tablet     ipratropium-albuterol (DUO-NEB) 0.5-2.5 mg/3 mL nebulizer     levalbuterol (XOPENEX HFA) 45 mcg/actuation inhaler     lidocaine (LIDODERM) 5 % patch     LORazepam (ATIVAN) 1 MG tablet     magnesium citrate solution     MEDICATION CANNOT BE REORDERED - PLEASE MANUALLY REORDER AND DISCONTINUE THE OLD ORDER     meloxicam (MOBIC) 7.5 MG tablet     metoprolol tartrate (LOPRESSOR) 25 MG tablet     multivitamin (ONE A DAY) per tablet     mupirocin (BACTROBAN) 2 % ointment     naloxone (NARCAN) 4 mg/actuation nasal spray     Nutritional Supplements (ENSURE HIGH PROTEIN)     nystatin (MYCOSTATIN) 400303 UNIT/ML suspension     omeprazole (PRILOSEC) 20 MG DR capsule     oxyCODONE IR (ROXICODONE) 15 MG tablet     oxyCODONE IR (ROXICODONE) 30 MG tablet     polyethylene glycol (GLYCOLAX) 17 gram/dose powder     potassium chloride ER (KLOR-CON M) 20 MEQ CR tablet     prednisoLONE acetate (PRED FORTE) 1 % ophthalmic suspension     pregabalin (LYRICA) 75 MG capsule     riluzole (RILUTEK) 50  "MG tablet     senna-docusate (PERICOLACE) 8.6-50 mg tablet     SPIRIVA HANDIHALER 18 MCG inhaled capsule     tamsulosin (FLOMAX) 0.4 MG capsule     triamcinolone (KENALOG) 0.1 % cream     vitamin (B COMPLEX) capsule       ALLERGIES:  No Known Allergies    VITALS:  Patient Vitals for the past 24 hrs:   BP Temp Temp src Pulse Resp SpO2 Height   06/23/23 0950 104/63 -- -- 87 -- 93 % --   06/23/23 0948 104/63 98.3  F (36.8  C) Oral 85 20 92 % 1.778 m (5' 10\")       PHYSICAL EXAM    Constitutional:  alert, no distress  EYES: Conjunctivae clear  HENT:  Atraumatic, normocephalic  Respiratory:  No respiratory distress, normal breath sounds  Cardiovascular:  Normal rate, normal rhythm, no murmurs  GI:  Soft, nondistended, nontender, no palpable masses, no rebound, no guarding   Musculoskeletal:  No edema.  No cyanosis.  Integument: Warm, Dry. Stage 2 pressure ulcer on the sacrum. No surrounding erythema.  Neurologic:  Alert & oriented x 3     LAB:  All pertinent labs reviewed and interpreted.  Labs Ordered and Resulted from Time of ED Arrival to Time of ED Departure   COMPREHENSIVE METABOLIC PANEL - Abnormal       Result Value    Sodium 138      Potassium 2.8 (*)     Chloride 96 (*)     Carbon Dioxide (CO2) 31 (*)     Anion Gap 11      Urea Nitrogen 6.3 (*)     Creatinine 0.71      Calcium 8.8      Glucose 90      Alkaline Phosphatase 102      AST 29      ALT 12      Protein Total 6.7      Albumin 3.4 (*)     Bilirubin Total 0.6      GFR Estimate >90     ROUTINE UA WITH MICROSCOPIC REFLEX TO CULTURE - Abnormal    Color Urine Yellow      Appearance Urine Clear      Glucose Urine Negative      Bilirubin Urine Negative      Ketones Urine 20 (*)     Specific Gravity Urine 1.011      Blood Urine Negative      pH Urine 8.0 (*)     Protein Albumin Urine Negative      Urobilinogen Urine 4.0 (*)     Nitrite Urine Negative      Leukocyte Esterase Urine Negative      Bacteria Urine Few (*)     RBC Urine 5 (*)     WBC Urine 1     CBC " WITH PLATELETS AND DIFFERENTIAL - Abnormal    WBC Count 12.6 (*)     RBC Count 4.68      Hemoglobin 13.7      Hematocrit 41.1      MCV 88      MCH 29.3      MCHC 33.3      RDW 15.8 (*)     Platelet Count 302      % Neutrophils 79      % Lymphocytes 12      % Monocytes 7      % Eosinophils 1      % Basophils 0      % Immature Granulocytes 1      NRBCs per 100 WBC 0      Absolute Neutrophils 10.0 (*)     Absolute Lymphocytes 1.5      Absolute Monocytes 0.9      Absolute Eosinophils 0.1      Absolute Basophils 0.0      Absolute Immature Granulocytes 0.1      Absolute NRBCs 0.0     LACTIC ACID WHOLE BLOOD - Normal    Lactic Acid 0.9     INFLUENZA A/B, RSV, & SARS-COV2 PCR - Normal    Influenza A PCR Negative      Influenza B PCR Negative      RSV PCR Negative      SARS CoV2 PCR Negative         RADIOLOGY:  Reviewed all pertinent imaging. Please see official radiology report.  XR Chest Port 1 View   Final Result   IMPRESSION: Low lung volumes. Minimal suprahilar fibrosis is unchanged. No signs of pneumonia or failure. Heart and pulmonary vascularity are normal. Severe degenerative changes in both shoulders. Numerous clips in the upper abdomen again noted.          PROCEDURES:   None      I, Jesus Davis, am serving as a scribe to document services personally performed by Francesco Rojas CNP. based on my observation and the provider's statements to me. IFrancesco CNP attest that Jesus Davis is acting in a scribe capacity, has observed my performance of the services and has documented them in accordance with my direction.    ZEKE Corea, CNP  Emergency Services  [facility] EMERGENCY DEPARTMENT  [facility] [facility] [address]  [phone]  Dept: [phone]         Francesco Rojas APRN CNP  06/23/23 1067

## 2023-06-23 NOTE — DISCHARGE INSTRUCTIONS
Your testing today shows low potassium.  Taking the potassium supplement twice daily as prescribed.  You should have your potassium level checked again in 1 week.    Did not find any urinary tract infection or pneumonia.  Since you do have COPD and mild worsening respiratory symptoms, take doxycycline (antibiotic) twice daily for 1 week.    Follow-up in clinic in about 1 week for recheck    Return to the ER if worsening

## 2023-06-30 ENCOUNTER — LAB REQUISITION (OUTPATIENT)
Dept: LAB | Facility: CLINIC | Age: 73
End: 2023-06-30
Payer: COMMERCIAL

## 2023-06-30 DIAGNOSIS — E87.6 HYPOKALEMIA: ICD-10-CM

## 2023-07-03 LAB — POTASSIUM SERPL-SCNC: 2.8 MMOL/L (ref 3.4–5.3)

## 2023-07-03 PROCEDURE — 84132 ASSAY OF SERUM POTASSIUM: CPT | Mod: ORL | Performed by: NURSE PRACTITIONER

## 2023-07-03 PROCEDURE — P9604 ONE-WAY ALLOW PRORATED TRIP: HCPCS | Mod: ORL | Performed by: NURSE PRACTITIONER

## 2023-07-03 PROCEDURE — 36415 COLL VENOUS BLD VENIPUNCTURE: CPT | Mod: ORL | Performed by: NURSE PRACTITIONER

## 2023-07-07 ENCOUNTER — LAB REQUISITION (OUTPATIENT)
Dept: LAB | Facility: CLINIC | Age: 73
End: 2023-07-07
Payer: COMMERCIAL

## 2023-07-07 DIAGNOSIS — E87.6 HYPOKALEMIA: ICD-10-CM

## 2023-07-10 LAB — POTASSIUM SERPL-SCNC: 3.4 MMOL/L (ref 3.4–5.3)

## 2023-07-10 PROCEDURE — 84132 ASSAY OF SERUM POTASSIUM: CPT | Mod: ORL | Performed by: NURSE PRACTITIONER

## 2023-07-10 PROCEDURE — 36415 COLL VENOUS BLD VENIPUNCTURE: CPT | Mod: ORL | Performed by: NURSE PRACTITIONER

## 2023-07-10 PROCEDURE — P9603 ONE-WAY ALLOW PRORATED MILES: HCPCS | Mod: ORL | Performed by: NURSE PRACTITIONER

## 2023-07-14 ENCOUNTER — LAB REQUISITION (OUTPATIENT)
Dept: LAB | Facility: CLINIC | Age: 73
End: 2023-07-14
Payer: COMMERCIAL

## 2023-07-14 DIAGNOSIS — E87.6 HYPOKALEMIA: ICD-10-CM

## 2023-07-24 LAB — POTASSIUM SERPL-SCNC: 4.3 MMOL/L (ref 3.4–5.3)

## 2023-07-24 PROCEDURE — 36415 COLL VENOUS BLD VENIPUNCTURE: CPT | Mod: ORL | Performed by: NURSE PRACTITIONER

## 2023-07-24 PROCEDURE — P9604 ONE-WAY ALLOW PRORATED TRIP: HCPCS | Mod: ORL | Performed by: NURSE PRACTITIONER

## 2023-07-24 PROCEDURE — 84132 ASSAY OF SERUM POTASSIUM: CPT | Mod: ORL | Performed by: NURSE PRACTITIONER

## 2023-08-04 ENCOUNTER — LAB REQUISITION (OUTPATIENT)
Dept: LAB | Facility: CLINIC | Age: 73
End: 2023-08-04
Payer: COMMERCIAL

## 2023-08-04 DIAGNOSIS — E87.6 HYPOKALEMIA: ICD-10-CM

## 2023-08-07 LAB — POTASSIUM SERPL-SCNC: 3.5 MMOL/L (ref 3.4–5.3)

## 2023-08-07 PROCEDURE — P9604 ONE-WAY ALLOW PRORATED TRIP: HCPCS | Mod: ORL | Performed by: NURSE PRACTITIONER

## 2023-08-07 PROCEDURE — 36415 COLL VENOUS BLD VENIPUNCTURE: CPT | Mod: ORL | Performed by: NURSE PRACTITIONER

## 2023-08-07 PROCEDURE — 84132 ASSAY OF SERUM POTASSIUM: CPT | Mod: ORL | Performed by: NURSE PRACTITIONER

## 2023-09-16 ENCOUNTER — LAB REQUISITION (OUTPATIENT)
Dept: LAB | Facility: CLINIC | Age: 73
End: 2023-09-16
Payer: COMMERCIAL

## 2023-09-16 DIAGNOSIS — E87.6 HYPOKALEMIA: ICD-10-CM

## 2023-09-18 LAB — POTASSIUM SERPL-SCNC: 4.1 MMOL/L (ref 3.4–5.3)

## 2023-09-18 PROCEDURE — 36415 COLL VENOUS BLD VENIPUNCTURE: CPT | Mod: ORL | Performed by: NURSE PRACTITIONER

## 2023-09-18 PROCEDURE — 84132 ASSAY OF SERUM POTASSIUM: CPT | Mod: ORL | Performed by: NURSE PRACTITIONER

## 2023-09-18 PROCEDURE — P9604 ONE-WAY ALLOW PRORATED TRIP: HCPCS | Mod: ORL | Performed by: NURSE PRACTITIONER

## 2023-11-16 ENCOUNTER — LAB REQUISITION (OUTPATIENT)
Dept: LAB | Facility: CLINIC | Age: 73
End: 2023-11-16
Payer: COMMERCIAL

## 2023-11-16 DIAGNOSIS — I10 ESSENTIAL (PRIMARY) HYPERTENSION: ICD-10-CM

## 2023-11-20 LAB
ANION GAP SERPL CALCULATED.3IONS-SCNC: 12 MMOL/L (ref 7–15)
BUN SERPL-MCNC: 11.5 MG/DL (ref 8–23)
CALCIUM SERPL-MCNC: 9.7 MG/DL (ref 8.8–10.2)
CHLORIDE SERPL-SCNC: 101 MMOL/L (ref 98–107)
CREAT SERPL-MCNC: 0.66 MG/DL (ref 0.67–1.17)
DEPRECATED HCO3 PLAS-SCNC: 27 MMOL/L (ref 22–29)
EGFRCR SERPLBLD CKD-EPI 2021: >90 ML/MIN/1.73M2
ERYTHROCYTE [DISTWIDTH] IN BLOOD BY AUTOMATED COUNT: 16.1 % (ref 10–15)
GLUCOSE SERPL-MCNC: 74 MG/DL (ref 70–99)
HCT VFR BLD AUTO: 44.2 % (ref 40–53)
HGB BLD-MCNC: 14.4 G/DL (ref 13.3–17.7)
MCH RBC QN AUTO: 29.3 PG (ref 26.5–33)
MCHC RBC AUTO-ENTMCNC: 32.6 G/DL (ref 31.5–36.5)
MCV RBC AUTO: 90 FL (ref 78–100)
PLATELET # BLD AUTO: 310 10E3/UL (ref 150–450)
POTASSIUM SERPL-SCNC: 3.9 MMOL/L (ref 3.4–5.3)
RBC # BLD AUTO: 4.91 10E6/UL (ref 4.4–5.9)
SODIUM SERPL-SCNC: 140 MMOL/L (ref 135–145)
WBC # BLD AUTO: 7.2 10E3/UL (ref 4–11)

## 2023-11-20 PROCEDURE — P9604 ONE-WAY ALLOW PRORATED TRIP: HCPCS | Mod: ORL | Performed by: NURSE PRACTITIONER

## 2023-11-20 PROCEDURE — 36415 COLL VENOUS BLD VENIPUNCTURE: CPT | Mod: ORL | Performed by: NURSE PRACTITIONER

## 2023-11-20 PROCEDURE — 85027 COMPLETE CBC AUTOMATED: CPT | Mod: ORL | Performed by: NURSE PRACTITIONER

## 2023-11-20 PROCEDURE — 80048 BASIC METABOLIC PNL TOTAL CA: CPT | Mod: ORL | Performed by: NURSE PRACTITIONER

## 2023-12-11 NOTE — TELEPHONE ENCOUNTER
Detail Level: Simple RN cannot approve Refill Request    RN can NOT refill this medication med is not covered by policy/route to provider.    Armand Burroughs, [facility] Triage/Med Refill 4/18/2019    Requested Prescriptions   Pending Prescriptions Disp Refills     baclofen (LIORESAL) 10 MG tablet [Pharmacy Med Name: BACLOFEN 10MG TABLETS] 135 tablet 0     Sig: TAKE 1/2 TABLET(5 MG) BY MOUTH THREE TIMES DAILY       There is no refill protocol information for this order

## 2024-01-23 NOTE — TELEPHONE ENCOUNTER
Patient Returning Call  Reason for call:  Patient returning call to check on the status of this request.  Information relayed to patient:  Pending providers review and approval.  Patient has additional questions:  No  If YES, what are your questions/concerns:  none  Okay to leave a detailed message?: Yes       The patient is calling to request to speak to  or a nurse. The patient is wondering if she needs any updated MRI of her cervical, thoracic or lumbar spine before the appointment? Please call to discuss, thank you.

## 2024-03-20 ENCOUNTER — APPOINTMENT (OUTPATIENT)
Dept: RADIOLOGY | Facility: HOSPITAL | Age: 74
DRG: 871 | End: 2024-03-20
Attending: EMERGENCY MEDICINE
Payer: COMMERCIAL

## 2024-03-20 ENCOUNTER — APPOINTMENT (OUTPATIENT)
Dept: CARDIOLOGY | Facility: HOSPITAL | Age: 74
DRG: 871 | End: 2024-03-20
Attending: INTERNAL MEDICINE
Payer: COMMERCIAL

## 2024-03-20 ENCOUNTER — APPOINTMENT (OUTPATIENT)
Dept: CT IMAGING | Facility: HOSPITAL | Age: 74
DRG: 871 | End: 2024-03-20
Attending: INTERNAL MEDICINE
Payer: COMMERCIAL

## 2024-03-20 ENCOUNTER — DOCUMENTATION ONLY (OUTPATIENT)
Dept: OTHER | Facility: CLINIC | Age: 74
End: 2024-03-20

## 2024-03-20 ENCOUNTER — APPOINTMENT (OUTPATIENT)
Dept: CT IMAGING | Facility: HOSPITAL | Age: 74
DRG: 871 | End: 2024-03-20
Attending: EMERGENCY MEDICINE
Payer: COMMERCIAL

## 2024-03-20 ENCOUNTER — APPOINTMENT (OUTPATIENT)
Dept: SPEECH THERAPY | Facility: HOSPITAL | Age: 74
DRG: 871 | End: 2024-03-20
Attending: FAMILY MEDICINE
Payer: COMMERCIAL

## 2024-03-20 ENCOUNTER — HOSPITAL ENCOUNTER (INPATIENT)
Facility: HOSPITAL | Age: 74
LOS: 2 days | Discharge: HOME HEALTH | DRG: 871 | End: 2024-03-22
Attending: EMERGENCY MEDICINE | Admitting: INTERNAL MEDICINE
Payer: COMMERCIAL

## 2024-03-20 DIAGNOSIS — R06.02 SOB (SHORTNESS OF BREATH): ICD-10-CM

## 2024-03-20 DIAGNOSIS — E87.20 LACTIC ACIDOSIS: ICD-10-CM

## 2024-03-20 DIAGNOSIS — D72.829 LEUKOCYTOSIS, UNSPECIFIED TYPE: ICD-10-CM

## 2024-03-20 DIAGNOSIS — R29.6 UNWITNESSED FALL: ICD-10-CM

## 2024-03-20 DIAGNOSIS — A41.9 SEPSIS WITHOUT ACUTE ORGAN DYSFUNCTION, DUE TO UNSPECIFIED ORGANISM (H): ICD-10-CM

## 2024-03-20 DIAGNOSIS — J18.9 PNEUMONIA OF LEFT LOWER LOBE DUE TO INFECTIOUS ORGANISM: ICD-10-CM

## 2024-03-20 DIAGNOSIS — G12.21 ALS (AMYOTROPHIC LATERAL SCLEROSIS) (H): Chronic | ICD-10-CM

## 2024-03-20 DIAGNOSIS — L89.159 DECUBITUS ULCER OF COCCYX, UNSPECIFIED PRESSURE ULCER STAGE: Primary | ICD-10-CM

## 2024-03-20 LAB
ALBUMIN UR-MCNC: NEGATIVE MG/DL
ANION GAP SERPL CALCULATED.3IONS-SCNC: 13 MMOL/L (ref 7–15)
ANION GAP SERPL CALCULATED.3IONS-SCNC: 7 MMOL/L (ref 7–15)
APPEARANCE UR: CLEAR
ATRIAL RATE - MUSE: 104 BPM
BASE EXCESS BLDV CALC-SCNC: 7.6 MMOL/L (ref -3–3)
BASOPHILS # BLD AUTO: 0 10E3/UL (ref 0–0.2)
BASOPHILS NFR BLD AUTO: 0 %
BILIRUB UR QL STRIP: NEGATIVE
BUN SERPL-MCNC: 7.9 MG/DL (ref 8–23)
BUN SERPL-MCNC: 9.7 MG/DL (ref 8–23)
CALCIUM SERPL-MCNC: 8.7 MG/DL (ref 8.8–10.2)
CALCIUM SERPL-MCNC: 9.2 MG/DL (ref 8.8–10.2)
CHLORIDE SERPL-SCNC: 100 MMOL/L (ref 98–107)
CHLORIDE SERPL-SCNC: 103 MMOL/L (ref 98–107)
COLOR UR AUTO: YELLOW
CREAT SERPL-MCNC: 0.8 MG/DL (ref 0.67–1.17)
CREAT SERPL-MCNC: 0.81 MG/DL (ref 0.67–1.17)
DEPRECATED HCO3 PLAS-SCNC: 27 MMOL/L (ref 22–29)
DEPRECATED HCO3 PLAS-SCNC: 31 MMOL/L (ref 22–29)
DIASTOLIC BLOOD PRESSURE - MUSE: 65 MMHG
EGFRCR SERPLBLD CKD-EPI 2021: >90 ML/MIN/1.73M2
EGFRCR SERPLBLD CKD-EPI 2021: >90 ML/MIN/1.73M2
EOSINOPHIL # BLD AUTO: 0.1 10E3/UL (ref 0–0.7)
EOSINOPHIL NFR BLD AUTO: 1 %
ERYTHROCYTE [DISTWIDTH] IN BLOOD BY AUTOMATED COUNT: 14.3 % (ref 10–15)
ERYTHROCYTE [DISTWIDTH] IN BLOOD BY AUTOMATED COUNT: 14.5 % (ref 10–15)
FLUAV RNA SPEC QL NAA+PROBE: NEGATIVE
FLUBV RNA RESP QL NAA+PROBE: NEGATIVE
GLUCOSE SERPL-MCNC: 106 MG/DL (ref 70–99)
GLUCOSE SERPL-MCNC: 83 MG/DL (ref 70–99)
GLUCOSE UR STRIP-MCNC: NEGATIVE MG/DL
HCO3 BLDV-SCNC: 32 MMOL/L (ref 21–28)
HCT VFR BLD AUTO: 39 % (ref 40–53)
HCT VFR BLD AUTO: 39.4 % (ref 40–53)
HGB BLD-MCNC: 12.7 G/DL (ref 13.3–17.7)
HGB BLD-MCNC: 13.2 G/DL (ref 13.3–17.7)
HGB UR QL STRIP: ABNORMAL
HOLD SPECIMEN: NORMAL
IMM GRANULOCYTES # BLD: 0.1 10E3/UL
IMM GRANULOCYTES NFR BLD: 1 %
INTERPRETATION ECG - MUSE: NORMAL
KETONES UR STRIP-MCNC: NEGATIVE MG/DL
LACTATE SERPL-SCNC: 1.5 MMOL/L (ref 0.7–2)
LACTATE SERPL-SCNC: 2.5 MMOL/L (ref 0.7–2)
LEUKOCYTE ESTERASE UR QL STRIP: NEGATIVE
LVEF ECHO: NORMAL
LYMPHOCYTES # BLD AUTO: 1.4 10E3/UL (ref 0.8–5.3)
LYMPHOCYTES NFR BLD AUTO: 7 %
MCH RBC QN AUTO: 30.3 PG (ref 26.5–33)
MCH RBC QN AUTO: 30.3 PG (ref 26.5–33)
MCHC RBC AUTO-ENTMCNC: 32.6 G/DL (ref 31.5–36.5)
MCHC RBC AUTO-ENTMCNC: 33.5 G/DL (ref 31.5–36.5)
MCV RBC AUTO: 91 FL (ref 78–100)
MCV RBC AUTO: 93 FL (ref 78–100)
MONOCYTES # BLD AUTO: 1 10E3/UL (ref 0–1.3)
MONOCYTES NFR BLD AUTO: 5 %
NEUTROPHILS # BLD AUTO: 16.4 10E3/UL (ref 1.6–8.3)
NEUTROPHILS NFR BLD AUTO: 86 %
NITRATE UR QL: NEGATIVE
NRBC # BLD AUTO: 0 10E3/UL
NRBC BLD AUTO-RTO: 0 /100
NT-PROBNP SERPL-MCNC: 315 PG/ML (ref 0–900)
O2/TOTAL GAS SETTING VFR VENT: 0 %
OXYHGB MFR BLDV: 59 % (ref 70–75)
P AXIS - MUSE: 65 DEGREES
PCO2 BLDV: 45 MM HG (ref 40–50)
PH BLDV: 7.45 [PH] (ref 7.32–7.43)
PH UR STRIP: 7 [PH] (ref 5–7)
PLATELET # BLD AUTO: 341 10E3/UL (ref 150–450)
PLATELET # BLD AUTO: 369 10E3/UL (ref 150–450)
PO2 BLDV: 30 MM HG (ref 25–47)
POTASSIUM SERPL-SCNC: 3.9 MMOL/L (ref 3.4–5.3)
POTASSIUM SERPL-SCNC: 3.9 MMOL/L (ref 3.4–5.3)
PR INTERVAL - MUSE: 136 MS
PROCALCITONIN SERPL IA-MCNC: 0.13 NG/ML
QRS DURATION - MUSE: 80 MS
QT - MUSE: 316 MS
QTC - MUSE: 415 MS
R AXIS - MUSE: 38 DEGREES
RBC # BLD AUTO: 4.19 10E6/UL (ref 4.4–5.9)
RBC # BLD AUTO: 4.35 10E6/UL (ref 4.4–5.9)
RBC URINE: 13 /HPF
RSV RNA SPEC NAA+PROBE: NEGATIVE
SAO2 % BLDV: 62 % (ref 70–75)
SARS-COV-2 RNA RESP QL NAA+PROBE: NEGATIVE
SODIUM SERPL-SCNC: 140 MMOL/L (ref 135–145)
SODIUM SERPL-SCNC: 141 MMOL/L (ref 135–145)
SP GR UR STRIP: 1.01 (ref 1–1.03)
SYSTOLIC BLOOD PRESSURE - MUSE: 130 MMHG
T AXIS - MUSE: 69 DEGREES
TROPONIN T SERPL HS-MCNC: 63 NG/L
TROPONIN T SERPL HS-MCNC: 77 NG/L
TROPONIN T SERPL HS-MCNC: 90 NG/L
UROBILINOGEN UR STRIP-MCNC: 8 MG/DL
VENTRICULAR RATE- MUSE: 104 BPM
WBC # BLD AUTO: 19.1 10E3/UL (ref 4–11)
WBC # BLD AUTO: 19.4 10E3/UL (ref 4–11)
WBC URINE: 1 /HPF

## 2024-03-20 PROCEDURE — 80048 BASIC METABOLIC PNL TOTAL CA: CPT | Performed by: INTERNAL MEDICINE

## 2024-03-20 PROCEDURE — 258N000003 HC RX IP 258 OP 636: Performed by: INTERNAL MEDICINE

## 2024-03-20 PROCEDURE — 250N000011 HC RX IP 250 OP 636: Performed by: FAMILY MEDICINE

## 2024-03-20 PROCEDURE — 82805 BLOOD GASES W/O2 SATURATION: CPT | Performed by: EMERGENCY MEDICINE

## 2024-03-20 PROCEDURE — 99291 CRITICAL CARE FIRST HOUR: CPT | Mod: 25

## 2024-03-20 PROCEDURE — 87637 SARSCOV2&INF A&B&RSV AMP PRB: CPT | Performed by: EMERGENCY MEDICINE

## 2024-03-20 PROCEDURE — 84145 PROCALCITONIN (PCT): CPT | Performed by: EMERGENCY MEDICINE

## 2024-03-20 PROCEDURE — 96368 THER/DIAG CONCURRENT INF: CPT

## 2024-03-20 PROCEDURE — 99207 PR NO BILLABLE SERVICE THIS VISIT: CPT | Performed by: FAMILY MEDICINE

## 2024-03-20 PROCEDURE — 36415 COLL VENOUS BLD VENIPUNCTURE: CPT | Performed by: FAMILY MEDICINE

## 2024-03-20 PROCEDURE — 36415 COLL VENOUS BLD VENIPUNCTURE: CPT | Performed by: EMERGENCY MEDICINE

## 2024-03-20 PROCEDURE — 96365 THER/PROPH/DIAG IV INF INIT: CPT | Mod: 59

## 2024-03-20 PROCEDURE — 36415 COLL VENOUS BLD VENIPUNCTURE: CPT | Performed by: INTERNAL MEDICINE

## 2024-03-20 PROCEDURE — 87040 BLOOD CULTURE FOR BACTERIA: CPT | Performed by: EMERGENCY MEDICINE

## 2024-03-20 PROCEDURE — 120N000001 HC R&B MED SURG/OB

## 2024-03-20 PROCEDURE — 99223 1ST HOSP IP/OBS HIGH 75: CPT | Performed by: INTERNAL MEDICINE

## 2024-03-20 PROCEDURE — 81001 URINALYSIS AUTO W/SCOPE: CPT | Performed by: EMERGENCY MEDICINE

## 2024-03-20 PROCEDURE — 258N000003 HC RX IP 258 OP 636: Performed by: EMERGENCY MEDICINE

## 2024-03-20 PROCEDURE — 84484 ASSAY OF TROPONIN QUANT: CPT | Performed by: EMERGENCY MEDICINE

## 2024-03-20 PROCEDURE — 250N000011 HC RX IP 250 OP 636: Performed by: EMERGENCY MEDICINE

## 2024-03-20 PROCEDURE — 92610 EVALUATE SWALLOWING FUNCTION: CPT | Mod: GN

## 2024-03-20 PROCEDURE — 250N000013 HC RX MED GY IP 250 OP 250 PS 637: Performed by: INTERNAL MEDICINE

## 2024-03-20 PROCEDURE — 93306 TTE W/DOPPLER COMPLETE: CPT

## 2024-03-20 PROCEDURE — 84484 ASSAY OF TROPONIN QUANT: CPT | Performed by: FAMILY MEDICINE

## 2024-03-20 PROCEDURE — 250N000011 HC RX IP 250 OP 636: Performed by: INTERNAL MEDICINE

## 2024-03-20 PROCEDURE — 71045 X-RAY EXAM CHEST 1 VIEW: CPT

## 2024-03-20 PROCEDURE — 250N000013 HC RX MED GY IP 250 OP 250 PS 637: Performed by: FAMILY MEDICINE

## 2024-03-20 PROCEDURE — 85027 COMPLETE CBC AUTOMATED: CPT | Performed by: INTERNAL MEDICINE

## 2024-03-20 PROCEDURE — 80048 BASIC METABOLIC PNL TOTAL CA: CPT | Performed by: EMERGENCY MEDICINE

## 2024-03-20 PROCEDURE — 250N000013 HC RX MED GY IP 250 OP 250 PS 637: Performed by: EMERGENCY MEDICINE

## 2024-03-20 PROCEDURE — 93306 TTE W/DOPPLER COMPLETE: CPT | Mod: 26 | Performed by: GENERAL ACUTE CARE HOSPITAL

## 2024-03-20 PROCEDURE — 93005 ELECTROCARDIOGRAM TRACING: CPT | Performed by: EMERGENCY MEDICINE

## 2024-03-20 PROCEDURE — 70450 CT HEAD/BRAIN W/O DYE: CPT

## 2024-03-20 PROCEDURE — 71250 CT THORAX DX C-: CPT

## 2024-03-20 PROCEDURE — 85025 COMPLETE CBC W/AUTO DIFF WBC: CPT | Performed by: EMERGENCY MEDICINE

## 2024-03-20 PROCEDURE — 250N000012 HC RX MED GY IP 250 OP 636 PS 637: Performed by: INTERNAL MEDICINE

## 2024-03-20 PROCEDURE — 72125 CT NECK SPINE W/O DYE: CPT

## 2024-03-20 PROCEDURE — 83880 ASSAY OF NATRIURETIC PEPTIDE: CPT | Performed by: EMERGENCY MEDICINE

## 2024-03-20 PROCEDURE — 83605 ASSAY OF LACTIC ACID: CPT | Performed by: EMERGENCY MEDICINE

## 2024-03-20 RX ORDER — CEFAZOLIN SODIUM 1 G/50ML
750 SOLUTION INTRAVENOUS EVERY 12 HOURS
Status: DISCONTINUED | OUTPATIENT
Start: 2024-03-20 | End: 2024-03-21

## 2024-03-20 RX ORDER — FUROSEMIDE 20 MG
40 TABLET ORAL DAILY
Status: DISCONTINUED | OUTPATIENT
Start: 2024-03-20 | End: 2024-03-22 | Stop reason: HOSPADM

## 2024-03-20 RX ORDER — PANTOPRAZOLE SODIUM 40 MG/1
40 TABLET, DELAYED RELEASE ORAL
Status: DISCONTINUED | OUTPATIENT
Start: 2024-03-20 | End: 2024-03-22 | Stop reason: HOSPADM

## 2024-03-20 RX ORDER — CHOLECALCIFEROL (VITAMIN D3) 50 MCG
2 TABLET ORAL DAILY
COMMUNITY
Start: 2024-03-18

## 2024-03-20 RX ORDER — NALOXONE HYDROCHLORIDE 0.4 MG/ML
0.4 INJECTION, SOLUTION INTRAMUSCULAR; INTRAVENOUS; SUBCUTANEOUS
Status: DISCONTINUED | OUTPATIENT
Start: 2024-03-20 | End: 2024-03-22 | Stop reason: HOSPADM

## 2024-03-20 RX ORDER — GABAPENTIN 300 MG/1
600 CAPSULE ORAL 3 TIMES DAILY
COMMUNITY

## 2024-03-20 RX ORDER — AMOXICILLIN 250 MG
1 CAPSULE ORAL 2 TIMES DAILY PRN
Status: DISCONTINUED | OUTPATIENT
Start: 2024-03-20 | End: 2024-03-22 | Stop reason: HOSPADM

## 2024-03-20 RX ORDER — ENOXAPARIN SODIUM 100 MG/ML
40 INJECTION SUBCUTANEOUS EVERY 24 HOURS
Status: DISCONTINUED | OUTPATIENT
Start: 2024-03-20 | End: 2024-03-22 | Stop reason: HOSPADM

## 2024-03-20 RX ORDER — ACETAMINOPHEN 325 MG/10.15ML
1000 LIQUID ORAL ONCE
Status: COMPLETED | OUTPATIENT
Start: 2024-03-20 | End: 2024-03-20

## 2024-03-20 RX ORDER — NALOXONE HYDROCHLORIDE 0.4 MG/ML
0.2 INJECTION, SOLUTION INTRAMUSCULAR; INTRAVENOUS; SUBCUTANEOUS
Status: DISCONTINUED | OUTPATIENT
Start: 2024-03-20 | End: 2024-03-22 | Stop reason: HOSPADM

## 2024-03-20 RX ORDER — ONDANSETRON 4 MG/1
4 TABLET, ORALLY DISINTEGRATING ORAL EVERY 6 HOURS PRN
Status: DISCONTINUED | OUTPATIENT
Start: 2024-03-20 | End: 2024-03-22 | Stop reason: HOSPADM

## 2024-03-20 RX ORDER — ATORVASTATIN CALCIUM 10 MG/1
10 TABLET, FILM COATED ORAL EVERY EVENING
Status: DISCONTINUED | OUTPATIENT
Start: 2024-03-20 | End: 2024-03-22 | Stop reason: HOSPADM

## 2024-03-20 RX ORDER — LIDOCAINE 4 G/G
1 PATCH TOPICAL DAILY PRN
Status: DISCONTINUED | OUTPATIENT
Start: 2024-03-20 | End: 2024-03-22 | Stop reason: HOSPADM

## 2024-03-20 RX ORDER — FOLIC ACID 1 MG/1
1000 TABLET ORAL DAILY
Status: DISCONTINUED | OUTPATIENT
Start: 2024-03-20 | End: 2024-03-22 | Stop reason: HOSPADM

## 2024-03-20 RX ORDER — ACETAMINOPHEN 650 MG/1
650 SUPPOSITORY RECTAL EVERY 4 HOURS PRN
Status: DISCONTINUED | OUTPATIENT
Start: 2024-03-20 | End: 2024-03-22 | Stop reason: HOSPADM

## 2024-03-20 RX ORDER — FUROSEMIDE 40 MG
40 TABLET ORAL DAILY
Status: ON HOLD | COMMUNITY
End: 2024-03-22

## 2024-03-20 RX ORDER — OXYCODONE HYDROCHLORIDE 5 MG/1
10 TABLET ORAL 3 TIMES DAILY
Status: DISCONTINUED | OUTPATIENT
Start: 2024-03-20 | End: 2024-03-22 | Stop reason: HOSPADM

## 2024-03-20 RX ORDER — BUDESONIDE AND FORMOTEROL FUMARATE DIHYDRATE 160; 4.5 UG/1; UG/1
2 AEROSOL RESPIRATORY (INHALATION) 2 TIMES DAILY
COMMUNITY
End: 2024-09-26

## 2024-03-20 RX ORDER — AMOXICILLIN 250 MG
2 CAPSULE ORAL 2 TIMES DAILY PRN
Status: DISCONTINUED | OUTPATIENT
Start: 2024-03-20 | End: 2024-03-22 | Stop reason: HOSPADM

## 2024-03-20 RX ORDER — GABAPENTIN 300 MG/1
600 CAPSULE ORAL 3 TIMES DAILY
Status: DISCONTINUED | OUTPATIENT
Start: 2024-03-20 | End: 2024-03-22 | Stop reason: HOSPADM

## 2024-03-20 RX ORDER — OXYCODONE HYDROCHLORIDE 10 MG/1
10 TABLET ORAL 3 TIMES DAILY
COMMUNITY
Start: 2023-03-31

## 2024-03-20 RX ORDER — BACLOFEN 10 MG/1
10 TABLET ORAL 3 TIMES DAILY
Status: DISCONTINUED | OUTPATIENT
Start: 2024-03-20 | End: 2024-03-22 | Stop reason: HOSPADM

## 2024-03-20 RX ORDER — LIDOCAINE 50 MG/G
1 PATCH TOPICAL EVERY 24 HOURS
Status: DISCONTINUED | OUTPATIENT
Start: 2024-03-20 | End: 2024-03-20

## 2024-03-20 RX ORDER — TRIAMTERENE CAPSULES 100 MG/1
2 CAPSULE ORAL DAILY
Status: ON HOLD | COMMUNITY
Start: 2024-03-18 | End: 2024-03-22

## 2024-03-20 RX ORDER — LIDOCAINE 40 MG/G
CREAM TOPICAL
Status: DISCONTINUED | OUTPATIENT
Start: 2024-03-20 | End: 2024-03-22 | Stop reason: HOSPADM

## 2024-03-20 RX ORDER — CYCLOBENZAPRINE HCL 5 MG
5 TABLET ORAL 2 TIMES DAILY
Status: DISCONTINUED | OUTPATIENT
Start: 2024-03-20 | End: 2024-03-22 | Stop reason: HOSPADM

## 2024-03-20 RX ORDER — ASPIRIN 81 MG/1
81 TABLET ORAL DAILY
Status: DISCONTINUED | OUTPATIENT
Start: 2024-03-20 | End: 2024-03-22 | Stop reason: HOSPADM

## 2024-03-20 RX ORDER — POLYETHYLENE GLYCOL 3350 17 G/17G
17 POWDER, FOR SOLUTION ORAL DAILY
Status: DISCONTINUED | OUTPATIENT
Start: 2024-03-20 | End: 2024-03-22 | Stop reason: HOSPADM

## 2024-03-20 RX ORDER — BUPRENORPHINE 10 UG/H
1 PATCH TRANSDERMAL WEEKLY
COMMUNITY
Start: 2024-03-04

## 2024-03-20 RX ORDER — RILUZOLE 50 MG/1
50 TABLET, FILM COATED ORAL
Status: DISCONTINUED | OUTPATIENT
Start: 2024-03-20 | End: 2024-03-22 | Stop reason: HOSPADM

## 2024-03-20 RX ORDER — HYDROXYZINE HYDROCHLORIDE 25 MG/1
25 TABLET, FILM COATED ORAL EVERY 8 HOURS PRN
COMMUNITY

## 2024-03-20 RX ORDER — METOPROLOL TARTRATE 25 MG/1
12.5 TABLET, FILM COATED ORAL 2 TIMES DAILY
COMMUNITY
End: 2024-09-26

## 2024-03-20 RX ORDER — PIPERACILLIN SODIUM, TAZOBACTAM SODIUM 3; .375 G/15ML; G/15ML
3.38 INJECTION, POWDER, LYOPHILIZED, FOR SOLUTION INTRAVENOUS ONCE
Status: COMPLETED | OUTPATIENT
Start: 2024-03-20 | End: 2024-03-20

## 2024-03-20 RX ORDER — BUPRENORPHINE 10 UG/H
1 PATCH TRANSDERMAL WEEKLY
Status: DISCONTINUED | OUTPATIENT
Start: 2024-03-25 | End: 2024-03-22 | Stop reason: HOSPADM

## 2024-03-20 RX ORDER — FLUTICASONE FUROATE AND VILANTEROL 200; 25 UG/1; UG/1
1 POWDER RESPIRATORY (INHALATION) DAILY
Status: DISCONTINUED | OUTPATIENT
Start: 2024-03-20 | End: 2024-03-22 | Stop reason: HOSPADM

## 2024-03-20 RX ORDER — ACETAMINOPHEN 325 MG/1
650 TABLET ORAL EVERY 4 HOURS PRN
Status: DISCONTINUED | OUTPATIENT
Start: 2024-03-20 | End: 2024-03-22 | Stop reason: HOSPADM

## 2024-03-20 RX ORDER — LOPERAMIDE HCL 2 MG
2 CAPSULE ORAL PRN
COMMUNITY
Start: 2023-07-17

## 2024-03-20 RX ORDER — PREDNISONE 2.5 MG/1
2.5 TABLET ORAL DAILY
Status: DISCONTINUED | OUTPATIENT
Start: 2024-03-20 | End: 2024-03-22 | Stop reason: HOSPADM

## 2024-03-20 RX ORDER — CYCLOBENZAPRINE HCL 5 MG
5 TABLET ORAL 2 TIMES DAILY
COMMUNITY
Start: 2024-03-18

## 2024-03-20 RX ORDER — ACETAMINOPHEN 500 MG
500 TABLET ORAL EVERY 8 HOURS PRN
Status: DISCONTINUED | OUTPATIENT
Start: 2024-03-20 | End: 2024-03-20

## 2024-03-20 RX ORDER — ONDANSETRON 2 MG/ML
4 INJECTION INTRAMUSCULAR; INTRAVENOUS EVERY 6 HOURS PRN
Status: DISCONTINUED | OUTPATIENT
Start: 2024-03-20 | End: 2024-03-22 | Stop reason: HOSPADM

## 2024-03-20 RX ORDER — BACLOFEN 10 MG/1
10 TABLET ORAL 3 TIMES DAILY
COMMUNITY
Start: 2024-03-18

## 2024-03-20 RX ORDER — ALBUTEROL SULFATE 90 UG/1
1-2 AEROSOL, METERED RESPIRATORY (INHALATION) EVERY 6 HOURS PRN
Status: DISCONTINUED | OUTPATIENT
Start: 2024-03-20 | End: 2024-03-22 | Stop reason: HOSPADM

## 2024-03-20 RX ORDER — TRIAMCINOLONE ACETONIDE 1 MG/G
CREAM TOPICAL 2 TIMES DAILY
COMMUNITY

## 2024-03-20 RX ORDER — PSEUDOEPHED/ACETAMINOPH/DIPHEN 30MG-500MG
500 TABLET ORAL EVERY 8 HOURS PRN
COMMUNITY
Start: 2023-07-17

## 2024-03-20 RX ORDER — CETIRIZINE HYDROCHLORIDE 10 MG/1
10 TABLET ORAL DAILY
Status: DISCONTINUED | OUTPATIENT
Start: 2024-03-20 | End: 2024-03-22 | Stop reason: HOSPADM

## 2024-03-20 RX ORDER — PIPERACILLIN SODIUM, TAZOBACTAM SODIUM 3; .375 G/15ML; G/15ML
3.38 INJECTION, POWDER, LYOPHILIZED, FOR SOLUTION INTRAVENOUS EVERY 8 HOURS
Status: DISCONTINUED | OUTPATIENT
Start: 2024-03-20 | End: 2024-03-21

## 2024-03-20 RX ORDER — TAMSULOSIN HYDROCHLORIDE 0.4 MG/1
0.4 CAPSULE ORAL
Status: DISCONTINUED | OUTPATIENT
Start: 2024-03-20 | End: 2024-03-22 | Stop reason: HOSPADM

## 2024-03-20 RX ORDER — HYDROXYZINE HYDROCHLORIDE 25 MG/1
25 TABLET, FILM COATED ORAL EVERY 8 HOURS PRN
Status: DISCONTINUED | OUTPATIENT
Start: 2024-03-20 | End: 2024-03-21

## 2024-03-20 RX ORDER — FUROSEMIDE 20 MG
40 TABLET ORAL 2 TIMES DAILY
Status: DISCONTINUED | OUTPATIENT
Start: 2024-03-20 | End: 2024-03-20

## 2024-03-20 RX ORDER — DULOXETIN HYDROCHLORIDE 60 MG/1
60 CAPSULE, DELAYED RELEASE ORAL DAILY
Status: DISCONTINUED | OUTPATIENT
Start: 2024-03-20 | End: 2024-03-22 | Stop reason: HOSPADM

## 2024-03-20 RX ORDER — FEXOFENADINE HCL 180 MG/1
180 TABLET ORAL DAILY
Status: DISCONTINUED | OUTPATIENT
Start: 2024-03-20 | End: 2024-03-22 | Stop reason: HOSPADM

## 2024-03-20 RX ORDER — LANOLIN ALCOHOL/MO/W.PET/CERES
3 CREAM (GRAM) TOPICAL
Status: DISCONTINUED | OUTPATIENT
Start: 2024-03-20 | End: 2024-03-22 | Stop reason: HOSPADM

## 2024-03-20 RX ORDER — TRIAMTERENE CAPSULES 50 MG/1
200 CAPSULE ORAL DAILY
Status: DISCONTINUED | OUTPATIENT
Start: 2024-03-20 | End: 2024-03-22 | Stop reason: HOSPADM

## 2024-03-20 RX ORDER — PREDNISONE 2.5 MG/1
1 TABLET ORAL DAILY
COMMUNITY
Start: 2023-04-18

## 2024-03-20 RX ORDER — CALCIUM CARBONATE 500 MG/1
1000 TABLET, CHEWABLE ORAL 4 TIMES DAILY PRN
Status: DISCONTINUED | OUTPATIENT
Start: 2024-03-20 | End: 2024-03-22 | Stop reason: HOSPADM

## 2024-03-20 RX ADMIN — BACLOFEN 10 MG: 10 TABLET ORAL at 20:54

## 2024-03-20 RX ADMIN — FEXOFENADINE HCL 180 MG: 180 TABLET ORAL at 09:03

## 2024-03-20 RX ADMIN — OXYCODONE HYDROCHLORIDE 10 MG: 5 TABLET ORAL at 17:21

## 2024-03-20 RX ADMIN — TAMSULOSIN HYDROCHLORIDE 0.4 MG: 0.4 CAPSULE ORAL at 09:00

## 2024-03-20 RX ADMIN — ACETAMINOPHEN 650 MG: 325 TABLET ORAL at 12:52

## 2024-03-20 RX ADMIN — PREDNISONE 2.5 MG: 2.5 TABLET ORAL at 09:03

## 2024-03-20 RX ADMIN — DULOXETINE HYDROCHLORIDE 60 MG: 60 CAPSULE, DELAYED RELEASE PELLETS ORAL at 09:01

## 2024-03-20 RX ADMIN — ATORVASTATIN CALCIUM 10 MG: 10 TABLET, FILM COATED ORAL at 20:54

## 2024-03-20 RX ADMIN — PIPERACILLIN AND TAZOBACTAM 3.38 G: 3; .375 INJECTION, POWDER, FOR SOLUTION INTRAVENOUS at 02:45

## 2024-03-20 RX ADMIN — FUROSEMIDE 40 MG: 20 TABLET ORAL at 09:00

## 2024-03-20 RX ADMIN — VANCOMYCIN HYDROCHLORIDE 750 MG: 5 INJECTION, POWDER, LYOPHILIZED, FOR SOLUTION INTRAVENOUS at 17:20

## 2024-03-20 RX ADMIN — ASPIRIN 81 MG: 81 TABLET, COATED ORAL at 09:01

## 2024-03-20 RX ADMIN — BACLOFEN 10 MG: 10 TABLET ORAL at 09:00

## 2024-03-20 RX ADMIN — BACLOFEN 10 MG: 10 TABLET ORAL at 17:21

## 2024-03-20 RX ADMIN — FOLIC ACID 1000 MCG: 1 TABLET ORAL at 09:00

## 2024-03-20 RX ADMIN — DOCUSATE SODIUM 50 MG AND SENNOSIDES 8.6 MG 2 TABLET: 8.6; 5 TABLET, FILM COATED ORAL at 18:34

## 2024-03-20 RX ADMIN — RILUZOLE 50 MG: 50 TABLET ORAL at 18:33

## 2024-03-20 RX ADMIN — PANTOPRAZOLE SODIUM 40 MG: 40 TABLET, DELAYED RELEASE ORAL at 09:01

## 2024-03-20 RX ADMIN — SODIUM CHLORIDE 1000 ML: 9 INJECTION, SOLUTION INTRAVENOUS at 03:37

## 2024-03-20 RX ADMIN — FLUTICASONE FUROATE AND VILANTEROL TRIFENATATE 1 PUFF: 200; 25 POWDER RESPIRATORY (INHALATION) at 09:09

## 2024-03-20 RX ADMIN — ENOXAPARIN SODIUM 40 MG: 40 INJECTION SUBCUTANEOUS at 11:08

## 2024-03-20 RX ADMIN — OXYCODONE HYDROCHLORIDE 10 MG: 5 TABLET ORAL at 20:54

## 2024-03-20 RX ADMIN — CETIRIZINE HYDROCHLORIDE 10 MG: 10 TABLET, FILM COATED ORAL at 09:00

## 2024-03-20 RX ADMIN — Medication 3 MG: at 23:42

## 2024-03-20 RX ADMIN — PIPERACILLIN AND TAZOBACTAM 3.38 G: 3; .375 INJECTION, POWDER, FOR SOLUTION INTRAVENOUS at 17:20

## 2024-03-20 RX ADMIN — PIPERACILLIN AND TAZOBACTAM 3.38 G: 3; .375 INJECTION, POWDER, FOR SOLUTION INTRAVENOUS at 08:53

## 2024-03-20 RX ADMIN — ACETAMINOPHEN 1000 MG: 325 SOLUTION ORAL at 02:11

## 2024-03-20 RX ADMIN — RILUZOLE 50 MG: 50 TABLET ORAL at 09:00

## 2024-03-20 RX ADMIN — POLYETHYLENE GLYCOL 3350 17 G: 17 POWDER, FOR SOLUTION ORAL at 09:00

## 2024-03-20 RX ADMIN — VANCOMYCIN HYDROCHLORIDE 1500 MG: 5 INJECTION, POWDER, LYOPHILIZED, FOR SOLUTION INTRAVENOUS at 02:57

## 2024-03-20 RX ADMIN — METOPROLOL TARTRATE 12.5 MG: 25 TABLET, FILM COATED ORAL at 20:54

## 2024-03-20 RX ADMIN — OXYCODONE HYDROCHLORIDE 10 MG: 5 TABLET ORAL at 09:00

## 2024-03-20 ASSESSMENT — ACTIVITIES OF DAILY LIVING (ADL)
ADLS_ACUITY_SCORE: 35
ADLS_ACUITY_SCORE: 36
ADLS_ACUITY_SCORE: 44
ADLS_ACUITY_SCORE: 37
ADLS_ACUITY_SCORE: 37
ADLS_ACUITY_SCORE: 44
ADLS_ACUITY_SCORE: 36
ADLS_ACUITY_SCORE: 37
ADLS_ACUITY_SCORE: 35
DEPENDENT_IADLS:: CLEANING;COOKING;LAUNDRY;SHOPPING;MEAL PREPARATION;INCONTINENCE;TRANSPORTATION;MONEY MANAGEMENT;MEDICATION MANAGEMENT
ADLS_ACUITY_SCORE: 44
ADLS_ACUITY_SCORE: 35
ADLS_ACUITY_SCORE: 37
ADLS_ACUITY_SCORE: 37
ADLS_ACUITY_SCORE: 35
ADLS_ACUITY_SCORE: 35
ADLS_ACUITY_SCORE: 37
ADLS_ACUITY_SCORE: 35
ADLS_ACUITY_SCORE: 37
ADLS_ACUITY_SCORE: 35

## 2024-03-20 NOTE — ED TRIAGE NOTES
"Pt arrives via EMS from an assisted living facility. Pt was found on the ground by staff at the facility, he reports being down for about an hour. He says he \"slipped\" and denies hitting his head. Pt reporting SOB, hx of COPD. Pt appears confused and answers questions intermittently. Pt currently sleeping in bed, appears in no respiratory distress.         "

## 2024-03-20 NOTE — CONSULTS
"Care Management Initial Consult    General Information  Assessment completed with: Caregiver, Xiomara Kohli from [facility] via phone and Tarah GRAHAM at facility via phone  Type of CM/SW Visit: Initial Assessment    Primary Care Provider verified and updated as needed: Yes   Readmission within the last 30 days: no previous admission in last 30 days      Reason for Consult: discharge planning  Advance Care Planning: Advance Care Planning Reviewed: no concerns identified; copy of POLST faxed to me. I sent it to Fannie Rose Mary to make a part of his chart. He is listed as DNR.          Communication Assessment  Patient's communication style: spoken language (English or Bilingual)             Cognitive  Cognitive/Neuro/Behavioral: diagnosis of Encephalopathy this admission. Per staff at [facility], \"he normally is not confused\".       Living Environment:   People in home: facility resident     Current living Arrangements: assisted living  Name of Facility: [facility]   Able to return to prior arrangements: other (see comments) (unknown at this time, but should be able to return to Assisted Living)       Family/Social Support:  Care provided by: other (see comments), self (Assisted Living staff)  Provides care for: no one, unable/limited ability to care for self  Marital Status:   Facility resident(s)/Staff, Children          Description of Support System: Supportive, Involved    Support Assessment: Adequate family and caregiver support, Adequate social supports    Current Resources:   Patient receiving home care services: No     Community Resources: Skilled Nursing Facility ([facility], [facility] )  Equipment currently used at home: wheelchair, power (\"scooter\")  Supplies currently used at home: Incontinence Supplies, Compression Stockings, Hearing Aid Batteries, Other (\"glasses\")    Employment/Financial:  Employment " "Status: retired        Financial Concerns:     Referral to Financial Worker: No       Does the patient's insurance plan have a 3 day qualifying hospital stay waiver?  No      Functional Status:  Prior to admission patient needed assistance:   Dependent ADLs:: Wheelchair-independent, Wheelchair-with assist, Bathing, Dressing, Grooming, Toileting (\"He most often is able to do self transfers to his wheelchair/scooter. But sometimes staff has to help him pivot trasfer\".)  Dependent IADLs:: Cleaning, Cooking, Laundry, Shopping, Meal Preparation, Incontinence, Transportation, Money Management, Medication Management (\"he uses his inhalers on his own. Staff gives all other medications\".)       Mental Health Status:          Chemical Dependency Status:  Chemical Dependency Status: Current Concern (Per staff, \"he daily spends most of his day in the smoking garage at our facility smoking cigarettes and recreational marijuana\")             Values/Beliefs:  Spiritual, Cultural Beliefs, Voodoo Practices, Values that affect care:                 Additional Information:  Jef lives at [facility]. He gets helps with some ADLs and all IADLs. SANTOS Rascon states, \"I would actually say he is normally pretty independent for us. We help with bathing and dressing. He most often is able to do self transfers to his wheelchair/scooter. But sometimes staff has to help him pivot transfer. He gets up and we help a little in the morning and then we don't see him much during the day. He daily spends most of his day in the smoking garage at our facility smoking cigarettes and recreational marijuana.\"    In the past he has had [facility] help for wound care needs. \"When his sacral wound opens then they help for a while. But they last ended 1/16/24. He is now doing his own care of the wound. He places the Mepilex on his scooter and then sits down on it to apply it on his own\".    He also has help from " [facility]. Xiomara Kohli is the Care Manager at [phone]. I spoke to her about him being in the hospital.    Unknown discharge needs at this time.    [facility] transport at discharge.    Amarilis Nunes RN

## 2024-03-20 NOTE — H&P
Admission History and Physical   Jef Centeno Jr.,    1950,   [id number]    [facility]   Unwitnessed fall [R29.6]    PCP: Marco A Avina,    Code status:  No Order       Extended Emergency Contact Information  Primary Emergency Contact: Darnell Centeno  Address: [address] [place] [address] [id number]           [place] [id number] [place]  Mobile Phone: [phone]  Relation: Son  Secondary Emergency Contact: Marylin Centeno  Address: [address] [place] [address] [place]  Mobile Phone: [phone]  Relation: Daughter       Principal Problem:    Unwitnessed fall  Active Problems:    Lactic acidosis    SOB (shortness of breath)    Leukocytosis, unspecified type    Sepsis without acute organ dysfunction, due to unspecified organism (H)    Patient with COPD, hypertension,   ALS with paraparesis, DM II, arteriosclerotic heart disease, BPH with prostate cancer, COPD, HTN, HLD, AJAY, and chronic pain resides in assisted living brought into the ED by EMS after being found down on the ground by staff at the facility    ASSESSMENT AND PLAN:  Unwitnessed fall, patient found down on the ground, staff at facility think fall probably an hour.  Patient denied loss of consciousness, CT head/C-spine negative for stroke or fractures.  Neurochecks ordered    Probable sepsis, low-grade fever noted in ED, also mild hypotension which improved with IV hydration, chest x-ray no clear infiltrates, viral panel in the ED negative, UA clean, check CT chest/abdomen looking for septic source, antibiotics started, blood culture sent    Lactic acidosis with leukocytosis, possibly  sepsis of unclear source, continue antibiotics as above, trend lactic acid, ID consulted to evaluate, temporarily hold sedative/hold blood pressure meds    Confusion noted at bedside, likely due to above possible infection, head CT negative for stroke, neurochecks ordered, obtain further information from facility in a.m. regarding patient  baseline    ALS, wheelchair-bound, resides in assisted living, falls precaution, skin check per nursing    High blood pressure hold home BP meds for now, trend vital signs closely, telemetry ordered and low-dose of steroids therefore does not need stress dose    Elevated initial troponin, no chest pain complaints and no acute EKG change, possibly type II non-STEMI from demand ischemia, check second troponin, telemetry monitoring ordered, check echo in a.m. looking for wall motion changes    Please call full code, special code for now till able to obtain CODE STATUS from facility      DVT PPX:  Mechanical    Barriers to discharge sepsis evaluation    Anticipated Discharge date inpatient multiple days          Chief Complaint:  Found down at facility    HPI:  Jef Centeno Jr. is a 73 year old old male with history of ALS, wheelchair-bound, brought into ED by EMS for evaluation after being found down at his facility by the staff.  ED spoke to the staff at the facility who reports they heard yelling in his room and found him in the bedroom on the floor next to his wheelchair.  Reportedly patient on the floor for only an hour duration, patient denies any head injuries or LOC, no chest pain, no fever or neck stiffness or neck pain.  He was brought to ED for evaluation    Upon arrival he was found to be slightly confused, vitals showed a low-grade temperature but normal blood pressure.  Repeat blood pressure however trended low was 90/53, he was given IV hydration with improvement.  Labs returned showing lactic acidosis with mild leukocytosis, focus of infection unclear.  He was admitted to hospital medicine for further care    Patient is sleepy but arousable at bedside, no neck stiffness demonstrable, no skin rashes, he is in the bed tilted to the left side, he is unable to provide any additional information currently.    Medical History  Past Medical History:   Diagnosis Date    ALS (amyotrophic lateral sclerosis) (H)      BPH (benign prostatic hyperplasia)     Closed fracture of tibia and fibula, left, initial encounter     COPD (chronic obstructive pulmonary disease) (H)     Decubitus ulcer, buttock     Erectile dysfunction associated with type 2 diabetes mellitus (H)     Fracture tibia/fibula, left, closed, initial encounter 6/6/2019    HTN (hypertension)     Obstructive sleep apnea     Postherpetic neuralgia     Type 2 diabetes mellitus with diabetic neuropathy (H)           Surgical History  He  has a past surgical history that includes Cholecystectomy; Laparoscopic Gastrotomy W/ Repair Of Ulcer; Posterior Fusion Cervical Spine; and Sigmoidoscopy flexible (06/29/2008).      SOCIAL HISTORY:  Social History     Socioeconomic History    Marital status:      Spouse name: Not on file    Number of children: Not on file    Years of education: Not on file    Highest education level: Not on file   Occupational History    Not on file   Tobacco Use    Smoking status: Every Day     Packs/day: 10.00     Years: 46.00     Additional pack years: 0.00     Total pack years: 460.00     Types: Cigarettes    Smokeless tobacco: Never   Substance and Sexual Activity    Alcohol use: No     Comment: Alcoholic Drinks/day: Quit drinking in 2014. Prior to that he drank excessively.    Drug use: No    Sexual activity: Not on file   Other Topics Concern    Not on file   Social History Narrative    . Wife passed away in 2018. 5 children by previous wife are alive and well. Patient has social security disability.  Used to be an  continues to work at [employer].     Social Determinants of Health     Financial Resource Strain: Not on file   Food Insecurity: Not on file   Transportation Needs: Not on file   Physical Activity: Not on file   Stress: Not on file   Social Connections: Not on file   Interpersonal Safety: Not on file   Housing Stability: Not on file       FAMILY HISTORY:  Family History   Problem Relation Age of Onset     Diabetes Mother     Diabetes Father     Cancer Father     Diabetes Sister     Diabetes Brother          ALLERGIES:  No Known Allergies    MEDICATIONS:  (Not in a hospital admission)        ROS:  12 point review of systems reviewed and is negative except as stated above      PHYSICAL EXAM:  BP 91/55   Pulse 90   Temp 100.3  F (37.9  C) (Oral)   Resp 21   Wt 63.5 kg (140 lb)   SpO2 93%   BMI 20.09 kg/m      General: Sleepy but arousable, possible left facial weakness but difficult to evaluate due to patient's current positioning in bed, baseline unknown  HEENT: Pupils equal and reactive,ENT WNL   Neck- supple, No JVP elevation, lymphadenopathy or thyromegaly. Trachea-central.  Chest: Clear to auscultation bilaterally.  Heart: S1S2 regular. No M/R/G.  Abdomen: Soft. NT, ND. No organomegaly. Bowel sounds- active.  Back: No spine tenderness. No CVA tenderness.  Extremities: No leg swelling. Peripheral pulses 2+ bilaterally.  Neuro: known paraparesis but able to elevate both arms ,   Skin: no skin rashes     DIAGNOSTIC DATA:      EKG Results: Personally reviewed, no acute st changes of ischemia        Advanced Care Planning       Alex Hart MD

## 2024-03-20 NOTE — PROGRESS NOTES
Speech-Language Pathology: Clinical Swallow Evaluation     03/20/24 1500   Appointment Info   Signing Clinician's Name / Credentials (SLP) Jackie Florence MA CCC-SLP   General Information   Onset of Illness/Injury or Date of Surgery 03/20/24   Referring Physician Dr. Cerna   Pertinent History of Current Problem Per EMR:   General Observations Alert and cooperative   Type of Evaluation   Type of Evaluation Swallow Evaluation   Oral Motor   Oral Musculature generally intact   Mucosal Quality good   Dentition (Oral Motor)   Dentition (Oral Motor)   (noted hardened gums; patient reports eating regular foods despite lack of dentition but did agree ETC foods would be good)   Facial Symmetry (Oral Motor)   Facial Symmetry (Oral Motor) WNL   Lip Function (Oral Motor)   Lip Range of Motion (Oral Motor) WNL   Lip Strength (Oral Motor) WNL   Tongue Function (Oral Motor)   Tongue Strength (Oral Motor) WNL   Tongue Coordination/Speed (Oral Motor) WNL   Tongue ROM (Oral Motor) WNL   Jaw Function (Oral Motor)   Jaw Function (Oral Motor) WNL   Vocal Quality/Secretion Management (Oral Motor)   Vocal Quality (Oral Motor) WNL   Secretion Management (Oral Motor) WNL   General Swallowing Observations   Past History of Dysphagia None per EMR, RN, or patient report.   Current Diet/Method of Nutritional Intake (General Swallowing Observations, NIS) regular diet;thin liquids (level 0)   Swallowing Evaluation Clinical swallow evaluation   Clinical Swallow Evaluation   Clinical Swallow Evaluation Textures Trialed thin liquids;solid foods   Clinical Swallow Eval: Thin Liquid Texture Trial   Mode of Presentation, Thin Liquids cup;straw; 4 ounces   Oral Phase of Swallow WFL   Pharyngeal Phase of Swallow intact   Diagnostic Statement No overt s/s aspiration   Clinical Swallow Evaluation: Solid Food Texture Trial   Mode of Presentation self-fed; 1 cracker   Oral Phase WFL   Pharyngeal Phase intact   Diagnostic Statement No overt s/s aspiration    Esophageal Phase of Swallow   Patient reports or presents with symptoms of esophageal dysphagia No   Swallowing Recommendations   Diet Consistency Recommendations thin liquids (level 0);easy to chew (level 7)   Recommended Feeding/Eating Techniques (Swallow Eval) maintain upright sitting position for eating   Medication Administration Recommendations, Swallowing (SLP) Per patient preference   Instrumental Assessment Recommendations instrumental evaluation not recommended at this time   Comment, Swallowing Recommendations No s/s aspiration or dysphagia. Low concern for aspiration. Unable to rule out silent aspiration, if further respiratory concerns arise, consider VFSS. ETC foods given lack of dentition.   Clinical Impression   Criteria for Skilled Therapeutic Interventions Met (SLP Eval) Evaluation only   SLP Diagnosis Dysphagia   Risks & Benefits of therapy have been explained evaluation/treatment results reviewed;care plan/treatment goals reviewed;participants voiced agreement with care plan;participants included;patient   Clinical Impression Comments Patient participated in Clinical Swallow Evaluation. No s/s aspiration with any intake. Oral motor was WFL. Mastication was adequate despite lack of dentition, patient agrees with ETC diet. Hyolaryngeal movement was present. Recommend diet listed above. No anticipated SLP needs at this time. Please contact SLP with any questions or concerns.   SLP Total Evaluation Time   Eval: oral/pharyngeal swallow function, clinical swallow Minutes ([id number]) 20   SLP Discharge Planning   SLP Discharge Recommendation other (see comments)   SLP Rationale for DC Rec No ST needs post-d/c   SLP Brief overview of current status  Recommend ETC textures due to lack of dentition and c/w patient's described baseline and thin liquids. Please contact SLP with any questions or concerns.   Total Session Time   Total Session Time (sum of timed and untimed services) 20

## 2024-03-20 NOTE — PROGRESS NOTES
Occupational Therapy:  OT order received. Pt defers the need for OT evaluation/services. Will discontinue order. Thank you.  Afsaneh FERRARI/JON

## 2024-03-20 NOTE — ED PROVIDER NOTES
"EMERGENCY DEPARTMENT ENCOUNTER      NAME: Jef Centeno Jr.  AGE: 73 year old male  YOB: 1950  MRN: [id number]  EVALUATION DATE & TIME: 3/20/2024  1:03 AM    PCP: Marco A Avina    ED PROVIDER: June Swift MD    Chief Complaint   Patient presents with    Shortness of Breath    Fall         FINAL IMPRESSION:  1. Sepsis without acute organ dysfunction, due to unspecified organism (H)    2. Lactic acidosis    3. Leukocytosis, unspecified type    4. SOB (shortness of breath)    5. Unwitnessed fall          ED COURSE & MEDICAL DECISION MAKING:    Pertinent Labs & Imaging studies reviewed. (See chart for details)  73 year old male with history of ALS, COPD, HTN, HLD who presents to the Emergency Department for evaluation of unwitnessed fall from care facility.  Reportedly had no complaints there but here patient complains of dyspnea.  He is not in obviously any respiratory distress and his breath sounds are clear.  He is quite drowsy however, and though staff from his care facility described him as being \"pretty with it\" it is hard to tell whether this is his baseline or not.  With his report of shortness of breath concern for potential hypercapnia, viral syndrome, COPD exacerbation, symptomatic anemia, arrhythmia, pulmonary edema.  With the unwitnessed fall I suspect that this was mechanical as patient is wheelchair-bound, but arrhythmias on the differential.  Will obtain neuroimaging of the head and cervical spine to rule out injury.  No reproducible tenderness to the T or L-spine.    Patient placed on monitor, IV established and blood obtained.  Twelve-lead EKG shows sinus tachycardia.  Given Tylenol for low-grade fever.  CBC, BMP, BNP, troponin, procalcitonin, VBG notable for WBC of 19.1 with left shift.  Lactate is however normal as is his procalcitonin which is reassuring.  Troponin elevated in indeterminate range at 63.  No previous with which to compare, will repeat.  Again no ischemic changes " "on his EKG.  Urinalysis is notable for a small amount of blood but this was a catheterized specimen.  COVID/influenza/RSV swab obtained and negative.  CT of the head and cervical spine unremarkable.  Chest x-ray without any obvious signs of pneumonia.  Given the significant leukocytosis and his drowsiness, will cover empirically with vancomycin and Zosyn for sepsis.  Given 1 L normal saline bolus.  No obvious source.  He is not in any increased risk for bacteremia with indwelling tubes lines or catheters.  With his report of dyspnea suspect that this is early pneumonia.  Admitted to medicine for further management.      ED Course as of 03/20/24 0608   Wed Mar 20, 2024   0149 WBC(!): 19.1   0211 Spoke w staff at [facility], [facility]. Bryan pt yelling in room, found in bedroom on the floor, next to his electric wheelchair.  Staff think he slid out of bed onto floor between bed/wheelchair.  No complaints for staff, no complaints of sob.  Baseline mental status - \"pretty with it\" - describe him A&Ox3.    0215 Troponin T, High Sensitivity(!): 63   0223 Head CT w/o contrast  CT head independently interpreted by myself.  Motion artifact but no obvious ICH   0224 Lactic Acid(!): 2.5   0330 Admitted to Dr. Hart       Medical Decision Making    History:  Supplemental history from: Caregiver and EMS  External Record(s) reviewed: Outpatient Record: 4/19/2023 PT discharge summary    Work Up:  Chart documentation includes differential considered and any EKGs or imaging independently interpreted by provider, see MDM  In additional to work up documented, I considered the following work up: see MDM    External consultation:  Discussion of management with another provider: Hospitalist    Complicating factors:  Care impacted by chronic illness: Chronic Lung Disease, Hyperlipidemia, Hypertension, and Other: ALS  Care affected by social determinants of health: Access to Medical Care night shift no access to PCP    Disposition " considerations: Admit.        At the conclusion of the encounter I discussed the results of all of the tests and the disposition. The questions were answered. The patient or family acknowledged understanding and was agreeable with the care plan.    CRITICAL CARE:  Critical Care  Performed by: June Swift MD  Authorized by: June Swift MD     Total critical care time: 37 minutes  Criticalcare time was exclusive of separately billable procedures and treating other patients.  Critical care was necessary to treat or prevent imminent or life-threatening deterioration of the following conditions: Sepsis  Critical care was time spent personally by me on the following activities: development of treatment plan with patient or surrogate, discussions with consultants, examination of patient, evaluation of patient's response totreatment, obtaining history from patient or surrogate, ordering and performing treatments and interventions, ordering and review of laboratory studies, ordering and review of radiographic studies and re-evaluation ofpatient's condition, this excludes any separately billable procedures.      MEDICATIONS GIVEN IN THE EMERGENCY:  Medications   lidocaine 1 % 0.1-1 mL (has no administration in time range)   lidocaine (LMX4) cream (has no administration in time range)   sodium chloride (PF) 0.9% PF flush 3 mL (3 mLs Intracatheter Not Given 3/20/24 0410)   sodium chloride (PF) 0.9% PF flush 3 mL (has no administration in time range)   senna-docusate (SENOKOT-S/PERICOLACE) 8.6-50 MG per tablet 1 tablet (has no administration in time range)     Or   senna-docusate (SENOKOT-S/PERICOLACE) 8.6-50 MG per tablet 2 tablet (has no administration in time range)   calcium carbonate (TUMS) chewable tablet 1,000 mg (has no administration in time range)   acetaminophen (TYLENOL) tablet 650 mg (has no administration in time range)     Or   acetaminophen (TYLENOL) Suppository 650 mg (has no administration in time range)    ondansetron (ZOFRAN ODT) ODT tab 4 mg (has no administration in time range)     Or   ondansetron (ZOFRAN) injection 4 mg (has no administration in time range)   piperacillin-tazobactam (ZOSYN) 3.375 g vial to attach to  mL bag (has no administration in time range)   albuterol (PROVENTIL HFA/VENTOLIN HFA) inhaler (has no administration in time range)   aspirin EC tablet 81 mg (has no administration in time range)   atorvastatin (LIPITOR) tablet 10 mg (has no administration in time range)   baclofen (LIORESAL) tablet 10 mg (has no administration in time range)   fluticasone-vilanterol (BREO ELLIPTA) 200-25 MCG/ACT inhaler 1 puff (has no administration in time range)   buprenorphine (BUTRANS) 10 MCG/HR WK patch 1 patch (has no administration in time range)     And   buprenorphine (BUTRANS) Patch in Place ( Transdermal Patch in Place 3/20/24 1980)   cetirizine (zyrTEC) tablet 10 mg (has no administration in time range)   cyclobenzaprine (FLEXERIL) tablet 5 mg ( Oral Automatically Held 3/23/24 2000)   DULoxetine (CYMBALTA) DR capsule 60 mg (has no administration in time range)   fexofenadine (ALLEGRA) tablet 180 mg (has no administration in time range)   folic acid (FOLVITE) tablet 1,000 mcg (has no administration in time range)   furosemide (LASIX) tablet 40 mg (has no administration in time range)   gabapentin (NEURONTIN) capsule 600 mg ( Oral Automatically Held 3/23/24 2000)   hydrOXYzine HCl (ATARAX) tablet 25 mg (has no administration in time range)   metoprolol tartrate (LOPRESSOR) half-tab 37.5 mg ( Oral Automatically Held 3/23/24 2000)   riluzole (RILUTEK) tablet 50 mg (has no administration in time range)   predniSONE (DELTASONE) tablet 2.5 mg ( Oral Unheld by provider 3/20/24 5839)   polyethylene glycol (MIRALAX) Packet 17 g (has no administration in time range)   oxyCODONE (ROXICODONE) tablet 10 mg (has no administration in time range)   tamsulosin (FLOMAX) capsule 0.4 mg (has no administration in time  range)   triamterene (DYRENIUM) capsule 200 mg ( Oral Automatically Held 3/23/24 0800)   vancomycin (VANCOCIN) 750 mg in sodium chloride 0.9 % 250 mL intermittent infusion (has no administration in time range)   Lidocaine (LIDOCARE) 4 % Patch 1 patch (has no administration in time range)   pantoprazole (PROTONIX) EC tablet 40 mg (has no administration in time range)   naloxone (NARCAN) injection 0.2 mg (has no administration in time range)     Or   naloxone (NARCAN) injection 0.4 mg (has no administration in time range)     Or   naloxone (NARCAN) injection 0.2 mg (has no administration in time range)     Or   naloxone (NARCAN) injection 0.4 mg (has no administration in time range)   acetaminophen (TYLENOL) solution 1,000 mg (1,000 mg Oral $Given 3/20/24 0211)   piperacillin-tazobactam (ZOSYN) 3.375 g vial to attach to  mL bag (0 g Intravenous Stopped 3/20/24 0328)   vancomycin (VANCOCIN) 1,500 mg in sodium chloride 0.9 % 250 mL intermittent infusion (0 mg Intravenous Stopped 3/20/24 0435)   sodium chloride 0.9% BOLUS 1,000 mL (0 mLs Intravenous Stopped 3/20/24 0435)       NEW PRESCRIPTIONS STARTED AT TODAY'S ER VISIT  New Prescriptions    No medications on file          =================================================================    HPI    Patient information was obtained from: ems, patient, care facility staff    Use of Intrepreter: N/A      Jef Centeno Jr. is a 73 year old male with pertinent medical history of ALS, COPD, HTN, HLD who presents for unwitnessed fall.  Per care facility staff, patient is for the most part wheelchair-bound.  They had rounded within the last hour and patient was resting in bed comfortably.  Later staff had then heard patient yelping out, and found him on the floor next to his bed in his wheelchair.  He reportedly denied any complaints for them and was transported here.  Here he complains of some shortness of breath.  Medics state that he has a history of COPD and CHF  "though I do not see the CHF history documented.  He seems quite sleepy here, and per care facility staff he is normally \"alert and oriented x 3, and pretty with it.\"      PAST MEDICAL HISTORY:  Past Medical History:   Diagnosis Date    ALS (amyotrophic lateral sclerosis) (H)     BPH (benign prostatic hyperplasia)     Closed fracture of tibia and fibula, left, initial encounter     COPD (chronic obstructive pulmonary disease) (H)     Decubitus ulcer, buttock     Erectile dysfunction associated with type 2 diabetes mellitus (H)     Fracture tibia/fibula, left, closed, initial encounter 6/6/2019    HTN (hypertension)     Obstructive sleep apnea     Postherpetic neuralgia     Type 2 diabetes mellitus with diabetic neuropathy (H)        PAST SURGICAL HISTORY:  Past Surgical History:   Procedure Laterality Date    CHOLECYSTECTOMY      LAPAROSCOPIC GASTROTOMY W/ REPAIR OF ULCER      POSTERIOR FUSION CERVICAL SPINE      posterior fusion C2-C4 with laminnectomy; excision cervical lipoma     SIGMOIDOSCOPY FLEXIBLE  06/29/2008       CURRENT MEDICATIONS:    Prior to Admission Medications   Prescriptions Last Dose Informant Patient Reported? Taking?   DULoxetine (CYMBALTA) 20 MG capsule 3/19/2024 at am Care Giver No Yes   Sig: Take 3 capsules (60 mg) by mouth daily   VITAMIN D3 50 MCG (2000 UT) tablet 3/19/2024 at am Care Giver Yes Yes   Sig: Take 2 tablets by mouth daily   acetaminophen (TYLENOL) 500 MG tablet Unknown at prn Care Giver Yes Yes   Sig: Take 500 mg by mouth every 8 hours as needed for mild pain   albuterol (PROAIR HFA/PROVENTIL HFA/VENTOLIN HFA) 108 (90 Base) MCG/ACT inhaler Unknown at prn Care Giver No Yes   Sig: Inhale 1-2 puffs into the lungs every 6 hours as needed for shortness of breath / dyspnea or wheezing   aspirin (ASA) 81 MG EC tablet 3/19/2024 at am Care Giver No Yes   Sig: Take 1 tablet (81 mg) by mouth daily   atorvastatin (LIPITOR) 10 MG tablet 3/19/2024 at hs Care Giver No Yes   Sig: Take 1 tablet " (10 mg) by mouth daily   baclofen (LIORESAL) 10 MG tablet 3/19/2024 at am Care Giver Yes Yes   Sig: Take 10 mg by mouth 3 times daily   budesonide-formoterol (SYMBICORT) 160-4.5 MCG/ACT Inhaler 3/19/2024 at pm Care Giver Yes Yes   Sig: Inhale 2 puffs into the lungs 2 times daily   buprenorphine (BUTRANS) 10 MCG/HR WK patch 3/18/2024 at am Care Giver Yes Yes   Sig: Place 1 patch onto the skin once a week   calcium carbonate 600 mg-vitamin D 400 units (CALTRATE) 600-400 MG-UNIT per tablet 3/19/2024 at am Care Giver No Yes   Sig: Take 1 tablet by mouth daily   cetirizine (ZYRTEC) 10 MG tablet 3/19/2024 at am Care Giver No Yes   Sig: Take 1 tablet (10 mg) by mouth daily   cyclobenzaprine (FLEXERIL) 5 MG tablet 3/19/2024 at pm Care Giver Yes Yes   Sig: Take 5 mg by mouth 2 times daily   docusate sodium (COLACE) 100 MG capsule 3/19/2024 at am Care Giver Yes Yes   Sig: Take 100 mg by mouth as needed for constipation   ferrous sulfate (FEROSUL) 325 (65 Fe) MG tablet 3/19/2024 at am Care Giver No Yes   Sig: Take 1 tablet (325 mg) by mouth daily (with breakfast)   fexofenadine (ALLEGRA) 180 MG tablet 3/19/2024 at am Care Giver No Yes   Sig: Take 1 tablet (180 mg) by mouth daily   folic acid (FOLVITE) 1 MG tablet 3/19/2024 at am Care Giver No Yes   Sig: Take 1 tablet (1,000 mcg) by mouth daily   furosemide (LASIX) 40 MG tablet 3/19/2024 at am Care Giver No Yes   Sig: Take 1 tablet (40 mg) by mouth in the morning and 1 tablet (40 mg) in the evening.   gabapentin (NEURONTIN) 300 MG capsule 3/19/2024 at pm Care Giver Yes Yes   Sig: Take 600 mg by mouth 3 times daily   hydrOXYzine HCl (ATARAX) 25 MG tablet Unknown at prn Care Giver Yes Yes   Sig: Take 25 mg by mouth every 8 hours as needed for itching   lidocaine (LIDODERM) 5 % patch Unknown at prn Care Giver No Yes   Sig: Apply one patch to affected area for no more than 12 hours, Remove patch, and maintain 12 hours free of Lidocaine before applying the next patch, Apply a new  patch for 12 of every 24 hours as needed to increase efficacy   loperamide (IMODIUM) 2 MG capsule Unknown at prn Care Giver Yes Yes   Sig: Take 2 mg by mouth as needed for diarrhea   metoprolol tartrate (LOPRESSOR) 25 MG tablet 3/19/2024 at pm Care Giver Yes Yes   Sig: Take 37.5 mg by mouth 2 times daily Take one and half tablet (37.5 mg) twice daily   multivitamin (ONE A DAY) per tablet 3/19/2024 at am Care Giver No Yes   Sig: [MULTIVITAMIN (ONE A DAY) PER TABLET] TAKE 1 TABLET BY MOUTH DAILY   naloxone (NARCAN) 4 MG/0.1ML nasal spray Unknown at prn Care Giver Yes Yes   Sig: Spray 4 mg into one nostril alternating nostrils as needed for opioid reversal every 2-3 minutes until assistance arrives   omeprazole (PRILOSEC) 20 MG DR capsule 3/19/2024 at am Care Giver No Yes   Sig: Take 1 capsule (20 mg) by mouth daily before breakfast   oxyCODONE IR (ROXICODONE) 10 MG tablet 3/19/2024 at pm Care Giver Yes Yes   Sig: Take 10 mg by mouth 3 times daily   polyethylene glycol (GLYCOLAX) 17 gram/dose powder 3/19/2024 at am Care Giver Yes Yes   Sig: Take 17 g by mouth daily   predniSONE (DELTASONE) 2.5 MG tablet 3/19/2024 at am Care Giver Yes Yes   Sig: Take 1 tablet by mouth daily   riluzole (RILUTEK) 50 MG tablet 3/19/2024 at pm Care Giver Yes Yes   Sig: Take 50 mg by mouth 2 times daily (with meals)   tamsulosin (FLOMAX) 0.4 MG capsule 3/19/2024 at am Care Giver No Yes   Sig: Take 1 capsule (0.4 mg) by mouth daily (with breakfast)   triamcinolone (KENALOG) 0.1 % external cream 3/19/2024 at pm Care Giver Yes Yes   Sig: Apply topically 2 times daily   triamterene (DYRENIUM) 100 MG capsule 3/19/2024 at am Care Giver Yes Yes   Sig: Take 2 capsules by mouth daily   vitamin (B COMPLEX) capsule 3/19/2024 at am Care Giver Yes Yes   Sig: Take 1 tablet by mouth daily      Facility-Administered Medications: None       ALLERGIES:  No Known Allergies    FAMILY HISTORY:  Family History   Problem Relation Age of Onset    Diabetes Mother      Diabetes Father     Cancer Father     Diabetes Sister     Diabetes Brother        SOCIAL HISTORY:  Social History     Tobacco Use    Smoking status: Every Day     Packs/day: 10.00     Years: 46.00     Additional pack years: 0.00     Total pack years: 460.00     Types: Cigarettes    Smokeless tobacco: Never   Substance Use Topics    Alcohol use: No     Comment: Alcoholic Drinks/day: Quit drinking in 2014. Prior to that he drank excessively.    Drug use: No        VITALS:  Patient Vitals for the past 24 hrs:   BP Temp Temp src Pulse Resp SpO2 Weight   03/20/24 0531 -- -- -- 87 20 91 % --   03/20/24 0530 97/54 -- -- 88 19 91 % --   03/20/24 0329 90/53 -- -- 89 22 98 % --   03/20/24 0316 91/55 -- -- 90 21 93 % --   03/20/24 0256 97/54 -- -- 96 24 -- --   03/20/24 0145 126/66 -- -- 100 -- 91 % --   03/20/24 0106 128/65 100.3  F (37.9  C) Oral 100 22 91 % 63.5 kg (140 lb)       PHYSICAL EXAM    General Appearance: Chronically ill-appearing elderly male in no acute distress  Head:  Normocephalic, atraumatic  Eyes:  PERRL, conjunctiva/corneas clear, EOM's intact  ENT:  membranes are moist without pallor  Neck:  Supple, no midline tenderness to palpation  Chest:  No tenderness or deformity, no crepitus  Cardio:  Regular rate and rhythm, no murmur/gallop/rub  Pulm:  No respiratory distress, clear to auscultation bilaterally  Back:  no midline tenderness to palpation, no paraspinal tenderness, No CVA tenderness, normal ROM  Abdomen:  Soft, non-tender, non distended,no rebound or guarding.  Extremities: Extremities ranged without signs of trauma.  No significant peripheral edema  Skin:  Skin warm, dry, no rashes  Neuro: Patient is quite drowsy, but with stimulation verbally and physically patient awakens.  Able to state his name and that he is in the hospital, appropriately states the year but does seem confused about historical questions.  Does not respond to extremity strength testing, or cranial nerve testing, falling  asleep instead.     RADIOLOGY/LABS:  Reviewed all pertinent imaging. Please see official radiology report. All pertinent labs reviewed and interpreted.    Results for orders placed or performed during the hospital encounter of 03/20/24   Head CT w/o contrast    Impression    IMPRESSION:  HEAD CT:  1. Limited exam. No definite acute intracranial abnormality    2. Age-related and chronic ischemic changes.     CERVICAL SPINE CT:  1.  No definite acute cervical spine fracture or posttraumatic subluxation.   CT Cervical Spine w/o Contrast    Impression    IMPRESSION:  HEAD CT:  1. Limited exam. No definite acute intracranial abnormality    2. Age-related and chronic ischemic changes.     CERVICAL SPINE CT:  1.  No definite acute cervical spine fracture or posttraumatic subluxation.   XR Chest Port 1 View    Impression    IMPRESSION: Shallow inspiration. The patient is rotated. Slight scarring in the apices. No adenopathy or effusion. Mild elevation of the left hemidiaphragm. Normal cardiac size and pulmonary vascularity. Atherosclerotic thoracic aorta. Postoperative   changes in the abdomen. Degenerative changes both shoulders and the spine, narrowing worse involving the left glenohumeral joint. Monitoring leads overlying the chest.   CT Chest Abdomen Pelvis w/o Contrast    Impression    IMPRESSION:  1.  New onset airspace infiltrate has developed involving the left lower lobe, likely representing an infectious or an inflammatory process. No cavitation, suspicious adenopathy or pleural effusion. Diffuse thickening of the bronchi with foci of   endobronchial mucus. Dependent mucus in the trachea and the proximal mainstem bronchi. Mild emphysematous changes. Scarring in the apices.    2.  Dependent stone in the right aspect of the urinary bladder, new since prior study. No renal calculi on either side. No hydronephrosis or hydroureter. Prostatomegaly.    3.  Cholecystectomy. Benign pancreatic lipoma. Formed stool material  throughout normal caliber redundant colon without mechanical obstruction, free gas or free fluid. Distal colonic diverticulosis without acute inflammation or secondary complications.   Normal appendix.    4.  Degenerative changes both shoulders, spine and joints of the pelvis. Right convex lumbar curve.     Basic metabolic panel   Result Value Ref Range    Sodium 140 135 - 145 mmol/L    Potassium 3.9 3.4 - 5.3 mmol/L    Chloride 100 98 - 107 mmol/L    Carbon Dioxide (CO2) 27 22 - 29 mmol/L    Anion Gap 13 7 - 15 mmol/L    Urea Nitrogen 7.9 (L) 8.0 - 23.0 mg/dL    Creatinine 0.80 0.67 - 1.17 mg/dL    GFR Estimate >90 >60 mL/min/1.73m2    Calcium 9.2 8.8 - 10.2 mg/dL    Glucose 106 (H) 70 - 99 mg/dL   Result Value Ref Range    Troponin T, High Sensitivity 63 (H) <=22 ng/L   Nt probnp inpatient   Result Value Ref Range    N terminal Pro BNP Inpatient 315 0 - 900 pg/mL   Symptomatic Influenza A/B, RSV, & SARS-CoV2 PCR (COVID-19) Nasopharyngeal    Specimen: Nasopharyngeal; Swab   Result Value Ref Range    Influenza A PCR Negative Negative    Influenza B PCR Negative Negative    RSV PCR Negative Negative    SARS CoV2 PCR Negative Negative   Blood gas venous   Result Value Ref Range    pH Venous 7.45 (H) 7.32 - 7.43    pCO2 Venous 45 40 - 50 mm Hg    pO2 Venous 30 25 - 47 mm Hg    Bicarbonate Venous 32 (H) 21 - 28 mmol/L    Base Excess/Deficit Venous 7.6 (H) -3.0 - 3.0 mmol/L    FIO2 0     Oxyhemoglobin Venous 59 (L) 70 - 75 %    O2 Sat, Venous 62.0 (L) 70.0 - 75.0 %   CBC with platelets and differential   Result Value Ref Range    WBC Count 19.1 (H) 4.0 - 11.0 10e3/uL    RBC Count 4.35 (L) 4.40 - 5.90 10e6/uL    Hemoglobin 13.2 (L) 13.3 - 17.7 g/dL    Hematocrit 39.4 (L) 40.0 - 53.0 %    MCV 91 78 - 100 fL    MCH 30.3 26.5 - 33.0 pg    MCHC 33.5 31.5 - 36.5 g/dL    RDW 14.3 10.0 - 15.0 %    Platelet Count 369 150 - 450 10e3/uL    % Neutrophils 86 %    % Lymphocytes 7 %    % Monocytes 5 %    % Eosinophils 1 %    %  Basophils 0 %    % Immature Granulocytes 1 %    NRBCs per 100 WBC 0 <1 /100    Absolute Neutrophils 16.4 (H) 1.6 - 8.3 10e3/uL    Absolute Lymphocytes 1.4 0.8 - 5.3 10e3/uL    Absolute Monocytes 1.0 0.0 - 1.3 10e3/uL    Absolute Eosinophils 0.1 0.0 - 0.7 10e3/uL    Absolute Basophils 0.0 0.0 - 0.2 10e3/uL    Absolute Immature Granulocytes 0.1 <=0.4 10e3/uL    Absolute NRBCs 0.0 10e3/uL   Lactic acid whole blood   Result Value Ref Range    Lactic Acid 2.5 (H) 0.7 - 2.0 mmol/L   Result Value Ref Range    Procalcitonin 0.13 <0.50 ng/mL   UA with Microscopic reflex to Culture    Specimen: Urine, Sepulveda Catheter   Result Value Ref Range    Color Urine Yellow Colorless, Straw, Light Yellow, Yellow    Appearance Urine Clear Clear    Glucose Urine Negative Negative mg/dL    Bilirubin Urine Negative Negative    Ketones Urine Negative Negative mg/dL    Specific Gravity Urine 1.015 1.001 - 1.030    Blood Urine 0.03 mg/dL (A) Negative    pH Urine 7.0 5.0 - 7.0    Protein Albumin Urine Negative Negative mg/dL    Urobilinogen Urine 8.0 (A) <2.0 mg/dL    Nitrite Urine Negative Negative    Leukocyte Esterase Urine Negative Negative    RBC Urine 13 (H) <=2 /HPF    WBC Urine 1 <=5 /HPF   Result Value Ref Range    Troponin T, High Sensitivity 90 (H) <=22 ng/L   Lactic acid whole blood   Result Value Ref Range    Lactic Acid 1.5 0.7 - 2.0 mmol/L   Extra Purple Top Tube   Result Value Ref Range    Hold Specimen [facility]    ECG 12-LEAD WITH MUSE (LHE)   Result Value Ref Range    Systolic Blood Pressure 130 mmHg    Diastolic Blood Pressure 65 mmHg    Ventricular Rate 104 BPM    Atrial Rate 104 BPM    WI Interval 136 ms    QRS Duration 80 ms     ms    QTc 415 ms    P Axis 65 degrees    R AXIS 38 degrees    T Axis 69 degrees    Interpretation ECG       Sinus tachycardia  Otherwise normal ECG  When compared with ECG of 04-APR-2022 13:21,  No significant change was found  Confirmed by SEE ED PROVIDER NOTE FOR, ECG INTERPRETATION (4000),   ANGELA CABALLERO ([id number]) on 3/20/2024 2:17:24 AM         EKG:  Sinus tachycardia rate 104.  No notable ST or T wave abnormalities normal intervals QRS 80 and QTc of 415.  Compared to previous EKG from 4/4/2022 no significant interval change.  I have independently reviewed and interpreted the EKG(s) documented above.      June Swift MD  Emergency Medicine  [facility] EMERGENCY DEPARTMENT  [facility] [address] [phone]  Dept: [phone]     June Swift MD  03/20/24 0608

## 2024-03-20 NOTE — PROVIDER NOTIFICATION
Paged Dr. Cerna for C consult on pre-existing pressure sore to buttock. Pt repositioned off of butt and mepilex applied.

## 2024-03-20 NOTE — PHARMACY-VANCOMYCIN DOSING SERVICE
Pharmacy Vancomycin Initial Note  Date of Service 2024  Patient's  1950  73 year old, male    Indication: Sepsis    Current estimated CrCl = Estimated Creatinine Clearance: 73.9 mL/min (based on SCr of 0.8 mg/dL).    Creatinine for last 3 days  3/20/2024:  1:41 AM Creatinine 0.80 mg/dL    Recent Vancomycin Level(s) for last 3 days  No results found for requested labs within last 3 days.      Vancomycin IV Administrations (past 72 hours)        No vancomycin orders with administrations in past 72 hours.                    Nephrotoxins and other renal medications (From now, onward)      Start     Dose/Rate Route Frequency Ordered Stop    24 0300  vancomycin (VANCOCIN) 1,500 mg in sodium chloride 0.9 % 250 mL intermittent infusion         1,500 mg  over 90 Minutes Intravenous ONCE 24 0243      24 0230  piperacillin-tazobactam (ZOSYN) 3.375 g vial to attach to  mL bag         3.375 g  over 30 Minutes Intravenous ONCE 24 0224              Contrast Orders - past 72 hours (72h ago, onward)      None                Plan:  Start vancomycin  1500 mg IV once for loading dose (23.6 mg/kg)   This is a one time consult. Please consult pharmacy again if vancomycin to be continued.     Sharon Red, KwakuD

## 2024-03-20 NOTE — PLAN OF CARE
Problem: Skin Injury Risk Increased  Goal: Skin Health and Integrity  Outcome: Not Progressing  Intervention: Plan: Nurse Driven Intervention: Moisture Management  Recent Flowsheet Documentation  Taken 3/20/2024 0900 by Genevieve Guillen RN  Moisture Interventions:   Encourage regular toileting   Incontinence pad  Intervention: Plan: Nurse Driven Intervention: Friction and Shear  Recent Flowsheet Documentation  Taken 3/20/2024 0900 by Genevieve Guillen RN  Friction/Shear Interventions: HOB 30 degrees or less  Intervention: Optimize Skin Protection  Recent Flowsheet Documentation  Taken 3/20/2024 0900 by Genevieve Guillen RN  Activity Management: activity adjusted per tolerance  Pressure sore to buttock, mepilex applied, WOC consulted. Repositioned side to side. PRN pain medication to help with pain.       Problem: Pain Acute  Goal: Optimal Pain Control and Function  Outcome: Progressing  Intervention: Develop Pain Management Plan  Recent Flowsheet Documentation  Taken 3/20/2024 0900 by Genevieve Guillen RN  Pain Management Interventions: medication (see MAR)   Scheduled oxy given, effective with pain control. Congested cough noted. Febrile this afternoon, PRN tylenol given, effective. IV antibiotics given per order. Call light within reach.     Goal Outcome Evaluation:

## 2024-03-20 NOTE — ED NOTES
Bed: JNED-06  Expected date: 3/20/24  Expected time:   Means of arrival:   Comments:  Tamara DAVIS- NORRIS with hx CHF

## 2024-03-20 NOTE — PROGRESS NOTES
[facility]    Medicine Progress Note - Hospitalist Service    Date of Admission:  3/20/2024    Assessment & Plan     73 year old male with COPD,  ALS with paraparesis, DM II, arteriosclerotic heart disease, BPH with prostate cancer, AJAY, and chronic pain brought into the ED by EMS from [place] after being found down on the ground by staff at the facility     Unwitnessed fall  ---patient found down on the ground, staff at facility think fall probably an hour.    ---Patient denied loss of consciousness  --- CT head/C-spine negative for stroke or fractures.   --PT and OT to eval  ---possibly weakness related to illness     LLL pneumonia  Severe Sepsis due to LLL pneumonia resolved: Diagnosis based on HR > 90 bpm, RR > 20 breaths/min, WBC > 12, and lactic acidosis (lactate >2) due to sepsis.   ---continue vanc/zosyn for now  --- low-grade fever noted in ED, also mild hypotension which improved with IV hydration,   ---chest x-ray no clear infiltrates,  ---viral panel in the ED negative,  ---UA clean  ---seen on CT chest/abdomen looking for septic source  ---cancelled ID  ---BC NGTD  ---swallow eval     Encephalopathy  ---resolved  ---suspect toxic from infection  --- head CT negative for stroke, neurochecks ordered,   ---unlcear baseline patient baseline     ALS, wheelchair-bound,   Heel pain  ---order boots  ---PT and OT to eval  ---contkne rilutek  ---continue prednisone     High blood pressure   ---initially had low bp in ED  ---now stable  ---hold triamterene and lasix  --- continue metoprolol - added hold parameters    Elevated troponin,  ---two elevated  --- no chest pain complaints and no acute EKG change  ---possibly type II non-STEMI from demand ischemia, c  ---telemetry monitoring ordered,   --pending echo in a.m.     Chronic pain  ---continue butrans, cymbalta, gabapengin    BPH - home flomax        Diet: Combination Diet Low Saturated Fat Na <2400mg Diet, No Caffeine Diet    DVT  Prophylaxis: Enoxaparin (Lovenox) SQ  Sepulveda Catheter: Not present  Lines: None     Cardiac Monitoring: ACTIVE order. Indication: Tachyarrhythmias, acute (48 hours)  Code Status:  clarifying    Clinically Significant Risk Factors Present on Admission                # Drug Induced Platelet Defect: home medication list includes an antiplatelet medication   # Hypertension: Noted on problem list                 Disposition Plan      Expected Discharge Date: 03/22/2024                    Cici Cerna MD  Hospitalist Service  [facility]  Securely message with Vocera (more info)  Text page via Mendeley Paging/Directory   ______________________________________________________________________    Interval History   ---patient seen and chart reviewed  ---he denies that he fell and tells me he slid down in bed  ---denies cough  ---tells me that his bilateral feet hurt    Physical Exam   Vital Signs: Temp: 99.2  F (37.3  C) Temp src: Oral BP: 121/60 Pulse: 92   Resp: 24 SpO2: 95 % O2 Device: None (Room air)    Weight: 140 lbs 0 oz    General Appearance: Pleasant edentulous NAD  Respiratory: CTA-B anteriorly  Cardiovascular: RRR  GI: soft, nontender  Skin: dry skin - I took off his socks and he has pain but no obvious skin openenigns on his feet which have super dry skin  Other: Neuro diffusely stiff no tremors     Medical Decision Making             Data     I have personally reviewed the following data over the past 24 hrs:    19.4 (H)  \   12.7 (L)   / 341     141 103 9.7 /  83   3.9 31 (H) 0.81 \     Trop: 90 (H) BNP: 315     Procal: 0.13 CRP: N/A Lactic Acid: 1.5         Imaging results reviewed over the past 24 hrs:   Recent Results (from the past 24 hour(s))   Head CT w/o contrast    Narrative    EXAM: CT CERVICAL SPINE W/O CONTRAST, CT HEAD W/O CONTRAST  LOCATION: [facility]  DATE: 3/20/2024    INDICATION: Trauma, head and neck injury.  COMPARISON: None.  TECHNIQUE:   1) Routine  CT Head without IV contrast. Multiplanar reformats. Dose reduction techniques were used.  2) Routine CT Cervical Spine without IV contrast. Multiplanar reformats. Dose reduction techniques were used.    FINDINGS:   HEAD CT:   INTRACRANIAL CONTENTS: Limited by motion and positioning. No definite acute hemorrhage or mass effect. No definite CT evidence of acute infarct. Mild volume loss and presumed chronic small vessel ischemia.    VISUALIZED ORBITS/SINUSES/MASTOIDS: No intraorbital abnormality. No paranasal sinus mucosal disease. No middle ear or mastoid effusion.    BONES/SOFT TISSUES: No acute abnormality.    CERVICAL SPINE CT:   VERTEBRA: Moderate reversal of the normal cervical lordosis. Slight degenerative anterolisthesis C2 on C3 through C4 on C5. Moderate-to-marked multilevel degenerative disc disease and facet arthropathy. Posterior instrumented fusion and laminectomies   from C2 through C4.    CANAL/FORAMINA: No high-grade canal narrowing. Moderate-to-marked multilevel degenerative foraminal narrowing.    PARASPINAL: No extraspinal abnormality. Visualized lung fields are clear.      Impression    IMPRESSION:  HEAD CT:  1. Limited exam. No definite acute intracranial abnormality    2. Age-related and chronic ischemic changes.     CERVICAL SPINE CT:  1.  No definite acute cervical spine fracture or posttraumatic subluxation.   CT Cervical Spine w/o Contrast    Narrative    EXAM: CT CERVICAL SPINE W/O CONTRAST, CT HEAD W/O CONTRAST  LOCATION: [facility]  DATE: 3/20/2024    INDICATION: Trauma, head and neck injury.  COMPARISON: None.  TECHNIQUE:   1) Routine CT Head without IV contrast. Multiplanar reformats. Dose reduction techniques were used.  2) Routine CT Cervical Spine without IV contrast. Multiplanar reformats. Dose reduction techniques were used.    FINDINGS:   HEAD CT:   INTRACRANIAL CONTENTS: Limited by motion and positioning. No definite acute hemorrhage or mass effect. No  definite CT evidence of acute infarct. Mild volume loss and presumed chronic small vessel ischemia.    VISUALIZED ORBITS/SINUSES/MASTOIDS: No intraorbital abnormality. No paranasal sinus mucosal disease. No middle ear or mastoid effusion.    BONES/SOFT TISSUES: No acute abnormality.    CERVICAL SPINE CT:   VERTEBRA: Moderate reversal of the normal cervical lordosis. Slight degenerative anterolisthesis C2 on C3 through C4 on C5. Moderate-to-marked multilevel degenerative disc disease and facet arthropathy. Posterior instrumented fusion and laminectomies   from C2 through C4.    CANAL/FORAMINA: No high-grade canal narrowing. Moderate-to-marked multilevel degenerative foraminal narrowing.    PARASPINAL: No extraspinal abnormality. Visualized lung fields are clear.      Impression    IMPRESSION:  HEAD CT:  1. Limited exam. No definite acute intracranial abnormality    2. Age-related and chronic ischemic changes.     CERVICAL SPINE CT:  1.  No definite acute cervical spine fracture or posttraumatic subluxation.   XR Chest Port 1 View    Narrative    EXAM: XR CHEST PORT 1 VIEW  LOCATION: [facility]  DATE: 3/20/2024    INDICATION: Shortness of breath.  COMPARISON: Portable chest single view 06/23/2023 at 1017 hours.      Impression    IMPRESSION: Shallow inspiration. The patient is rotated. Slight scarring in the apices. No adenopathy or effusion. Mild elevation of the left hemidiaphragm. Normal cardiac size and pulmonary vascularity. Atherosclerotic thoracic aorta. Postoperative   changes in the abdomen. Degenerative changes both shoulders and the spine, narrowing worse involving the left glenohumeral joint. Monitoring leads overlying the chest.   CT Chest Abdomen Pelvis w/o Contrast    Narrative    EXAM: CT CHEST, ABDOMEN, PELVIS WITHOUT CONTRAST  LOCATION: [facility]  DATE: 03/20/2024    INDICATION: Fever. Sepsis. Evaluate for source of sepsis.  COMPARISON:   1. CT  pulmonary angiogram 02/07/2018.  2. CT abdomen and pelvis with IV contrast 02/07/2018.  TECHNIQUE: CT scan of the chest, abdomen, and pelvis was performed without IV contrast. Multiplanar reformats were obtained. Dose reduction techniques were used.   CONTRAST: None.    FINDINGS:   LUNGS AND PLEURA: Motion artifact degrades numerous images. Slight scarring in the apices. Mild emphysematous changes, more apparent involving the upper lobes. New onset airspace infiltrate has developed involving the left lower lobe, likely representing   an infectious or an inflammatory process. No cavitation or pleural effusion. Diffuse thickening of the bronchi with foci of endobronchial mucus.    MEDIASTINUM/AXILLAE: Normal cardiac size. Moderate coronary artery calcifications. Mitral annulus calcifications. No pericardial effusion. Atherosclerotic normal caliber thoracic aorta. Dependent mucus in the distal trachea and the proximal mainstem   bronchi.    CORONARY ARTERY CALCIFICATION: Moderate.    HEPATOBILIARY: Cholecystectomy. No discrete hepatic lesion.    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    SPLEEN: Normal.    ADRENAL GLANDS: Normal.    KIDNEYS/BLADDER: No renal calculi on either side. Both kidneys are negative for hydronephrosis and hydroureter. Partially distended urinary bladder containing dependent stone on the right aspect (image 251, series 3), an interval development. Stone   measures 1.1 x 0.5 cm, mean density measures 1133 Hounsfield units (image 251, series 3). Prostatomegaly.    BOWEL: Normal appendix. Formed stool material within normal caliber redundant colon without mechanical obstruction or free gas. Distal colonic mild diverticulosis without acute inflammation or secondary complications.    LYMPH NODES: No suspicious abdominopelvic adenopathy.    VASCULATURE: Atherosclerotic normal caliber distal abdominal aorta measuring 1.7 x 1.7 cm (image 177, series 3). Normal caliber  IVC.    PELVIC ORGANS: Stool-filled redundant rectosigmoid. Distal colonic diverticulosis without acute inflammation or secondary complications. Normal appendix. Prostatomegaly. Dependent stone in the urinary bladder to the right aspect. Vascular calcifications.   No adenopathy or free fluid.    MUSCULOSKELETAL: Degenerative changes both shoulders, spine and joints of the pelvis. Right convex lumbar curve.      Impression    IMPRESSION:  1.  New onset airspace infiltrate has developed involving the left lower lobe, likely representing an infectious or an inflammatory process. No cavitation, suspicious adenopathy or pleural effusion. Diffuse thickening of the bronchi with foci of   endobronchial mucus. Dependent mucus in the trachea and the proximal mainstem bronchi. Mild emphysematous changes. Scarring in the apices.    2.  Dependent stone in the right aspect of the urinary bladder, new since prior study. No renal calculi on either side. No hydronephrosis or hydroureter. Prostatomegaly.    3.  Cholecystectomy. Benign pancreatic lipoma. Formed stool material throughout normal caliber redundant colon without mechanical obstruction, free gas or free fluid. Distal colonic diverticulosis without acute inflammation or secondary complications.   Normal appendix.    4.  Degenerative changes both shoulders, spine and joints of the pelvis. Right convex lumbar curve.

## 2024-03-20 NOTE — PHARMACY-VANCOMYCIN DOSING SERVICE
"Pharmacy Vancomycin Initial Note  Date of Service 2024  Patient's  1950  73 year old, male    Indication: Sepsis    Current estimated CrCl = Estimated Creatinine Clearance: 73.9 mL/min (based on SCr of 0.8 mg/dL).    Creatinine for last 3 days  3/20/2024:  1:41 AM Creatinine 0.80 mg/dL    Recent Vancomycin Level(s) for last 3 days  No results found for requested labs within last 3 days.      Vancomycin IV Administrations (past 72 hours)                     vancomycin (VANCOCIN) 1,500 mg in sodium chloride 0.9 % 250 mL intermittent infusion (mg) 1,500 mg New Bag 24 0257                    Nephrotoxins and other renal medications (From now, onward)      Start     Dose/Rate Route Frequency Ordered Stop    24 1700  vancomycin (VANCOCIN) 750 mg in sodium chloride 0.9 % 250 mL intermittent infusion         750 mg  over 60 Minutes Intravenous EVERY 12 HOURS 24 0430      24 0900  piperacillin-tazobactam (ZOSYN) 3.375 g vial to attach to  mL bag        Note to Pharmacy: For [facility], [facility] and [facility]: For Zosyn-naive patients, use the \"Zosyn initial dose + extended infusion\" order panel.    3.375 g  over 240 Minutes Intravenous EVERY 8 HOURS 24 0402      24 0800  furosemide (LASIX) tablet 40 mg        Note to Pharmacy: PTA Sig:Take 1 tablet (40 mg) by mouth in the morning and 1 tablet (40 mg) in the evening.      40 mg Oral 2 TIMES DAILY 24 0430              Contrast Orders - past 72 hours (72h ago, onward)      None            [employer] Prediction of Planned Initial Vancomycin Regimen    Loading dose: N/A  Regimen: 750 mg IV every 12 hours.  Start time: 14:57 on 2024  Exposure target: AUC24 (range)400-600 mg/L.hr   AUC24,ss: 486 mg/L.hr  Probability of AUC24 > 400: 71 %  Ctrough,ss: 15.6 mg/L  Probability of Ctrough,ss > 20: 29 %  Probability of nephrotoxicity (Lodise LAZARO ): 11 %        Plan:  Start vancomycin  750 mg IV q12h.   Vancomycin monitoring method: " AUC  Vancomycin therapeutic monitoring goal: 400-600 mg*h/L  Pharmacy will check vancomycin levels as appropriate in 1-3 Days.    Serum creatinine levels will be ordered daily for the first week of therapy and at least twice weekly for subsequent weeks.      Sharon Red, PharmD

## 2024-03-20 NOTE — MEDICATION SCRIBE - ADMISSION MEDICATION HISTORY
Medication Scribe Admission Medication History    Admission medication history is complete. The information provided in this note is only as accurate as the sources available at the time of the update.    Information Source(s): Facility ([facility]/) medication list/MAR via phone    Pertinent Information: Vanessa Rucker from [facility] ([phone]) provided updated PTA med list over the phone    Changes made to PTA medication list:  Added: None  Deleted: None  Changed: None    Allergies reviewed with patient and updates made in EHR: yes    Medication History Completed By: Austin Jauregui 3/20/2024 2:55 AM    Addendum: discussed with med scribe Austin to update furosemide 40mg twice daily to 40mg daily & metoprolol 37.5mg twice daily to 12.5mg twice daily per pharmacy fill records.   Sharon Red, PharmD on 3/20/2024 at 6:46 AM      PTA Med List   Medication Sig Last Dose    acetaminophen (TYLENOL) 500 MG tablet Take 500 mg by mouth every 8 hours as needed for mild pain Unknown at prn    albuterol (PROAIR HFA/PROVENTIL HFA/VENTOLIN HFA) 108 (90 Base) MCG/ACT inhaler Inhale 1-2 puffs into the lungs every 6 hours as needed for shortness of breath / dyspnea or wheezing Unknown at prn    aspirin (ASA) 81 MG EC tablet Take 1 tablet (81 mg) by mouth daily 3/19/2024 at am    atorvastatin (LIPITOR) 10 MG tablet Take 1 tablet (10 mg) by mouth daily 3/19/2024 at hs    baclofen (LIORESAL) 10 MG tablet Take 10 mg by mouth 3 times daily 3/19/2024 at am    budesonide-formoterol (SYMBICORT) 160-4.5 MCG/ACT Inhaler Inhale 2 puffs into the lungs 2 times daily 3/19/2024 at pm    buprenorphine (BUTRANS) 10 MCG/HR WK patch Place 1 patch onto the skin once a week 3/18/2024 at am    calcium carbonate 600 mg-vitamin D 400 units (CALTRATE) 600-400 MG-UNIT per tablet Take 1 tablet by mouth daily 3/19/2024 at am    cetirizine (ZYRTEC) 10 MG tablet Take 1 tablet (10 mg) by mouth daily 3/19/2024 at am    cyclobenzaprine (FLEXERIL) 5  MG tablet Take 5 mg by mouth 2 times daily 3/19/2024 at pm    docusate sodium (COLACE) 100 MG capsule Take 100 mg by mouth as needed for constipation 3/19/2024 at am    DULoxetine (CYMBALTA) 20 MG capsule Take 3 capsules (60 mg) by mouth daily 3/19/2024 at am    ferrous sulfate (FEROSUL) 325 (65 Fe) MG tablet Take 1 tablet (325 mg) by mouth daily (with breakfast) 3/19/2024 at am    fexofenadine (ALLEGRA) 180 MG tablet Take 1 tablet (180 mg) by mouth daily 3/19/2024 at am    folic acid (FOLVITE) 1 MG tablet Take 1 tablet (1,000 mcg) by mouth daily 3/19/2024 at am    furosemide (LASIX) 40 MG tablet Take 1 tablet (40 mg) by mouth in the morning and 1 tablet (40 mg) in the evening. 3/19/2024 at am    gabapentin (NEURONTIN) 300 MG capsule Take 600 mg by mouth 3 times daily 3/19/2024 at pm    hydrOXYzine HCl (ATARAX) 25 MG tablet Take 25 mg by mouth every 8 hours as needed for itching Unknown at prn    lidocaine (LIDODERM) 5 % patch Apply one patch to affected area for no more than 12 hours, Remove patch, and maintain 12 hours free of Lidocaine before applying the next patch, Apply a new patch for 12 of every 24 hours as needed to increase efficacy Unknown at prn    loperamide (IMODIUM) 2 MG capsule Take 2 mg by mouth as needed for diarrhea Unknown at prn    metoprolol tartrate (LOPRESSOR) 25 MG tablet Take 37.5 mg by mouth 2 times daily Take one and half tablet (37.5 mg) twice daily 3/19/2024 at pm    multivitamin (ONE A DAY) per tablet [MULTIVITAMIN (ONE A DAY) PER TABLET] TAKE 1 TABLET BY MOUTH DAILY 3/19/2024 at am    naloxone (NARCAN) 4 MG/0.1ML nasal spray Spray 4 mg into one nostril alternating nostrils as needed for opioid reversal every 2-3 minutes until assistance arrives Unknown at prn    omeprazole (PRILOSEC) 20 MG DR capsule Take 1 capsule (20 mg) by mouth daily before breakfast 3/19/2024 at am    oxyCODONE IR (ROXICODONE) 10 MG tablet Take 10 mg by mouth 3 times daily 3/19/2024 at pm    polyethylene glycol  (GLYCOLAX) 17 gram/dose powder Take 17 g by mouth daily 3/19/2024 at am    predniSONE (DELTASONE) 2.5 MG tablet Take 1 tablet by mouth daily 3/19/2024 at am    riluzole (RILUTEK) 50 MG tablet Take 50 mg by mouth 2 times daily (with meals) 3/19/2024 at pm    tamsulosin (FLOMAX) 0.4 MG capsule Take 1 capsule (0.4 mg) by mouth daily (with breakfast) 3/19/2024 at am    triamcinolone (KENALOG) 0.1 % external cream Apply topically 2 times daily 3/19/2024 at pm    triamterene (DYRENIUM) 100 MG capsule Take 2 capsules by mouth daily 3/19/2024 at am    vitamin (B COMPLEX) capsule Take 1 tablet by mouth daily 3/19/2024 at am    VITAMIN D3 50 MCG (2000 UT) tablet Take 2 tablets by mouth daily 3/19/2024 at am

## 2024-03-21 ENCOUNTER — APPOINTMENT (OUTPATIENT)
Dept: PHYSICAL THERAPY | Facility: HOSPITAL | Age: 74
DRG: 871 | End: 2024-03-21
Attending: FAMILY MEDICINE
Payer: COMMERCIAL

## 2024-03-21 LAB
ANION GAP SERPL CALCULATED.3IONS-SCNC: 9 MMOL/L (ref 7–15)
BUN SERPL-MCNC: 11.4 MG/DL (ref 8–23)
CALCIUM SERPL-MCNC: 8.7 MG/DL (ref 8.8–10.2)
CHLORIDE SERPL-SCNC: 103 MMOL/L (ref 98–107)
CREAT SERPL-MCNC: 0.78 MG/DL (ref 0.67–1.17)
DEPRECATED HCO3 PLAS-SCNC: 27 MMOL/L (ref 22–29)
EGFRCR SERPLBLD CKD-EPI 2021: >90 ML/MIN/1.73M2
ERYTHROCYTE [DISTWIDTH] IN BLOOD BY AUTOMATED COUNT: 14.5 % (ref 10–15)
GLUCOSE SERPL-MCNC: 76 MG/DL (ref 70–99)
HCT VFR BLD AUTO: 34.9 % (ref 40–53)
HGB BLD-MCNC: 11.6 G/DL (ref 13.3–17.7)
LACTATE SERPL-SCNC: 0.7 MMOL/L (ref 0.7–2)
MCH RBC QN AUTO: 30.4 PG (ref 26.5–33)
MCHC RBC AUTO-ENTMCNC: 33.2 G/DL (ref 31.5–36.5)
MCV RBC AUTO: 92 FL (ref 78–100)
PLATELET # BLD AUTO: 301 10E3/UL (ref 150–450)
POTASSIUM SERPL-SCNC: 3.7 MMOL/L (ref 3.4–5.3)
RBC # BLD AUTO: 3.81 10E6/UL (ref 4.4–5.9)
SODIUM SERPL-SCNC: 139 MMOL/L (ref 135–145)
VANCOMYCIN SERPL-MCNC: 8.8 UG/ML
WBC # BLD AUTO: 14.4 10E3/UL (ref 4–11)

## 2024-03-21 PROCEDURE — 36415 COLL VENOUS BLD VENIPUNCTURE: CPT | Performed by: FAMILY MEDICINE

## 2024-03-21 PROCEDURE — 250N000011 HC RX IP 250 OP 636: Performed by: INTERNAL MEDICINE

## 2024-03-21 PROCEDURE — 85027 COMPLETE CBC AUTOMATED: CPT | Performed by: FAMILY MEDICINE

## 2024-03-21 PROCEDURE — 258N000003 HC RX IP 258 OP 636: Performed by: INTERNAL MEDICINE

## 2024-03-21 PROCEDURE — 250N000012 HC RX MED GY IP 250 OP 636 PS 637: Performed by: INTERNAL MEDICINE

## 2024-03-21 PROCEDURE — 250N000013 HC RX MED GY IP 250 OP 250 PS 637: Performed by: FAMILY MEDICINE

## 2024-03-21 PROCEDURE — 250N000013 HC RX MED GY IP 250 OP 250 PS 637: Performed by: INTERNAL MEDICINE

## 2024-03-21 PROCEDURE — 97530 THERAPEUTIC ACTIVITIES: CPT | Mod: GP

## 2024-03-21 PROCEDURE — G0463 HOSPITAL OUTPT CLINIC VISIT: HCPCS

## 2024-03-21 PROCEDURE — 97161 PT EVAL LOW COMPLEX 20 MIN: CPT | Mod: GP

## 2024-03-21 PROCEDURE — 99232 SBSQ HOSP IP/OBS MODERATE 35: CPT | Mod: GC

## 2024-03-21 PROCEDURE — 250N000011 HC RX IP 250 OP 636: Performed by: FAMILY MEDICINE

## 2024-03-21 PROCEDURE — 83605 ASSAY OF LACTIC ACID: CPT | Performed by: FAMILY MEDICINE

## 2024-03-21 PROCEDURE — 250N000013 HC RX MED GY IP 250 OP 250 PS 637

## 2024-03-21 PROCEDURE — 80048 BASIC METABOLIC PNL TOTAL CA: CPT | Performed by: FAMILY MEDICINE

## 2024-03-21 PROCEDURE — 80202 ASSAY OF VANCOMYCIN: CPT | Performed by: FAMILY MEDICINE

## 2024-03-21 PROCEDURE — 120N000001 HC R&B MED SURG/OB

## 2024-03-21 RX ORDER — VANCOMYCIN HYDROCHLORIDE 1 G/200ML
1000 INJECTION, SOLUTION INTRAVENOUS EVERY 12 HOURS
Status: DISCONTINUED | OUTPATIENT
Start: 2024-03-21 | End: 2024-03-21

## 2024-03-21 RX ADMIN — ACETAMINOPHEN 650 MG: 325 TABLET ORAL at 16:08

## 2024-03-21 RX ADMIN — SODIUM PHOSPHATE, DIBASIC AND SODIUM PHOSPHATE, MONOBASIC 1 ENEMA: 7; 19 ENEMA RECTAL at 12:38

## 2024-03-21 RX ADMIN — METOPROLOL TARTRATE 12.5 MG: 25 TABLET, FILM COATED ORAL at 08:50

## 2024-03-21 RX ADMIN — ATORVASTATIN CALCIUM 10 MG: 10 TABLET, FILM COATED ORAL at 20:27

## 2024-03-21 RX ADMIN — PIPERACILLIN AND TAZOBACTAM 3.38 G: 3; .375 INJECTION, POWDER, FOR SOLUTION INTRAVENOUS at 08:50

## 2024-03-21 RX ADMIN — BACLOFEN 10 MG: 10 TABLET ORAL at 14:28

## 2024-03-21 RX ADMIN — BACLOFEN 10 MG: 10 TABLET ORAL at 08:50

## 2024-03-21 RX ADMIN — DULOXETINE HYDROCHLORIDE 60 MG: 60 CAPSULE, DELAYED RELEASE PELLETS ORAL at 08:51

## 2024-03-21 RX ADMIN — OXYCODONE HYDROCHLORIDE 10 MG: 5 TABLET ORAL at 08:51

## 2024-03-21 RX ADMIN — METOPROLOL TARTRATE 12.5 MG: 25 TABLET, FILM COATED ORAL at 20:26

## 2024-03-21 RX ADMIN — ENOXAPARIN SODIUM 40 MG: 40 INJECTION SUBCUTANEOUS at 08:55

## 2024-03-21 RX ADMIN — RILUZOLE 50 MG: 50 TABLET ORAL at 16:08

## 2024-03-21 RX ADMIN — CETIRIZINE HYDROCHLORIDE 10 MG: 10 TABLET, FILM COATED ORAL at 08:51

## 2024-03-21 RX ADMIN — PREDNISONE 2.5 MG: 2.5 TABLET ORAL at 08:54

## 2024-03-21 RX ADMIN — POLYETHYLENE GLYCOL 3350 17 G: 17 POWDER, FOR SOLUTION ORAL at 08:51

## 2024-03-21 RX ADMIN — OXYCODONE HYDROCHLORIDE 10 MG: 5 TABLET ORAL at 20:26

## 2024-03-21 RX ADMIN — PANTOPRAZOLE SODIUM 40 MG: 40 TABLET, DELAYED RELEASE ORAL at 06:20

## 2024-03-21 RX ADMIN — GABAPENTIN 600 MG: 300 CAPSULE ORAL at 14:28

## 2024-03-21 RX ADMIN — BACLOFEN 10 MG: 10 TABLET ORAL at 20:27

## 2024-03-21 RX ADMIN — TAMSULOSIN HYDROCHLORIDE 0.4 MG: 0.4 CAPSULE ORAL at 08:51

## 2024-03-21 RX ADMIN — RILUZOLE 50 MG: 50 TABLET ORAL at 06:20

## 2024-03-21 RX ADMIN — FOLIC ACID 1000 MCG: 1 TABLET ORAL at 08:51

## 2024-03-21 RX ADMIN — OXYCODONE HYDROCHLORIDE 10 MG: 5 TABLET ORAL at 14:28

## 2024-03-21 RX ADMIN — FEXOFENADINE HCL 180 MG: 180 TABLET ORAL at 08:54

## 2024-03-21 RX ADMIN — VANCOMYCIN HYDROCHLORIDE 750 MG: 5 INJECTION, POWDER, LYOPHILIZED, FOR SOLUTION INTRAVENOUS at 05:14

## 2024-03-21 RX ADMIN — AMOXICILLIN AND CLAVULANATE POTASSIUM 1 TABLET: 875; 125 TABLET, FILM COATED ORAL at 20:26

## 2024-03-21 RX ADMIN — ASPIRIN 81 MG: 81 TABLET, COATED ORAL at 08:51

## 2024-03-21 RX ADMIN — GABAPENTIN 600 MG: 300 CAPSULE ORAL at 20:26

## 2024-03-21 RX ADMIN — PIPERACILLIN AND TAZOBACTAM 3.38 G: 3; .375 INJECTION, POWDER, FOR SOLUTION INTRAVENOUS at 01:41

## 2024-03-21 RX ADMIN — FLUTICASONE FUROATE AND VILANTEROL TRIFENATATE 1 PUFF: 200; 25 POWDER RESPIRATORY (INHALATION) at 08:54

## 2024-03-21 ASSESSMENT — ACTIVITIES OF DAILY LIVING (ADL)
ADLS_ACUITY_SCORE: 44
ADLS_ACUITY_SCORE: 48
ADLS_ACUITY_SCORE: 44
ADLS_ACUITY_SCORE: 48
ADLS_ACUITY_SCORE: 48
ADLS_ACUITY_SCORE: 44
ADLS_ACUITY_SCORE: 48
ADLS_ACUITY_SCORE: 44
ADLS_ACUITY_SCORE: 48
ADLS_ACUITY_SCORE: 44
ADLS_ACUITY_SCORE: 48
ADLS_ACUITY_SCORE: 44
ADLS_ACUITY_SCORE: 48

## 2024-03-21 NOTE — PLAN OF CARE
"Goal Outcome Evaluation:      Problem: Adult Inpatient Plan of Care  Goal: Patient-Specific Goal (Individualized)  Description: You can add care plan individualizations to a care plan. Examples of Individualization might be:  \"Parent requests to be called daily at 9am for status\", \"I have a hard time hearing out of my right ear\", or \"Do not touch me to wake me up as it startles  me\".  Outcome: Progressing     Problem: Adult Inpatient Plan of Care  Goal: Optimal Comfort and Wellbeing  Outcome: Progressing     Problem: Risk for Delirium  Goal: Improved Attention and Thought Clarity  Outcome: Progressing    A/O x4. VSS. Scheduled Oxycodone was given leg pain which was effective per pt. Ate 100% of his dinner. Used commode with assist of 2. Dressing sacral wound is dry and intact.                            "

## 2024-03-21 NOTE — PHARMACY-VANCOMYCIN DOSING SERVICE
"Pharmacy Vancomycin Note  Date of Service 2024  Patient's  1950   73 year old, male    Indication: Sepsis  Day of Therapy: 2  Current vancomycin regimen: 1000 mg IV q12h  Current vancomycin monitoring method: AUC  Current vancomycin therapeutic monitoring goal: 400-600 mg*h/L    Insight[employer] Prediction of Current Vancomycin Regimen  Loading dose: 1500 mg once at 0257 on 3/20/2024  Regimen: 750 mg IV every 12 hours.  Start time: 17:20 on 2024  Exposure target: AUC24 (range)400-600 mg/L.hr   AUC24,ss: 392 mg/L.hr  Probability of AUC24 > 400: 46 %  Ctrough,ss: 11.6 mg/L  Probability of Ctrough,ss > 20: 3 %  Probability of nephrotoxicity (Lodise LAZARO ): 7 %    Current estimated CrCl = Estimated Creatinine Clearance: 72.4 mL/min (based on SCr of 0.78 mg/dL).    Creatinine for last 3 days  3/20/2024:  1:41 AM Creatinine 0.80 mg/dL;  6:58 AM Creatinine 0.81 mg/dL  3/21/2024:  4:11 AM Creatinine 0.78 mg/dL    Recent Vancomycin Levels (past 3 days)  3/21/2024:  4:11 AM Vancomycin 8.8 ug/mL    Vancomycin IV Administrations (past 72 hours)                     vancomycin (VANCOCIN) 750 mg in sodium chloride 0.9 % 250 mL intermittent infusion (mg) 750 mg New Bag 24 0514     750 mg New Bag 24 1720    vancomycin (VANCOCIN) 1,500 mg in sodium chloride 0.9 % 250 mL intermittent infusion (mg) 1,500 mg New Bag 24 0257                    Nephrotoxins and other renal medications (From now, onward)      Start     Dose/Rate Route Frequency Ordered Stop    24 1700  vancomycin (VANCOCIN) 1,000 mg in 200 mL dextrose intermittent infusion         1,000 mg  200 mL/hr over 1 Hours Intravenous EVERY 12 HOURS 24 0759      24 0900  piperacillin-tazobactam (ZOSYN) 3.375 g vial to attach to  mL bag        Note to Pharmacy: For [facility], [facility] and [facility]: For Zosyn-naive patients, use the \"Zosyn initial dose + extended infusion\" order panel.    3.375 g  over 240 Minutes Intravenous EVERY 8 HOURS " 03/20/24 0402      03/20/24 0800  [Held by provider]  furosemide (LASIX) tablet 40 mg        (On hold since yesterday at 0955 until manually unheld; held by Cici Cerna MDHold Reason: Other)   Note to Pharmacy: PTA Sig:Take 1 tablet (40 mg) by mouth in the morning and 1 tablet (40 mg) in the evening.      40 mg Oral DAILY 03/20/24 0641                 Contrast Orders - past 72 hours (72h ago, onward)      None            Interpretation of levels and current regimen:  Vancomycin level is reflective of -600    InsightRX Prediction of Planned New Vancomycin Regimen  Regimen: 1000 mg IV every 12 hours.  Start time: 17:00 on 03/21/2024  Exposure target: AUC24 (range)400-600 mg/L.hr   AUC24,ss: 519 mg/L.hr  Probability of AUC24 > 400: 92 %  Ctrough,ss: 15.3 mg/L  Probability of Ctrough,ss > 20: 19 %  Probability of nephrotoxicity (Lodise LAZARO 2009): 11 %    Plan:  Increase Dose to 1000 mg IV every 12 hours.  Vancomycin monitoring method: AUC  Vancomycin therapeutic monitoring goal: 400-600 mg*h/L  Pharmacy will check vancomycin levels as appropriate in 1-3 Days.  Serum creatinine levels will be ordered daily for the first week of therapy and at least twice weekly for subsequent weeks.    Haylee Page RPH

## 2024-03-21 NOTE — PROGRESS NOTES
03/21/24 0919   Appointment Info   Signing Clinician's Name / Credentials (PT) Bettie Ward,PT   Living Environment   People in Home alone   Current Living Arrangements assisted living   Home Accessibility no concerns  (elevator)   Self-Care   Equipment Currently Used at Home wheelchair, power;shower chair;other (see comments)  (roll in shower)   Fall history within last six months yes   Number of times patient has fallen within last six months 1   Activity/Exercise/Self-Care Comment I with ADLs, some help wit shower, A with IALDs   General Information   Onset of Illness/Injury or Date of Surgery 03/20/24   Referring Physician Dr. Ronny Grey   Patient/Family Therapy Goals Statement (PT) hoping to return home   Pertinent History of Current Problem (include personal factors and/or comorbidities that impact the POC) Jef Centeno Jr. is a 73 year old old male with history of ALS, wheelchair-bound, brought into ED by EMS for evaluation after being found down at his facility by the staff.  ED spoke to the staff at the facility who reports they heard yelling in his room and found him in the bedroom on the floor next to his wheelchair.  Reportedly patient on the floor for only an hour duration, patient denies any head injuries or LOC, no chest pain, no fever or neck stiffness or neck pain.  He was brought to ED for evaluation     Upon arrival he was found to be slightly confused, vitals showed a low-grade temperature but normal blood pressure.  Repeat blood pressure however trended low was 90/53, he was given IV hydration with improvement.  Labs returned showing lactic acidosis with mild leukocytosis, focus of infection unclear.  He was admitted to hospital medicine for further care   General Observations pt in bed agreeable to PT   Cognition   Affect/Mental Status (Cognition) WFL   Pain Assessment   Patient Currently in Pain Yes, see Vital Sign flowsheet  (cest and wraps around to back)   Range of Motion (ROM)    ROM Comment BLE ROM = pt reports stiffness and some pain -WFL   Strength (Manual Muscle Testing)   Strength Comments limited BLE   Bed Mobility   Impairments Contributing to Impaired Bed Mobility pain;decreased strength   Assistive Device (Bed Mobility) bed rails;other (see comments);draw sheet  (HOB elevated)   Comment, (Bed Mobility) supine > sit CGA, sitting scooting minAx1 with draw sheet, slight dizziness with sitting improved with time   Transfers   Transfer Safety Concerns Noted decreased weight-shifting ability   Impairments Contributing to Impaired Transfers pain;decreased strength   Comment, (Transfers) CGA partial stand pivot transfer to recliner to R side   Gait/Stairs (Locomotion)   Comment, (Gait/Stairs) pt does not ambulate, partial stand pivot transfer at home   Balance   Balance Comments sitting balance SBA/I   Clinical Impression   Criteria for Skilled Therapeutic Intervention Yes, treatment indicated   PT Diagnosis (PT) decreased mobility   Influenced by the following impairments pain, weakness   Functional limitations due to impairments bed mob, transfers   Clinical Presentation (PT Evaluation Complexity) stable   Clinical Presentation Rationale persents as medically diagnosed   Clinical Decision Making (Complexity) moderate complexity   Planned Therapy Interventions (PT) bed mobility training;transfer training;strengthening   Risk & Benefits of therapy have been explained evaluation/treatment results reviewed   PT Total Evaluation Time   PT Eval, Low Complexity Minutes (71283) 12   Physical Therapy Goals   PT Frequency Daily   PT Predicted Duration/Target Date for Goal Attainment 03/28/24   PT Goals Bed Mobility;Transfers;PT Goal 1   PT: Bed Mobility Modified independent;Supine to/from sit   PT: Transfers Modified independent;Bed to/from chair   PT: Goal 1 Pt able to participate BLE ex to increased strength for pivot transfers   Interventions   Interventions Quick Adds Therapeutic Activity    Therapeutic Activity   Therapeutic Activities: dynamic activities to improve functional performance Minutes (91372) 24   Symptoms Noted During/After Treatment Fatigue;Increased pain   Treatment Detail/Skilled Intervention supine BLE AAROM x5, sat EOB x10 minutes SBA, A with scooting , pt able to perform partial stand from recliner to position lines   PT Discharge Planning   PT Plan BLE strength, bed mob, partial stand pivot transfers   PT Discharge Recommendation (DC Rec) other (see comments)  (if RONEY able to A with bed mob and  stand pivot transfesr then pt able to return home to group home with A and home PT, if group home unable to provide will need TCU)   PT Rationale for DC Rec pt CGA/minAx1 for bed moband pivot transfer   PT Brief overview of current status pt CGA for supine > sit, minAx1 sit scooting and CGA x1 for partial stand pivot transfer to recliner   PT Equipment Needed at Discharge other (see comments)  (pt has motorized w/c at home)   Total Session Time   Timed Code Treatment Minutes 24   Total Session Time (sum of timed and untimed services) 36

## 2024-03-21 NOTE — DISCHARGE INSTRUCTIONS
"WOC DISCHARGE INSTRUCTIONS:  coccyx wound(s): Every other day PRN if dressing soiled, saturated or falls off  Cleanse with bath wipe or soap and water, pat dry.  Cover with mepilex     Pressure Injury Prevention (PIP) Plan:  If patient is declining pressure injury prevention interventions: Explore reason why and address patient's concerns, Educate on pressure injury risk and prevention intervention(s), If patient is still declining, document \"informed refusal\" , and Ensure Care team is aware ( provider, charge nurse, etc)  Mattress: Follow bed algorithm, reassess daily and order specialty mattress, if indicated.  HOB: Maintain at or below 30 degrees, unless contraindicated  Repositioning in bed: Every 1-2 hours   Heels: Pillows under calves  Protective Dressing: Sacral Mepilex for prevention (#[id number]),  especially for the agitated patient   Positioning Equipment: None  Chair positioning: Assist patient to reposition hourly   If patient has a buttock pressure injury, or high risk for PI use chair cushion or SPS.  Moisture Management: Perineal cleansing /protection: Follow Incontinence Protocol and Moisturize dry skin  Under Devices: Inspect skin under all medical devices during skin inspection , Ensure tubes are stabilized without tension, and Ensure patient is not lying on medical devices or equipment when repositioned  Ask provider to discontinue device when no longer needed.  "

## 2024-03-21 NOTE — PROGRESS NOTES
"    [facility]    Progress Note - Hospitalist Service       Date of Admission:  3/20/2024    Assessment & Plan   Jef Centeno Jr. is a 73 year old male admitted on 3/20/2024. He has a history of COPD, ALS with paraparesis, DM2, CAD, BPH with prostate cancer, and is admitted after being found down on the ground by staff at his assisted living facility.    Sepsis, resolved  Mild LLL pneumonia  Chronic sacral ulcer  Upon evaluation in ED for fall, was found to be tachycardic, tachypneic, with leukocytosis and lactic acidosis. Did have low-grade fever 3/19 and 3/20. CT chest/abdomen/pelvis showed a mild left lower lobe pneumonia. No supplemental oxygen needed, no shortness of breath, has chronic cough. Viral respiratory panel was negative. Swallow evaluation with no signs of aspiration. UA without signs of infection. Blood cultures with no growth to date. Per No - known sacral ulcer potentially contributing. Leukocytosis is improving.   - Will transition from vanc/zosyn to augmentin for ongoing treatment of pneumonia  - follow up blood cultures  - daily CBC  - WOC consult - will await eval    Unwitnessed fall  Encephalopathy, resolved  ALS - wheelchair bound  Chronic heel pain  Likely secondary to acute illness as above. Staff think he may have been on the ground for about an hour. CT head and cervical spine negative. Per his assisted living facility, typically is pretty independent - gets help with bathing and dressing, is normally able to transfer on his own. Staff helps him in the morning \"and then we don't see him much during the day. He spends most of his day int he smoking garage at our facility smoking cigarettes and recreational marijuana\".   - PT, care management consults  - continue PTA rilutek, prednisone 2.5 mg  - continue PTA butrans weekly patch, oxycodone 10 mg TID, cymbalta, and gabapentin  - continue PTA baclofen 10 mg TID    Hypertension  Elevated troponins  Pulmonary " hypertension  Initially hypotensive in ED, now stable. Troponins elevated x 2 in ED - no chest pain, no EKG changes. Echocardiogram obtained this admission with normal EF (65-70%), moderate pulmonary hypertension.  - continue to hold PTA triamterene and lasix due to soft pressures  - continue PTA metoprolol  - pulmonary hypertension appears to be a new diagnosis for him - potentially secondary to chronic lung disease - appropriate for outpatient follow-up as he is asymptomatic    BPH  - continue PTA flomax    Hyperlipidemia  CAD  - continue PTA aspirin 81 mg, atorvastatin 10 mg daily    Seasonal allergies  - continue PTA cetirizine 10 mg daily and feexofenadine 180 mg daily    COPD  - continue PTA fluticasone-vilanterol 1 puff daily         Diet: Combination Diet Easy to Chew (level 7); Thin Liquids (level 0); Low Saturated Fat Na <2400mg Diet, No Caffeine Diet    DVT Prophylaxis: Enoxaparin (Lovenox) SQ  Sepulveda Catheter: Not present  Fluids: PO  Lines: None     Cardiac Monitoring: ACTIVE order. Indication: Tachyarrhythmias, acute (48 hours)  Code Status: No CPR- Do NOT Intubate    Clinically Significant Risk Factors                  # Hypertension: Noted on problem list                   Disposition Plan     Expected Discharge Date: 03/22/2024      Destination: assisted living          The patient's care was discussed with the Attending Physician, Dr. Carrera .    Nery Walker MD  Hospitalist Service  [facility]  Securely message with [employer] (more info)  Text page via [facility] Paging/Directory   ______________________________________________________________________    Interval History   Patient is new to me today.  Chart reviewed.  He is seen with nephew, Peterson, at bedside.  Just finishing eating breakfast.  He is feeling pretty well.  He states that he slid out of bed when trying to transfer to his wheelchair and that is how he ended up on the floor before they brought him to the hospital.   He states it was the middle the night and it was dark.  He did not lose consciousness.    He has had a chronic cough with ongoing nasal congestion, nothing worse lately.  No trouble breathing.  No chest pain.  He does have a chronic sacral wound that is painful when he is laying on it in a certain position for too long.  He states otherwise it does not really bother him.  He has not had fevers or chills at home.    He states he uses an enema daily to have a bowel movement.  He has not had a bowel movement in a couple of days because we have not given him an enema here.  He has an appointment with his pain clinic on Monday, 3/25, that he is hoping to make it to.    Physical Exam   Vital Signs: Temp: 98.3  F (36.8  C) Temp src: Oral BP: 116/74 Pulse: 94   Resp: 17 SpO2: 94 % O2 Device: None (Room air)    Weight: 133 lbs 13.11 oz  GEN: Patient sitting comfortably in no acute distress.  HEEN: Head is atraumatic, normocephalic, eyes anicteric, mucous membranes moist. Edentulous.  CV: Regular rate and rhythm without obvious murmurs.  PULM: Clear to auscultation bilaterally without wheezing or rales.  ABD: Soft, nontender, bowel sounds present. Sacral wound not examined as he was eating breakfast up in the chair.  NEURO: Alert and oriented x3.  No focal motor abnormalities.  Face symmetric.  PSYCH: Appropriate affect.  SKIN: No rashes, bruising, or other lesions.        Data     I have personally reviewed the following data over the past 24 hrs:    14.4 (H)  \   11.6 (L)   / 301     139 103 11.4 /  76   3.7 27 0.78 \     Trop: N/A BNP: N/A     Procal: N/A CRP: N/A Lactic Acid: 0.7         Imaging results reviewed over the past 24 hrs:   No results found for this or any previous visit (from the past 24 hour(s)).

## 2024-03-21 NOTE — PLAN OF CARE
Patient AO x4, denies nausea. Reporting some stiffness in legs that was helped with scheduled medication and repositioning. Says he's having trouble falling asleep so quiet and dark environment promoted. Prevalon boots on both feet at this time. Mepilex on bottom.     Malinda Bergeron RN

## 2024-03-21 NOTE — PLAN OF CARE
Problem: Risk for Delirium  Goal: Improved Behavioral Control  Outcome: Progressing  Goal: Improved Attention and Thought Clarity  Outcome: Progressing     Problem: Pain Acute  Goal: Optimal Pain Control and Function  Outcome: Progressing   Goal Outcome Evaluation:       Pt alert and oriented x4. No complained of pain. Voiding using urinal at bedside. Prevalon boots on both feet. Repositioned on bed. Bed alarm on for safety. VSS on room air.Slept between cares

## 2024-03-21 NOTE — CONSULTS
"[facility] Nurse Inpatient Assessment     Consulted for: buttocks      Patient History (according to provider note(s):      Jef Centeno Jr. is a 73 year old male admitted on 3/20/2024. He has a history of COPD, ALS with paraparesis, DM2, CAD, BPH with prostate cancer, and is admitted after being found down on the ground by staff at his assisted living facility.     Assessment:      Areas visualized during today's visit: Sacrum/coccyx    Pressure Injury Location: coccyx        Last photo: 3/21  Wound type: Pressure Injury     Pressure Injury Stage: 2, present on admission  mostly healed     This is a Medical Device Related Pressure Injury (MDRPI) due to  n/a  Wound history/plan of care:   patient stated PI has been there a while, usually keeps \"some kind of dressing on it\" at his facility    Wound base: 20 % serous scabbing and eschar, 80 % epidermis     Palpation of the wound bed: normal      Drainage: none     Description of drainage: none     Measurements (length x width x depth, in cm) 6 cm  x 8 cm     Tunneling N/A     Undermining N/A  Periwound skin: Intact      Color: normal and consistent with surrounding tissue and pink      Temperature: normal   Odor: none  Pain: mild, sharp  Pain intervention prior to dressing change: N/A  Treatment goal: Heal  and Protection  STATUS: initial assessment  Supplies ordered: supplies stored on unit    My PI Risk Assessment     Sensory Perception: 2 - Very Limited     Moisture: 2 - Very moist      Activity: 2 - Chairfast     Mobility: 2 - Very limited     Nutrition: 2 - Probably inadequate      Friction/Shear: 1 - Problem     TOTAL: 11       Treatment Plan:     coccyx wound(s): Every other day PRN if dressing soiled, saturated or falls off  Cleanse with bath wipe or soap and water, pat dry.  Cover with mepilex     Pressure Injury Prevention (PIP) Plan:  If patient is declining pressure injury prevention interventions: Explore reason why and " "address patient's concerns, Educate on pressure injury risk and prevention intervention(s), If patient is still declining, document \"informed refusal\" , and Ensure Care team is aware ( provider, charge nurse, etc)  Mattress: Follow bed algorithm, reassess daily and order specialty mattress, if indicated.  HOB: Maintain at or below 30 degrees, unless contraindicated  Repositioning in bed: Every 1-2 hours   Heels: Pillows under calves  Protective Dressing: Sacral Mepilex for prevention (#[id number]),  especially for the agitated patient   Positioning Equipment: None  Chair positioning: Assist patient to reposition hourly   If patient has a buttock pressure injury, or high risk for PI use chair cushion or SPS.  Moisture Management: Perineal cleansing /protection: Follow Incontinence Protocol and Moisturize dry skin  Under Devices: Inspect skin under all medical devices during skin inspection , Ensure tubes are stabilized without tension, and Ensure patient is not lying on medical devices or equipment when repositioned  Ask provider to discontinue device when no longer needed.    Orders: Written    RECOMMEND PRIMARY TEAM ORDER: None, at this time  Education provided: importance of repositioning and plan of care  Discussed plan of care with: Patient  WOC nurse follow-up plan: weekly  Notify WOC if wound(s) deteriorate.  Nursing to notify the Provider(s) and re-consult the WOC Nurse if new skin concern.    DATA:     Current support surface: Standard  Standard gel/foam mattress (IsoFlex, Atmos air, etc)  Containment of urine/stool: Incontinent pad in bed  BMI: Body mass index is 19.2 kg/m .   Active diet order: Orders Placed This Encounter      Combination Diet Easy to Chew (level 7); Thin Liquids (level 0); Low Saturated Fat Na <2400mg Diet, No Caffeine Diet     Output: I/O last 3 completed shifts:  In: 240 [P.O.:240]  Out: 1480 [Urine:1480]     Labs:   Recent Labs   Lab 03/21/24  0411   HGB 11.6*   WBC 14.4*     Pressure " injury risk assessment:   Sensory Perception: 3-->slightly limited  Moisture: 3-->occasionally moist  Activity: 2-->chairfast  Mobility: 3-->slightly limited  Nutrition: 3-->adequate  Friction and Shear: 2-->potential problem  Dipak Score: 16    ELIN VazN RN [facility] services  Pager no longer in use, please contact through [facility] group: [facility]

## 2024-03-22 ENCOUNTER — APPOINTMENT (OUTPATIENT)
Dept: PHYSICAL THERAPY | Facility: HOSPITAL | Age: 74
DRG: 871 | End: 2024-03-22
Payer: COMMERCIAL

## 2024-03-22 VITALS
DIASTOLIC BLOOD PRESSURE: 52 MMHG | BODY MASS INDEX: 19.2 KG/M2 | WEIGHT: 133.82 LBS | TEMPERATURE: 98 F | RESPIRATION RATE: 16 BRPM | OXYGEN SATURATION: 95 % | SYSTOLIC BLOOD PRESSURE: 92 MMHG | HEART RATE: 78 BPM

## 2024-03-22 PROBLEM — R06.02 SOB (SHORTNESS OF BREATH): Status: RESOLVED | Noted: 2024-03-20 | Resolved: 2024-03-22

## 2024-03-22 PROBLEM — J18.9 LEFT LOWER LOBE PNEUMONIA: Status: ACTIVE | Noted: 2024-03-22

## 2024-03-22 PROBLEM — E87.20 LACTIC ACIDOSIS: Status: RESOLVED | Noted: 2024-03-20 | Resolved: 2024-03-22

## 2024-03-22 PROBLEM — A41.9 SEPSIS WITHOUT ACUTE ORGAN DYSFUNCTION, DUE TO UNSPECIFIED ORGANISM (H): Status: RESOLVED | Noted: 2024-03-20 | Resolved: 2024-03-22

## 2024-03-22 LAB
CREAT SERPL-MCNC: 0.64 MG/DL (ref 0.67–1.17)
EGFRCR SERPLBLD CKD-EPI 2021: >90 ML/MIN/1.73M2
HOLD SPECIMEN: NORMAL
WBC # BLD AUTO: 8.5 10E3/UL (ref 4–11)

## 2024-03-22 PROCEDURE — 85048 AUTOMATED LEUKOCYTE COUNT: CPT

## 2024-03-22 PROCEDURE — 250N000013 HC RX MED GY IP 250 OP 250 PS 637

## 2024-03-22 PROCEDURE — 250N000013 HC RX MED GY IP 250 OP 250 PS 637: Performed by: FAMILY MEDICINE

## 2024-03-22 PROCEDURE — 250N000012 HC RX MED GY IP 250 OP 636 PS 637: Performed by: INTERNAL MEDICINE

## 2024-03-22 PROCEDURE — 250N000011 HC RX IP 250 OP 636: Performed by: FAMILY MEDICINE

## 2024-03-22 PROCEDURE — 36415 COLL VENOUS BLD VENIPUNCTURE: CPT | Performed by: INTERNAL MEDICINE

## 2024-03-22 PROCEDURE — 99238 HOSP IP/OBS DSCHRG MGMT 30/<: CPT | Mod: GC

## 2024-03-22 PROCEDURE — 250N000013 HC RX MED GY IP 250 OP 250 PS 637: Performed by: INTERNAL MEDICINE

## 2024-03-22 PROCEDURE — 82565 ASSAY OF CREATININE: CPT | Performed by: INTERNAL MEDICINE

## 2024-03-22 PROCEDURE — 97110 THERAPEUTIC EXERCISES: CPT | Mod: GP

## 2024-03-22 RX ADMIN — CETIRIZINE HYDROCHLORIDE 10 MG: 10 TABLET, FILM COATED ORAL at 08:57

## 2024-03-22 RX ADMIN — TAMSULOSIN HYDROCHLORIDE 0.4 MG: 0.4 CAPSULE ORAL at 08:55

## 2024-03-22 RX ADMIN — OXYCODONE HYDROCHLORIDE 10 MG: 5 TABLET ORAL at 08:53

## 2024-03-22 RX ADMIN — FEXOFENADINE HCL 180 MG: 180 TABLET ORAL at 08:53

## 2024-03-22 RX ADMIN — GABAPENTIN 600 MG: 300 CAPSULE ORAL at 08:52

## 2024-03-22 RX ADMIN — METOPROLOL TARTRATE 12.5 MG: 25 TABLET, FILM COATED ORAL at 08:52

## 2024-03-22 RX ADMIN — PREDNISONE 2.5 MG: 2.5 TABLET ORAL at 08:55

## 2024-03-22 RX ADMIN — ENOXAPARIN SODIUM 40 MG: 40 INJECTION SUBCUTANEOUS at 10:08

## 2024-03-22 RX ADMIN — POLYETHYLENE GLYCOL 3350 17 G: 17 POWDER, FOR SOLUTION ORAL at 08:52

## 2024-03-22 RX ADMIN — RILUZOLE 50 MG: 50 TABLET ORAL at 06:34

## 2024-03-22 RX ADMIN — AMOXICILLIN AND CLAVULANATE POTASSIUM 1 TABLET: 875; 125 TABLET, FILM COATED ORAL at 08:52

## 2024-03-22 RX ADMIN — ASPIRIN 81 MG: 81 TABLET, COATED ORAL at 08:56

## 2024-03-22 RX ADMIN — ACETAMINOPHEN 650 MG: 325 TABLET ORAL at 03:53

## 2024-03-22 RX ADMIN — DULOXETINE HYDROCHLORIDE 60 MG: 60 CAPSULE, DELAYED RELEASE PELLETS ORAL at 08:52

## 2024-03-22 RX ADMIN — PANTOPRAZOLE SODIUM 40 MG: 40 TABLET, DELAYED RELEASE ORAL at 06:34

## 2024-03-22 RX ADMIN — ALBUTEROL SULFATE 2 PUFF: 90 AEROSOL, METERED RESPIRATORY (INHALATION) at 08:55

## 2024-03-22 RX ADMIN — FLUTICASONE FUROATE AND VILANTEROL TRIFENATATE 1 PUFF: 200; 25 POWDER RESPIRATORY (INHALATION) at 08:55

## 2024-03-22 RX ADMIN — BACLOFEN 10 MG: 10 TABLET ORAL at 08:52

## 2024-03-22 RX ADMIN — FOLIC ACID 1000 MCG: 1 TABLET ORAL at 08:55

## 2024-03-22 ASSESSMENT — ACTIVITIES OF DAILY LIVING (ADL)
ADLS_ACUITY_SCORE: 48
ADLS_ACUITY_SCORE: 48
ADLS_ACUITY_SCORE: 51
ADLS_ACUITY_SCORE: 48
ADLS_ACUITY_SCORE: 51
ADLS_ACUITY_SCORE: 48

## 2024-03-22 NOTE — PLAN OF CARE
Problem: Adult Inpatient Plan of Care  Goal: Optimal Comfort and Wellbeing  Outcome: Progressing  Problem: Skin Injury Risk Increased  Goal: Skin Health and Integrity  Outcome: Progressing  Problem: Pain Acute  Goal: Optimal Pain Control and Function  Outcome: Progressing   Goal Outcome Evaluation:       Pt slept well overnight. A/Ox4, VSS on RA. Turn and repos q2hrs. W/C bound at baseline. Reports generalized pain, scheduled oxy given. No acute events reported.

## 2024-03-22 NOTE — PLAN OF CARE
Physical Therapy Discharge Summary    Reason for therapy discharge:    Discharged to home with home therapy.    Progress towards therapy goal(s). See goals on Care Plan in [facility] electronic health record for goal details.  Goals not met.  Barriers to achieving goals:   discharge from facility.    Therapy recommendation(s):    Continued therapy is recommended.  Rationale/Recommendations:  Continue with assist for transfers and home PT as pt is progressing towards goals.

## 2024-03-22 NOTE — DISCHARGE SUMMARY
[facility]  Discharge Summary - Medicine & Pediatrics       Date of Admission:  3/20/2024  Date of Discharge:  3/22/2024  Discharging Provider: Dr. Walker and Dr. Talbot  Discharge Service: Hospitalist Service    Discharge Diagnoses   Left lower lobe pneumonia  Sepsis, resolved  Coccyx ulcer    Clinically Significant Risk Factors          Follow-ups Needed After Discharge   Follow-up Appointments     Follow-up and recommended labs and tests       Follow up with primary care provider, [facility], within 7 days   for hospital follow- up.  The following labs/tests are recommended: BP   check to evaluate whether to restart lasix and triamterene.        Could also consider outpatient follow-up with cardiology and/or pulmonology for evaluation/management of pulmonary hypertension.    Unresulted Labs Ordered in the Past 30 Days of this Admission       Date and Time Order Name Status Description    3/20/2024  1:50 AM Blood Culture Peripheral Blood Preliminary     3/20/2024  1:50 AM Blood Culture Peripheral Blood Preliminary         These results will be followed up by our team.    Discharge Disposition   Discharged to assisted living  Condition at discharge: Stable    Hospital Course   Jef Centeno Jr. was admitted on 3/20/2024 for an unwitnessed fall.  The following problems were addressed during his hospitalization:    Sepsis, resolved  Mild LLL pneumonia  Chronic sacral ulcer  Upon evaluation in ED for fall, was found to be tachycardic, tachypneic, with leukocytosis and lactic acidosis. Did have low-grade fever 3/19 and 3/20. CT chest/abdomen/pelvis showed a mild left lower lobe pneumonia. No supplemental oxygen needed, no shortness of breath, has chronic cough. Viral respiratory panel was negative. Swallow evaluation with no signs of aspiration. UA without signs of infection. Blood cultures with no growth to date. Per No - known sacral ulcer potentially contributing, however  "on consultation with [facility] RN - wound looks like it is healing well. Leukocytosis resolved and he was feeling much better on day of discharge. He was treated with vancomycin and zosyn on 3/20 and 3/21, transitioned to Augmentin on 3/21 and will be discharged with 5 more days of Augmentin for a total of 7 days of antibiotics.      Unwitnessed fall  Encephalopathy, resolved  ALS - wheelchair bound  Chronic heel pain  Likely secondary to acute illness as above. Staff think he may have been on the ground for about an hour. CT head and cervical spine negative. Per his assisted living facility, typically is pretty independent - gets help with bathing and dressing, is normally able to transfer on his own. Staff helps him in the morning \"and then we don't see him much during the day. He spends most of his day int he smoking garage at our facility smoking cigarettes and recreational marijuana\". He was continued on his PTA medication regimen for his ALS and chronic pain. He was cleared by PT to return to his assisted living.      Hypertension  Elevated troponins  Pulmonary hypertension  Initially hypotensive in ED, quickly stabilized. Troponins elevated x 2 in ED - no chest pain, no EKG changes. Echocardiogram obtained this admission with normal EF (65-70%), moderate pulmonary hypertension. His triamterene and lasix were held while in the hospital due to soft blood pressures, and his blood pressure was 92/52 the morning of discharge - asymptomatic. These medications were held at discharge and can be restarted as an outpatient, if necessary. His metoprolol 12.5 mg BID was still given in the hospital.     Consultations This Hospital Stay   PHARMACY TO DOSE VANCO  PHARMACY TO DOSE VANCO  INFECTIOUS DISEASES IP CONSULT  PHYSICAL THERAPY ADULT IP CONSULT  OCCUPATIONAL THERAPY ADULT IP CONSULT  SPEECH LANGUAGE PATH ADULT IP CONSULT  CARE MANAGEMENT / SOCIAL WORK IP CONSULT  WOUND OSTOMY CONTINENCE NURSE  IP CONSULT    Code Status "   No CPR- Do NOT Intubate       The patient was discussed with Dr. Talbot.    Nery Walker MD  [facility], PGY-3  Contact via [employer] lei or pager #: [phone].    ______________________________________________________________________    Physical Exam   Vital Signs: Temp: 98  F (36.7  C) Temp src: Oral BP: 92/52 Pulse: 78   Resp: 16 SpO2: 95 % O2 Device: None (Room air)    Weight: 133 lbs 13.11 oz  GEN: Patient sitting comfortably in no acute distress.  HEEN: Head is atraumatic, normocephalic, eyes anicteric, mucous membranes moist. Edentulous.  CV: Regular rate and rhythm without obvious murmurs.  PULM: Clear to auscultation bilaterally without wheezing or rales.  ABD: Soft, nontender, bowel sounds present. Sacral wound not examined as he was eating breakfast up in the chair.  NEURO: Alert and oriented x3.  No focal motor abnormalities.  Face symmetric.  PSYCH: Appropriate affect.  SKIN: No rashes, bruising, or other lesions.      Primary Care Physician   Marco A Avina    Discharge Orders      Reason for your hospital stay    You were hospitalized after a fall and found to have pneumonia. You were treated with antibiotics and improved. You will be continuing antibiotics at home for 5 more days.    Your blood pressure was borderline low even when you had clinically recovered, so we HELD two of your medications: lasix and triamterene. These could be restarted as an outpatient if your blood pressure is high again or if there is concern for edema, however at this time the risk of orthostatic hypotension outweighs any benefit.     Follow-up and recommended labs and tests     Follow up with primary care provider, [facility], within 7 days for hospital follow- up.  The following labs/tests are recommended: BP check to evaluate whether to restart lasix and triamterene.     Activity    Your activity upon discharge: activity as tolerated     Diet    Follow this diet upon  discharge: Regular       Significant Results and Procedures   Most Recent 3 CBC's:  Recent Labs   Lab Test 03/22/24  0654 03/21/24  0411 03/20/24  0658 03/20/24  0141   WBC 8.5 14.4* 19.4* 19.1*   HGB  --  11.6* 12.7* 13.2*   MCV  --  92 93 91   PLT  --  301 341 369     Most Recent 3 BMP's:  Recent Labs   Lab Test 03/22/24  0654 03/21/24  0411 03/20/24  0658 03/20/24  0141   NA  --  139 141 140   POTASSIUM  --  3.7 3.9 3.9   CHLORIDE  --  103 103 100   CO2  --  27 31* 27   BUN  --  11.4 9.7 7.9*   CR 0.64* 0.78 0.81 0.80   ANIONGAP  --  9 7 13   CT  --  8.7* 8.7* 9.2   GLC  --  76 83 106*     Most Recent 2 LFT's:  Recent Labs   Lab Test 06/23/23  1010 04/17/23  0919   AST 29 39   ALT 12 12   ALKPHOS 102 118   BILITOTAL 0.6 0.4     Most Recent 3 Troponin's:No lab results found.  Most Recent Hemoglobin A1c:  Recent Labs   Lab Test 08/15/22  0900   A1C 5.4     Most Recent Urinalysis:  Recent Labs   Lab Test 03/20/24  0254 06/23/23  1013 10/20/18  1745   COLOR Yellow   < > Yellow   APPEARANCE Clear   < > Clear   URINEGLC Negative   < > Negative   URINEBILI Negative   < > Negative   URINEKETONE Negative   < > Negative   SG 1.015   < > 1.006   UBLD 0.03 mg/dL*   < > Negative   URINEPH 7.0   < > 6.0   PROTEIN Negative   < > Negative   UROBILINOGEN  --   --  2.0 E.U./dL   NITRITE Negative   < > Negative   LEUKEST Negative   < > Negative   RBCU 13*   < >  --    WBCU 1   < >  --     < > = values in this interval not displayed.   ,   Results for orders placed or performed during the hospital encounter of 03/20/24   Head CT w/o contrast    Narrative    EXAM: CT CERVICAL SPINE W/O CONTRAST, CT HEAD W/O CONTRAST  LOCATION: [facility]  DATE: 3/20/2024    INDICATION: Trauma, head and neck injury.  COMPARISON: None.  TECHNIQUE:   1) Routine CT Head without IV contrast. Multiplanar reformats. Dose reduction techniques were used.  2) Routine CT Cervical Spine without IV contrast. Multiplanar reformats. Dose  reduction techniques were used.    FINDINGS:   HEAD CT:   INTRACRANIAL CONTENTS: Limited by motion and positioning. No definite acute hemorrhage or mass effect. No definite CT evidence of acute infarct. Mild volume loss and presumed chronic small vessel ischemia.    VISUALIZED ORBITS/SINUSES/MASTOIDS: No intraorbital abnormality. No paranasal sinus mucosal disease. No middle ear or mastoid effusion.    BONES/SOFT TISSUES: No acute abnormality.    CERVICAL SPINE CT:   VERTEBRA: Moderate reversal of the normal cervical lordosis. Slight degenerative anterolisthesis C2 on C3 through C4 on C5. Moderate-to-marked multilevel degenerative disc disease and facet arthropathy. Posterior instrumented fusion and laminectomies   from C2 through C4.    CANAL/FORAMINA: No high-grade canal narrowing. Moderate-to-marked multilevel degenerative foraminal narrowing.    PARASPINAL: No extraspinal abnormality. Visualized lung fields are clear.      Impression    IMPRESSION:  HEAD CT:  1. Limited exam. No definite acute intracranial abnormality    2. Age-related and chronic ischemic changes.     CERVICAL SPINE CT:  1.  No definite acute cervical spine fracture or posttraumatic subluxation.   CT Cervical Spine w/o Contrast    Narrative    EXAM: CT CERVICAL SPINE W/O CONTRAST, CT HEAD W/O CONTRAST  LOCATION: [facility]  DATE: 3/20/2024    INDICATION: Trauma, head and neck injury.  COMPARISON: None.  TECHNIQUE:   1) Routine CT Head without IV contrast. Multiplanar reformats. Dose reduction techniques were used.  2) Routine CT Cervical Spine without IV contrast. Multiplanar reformats. Dose reduction techniques were used.    FINDINGS:   HEAD CT:   INTRACRANIAL CONTENTS: Limited by motion and positioning. No definite acute hemorrhage or mass effect. No definite CT evidence of acute infarct. Mild volume loss and presumed chronic small vessel ischemia.    VISUALIZED ORBITS/SINUSES/MASTOIDS: No intraorbital abnormality. No  paranasal sinus mucosal disease. No middle ear or mastoid effusion.    BONES/SOFT TISSUES: No acute abnormality.    CERVICAL SPINE CT:   VERTEBRA: Moderate reversal of the normal cervical lordosis. Slight degenerative anterolisthesis C2 on C3 through C4 on C5. Moderate-to-marked multilevel degenerative disc disease and facet arthropathy. Posterior instrumented fusion and laminectomies   from C2 through C4.    CANAL/FORAMINA: No high-grade canal narrowing. Moderate-to-marked multilevel degenerative foraminal narrowing.    PARASPINAL: No extraspinal abnormality. Visualized lung fields are clear.      Impression    IMPRESSION:  HEAD CT:  1. Limited exam. No definite acute intracranial abnormality    2. Age-related and chronic ischemic changes.     CERVICAL SPINE CT:  1.  No definite acute cervical spine fracture or posttraumatic subluxation.   XR Chest Port 1 View    Narrative    EXAM: XR CHEST PORT 1 VIEW  LOCATION: [facility]  DATE: 3/20/2024    INDICATION: Shortness of breath.  COMPARISON: Portable chest single view 06/23/2023 at 1017 hours.      Impression    IMPRESSION: Shallow inspiration. The patient is rotated. Slight scarring in the apices. No adenopathy or effusion. Mild elevation of the left hemidiaphragm. Normal cardiac size and pulmonary vascularity. Atherosclerotic thoracic aorta. Postoperative   changes in the abdomen. Degenerative changes both shoulders and the spine, narrowing worse involving the left glenohumeral joint. Monitoring leads overlying the chest.   CT Chest Abdomen Pelvis w/o Contrast    Narrative    EXAM: CT CHEST, ABDOMEN, PELVIS WITHOUT CONTRAST  LOCATION: [facility]  DATE: 03/20/2024    INDICATION: Fever. Sepsis. Evaluate for source of sepsis.  COMPARISON:   1. CT pulmonary angiogram 02/07/2018.  2. CT abdomen and pelvis with IV contrast 02/07/2018.  TECHNIQUE: CT scan of the chest, abdomen, and pelvis was performed without IV contrast.  Multiplanar reformats were obtained. Dose reduction techniques were used.   CONTRAST: None.    FINDINGS:   LUNGS AND PLEURA: Motion artifact degrades numerous images. Slight scarring in the apices. Mild emphysematous changes, more apparent involving the upper lobes. New onset airspace infiltrate has developed involving the left lower lobe, likely representing   an infectious or an inflammatory process. No cavitation or pleural effusion. Diffuse thickening of the bronchi with foci of endobronchial mucus.    MEDIASTINUM/AXILLAE: Normal cardiac size. Moderate coronary artery calcifications. Mitral annulus calcifications. No pericardial effusion. Atherosclerotic normal caliber thoracic aorta. Dependent mucus in the distal trachea and the proximal mainstem   bronchi.    CORONARY ARTERY CALCIFICATION: Moderate.    HEPATOBILIARY: Cholecystectomy. No discrete hepatic lesion.    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    SPLEEN: Normal.    ADRENAL GLANDS: Normal.    KIDNEYS/BLADDER: No renal calculi on either side. Both kidneys are negative for hydronephrosis and hydroureter. Partially distended urinary bladder containing dependent stone on the right aspect (image 251, series 3), an interval development. Stone   measures 1.1 x 0.5 cm, mean density measures 1133 Hounsfield units (image 251, series 3). Prostatomegaly.    BOWEL: Normal appendix. Formed stool material within normal caliber redundant colon without mechanical obstruction or free gas. Distal colonic mild diverticulosis without acute inflammation or secondary complications.    LYMPH NODES: No suspicious abdominopelvic adenopathy.    VASCULATURE: Atherosclerotic normal caliber distal abdominal aorta measuring 1.7 x 1.7 cm (image 177, series 3). Normal caliber IVC.    PELVIC ORGANS: Stool-filled redundant rectosigmoid. Distal colonic diverticulosis without acute inflammation or secondary complications. Normal appendix. Prostatomegaly.  Dependent stone in the urinary bladder to the right aspect. Vascular calcifications.   No adenopathy or free fluid.    MUSCULOSKELETAL: Degenerative changes both shoulders, spine and joints of the pelvis. Right convex lumbar curve.      Impression    IMPRESSION:  1.  New onset airspace infiltrate has developed involving the left lower lobe, likely representing an infectious or an inflammatory process. No cavitation, suspicious adenopathy or pleural effusion. Diffuse thickening of the bronchi with foci of   endobronchial mucus. Dependent mucus in the trachea and the proximal mainstem bronchi. Mild emphysematous changes. Scarring in the apices.    2.  Dependent stone in the right aspect of the urinary bladder, new since prior study. No renal calculi on either side. No hydronephrosis or hydroureter. Prostatomegaly.    3.  Cholecystectomy. Benign pancreatic lipoma. Formed stool material throughout normal caliber redundant colon without mechanical obstruction, free gas or free fluid. Distal colonic diverticulosis without acute inflammation or secondary complications.   Normal appendix.    4.  Degenerative changes both shoulders, spine and joints of the pelvis. Right convex lumbar curve.     Echocardiogram Complete     Value    LVEF  65-70%    [facility]    [id number]  [id number]  [id number]  [id number]^[id number]^[place] [address]     Name: WM CARLOZJR.  MRN: [id number]  : 1950  Study Date: 2024 09:06 AM  Age: 73 yrs  Gender: Male  Patient Location: [facility]  Reason For Study: Abn EKG  Ordering Physician: FREDY MATHIS  Performed By: ACE     BSA: 1.8 m2  Height: 70 in  Weight: 140 lb  HR: 92  BP: 121/60 mmHg  ______________________________________________________________________________  Procedure  Complete Portable Echo Adult. Good quality two-dimensional was performed and  interpreted. Good quality color and spectral Doppler were performed  and  interpreted. There is no comparison study available.  ______________________________________________________________________________  Interpretation Summary     1. Left ventricular chamber size, wall thickness and systolic function are  normal. The visually estimated left ventricular ejection fraction is 65-70%.  2. Right ventricular chamber size and systolic function are normal.  3. No hemodynamically significant valvular abnormalities.  4. Moderate pulmonary hypertension is present with an estimated pulmonary  artery systolic pressure of 51 mmHg.  5. No prior study available for comparison.  ______________________________________________________________________________  Left Ventricle  The left ventricle is normal in size. There is normal left ventricular wall  thickness. Left ventricular systolic function is normal. The visual ejection  fraction is 65-70%. Left ventricular diastolic function is normal. Diastolic  Doppler findings (E/E' ratio and/or other parameters) suggest left ventricular  filling pressures are normal. No regional wall motion abnormalities noted.     Right Ventricle  Normal right ventricle size and systolic function.     Atria  Normal left atrial size. Right atrial size is normal.     Mitral Valve  The mitral valve leaflets are mildly thickened. There is trace mitral  regurgitation. There is no mitral valve stenosis.     Tricuspid Valve  Tricuspid valve leaflets appear normal. There is mild (1+) tricuspid  regurgitation. The right ventricular systolic pressure is elevated at 48.3  mmHg. Moderate (46-55mmHg) pulmonary hypertension is present. There is no  tricuspid stenosis.     Aortic Valve  The aortic valve is trileaflet with aortic valve sclerosis. No aortic  regurgitation is present. No aortic stenosis is present.     Pulmonic Valve  The pulmonic valve is not well seen, but is grossly normal. There is no  pulmonic valvular regurgitation. There is no pulmonic valvular stenosis.      Vessels  The aorta root is normal. The thoracic aorta cannot be assessed. IVC diameter  <2.1 cm collapsing >50% with sniff suggests a normal RA pressure of 3 mmHg.     Pericardium  There is no pericardial effusion.     Rhythm  Sinus rhythm was noted.  ______________________________________________________________________________  MMode/2D Measurements & Calculations     IVSd: 0.89 cm  LVIDd: 3.8 cm  LVIDs: 2.4 cm  LVPWd: 0.79 cm  FS: 36.5 %  LV mass(C)d: 89.9 grams  LV mass(C)dI: 50.1 grams/m2  Ao root diam: 3.1 cm  LA dimension: 2.8 cm  LA/Ao: 0.91  LVOT diam: 2.1 cm  LVOT area: 3.4 cm2  Ao root diam index Ht(cm/m): 1.7  Ao root diam index BSA (cm/m2): 1.7  LA Volume (BP): 32.8 ml  LA Volume Index (BP): 18.3 ml/m2     LA Volume Indexed (AL/bp): 20.3 ml/m2  RV Base: 3.4 cm  RWT: 0.42  TAPSE: 1.9 cm     Time Measurements  MM HR: 91.0 BPM     Doppler Measurements & Calculations  MV E max amrit: 96.1 cm/sec  MV A max amrit: 102.2 cm/sec  MV E/A: 0.94  MV dec slope: 634.7 cm/sec2  MV dec time: 0.15 sec  Ao V2 max: 159.8 cm/sec  Ao max PG: 10.0 mmHg  Ao V2 mean: 105.2 cm/sec  Ao mean P.2 mmHg  Ao V2 VTI: 29.3 cm  ISAIAH(I,D): 2.7 cm2  ISAIAH(V,D): 3.1 cm2  LV V1 max P.7 mmHg  LV V1 max: 147.4 cm/sec  LV V1 VTI: 23.4 cm  SV(LVOT): 79.1 ml  SI(LVOT): 44.1 ml/m2  PA acc time: 0.07 sec  TR max amrit: 347.5 cm/sec  TR max P.3 mmHg  AV Amrit Ratio (DI): 0.92  ISAIAH Index (cm2/m2): 1.5  E/E': 9.5  E/E' avg: 10.6  Lateral E/e': 9.5  Medial E/e': 11.6  Peak E' Amrit: 10.1 cm/sec  RV S Amrit: 12.8 cm/sec     ______________________________________________________________________________  Report approved by: Kimberly Smith 2024 10:57 AM           *Note: Due to a large number of results and/or encounters for the requested time period, some results have not been displayed. A complete set of results can be found in Results Review.       Discharge Medications   Current Discharge Medication List        START taking these medications     Details   amoxicillin-clavulanate (AUGMENTIN) 875-125 MG tablet Take 1 tablet by mouth every 12 hours for 5 days  Qty: 10 tablet, Refills: 0    Associated Diagnoses: Pneumonia of left lower lobe due to infectious organism           CONTINUE these medications which have NOT CHANGED    Details   acetaminophen (TYLENOL) 500 MG tablet Take 500 mg by mouth every 8 hours as needed for mild pain      albuterol (PROAIR HFA/PROVENTIL HFA/VENTOLIN HFA) 108 (90 Base) MCG/ACT inhaler Inhale 1-2 puffs into the lungs every 6 hours as needed for shortness of breath / dyspnea or wheezing  Qty: 18 g, Refills: 3    Comments: Pharmacy may dispense brand covered by insurance (Proair, or proventil or ventolin or generic albuterol inhaler)  Associated Diagnoses: Chronic obstructive pulmonary disease with acute exacerbation (H)      aspirin (ASA) 81 MG EC tablet Take 1 tablet (81 mg) by mouth daily  Qty: 100 tablet, Refills: 3    Associated Diagnoses: Type 2 diabetes mellitus with diabetic neuropathy, without long-term current use of insulin (H)      atorvastatin (LIPITOR) 10 MG tablet Take 1 tablet (10 mg) by mouth daily  Qty: 90 tablet, Refills: 2    Associated Diagnoses: Type 2 diabetes mellitus with hyperglycemia, without long-term current use of insulin (H)      baclofen (LIORESAL) 10 MG tablet Take 10 mg by mouth 3 times daily      budesonide-formoterol (SYMBICORT) 160-4.5 MCG/ACT Inhaler Inhale 2 puffs into the lungs 2 times daily      buprenorphine (BUTRANS) 10 MCG/HR WK patch Place 1 patch onto the skin once a week      calcium carbonate 600 mg-vitamin D 400 units (CALTRATE) 600-400 MG-UNIT per tablet Take 1 tablet by mouth daily  Qty: 90 tablet, Refills: 3    Associated Diagnoses: Malnutrition, unspecified type (H24)      cetirizine (ZYRTEC) 10 MG tablet Take 1 tablet (10 mg) by mouth daily  Qty: 90 tablet, Refills: 3    Associated Diagnoses: Environmental allergies      cyclobenzaprine (FLEXERIL) 5 MG tablet Take 5 mg by  mouth 2 times daily      docusate sodium (COLACE) 100 MG capsule Take 100 mg by mouth as needed for constipation      DULoxetine (CYMBALTA) 20 MG capsule Take 3 capsules (60 mg) by mouth daily  Qty: 270 capsule, Refills: 3    Associated Diagnoses: Chronic neck pain      ferrous sulfate (FEROSUL) 325 (65 Fe) MG tablet Take 1 tablet (325 mg) by mouth daily (with breakfast)  Qty: 90 tablet, Refills: 3    Associated Diagnoses: Iron deficiency anemia      fexofenadine (ALLEGRA) 180 MG tablet Take 1 tablet (180 mg) by mouth daily  Qty: 90 tablet, Refills: 1    Associated Diagnoses: Environmental allergies      folic acid (FOLVITE) 1 MG tablet Take 1 tablet (1,000 mcg) by mouth daily  Qty: 90 tablet, Refills: 0    Associated Diagnoses: ALS (amyotrophic lateral sclerosis) (H)      gabapentin (NEURONTIN) 300 MG capsule Take 600 mg by mouth 3 times daily      hydrOXYzine HCl (ATARAX) 25 MG tablet Take 25 mg by mouth every 8 hours as needed for itching      lidocaine (LIDODERM) 5 % patch Apply one patch to affected area for no more than 12 hours, Remove patch, and maintain 12 hours free of Lidocaine before applying the next patch, Apply a new patch for 12 of every 24 hours as needed to increase efficacy  Qty: 60 patch, Refills: 5    Associated Diagnoses: Chronic neck pain      loperamide (IMODIUM) 2 MG capsule Take 2 mg by mouth as needed for diarrhea      metoprolol tartrate (LOPRESSOR) 25 MG tablet Take 12.5 mg by mouth 2 times daily      multivitamin (ONE A DAY) per tablet [MULTIVITAMIN (ONE A DAY) PER TABLET] TAKE 1 TABLET BY MOUTH DAILY  Qty: 90 tablet, Refills: 3    Associated Diagnoses: Diabetes mellitus, type 2 (H)      naloxone (NARCAN) 4 MG/0.1ML nasal spray Spray 4 mg into one nostril alternating nostrils as needed for opioid reversal every 2-3 minutes until assistance arrives      omeprazole (PRILOSEC) 20 MG DR capsule Take 1 capsule (20 mg) by mouth daily before breakfast  Qty: 90 capsule, Refills: 1    Associated  Diagnoses: Iron deficiency anemia      oxyCODONE IR (ROXICODONE) 10 MG tablet Take 10 mg by mouth 3 times daily      polyethylene glycol (GLYCOLAX) 17 gram/dose powder Take 17 g by mouth daily      predniSONE (DELTASONE) 2.5 MG tablet Take 1 tablet by mouth daily      riluzole (RILUTEK) 50 MG tablet Take 50 mg by mouth 2 times daily (with meals)      tamsulosin (FLOMAX) 0.4 MG capsule Take 1 capsule (0.4 mg) by mouth daily (with breakfast)  Qty: 90 capsule, Refills: 3    Associated Diagnoses: Benign prostatic hyperplasia with urinary frequency      triamcinolone (KENALOG) 0.1 % external cream Apply topically 2 times daily      vitamin (B COMPLEX) capsule Take 1 tablet by mouth daily      VITAMIN D3 50 MCG (2000 UT) tablet Take 2 tablets by mouth daily           STOP taking these medications       furosemide (LASIX) 40 MG tablet Comments:   Reason for Stopping:         furosemide (LASIX) 40 MG tablet Comments:   Reason for Stopping:         triamterene (DYRENIUM) 100 MG capsule Comments:   Reason for Stopping:             Allergies   No Known Allergies

## 2024-03-22 NOTE — PROGRESS NOTES
Care Management Discharge Note    Discharge Date: 03/22/2024       Discharge Disposition: Assisted Living      Discharge Transportation: health plan transportation    Education Provided on the Discharge Plan:  Per Care Team   Persons Notified of Discharge Plans: Yes  Patient/Family in Agreement with the Plan:      Handoff Referral Completed: Yes    Additional Information:    Final discharge plan is for pt to return to [facility] today 3/22. Pt agreeable to the private cost of w/c transport. [facility] w/c transport set up for 1:08pm- 1:53pm today 3/22. Bedside, Resident, pt, and facility updated. Orders faxed.         Carito Zee RN

## 2024-03-22 NOTE — PLAN OF CARE
"Pt to discharge back to where he lives. He denies pain. Is on scheduled oxycodone. Will continue with PO abx at his assisted living. Up with assist of 2 with only a turn and pivot.    Problem: Adult Inpatient Plan of Care  Goal: Plan of Care Review  Description: The Plan of Care Review/Shift note should be completed every shift.  The Outcome Evaluation is a brief statement about your assessment that the patient is improving, declining, or no change.  This information will be displayed automatically on your shift  note.  Outcome: Progressing     Problem: Adult Inpatient Plan of Care  Goal: Patient-Specific Goal (Individualized)  Description: You can add care plan individualizations to a care plan. Examples of Individualization might be:  \"Parent requests to be called daily at 9am for status\", \"I have a hard time hearing out of my right ear\", or \"Do not touch me to wake me up as it startles  me\".  Outcome: Progressing   Goal Outcome Evaluation:                        "

## 2024-03-25 ENCOUNTER — PATIENT OUTREACH (OUTPATIENT)
Dept: CARE COORDINATION | Facility: CLINIC | Age: 74
End: 2024-03-25
Payer: COMMERCIAL

## 2024-03-25 LAB
BACTERIA BLD CULT: NO GROWTH
BACTERIA BLD CULT: NO GROWTH

## 2024-03-25 NOTE — PROGRESS NOTES
Clinic Care Coordination Contact  [facility]/Voicemail    Clinical Data: Care Coordinator Outreach    Outreach Documentation Number of Outreach Attempt   3/25/2024   1:03 PM 2   3/25/2024   1:05 PM 1       Unable to leave a voicemail as patient's mailbox is full.     Plan: Care Coordinator will try to reach patient again in 1-2 business days.    David Myhre, RN   CCC RN

## 2024-03-25 NOTE — PROGRESS NOTES
Clinic Care Coordination Contact  [facility]/Voicemail    Clinical Data: Care Coordinator Outreach  {Link to outreach attempt documentation:[id number]}  Outreach Documentation Number of Outreach Attempt   3/25/2024   1:03 PM 2       Unable to leave a voice mail as the mailbox is full.     Plan:  Care Coordinator will try to reach patient again in 1-2 business days.    David Myhre, RN   CCC RN

## 2024-03-26 ENCOUNTER — PATIENT OUTREACH (OUTPATIENT)
Dept: CARE COORDINATION | Facility: CLINIC | Age: 74
End: 2024-03-26
Payer: COMMERCIAL

## 2024-03-26 NOTE — PROGRESS NOTES
Clinic Care Coordination Contact  [facility]/Voicemail    Clinical Data: Care Coordinator Outreach    Outreach Documentation Number of Outreach Attempt   3/25/2024   1:03 PM 2   3/25/2024   1:05 PM 1   3/26/2024  12:21 PM 2       Unable to leave a voicemail as patient's voicemail box is full.    Plan: Care Coordinator will send care coordination introduction letter with care coordinator contact information and explanation of care coordination services via mail. Care Coordinator will do no further outreaches at this time.    David Myhre, RN   CCC RN

## 2024-03-26 NOTE — LETTER
[facility] CARE COORDINATION  [facility]    March 26, 2024    Jef Centeno Jr.  [address]  [facility] [address]      Dear Jef,    I am a clinic care coordinator who works with Marco A Avina MD with the [facility]. I have been trying to reach you recently to introduce Clinic Care Coordination. Below is a description of clinic care coordination and how I can further assist you.       The clinic care coordination team is made up of a registered nurse, , financial resource worker and community health worker who understand the health care system. The goal of clinic care coordination is to help you manage your health and improve access to the health care system. Our team works alongside your provider to assist you in determining your health and social needs. We can help you obtain health care and community resources, providing you with necessary information and education. We can work with you through any barriers and develop a care plan that helps coordinate and strengthen the communication between you and your care team.  Our services are voluntary and are offered without charge to you personally.    Please feel free to contact me with any questions or concerns regarding care coordination and what we can offer.      We are focused on providing you with the highest-quality healthcare experience possible.    Sincerely,     David Myhre, RN   [facility] RN  [phone]

## 2024-03-27 ENCOUNTER — MEDICAL CORRESPONDENCE (OUTPATIENT)
Dept: HEALTH INFORMATION MANAGEMENT | Facility: CLINIC | Age: 74
End: 2024-03-27

## 2024-03-28 ENCOUNTER — MEDICAL CORRESPONDENCE (OUTPATIENT)
Dept: HEALTH INFORMATION MANAGEMENT | Facility: CLINIC | Age: 74
End: 2024-03-28
Payer: COMMERCIAL

## 2024-04-03 ENCOUNTER — TELEPHONE (OUTPATIENT)
Dept: FAMILY MEDICINE | Facility: CLINIC | Age: 74
End: 2024-04-03
Payer: COMMERCIAL

## 2024-04-03 NOTE — TELEPHONE ENCOUNTER
Verbal Orders    Physical Therapy   - 1 x a week for 3 weeks     -Once ever other week for 2 visits     [facility]   [phone]

## 2024-04-03 NOTE — TELEPHONE ENCOUNTER
Home Care is calling regarding an established patient with [facility].       Requesting orders from: Marco A Avina  Provider is following patient: Yes  Is this a 60-day recertification request?  No    Orders Requested    Physical Therapy  Request for initial certification (first set of orders)   Frequency:  1x/wk for 3 wks  Then every other week for 2 additional visits.       Information was gathered and will be sent to provider for review.  RN will contact Home Care with information after provider review.  Confirmed ok to leave a detailed message with call back.  Contact information confirmed and updated as needed.    Otilia Frey RN

## 2024-04-05 ENCOUNTER — LAB REQUISITION (OUTPATIENT)
Dept: LAB | Facility: CLINIC | Age: 74
End: 2024-04-05
Payer: COMMERCIAL

## 2024-04-05 DIAGNOSIS — I10 ESSENTIAL (PRIMARY) HYPERTENSION: ICD-10-CM

## 2024-04-10 NOTE — TELEPHONE ENCOUNTER
Arterial pressure >-320 on CRRT. Multiple attempts to troubleshoot. Swapped venous and arterial lines. No effect. Decision made to rinse back. Dialysis nurse aware.      Dr. Crabtree,  Please review message below and advise if a letter is appropriate.  Patient is aware that you are out of the clinic until Monday and is okay to wait until then.  Geovanna PRICE, TIAN/CMT....................10:53 AM

## 2024-04-11 ENCOUNTER — TELEPHONE (OUTPATIENT)
Dept: FAMILY MEDICINE | Facility: CLINIC | Age: 74
End: 2024-04-11
Payer: COMMERCIAL

## 2024-04-16 DIAGNOSIS — R35.0 BENIGN PROSTATIC HYPERPLASIA WITH URINARY FREQUENCY: ICD-10-CM

## 2024-04-16 DIAGNOSIS — N40.1 BENIGN PROSTATIC HYPERPLASIA WITH URINARY FREQUENCY: ICD-10-CM

## 2024-04-16 DIAGNOSIS — J44.1 CHRONIC OBSTRUCTIVE PULMONARY DISEASE WITH ACUTE EXACERBATION (H): ICD-10-CM

## 2024-04-16 DIAGNOSIS — E11.65 TYPE 2 DIABETES MELLITUS WITH HYPERGLYCEMIA, WITHOUT LONG-TERM CURRENT USE OF INSULIN (H): ICD-10-CM

## 2024-04-16 DIAGNOSIS — G89.29 CHRONIC NECK PAIN: ICD-10-CM

## 2024-04-16 DIAGNOSIS — D50.9 IRON DEFICIENCY ANEMIA: ICD-10-CM

## 2024-04-16 DIAGNOSIS — E11.40 TYPE 2 DIABETES MELLITUS WITH DIABETIC NEUROPATHY, WITHOUT LONG-TERM CURRENT USE OF INSULIN (H): ICD-10-CM

## 2024-04-16 DIAGNOSIS — M54.2 CHRONIC NECK PAIN: ICD-10-CM

## 2024-04-18 RX ORDER — FERROUS SULFATE 325(65) MG
325 TABLET ORAL
Qty: 90 TABLET | Refills: 3 | OUTPATIENT
Start: 2024-04-18

## 2024-04-18 RX ORDER — DULOXETIN HYDROCHLORIDE 20 MG/1
60 CAPSULE, DELAYED RELEASE ORAL DAILY
Qty: 270 CAPSULE | Refills: 3 | OUTPATIENT
Start: 2024-04-18

## 2024-04-18 RX ORDER — TAMSULOSIN HYDROCHLORIDE 0.4 MG/1
0.4 CAPSULE ORAL
Qty: 90 CAPSULE | Refills: 3 | OUTPATIENT
Start: 2024-04-18

## 2024-04-18 RX ORDER — ATORVASTATIN CALCIUM 10 MG/1
10 TABLET, FILM COATED ORAL DAILY
Qty: 90 TABLET | Refills: 2 | OUTPATIENT
Start: 2024-04-18

## 2024-04-18 RX ORDER — ASPIRIN 81 MG/1
81 TABLET ORAL DAILY
OUTPATIENT
Start: 2024-04-18

## 2024-04-18 RX ORDER — ALBUTEROL SULFATE 90 UG/1
1-2 AEROSOL, METERED RESPIRATORY (INHALATION) EVERY 6 HOURS PRN
Qty: 18 G | Refills: 3 | OUTPATIENT
Start: 2024-04-18

## 2024-04-18 NOTE — TELEPHONE ENCOUNTER
Attempted to contact patient. No answer and mailbox is full. If patient calls back please relay message below and assist as needed. Patient was last seen April 2022

## 2024-04-22 LAB
ANION GAP SERPL CALCULATED.3IONS-SCNC: 12 MMOL/L (ref 7–15)
BUN SERPL-MCNC: 8.9 MG/DL (ref 8–23)
CALCIUM SERPL-MCNC: 8.9 MG/DL (ref 8.8–10.2)
CHLORIDE SERPL-SCNC: 103 MMOL/L (ref 98–107)
CREAT SERPL-MCNC: 0.67 MG/DL (ref 0.67–1.17)
DEPRECATED HCO3 PLAS-SCNC: 25 MMOL/L (ref 22–29)
EGFRCR SERPLBLD CKD-EPI 2021: >90 ML/MIN/1.73M2
ERYTHROCYTE [DISTWIDTH] IN BLOOD BY AUTOMATED COUNT: 16.2 % (ref 10–15)
GLUCOSE SERPL-MCNC: 146 MG/DL (ref 70–99)
HCT VFR BLD AUTO: 39.6 % (ref 40–53)
HGB BLD-MCNC: 13 G/DL (ref 13.3–17.7)
MCH RBC QN AUTO: 31 PG (ref 26.5–33)
MCHC RBC AUTO-ENTMCNC: 32.8 G/DL (ref 31.5–36.5)
MCV RBC AUTO: 95 FL (ref 78–100)
PLATELET # BLD AUTO: 286 10E3/UL (ref 150–450)
POTASSIUM SERPL-SCNC: 4 MMOL/L (ref 3.4–5.3)
RBC # BLD AUTO: 4.19 10E6/UL (ref 4.4–5.9)
SODIUM SERPL-SCNC: 140 MMOL/L (ref 135–145)
WBC # BLD AUTO: 6.5 10E3/UL (ref 4–11)

## 2024-04-22 PROCEDURE — P9604 ONE-WAY ALLOW PRORATED TRIP: HCPCS | Mod: ORL | Performed by: NURSE PRACTITIONER

## 2024-04-22 PROCEDURE — 80048 BASIC METABOLIC PNL TOTAL CA: CPT | Mod: ORL | Performed by: NURSE PRACTITIONER

## 2024-04-22 PROCEDURE — 36415 COLL VENOUS BLD VENIPUNCTURE: CPT | Mod: ORL | Performed by: NURSE PRACTITIONER

## 2024-04-22 PROCEDURE — 85027 COMPLETE CBC AUTOMATED: CPT | Mod: ORL | Performed by: NURSE PRACTITIONER

## 2024-04-24 NOTE — TELEPHONE ENCOUNTER
Spoke with [facility]  Shelby. Appointment scheduled next Thursday at 1:30pm. She will let him know of appointment and have him call if time does not work.

## 2024-05-08 ENCOUNTER — TELEPHONE (OUTPATIENT)
Dept: FAMILY MEDICINE | Facility: CLINIC | Age: 74
End: 2024-05-08
Payer: COMMERCIAL

## 2024-05-08 NOTE — TELEPHONE ENCOUNTER
[facility] form received to review and sign.  Patient has not been seen by this writer for > 2yrs.   - unable to sign. Please delegate to the current treating/ monitoring provider.

## 2024-05-09 NOTE — TELEPHONE ENCOUNTER
Dr. Avina spoke with writer and ok to sign paperwork.     Writer called and left vm on confidential accent care vm to have them refax paperwork for PCP to sign

## 2024-05-10 NOTE — TELEPHONE ENCOUNTER
- presented with left sided weakness and confusion on 5/7 at 1220  - Stroke RF: none  - Intervention: TPA at OSH on 5/7/2024, MT not indicated  - Etiology: TBD  -CSF WBC: 400    Etiology - low suspicion for an acute infarct. Differentials remain wide - seizure vs intracranial mass vs meningoencephalitis.     Stroke workup:  -CTh: negative   -CTA h/n: Limited examination due to venous contamination and mistiming of the contrast bolus but no evidence of large vessel occlusion seen on this examination.   -TSH: 0.403   -home medications include: none    Plan:  S/p TNK  -MRI brain w/ w/o pending  -keppra increased from 1000 mg to 1500 mg BID  -continue frequent neuro checks, notify neurology of any change/decline     Further recommendations per MD.                  Controlled Substance Refill Request  Medication Name:   Requested Prescriptions     Pending Prescriptions Disp Refills     LORATADINE-PSEUDOEPHEDRINE 5-120 mg per tablet [Pharmacy Med Name: LORATADINE-D 5-120MG TABLETS] 60 tablet 0     Sig: TAKE 1 TABLET BY MOUTH TWICE DAILY     Date Last Fill: 5/29/20  [facility]ed Pharmacy: [facility]  Submit electronically to pharmacy  Controlled Substance Agreement on file:   Encounter-Level CSA Scan Date:    There are no encounter-level csa scan date.        Last office visit:  3/11/20

## 2024-05-31 ENCOUNTER — LAB REQUISITION (OUTPATIENT)
Dept: LAB | Facility: CLINIC | Age: 74
End: 2024-05-31
Payer: COMMERCIAL

## 2024-05-31 DIAGNOSIS — I10 ESSENTIAL (PRIMARY) HYPERTENSION: ICD-10-CM

## 2024-06-03 LAB
ANION GAP SERPL CALCULATED.3IONS-SCNC: 13 MMOL/L (ref 7–15)
BUN SERPL-MCNC: 7.9 MG/DL (ref 8–23)
CALCIUM SERPL-MCNC: 9.3 MG/DL (ref 8.8–10.2)
CHLORIDE SERPL-SCNC: 95 MMOL/L (ref 98–107)
CREAT SERPL-MCNC: 0.58 MG/DL (ref 0.67–1.17)
DEPRECATED HCO3 PLAS-SCNC: 26 MMOL/L (ref 22–29)
EGFRCR SERPLBLD CKD-EPI 2021: >90 ML/MIN/1.73M2
GLUCOSE SERPL-MCNC: 102 MG/DL (ref 70–99)
POTASSIUM SERPL-SCNC: 3.6 MMOL/L (ref 3.4–5.3)
SODIUM SERPL-SCNC: 134 MMOL/L (ref 135–145)

## 2024-06-03 PROCEDURE — P9604 ONE-WAY ALLOW PRORATED TRIP: HCPCS | Mod: ORL | Performed by: NURSE PRACTITIONER

## 2024-06-03 PROCEDURE — 80048 BASIC METABOLIC PNL TOTAL CA: CPT | Mod: ORL | Performed by: NURSE PRACTITIONER

## 2024-06-03 PROCEDURE — 36415 COLL VENOUS BLD VENIPUNCTURE: CPT | Mod: ORL | Performed by: NURSE PRACTITIONER

## 2024-06-07 ENCOUNTER — TELEPHONE (OUTPATIENT)
Dept: FAMILY MEDICINE | Facility: CLINIC | Age: 74
End: 2024-06-07

## 2024-06-07 NOTE — TELEPHONE ENCOUNTER
June 7, 2024    Home health orders was received via fax for Dr. Avina. Patient label was attached to paperwork and placed in provider's inbox to be signed.    NANO Rivera

## 2024-06-07 NOTE — TELEPHONE ENCOUNTER
Tegan from [facility] calling, asking if we have received order #[id number]    Also asking if pt needs an appt due to his PCP not seen him in 2 years?     May call Tegan back at [phone]

## 2024-06-10 ENCOUNTER — MEDICAL CORRESPONDENCE (OUTPATIENT)
Dept: HEALTH INFORMATION MANAGEMENT | Facility: CLINIC | Age: 74
End: 2024-06-10
Payer: COMMERCIAL

## 2024-06-13 DIAGNOSIS — Z53.9 DIAGNOSIS NOT YET DEFINED: Primary | ICD-10-CM

## 2024-06-13 PROCEDURE — G0180 MD CERTIFICATION HHA PATIENT: HCPCS | Performed by: FAMILY MEDICINE

## 2024-06-14 NOTE — TELEPHONE ENCOUNTER
June 14, 2024    Home health orders was picked up from outbox of  Dr. Avina.  sent via fax to [phone]. Sent to scanning and placed in provider's JIC folder.    AVELINO TRIVEDI MA

## 2024-07-10 NOTE — TELEPHONE ENCOUNTER
Received fax from resident's facility.    [facility]    Med's alaina'd up for transfer   normal appearance, nontender upon palpation, trachea midline, no masses

## 2024-09-18 ENCOUNTER — LAB REQUISITION (OUTPATIENT)
Dept: LAB | Facility: CLINIC | Age: 74
End: 2024-09-18
Payer: COMMERCIAL

## 2024-09-18 DIAGNOSIS — E87.6 HYPOKALEMIA: ICD-10-CM

## 2024-09-19 ENCOUNTER — OFFICE VISIT (OUTPATIENT)
Dept: FAMILY MEDICINE | Facility: CLINIC | Age: 74
End: 2024-09-19
Payer: COMMERCIAL

## 2024-09-19 VITALS
HEIGHT: 70 IN | BODY MASS INDEX: 20.04 KG/M2 | OXYGEN SATURATION: 93 % | HEART RATE: 76 BPM | TEMPERATURE: 97.9 F | DIASTOLIC BLOOD PRESSURE: 52 MMHG | SYSTOLIC BLOOD PRESSURE: 112 MMHG | RESPIRATION RATE: 12 BRPM | WEIGHT: 140 LBS

## 2024-09-19 DIAGNOSIS — E55.9 VITAMIN D DEFICIENCY: ICD-10-CM

## 2024-09-19 DIAGNOSIS — Z13.9 ENCOUNTER FOR HEALTH-RELATED SCREENING: ICD-10-CM

## 2024-09-19 DIAGNOSIS — I25.10 ASHD (ARTERIOSCLEROTIC HEART DISEASE): ICD-10-CM

## 2024-09-19 DIAGNOSIS — Z91.09 ENVIRONMENTAL ALLERGIES: ICD-10-CM

## 2024-09-19 DIAGNOSIS — M54.2 CHRONIC NECK PAIN: ICD-10-CM

## 2024-09-19 DIAGNOSIS — J44.1 CHRONIC OBSTRUCTIVE PULMONARY DISEASE WITH ACUTE EXACERBATION (H): ICD-10-CM

## 2024-09-19 DIAGNOSIS — D50.9 IRON DEFICIENCY ANEMIA, UNSPECIFIED IRON DEFICIENCY ANEMIA TYPE: ICD-10-CM

## 2024-09-19 DIAGNOSIS — N40.1 BENIGN PROSTATIC HYPERPLASIA WITH URINARY FREQUENCY: ICD-10-CM

## 2024-09-19 DIAGNOSIS — Z13.1 SCREENING FOR DIABETES MELLITUS: ICD-10-CM

## 2024-09-19 DIAGNOSIS — G99.2 STENOSIS OF CERVICAL SPINE WITH MYELOPATHY (H): ICD-10-CM

## 2024-09-19 DIAGNOSIS — R35.0 BENIGN PROSTATIC HYPERPLASIA WITH URINARY FREQUENCY: ICD-10-CM

## 2024-09-19 DIAGNOSIS — M48.02 STENOSIS OF CERVICAL SPINE WITH MYELOPATHY (H): ICD-10-CM

## 2024-09-19 DIAGNOSIS — Z09 ENCOUNTER FOR FOLLOW-UP: Primary | ICD-10-CM

## 2024-09-19 DIAGNOSIS — E78.49 OTHER HYPERLIPIDEMIA: ICD-10-CM

## 2024-09-19 DIAGNOSIS — G89.29 CHRONIC NECK PAIN: ICD-10-CM

## 2024-09-19 DIAGNOSIS — T74.01XA ADULT NEGLECT, INITIAL ENCOUNTER: ICD-10-CM

## 2024-09-19 DIAGNOSIS — G12.21 ALS (AMYOTROPHIC LATERAL SCLEROSIS) (H): ICD-10-CM

## 2024-09-19 PROBLEM — E46 MALNUTRITION (H): Status: RESOLVED | Noted: 2018-06-27 | Resolved: 2024-09-19

## 2024-09-19 PROBLEM — J18.9 LEFT LOWER LOBE PNEUMONIA: Status: RESOLVED | Noted: 2024-03-22 | Resolved: 2024-09-19

## 2024-09-19 PROBLEM — L89.43: Status: RESOLVED | Noted: 2019-02-13 | Resolved: 2024-09-19

## 2024-09-19 LAB
ALBUMIN SERPL BCG-MCNC: 3.8 G/DL (ref 3.5–5.2)
ALP SERPL-CCNC: 103 U/L (ref 40–150)
ALT SERPL W P-5'-P-CCNC: 13 U/L (ref 0–70)
ANION GAP SERPL CALCULATED.3IONS-SCNC: 9 MMOL/L (ref 7–15)
AST SERPL W P-5'-P-CCNC: 30 U/L (ref 0–45)
BILIRUB SERPL-MCNC: 0.4 MG/DL
BUN SERPL-MCNC: 7.4 MG/DL (ref 8–23)
CALCIUM SERPL-MCNC: 9.1 MG/DL (ref 8.8–10.4)
CHLORIDE SERPL-SCNC: 96 MMOL/L (ref 98–107)
CHOLEST SERPL-MCNC: 112 MG/DL
CREAT SERPL-MCNC: 0.57 MG/DL (ref 0.67–1.17)
EGFRCR SERPLBLD CKD-EPI 2021: >90 ML/MIN/1.73M2
ERYTHROCYTE [DISTWIDTH] IN BLOOD BY AUTOMATED COUNT: 14.8 % (ref 10–15)
EST. AVERAGE GLUCOSE BLD GHB EST-MCNC: 114 MG/DL
FASTING STATUS PATIENT QL REPORTED: ABNORMAL
FASTING STATUS PATIENT QL REPORTED: NORMAL
GLUCOSE SERPL-MCNC: 105 MG/DL (ref 70–99)
HBA1C MFR BLD: 5.6 % (ref 0–5.6)
HCO3 SERPL-SCNC: 30 MMOL/L (ref 22–29)
HCT VFR BLD AUTO: 41.8 % (ref 40–53)
HDLC SERPL-MCNC: 45 MG/DL
HGB BLD-MCNC: 14.3 G/DL (ref 13.3–17.7)
LDLC SERPL CALC-MCNC: 52 MG/DL
MCH RBC QN AUTO: 30.3 PG (ref 26.5–33)
MCHC RBC AUTO-ENTMCNC: 34.2 G/DL (ref 31.5–36.5)
MCV RBC AUTO: 89 FL (ref 78–100)
NONHDLC SERPL-MCNC: 67 MG/DL
PLATELET # BLD AUTO: 267 10E3/UL (ref 150–450)
POTASSIUM SERPL-SCNC: 3.3 MMOL/L (ref 3.4–5.3)
PROT SERPL-MCNC: 6.9 G/DL (ref 6.4–8.3)
RBC # BLD AUTO: 4.72 10E6/UL (ref 4.4–5.9)
SODIUM SERPL-SCNC: 135 MMOL/L (ref 135–145)
TRIGL SERPL-MCNC: 73 MG/DL
VIT B12 SERPL-MCNC: 996 PG/ML (ref 232–1245)
VIT D+METAB SERPL-MCNC: 81 NG/ML (ref 20–50)
WBC # BLD AUTO: 6.6 10E3/UL (ref 4–11)

## 2024-09-19 PROCEDURE — 36415 COLL VENOUS BLD VENIPUNCTURE: CPT | Performed by: FAMILY MEDICINE

## 2024-09-19 PROCEDURE — 99214 OFFICE O/P EST MOD 30 MIN: CPT | Performed by: FAMILY MEDICINE

## 2024-09-19 PROCEDURE — 82306 VITAMIN D 25 HYDROXY: CPT | Performed by: FAMILY MEDICINE

## 2024-09-19 PROCEDURE — 85027 COMPLETE CBC AUTOMATED: CPT | Performed by: FAMILY MEDICINE

## 2024-09-19 PROCEDURE — G2211 COMPLEX E/M VISIT ADD ON: HCPCS | Performed by: FAMILY MEDICINE

## 2024-09-19 PROCEDURE — 80053 COMPREHEN METABOLIC PANEL: CPT | Performed by: FAMILY MEDICINE

## 2024-09-19 PROCEDURE — 82607 VITAMIN B-12: CPT | Performed by: FAMILY MEDICINE

## 2024-09-19 PROCEDURE — 80061 LIPID PANEL: CPT | Performed by: FAMILY MEDICINE

## 2024-09-19 PROCEDURE — 83036 HEMOGLOBIN GLYCOSYLATED A1C: CPT | Performed by: FAMILY MEDICINE

## 2024-09-19 RX ORDER — DIAPER,BRIEF,INFANT-TODD,DISP
1 EACH MISCELLANEOUS DAILY
Status: CANCELLED | OUTPATIENT
Start: 2024-09-19

## 2024-09-19 RX ORDER — GABAPENTIN 300 MG/1
600 CAPSULE ORAL 3 TIMES DAILY
Status: CANCELLED | OUTPATIENT
Start: 2024-09-19

## 2024-09-19 RX ORDER — FEXOFENADINE HCL 180 MG/1
180 TABLET ORAL DAILY
Qty: 90 TABLET | Refills: 1 | Status: CANCELLED | OUTPATIENT
Start: 2024-09-19

## 2024-09-19 RX ORDER — BUDESONIDE AND FORMOTEROL FUMARATE DIHYDRATE 160; 4.5 UG/1; UG/1
2 AEROSOL RESPIRATORY (INHALATION) 2 TIMES DAILY
Status: CANCELLED | OUTPATIENT
Start: 2024-09-19

## 2024-09-19 RX ORDER — NAPROXEN SODIUM 220 MG
220 TABLET ORAL
COMMUNITY
Start: 2024-09-17

## 2024-09-19 RX ORDER — TAMSULOSIN HYDROCHLORIDE 0.4 MG/1
0.4 CAPSULE ORAL
Qty: 90 CAPSULE | Refills: 3 | Status: CANCELLED | OUTPATIENT
Start: 2024-09-19

## 2024-09-19 RX ORDER — BACLOFEN 10 MG/1
10 TABLET ORAL 3 TIMES DAILY
Status: CANCELLED | OUTPATIENT
Start: 2024-09-19

## 2024-09-19 RX ORDER — AMMONIUM LACTATE 12 G/100G
LOTION TOPICAL
COMMUNITY
Start: 2024-09-16

## 2024-09-19 RX ORDER — FERROUS SULFATE 325(65) MG
325 TABLET ORAL
Qty: 90 TABLET | Refills: 3 | Status: CANCELLED | OUTPATIENT
Start: 2024-09-19

## 2024-09-19 RX ORDER — ASPIRIN 81 MG/1
81 TABLET ORAL DAILY
Status: CANCELLED | OUTPATIENT
Start: 2024-09-19

## 2024-09-19 RX ORDER — DULOXETIN HYDROCHLORIDE 20 MG/1
60 CAPSULE, DELAYED RELEASE ORAL DAILY
Qty: 270 CAPSULE | Refills: 3 | Status: CANCELLED | OUTPATIENT
Start: 2024-09-19

## 2024-09-19 RX ORDER — CALCIUM CARBONATE/VITAMIN D3 600 MG-10
1 TABLET ORAL
COMMUNITY
Start: 2024-08-23

## 2024-09-19 RX ORDER — CALCIUM CARBONATE/VITAMIN D3 600 MG-10
1 TABLET ORAL
COMMUNITY
Start: 2024-04-08

## 2024-09-19 RX ORDER — ATORVASTATIN CALCIUM 10 MG/1
10 TABLET, FILM COATED ORAL DAILY
Qty: 90 TABLET | Refills: 2 | Status: CANCELLED | OUTPATIENT
Start: 2024-09-19

## 2024-09-19 RX ORDER — LORATADINE 10 MG/1
10 TABLET ORAL
COMMUNITY
Start: 2024-08-23 | End: 2024-09-26

## 2024-09-19 RX ORDER — HYDROCHLOROTHIAZIDE 50 MG/1
50 TABLET ORAL
COMMUNITY
Start: 2024-08-29 | End: 2024-09-26

## 2024-09-19 RX ORDER — ALBUTEROL SULFATE 90 UG/1
1-2 AEROSOL, METERED RESPIRATORY (INHALATION) EVERY 6 HOURS PRN
Qty: 18 G | Refills: 3 | Status: CANCELLED | OUTPATIENT
Start: 2024-09-19

## 2024-09-19 RX ORDER — HYDROCHLOROTHIAZIDE 25 MG/1
25 TABLET ORAL
COMMUNITY
Start: 2024-07-26 | End: 2024-09-26

## 2024-09-19 RX ORDER — FOLIC ACID 1 MG/1
1000 TABLET ORAL DAILY
Qty: 90 TABLET | Refills: 0 | Status: CANCELLED | OUTPATIENT
Start: 2024-09-19

## 2024-09-19 RX ORDER — SODIUM PHOSPHATE,MONO-DIBASIC 19G-7G/118
1 ENEMA (ML) RECTAL
COMMUNITY
Start: 2024-09-06

## 2024-09-19 RX ORDER — LIDOCAINE 50 MG/G
PATCH TOPICAL
Qty: 60 PATCH | Refills: 5 | Status: CANCELLED | OUTPATIENT
Start: 2024-09-19

## 2024-09-19 RX ORDER — DOCUSATE SODIUM 100 MG/1
100 CAPSULE, LIQUID FILLED ORAL PRN
Status: CANCELLED | OUTPATIENT
Start: 2024-09-19

## 2024-09-19 RX ORDER — CYCLOBENZAPRINE HCL 5 MG
5 TABLET ORAL 2 TIMES DAILY
Status: CANCELLED | OUTPATIENT
Start: 2024-09-19

## 2024-09-19 ASSESSMENT — PATIENT HEALTH QUESTIONNAIRE - PHQ9: SUM OF ALL RESPONSES TO PHQ QUESTIONS 1-9: 6

## 2024-09-19 NOTE — PROGRESS NOTES
Assessment & Plan     Encounter for follow-up  Chronic obstructive pulmonary disease with acute exacerbation (H)  ASHD (arteriosclerotic heart disease)    - Lipid panel reflex to direct LDL Non-fasting; Future  - Lipid panel reflex to direct LDL Non-fasting    Other hyperlipidemia  - ALT; Future    Benign prostatic hyperplasia with urinary frequency    ALS (amyotrophic lateral sclerosis) (H)  - Vitamin B12; Future  - Vitamin B12    Environmental allergies    Iron deficiency anemia, unspecified iron deficiency anemia type  - CBC with platelets; Future  - CBC with platelets    Chronic neck pain    Stenosis of cervical spine with myelopathy (H)    Encounter for health-related screening  - Comprehensive metabolic panel (BMP + Alb, Alk Phos, ALT, AST, Total. Bili, TP); Future  - Comprehensive metabolic panel (BMP + Alb, Alk Phos, ALT, AST, Total. Bili, TP)    Screening for diabetes mellitus  - Albumin Random Urine Quantitative with Creat Ratio; Future  - Hemoglobin A1c; Future  - Hemoglobin A1c    Vitamin D deficiency  - Vitamin D Deficiency; Future  - Vitamin D Deficiency    Recent Results (from the past 168 hour(s))   Lipid panel reflex to direct LDL Non-fasting    Collection Time: 09/19/24 12:15 PM   Result Value Ref Range    Cholesterol 112 <200 mg/dL    Triglycerides 73 <150 mg/dL    Direct Measure HDL 45 >=40 mg/dL    LDL Cholesterol Calculated 52 <100 mg/dL    Non HDL Cholesterol 67 <130 mg/dL    Patient Fasting > 8hrs? Unknown    CBC with platelets    Collection Time: 09/19/24 12:15 PM   Result Value Ref Range    WBC Count 6.6 4.0 - 11.0 10e3/uL    RBC Count 4.72 4.40 - 5.90 10e6/uL    Hemoglobin 14.3 13.3 - 17.7 g/dL    Hematocrit 41.8 40.0 - 53.0 %    MCV 89 78 - 100 fL    MCH 30.3 26.5 - 33.0 pg    MCHC 34.2 31.5 - 36.5 g/dL    RDW 14.8 10.0 - 15.0 %    Platelet Count 267 150 - 450 10e3/uL   Comprehensive metabolic panel (BMP + Alb, Alk Phos, ALT, AST, Total. Bili, TP)    Collection Time: 09/19/24 12:15 PM    Result Value Ref Range    Sodium 135 135 - 145 mmol/L    Potassium 3.3 (L) 3.4 - 5.3 mmol/L    Carbon Dioxide (CO2) 30 (H) 22 - 29 mmol/L    Anion Gap 9 7 - 15 mmol/L    Urea Nitrogen 7.4 (L) 8.0 - 23.0 mg/dL    Creatinine 0.57 (L) 0.67 - 1.17 mg/dL    GFR Estimate >90 >60 mL/min/1.73m2    Calcium 9.1 8.8 - 10.4 mg/dL    Chloride 96 (L) 98 - 107 mmol/L    Glucose 105 (H) 70 - 99 mg/dL    Alkaline Phosphatase 103 40 - 150 U/L    AST 30 0 - 45 U/L    ALT 13 0 - 70 U/L    Protein Total 6.9 6.4 - 8.3 g/dL    Albumin 3.8 3.5 - 5.2 g/dL    Bilirubin Total 0.4 <=1.2 mg/dL    Patient Fasting > 8hrs? Unknown    Hemoglobin A1c    Collection Time: 09/19/24 12:15 PM   Result Value Ref Range    Estimated Average Glucose 114 <117 mg/dL    Hemoglobin A1C 5.6 0.0 - 5.6 %   Vitamin D Deficiency    Collection Time: 09/19/24 12:15 PM   Result Value Ref Range    Vitamin D, Total (25-Hydroxy) 81 (H) 20 - 50 ng/mL   Vitamin B12    Collection Time: 09/19/24 12:15 PM   Result Value Ref Range    Vitamin B12 996 232 - 1,245 pg/mL          Adult neglect, initial encounter  Patient is found to be unkept in very poor hygiene concerning VA    Plan:  Referral  - Primary Care - Care Coordination Referral; Future    74 year old male with multiple chronic disease seen in follow-up for reassessment and med check.  He is found in process of health, poor hygiene and very unkept concerning of VA.  Patient voicing no mental or physical concerns and not knowing or understanding his medications.  Plan to follow-up with assisted-living staff to reconcile medications prior to refill.  Referral to  to further assist with discovering his current living situation.        The longitudinal plan of care for the diagnosis(es)/condition(s) as documented were addressed during this visit. Due to the added complexity in care, I will continue to support Jef in the subsequent management and with ongoing continuity of care.    Subjective   Jef is a 74  "year old, presenting for the following health issues:  Med check and follow-up  Patient endorsing no additional complaints or concerns      9/19/2024    11:15 AM   Additional Questions   Roomed by Cj Tay     History of Present Illness       Reason for visit:  Follow up on medications   He is taking medications regularly.                     Objective    /52   Pulse 76   Temp 97.9  F (36.6  C) (Oral)   Resp 12   Ht 1.778 m (5' 10\")   Wt 63.5 kg (140 lb)   SpO2 93%   BMI 20.09 kg/m    Body mass index is 20.09 kg/m .    Physical Exam   GENERAL: alert, no distress, very poor hygiene   RESP: lungs clear to auscultation - no rales, rhonchi or wheezes  CV: regular rate and rhythm, normal S1 S2, no S3 or S4, no murmur, click or rub, no peripheral edema  ABDOMEN: soft, nontender, no hepatosplenomegaly, no masses and bowel sounds normal  MS: no gross musculoskeletal defects noted, no edema            Signed Electronically by: Marco A Avina MD    "

## 2024-09-19 NOTE — LETTER
September 23, 2024      Jef Centeno   [address]  [facility] [place] [facility] [address]        Dear ,    We are writing to inform you of your test results.    Normal     Resulted Orders   Lipid panel reflex to direct LDL Non-fasting   Result Value Ref Range    Cholesterol 112 <200 mg/dL    Triglycerides 73 <150 mg/dL    Direct Measure HDL 45 >=40 mg/dL    LDL Cholesterol Calculated 52 <100 mg/dL    Non HDL Cholesterol 67 <130 mg/dL    Patient Fasting > 8hrs? Unknown     Narrative    Cholesterol  Desirable: < 200 mg/dL  Borderline High: 200 - 239 mg/dL  High: >= 240 mg/dL    Triglycerides  Normal: < 150 mg/dL  Borderline High: 150 - 199 mg/dL  High: 200-499 mg/dL  Very High: >= 500 mg/dL    Direct Measure HDL  Female: >= 50 mg/dL   Male: >= 40 mg/dL    LDL Cholesterol  Desirable: < 100 mg/dL  Above Desirable: 100 - 129 mg/dL   Borderline High: 130 - 159 mg/dL   High:  160 - 189 mg/dL   Very High: >= 190 mg/dL    Non HDL Cholesterol  Desirable: < 130 mg/dL  Above Desirable: 130 - 159 mg/dL  Borderline High: 160 - 189 mg/dL  High: 190 - 219 mg/dL  Very High: >= 220 mg/dL   CBC with platelets   Result Value Ref Range    WBC Count 6.6 4.0 - 11.0 10e3/uL    RBC Count 4.72 4.40 - 5.90 10e6/uL    Hemoglobin 14.3 13.3 - 17.7 g/dL    Hematocrit 41.8 40.0 - 53.0 %    MCV 89 78 - 100 fL    MCH 30.3 26.5 - 33.0 pg    MCHC 34.2 31.5 - 36.5 g/dL    RDW 14.8 10.0 - 15.0 %    Platelet Count 267 150 - 450 10e3/uL   Comprehensive metabolic panel (BMP + Alb, Alk Phos, ALT, AST, Total. Bili, TP)   Result Value Ref Range    Sodium 135 135 - 145 mmol/L    Potassium 3.3 (L) 3.4 - 5.3 mmol/L    Carbon Dioxide (CO2) 30 (H) 22 - 29 mmol/L    Anion Gap 9 7 - 15 mmol/L    Urea Nitrogen 7.4 (L) 8.0 - 23.0 mg/dL    Creatinine 0.57 (L) 0.67 - 1.17 mg/dL    GFR Estimate >90 >60 mL/min/1.73m2      Comment:      eGFR calculated using 2021 CKD-EPI equation.    Calcium 9.1 8.8 - 10.4 mg/dL      Comment:      Reference intervals for this  test were updated on 7/16/2024 to reflect our healthy population more accurately. There may be differences in the flagging of prior results with similar values performed with this method. Those prior results can be interpreted in the context of the updated reference intervals.    Chloride 96 (L) 98 - 107 mmol/L    Glucose 105 (H) 70 - 99 mg/dL    Alkaline Phosphatase 103 40 - 150 U/L    AST 30 0 - 45 U/L    ALT 13 0 - 70 U/L    Protein Total 6.9 6.4 - 8.3 g/dL    Albumin 3.8 3.5 - 5.2 g/dL    Bilirubin Total 0.4 <=1.2 mg/dL    Patient Fasting > 8hrs? Unknown    Hemoglobin A1c   Result Value Ref Range    Estimated Average Glucose 114 <117 mg/dL    Hemoglobin A1C 5.6 0.0 - 5.6 %      Comment:      Normal <5.7%   Prediabetes 5.7-6.4%    Diabetes 6.5% or higher     Note: Adopted from ADA consensus guidelines.   Vitamin D Deficiency   Result Value Ref Range    Vitamin D, Total (25-Hydroxy) 81 (H) 20 - 50 ng/mL      Comment:      toxicity possible    Narrative    Season, race, dietary intake, and treatment affect the concentration of 25-hydroxy-Vitamin D. Values may decrease during winter months and increase during summer months.    Vitamin D determination is routinely performed by an immunoassay specific for 25 hydroxyvitamin D3.  If an individual is on vitamin D2(ergocalciferol) supplementation, please specify 25 OH vitamin D2 and D3 level determination by LCMSMS test VITD23.     Vitamin B12   Result Value Ref Range    Vitamin B12 996 232 - 1,245 pg/mL       If you have any questions or concerns, please call the clinic at the number listed above.       Sincerely,      Marco A Avina MD

## 2024-09-19 NOTE — LETTER
September 23, 2024      Jef Jacobo Swanson  [address]  [facility] [address]        Dear ,    We are writing to inform you of your test results.    Lab values marked (H) or (L) are not clinically/medically significant     Resulted Orders   Lipid panel reflex to direct LDL Non-fasting   Result Value Ref Range    Cholesterol 112 <200 mg/dL    Triglycerides 73 <150 mg/dL    Direct Measure HDL 45 >=40 mg/dL    LDL Cholesterol Calculated 52 <100 mg/dL    Non HDL Cholesterol 67 <130 mg/dL    Patient Fasting > 8hrs? Unknown     Narrative    Cholesterol  Desirable: < 200 mg/dL  Borderline High: 200 - 239 mg/dL  High: >= 240 mg/dL    Triglycerides  Normal: < 150 mg/dL  Borderline High: 150 - 199 mg/dL  High: 200-499 mg/dL  Very High: >= 500 mg/dL    Direct Measure HDL  Female: >= 50 mg/dL   Male: >= 40 mg/dL    LDL Cholesterol  Desirable: < 100 mg/dL  Above Desirable: 100 - 129 mg/dL   Borderline High: 130 - 159 mg/dL   High:  160 - 189 mg/dL   Very High: >= 190 mg/dL    Non HDL Cholesterol  Desirable: < 130 mg/dL  Above Desirable: 130 - 159 mg/dL  Borderline High: 160 - 189 mg/dL  High: 190 - 219 mg/dL  Very High: >= 220 mg/dL   CBC with platelets   Result Value Ref Range    WBC Count 6.6 4.0 - 11.0 10e3/uL    RBC Count 4.72 4.40 - 5.90 10e6/uL    Hemoglobin 14.3 13.3 - 17.7 g/dL    Hematocrit 41.8 40.0 - 53.0 %    MCV 89 78 - 100 fL    MCH 30.3 26.5 - 33.0 pg    MCHC 34.2 31.5 - 36.5 g/dL    RDW 14.8 10.0 - 15.0 %    Platelet Count 267 150 - 450 10e3/uL   Comprehensive metabolic panel (BMP + Alb, Alk Phos, ALT, AST, Total. Bili, TP)   Result Value Ref Range    Sodium 135 135 - 145 mmol/L    Potassium 3.3 (L) 3.4 - 5.3 mmol/L    Carbon Dioxide (CO2) 30 (H) 22 - 29 mmol/L    Anion Gap 9 7 - 15 mmol/L    Urea Nitrogen 7.4 (L) 8.0 - 23.0 mg/dL    Creatinine 0.57 (L) 0.67 - 1.17 mg/dL    GFR Estimate >90 >60 mL/min/1.73m2      Comment:      eGFR calculated using 2021 CKD-EPI equation.    Calcium 9.1 8.8 -  10.4 mg/dL      Comment:      Reference intervals for this test were updated on 7/16/2024 to reflect our healthy population more accurately. There may be differences in the flagging of prior results with similar values performed with this method. Those prior results can be interpreted in the context of the updated reference intervals.    Chloride 96 (L) 98 - 107 mmol/L    Glucose 105 (H) 70 - 99 mg/dL    Alkaline Phosphatase 103 40 - 150 U/L    AST 30 0 - 45 U/L    ALT 13 0 - 70 U/L    Protein Total 6.9 6.4 - 8.3 g/dL    Albumin 3.8 3.5 - 5.2 g/dL    Bilirubin Total 0.4 <=1.2 mg/dL    Patient Fasting > 8hrs? Unknown    Hemoglobin A1c   Result Value Ref Range    Estimated Average Glucose 114 <117 mg/dL    Hemoglobin A1C 5.6 0.0 - 5.6 %      Comment:      Normal <5.7%   Prediabetes 5.7-6.4%    Diabetes 6.5% or higher     Note: Adopted from ADA consensus guidelines.   Vitamin D Deficiency   Result Value Ref Range    Vitamin D, Total (25-Hydroxy) 81 (H) 20 - 50 ng/mL      Comment:      toxicity possible    Narrative    Season, race, dietary intake, and treatment affect the concentration of 25-hydroxy-Vitamin D. Values may decrease during winter months and increase during summer months.    Vitamin D determination is routinely performed by an immunoassay specific for 25 hydroxyvitamin D3.  If an individual is on vitamin D2(ergocalciferol) supplementation, please specify 25 OH vitamin D2 and D3 level determination by LCMSMS test [id number].     Vitamin B12   Result Value Ref Range    Vitamin B12 996 232 - 1,245 pg/mL       If you have any questions or concerns, please call the clinic at the number listed above.       Sincerely,      Marco A Avina MD

## 2024-09-20 ENCOUNTER — PATIENT OUTREACH (OUTPATIENT)
Dept: CARE COORDINATION | Facility: CLINIC | Age: 74
End: 2024-09-20
Payer: COMMERCIAL

## 2024-09-20 NOTE — PROGRESS NOTES
[facility]/Voicemail    Clinical Data: Care Coordinator Outreach    Outreach Documentation Number of Outreach Attempt   9/20/2024   9:40 AM 1     PCP placed an order for care coordination as patient appears disheveled and having poor hygiene during appt yesterday. Did chart review. Appears that he lives in assisted living at [facility].      Left message on patient's voicemail with call back information and requested return call.    Called main number at [facility] and left a message asking for a call back with information on if he receives care coordination, if yes, who is his care coordinator.      VM from his care coordinator with No, Norma Vanrukhsanaon, [phone].  She is recently been assigned to work with him and will see him soon. She is at his facility weekly. He is supposed to have daily assistance and she will look into PCP concerns.  Called her back and got her VM and reiterated PCP concerns and that a VA report may be needed.  Asked Norma to call clinic for further information and with updates.  Added Norma to the care team.     Plan: routing to PCP. No further outreach as patient lives in assisted living and has supports.     Ashley Pena,   [facility]  [phone]

## 2024-09-21 DIAGNOSIS — R35.0 BENIGN PROSTATIC HYPERPLASIA WITH URINARY FREQUENCY: ICD-10-CM

## 2024-09-21 DIAGNOSIS — I10 ESSENTIAL HYPERTENSION: Primary | ICD-10-CM

## 2024-09-21 DIAGNOSIS — J44.1 CHRONIC OBSTRUCTIVE PULMONARY DISEASE WITH ACUTE EXACERBATION (H): ICD-10-CM

## 2024-09-21 DIAGNOSIS — N40.1 BENIGN PROSTATIC HYPERPLASIA WITH URINARY FREQUENCY: ICD-10-CM

## 2024-09-21 DIAGNOSIS — D50.9 IRON DEFICIENCY ANEMIA: ICD-10-CM

## 2024-09-21 DIAGNOSIS — E11.65 TYPE 2 DIABETES MELLITUS WITH HYPERGLYCEMIA, WITHOUT LONG-TERM CURRENT USE OF INSULIN (H): ICD-10-CM

## 2024-09-21 DIAGNOSIS — Z91.09 ENVIRONMENTAL ALLERGIES: ICD-10-CM

## 2024-09-21 DIAGNOSIS — G12.21 ALS (AMYOTROPHIC LATERAL SCLEROSIS) (H): ICD-10-CM

## 2024-09-21 NOTE — TELEPHONE ENCOUNTER
Please call assisted living/staff to reconcile med list and alaina up meds needed for refill (within the primary care specialty).  Route back to MD to review and sign

## 2024-09-23 LAB — POTASSIUM SERPL-SCNC: 3.5 MMOL/L (ref 3.4–5.3)

## 2024-09-23 PROCEDURE — 36415 COLL VENOUS BLD VENIPUNCTURE: CPT | Mod: ORL | Performed by: NURSE PRACTITIONER

## 2024-09-23 PROCEDURE — P9604 ONE-WAY ALLOW PRORATED TRIP: HCPCS | Mod: ORL | Performed by: NURSE PRACTITIONER

## 2024-09-23 PROCEDURE — 84132 ASSAY OF SERUM POTASSIUM: CPT | Mod: ORL | Performed by: NURSE PRACTITIONER

## 2024-09-24 NOTE — TELEPHONE ENCOUNTER
Left message for patient to call back. Attempting to find out what assisted living facility patient is at. Please relay message from Dr. AGOSTO

## 2024-09-26 ENCOUNTER — MEDICAL CORRESPONDENCE (OUTPATIENT)
Dept: HEALTH INFORMATION MANAGEMENT | Facility: CLINIC | Age: 74
End: 2024-09-26
Payer: COMMERCIAL

## 2024-09-26 RX ORDER — HYDROCHLOROTHIAZIDE 50 MG/1
50 TABLET ORAL DAILY
COMMUNITY
End: 2024-09-26

## 2024-09-26 NOTE — TELEPHONE ENCOUNTER
Spoke with Nurse at [facility] to fax med list over to us at [phone]

## 2024-09-26 NOTE — TELEPHONE ENCOUNTER
Received record and writer alaina'd up some meds    Writer unsure so will place document in PCP's middle bin at desk for review in case writer missed any that PCP is willing to refill/send

## 2024-09-27 RX ORDER — TAMSULOSIN HYDROCHLORIDE 0.4 MG/1
0.4 CAPSULE ORAL
Qty: 90 CAPSULE | Refills: 3 | Status: SHIPPED | OUTPATIENT
Start: 2024-09-27

## 2024-09-27 RX ORDER — BUDESONIDE AND FORMOTEROL FUMARATE DIHYDRATE 160; 4.5 UG/1; UG/1
2 AEROSOL RESPIRATORY (INHALATION) 2 TIMES DAILY
Qty: 10.2 G | Refills: 11 | Status: SHIPPED | OUTPATIENT
Start: 2024-09-27

## 2024-09-27 RX ORDER — HYDROCHLOROTHIAZIDE 50 MG/1
50 TABLET ORAL DAILY
Qty: 90 TABLET | Refills: 3 | Status: SHIPPED | OUTPATIENT
Start: 2024-09-27

## 2024-09-27 RX ORDER — LORATADINE 10 MG/1
10 TABLET ORAL DAILY PRN
Qty: 90 TABLET | Refills: 1 | Status: SHIPPED | OUTPATIENT
Start: 2024-09-27

## 2024-09-27 RX ORDER — METOPROLOL TARTRATE 25 MG/1
12.5 TABLET, FILM COATED ORAL 2 TIMES DAILY
Qty: 180 TABLET | Refills: 1 | Status: SHIPPED | OUTPATIENT
Start: 2024-09-27

## 2024-09-27 RX ORDER — FERROUS SULFATE 325(65) MG
325 TABLET ORAL
Qty: 90 TABLET | Refills: 3 | OUTPATIENT
Start: 2024-09-27

## 2024-09-27 RX ORDER — FOLIC ACID 1 MG/1
1000 TABLET ORAL DAILY
Qty: 90 TABLET | Refills: 3 | Status: SHIPPED | OUTPATIENT
Start: 2024-09-27

## 2024-09-27 RX ORDER — ATORVASTATIN CALCIUM 10 MG/1
10 TABLET, FILM COATED ORAL DAILY
Qty: 90 TABLET | Refills: 3 | Status: SHIPPED | OUTPATIENT
Start: 2024-09-27

## 2024-09-27 RX ORDER — ALBUTEROL SULFATE 90 UG/1
1-2 INHALANT RESPIRATORY (INHALATION) EVERY 4 HOURS PRN
Qty: 18 G | Refills: 11 | Status: SHIPPED | OUTPATIENT
Start: 2024-09-27

## 2024-09-27 NOTE — TELEPHONE ENCOUNTER
Please inform:   CBC/hemoglobin is now normalized.   Patient may discontinue iron supplements at this time.

## 2024-10-04 NOTE — TELEPHONE ENCOUNTER
Called and spoke to pt. Relayed msg. Pt is happy for the call.       Geoff Salvador Jr., CMA on 10/4/2024 at 4:48 PM

## 2024-12-04 ENCOUNTER — MEDICAL CORRESPONDENCE (OUTPATIENT)
Dept: HEALTH INFORMATION MANAGEMENT | Facility: CLINIC | Age: 74
End: 2024-12-04

## 2025-01-12 ENCOUNTER — APPOINTMENT (OUTPATIENT)
Dept: RADIOLOGY | Facility: HOSPITAL | Age: 75
DRG: 193 | End: 2025-01-12
Attending: EMERGENCY MEDICINE
Payer: COMMERCIAL

## 2025-01-12 ENCOUNTER — APPOINTMENT (OUTPATIENT)
Dept: CT IMAGING | Facility: HOSPITAL | Age: 75
DRG: 193 | End: 2025-01-12
Attending: EMERGENCY MEDICINE
Payer: COMMERCIAL

## 2025-01-12 ENCOUNTER — HOSPITAL ENCOUNTER (INPATIENT)
Facility: HOSPITAL | Age: 75
End: 2025-01-12
Attending: EMERGENCY MEDICINE
Payer: COMMERCIAL

## 2025-01-12 DIAGNOSIS — Z71.6 ENCOUNTER FOR SMOKING CESSATION COUNSELING: ICD-10-CM

## 2025-01-12 DIAGNOSIS — Z79.899 CONTROLLED SUBSTANCE AGREEMENT SIGNED: ICD-10-CM

## 2025-01-12 DIAGNOSIS — M79.10 MYALGIA: ICD-10-CM

## 2025-01-12 DIAGNOSIS — R91.8 PULMONARY NODULES: ICD-10-CM

## 2025-01-12 DIAGNOSIS — R05.1 ACUTE COUGH: ICD-10-CM

## 2025-01-12 DIAGNOSIS — K59.00 CONSTIPATION, UNSPECIFIED CONSTIPATION TYPE: ICD-10-CM

## 2025-01-12 DIAGNOSIS — G89.29 OTHER CHRONIC PAIN: ICD-10-CM

## 2025-01-12 DIAGNOSIS — R00.0 SINUS TACHYCARDIA: ICD-10-CM

## 2025-01-12 DIAGNOSIS — Z91.89 AT HIGH RISK FOR ALTERED SKIN INTEGRITY: Primary | ICD-10-CM

## 2025-01-12 DIAGNOSIS — G12.21 ALS (AMYOTROPHIC LATERAL SCLEROSIS) (H): Chronic | ICD-10-CM

## 2025-01-12 PROBLEM — Z78.9 IMPAIRED MOBILITY AND ACTIVITIES OF DAILY LIVING: Status: ACTIVE | Noted: 2017-12-20

## 2025-01-12 PROBLEM — F09 COGNITIVE DISORDER: Status: ACTIVE | Noted: 2018-12-19

## 2025-01-12 PROBLEM — M79.2 NEUROPATHIC PAIN SYNDROME (NON-HERPETIC): Status: ACTIVE | Noted: 2018-04-18

## 2025-01-12 PROBLEM — K59.2 NEUROGENIC BOWEL: Status: ACTIVE | Noted: 2017-12-20

## 2025-01-12 PROBLEM — Z74.09 IMPAIRED MOBILITY AND ACTIVITIES OF DAILY LIVING: Status: ACTIVE | Noted: 2017-12-20

## 2025-01-12 LAB
ALBUMIN SERPL BCG-MCNC: 4.2 G/DL (ref 3.5–5.2)
ALBUMIN UR-MCNC: NEGATIVE MG/DL
ALP SERPL-CCNC: 124 U/L (ref 40–150)
ALT SERPL W P-5'-P-CCNC: 31 U/L (ref 0–70)
ANION GAP SERPL CALCULATED.3IONS-SCNC: 16 MMOL/L (ref 7–15)
APPEARANCE UR: CLEAR
APTT PPP: 36 SECONDS (ref 22–38)
AST SERPL W P-5'-P-CCNC: 62 U/L (ref 0–45)
BASE EXCESS BLDA CALC-SCNC: 6.3 MMOL/L (ref -3–3)
BASOPHILS # BLD AUTO: 0.1 10E3/UL (ref 0–0.2)
BASOPHILS NFR BLD AUTO: 1 %
BILIRUB SERPL-MCNC: 0.6 MG/DL
BILIRUB UR QL STRIP: NEGATIVE
BUN SERPL-MCNC: 7.4 MG/DL (ref 8–23)
CALCIUM SERPL-MCNC: 9.8 MG/DL (ref 8.8–10.4)
CHLORIDE SERPL-SCNC: 85 MMOL/L (ref 98–107)
COLOR UR AUTO: YELLOW
CREAT SERPL-MCNC: 0.64 MG/DL (ref 0.67–1.17)
EGFRCR SERPLBLD CKD-EPI 2021: >90 ML/MIN/1.73M2
EOSINOPHIL # BLD AUTO: 0.1 10E3/UL (ref 0–0.7)
EOSINOPHIL NFR BLD AUTO: 1 %
ERYTHROCYTE [DISTWIDTH] IN BLOOD BY AUTOMATED COUNT: 15.6 % (ref 10–15)
FLUAV RNA SPEC QL NAA+PROBE: NEGATIVE
FLUBV RNA RESP QL NAA+PROBE: NEGATIVE
GLUCOSE SERPL-MCNC: 81 MG/DL (ref 70–99)
GLUCOSE UR STRIP-MCNC: NEGATIVE MG/DL
HCO3 BLD-SCNC: 30 MMOL/L (ref 21–28)
HCO3 SERPL-SCNC: 26 MMOL/L (ref 22–29)
HCT VFR BLD AUTO: 44.7 % (ref 40–53)
HGB BLD-MCNC: 15.6 G/DL (ref 13.3–17.7)
HGB UR QL STRIP: NEGATIVE
HOLD SPECIMEN: NORMAL
IMM GRANULOCYTES # BLD: 0.1 10E3/UL
IMM GRANULOCYTES NFR BLD: 1 %
INR PPP: 0.98 (ref 0.85–1.15)
KETONES UR STRIP-MCNC: 40 MG/DL
LACTATE SERPL-SCNC: 0.9 MMOL/L (ref 0.7–2)
LEUKOCYTE ESTERASE UR QL STRIP: NEGATIVE
LYMPHOCYTES # BLD AUTO: 2.4 10E3/UL (ref 0.8–5.3)
LYMPHOCYTES NFR BLD AUTO: 23 %
MCH RBC QN AUTO: 30.1 PG (ref 26.5–33)
MCHC RBC AUTO-ENTMCNC: 34.9 G/DL (ref 31.5–36.5)
MCV RBC AUTO: 86 FL (ref 78–100)
MONOCYTES # BLD AUTO: 1.1 10E3/UL (ref 0–1.3)
MONOCYTES NFR BLD AUTO: 11 %
MUCOUS THREADS #/AREA URNS LPF: PRESENT /LPF
NEUTROPHILS # BLD AUTO: 6.5 10E3/UL (ref 1.6–8.3)
NEUTROPHILS NFR BLD AUTO: 63 %
NITRATE UR QL: NEGATIVE
NRBC # BLD AUTO: 0 10E3/UL
NRBC BLD AUTO-RTO: 0 /100
O2/TOTAL GAS SETTING VFR VENT: 21 %
OXYHGB MFR BLDA: 92 % (ref 92–100)
PCO2 BLD: 39 MM HG (ref 35–45)
PEEP: 0 CM H2O
PH BLD: 7.49 [PH] (ref 7.35–7.45)
PH UR STRIP: 7.5 [PH] (ref 5–7)
PLATELET # BLD AUTO: 278 10E3/UL (ref 150–450)
PO2 BLD: 71 MM HG (ref 80–105)
POTASSIUM SERPL-SCNC: 3.2 MMOL/L (ref 3.4–5.3)
PROCALCITONIN SERPL IA-MCNC: 0.17 NG/ML
PROT SERPL-MCNC: 7.8 G/DL (ref 6.4–8.3)
RBC # BLD AUTO: 5.18 10E6/UL (ref 4.4–5.9)
RBC URINE: 7 /HPF
RSV RNA SPEC NAA+PROBE: NEGATIVE
SAO2 % BLDA: 94.3 % (ref 95–96)
SARS-COV-2 RNA RESP QL NAA+PROBE: NEGATIVE
SODIUM SERPL-SCNC: 127 MMOL/L (ref 135–145)
SP GR UR STRIP: 1.01 (ref 1–1.03)
UROBILINOGEN UR STRIP-MCNC: 2 MG/DL
WBC # BLD AUTO: 10.2 10E3/UL (ref 4–11)
WBC URINE: <1 /HPF

## 2025-01-12 PROCEDURE — 82550 ASSAY OF CK (CPK): CPT | Performed by: HOSPITALIST

## 2025-01-12 PROCEDURE — 71260 CT THORAX DX C+: CPT

## 2025-01-12 PROCEDURE — G0378 HOSPITAL OBSERVATION PER HR: HCPCS

## 2025-01-12 PROCEDURE — 93005 ELECTROCARDIOGRAM TRACING: CPT | Performed by: STUDENT IN AN ORGANIZED HEALTH CARE EDUCATION/TRAINING PROGRAM

## 2025-01-12 PROCEDURE — 82805 BLOOD GASES W/O2 SATURATION: CPT | Performed by: EMERGENCY MEDICINE

## 2025-01-12 PROCEDURE — 71045 X-RAY EXAM CHEST 1 VIEW: CPT

## 2025-01-12 PROCEDURE — 87637 SARSCOV2&INF A&B&RSV AMP PRB: CPT | Performed by: STUDENT IN AN ORGANIZED HEALTH CARE EDUCATION/TRAINING PROGRAM

## 2025-01-12 PROCEDURE — 250N000011 HC RX IP 250 OP 636: Performed by: EMERGENCY MEDICINE

## 2025-01-12 PROCEDURE — 258N000003 HC RX IP 258 OP 636: Performed by: EMERGENCY MEDICINE

## 2025-01-12 PROCEDURE — 96365 THER/PROPH/DIAG IV INF INIT: CPT

## 2025-01-12 PROCEDURE — 96367 TX/PROPH/DG ADDL SEQ IV INF: CPT

## 2025-01-12 PROCEDURE — 99223 1ST HOSP IP/OBS HIGH 75: CPT | Performed by: HOSPITALIST

## 2025-01-12 PROCEDURE — 85730 THROMBOPLASTIN TIME PARTIAL: CPT | Performed by: EMERGENCY MEDICINE

## 2025-01-12 PROCEDURE — 84100 ASSAY OF PHOSPHORUS: CPT | Performed by: HOSPITALIST

## 2025-01-12 PROCEDURE — 87040 BLOOD CULTURE FOR BACTERIA: CPT | Performed by: EMERGENCY MEDICINE

## 2025-01-12 PROCEDURE — 83605 ASSAY OF LACTIC ACID: CPT | Performed by: EMERGENCY MEDICINE

## 2025-01-12 PROCEDURE — 85025 COMPLETE CBC W/AUTO DIFF WBC: CPT | Performed by: EMERGENCY MEDICINE

## 2025-01-12 PROCEDURE — 84145 PROCALCITONIN (PCT): CPT | Performed by: EMERGENCY MEDICINE

## 2025-01-12 PROCEDURE — 250N000013 HC RX MED GY IP 250 OP 250 PS 637: Performed by: EMERGENCY MEDICINE

## 2025-01-12 PROCEDURE — 85610 PROTHROMBIN TIME: CPT | Performed by: EMERGENCY MEDICINE

## 2025-01-12 PROCEDURE — 36600 WITHDRAWAL OF ARTERIAL BLOOD: CPT

## 2025-01-12 PROCEDURE — 74177 CT ABD & PELVIS W/CONTRAST: CPT

## 2025-01-12 PROCEDURE — 93005 ELECTROCARDIOGRAM TRACING: CPT

## 2025-01-12 PROCEDURE — 83735 ASSAY OF MAGNESIUM: CPT | Performed by: HOSPITALIST

## 2025-01-12 PROCEDURE — 36415 COLL VENOUS BLD VENIPUNCTURE: CPT | Performed by: EMERGENCY MEDICINE

## 2025-01-12 PROCEDURE — 99285 EMERGENCY DEPT VISIT HI MDM: CPT | Mod: 25

## 2025-01-12 PROCEDURE — 81001 URINALYSIS AUTO W/SCOPE: CPT | Performed by: EMERGENCY MEDICINE

## 2025-01-12 PROCEDURE — 82040 ASSAY OF SERUM ALBUMIN: CPT | Performed by: EMERGENCY MEDICINE

## 2025-01-12 PROCEDURE — 36415 COLL VENOUS BLD VENIPUNCTURE: CPT | Performed by: STUDENT IN AN ORGANIZED HEALTH CARE EDUCATION/TRAINING PROGRAM

## 2025-01-12 RX ORDER — BACLOFEN 10 MG/1
10 TABLET ORAL 3 TIMES DAILY
Status: DISCONTINUED | OUTPATIENT
Start: 2025-01-13 | End: 2025-01-23 | Stop reason: HOSPADM

## 2025-01-12 RX ORDER — AMOXICILLIN 250 MG
1 CAPSULE ORAL 2 TIMES DAILY PRN
Status: DISCONTINUED | OUTPATIENT
Start: 2025-01-12 | End: 2025-01-23 | Stop reason: HOSPADM

## 2025-01-12 RX ORDER — BACITRACIN ZINC 500 [USP'U]/G
OINTMENT TOPICAL PRN
Status: ON HOLD | COMMUNITY
End: 2025-01-22

## 2025-01-12 RX ORDER — GUAIFENESIN 600 MG/1
600 TABLET, EXTENDED RELEASE ORAL 2 TIMES DAILY
Status: DISCONTINUED | OUTPATIENT
Start: 2025-01-13 | End: 2025-01-23 | Stop reason: HOSPADM

## 2025-01-12 RX ORDER — POLYETHYLENE GLYCOL 3350 17 G/17G
17 POWDER, FOR SOLUTION ORAL DAILY PRN
Status: DISCONTINUED | OUTPATIENT
Start: 2025-01-12 | End: 2025-01-23 | Stop reason: HOSPADM

## 2025-01-12 RX ORDER — ONDANSETRON 4 MG/1
4 TABLET, ORALLY DISINTEGRATING ORAL EVERY 6 HOURS PRN
Status: DISCONTINUED | OUTPATIENT
Start: 2025-01-12 | End: 2025-01-23 | Stop reason: HOSPADM

## 2025-01-12 RX ORDER — HYDROCHLOROTHIAZIDE 25 MG/1
50 TABLET ORAL DAILY
Status: DISCONTINUED | OUTPATIENT
Start: 2025-01-13 | End: 2025-01-22

## 2025-01-12 RX ORDER — GABAPENTIN 300 MG/1
600 CAPSULE ORAL 3 TIMES DAILY
Status: DISCONTINUED | OUTPATIENT
Start: 2025-01-13 | End: 2025-01-23 | Stop reason: HOSPADM

## 2025-01-12 RX ORDER — AZITHROMYCIN 250 MG/1
250 TABLET, FILM COATED ORAL DAILY
Status: COMPLETED | OUTPATIENT
Start: 2025-01-13 | End: 2025-01-16

## 2025-01-12 RX ORDER — BUPRENORPHINE 10 UG/H
1 PATCH TRANSDERMAL WEEKLY
Status: DISCONTINUED | OUTPATIENT
Start: 2025-01-13 | End: 2025-01-13

## 2025-01-12 RX ORDER — FLUTICASONE PROPIONATE 50 MCG
1 SPRAY, SUSPENSION (ML) NASAL DAILY
Status: DISCONTINUED | OUTPATIENT
Start: 2025-01-13 | End: 2025-01-23 | Stop reason: HOSPADM

## 2025-01-12 RX ORDER — OXYCODONE HYDROCHLORIDE 5 MG/1
10 TABLET ORAL ONCE
Status: COMPLETED | OUTPATIENT
Start: 2025-01-12 | End: 2025-01-12

## 2025-01-12 RX ORDER — CYCLOBENZAPRINE HCL 5 MG
5 TABLET ORAL 2 TIMES DAILY
Status: DISCONTINUED | OUTPATIENT
Start: 2025-01-13 | End: 2025-01-23 | Stop reason: HOSPADM

## 2025-01-12 RX ORDER — ONDANSETRON 2 MG/ML
4 INJECTION INTRAMUSCULAR; INTRAVENOUS EVERY 6 HOURS PRN
Status: DISCONTINUED | OUTPATIENT
Start: 2025-01-12 | End: 2025-01-23 | Stop reason: HOSPADM

## 2025-01-12 RX ORDER — PREDNISONE 2.5 MG/1
2.5 TABLET ORAL EVERY MORNING
Status: DISCONTINUED | OUTPATIENT
Start: 2025-01-13 | End: 2025-01-23 | Stop reason: HOSPADM

## 2025-01-12 RX ORDER — OXYCODONE HYDROCHLORIDE 5 MG/1
10 TABLET ORAL EVERY 8 HOURS
Status: DISCONTINUED | OUTPATIENT
Start: 2025-01-13 | End: 2025-01-23 | Stop reason: HOSPADM

## 2025-01-12 RX ORDER — DULOXETIN HYDROCHLORIDE 60 MG/1
60 CAPSULE, DELAYED RELEASE ORAL EVERY MORNING
COMMUNITY

## 2025-01-12 RX ORDER — FLUTICASONE PROPIONATE 50 MCG
1 SPRAY, SUSPENSION (ML) NASAL
COMMUNITY
Start: 2024-10-18

## 2025-01-12 RX ORDER — LORATADINE 10 MG/1
10 TABLET ORAL EVERY MORNING
Status: DISCONTINUED | OUTPATIENT
Start: 2025-01-13 | End: 2025-01-23 | Stop reason: HOSPADM

## 2025-01-12 RX ORDER — ALBUTEROL SULFATE 90 UG/1
1-2 INHALANT RESPIRATORY (INHALATION) EVERY 4 HOURS PRN
Status: DISCONTINUED | OUTPATIENT
Start: 2025-01-12 | End: 2025-01-23 | Stop reason: HOSPADM

## 2025-01-12 RX ORDER — NICOTINE 21 MG/24HR
1 PATCH, TRANSDERMAL 24 HOURS TRANSDERMAL DAILY
Status: DISCONTINUED | OUTPATIENT
Start: 2025-01-13 | End: 2025-01-23 | Stop reason: HOSPADM

## 2025-01-12 RX ORDER — TAMSULOSIN HYDROCHLORIDE 0.4 MG/1
0.4 CAPSULE ORAL
Status: DISCONTINUED | OUTPATIENT
Start: 2025-01-13 | End: 2025-01-23 | Stop reason: HOSPADM

## 2025-01-12 RX ORDER — ACETAMINOPHEN 650 MG/1
650 SUPPOSITORY RECTAL
Status: DISCONTINUED | OUTPATIENT
Start: 2025-01-12 | End: 2025-01-12

## 2025-01-12 RX ORDER — LIDOCAINE 40 MG/G
CREAM TOPICAL
Status: ACTIVE | OUTPATIENT
Start: 2025-01-12 | End: 2025-01-15

## 2025-01-12 RX ORDER — ACETAMINOPHEN 500 MG
1000 TABLET ORAL EVERY 8 HOURS PRN
Status: DISCONTINUED | OUTPATIENT
Start: 2025-01-12 | End: 2025-01-23 | Stop reason: HOSPADM

## 2025-01-12 RX ORDER — RILUZOLE 50 MG/1
50 TABLET, FILM COATED ORAL 2 TIMES DAILY WITH MEALS
Status: DISCONTINUED | OUTPATIENT
Start: 2025-01-13 | End: 2025-01-23 | Stop reason: HOSPADM

## 2025-01-12 RX ORDER — AMOXICILLIN 250 MG
2 CAPSULE ORAL 2 TIMES DAILY PRN
Status: DISCONTINUED | OUTPATIENT
Start: 2025-01-12 | End: 2025-01-23 | Stop reason: HOSPADM

## 2025-01-12 RX ORDER — NAPROXEN SODIUM 220 MG/1
220 TABLET, FILM COATED ORAL 2 TIMES DAILY PRN
Status: DISCONTINUED | OUTPATIENT
Start: 2025-01-12 | End: 2025-01-14 | Stop reason: DRUGHIGH

## 2025-01-12 RX ORDER — PROCHLORPERAZINE MALEATE 5 MG/1
5 TABLET ORAL EVERY 6 HOURS PRN
Status: DISCONTINUED | OUTPATIENT
Start: 2025-01-12 | End: 2025-01-23 | Stop reason: HOSPADM

## 2025-01-12 RX ORDER — SODIUM PHOSPHATE,MONO-DIBASIC 19G-7G/118
1 ENEMA (ML) RECTAL DAILY PRN
Status: DISCONTINUED | OUTPATIENT
Start: 2025-01-12 | End: 2025-01-23 | Stop reason: HOSPADM

## 2025-01-12 RX ORDER — LORATADINE 10 MG/1
10 TABLET ORAL EVERY MORNING
COMMUNITY

## 2025-01-12 RX ORDER — IPRATROPIUM BROMIDE AND ALBUTEROL SULFATE 2.5; .5 MG/3ML; MG/3ML
3 SOLUTION RESPIRATORY (INHALATION) EVERY 4 HOURS PRN
Status: DISCONTINUED | OUTPATIENT
Start: 2025-01-12 | End: 2025-01-23 | Stop reason: HOSPADM

## 2025-01-12 RX ORDER — ASPIRIN 81 MG/1
81 TABLET ORAL DAILY
Status: DISCONTINUED | OUTPATIENT
Start: 2025-01-13 | End: 2025-01-23 | Stop reason: HOSPADM

## 2025-01-12 RX ORDER — IOPAMIDOL 755 MG/ML
90 INJECTION, SOLUTION INTRAVASCULAR ONCE
Status: COMPLETED | OUTPATIENT
Start: 2025-01-12 | End: 2025-01-12

## 2025-01-12 RX ORDER — FLUTICASONE FUROATE AND VILANTEROL 200; 25 UG/1; UG/1
1 POWDER RESPIRATORY (INHALATION) DAILY
Status: DISCONTINUED | OUTPATIENT
Start: 2025-01-13 | End: 2025-01-23 | Stop reason: HOSPADM

## 2025-01-12 RX ORDER — ACETAMINOPHEN 325 MG/1
650 TABLET ORAL
Status: DISCONTINUED | OUTPATIENT
Start: 2025-01-12 | End: 2025-01-12

## 2025-01-12 RX ORDER — DULOXETIN HYDROCHLORIDE 60 MG/1
60 CAPSULE, DELAYED RELEASE ORAL EVERY MORNING
Status: DISCONTINUED | OUTPATIENT
Start: 2025-01-13 | End: 2025-01-23 | Stop reason: HOSPADM

## 2025-01-12 RX ORDER — LOPERAMIDE HYDROCHLORIDE 2 MG/1
2 CAPSULE ORAL 3 TIMES DAILY PRN
Status: DISCONTINUED | OUTPATIENT
Start: 2025-01-12 | End: 2025-01-23 | Stop reason: HOSPADM

## 2025-01-12 RX ORDER — ATORVASTATIN CALCIUM 10 MG/1
10 TABLET, FILM COATED ORAL EVERY EVENING
Status: DISCONTINUED | OUTPATIENT
Start: 2025-01-13 | End: 2025-01-23 | Stop reason: HOSPADM

## 2025-01-12 RX ORDER — CEFTRIAXONE 2 G/1
2 INJECTION, POWDER, FOR SOLUTION INTRAMUSCULAR; INTRAVENOUS ONCE
Status: COMPLETED | OUTPATIENT
Start: 2025-01-12 | End: 2025-01-12

## 2025-01-12 RX ADMIN — CEFTRIAXONE SODIUM 2 G: 2 INJECTION, POWDER, FOR SOLUTION INTRAMUSCULAR; INTRAVENOUS at 20:59

## 2025-01-12 RX ADMIN — IOPAMIDOL 90 ML: 755 INJECTION, SOLUTION INTRAVENOUS at 22:21

## 2025-01-12 RX ADMIN — SODIUM CHLORIDE 1905 ML: 9 INJECTION, SOLUTION INTRAVENOUS at 20:58

## 2025-01-12 RX ADMIN — OXYCODONE HYDROCHLORIDE 10 MG: 5 TABLET ORAL at 21:42

## 2025-01-12 RX ADMIN — AZITHROMYCIN MONOHYDRATE 500 MG: 500 INJECTION, POWDER, LYOPHILIZED, FOR SOLUTION INTRAVENOUS at 21:19

## 2025-01-12 RX ADMIN — ACETAMINOPHEN 650 MG: 325 TABLET ORAL at 21:04

## 2025-01-12 ASSESSMENT — ACTIVITIES OF DAILY LIVING (ADL)
ADLS_ACUITY_SCORE: 55

## 2025-01-12 ASSESSMENT — COLUMBIA-SUICIDE SEVERITY RATING SCALE - C-SSRS
1. IN THE PAST MONTH, HAVE YOU WISHED YOU WERE DEAD OR WISHED YOU COULD GO TO SLEEP AND NOT WAKE UP?: NO
2. HAVE YOU ACTUALLY HAD ANY THOUGHTS OF KILLING YOURSELF IN THE PAST MONTH?: NO
6. HAVE YOU EVER DONE ANYTHING, STARTED TO DO ANYTHING, OR PREPARED TO DO ANYTHING TO END YOUR LIFE?: NO

## 2025-01-12 ASSESSMENT — ENCOUNTER SYMPTOMS
WEAKNESS: 1
FATIGUE: 1

## 2025-01-13 PROBLEM — R00.0 SINUS TACHYCARDIA: Status: ACTIVE | Noted: 2025-01-13

## 2025-01-13 PROBLEM — M79.10 MYALGIA: Status: ACTIVE | Noted: 2025-01-13

## 2025-01-13 PROBLEM — R05.1 ACUTE COUGH: Status: ACTIVE | Noted: 2025-01-13

## 2025-01-13 LAB
ALBUMIN SERPL BCG-MCNC: 3.4 G/DL (ref 3.5–5.2)
ALP SERPL-CCNC: 95 U/L (ref 40–150)
ALT SERPL W P-5'-P-CCNC: 25 U/L (ref 0–70)
ANION GAP SERPL CALCULATED.3IONS-SCNC: 11 MMOL/L (ref 7–15)
AST SERPL W P-5'-P-CCNC: 52 U/L (ref 0–45)
BASOPHILS # BLD AUTO: 0 10E3/UL (ref 0–0.2)
BASOPHILS NFR BLD AUTO: 0 %
BILIRUB SERPL-MCNC: 0.3 MG/DL
BUN SERPL-MCNC: 7.5 MG/DL (ref 8–23)
C PNEUM DNA SPEC QL NAA+PROBE: NOT DETECTED
CALCIUM SERPL-MCNC: 8.4 MG/DL (ref 8.8–10.4)
CHLORIDE SERPL-SCNC: 93 MMOL/L (ref 98–107)
CK SERPL-CCNC: 571 U/L (ref 39–308)
CREAT SERPL-MCNC: 0.54 MG/DL (ref 0.67–1.17)
EGFRCR SERPLBLD CKD-EPI 2021: >90 ML/MIN/1.73M2
EOSINOPHIL # BLD AUTO: 0.1 10E3/UL (ref 0–0.7)
EOSINOPHIL NFR BLD AUTO: 1 %
ERYTHROCYTE [DISTWIDTH] IN BLOOD BY AUTOMATED COUNT: 15.8 % (ref 10–15)
FLUAV H1 2009 PAND RNA SPEC QL NAA+PROBE: NOT DETECTED
FLUAV H1 RNA SPEC QL NAA+PROBE: NOT DETECTED
FLUAV H3 RNA SPEC QL NAA+PROBE: NOT DETECTED
FLUAV RNA SPEC QL NAA+PROBE: NOT DETECTED
FLUBV RNA SPEC QL NAA+PROBE: NOT DETECTED
GLUCOSE SERPL-MCNC: 64 MG/DL (ref 70–99)
HADV DNA SPEC QL NAA+PROBE: NOT DETECTED
HCO3 SERPL-SCNC: 26 MMOL/L (ref 22–29)
HCOV PNL SPEC NAA+PROBE: DETECTED
HCT VFR BLD AUTO: 36.8 % (ref 40–53)
HGB BLD-MCNC: 12.9 G/DL (ref 13.3–17.7)
HMPV RNA SPEC QL NAA+PROBE: NOT DETECTED
HPIV1 RNA SPEC QL NAA+PROBE: NOT DETECTED
HPIV2 RNA SPEC QL NAA+PROBE: NOT DETECTED
HPIV3 RNA SPEC QL NAA+PROBE: NOT DETECTED
HPIV4 RNA SPEC QL NAA+PROBE: NOT DETECTED
IMM GRANULOCYTES # BLD: 0.1 10E3/UL
IMM GRANULOCYTES NFR BLD: 1 %
L PNEUMO1 AG UR QL IA: NEGATIVE
LYMPHOCYTES # BLD AUTO: 1.3 10E3/UL (ref 0.8–5.3)
LYMPHOCYTES NFR BLD AUTO: 15 %
M PNEUMO DNA SPEC QL NAA+PROBE: NOT DETECTED
MAGNESIUM SERPL-MCNC: 1.7 MG/DL (ref 1.7–2.3)
MCH RBC QN AUTO: 30.3 PG (ref 26.5–33)
MCHC RBC AUTO-ENTMCNC: 35.1 G/DL (ref 31.5–36.5)
MCV RBC AUTO: 86 FL (ref 78–100)
MONOCYTES # BLD AUTO: 0.8 10E3/UL (ref 0–1.3)
MONOCYTES NFR BLD AUTO: 9 %
NEUTROPHILS # BLD AUTO: 6.6 10E3/UL (ref 1.6–8.3)
NEUTROPHILS NFR BLD AUTO: 74 %
NRBC # BLD AUTO: 0 10E3/UL
NRBC BLD AUTO-RTO: 0 /100
PHOSPHATE SERPL-MCNC: 2.8 MG/DL (ref 2.5–4.5)
PLATELET # BLD AUTO: 206 10E3/UL (ref 150–450)
POTASSIUM SERPL-SCNC: 3.1 MMOL/L (ref 3.4–5.3)
POTASSIUM SERPL-SCNC: 3.8 MMOL/L (ref 3.4–5.3)
PROT SERPL-MCNC: 6.1 G/DL (ref 6.4–8.3)
RBC # BLD AUTO: 4.26 10E6/UL (ref 4.4–5.9)
RSV RNA SPEC QL NAA+PROBE: NOT DETECTED
RSV RNA SPEC QL NAA+PROBE: NOT DETECTED
RV+EV RNA SPEC QL NAA+PROBE: NOT DETECTED
S PNEUM AG SPEC QL: NEGATIVE
SODIUM SERPL-SCNC: 130 MMOL/L (ref 135–145)
SPECIMEN TYPE: NORMAL
WBC # BLD AUTO: 8.9 10E3/UL (ref 4–11)

## 2025-01-13 PROCEDURE — 87486 CHLMYD PNEUM DNA AMP PROBE: CPT | Performed by: HOSPITALIST

## 2025-01-13 PROCEDURE — 96375 TX/PRO/DX INJ NEW DRUG ADDON: CPT

## 2025-01-13 PROCEDURE — 250N000011 HC RX IP 250 OP 636: Mod: JW | Performed by: HOSPITALIST

## 2025-01-13 PROCEDURE — 250N000012 HC RX MED GY IP 250 OP 636 PS 637: Performed by: HOSPITALIST

## 2025-01-13 PROCEDURE — 272N000451 HC KIT SHRLOCK 5FR POWER PICC DOUBLE LUMEN

## 2025-01-13 PROCEDURE — 120N000001 HC R&B MED SURG/OB

## 2025-01-13 PROCEDURE — 87633 RESP VIRUS 12-25 TARGETS: CPT | Performed by: HOSPITALIST

## 2025-01-13 PROCEDURE — 85025 COMPLETE CBC W/AUTO DIFF WBC: CPT | Performed by: HOSPITALIST

## 2025-01-13 PROCEDURE — 999N000157 HC STATISTIC RCP TIME EA 10 MIN

## 2025-01-13 PROCEDURE — 258N000003 HC RX IP 258 OP 636: Performed by: INTERNAL MEDICINE

## 2025-01-13 PROCEDURE — 36592 COLLECT BLOOD FROM PICC: CPT | Performed by: HOSPITALIST

## 2025-01-13 PROCEDURE — 94799 UNLISTED PULMONARY SVC/PX: CPT

## 2025-01-13 PROCEDURE — G0463 HOSPITAL OUTPT CLINIC VISIT: HCPCS

## 2025-01-13 PROCEDURE — 36415 COLL VENOUS BLD VENIPUNCTURE: CPT | Performed by: INTERNAL MEDICINE

## 2025-01-13 PROCEDURE — G0378 HOSPITAL OBSERVATION PER HR: HCPCS

## 2025-01-13 PROCEDURE — 87899 AGENT NOS ASSAY W/OPTIC: CPT | Performed by: HOSPITALIST

## 2025-01-13 PROCEDURE — 250N000013 HC RX MED GY IP 250 OP 250 PS 637: Performed by: HOSPITALIST

## 2025-01-13 PROCEDURE — 250N000011 HC RX IP 250 OP 636: Performed by: INTERNAL MEDICINE

## 2025-01-13 PROCEDURE — 250N000013 HC RX MED GY IP 250 OP 250 PS 637: Performed by: INTERNAL MEDICINE

## 2025-01-13 PROCEDURE — 99233 SBSQ HOSP IP/OBS HIGH 50: CPT | Performed by: INTERNAL MEDICINE

## 2025-01-13 PROCEDURE — 82040 ASSAY OF SERUM ALBUMIN: CPT | Performed by: HOSPITALIST

## 2025-01-13 PROCEDURE — 84132 ASSAY OF SERUM POTASSIUM: CPT | Performed by: INTERNAL MEDICINE

## 2025-01-13 PROCEDURE — 36569 INSJ PICC 5 YR+ W/O IMAGING: CPT

## 2025-01-13 PROCEDURE — 272N000202 HC AEROBIKA WITH MANOMETER

## 2025-01-13 RX ORDER — ENOXAPARIN SODIUM 100 MG/ML
40 INJECTION SUBCUTANEOUS EVERY 24 HOURS
Status: DISCONTINUED | OUTPATIENT
Start: 2025-01-13 | End: 2025-01-14

## 2025-01-13 RX ORDER — SODIUM CHLORIDE 9 MG/ML
INJECTION, SOLUTION INTRAVENOUS CONTINUOUS
Status: DISCONTINUED | OUTPATIENT
Start: 2025-01-13 | End: 2025-01-15

## 2025-01-13 RX ORDER — NALOXONE HYDROCHLORIDE 0.4 MG/ML
0.2 INJECTION, SOLUTION INTRAMUSCULAR; INTRAVENOUS; SUBCUTANEOUS
Status: DISCONTINUED | OUTPATIENT
Start: 2025-01-13 | End: 2025-01-23 | Stop reason: HOSPADM

## 2025-01-13 RX ORDER — HYDROMORPHONE HYDROCHLORIDE 1 MG/ML
0.5 INJECTION, SOLUTION INTRAMUSCULAR; INTRAVENOUS; SUBCUTANEOUS ONCE
Status: COMPLETED | OUTPATIENT
Start: 2025-01-13 | End: 2025-01-13

## 2025-01-13 RX ORDER — POTASSIUM CHLORIDE 1500 MG/1
40 TABLET, EXTENDED RELEASE ORAL ONCE
Status: COMPLETED | OUTPATIENT
Start: 2025-01-13 | End: 2025-01-13

## 2025-01-13 RX ORDER — CEFTRIAXONE 1 G/1
1 INJECTION, POWDER, FOR SOLUTION INTRAMUSCULAR; INTRAVENOUS EVERY 24 HOURS
Status: DISCONTINUED | OUTPATIENT
Start: 2025-01-13 | End: 2025-01-15

## 2025-01-13 RX ORDER — PANTOPRAZOLE SODIUM 40 MG/1
40 TABLET, DELAYED RELEASE ORAL
Status: DISCONTINUED | OUTPATIENT
Start: 2025-01-13 | End: 2025-01-23 | Stop reason: HOSPADM

## 2025-01-13 RX ORDER — DIAZEPAM 10 MG/2ML
2.5 INJECTION, SOLUTION INTRAMUSCULAR; INTRAVENOUS ONCE
Status: COMPLETED | OUTPATIENT
Start: 2025-01-13 | End: 2025-01-13

## 2025-01-13 RX ORDER — POTASSIUM CHLORIDE 1500 MG/1
20 TABLET, EXTENDED RELEASE ORAL ONCE
Status: COMPLETED | OUTPATIENT
Start: 2025-01-13 | End: 2025-01-13

## 2025-01-13 RX ORDER — NALOXONE HYDROCHLORIDE 0.4 MG/ML
0.4 INJECTION, SOLUTION INTRAMUSCULAR; INTRAVENOUS; SUBCUTANEOUS
Status: DISCONTINUED | OUTPATIENT
Start: 2025-01-13 | End: 2025-01-23 | Stop reason: HOSPADM

## 2025-01-13 RX ADMIN — OXYCODONE HYDROCHLORIDE 10 MG: 5 TABLET ORAL at 06:28

## 2025-01-13 RX ADMIN — CYCLOBENZAPRINE HYDROCHLORIDE 5 MG: 5 TABLET, FILM COATED ORAL at 08:52

## 2025-01-13 RX ADMIN — FLUTICASONE FUROATE AND VILANTEROL TRIFENATATE 1 PUFF: 200; 25 POWDER RESPIRATORY (INHALATION) at 08:59

## 2025-01-13 RX ADMIN — GABAPENTIN 600 MG: 300 CAPSULE ORAL at 13:54

## 2025-01-13 RX ADMIN — TAMSULOSIN HYDROCHLORIDE 0.4 MG: 0.4 CAPSULE ORAL at 08:52

## 2025-01-13 RX ADMIN — RILUZOLE 50 MG: 50 TABLET ORAL at 08:53

## 2025-01-13 RX ADMIN — GABAPENTIN 600 MG: 300 CAPSULE ORAL at 20:26

## 2025-01-13 RX ADMIN — METOPROLOL TARTRATE 12.5 MG: 25 TABLET, FILM COATED ORAL at 20:27

## 2025-01-13 RX ADMIN — GUAIFENESIN 600 MG: 600 TABLET, EXTENDED RELEASE ORAL at 20:27

## 2025-01-13 RX ADMIN — POTASSIUM CHLORIDE 20 MEQ: 1500 TABLET, EXTENDED RELEASE ORAL at 17:04

## 2025-01-13 RX ADMIN — BACLOFEN 10 MG: 10 TABLET ORAL at 13:55

## 2025-01-13 RX ADMIN — METOPROLOL TARTRATE 12.5 MG: 25 TABLET, FILM COATED ORAL at 00:37

## 2025-01-13 RX ADMIN — DIAZEPAM 2.5 MG: 5 INJECTION, SOLUTION INTRAMUSCULAR; INTRAVENOUS at 00:30

## 2025-01-13 RX ADMIN — DULOXETINE HYDROCHLORIDE 60 MG: 60 CAPSULE, DELAYED RELEASE ORAL at 08:55

## 2025-01-13 RX ADMIN — RILUZOLE 50 MG: 50 TABLET ORAL at 17:05

## 2025-01-13 RX ADMIN — BACLOFEN 10 MG: 10 TABLET ORAL at 22:05

## 2025-01-13 RX ADMIN — LORATADINE 10 MG: 10 TABLET ORAL at 08:54

## 2025-01-13 RX ADMIN — GUAIFENESIN 600 MG: 600 TABLET, EXTENDED RELEASE ORAL at 08:52

## 2025-01-13 RX ADMIN — PANTOPRAZOLE SODIUM 40 MG: 40 TABLET, DELAYED RELEASE ORAL at 06:30

## 2025-01-13 RX ADMIN — ENOXAPARIN SODIUM 40 MG: 40 INJECTION SUBCUTANEOUS at 12:22

## 2025-01-13 RX ADMIN — SODIUM CHLORIDE: 9 INJECTION, SOLUTION INTRAVENOUS at 12:22

## 2025-01-13 RX ADMIN — GUAIFENESIN 600 MG: 600 TABLET, EXTENDED RELEASE ORAL at 00:40

## 2025-01-13 RX ADMIN — OXYCODONE HYDROCHLORIDE 10 MG: 5 TABLET ORAL at 13:54

## 2025-01-13 RX ADMIN — CYCLOBENZAPRINE HYDROCHLORIDE 5 MG: 5 TABLET, FILM COATED ORAL at 00:37

## 2025-01-13 RX ADMIN — GABAPENTIN 600 MG: 300 CAPSULE ORAL at 08:54

## 2025-01-13 RX ADMIN — BACLOFEN 10 MG: 10 TABLET ORAL at 08:56

## 2025-01-13 RX ADMIN — AZITHROMYCIN DIHYDRATE 250 MG: 250 TABLET, FILM COATED ORAL at 20:27

## 2025-01-13 RX ADMIN — CYCLOBENZAPRINE HYDROCHLORIDE 5 MG: 5 TABLET, FILM COATED ORAL at 20:27

## 2025-01-13 RX ADMIN — POTASSIUM CHLORIDE 40 MEQ: 1500 TABLET, EXTENDED RELEASE ORAL at 09:58

## 2025-01-13 RX ADMIN — SENNOSIDES AND DOCUSATE SODIUM 1 TABLET: 50; 8.6 TABLET ORAL at 08:57

## 2025-01-13 RX ADMIN — ASPIRIN 81 MG: 81 TABLET, COATED ORAL at 08:55

## 2025-01-13 RX ADMIN — CEFTRIAXONE SODIUM 1 G: 1 INJECTION, POWDER, FOR SOLUTION INTRAMUSCULAR; INTRAVENOUS at 20:27

## 2025-01-13 RX ADMIN — HYDROMORPHONE HYDROCHLORIDE 0.5 MG: 1 INJECTION, SOLUTION INTRAMUSCULAR; INTRAVENOUS; SUBCUTANEOUS at 01:47

## 2025-01-13 RX ADMIN — FLUTICASONE PROPIONATE 1 SPRAY: 50 SPRAY, METERED NASAL at 13:58

## 2025-01-13 RX ADMIN — PREDNISONE 2.5 MG: 2.5 TABLET ORAL at 08:56

## 2025-01-13 RX ADMIN — BACLOFEN 10 MG: 10 TABLET ORAL at 00:37

## 2025-01-13 RX ADMIN — GABAPENTIN 600 MG: 300 CAPSULE ORAL at 00:40

## 2025-01-13 RX ADMIN — OXYCODONE HYDROCHLORIDE 10 MG: 5 TABLET ORAL at 21:40

## 2025-01-13 RX ADMIN — METOPROLOL TARTRATE 12.5 MG: 25 TABLET, FILM COATED ORAL at 08:52

## 2025-01-13 ASSESSMENT — ACTIVITIES OF DAILY LIVING (ADL)
ADLS_ACUITY_SCORE: 57
ADLS_ACUITY_SCORE: 51
ADLS_ACUITY_SCORE: 57
ADLS_ACUITY_SCORE: 57
ADLS_ACUITY_SCORE: 59
ADLS_ACUITY_SCORE: 51
ADLS_ACUITY_SCORE: 57
DEPENDENT_IADLS:: CLEANING;COOKING;LAUNDRY;SHOPPING;MEAL PREPARATION;MEDICATION MANAGEMENT;MONEY MANAGEMENT;TRANSPORTATION;INCONTINENCE
ADLS_ACUITY_SCORE: 59
ADLS_ACUITY_SCORE: 55
ADLS_ACUITY_SCORE: 57
ADLS_ACUITY_SCORE: 57
ADLS_ACUITY_SCORE: 59
ADLS_ACUITY_SCORE: 57
ADLS_ACUITY_SCORE: 55
ADLS_ACUITY_SCORE: 57
ADLS_ACUITY_SCORE: 59

## 2025-01-13 NOTE — ED PROVIDER NOTES
Emergency Department Encounter     Evaluation Date & Time:   2025  7:25 PM    CHIEF COMPLAINT:  Generalized Weakness and Fatigue      Triage Note:Pt arrives via [facility] from [facility] due to weakness and body aches since yesterday.  Infrequent cough.  Not eaten since yesterday.       Triage Assessment (Adult)       Row Name 25 1927          Triage Assessment    Airway WDL WDL        Respiratory WDL    Respiratory WDL X;cough     Cough Frequency infrequent        Skin Circulation/Temperature WDL    Skin Circulation/Temperature WDL WDL        Cardiac WDL    Cardiac WDL WDL        Peripheral/Neurovascular WDL    Peripheral Neurovascular WDL WDL        Cognitive/Neuro/Behavioral WDL    Cognitive/Neuro/Behavioral WDL WDL                         FINAL IMPRESSION:    ICD-10-CM    1. At high risk for altered skin integrity [Z91.89]  Z91.89 Primary Care - Care Coordination Referral      2. Sinus tachycardia  R00.0     rule out sepsis      3. Myalgia  M79.10       4. Acute cough  R05.1           Impression and Plan     ED COURSE & MEDICAL DECISION MAKIN:01 PM I met with the patient for the initial interview and physical examination. Discussed plan for treatment and workup in the ED.         ED Course as of 25 0040   Sun  On my seeing the patient that he is tachycardic with infectious symptoms.  Certainly may be viral from influenza which is very likely but with the tachycardia does meet sepsis criteria so will empirically start on antibiotics especially as given his history of ALS he is high risk for pneumonia.  His symptoms are primarily respiratory.  However he does have apparent chronic incontinence and so we will also test a urine.  Patient arrives soiled with urine will have nursing staff get him cleaned up.    I reviewed his most recent neurology clinic note as well as his information from the [facility].  He does have cervical  fusion explaining his significant anterior flexion of the neck.  However, as mentioned he is protecting his airway and is in no respiratory distress.  Will get ABG to evaluate his ventilation additionally.  I did if he does have influenza his symptom onset was within the last 24 hours so certainly would start on Tamiflu.   2129 RN updated me that they were only able to get 1 IV on him and he declined further IV sticks.  He remains tachycardic and with infectious symptoms I will discuss with him getting a PICC line.  Interestingly his influenza screen is negative making this more likely to be bacterial sepsis.   2136 Patient is unable to sign consent form.  He gives verbal consent today now for PICC line.  Patient's nurse was also in the room at this time.  I did also confirm his CODE STATUS with him as being DNR DNI.   2139 Most recent clinic note with vital signs is from September and at that time his heart rate was normal.  Since the chest x-ray and viral screen do not show source of infection but this is biggest concern given his symptoms we will do CT chest abdomen pelvis.   Patient admitted to hospitalist with acute respiratory illness empirically treated with antibiotics.    At the conclusion of the encounter I discussed the results of all the tests and the disposition. The questions were answered. The patient or family acknowledged understanding and was agreeable with the care plan.          0 minutes of critical care time        MEDICATIONS GIVEN IN THE EMERGENCY DEPARTMENT:  Medications   sodium chloride (PF) 0.9% PF flush 3 mL (has no administration in time range)   sodium chloride (PF) 0.9% PF flush 3 mL (has no administration in time range)   sodium chloride 0.9% BOLUS 1,905 mL (has no administration in time range)   cefTRIAXone (ROCEPHIN) 2 g vial to attach to  ml bag for ADULTS or NS 50 ml bag for PEDS (has no administration in time range)   azithromycin (ZITHROMAX) 500 mg in sodium chloride 0.9  "% 250 mL intermittent infusion (has no administration in time range)   acetaminophen (TYLENOL) tablet 650 mg (has no administration in time range)     Or   acetaminophen (TYLENOL) Suppository 650 mg (has no administration in time range)       NEW PRESCRIPTIONS STARTED AT TODAY'S ED VISIT:      HPI     The history is provided by the patient. No  was used.        Jef Centeno Jr. is a 74 year old male with a pertinent history of ALS, ASHD, postherpetic neuralgia, anemia, and BPH who presents to this ED via EMS for evaluation of generalized weakness and fatigue.    The patient reports he has been experiencing \"weakness all over\", congestion, and a cough with green phlegm. He endorses there is an illness going around the care facility that he lives in. He states he has \"all his shots\". He denies being diabetic and experiencing any vomiting.    The patient has a signed POLST stating he is DNR and DNI.      REVIEW OF SYSTEMS:  Review of Systems   Constitutional:  Positive for fatigue.   Neurological:  Positive for weakness.     remainder of systems are all otherwise negative.        Medical History     Past Medical History:   Diagnosis Date    ALS (amyotrophic lateral sclerosis) (H)     BPH (benign prostatic hyperplasia)     Closed fracture of tibia and fibula, left, initial encounter     COPD (chronic obstructive pulmonary disease) (H)     Decubitus ulcer, buttock     Erectile dysfunction associated with type 2 diabetes mellitus (H)     Fracture tibia/fibula, left, closed, initial encounter 06/06/2019    HTN (hypertension)     Left lower lobe pneumonia 03/22/2024    Obstructive sleep apnea     Postherpetic neuralgia     Type 2 diabetes mellitus with diabetic neuropathy (H)        Past Surgical History:   Procedure Laterality Date    CHOLECYSTECTOMY      LAPAROSCOPIC GASTROTOMY W/ REPAIR OF ULCER      POSTERIOR FUSION CERVICAL SPINE      posterior fusion C2-C4 with laminnectomy; excision cervical " lipoma     SIGMOIDOSCOPY FLEXIBLE  06/29/2008       Family History   Problem Relation Age of Onset    Diabetes Mother     Diabetes Father     Cancer Father     Diabetes Sister     Diabetes Brother        Social History     Tobacco Use    Smoking status: Every Day     Current packs/day: 10.00     Average packs/day: 10.0 packs/day for 46.0 years (460.0 ttl pk-yrs)     Types: Cigarettes    Smokeless tobacco: Never   Substance Use Topics    Alcohol use: No     Comment: Alcoholic Drinks/day: Quit drinking in 2014. Prior to that he drank excessively.    Drug use: No       acetaminophen (TYLENOL) 500 MG tablet  albuterol (PROAIR HFA/PROVENTIL HFA/VENTOLIN HFA) 108 (90 Base) MCG/ACT inhaler  ammonium lactate (LAC-HYDRIN) 12 % external lotion  aspirin (ASA) 81 MG EC tablet  atorvastatin (LIPITOR) 10 MG tablet  baclofen (LIORESAL) 10 MG tablet  budesonide-formoterol (SYMBICORT) 160-4.5 MCG/ACT Inhaler  buprenorphine (BUTRANS) 10 MCG/HR WK patch  CALCIUM + VITAMIN D3 600-10 MG-MCG per tablet  CALCIUM + VITAMIN D3 600-10 MG-MCG per tablet  calcium carbonate 600 mg-vitamin D 400 units (CALTRATE) 600-400 MG-UNIT per tablet  cetirizine (ZYRTEC) 10 MG tablet  cyclobenzaprine (FLEXERIL) 5 MG tablet  docusate sodium (COLACE) 100 MG capsule  DULoxetine (CYMBALTA) 20 MG capsule  ferrous sulfate (FEROSUL) 325 (65 Fe) MG tablet  fexofenadine (ALLEGRA) 180 MG tablet  folic acid (FOLVITE) 1 MG tablet  gabapentin (NEURONTIN) 300 MG capsule  hydrochlorothiazide (HYDRODIURIL) 50 MG tablet  hydrOXYzine HCl (ATARAX) 25 MG tablet  lidocaine (LIDODERM) 5 % patch  loperamide (IMODIUM) 2 MG capsule  loratadine (CLARITIN) 10 MG tablet  metoprolol tartrate (LOPRESSOR) 25 MG tablet  multivitamin (ONE A DAY) per tablet  naloxone (NARCAN) 4 MG/0.1ML nasal spray  naproxen sodium (ANAPROX) 220 MG tablet  omeprazole (PRILOSEC) 20 MG DR capsule  oxyCODONE IR (ROXICODONE) 10 MG tablet  polyethylene glycol (GLYCOLAX) 17 gram/dose powder  predniSONE  (DELTASONE) 2.5 MG tablet  riluzole (RILUTEK) 50 MG tablet  sodium phosphate (FLEET ENEMA) 7-19 GM/118ML rectal enema  tamsulosin (FLOMAX) 0.4 MG capsule  triamcinolone (KENALOG) 0.1 % external cream  vitamin (B COMPLEX) capsule  VITAMIN D3 50 MCG (2000 UT) tablet        Physical Exam     First Vitals:  Patient Vitals for the past 24 hrs:   BP Temp Temp src Pulse Resp SpO2 Weight   01/12/25 1932 -- -- -- -- -- 91 % --   01/12/25 1930 119/69 98.8  F (37.1  C) Oral 120 20 -- 63.5 kg (140 lb)       PHYSICAL EXAM:   Constitutional:   Laying in bed and conversational.   HENT:  Normocephalic, posterior pharynx wnl, mucous membranes moist and dark pink. Speech is normal, not hoarse.  Eyes:  PERRL, EOMI, Conjunctiva normal, No discharge, no scleral icterus.  Respiratory:  Breathing easily  Cardiovascular:  Tachycardic, regular rhythm.  Peripheral pulses dp, pt, and radial are wnl.  No peripheral edema   GI:  Bowel sounds normal, Soft, No tenderness, No flank tenderness, nondistended. Wears adult depends for incontinence.  :No CVA tenderness.   Musculoskeletal:  Moves all extremities.  No erythematous or swollen major joints. Kyphotic cervical spine.  Integument:  Skin is warm to the touch.  Neurologic:  Alert & oriented x 3, Normal motor function, Normal sensory function, No focal deficits noted. Normal speech.  Psychiatric:  Affect normal, Judgment normal, Mood normal.     Results     LAB AND RADIOLOGY:  All pertinent labs reviewed and interpreted  Results for orders placed or performed during the hospital encounter of 01/12/25   [place] Draw     Status: None (In process)    Narrative    The following orders were created for panel order [place] Draw.  Procedure                               Abnormality         Status                     ---------                               -----------         ------                     Extra Blue Top Tube[[id number]]                              In process                 Extra Red Top  Tube[[id number]]                               In process                 Extra Green Top (Lithium...[[id number]]                      In process                 Extra Green Top (Lithium...[[id number]]                      In process                 Extra Purple Top Tube[[id number]]                            In process                   Please view results for these tests on the individual orders.         ECG:    Performed at: 1927    Impression: nst with occasional pvc's rate of 125.  Pr 150ms, qrs 80ms, qtc 447ms, prt axes 46 31 72.  Compared to 3/20/24 hr has increased and fusion complexes now present otherwise not significcantly changed.    I have independently reviewed and interpreted the EKS(s) documented above    PROCEDURES:  Procedures:      [facility] Documentation     Medical Decision Making  Obtained supplemental history:Supplemental history obtained?: No  Reviewed external records: External records reviewed?: Documented in chart  Care impacted by chronic illness:Other: ALS  Did you consider but not order tests?: Work up considered but not performed and documented in chart, if applicable  Did you interpret images independently?: Independent interpretation of ECG and images noted in documentation, when applicable.  Consultation discussion with other provider:Did you involve another provider (consultant, , pharmacy, etc.)?: I discussed the care with another health care provider, see documentation for details.  Admit.    MIPS: Not Applicable         The creation of this record is based on the scribe s observations of the work being performed by Sushant Rooney and the provider s statements to them. This document has been checked and approved by MD Danielle Johnson MD  Emergency Medicine  [facility] EMERGENCY DEPARTMENT         Danielle Chamberlain MD  01/16/25 0040

## 2025-01-13 NOTE — H&P
[facility]    History and Physical - Hospitalist Service       Date of Admission:  1/12/2025    Assessment & Plan      Jef Centeno Jr. is a 74 year old male admitted on 1/12/2025. He was brought to the emergency department by ambulance from assisted living facility for evaluation of bodyaches, nasal congestion, cough, feeling cold, decreased oral intake since 1 day prior to arrival    #Acute bronchitis  -Likely viral illness, rule out atypical pneumonia  -CT chest, abdomen pelvis showed scattered areas of bilateral bronchial wall thickening most prevalent in the right lower lobe consistent with acute infectious etiology  -Afebrile, however patient takes acetaminophen and ibuprofen on a daily basis  -Normal WBC, procalcitonin and lactic acid; negative SARS-CoV-2/influenza/RSV PCR  -Check respiratory panel PCR, sputum culture, Legionella and streptococcal pneumoniae antigens  -Treated with IV ceftriaxone and azithromycin in the ED, will continue azithromycin    #Moderate persistent asthma  -Ongoing tobacco abuse  -No bronchospasms on exam  -Not hypoxic  -VBG consistent with metabolic alkalosis  -Continue PTA albuterol HFA, Symbicort (auto substituted with Breo Ellipta)  -DuoNebs as needed  -Flutter valve    #Hyponatremia  #Hypochloremia with high anion gap  -Secondary to decreased oral intake and PTA hydrochlorothiazide  -UA showed some ketonuria  -Treated in the ED with 1905 mL milliliters IV NS bolus  -Encourage oral intake  -Recheck BMP in a.m.    #Hypokalemia  -Replace per protocol  -Check magnesium and phosphorus    #Chronic neuropathic pain syndrome  #Opioid dependence  -On PTA gabapentin, duloxetine, and as needed oxycodone  -Patient has been refusing buprenorphine patch until he sees prescribing provider, last administered on 12/16/2024    #Amyotrophic lateral sclerosis  -Patient receives 24-hour cares and is nonambulatory bedbound  -Continue PTA riluzole, baclofen, cyclobenzaprine,  bowel regimen    #Decubitus pressure injury, present on admission  -Wound care consult    #Essential hypertension  -Hold PTA hydrochlorothiazide as above  -Continue PTA metoprolol    #Tobacco abuse  -Smokes 1 pack/day  -Cessation education  -Nicotine patch    #BPH with LUTS  -Intermittent catheterization  -Continue PTA tamsulosin    #Chronic steroid use  -On PTA prednisone 2.5 mg daily, indication is unclear: COPD versus ALS    #Hyperlipidemia  -Hold PTA atorvastatin  -Check CK level 571    #Drug-induced platelet defect  -On PTA aspirin, monitor for bleeding    #Incidental radiographic findings  -Irregular opacity in the lingula measuring 1.4 cm, likely inflammatory though short-term follow-up chest CT may be beneficial to ensure resolution  -Outpatient follow-up     Observation Goals: -diagnostic tests and consults completed and resulted, -vital signs normal or at patient baseline, Nurse to notify provider when observation goals have been met and patient is ready for discharge.  Diet: Regular Diet Adult    DVT Prophylaxis: Pneumatic Compression Devices  Sepulveda Catheter: Not present  Lines: None     Cardiac Monitoring: ACTIVE order. Indication: Tachyarrhythmias, acute (48 hours)  Code Status: No CPR- Do NOT Intubate          Disposition Plan     Medically Ready for Discharge: Anticipated Tomorrow           Armin Ríos MD  Hospitalist Service  [facility]  Securely message with [employer] (more info)  Text page via [employer] Paging/Directory     ______________________________________________________________________    Chief Complaint   Generalized body aches, cough, nasal congestion and feeling cold    History is obtained from the patient, electronic health record, and emergency department physician    History of Present Illness   Jef Centeno Jr. is a 74 year old male who was brought to the ED by ambulance from assisted living facility for evaluation of generalized bodyaches, cough, nasal  congestion and feeling cold.  Past medical history of ALS, asthma, tobacco abuse, hypertension, hyperlipidemia, pulmonary hypertension, chronic neuropathic pain syndrome, BPH, constipation, cervical spine stenosis with myelopathy, decubitus pressure injury.  Patient receives 24-hour cares, is nonambulatory and essentially bedbound.  He was doing well until just prior to ED evaluation when he felt unwell.  He reported generalized body aches, feeling cold with nasal congestion and a productive cough of green phlegm.  He denied chest pain, palpitations, shortness of breath, nausea, vomiting, abdominal pain, dysuria or diarrhea.  He reports chronic pain from the waist down which has not changed.  In the ED, patient was tachycardic but afebrile, normotensive with oxygen saturation 91% and above.  However, he takes acetaminophen and ibuprofen as needed on a daily basis.  He appeared acutely ill but initial workup was unrevealing.  IV access for labs and fluids was challenging, patient refused to be poked multiple times and a PICC line was ordered.  CT chest, abdomen pelvis suggested infectious bronchial wall thickening.  Patient was treated with IV NS bolus 30 mL/kg, ceftriaxone and azithromycin.      Past Medical History    Past Medical History:   Diagnosis Date    ALS (amyotrophic lateral sclerosis) (H)     BPH (benign prostatic hyperplasia)     Closed fracture of tibia and fibula, left, initial encounter     COPD (chronic obstructive pulmonary disease) (H)     Decubitus ulcer, buttock     Erectile dysfunction associated with type 2 diabetes mellitus (H)     Fracture tibia/fibula, left, closed, initial encounter 06/06/2019    HTN (hypertension)     Left lower lobe pneumonia 03/22/2024    Obstructive sleep apnea     Postherpetic neuralgia     Type 2 diabetes mellitus with diabetic neuropathy (H)        Past Surgical History   Past Surgical History:   Procedure Laterality Date    CHOLECYSTECTOMY      LAPAROSCOPIC  GASTROTOMY W/ REPAIR OF ULCER      POSTERIOR FUSION CERVICAL SPINE      posterior fusion C2-C4 with laminnectomy; excision cervical lipoma     SIGMOIDOSCOPY FLEXIBLE  06/29/2008       Prior to Admission Medications   Prior to Admission Medications   Prescriptions Last Dose Informant Patient Reported? Taking?   CALCIUM + VITAMIN D3 600-10 MG-MCG per tablet 1/12/2025 Morning  Yes Yes   Sig: Take 1 tablet by mouth every morning.   Cholecalciferol (VITAMIN D3) 25 MCG (1000 UT) CAPS 1/12/2025 Morning  Yes Yes   Sig: Take 4 tablets by mouth every morning.   DULoxetine (CYMBALTA) 60 MG capsule 1/12/2025 Morning  Yes Yes   Sig: Take 60 mg by mouth every morning.   acetaminophen (TYLENOL) 500 MG tablet  Care Giver Yes Yes   Sig: Take 1,000 mg by mouth every 8 hours as needed for mild pain.   albuterol (PROAIR HFA/PROVENTIL HFA/VENTOLIN HFA) 108 (90 Base) MCG/ACT inhaler   No Yes   Sig: Inhale 1-2 puffs into the lungs every 4 hours as needed for shortness of breath or wheezing.   ammonium lactate (LAC-HYDRIN) 12 % external lotion   Yes Yes   Sig: Apply topically 2 times daily as needed.   aspirin (ASA) 81 MG EC tablet 1/12/2025 Morning Care Giver No Yes   Sig: Take 1 tablet (81 mg) by mouth daily   atorvastatin (LIPITOR) 10 MG tablet 1/11/2025 Evening  No Yes   Sig: Take 1 tablet (10 mg) by mouth daily.   bacitracin 500 UNIT/GM external ointment   Yes Yes   Sig: Apply topically as needed for wound care.   baclofen (LIORESAL) 10 MG tablet 1/12/2025 at  3:00 PM Care Giver Yes Yes   Sig: Take 10 mg by mouth 3 times daily   budesonide-formoterol (SYMBICORT) 160-4.5 MCG/ACT Inhaler 1/12/2025 Morning  No Yes   Sig: Inhale 2 puffs into the lungs 2 times daily.   buprenorphine (BUTRANS) 10 MCG/HR WK patch Past Week Care Giver Yes Yes   Sig: Place 1 patch onto the skin once a week   cyclobenzaprine (FLEXERIL) 5 MG tablet 1/12/2025 Morning Care Giver Yes Yes   Sig: Take 5 mg by mouth 2 times daily   docusate sodium (COLACE) 100 MG  capsule  Care Giver Yes Yes   Sig: Take 100 mg by mouth as needed for constipation   ferrous sulfate (FEROSUL) 325 (65 Fe) MG tablet 1/12/2025 Morning Care Giver No Yes   Sig: Take 1 tablet (325 mg) by mouth daily (with breakfast)   fluticasone (FLONASE) 50 MCG/ACT nasal spray 1/12/2025 at  2:00 PM  Yes Yes   Sig: Spray 1 spray into both nostrils daily at 2 pm.   folic acid (FOLVITE) 1 MG tablet 1/12/2025 Morning  No Yes   Sig: Take 1 tablet (1,000 mcg) by mouth daily.   gabapentin (NEURONTIN) 300 MG capsule 1/12/2025 at  3:00 PM Care Giver Yes Yes   Sig: Take 600 mg by mouth 3 times daily   hydrochlorothiazide (HYDRODIURIL) 50 MG tablet 1/12/2025 Morning  No Yes   Sig: Take 1 tablet (50 mg) by mouth daily. **hold hydrochlorothiazide 50mg if SBP (top number) is under 110**   lidocaine (LIDODERM) 5 % patch  Care Giver No Yes   Sig: Apply one patch to affected area for no more than 12 hours, Remove patch, and maintain 12 hours free of Lidocaine before applying the next patch, Apply a new patch for 12 of every 24 hours as needed to increase efficacy   loperamide (IMODIUM) 2 MG capsule  Care Giver Yes Yes   Sig: Take 2 mg by mouth as needed for diarrhea   loratadine (CLARITIN) 10 MG tablet 1/12/2025 Morning  Yes Yes   Sig: Take 10 mg by mouth every morning.   metoprolol tartrate (LOPRESSOR) 25 MG tablet 1/12/2025 Morning  No Yes   Sig: Take 0.5 tablets (12.5 mg) by mouth 2 times daily.   multivitamin (ONE A DAY) per tablet 1/12/2025 Morning Care Giver No Yes   Sig: [MULTIVITAMIN (ONE A DAY) PER TABLET] TAKE 1 TABLET BY MOUTH DAILY   naloxone (NARCAN) 4 MG/0.1ML nasal spray  Care Giver Yes Yes   Sig: Spray 4 mg into one nostril alternating nostrils as needed for opioid reversal every 2-3 minutes until assistance arrives   naproxen sodium (ANAPROX) 220 MG tablet   Yes Yes   Sig: Take 220 mg by mouth 2 times daily as needed.   omeprazole (PRILOSEC) 20 MG DR capsule 1/12/2025 Morning  No Yes   Sig: Take 1 capsule (20 mg) by  mouth every morning.   oxyCODONE IR (ROXICODONE) 10 MG tablet 1/12/2025 at  3:00 PM Care Giver Yes Yes   Sig: Take 10 mg by mouth 3 times daily   polyethylene glycol (GLYCOLAX) 17 gram/dose powder  Care Giver Yes Yes   Sig: Take 17 g by mouth daily as needed.   predniSONE (DELTASONE) 2.5 MG tablet 1/12/2025 Morning Care Giver Yes Yes   Sig: Take 1 tablet by mouth every morning.   riluzole (RILUTEK) 50 MG tablet 1/12/2025 Morning Care Giver Yes Yes   Sig: Take 50 mg by mouth 2 times daily (with meals)   sodium phosphate (FLEET ENEMA) 7-19 GM/118ML rectal enema   Yes Yes   Sig: Place 1 enema rectally daily as needed.   tamsulosin (FLOMAX) 0.4 MG capsule 1/12/2025 Morning  No Yes   Sig: Take 1 capsule (0.4 mg) by mouth daily (with breakfast).   triamcinolone (KENALOG) 0.1 % external cream  Care Giver Yes Yes   Sig: Apply topically 2 times daily as needed.   vitamin (B COMPLEX) capsule 1/12/2025 Morning Care Giver Yes Yes   Sig: Take 1 tablet by mouth every morning.      Facility-Administered Medications: None           Physical Exam   Vital Signs: Temp: 98.8  F (37.1  C) Temp src: Oral BP: 119/67 Pulse: (!) 125   Resp: 27 SpO2: (S) 98 % O2 Device: (S) None (Room air)    Weight: 140 lbs 0 oz    Constitutional: Appears acutely ill, mild distress  Respiratory: tachypneic and occasional bronchial sounds without wheezing or stridors  Cardiovascular: tachycardic with regular rhythm  GI: normal bowel sounds  Musculoskeletal: no lower extremity pitting edema present  Neurologic: Mental Status Exam:  Level of Alertness:   awake  Orientation:   person, place, time    Medical Decision Making       MANAGEMENT DISCUSSED with the following over the past 24 hours: ED provider and patient       Data     I have personally reviewed the following data over the past 24 hrs:    10.2  \   15.6   / 278     127 (L) 85 (L) 7.4 (L) /  81   3.2 (L) 26 0.64 (L) \     ALT: 31 AST: 62 (H) AP: 124 TBILI: 0.6   ALB: 4.2 TOT PROTEIN: 7.8 LIPASE: N/A      Procal: 0.17 CRP: N/A Lactic Acid: 0.9       INR:  0.98 PTT:  36   D-dimer:  N/A Fibrinogen:  N/A       Imaging results reviewed over the past 24 hrs:   Recent Results (from the past 24 hours)   XR Chest Port 1 View    Narrative    EXAM: XR CHEST PORT 1 VIEW  LOCATION: [facility]  DATE: 1/12/2025    INDICATION: Fever  COMPARISON: None.      Impression    IMPRESSION: Overlying skull limits evaluation of the lung apices. Unchanged elevation of the left hemidiaphragm. No pleural effusion. No lobar consolidation. The cardiac mediastinal silhouette is within normal limits. Degenerative changes of the left   shoulder. Upper abdominal surgical clips.   CT Chest/Abdomen/Pelvis w Contrast    Narrative    EXAM: CT CHEST/ABDOMEN/PELVIS W CONTRAST  LOCATION: [facility]  DATE: 1/12/2025    INDICATION: ALS patient chronically weak presents with feverish, cough, all over body aches, influenza screen negative  COMPARISON: Portable chest radiograph from 1/12/2025, CT from 3/20/2024.  TECHNIQUE: CT scan of the chest, abdomen, and pelvis was performed following injection of IV contrast. Multiplanar reformats were obtained. Dose reduction techniques were used.   CONTRAST: isovue 370 90ml    FINDINGS:   LUNGS AND PLEURA: Emphysema and fibrotic change in the upper lung zones. There are scattered areas of bronchial wall thickening, which are severe in nature in the posterior aspects of the lower lobes. There is some distal airspace disease in the right   lower lobe associated with the bronchial wall thickening. There are a few other scattered opacities as seen in the posterior aspect of the right upper lobe in a region of bronchial wall thickening, as well as an irregular opacity in the lingula measuring   about 1.4 cm on image 172 of series 4. No pneumothorax or pleural effusion.    MEDIASTINUM/AXILLAE: Normal heart size. No pericardial effusion. Coronary artery  calcifications.    CORONARY ARTERY CALCIFICATION: Moderate.    HEPATOBILIARY: Absent gallbladder. Mild central intrahepatic biliary ductal prominence likely due to postcholecystectomy reservoir effect.    PANCREAS: Stable benign appearing fatty lesion in the proximal pancreatic body consistent with a lipoma.    SPLEEN: Normal.    ADRENAL GLANDS: Normal.    KIDNEYS/BLADDER: Normal.    BOWEL: The stomach is decompressed. Normal caliber small bowel and appendix. Moderate to large amount of colonic stool. No free air.    LYMPH NODES: Normal.    VASCULATURE: Mild atherosclerotic change. No abdominal aortic aneurysm.    PELVIC ORGANS: Trace pelvic free fluid. Moderate to severe enlargement of the prostate.    MUSCULOSKELETAL: Osteopenia. There is a severe, focal kyphosis centered about the mid cervical spine. There is severe degenerative disc change in the lumbar spine.      Impression    IMPRESSION:  1.  Scattered areas of bilateral bronchial wall thickening with associated airspace opacities most prevalent in the right lower lobe consistent with acute infectious etiology. There is an irregular opacity in the lingula measuring 1.4 cm which is likely   inflammatory, though short-term follow-up chest CT may be beneficial to ensure that this resolves.    2.  Moderate coronary artery disease.    3.  Moderate to large amount of colonic stool.    4.  Moderate to severe enlargement of the prostate.

## 2025-01-13 NOTE — ED NOTES
ADULT ASSESSMENT PER CHIEF COMPLAINT OF general malaise/weakness    AREA OF ASSESSMENT: general    AIRWAY: patent    BREATHING: non labored, states cough, infrequent    CIRCULATION: good cwms, bp 119/69 map 90    BLEEDING: none    PAIN: 10/10 body aches    DEFORMITY TO: none    SWELLING/EDEMA: none    PT LAST TETANUS: na    PT ALERT/ORIENTED: x 4    PT TRANSFERS VIA: uses motorized wheelchair    PT LIVES AT: [facility] ADDRESS PICKED UP AT:     PT LAST FOOD: yesterday evening    PT LAST PO FLUIDS: today  -states drinking water all day    PT LAST ETOH/DRUG USE: none  TYPE na    PT VISITORS: none   In case of emergency call - Peterson flores- [phone]

## 2025-01-13 NOTE — PLAN OF CARE
Goal Outcome Evaluation:      Plan of Care Reviewed With: patient    Outcome Evaluation: Discharge back to [facility] and resume homecare skilled nursing services.

## 2025-01-13 NOTE — MEDICATION SCRIBE - ADMISSION MEDICATION HISTORY
Medication Scribe Admission Medication History    Admission medication history is complete. The information provided in this note is only as accurate as the sources available at the time of the update.    Information Source(s): Facility ([facility] medication list/MAR via N/A    Pertinent Information: Patient's medications are being managed by [facility]. Received MAR. Called on call RN at [phone] and confirmed last doses (today at 15:00)    Changes made to PTA medication list:  Added: Bacitracin, Flonase,   Deleted: Caltrate (duplicates), Zyrtec, Allegra, Atarax,  Changed: Tylenol to 1000 Q8H PRN, Cymbalta to 60 mg from Claritin to every day, Miralax to PRN, Fleets to daily PRN, Kenalog to BID PRN, D3 to 4000 units every day     Allergies reviewed with patient and updates made in EHR: yes    Medication History Completed By: Marcial Dukes 1/12/2025 9:48 PM    PTA Med List   Medication Sig Last Dose/Taking    acetaminophen (TYLENOL) 500 MG tablet Take 1,000 mg by mouth every 8 hours as needed for mild pain. Taking As Needed    albuterol (PROAIR HFA/PROVENTIL HFA/VENTOLIN HFA) 108 (90 Base) MCG/ACT inhaler Inhale 1-2 puffs into the lungs every 4 hours as needed for shortness of breath or wheezing. Taking As Needed    ammonium lactate (LAC-HYDRIN) 12 % external lotion Apply topically 2 times daily as needed. Taking As Needed    aspirin (ASA) 81 MG EC tablet Take 1 tablet (81 mg) by mouth daily 1/12/2025 Morning    atorvastatin (LIPITOR) 10 MG tablet Take 1 tablet (10 mg) by mouth daily. 1/11/2025 Evening    bacitracin 500 UNIT/GM external ointment Apply topically as needed for wound care. Taking As Needed    baclofen (LIORESAL) 10 MG tablet Take 10 mg by mouth 3 times daily 1/12/2025 at  3:00 PM    budesonide-formoterol (SYMBICORT) 160-4.5 MCG/ACT Inhaler Inhale 2 puffs into the lungs 2 times daily. 1/12/2025 Morning    buprenorphine (BUTRANS) 10 MCG/HR WK patch Place 1 patch onto the skin once  a week Past Week    CALCIUM + VITAMIN D3 600-10 MG-MCG per tablet Take 1 tablet by mouth every morning. 1/12/2025 Morning    Cholecalciferol (VITAMIN D3) 25 MCG (1000 UT) CAPS Take 4 tablets by mouth every morning. 1/12/2025 Morning    cyclobenzaprine (FLEXERIL) 5 MG tablet Take 5 mg by mouth 2 times daily 1/12/2025 Morning    docusate sodium (COLACE) 100 MG capsule Take 100 mg by mouth as needed for constipation Taking As Needed    DULoxetine (CYMBALTA) 60 MG capsule Take 60 mg by mouth every morning. 1/12/2025 Morning    ferrous sulfate (FEROSUL) 325 (65 Fe) MG tablet Take 1 tablet (325 mg) by mouth daily (with breakfast) 1/12/2025 Morning    fluticasone (FLONASE) 50 MCG/ACT nasal spray Spray 1 spray into both nostrils daily at 2 pm. 1/12/2025 at  2:00 PM    folic acid (FOLVITE) 1 MG tablet Take 1 tablet (1,000 mcg) by mouth daily. 1/12/2025 Morning    gabapentin (NEURONTIN) 300 MG capsule Take 600 mg by mouth 3 times daily 1/12/2025 at  3:00 PM    hydrochlorothiazide (HYDRODIURIL) 50 MG tablet Take 1 tablet (50 mg) by mouth daily. **hold hydrochlorothiazide 50mg if SBP (top number) is under 110** 1/12/2025 Morning    lidocaine (LIDODERM) 5 % patch Apply one patch to affected area for no more than 12 hours, Remove patch, and maintain 12 hours free of Lidocaine before applying the next patch, Apply a new patch for 12 of every 24 hours as needed to increase efficacy Taking    loperamide (IMODIUM) 2 MG capsule Take 2 mg by mouth as needed for diarrhea Taking As Needed    loratadine (CLARITIN) 10 MG tablet Take 10 mg by mouth every morning. 1/12/2025 Morning    metoprolol tartrate (LOPRESSOR) 25 MG tablet Take 0.5 tablets (12.5 mg) by mouth 2 times daily. 1/12/2025 Morning    multivitamin (ONE A DAY) per tablet [MULTIVITAMIN (ONE A DAY) PER TABLET] TAKE 1 TABLET BY MOUTH DAILY 1/12/2025 Morning    naloxone (NARCAN) 4 MG/0.1ML nasal spray Spray 4 mg into one nostril alternating nostrils as needed for opioid reversal  every 2-3 minutes until assistance arrives Taking As Needed    naproxen sodium (ANAPROX) 220 MG tablet Take 220 mg by mouth 2 times daily as needed. Taking As Needed    omeprazole (PRILOSEC) 20 MG DR capsule Take 1 capsule (20 mg) by mouth every morning. 1/12/2025 Morning    oxyCODONE IR (ROXICODONE) 10 MG tablet Take 10 mg by mouth 3 times daily 1/12/2025 at  3:00 PM    polyethylene glycol (GLYCOLAX) 17 gram/dose powder Take 17 g by mouth daily as needed. Taking As Needed    predniSONE (DELTASONE) 2.5 MG tablet Take 1 tablet by mouth every morning. 1/12/2025 Morning    riluzole (RILUTEK) 50 MG tablet Take 50 mg by mouth 2 times daily (with meals) 1/12/2025 Morning    sodium phosphate (FLEET ENEMA) 7-19 GM/118ML rectal enema Place 1 enema rectally daily as needed. Taking As Needed    tamsulosin (FLOMAX) 0.4 MG capsule Take 1 capsule (0.4 mg) by mouth daily (with breakfast). 1/12/2025 Morning    triamcinolone (KENALOG) 0.1 % external cream Apply topically 2 times daily as needed. Taking As Needed    vitamin (B COMPLEX) capsule Take 1 tablet by mouth every morning. 1/12/2025 Morning

## 2025-01-13 NOTE — ED TRIAGE NOTES
Pt arrives via [facility] from [facility] assisted living due to weakness and body aches since yesterday.  Infrequent cough.  Not eaten since yesterday.       Triage Assessment (Adult)       Row Name 01/12/25 1927          Triage Assessment    Airway WDL WDL        Respiratory WDL    Respiratory WDL X;cough     Cough Frequency infrequent        Skin Circulation/Temperature WDL    Skin Circulation/Temperature WDL WDL        Cardiac WDL    Cardiac WDL WDL        Peripheral/Neurovascular WDL    Peripheral Neurovascular WDL WDL        Cognitive/Neuro/Behavioral WDL    Cognitive/Neuro/Behavioral WDL WDL

## 2025-01-13 NOTE — DISCHARGE INSTRUCTIONS
"[facility] DISCHARGE INSTRUCTIONS:  Pressure Injury Prevention (PIP) Plan:  If patient is declining pressure injury prevention interventions: Explore reason why and address patient's concerns, Educate on pressure injury risk and prevention intervention(s), If patient is still declining, document \"informed refusal\" , and Ensure Care team is aware ( provider, charge nurse, etc)  Mattress: Follow bed algorithm, add Low Air Loss (Air+) mattress pump if skin is very moist or constantly moist.   HOB: Maintain at or below 30 degrees, unless contraindicated  Repositioning in bed: Every 1-2 hours  and Raise foot of bed prior to raising head of bed, to reduce patient sliding down (shear)  Heels: Keep elevated off mattress and Pillows under calves  Protective Dressing: Sacral Mepilex for prevention (#666789),  especially for the agitated patient   Positioning Equipment:None  Chair positioning: Chair cushion (#146741) , Assist patient to reposition hourly, and Do NOT use a donut for sitting (this increases pressure to smaller area and creates a higher potential for injury)    If patient has a buttock pressure injury, or high risk for PI use chair cushion or SPS.  Moisture Management: Perineal cleansing /protection: Follow Incontinence Protocol, Avoid brief in bed, and Moisturize dry skin  Under Devices: Inspect skin under all medical devices during skin inspection , Ensure tubes are stabilized without tension, and Ensure patient is not lying on medical devices or equipment when repositioned  Ask provider to discontinue device when no longer needed.  "

## 2025-01-13 NOTE — PROGRESS NOTES
[facility]    Medicine Progress Note - Hospitalist Service    Date of Admission:  1/12/2025    Assessment & Plan   74 year old male with a past medical history of ALS, asthma, tobacco abuse, hypertension, hyperlipidemia, pulmonary hypertension, chronic neuropathic pain syndrome, BPH, constipation, cervical spine stenosis with myelopathy, decubitus pressure injury who was brought to the emergency department by ambulance from assisted living facility for evaluation of bodyaches, nasal congestion, cough, feeling cold, decreased oral intake, found to have pneumonia and positive coronavirus.     Acute viral pneumonia  -Negative COVID influenza, positive coronavirus.  -CT chest, abdomen pelvis showed scattered areas of bilateral bronchial wall thickening most prevalent in the right lower lobe consistent with acute infectious etiology  -Still running low-grade fever of 100.2 in spite of taking acetaminophen and ibuprofen on a daily basis  -Normal WBC, procalcitonin and lactic acid; negative SARS-CoV-2/influenza/RSV PCR  -Negative Legionella and streptococcal pneumoniae antigens  -Treated with IV ceftriaxone and azithromycin in the ED, will continue both antibiotic given his chronic steroid dependent and ALS     Moderate persistent asthma  -Ongoing tobacco abuse  -No bronchospasms on exam  -Requires only 2 L  -VBG consistent with metabolic alkalosis  -Continue PTA albuterol HFA, Symbicort (auto substituted with Breo Ellipta)  -DuoNebs as needed  -Flutter valve  -Continue home steroid dose     Hyponatremia, hypochloremia with high anion gap  -Secondary to decreased oral intake and PTA hydrochlorothiazide  -UA showed some ketonuria  -Treated in the ED with 1905 mL milliliters IV NS bolus  -Encourage oral intake  Continue to hold HCTZ-   -Continue IV fluid support  -Recheck BMP in a.m.     Hypokalemia  -Replace per protocol  -Unremarkable magnesium and phosphorus  -Continue to monitor liver     Chronic  neuropathic pain syndrome  - Opioid dependence  - On PTA gabapentin, duloxetine, and as needed oxycodone  - Patient has been refusing buprenorphine patch until he sees prescribing provider, last administered on 12/16/2024  Essential hypertension  - Hold PTA hydrochlorothiazide because of hyponatremia as above  - Continue PTA metoprolol  - Continue to monitor blood pressure    Mild rhabdomyolysis  -Probably secondary to infection  -Elevated CK level at 571  -Continue IV hydration   - monitor CK level     Tobacco abuse  - Smokes 1 pack/day  - Cessation education  - Nicotine patch     BPH with LUTS  - Intermittent catheterization  - Continue PTA tamsulosin     Chronic steroid use  -On PTA prednisone 2.5 mg daily,  -Probably secondary to COPD versus ALS     Hyperlipidemia  -Hold PTA atorvastatin because of rhabdo    Amyotrophic lateral sclerosis  - Patient receives 24-hour cares and is nonambulatory bedbound  - Continue PTA riluzole, baclofen, cyclobenzaprine, bowel regimen     Decubitus pressure injury, present on admission  -Wound care consult     Drug-induced platelet defect  -On PTA aspirin, monitor for bleeding     Incidental radiographic findings  -Irregular opacity in the lingula measuring 1.4 cm, likely inflammatory though short-term follow-up chest CT may be beneficial to ensure resolution  -Outpatient follow-up          Diet: Regular Diet Adult    DVT Prophylaxis: Enoxaparin (Lovenox) SQ  Sepulveda Catheter: Not present  Lines: PRESENT      PICC 01/13/25 Double Lumen Right Basilic Antibiotic.-Site Assessment: WDL      Cardiac Monitoring: ACTIVE order. Indication: Tachyarrhythmias, acute (48 hours)  Code Status: No CPR- Do NOT Intubate      Clinically Significant Risk Factors Present on Admission        # Hypokalemia: Lowest K = 3.1 mmol/L in last 2 days, will replace as needed  # Hyponatremia: Lowest Na = 127 mmol/L in last 2 days, will monitor as appropriate  # Hypochloremia: Lowest Cl = 85 mmol/L in last 2 days,  will monitor as appropriate  # Hypocalcemia: Lowest Ca = 8.4 mg/dL in last 2 days, will monitor and replace as appropriate     # Hypoalbuminemia: Lowest albumin = 3.4 g/dL at 1/13/2025  6:22 AM, will monitor as appropriate   # Drug Induced Platelet Defect: home medication list includes an antiplatelet medication   # Hypertension: Noted on problem list                      Social Drivers of Health    Depression: At risk (9/19/2024)    PHQ-2     PHQ-2 Score: 3   Tobacco Use: High Risk (11/13/2024)    Received from [facility]    Patient History     Smoking Tobacco Use: Every Day     Smokeless Tobacco Use: Never    Received from [employer]    Financial Resource Strain    Received from [employer]    Social Connections          Disposition Plan     Medically Ready for Discharge: Anticipated in 2-4 Days             Will Faulkner MD  Hospitalist Service  [facility]  Securely message with [employer] (more info)  Text page via [employer] Paging/Directory   ______________________________________________________________________    Interval History   Jef is feeling sick still today, still has fever 100.2, generalized body ache, no nausea or vomiting    Physical Exam   Vital Signs: Temp: 100.2  F (37.9  C) Temp src: Oral BP: 125/60 Pulse: 96   Resp: 20 SpO2: 98 % O2 Device: Nasal cannula Oxygen Delivery: 2 LPM  Weight: 140 lbs 0 oz    General Appearance: Sleeping comfortably in bed in no acute respiratory distress  Respiratory: Decreased breath sounds on lung base with scattered rhonchi, no wheeze.  Cardiovascular:  Normal heart sound, tachycardia.  Soft systolic murmur in aortic area.  GI: Soft, normal bowel sounds, no tenderness.  Skin: Positive decubitus ulcer on sacral area stage III  Other: Paraplegia, alert and awake    Medical Decision Making        50 MINUTES SPENT BY ME on the date of service doing chart review, history, exam, documentation & further activities per the note.      Data     I have personally reviewed the following data over the past 24 hrs:    8.9  \   12.9 (L)   / 206     130 (L) 93 (L) 7.5 (L) /  64 (L)   3.1 (L) 26 0.54 (L) \     ALT: 25 AST: 52 (H) AP: 95 TBILI: 0.3   ALB: 3.4 (L) TOT PROTEIN: 6.1 (L) LIPASE: N/A     Procal: 0.17 CRP: N/A Lactic Acid: 0.9       INR:  0.98 PTT:  36   D-dimer:  N/A Fibrinogen:  N/A       Imaging results reviewed over the past 24 hrs:   Recent Results (from the past 24 hours)   XR Chest Port 1 View    Narrative    EXAM: XR CHEST PORT 1 VIEW  LOCATION: [facility]  DATE: 1/12/2025    INDICATION: Fever  COMPARISON: None.      Impression    IMPRESSION: Overlying skull limits evaluation of the lung apices. Unchanged elevation of the left hemidiaphragm. No pleural effusion. No lobar consolidation. The cardiac mediastinal silhouette is within normal limits. Degenerative changes of the left   shoulder. Upper abdominal surgical clips.   CT Chest/Abdomen/Pelvis w Contrast    Narrative    EXAM: CT CHEST/ABDOMEN/PELVIS W CONTRAST  LOCATION: [facility]  DATE: 1/12/2025    INDICATION: ALS patient chronically weak presents with feverish, cough, all over body aches, influenza screen negative  COMPARISON: Portable chest radiograph from 1/12/2025, CT from 3/20/2024.  TECHNIQUE: CT scan of the chest, abdomen, and pelvis was performed following injection of IV contrast. Multiplanar reformats were obtained. Dose reduction techniques were used.   CONTRAST: isovue 370 90ml    FINDINGS:   LUNGS AND PLEURA: Emphysema and fibrotic change in the upper lung zones. There are scattered areas of bronchial wall thickening, which are severe in nature in the posterior aspects of the lower lobes. There is some distal airspace disease in the right   lower lobe associated with the bronchial  wall thickening. There are a few other scattered opacities as seen in the posterior aspect of the right upper lobe in a region of bronchial wall thickening, as well as an irregular opacity in the lingula measuring   about 1.4 cm on image 172 of series 4. No pneumothorax or pleural effusion.    MEDIASTINUM/AXILLAE: Normal heart size. No pericardial effusion. Coronary artery calcifications.    CORONARY ARTERY CALCIFICATION: Moderate.    HEPATOBILIARY: Absent gallbladder. Mild central intrahepatic biliary ductal prominence likely due to postcholecystectomy reservoir effect.    PANCREAS: Stable benign appearing fatty lesion in the proximal pancreatic body consistent with a lipoma.    SPLEEN: Normal.    ADRENAL GLANDS: Normal.    KIDNEYS/BLADDER: Normal.    BOWEL: The stomach is decompressed. Normal caliber small bowel and appendix. Moderate to large amount of colonic stool. No free air.    LYMPH NODES: Normal.    VASCULATURE: Mild atherosclerotic change. No abdominal aortic aneurysm.    PELVIC ORGANS: Trace pelvic free fluid. Moderate to severe enlargement of the prostate.    MUSCULOSKELETAL: Osteopenia. There is a severe, focal kyphosis centered about the mid cervical spine. There is severe degenerative disc change in the lumbar spine.      Impression    IMPRESSION:  1.  Scattered areas of bilateral bronchial wall thickening with associated airspace opacities most prevalent in the right lower lobe consistent with acute infectious etiology. There is an irregular opacity in the lingula measuring 1.4 cm which is likely   inflammatory, though short-term follow-up chest CT may be beneficial to ensure that this resolves.    2.  Moderate coronary artery disease.    3.  Moderate to large amount of colonic stool.    4.  Moderate to severe enlargement of the prostate.

## 2025-01-13 NOTE — ED NOTES
Bed: JNED-02  Expected date: 1/12/25  Expected time: 7:15 PM  Means of arrival: Ambulance  Comments:  MW 74M weakness; hx als

## 2025-01-13 NOTE — CONSULTS
"Care Management Initial Consult    General Information  Assessment completed with: Patient Bear. Called and spoke with son Darnell on the phone. Reviewed facility packet that was sent with patient.   Type of CM/SW Visit: Initial Assessment    Primary Care Provider verified and updated as needed: Yes   Readmission within the last 30 days: no previous admission in last 30 days      Reason for Consult: discharge planning  Advance Care Planning: Advance Care Planning Reviewed: no concerns identified          Communication Assessment  Patient's communication style: spoken language (English or Bilingual)             Cognitive  Cognitive/Neuro/Behavioral: WDL, kind of sleepy. Told me \"I'm a little confused today\" but could answer some more simple questions appropriately.                      Living Environment:   People in home: facility resident     Current living Arrangements: assisted living  Name of Facility: [facility]   Able to return to prior arrangements: yes       Family/Social Support:  Care provided by: other (see comments) (facliity staff)  Provides care for: no one, unable/limited ability to care for self  Marital Status:   Support system: Children, Facility resident(s)/Staff          Description of Support System: TBD - son lives in [place], daughter Marylin is no longer involved in his care per patient and son Darnell. Has a nephew Peterson who lives locally and patient says, \"Yeah, he helps me, he's over at the house all the time.\"  Support Assessment: Patient communicates needs well met    Current Resources:   Patient receiving home care services: No        Community Resources: [facility]  Berry Starkey, Care Coordinator with [facility] Norma Cortez [phone]  Equipment currently used at home: wheelchair, power  Supplies currently used at home: Incontinence Supplies, Wound Care Supplies    Employment/Financial:  Employment Status: retired     Employment/ " Comments: no  background  Financial Concerns: none   Referral to Financial Worker: No       Does the patient's insurance plan have a 3 day qualifying hospital stay waiver?  No    Lifestyle & Psychosocial Needs:  Social Drivers of Health     Food Insecurity: Not on file   Depression: At risk (9/19/2024)    PHQ-2     PHQ-2 Score: 3   Housing Stability: Not on file   Tobacco Use: High Risk (11/13/2024)    Received from [employer]    Patient History     Smoking Tobacco Use: Every Day     Smokeless Tobacco Use: Never     Passive Exposure: Not on file   Financial Resource Strain: High Risk (1/1/2022)    [facility] [employer]    Financial Resource Strain     Difficulty of Paying Living Expenses: Not on file     Difficulty of Paying Living Expenses: Not on file   Alcohol Use: Not on file   Transportation Needs: Not on file   Physical Activity: Not on file   Interpersonal Safety: Low Risk  (9/19/2024)    Interpersonal Safety     Do you feel physically and emotionally safe where you currently live?: Yes     Within the past 12 months, have you been hit, slapped, kicked or otherwise physically hurt by someone?: No     Within the past 12 months, have you been humiliated or emotionally abused in other ways by your partner or ex-partner?: No   Stress: Not on file   Social Connections: Unknown (1/1/2022)    [facility] [employer]    Social Connections     Frequency of Communication with Friends and Family: Not on file   Health Literacy: Not on file       Functional Status:  Prior to admission patient needed assistance:   Dependent ADLs:: Wheelchair-with assist, Bathing, Dressing, Eating, Grooming, Incontinence, Positioning, Transfers, Toileting  Dependent IADLs:: Cleaning, Cooking, Laundry, Shopping, Meal Preparation, Medication Management, Money Management,  "Transportation, Incontinence       Mental Health Status:  Mental Health Status: No Current Concerns       Chemical Dependency Status:  Chemical Dependency Status: No Current Concerns             Values/Beliefs:  Spiritual, Cultural Beliefs, Advent Practices, Values that affect care: no               Discussed  Partnership in Safe Discharge Planning  document with patient/family: No    Additional Information:  Patient resides at [facility] in [place]. I spoke with the nurse at [facility]. He has an electric scooter he is independent with. He even goes out to [employer] on his own to do a little shopping. He uses a urinal for voiding and is able to transfer himself to the toilet for bowel movements. He does not wear oxygen at baseline. He currently is open to Home Care through [facility] for his wound cares. He is able to feed himself. His nurse says his speech can be quiet and hard to understand.     Bear told me \"I'm a little confused today.\" He asked me to call his son Darnell to complete my assessment. I spoke with Darnell who lives in [place]. Darnell hasn't seen patient for about two months. Per Bear and Darnell, mark Winkler lives locally and he helps patient out pretty regularly. Added Peterson to contact list. Bear asked me to remove daughter Marylin as she is no longer involved in his care but I was unable to do so because patient is marked as a Healthcare Agent.       Next Steps:   Follow to evaluate for discharge needs.   Keep in contact with [facility] (ph. [phone]) about discharge plan  Added to Destinations tab  Transportation out of hospital  TBD, mark Winkler added to contact list as he's the local family member  Open to Skilled Nursing service through [facility]  Added to Homecare Tab  Sent communication letting them know patient inpatients status        Verna Seaman, RN  ED Care Manager  [phone]    "

## 2025-01-13 NOTE — CONSULTS
[facility]  WOC Nurse Inpatient Assessment     Consulted for: sacrum    Summary: 1/13 WOC assessed and no wound on sacrum, site of healed pressure injury.  WOC will sign off, please re-consult if anything changes    Patient History (according to provider note(s):      74 year old male with a past medical history of ALS, asthma, tobacco abuse, hypertension, hyperlipidemia, pulmonary hypertension, chronic neuropathic pain syndrome, BPH, constipation, cervical spine stenosis with myelopathy, decubitus pressure injury who was brought to the emergency department by ambulance from assisted living facility for evaluation of bodyaches, nasal congestion, cough, feeling cold, decreased oral intake, found to have pneumonia and positive coronavirus.     Assessment:      Areas visualized during today's visit: Sacrum/coccyx    Pressure Injury Location: sacrum        Last photo: 1/13  Wound type:  no wound, healed PI     Pressure Injury Stage: site of previously healed pressure injury, present on admission      This is a Medical Device Related Pressure Injury (MDRPI) due to  n/a  Wound history/plan of care:   patient stated has had wounds at this location previously, spends most of time sitting or laying    Wound base: 100 % Healthy,      Palpation of the wound bed: normal      Drainage: none     Description of drainage: none     Measurements (length x width x depth, in cm) no wound to measure     Tunneling N/A     Undermining N/A  Periwound skin: Intact      Color: normal and consistent with surrounding tissue      Temperature: normal   Odor: none  Pain: denies , none  Pain intervention prior to dressing change: N/A  Treatment goal: Protection  STATUS: initial assessment  Supplies ordered: supplies stored on unit    My PI Risk Assessment     Sensory Perception: 3 - Slightly Limited     Moisture: 3 - Occasionally moist      Activity: 2 - Chairfast     Mobility: 2 - Very limited     Nutrition: 2 - Probably  "inadequate      Friction/Shear: 3 - No apparent problem      TOTAL: 15       Treatment Plan:     Pressure Injury Prevention (PIP) Plan:  If patient is declining pressure injury prevention interventions: Explore reason why and address patient's concerns, Educate on pressure injury risk and prevention intervention(s), If patient is still declining, document \"informed refusal\" , and Ensure Care team is aware ( provider, charge nurse, etc)  Mattress: Follow bed algorithm, add Low Air Loss (Air+) mattress pump if skin is very moist or constantly moist.   HOB: Maintain at or below 30 degrees, unless contraindicated  Repositioning in bed: Every 1-2 hours  and Raise foot of bed prior to raising head of bed, to reduce patient sliding down (shear)  Heels: Keep elevated off mattress and Pillows under calves  Protective Dressing: Sacral Mepilex for prevention (#[id number]),  especially for the agitated patient   Positioning Equipment:None  Chair positioning: Chair cushion (#[id number]) , Assist patient to reposition hourly, and Do NOT use a donut for sitting (this increases pressure to smaller area and creates a higher potential for injury)    If patient has a buttock pressure injury, or high risk for PI use chair cushion or SPS.  Moisture Management: Perineal cleansing /protection: Follow Incontinence Protocol, Avoid brief in bed, and Moisturize dry skin  Under Devices: Inspect skin under all medical devices during skin inspection , Ensure tubes are stabilized without tension, and Ensure patient is not lying on medical devices or equipment when repositioned  Ask provider to discontinue device when no longer needed.      Orders: Written    RECOMMEND PRIMARY TEAM ORDER: None, at this time  Education provided: importance of repositioning  Discussed plan of care with: Patient  WOC nurse follow-up plan: signing off  Notify WOC if wound(s) deteriorate.  Nursing to notify the Provider(s) and re-consult the WOC Nurse if new skin " concern.    DATA:     Current support surface: Standard  Standard gel mattress (Isoflex)  Containment of urine/stool: Brief and Suction based external urinary catheter   BMI: Body mass index is 20.09 kg/m .   Active diet order: Orders Placed This Encounter      Regular Diet Adult     Output: I/O last 3 completed shifts:  In: 3115 [P.O.:860; IV Piggyback:2255]  Out: 1750 [Urine:1750]     Labs:   Recent Labs   Lab 01/13/25  0622 01/12/25 1935   ALBUMIN 3.4* 4.2   HGB 12.9* 15.6   INR  --  0.98   WBC 8.9 10.2     Pressure injury risk assessment:   Sensory Perception: 3-->slightly limited  Moisture: 3-->occasionally moist  Activity: 2-->chairfast  Mobility: 3-->slightly limited  Nutrition: 3-->adequate  Friction and Shear: 2-->potential problem  Dipak Score: 16    ELIN VazN RN CWOCN  Pager no longer in use, please contact through [employer] group: [facility]

## 2025-01-13 NOTE — PROCEDURES
[facility]  Procedure Note         PICC      Jef Centeno Jr.  MRN# [id number]   January 13, 2025, 12:15 AM Indication: Infection           Pause for the cause: Consent for catheter placement procedure signed  Time out completed  Patient ID's verified using two distinct indicators  All necessary equipment is present  Site marked if extremity to be used has been predetermined   Type of line to be used: PICC   Full barrier precautions used: Yes   Skin preparation: Chlorehexidine Gluconate 25% with isopropyl alcohol 70%   Date of insertion: January 13, 2025, 12:15 AM   Device type: Double lumen, valved, 5.0   Catheter brand: [employer]   Lot number: [id number]   Insertion location: Right basilic vein   Method of placement: MST   Number of attempts: With ultrasound: yes   Without ultrasound: no   Difficulty threading: No  Some difficulty   Midline IV device: Dressing dry and intact  Silver patch  Catheter securement device   Arm circumference: Adults 10 cm   Midline extremity circumference: 27 cm   Internal length: 47 cm   Midline visible catheter length: 1 cm   Total catheter length: 48 cm   Tip termination: CAJ   Method of verification: ECG blood return    Positive blood return  Flushes without difficulty   Line flush: Line flush documented on the [id number]   Placement verified by: Registered Nurse   Catheter placed by: Edison Rowe RN   Discontinuation form initiated: No   Patient tolerance: Tolerated well   PICC Insertion Education Complete: Yes      Summary:  This procedure was performed without difficulty and he tolerated the procedure well with nono immediate complications.       Recorded by Edison Rowe RN    Attestation:  This patient has been seen and evaluated by me, Edison Rowe RN.  I have reviewed today's vital signs, medications, labs and imaging.  Face-to-face time: 60 minutes  Edison Rowe RN     Procedures

## 2025-01-13 NOTE — MEDICATION SCRIBE - ADMISSION MEDICATION HISTORY
**Addendum**  Admission medication history completed at [facility]. Please see Medication Scribe Admission Medication History note from 01/12/2025 copied below.  - Butrans patch has been refused by patient. Normally takes weekly on Monday(s) but facility reports last administered 12/16/24. Patient has been refusing until he sees his doctor.         Medication Scribe Admission Medication History    Admission medication history is complete. The information provided in this note is only as accurate as the sources available at the time of the update.    Information Source(s): Facility ([facility]/) medication list/MAR via N/A    Pertinent Information: Patient's medications are being managed by [facility]. Received MAR. Called on call RN at [phone] and confirmed last doses (today at 15:00)    Changes made to PTA medication list:  Added: Bacitracin, Flonase,   Deleted: Caltrate (duplicates), Zyrtec, Allegra, Atarax,  Changed: Tylenol to 1000 Q8H PRN, Cymbalta to 60 mg from Claritin to every day, Miralax to PRN, Fleets to daily PRN, Kenalog to BID PRN, D3 to 4000 units every day     Allergies reviewed with patient and updates made in EHR: yes    Medication History Completed By: Marcial Dukes 1/12/2025 9:48 PM    PTA Med List   Medication Sig Last Dose/Taking    acetaminophen (TYLENOL) 500 MG tablet Take 1,000 mg by mouth every 8 hours as needed for mild pain. Taking As Needed    albuterol (PROAIR HFA/PROVENTIL HFA/VENTOLIN HFA) 108 (90 Base) MCG/ACT inhaler Inhale 1-2 puffs into the lungs every 4 hours as needed for shortness of breath or wheezing. Taking As Needed    ammonium lactate (LAC-HYDRIN) 12 % external lotion Apply topically 2 times daily as needed. Taking As Needed    aspirin (ASA) 81 MG EC tablet Take 1 tablet (81 mg) by mouth daily 1/12/2025 Morning    atorvastatin (LIPITOR) 10 MG tablet Take 1 tablet (10 mg) by mouth daily. 1/11/2025 Evening    bacitracin  500 UNIT/GM external ointment Apply topically as needed for wound care. Taking As Needed    baclofen (LIORESAL) 10 MG tablet Take 10 mg by mouth 3 times daily 1/12/2025 at  3:00 PM    budesonide-formoterol (SYMBICORT) 160-4.5 MCG/ACT Inhaler Inhale 2 puffs into the lungs 2 times daily. 1/12/2025 Morning    buprenorphine (BUTRANS) 10 MCG/HR WK patch Place 1 patch onto the skin once a week. Monday(s) 12/16/2024    CALCIUM + VITAMIN D3 600-10 MG-MCG per tablet Take 1 tablet by mouth every morning. 1/12/2025 Morning    Cholecalciferol (VITAMIN D3) 25 MCG (1000 UT) CAPS Take 4 tablets by mouth every morning. 1/12/2025 Morning    cyclobenzaprine (FLEXERIL) 5 MG tablet Take 5 mg by mouth 2 times daily 1/12/2025 Morning    docusate sodium (COLACE) 100 MG capsule Take 100 mg by mouth as needed for constipation Taking As Needed    DULoxetine (CYMBALTA) 60 MG capsule Take 60 mg by mouth every morning. 1/12/2025 Morning    ferrous sulfate (FEROSUL) 325 (65 Fe) MG tablet Take 1 tablet (325 mg) by mouth daily (with breakfast) 1/12/2025 Morning    fluticasone (FLONASE) 50 MCG/ACT nasal spray Spray 1 spray into both nostrils daily at 2 pm. 1/12/2025 at  2:00 PM    folic acid (FOLVITE) 1 MG tablet Take 1 tablet (1,000 mcg) by mouth daily. 1/12/2025 Morning    gabapentin (NEURONTIN) 300 MG capsule Take 600 mg by mouth 3 times daily 1/12/2025 at  3:00 PM    hydrochlorothiazide (HYDRODIURIL) 50 MG tablet Take 1 tablet (50 mg) by mouth daily. **hold hydrochlorothiazide 50mg if SBP (top number) is under 110** 1/12/2025 Morning    lidocaine (LIDODERM) 5 % patch Apply one patch to affected area for no more than 12 hours, Remove patch, and maintain 12 hours free of Lidocaine before applying the next patch, Apply a new patch for 12 of every 24 hours as needed to increase efficacy Taking    loperamide (IMODIUM) 2 MG capsule Take 2 mg by mouth as needed for diarrhea Taking As Needed    loratadine (CLARITIN) 10 MG tablet Take 10 mg by mouth  every morning. 1/12/2025 Morning    metoprolol tartrate (LOPRESSOR) 25 MG tablet Take 0.5 tablets (12.5 mg) by mouth 2 times daily. 1/12/2025 Morning    multivitamin (ONE A DAY) per tablet [MULTIVITAMIN (ONE A DAY) PER TABLET] TAKE 1 TABLET BY MOUTH DAILY 1/12/2025 Morning    naloxone (NARCAN) 4 MG/0.1ML nasal spray Spray 4 mg into one nostril alternating nostrils as needed for opioid reversal every 2-3 minutes until assistance arrives Taking As Needed    naproxen sodium (ANAPROX) 220 MG tablet Take 220 mg by mouth 2 times daily as needed. Taking As Needed    omeprazole (PRILOSEC) 20 MG DR capsule Take 1 capsule (20 mg) by mouth every morning. 1/12/2025 Morning    oxyCODONE IR (ROXICODONE) 10 MG tablet Take 10 mg by mouth 3 times daily 1/12/2025 at  3:00 PM    polyethylene glycol (GLYCOLAX) 17 gram/dose powder Take 17 g by mouth daily as needed. Taking As Needed    predniSONE (DELTASONE) 2.5 MG tablet Take 1 tablet by mouth every morning. 1/12/2025 Morning    riluzole (RILUTEK) 50 MG tablet Take 50 mg by mouth 2 times daily (with meals) 1/12/2025 Morning    sodium phosphate (FLEET ENEMA) 7-19 GM/118ML rectal enema Place 1 enema rectally daily as needed. Taking As Needed    tamsulosin (FLOMAX) 0.4 MG capsule Take 1 capsule (0.4 mg) by mouth daily (with breakfast). 1/12/2025 Morning    triamcinolone (KENALOG) 0.1 % external cream Apply topically 2 times daily as needed. Taking As Needed    vitamin (B COMPLEX) capsule Take 1 tablet by mouth every morning. 1/12/2025 Morning

## 2025-01-13 NOTE — PLAN OF CARE
Problem: Adult Inpatient Plan of Care  Goal: Optimal Comfort and Wellbeing  Outcome: Met     Problem: Pain Acute  Goal: Optimal Pain Control and Function  Outcome: Met     Problem: Fall Injury Risk  Goal: Absence of Fall and Fall-Related Injury  Outcome: Met   Goal Outcome Evaluation:PRIMARY DIAGNOSIS: ACUTE PAIN  OUTPATIENT/OBSERVATION GOALS TO BE MET BEFORE DISCHARGE:  1. Pain Status: Improved but still requiring IV narcotics.    2. Return to near baseline physical activity: Yes    3. Cleared for discharge by consultants (if involved): No    Discharge Planner Nurse   Safe discharge environment identified: Yes  Barriers to discharge: No       Entered by: Shanika Marin RN 01/13/2025 3:00 AM   Alert and oriented. Telemetry NSR. Primofit on for voiding. PICC placed for IV ABX. C/O pain. Dr. Ríos updated. IV Dilaudid ordered and helpful. Pt. Then sleeping in between cares. Using call light for needs. Mepilex to coccyx pressure injury. Pink skin no open areas noted. WOC consult in place. Continue with current plan of care.    /67   Pulse (!) 124   Temp 98.7  F (37.1  C) (Oral)   Resp 19   Wt 63.5 kg (140 lb)   SpO2 90%   BMI 20.09 kg/m      Shanika Marin RN    Please review provider order for any additional goals.   Nurse to notify provider when observation goals have been met and patient is ready for discharge.

## 2025-01-13 NOTE — ED NOTES
Per Dr. Coley, verbal consent for PICC line given by pt.     PICC STAT paged earlier, no response, will page again

## 2025-01-14 LAB
ALBUMIN SERPL BCG-MCNC: 3.1 G/DL (ref 3.5–5.2)
ALP SERPL-CCNC: 91 U/L (ref 40–150)
ALT SERPL W P-5'-P-CCNC: 21 U/L (ref 0–70)
ANION GAP SERPL CALCULATED.3IONS-SCNC: 6 MMOL/L (ref 7–15)
AST SERPL W P-5'-P-CCNC: 44 U/L (ref 0–45)
BILIRUB SERPL-MCNC: 0.3 MG/DL
BUN SERPL-MCNC: 7.1 MG/DL (ref 8–23)
CALCIUM SERPL-MCNC: 8.5 MG/DL (ref 8.8–10.4)
CHLORIDE SERPL-SCNC: 97 MMOL/L (ref 98–107)
CK SERPL-CCNC: 352 U/L (ref 39–308)
CREAT SERPL-MCNC: 0.55 MG/DL (ref 0.67–1.17)
EGFRCR SERPLBLD CKD-EPI 2021: >90 ML/MIN/1.73M2
ERYTHROCYTE [DISTWIDTH] IN BLOOD BY AUTOMATED COUNT: 15.7 % (ref 10–15)
GLUCOSE SERPL-MCNC: 68 MG/DL (ref 70–99)
HCO3 SERPL-SCNC: 28 MMOL/L (ref 22–29)
HCT VFR BLD AUTO: 35.9 % (ref 40–53)
HGB BLD-MCNC: 12.3 G/DL (ref 13.3–17.7)
MCH RBC QN AUTO: 29.9 PG (ref 26.5–33)
MCHC RBC AUTO-ENTMCNC: 34.3 G/DL (ref 31.5–36.5)
MCV RBC AUTO: 87 FL (ref 78–100)
PLATELET # BLD AUTO: 187 10E3/UL (ref 150–450)
POTASSIUM SERPL-SCNC: 4.1 MMOL/L (ref 3.4–5.3)
PROT SERPL-MCNC: 5.8 G/DL (ref 6.4–8.3)
RBC # BLD AUTO: 4.11 10E6/UL (ref 4.4–5.9)
SODIUM SERPL-SCNC: 131 MMOL/L (ref 135–145)
WBC # BLD AUTO: 8.7 10E3/UL (ref 4–11)

## 2025-01-14 PROCEDURE — 99233 SBSQ HOSP IP/OBS HIGH 50: CPT | Performed by: INTERNAL MEDICINE

## 2025-01-14 PROCEDURE — 85014 HEMATOCRIT: CPT | Performed by: INTERNAL MEDICINE

## 2025-01-14 PROCEDURE — 999N000157 HC STATISTIC RCP TIME EA 10 MIN

## 2025-01-14 PROCEDURE — 120N000001 HC R&B MED SURG/OB

## 2025-01-14 PROCEDURE — 999N000156 HC STATISTIC RCP CONSULT EA 30 MIN

## 2025-01-14 PROCEDURE — 250N000012 HC RX MED GY IP 250 OP 636 PS 637: Performed by: HOSPITALIST

## 2025-01-14 PROCEDURE — 94799 UNLISTED PULMONARY SVC/PX: CPT

## 2025-01-14 PROCEDURE — 250N000013 HC RX MED GY IP 250 OP 250 PS 637: Performed by: HOSPITALIST

## 2025-01-14 PROCEDURE — 250N000011 HC RX IP 250 OP 636: Performed by: INTERNAL MEDICINE

## 2025-01-14 PROCEDURE — 85048 AUTOMATED LEUKOCYTE COUNT: CPT | Performed by: INTERNAL MEDICINE

## 2025-01-14 PROCEDURE — 82550 ASSAY OF CK (CPK): CPT | Performed by: INTERNAL MEDICINE

## 2025-01-14 PROCEDURE — 80053 COMPREHEN METABOLIC PANEL: CPT | Performed by: INTERNAL MEDICINE

## 2025-01-14 RX ORDER — ENOXAPARIN SODIUM 100 MG/ML
30 INJECTION SUBCUTANEOUS EVERY 24 HOURS
Status: DISCONTINUED | OUTPATIENT
Start: 2025-01-14 | End: 2025-01-23 | Stop reason: HOSPADM

## 2025-01-14 RX ORDER — NAPROXEN 250 MG/1
250 TABLET ORAL 2 TIMES DAILY PRN
Status: DISCONTINUED | OUTPATIENT
Start: 2025-01-14 | End: 2025-01-23 | Stop reason: HOSPADM

## 2025-01-14 RX ADMIN — TAMSULOSIN HYDROCHLORIDE 0.4 MG: 0.4 CAPSULE ORAL at 09:09

## 2025-01-14 RX ADMIN — DULOXETINE HYDROCHLORIDE 60 MG: 60 CAPSULE, DELAYED RELEASE ORAL at 09:09

## 2025-01-14 RX ADMIN — RILUZOLE 50 MG: 50 TABLET ORAL at 09:09

## 2025-01-14 RX ADMIN — NICOTINE 1 PATCH: 21 PATCH, EXTENDED RELEASE TRANSDERMAL at 09:11

## 2025-01-14 RX ADMIN — CYCLOBENZAPRINE HYDROCHLORIDE 5 MG: 5 TABLET, FILM COATED ORAL at 20:52

## 2025-01-14 RX ADMIN — GABAPENTIN 600 MG: 300 CAPSULE ORAL at 20:52

## 2025-01-14 RX ADMIN — GABAPENTIN 600 MG: 300 CAPSULE ORAL at 14:05

## 2025-01-14 RX ADMIN — GABAPENTIN 600 MG: 300 CAPSULE ORAL at 09:10

## 2025-01-14 RX ADMIN — AZITHROMYCIN DIHYDRATE 250 MG: 250 TABLET, FILM COATED ORAL at 20:51

## 2025-01-14 RX ADMIN — PREDNISONE 2.5 MG: 2.5 TABLET ORAL at 09:09

## 2025-01-14 RX ADMIN — OXYCODONE HYDROCHLORIDE 10 MG: 5 TABLET ORAL at 05:04

## 2025-01-14 RX ADMIN — PANTOPRAZOLE SODIUM 40 MG: 40 TABLET, DELAYED RELEASE ORAL at 06:41

## 2025-01-14 RX ADMIN — FLUTICASONE FUROATE AND VILANTEROL TRIFENATATE 1 PUFF: 200; 25 POWDER RESPIRATORY (INHALATION) at 09:08

## 2025-01-14 RX ADMIN — ACETAMINOPHEN 1000 MG: 500 TABLET, FILM COATED ORAL at 01:31

## 2025-01-14 RX ADMIN — FLUTICASONE PROPIONATE 1 SPRAY: 50 SPRAY, METERED NASAL at 14:06

## 2025-01-14 RX ADMIN — METOPROLOL TARTRATE 12.5 MG: 25 TABLET, FILM COATED ORAL at 20:52

## 2025-01-14 RX ADMIN — BACLOFEN 10 MG: 10 TABLET ORAL at 09:09

## 2025-01-14 RX ADMIN — OXYCODONE HYDROCHLORIDE 10 MG: 5 TABLET ORAL at 20:52

## 2025-01-14 RX ADMIN — RILUZOLE 50 MG: 50 TABLET ORAL at 17:09

## 2025-01-14 RX ADMIN — ENOXAPARIN SODIUM 30 MG: 30 INJECTION SUBCUTANEOUS at 14:04

## 2025-01-14 RX ADMIN — METOPROLOL TARTRATE 12.5 MG: 25 TABLET, FILM COATED ORAL at 09:09

## 2025-01-14 RX ADMIN — OXYCODONE HYDROCHLORIDE 10 MG: 5 TABLET ORAL at 14:05

## 2025-01-14 RX ADMIN — NAPROXEN 250 MG: 250 TABLET ORAL at 02:21

## 2025-01-14 RX ADMIN — GUAIFENESIN 600 MG: 600 TABLET, EXTENDED RELEASE ORAL at 09:09

## 2025-01-14 RX ADMIN — CEFTRIAXONE SODIUM 1 G: 1 INJECTION, POWDER, FOR SOLUTION INTRAMUSCULAR; INTRAVENOUS at 20:50

## 2025-01-14 RX ADMIN — CYCLOBENZAPRINE HYDROCHLORIDE 5 MG: 5 TABLET, FILM COATED ORAL at 09:09

## 2025-01-14 RX ADMIN — ACETAMINOPHEN 1000 MG: 500 TABLET, FILM COATED ORAL at 23:56

## 2025-01-14 RX ADMIN — BACLOFEN 10 MG: 10 TABLET ORAL at 20:51

## 2025-01-14 RX ADMIN — GUAIFENESIN 600 MG: 600 TABLET, EXTENDED RELEASE ORAL at 20:53

## 2025-01-14 RX ADMIN — BACLOFEN 10 MG: 10 TABLET ORAL at 14:05

## 2025-01-14 RX ADMIN — LORATADINE 10 MG: 10 TABLET ORAL at 09:09

## 2025-01-14 RX ADMIN — ASPIRIN 81 MG: 81 TABLET, COATED ORAL at 09:09

## 2025-01-14 ASSESSMENT — ACTIVITIES OF DAILY LIVING (ADL)
ADLS_ACUITY_SCORE: 59
ADLS_ACUITY_SCORE: 65
ADLS_ACUITY_SCORE: 65
ADLS_ACUITY_SCORE: 63
ADLS_ACUITY_SCORE: 65
ADLS_ACUITY_SCORE: 59
ADLS_ACUITY_SCORE: 65
ADLS_ACUITY_SCORE: 59
ADLS_ACUITY_SCORE: 67
ADLS_ACUITY_SCORE: 59
ADLS_ACUITY_SCORE: 65
ADLS_ACUITY_SCORE: 59
ADLS_ACUITY_SCORE: 67
ADLS_ACUITY_SCORE: 67
ADLS_ACUITY_SCORE: 59

## 2025-01-14 NOTE — PLAN OF CARE
Problem: Adult Inpatient Plan of Care  Goal: Plan of Care Review  Description: The Plan of Care Review/Shift note should be completed every shift.  The Outcome Evaluation is a brief statement about your assessment that the patient is improving, declining, or no change.  This information will be displayed automatically on your shift  note.  Outcome: Progressing  Flowsheets (Taken 1/13/2025 2321)  Plan of Care Reviewed With: patient     Problem: Comorbidity Management  Goal: Maintenance of Asthma Control  Outcome: Progressing  Intervention: Maintain Asthma Symptom Control  Recent Flowsheet Documentation  Taken 1/13/2025 2200 by Priscilla Velez RN  Medication Review/Management: medications reviewed  Goal: Maintenance of COPD Symptom Control  Outcome: Progressing  Intervention: Maintain COPD Symptom Control  Recent Flowsheet Documentation  Taken 1/13/2025 2200 by Priscilla Velez RN  Medication Review/Management: medications reviewed  Goal: Blood Pressure in Desired Range  Outcome: Progressing  Intervention: Maintain Blood Pressure Management  Recent Flowsheet Documentation  Taken 1/13/2025 2200 by Priscilla Velez RN  Medication Review/Management: medications reviewed     Problem: Pneumonia  Goal: Resolution of Infection Signs and Symptoms  Outcome: Progressing     Problem: Pain Acute  Goal: Optimal Pain Control and Function  Outcome: Progressing  Intervention: Prevent or Manage Pain  Recent Flowsheet Documentation  Taken 1/13/2025 2200 by Priscilla Velez RN  Medication Review/Management: medications reviewed   Goal Outcome Evaluation:      Plan of Care Reviewed With: patient      Pt came from ED around 1950. Pt alert and oriented times 4. Vitals stable. Pt on 2L NC. Pt c/o pain all over. Gave scheduled pain medication with some relief. Pt has PICC line which is dry and intact. He has IV fluid running at 50 ml/hr. Pt received IV abx and oral abx this shift. Tele NSR. Pt has pressure ulcer on his  bottom which is covered by mepilex. Primo fit on and draining well. Pt on K protocol. Recheck lab in am per protocol. All cares explained to pt.

## 2025-01-14 NOTE — PLAN OF CARE
Problem: Adult Inpatient Plan of Care  Goal: Plan of Care Review  Description: The Plan of Care Review/Shift note should be completed every shift.  The Outcome Evaluation is a brief statement about your assessment that the patient is improving, declining, or no change.  This information will be displayed automatically on your shift  note.  Outcome: Progressing   Goal Outcome Evaluation:         Pt remains in isolation and on iv abx. PICC line in place and fluids running. Pt has orders for therapy but at home pt is wheelchair bound at baseline. Pt is on room air with pulse ox

## 2025-01-14 NOTE — PROGRESS NOTES
[facility]    PROGRESS NOTE - Hospitalist Service    Assessment and Plan  74 year old male with a past medical history of ALS, asthma, tobacco abuse, hypertension, hyperlipidemia, pulmonary hypertension, chronic neuropathic pain syndrome, BPH, constipation, cervical spine stenosis with myelopathy, decubitus pressure injury who was brought to the emergency department by ambulance from assisted living facility for evaluation of bodyaches, nasal congestion, cough, feeling cold, decreased oral intake, found to have pneumonia and positive coronavirus.     Acute viral pneumonia  - Negative COVID influenza, positive coronavirus.  - CT chest, abdomen pelvis showed scattered areas of bilateral bronchial wall thickening most prevalent in the right lower lobe consistent with acute infectious etiology  - Had low-grade fever of 100.2 in spite of taking acetaminophen and ibuprofen on a daily basis  - Normal WBC, procalcitonin and lactic acid; negative SARS-CoV-2/influenza/RSV PCR  - Negative Legionella and streptococcal pneumoniae antigens  - Treated with IV ceftriaxone and azithromycin in the ED, will continue both antibiotic given his chronic steroid dependent and ALS     Moderate persistent asthma  -Ongoing tobacco abuse  -No bronchospasms on exam  -Requires only 2 L  -VBG consistent with metabolic alkalosis  -Continue PTA albuterol HFA, Symbicort (auto substituted with Breo Ellipta)  -DuoNebs as needed  -Flutter valve  -Continue home steroid dose     Hyponatremia, hypochloremia with high anion gap  - Secondary to decreased oral intake and PTA hydrochlorothiazide  - UA showed some ketonuria  - Treated in the ED with 1905 mL milliliters IV NS bolus  - Encourage oral intake  - Continue to hold HCTZ-   -Improving but still low sodium  - Continue IV fluid support for 1 more day  - Recheck BMP in a.m.     Hypokalemia  -Replace per protocol  -Unremarkable magnesium and phosphorus  -Continue to monitor  liver     Chronic neuropathic pain syndrome  - Opioid dependence  - On PTA gabapentin, duloxetine, and as needed oxycodone  - Patient has been refusing buprenorphine patch until he sees prescribing provider, last administered on 12/16/2024    Essential hypertension  - Hold PTA hydrochlorothiazide because of hyponatremia as above  - Continue PTA metoprolol  - Continue to monitor blood pressure     Mild rhabdomyolysis  -Probably secondary to infection  -Elevated CK level at 571, improving to 352 today  -Continue IV hydration   - monitor CK level     Tobacco abuse  - Smokes 1 pack/day  - Cessation education  - Nicotine patch     BPH with LUTS  - Intermittent catheterization  - Continue PTA tamsulosin     Chronic steroid use  -On PTA prednisone 2.5 mg daily,  -Probably secondary to COPD versus ALS     Hyperlipidemia  -Hold PTA atorvastatin because of rhabdo     Amyotrophic lateral sclerosis  - Patient receives 24-hour cares and is nonambulatory bedbound  - Continue PTA riluzole, baclofen, cyclobenzaprine, bowel regimen     Decubitus pressure injury, present on admission  -Wound care consult     Drug-induced platelet defect  -On PTA aspirin, monitor for bleeding     Incidental radiographic findings  -Irregular opacity in the lingula measuring 1.4 cm, likely inflammatory though short-term follow-up chest CT may be beneficial to ensure resolution  -Outpatient follow-up    Significant weakness and deconditioning  -PT/OT evaluate        50 MINUTES SPENT BY ME on the date of service doing chart review, history, exam, documentation & further activities per the note    Active Problems:    ALS (amyotrophic lateral sclerosis) (H)    Cognitive disorder    Essential hypertension    Impaired mobility and activities of daily living    Neurogenic bowel    Neuropathic pain syndrome (non-herpetic)    Sinus tachycardia    Myalgia    Acute cough      VTE prophylaxis:  Enoxaparin (Lovenox) SQ  DIET: Orders Placed This Encounter       Regular Diet Adult      Disposition/Barriers to discharge: IV antibiotic, hypoxia, monitor sodium level, IV fluid.  Medically Ready for Discharge: Anticipated in 2-4 Days    Code Status: No CPR- Do NOT Intubate    Subjective:  Jef is feeling slightly better today, still has some chills and bodyaches.  Denies any chest pain.    PHYSICAL EXAM  Vitals:    01/12/25 1930 01/13/25 2024   Weight: 63.5 kg (140 lb) 56.1 kg (123 lb 10.9 oz)     B/P:113/61 T:97.9 P:84 R:16     Intake/Output Summary (Last 24 hours) at 1/14/2025 1240  Last data filed at 1/14/2025 1000  Gross per 24 hour   Intake 700 ml   Output 1150 ml   Net -450 ml      Body mass index is 17.75 kg/m .    Constitutional: awake, alert, cooperative, no apparent distress, and appears stated age  Respiratory: No increased work of breathing, good air exchange, clear to auscultation bilaterally, no crackles or wheezing  Cardiovascular: Normal apical impulse, regular rate and rhythm, normal S1 and S2, no S3 or S4, and no murmur noted  GI: No scars, normal bowel sounds, soft, non-distended, non-tender, no masses palpated, no hepatosplenomegally  Skin: no bruising or bleeding and normal skin color, texture, turgor  Musculoskeletal: There is no redness, warmth, or swelling of the joints.  Full range of motion noted.  no lower extremity pitting edema present  Neurologic: Awake, alert, oriented to name, place and time.  Paraplegia  Neuropsychiatric: Appropriate with examiner      PERTINENT LABS/IMAGING:    I have personally reviewed the following data over the past 24 hrs:    8.7  \   12.3 (L)   / 187     131 (L) 97 (L) 7.1 (L) /  68 (L)   4.1 28 0.55 (L) \     ALT: 21 AST: 44 AP: 91 TBILI: 0.3   ALB: 3.1 (L) TOT PROTEIN: 5.8 (L) LIPASE: N/A       Imaging results reviewed over the past 24 hrs:   No results found for this or any previous visit (from the past 24 hours).    Discussed with patient, family, nursing staff and discharge planner    Will Faulkner MD  [facility]  [facility]  [phone]

## 2025-01-14 NOTE — PLAN OF CARE
Problem: Pneumonia  Goal: Effective Oxygenation and Ventilation  Outcome: Progressing  Intervention: Promote Airway Secretion Clearance  Recent Flowsheet Documentation  Taken 1/14/2025 0133 by Adelita York RN  Activity Management: bedrest  Intervention: Optimize Oxygenation and Ventilation  Recent Flowsheet Documentation  Taken 1/14/2025 0430 by Adelita York RN  Head of Bed (HOB) Positioning: HOB at 20-30 degrees  Taken 1/14/2025 0133 by Adelita York RN  Head of Bed ([facility]) Positioning: HOB at 20-30 degrees   Goal Outcome Evaluation: VSS on 1L oxygen    Problem: Pain Acute  Goal: Optimal Pain Control and Function  Outcome: Progressing  Intervention: Prevent or Manage Pain  Recent Flowsheet Documentation  Taken 1/14/2025 0133 by Adelita York RN  Medication Review/Management: medications reviewed  Pain controlled with prn Naproxen and scheduled oxycodone.    Patient slept in between cares. Turned about every 3 hours.

## 2025-01-15 ENCOUNTER — APPOINTMENT (OUTPATIENT)
Dept: PHYSICAL THERAPY | Facility: HOSPITAL | Age: 75
End: 2025-01-15
Attending: INTERNAL MEDICINE
Payer: COMMERCIAL

## 2025-01-15 ENCOUNTER — APPOINTMENT (OUTPATIENT)
Dept: OCCUPATIONAL THERAPY | Facility: HOSPITAL | Age: 75
DRG: 193 | End: 2025-01-15
Attending: INTERNAL MEDICINE
Payer: COMMERCIAL

## 2025-01-15 LAB
ALBUMIN SERPL BCG-MCNC: 3.2 G/DL (ref 3.5–5.2)
ALP SERPL-CCNC: 85 U/L (ref 40–150)
ALT SERPL W P-5'-P-CCNC: 20 U/L (ref 0–70)
ANION GAP SERPL CALCULATED.3IONS-SCNC: 7 MMOL/L (ref 7–15)
AST SERPL W P-5'-P-CCNC: 41 U/L (ref 0–45)
BILIRUB SERPL-MCNC: 0.3 MG/DL
BUN SERPL-MCNC: 6.4 MG/DL (ref 8–23)
CALCIUM SERPL-MCNC: 8.9 MG/DL (ref 8.8–10.4)
CHLORIDE SERPL-SCNC: 99 MMOL/L (ref 98–107)
CK SERPL-CCNC: 259 U/L (ref 39–308)
CREAT SERPL-MCNC: 0.51 MG/DL (ref 0.67–1.17)
EGFRCR SERPLBLD CKD-EPI 2021: >90 ML/MIN/1.73M2
ERYTHROCYTE [DISTWIDTH] IN BLOOD BY AUTOMATED COUNT: 15.7 % (ref 10–15)
GLUCOSE SERPL-MCNC: 93 MG/DL (ref 70–99)
HCO3 SERPL-SCNC: 29 MMOL/L (ref 22–29)
HCT VFR BLD AUTO: 37 % (ref 40–53)
HGB BLD-MCNC: 12.7 G/DL (ref 13.3–17.7)
MCH RBC QN AUTO: 29.9 PG (ref 26.5–33)
MCHC RBC AUTO-ENTMCNC: 34.3 G/DL (ref 31.5–36.5)
MCV RBC AUTO: 87 FL (ref 78–100)
PLATELET # BLD AUTO: 214 10E3/UL (ref 150–450)
POTASSIUM SERPL-SCNC: 4 MMOL/L (ref 3.4–5.3)
PROT SERPL-MCNC: 6.1 G/DL (ref 6.4–8.3)
RBC # BLD AUTO: 4.25 10E6/UL (ref 4.4–5.9)
SODIUM SERPL-SCNC: 135 MMOL/L (ref 135–145)
WBC # BLD AUTO: 7.9 10E3/UL (ref 4–11)

## 2025-01-15 PROCEDURE — 97166 OT EVAL MOD COMPLEX 45 MIN: CPT | Mod: GO

## 2025-01-15 PROCEDURE — 97110 THERAPEUTIC EXERCISES: CPT | Mod: GP

## 2025-01-15 PROCEDURE — 999N000157 HC STATISTIC RCP TIME EA 10 MIN

## 2025-01-15 PROCEDURE — 84075 ASSAY ALKALINE PHOSPHATASE: CPT | Performed by: INTERNAL MEDICINE

## 2025-01-15 PROCEDURE — 120N000001 HC R&B MED SURG/OB

## 2025-01-15 PROCEDURE — 250N000013 HC RX MED GY IP 250 OP 250 PS 637: Performed by: INTERNAL MEDICINE

## 2025-01-15 PROCEDURE — 82310 ASSAY OF CALCIUM: CPT | Performed by: INTERNAL MEDICINE

## 2025-01-15 PROCEDURE — 99232 SBSQ HOSP IP/OBS MODERATE 35: CPT | Performed by: INTERNAL MEDICINE

## 2025-01-15 PROCEDURE — 97162 PT EVAL MOD COMPLEX 30 MIN: CPT | Mod: GP

## 2025-01-15 PROCEDURE — 85041 AUTOMATED RBC COUNT: CPT | Performed by: INTERNAL MEDICINE

## 2025-01-15 PROCEDURE — 94799 UNLISTED PULMONARY SVC/PX: CPT

## 2025-01-15 PROCEDURE — 97530 THERAPEUTIC ACTIVITIES: CPT | Mod: GP

## 2025-01-15 PROCEDURE — 250N000012 HC RX MED GY IP 250 OP 636 PS 637: Performed by: HOSPITALIST

## 2025-01-15 PROCEDURE — 85018 HEMOGLOBIN: CPT | Performed by: INTERNAL MEDICINE

## 2025-01-15 PROCEDURE — 97535 SELF CARE MNGMENT TRAINING: CPT | Mod: GO

## 2025-01-15 PROCEDURE — 250N000011 HC RX IP 250 OP 636: Performed by: INTERNAL MEDICINE

## 2025-01-15 PROCEDURE — 250N000013 HC RX MED GY IP 250 OP 250 PS 637: Performed by: HOSPITALIST

## 2025-01-15 PROCEDURE — 82550 ASSAY OF CK (CPK): CPT | Performed by: INTERNAL MEDICINE

## 2025-01-15 PROCEDURE — 97110 THERAPEUTIC EXERCISES: CPT | Mod: GO

## 2025-01-15 RX ORDER — CEFDINIR 300 MG/1
300 CAPSULE ORAL EVERY 12 HOURS SCHEDULED
Status: COMPLETED | OUTPATIENT
Start: 2025-01-15 | End: 2025-01-19

## 2025-01-15 RX ORDER — MULTIPLE VITAMINS W/ MINERALS TAB 9MG-400MCG
1 TAB ORAL DAILY
Status: DISCONTINUED | OUTPATIENT
Start: 2025-01-15 | End: 2025-01-23 | Stop reason: HOSPADM

## 2025-01-15 RX ADMIN — FLUTICASONE FUROATE AND VILANTEROL TRIFENATATE 1 PUFF: 200; 25 POWDER RESPIRATORY (INHALATION) at 08:57

## 2025-01-15 RX ADMIN — GABAPENTIN 600 MG: 300 CAPSULE ORAL at 13:13

## 2025-01-15 RX ADMIN — OXYCODONE HYDROCHLORIDE 10 MG: 5 TABLET ORAL at 21:12

## 2025-01-15 RX ADMIN — RILUZOLE 50 MG: 50 TABLET ORAL at 08:57

## 2025-01-15 RX ADMIN — BACLOFEN 10 MG: 10 TABLET ORAL at 08:56

## 2025-01-15 RX ADMIN — TAMSULOSIN HYDROCHLORIDE 0.4 MG: 0.4 CAPSULE ORAL at 08:56

## 2025-01-15 RX ADMIN — CEFDINIR 300 MG: 300 CAPSULE ORAL at 21:12

## 2025-01-15 RX ADMIN — CYCLOBENZAPRINE HYDROCHLORIDE 5 MG: 5 TABLET, FILM COATED ORAL at 08:56

## 2025-01-15 RX ADMIN — AZITHROMYCIN DIHYDRATE 250 MG: 250 TABLET, FILM COATED ORAL at 21:12

## 2025-01-15 RX ADMIN — GABAPENTIN 600 MG: 300 CAPSULE ORAL at 21:12

## 2025-01-15 RX ADMIN — GUAIFENESIN 600 MG: 600 TABLET, EXTENDED RELEASE ORAL at 08:56

## 2025-01-15 RX ADMIN — RILUZOLE 50 MG: 50 TABLET ORAL at 16:49

## 2025-01-15 RX ADMIN — OXYCODONE HYDROCHLORIDE 10 MG: 5 TABLET ORAL at 05:56

## 2025-01-15 RX ADMIN — GABAPENTIN 600 MG: 300 CAPSULE ORAL at 08:56

## 2025-01-15 RX ADMIN — BACLOFEN 10 MG: 10 TABLET ORAL at 13:18

## 2025-01-15 RX ADMIN — FLUTICASONE PROPIONATE 1 SPRAY: 50 SPRAY, METERED NASAL at 13:17

## 2025-01-15 RX ADMIN — CEFDINIR 300 MG: 300 CAPSULE ORAL at 08:57

## 2025-01-15 RX ADMIN — Medication 1 TABLET: at 16:49

## 2025-01-15 RX ADMIN — NICOTINE 1 PATCH: 21 PATCH, EXTENDED RELEASE TRANSDERMAL at 08:57

## 2025-01-15 RX ADMIN — PANTOPRAZOLE SODIUM 40 MG: 40 TABLET, DELAYED RELEASE ORAL at 08:58

## 2025-01-15 RX ADMIN — METOPROLOL TARTRATE 12.5 MG: 25 TABLET, FILM COATED ORAL at 08:55

## 2025-01-15 RX ADMIN — LORATADINE 10 MG: 10 TABLET ORAL at 08:56

## 2025-01-15 RX ADMIN — PREDNISONE 2.5 MG: 2.5 TABLET ORAL at 08:56

## 2025-01-15 RX ADMIN — ALBUTEROL SULFATE 1 PUFF: 90 AEROSOL, METERED RESPIRATORY (INHALATION) at 13:13

## 2025-01-15 RX ADMIN — METOPROLOL TARTRATE 12.5 MG: 25 TABLET, FILM COATED ORAL at 21:12

## 2025-01-15 RX ADMIN — ASPIRIN 81 MG: 81 TABLET, COATED ORAL at 08:57

## 2025-01-15 RX ADMIN — DULOXETINE HYDROCHLORIDE 60 MG: 60 CAPSULE, DELAYED RELEASE ORAL at 08:56

## 2025-01-15 RX ADMIN — GUAIFENESIN 600 MG: 600 TABLET, EXTENDED RELEASE ORAL at 21:12

## 2025-01-15 RX ADMIN — ENOXAPARIN SODIUM 30 MG: 30 INJECTION SUBCUTANEOUS at 13:13

## 2025-01-15 RX ADMIN — BACLOFEN 10 MG: 10 TABLET ORAL at 21:12

## 2025-01-15 RX ADMIN — OXYCODONE HYDROCHLORIDE 10 MG: 5 TABLET ORAL at 13:13

## 2025-01-15 RX ADMIN — CYCLOBENZAPRINE HYDROCHLORIDE 5 MG: 5 TABLET, FILM COATED ORAL at 21:12

## 2025-01-15 ASSESSMENT — ACTIVITIES OF DAILY LIVING (ADL)
ADLS_ACUITY_SCORE: 67

## 2025-01-15 NOTE — PROGRESS NOTES
"CLINICAL NUTRITION SERVICES - ASSESSMENT NOTE    RECOMMENDATIONS FOR MDs/PROVIDERS TO ORDER:  Mvi with minerals d/t not meeting RDIs with inadequate oral intake    Malnutrition Status:    Patient does not meet two of the established criteria necessary for diagnosing malnutrition but is at risk for malnutrition    Registered Dietitian Interventions:  2 x weekly weights  Ensure enlive bid = 700 kcal, 40 g protein    Future/Additional Recommendations:  Adjust supplement pending intake, weight, tolerance, acceptance, labs       REASON FOR ASSESSMENT  Positive admission nutrition risk screen with \"unsure\" of weight loss.     Pt presents with PNA, moderate persistent asthma  Hx chronic neuropathic pain syndrome,HTN, HLD, ALS,     SUBJECTIVE INFORMATION  Assessed patient in room.  Pt reports his appetite is good but is less than baseline d/t not feeling well. \"I eat as much as I can\". He is interested in receiving ensure here per his baseline       NUTRITION HISTORY  Pt lives in [place]. He is WC dependent but can transfer independently. Receives meals from facility  Pt follows with ALS clinic RD-last seen 11/13. Pt was eating well and taking 1-2 boost/ensure/day. Eating 2 meal/day of take out, or prepares his own. Pt had reported some difficulty swallowing but compensated with food item choices    Pt reports eating well PTA, 2 meal/day and 1-2 ensure/day. He reports he prepares his own meals. PNA sx x 1 day PTA.   Pt has UE strength \"and sometimes in my legs\" strength    CURRENT NUTRITION ORDERS  Diet: Orders Placed This Encounter      Regular Diet Adult    CURRENT INTAKE/TOLERANCE  Poor. Eating 25-50% of meals     Intake <50% x 4 days total    LABS  Nutrition-relevant labs: Reviewed- no concerns    MEDICATIONS  Nutrition-relevant medications:  oral abx, oxycodone q 8 hr, protonix    ANTHROPOMETRICS  Height: 177.8 cm (5' 10\")  IBW: 75.45 kg  % IBW: 75%  BMI (kg/m ): Underweight BMI < 18.5  Weight History: 18% weight loss x 2 " months? Pt reports he does not feel he has lost weight and current wt likely not accurate. He has lost some muscle wt d/t ALS.   Wt Readings from Last 10 Encounters:   01/13/25 56.1 kg (123 lb 10.9 oz)-bedscale   11/13/24 09/19/24 68 kg (150 lb ) - office  63.5 kg (140 lb)   03/20/24 60.7 kg (133 lb 13.1 oz)   04/04/22 68 kg (150 lb)   06/18/21 60.8 kg (134 lb)   02/10/21 62 kg (136 lb 9.6 oz)   09/10/20 63 kg (139 lb)   08/28/19 67.6 kg (149 lb)   08/09/19 67.6 kg (149 lb)   04/30/19 67.6 kg (149 lb)       Dosing Weight: 56.1 kg, based on actual wt    ASSESSED NUTRITION NEEDS  Estimated Energy Needs: 8981-7048 kcals/day (30 - 35 kcals/kg)  Justification: Increased needs and Underweight  Estimated Protein Needs: 56-67 grams protein/day (1 - 1.2 grams of pro/kg)  Justification: Hypercatabolism with acute illness and Repletion  Estimated Fluid Needs: 5419-4896 mL/day (25 - 30 mL/kg)  Justification: Maintenance    SYSTEM FINDINGS    Edentulous- dentures are at home per pt  Skin/wounds: Healed PI  GI symptoms: No BM documented. WDL per nsg    MALNUTRITION  % Intake: Decreased intake does not meet criteria  % Weight Loss:  Unable to determine. Pt does not feel current wt is accurate  Subcutaneous Fat Loss: Triceps: Moderate  Muscle Loss: Wasting of the temples (temporalis muscle): Severe, Clavicles (pectoralis and deltoids): Severe, Shoulders (deltoids): Severe, and Interosseous muscles: Moderate. Did not assess LE d/t expected ALS muscle loss  Fluid Accumulation/Edema: None noted  Malnutrition Diagnosis: Patient does not meet two of the established criteria necessary for diagnosing malnutrition but is at risk for malnutrition  Malnutrition Present on Admission: No    NUTRITION DIAGNOSIS  Inadequate oral intake related to decreased appetite as evidenced by eating 25-50% of meals, possible wt loss    Goals  -Total avg nutritional intake to meet a minimum of 1700 kcal/kg and 56 g PRO/kg daily (per dosing wt 56.1  kg).  -Weight maintenance 56.1 kg +     Monitoring/Evaluation  Progress toward goals will be monitored and evaluated per policy.

## 2025-01-15 NOTE — PROGRESS NOTES
[facility]    PROGRESS NOTE - Hospitalist Service    Assessment and Plan  74 year old male with a past medical history of ALS, asthma, tobacco abuse, hypertension, hyperlipidemia, pulmonary hypertension, chronic neuropathic pain syndrome, BPH, constipation, cervical spine stenosis with myelopathy, decubitus pressure injury who was brought to the emergency department by ambulance from assisted living facility for evaluation of bodyaches, nasal congestion, cough, feeling cold, decreased oral intake, found to have pneumonia and positive coronavirus.     Acute corona viral pneumonia  - Negative COVID and influenza, positive coronavirus.  - CT chest, abdomen pelvis showed scattered areas of bilateral bronchial wall thickening most prevalent in the right lower lobe consistent with acute infectious etiology  - Had low-grade fever of 100.2 in spite of taking acetaminophen and ibuprofen on a daily basis  - Normal WBC, procalcitonin and lactic acid; negative SARS-CoV-2/influenza/RSV PCR  - Negative Legionella and streptococcal pneumoniae antigens  - Treated with IV ceftriaxone and azithromycin in the ED, will continue both antibiotic given his chronic steroid dependent and ALS  -Switch to oral antibiotic today to finish course     Moderate persistent asthma  - Ongoing tobacco abuse  - No bronchospasms on exam  - Requires only 2 L on admission, resolved  -VBG consistent with metabolic alkalosis  -Continue PTA albuterol HFA, Symbicort (auto substituted with Breo Ellipta)  -DuoNebs as needed  -Flutter valve  -Continue home steroid dose  -Weaned off oxygen     Hyponatremia, hypochloremia with high anion gap  - Secondary to decreased oral intake and PTA hydrochlorothiazide  - UA showed some ketonuria  - Treated in the ED with 1905 mL milliliters IV NS bolus  - Encourage oral intake  - Continue to hold HCTZ-will probably discontinue on discharge as blood pressure on the low side  -Resolved  - Discontinue IV  fluid     Hypokalemia  -Replaced per protocol  -Unremarkable magnesium and phosphorus  -Continue to monitor      Chronic neuropathic pain syndrome  - Opioid dependence  - On PTA gabapentin, duloxetine, and as needed oxycodone  - Patient has been refusing buprenorphine patch until he sees prescribing provider, last administered on 12/16/2024     Essential hypertension  - Hold PTA hydrochlorothiazide because of hyponatremia as above  - Continue PTA metoprolol  -Blood pressure has been stable, doubt he will need to resume HCTZ on discharge.  - Continue to monitor blood pressure     Mild rhabdomyolysis  -Probably secondary to infection  -Elevated CK level at 571, improving, down to normal today  -D/Continue IV hydration      Tobacco abuse  - Smokes 1 pack/day  - Cessation education  - Nicotine patch     BPH with LUTS  - Intermittent catheterization  - Continue PTA tamsulosin     Chronic steroid use  -On PTA prednisone 2.5 mg daily,  -Probably secondary to COPD versus ALS     Hyperlipidemia  -Hold PTA atorvastatin because of rhabdo     Amyotrophic lateral sclerosis  - Patient receives 24-hour cares and is nonambulatory bedbound  - Continue PTA riluzole, baclofen, cyclobenzaprine, bowel regimen  -Patient uses power wheelchair for ambulation     Decubitus pressure injury, present on admission  -Wound care consult  -Dressing as per wound care     Drug-induced platelet defect  -On PTA aspirin, monitor for bleeding     Incidental radiographic findings  -Irregular opacity in the lingula measuring 1.4 cm, likely inflammatory though short-term follow-up chest CT may be beneficial to ensure resolution  -Outpatient follow-up     Significant weakness and deconditioning  -PT/OT evaluate  -Patient has been using electric wheelchair.  - Patient needs TCU on discharge.      40 MINUTES SPENT BY ME on the date of service doing chart review, history, exam, documentation & further activities per the note    Active Problems:    ALS  (amyotrophic lateral sclerosis) (H)    Cognitive disorder    Essential hypertension    Impaired mobility and activities of daily living    Neurogenic bowel    Neuropathic pain syndrome (non-herpetic)    Sinus tachycardia    Myalgia    Acute cough      VTE prophylaxis:  Enoxaparin (Lovenox) SQ  DIET: Orders Placed This Encounter      Regular Diet Adult      Disposition/Barriers to discharge: TCU acceptance.  Medically Ready for Discharge: Anticipated Tomorrow   Code Status: No CPR- Do NOT Intubate    Subjective:  Jef is feeling much better today, increased energy level, denies any shortness of breath.  No chest pain.  Better oral intake.    PHYSICAL EXAM  Vitals:    01/12/25 1930 01/13/25 2024   Weight: 63.5 kg (140 lb) 56.1 kg (123 lb 10.9 oz)     B/P:100/59 T:98.3 P:80 R:18     Intake/Output Summary (Last 24 hours) at 1/15/2025 1111  Last data filed at 1/15/2025 0357  Gross per 24 hour   Intake 250 ml   Output 1425 ml   Net -1175 ml      Body mass index is 17.75 kg/m .    Constitutional: awake, alert, cooperative, no apparent distress, and appears stated age  Respiratory: Decreased breath sounds on lung base with scattered rhonchi, no wheeze.    Cardiovascular: Normal apical impulse, regular rate and rhythm, normal S1 and S2, no S3 or S4, and no murmur noted  GI: No scars, normal bowel sounds, soft, non-distended, non-tender, no masses palpated, no hepatosplenomegally  Skin: no bruising or bleeding and normal skin color, texture, turgor  Musculoskeletal: There is no redness, warmth, or swelling of the joints.  Full range of motion noted.  Trace of lower extremity pitting edema present  Neurologic: Awake, alert, oriented to name, place bilateral leg weakness.  Neuropsychiatric: Appropriate with examiner      PERTINENT LABS/IMAGING:    I have personally reviewed the following data over the past 24 hrs:    7.9  \   12.7 (L)   / 214     135 99 6.4 (L) /  93   4.0 29 0.51 (L) \     ALT: 20 AST: 41 AP: 85 TBILI: 0.3    ALB: 3.2 (L) TOT PROTEIN: 6.1 (L) LIPASE: N/A       Imaging results reviewed over the past 24 hrs:   No results found for this or any previous visit (from the past 24 hours).    Discussed with patient, family, nursing staff and discharge planner    Will Faulkner MD  [facility] Medicine Service  [phone]

## 2025-01-15 NOTE — PLAN OF CARE
Problem: Adult Inpatient Plan of Care  Goal: Plan of Care Review  Description: The Plan of Care Review/Shift note should be completed every shift.  The Outcome Evaluation is a brief statement about your assessment that the patient is improving, declining, or no change.  This information will be displayed automatically on your shift  note.  Outcome: Progressing   Goal Outcome Evaluation:         Pt has been up with therapy. Pt still weak. Pt off tele and remains on room air. Pt getting scheduled oxycodone for pain.

## 2025-01-15 NOTE — PLAN OF CARE
"  Problem: Adult Inpatient Plan of Care  Goal: Plan of Care Review  Description: The Plan of Care Review/Shift note should be completed every shift.  The Outcome Evaluation is a brief statement about your assessment that the patient is improving, declining, or no change.  This information will be displayed automatically on your shift  note.  Outcome: Progressing  Goal: Patient-Specific Goal (Individualized)  Description: You can add care plan individualizations to a care plan. Examples of Individualization might be:  \"Parent requests to be called daily at 9am for status\", \"I have a hard time hearing out of my right ear\", or \"Do not touch me to wake me up as it startles  me\".  Outcome: Progressing  Goal: Absence of Hospital-Acquired Illness or Injury  Outcome: Progressing  Intervention: Identify and Manage Fall Risk  Recent Flowsheet Documentation  Taken 1/15/2025 0230 by Gretta Geronimo RN  Safety Promotion/Fall Prevention:   activity supervised   clutter free environment maintained   lighting adjusted  Intervention: Prevent Skin Injury  Recent Flowsheet Documentation  Taken 1/15/2025 0515 by Gretta Geronimo RN  Body Position: heels elevated  Taken 1/15/2025 0230 by Gretta Geroniom RN  Body Position:   right   turned   heels elevated   supine  Goal: Readiness for Transition of Care  Outcome: Progressing     Problem: Pain Acute  Goal: Optimal Pain Control and Function  Outcome: Progressing  Intervention: Prevent or Manage Pain  Recent Flowsheet Documentation  Taken 1/15/2025 0230 by Gretta Geronimo RN  Medication Review/Management: medications reviewed     Problem: Fall Injury Risk  Goal: Absence of Fall and Fall-Related Injury  Intervention: Identify and Manage Contributors  Recent Flowsheet Documentation  Taken 1/15/2025 0230 by Gretta Geronimo RN  Medication Review/Management: medications reviewed  Intervention: Promote Injury-Free Environment  Recent Flowsheet Documentation  Taken 1/15/2025 0230 by Grazyna" Gretta HILL RN  Safety Promotion/Fall Prevention:   activity supervised   clutter free environment maintained   lighting adjusted   Goal Outcome Evaluation:       Pt alert and oriented, pain to BLE control with current pain regimen, oxygen high 90s on room air,  turn/repo done accordingly. Pt rested well during the shift.

## 2025-01-15 NOTE — PROGRESS NOTES
Care Management Follow Up    Length of Stay (days): 2    Expected Discharge Date: 01/15/2025    Anticipated Discharge Plan:   TBD    Transportation: TBD    PT Recommendations:    OT Recommendations:        Barriers to Discharge:  PT/OT recommendations    Prior Living Situation: Patient resides at [facility] in [place]. He uses a urinal for voiding and is able to transfer himself to his electric scooter and the toilet for bowel movements. He does not wear oxygen at baseline. He currently is open to Home Care through [facility] for his wound cares. He is able to feed himself. He gets assist with weekly showers, making his bed, medication management, putting his TEDs on and getting dressed if necessary.    Discussed  Partnership in Safe Discharge Planning  document with patient/family: No     Handoff Completed: No, handoff not indicated or clinically appropriate    Patient/Spokesperson Updated: Yes. Who? [facility]-director of nursing    Additional Information:    Per provider update pt should be medically ready to discharge today, he is off oxygen and will be transitioning to oral ABX. Writer then called PT/OT to see what their recommendation is and they have not seen the pt yet but will see him soon. Per the [facility] the pt needs to be back to baseline before returning to the [facility] and they would like him to be able to self transfer to his electric scooter. Per PT they will work on this and let CM know how it goes.    Contacts:  [facility] [phone]  [facility] care manager [phone]    Next Steps: follow PT/OT recs    Beba Zelaya RN

## 2025-01-15 NOTE — PROGRESS NOTES
01/15/25 0900   Appointment Info   Signing Clinician's Name / Credentials (PT) Maya Carrera, PT   Rehab Comments (PT) O2 SATS at 96%  HR 82.  with pt being slightly SOB 97%  and HR 82 BPM after mobility.  (no O2)   Living Environment   People in Home alone   Current Living Arrangements assisted living   Home Accessibility no concerns   Living Environment Comments Pt lives on the first floor.  (walk in shower.)   Self-Care   Usual Activity Tolerance moderate   Current Activity Tolerance fair   Equipment Currently Used at Home shower chair  (GBs in the shower.  Pt said his bed does sit up and he does have GBs on the bed.)   Fall history within last six months no  (per the pt)   Activity/Exercise/Self-Care Comment Indep with bed mobility, indep pivot transfers bed<>electric WC and indep toilet transfers.  indep ADL:s dressing,  (Pt said he does his own meal prep)   General Information   Onset of Illness/Injury or Date of Surgery 01/12/25   Referring Physician Will Faulkner MD   Patient/Family Therapy Goals Statement (PT) To go home   Pertinent History of Current Problem (include personal factors and/or comorbidities that impact the POC) per the chart, 4 year old male with a past medical history of ALS, asthma, tobacco abuse, hypertension, hyperlipidemia, pulmonary hypertension, chronic neuropathic pain syndrome, BPH, constipation, cervical spine stenosis with myelopathy, decubitus pressure injury who was brought to the emergency department by ambulance from assisted living facility for evaluation of bodyaches, nasal congestion, cough, feeling cold, decreased oral intake, found to have pneumonia and positive coronavirus.     Acute viral pneumonia  - Negative COVID influenza, positive coronavirus.   Existing Precautions/Restrictions   (droplet and contact)   Weight-Bearing Status - LLE weight-bearing as tolerated   Weight-Bearing Status - RLE weight-bearing as tolerated   Cognition   Orientation Status (Cognition)  oriented to;person;place;situation;time   Cognitive Status Comments Some increased cues for safety.  Pt was impulsive with trying to transfers.  Increased A for safety.   Pain Assessment   Patient Currently in Pain   (hip and leg pain.)   Posture    Posture Comments flexed posture in sitting   Range of Motion (ROM)   Range of Motion ROM deficits secondary to weakness   ROM Comment Pt does have bilat hip and LE pain.  Bilat knee ext liimited by possible contractures.   Strength (Manual Muscle Testing)   Strength Comments Bilat Hip motions 4/5, bilat knees 3+/4 and bilat DF 2+ and bilat PF 3/5.   Bed Mobility   Comment, (Bed Mobility) Supine>sit with CGA with HOB elevated.   Transfers   Comment, (Transfers) Sit>stand with max A x1 for the first rep.  Pt was not able to stand up and got to more of a sqat position.   Gait/Stairs (Locomotion)   Comment, (Gait/Stairs) Not tested.  Pt uses a electric WC for mobility.   Balance   Balance Comments Supervision with static sit bal and max A with sit<>semistand.  Min A with sit scoot to the HOB   Sensory Examination   Sensory Perception Comments Pt could feel light touch bilat LEs but said he has dec sensation in the feet.   Coordination   Coordination Comments dec coordination bilat LEs   Clinical Impression   Criteria for Skilled Therapeutic Intervention Yes, treatment indicated   PT Diagnosis (PT) Impaired functional mobility.   Influenced by the following impairments dec bal, dec strength, dec endurance   Functional limitations due to impairments flexed posture in sitting   Clinical Presentation (PT Evaluation Complexity) stable   Clinical Presentation Rationale Pt presents medically diagnosed.   Clinical Decision Making (Complexity) moderate complexity   Planned Therapy Interventions (PT) bed mobility training;gait training;strengthening;transfer training   Risk & Benefits of therapy have been explained evaluation/treatment results reviewed;care plan/treatment goals  reviewed;risks/benefits reviewed;patient   PT Total Evaluation Time   PT Eval, Moderate Complexity Minutes (61217) 15   Physical Therapy Goals   PT Frequency 6x/week   PT Predicted Duration/Target Date for Goal Attainment 01/22/25   PT Goals Bed Mobility;Transfers   PT: Bed Mobility Supervision/stand-by assist;Supine to/from sit;Rolling   PT: Transfers Minimal assist;Sit to/from stand;Bed to/from chair   Interventions   Interventions Quick Adds Therapeutic Activity;Therapeutic Procedure   Therapeutic Procedure/Exercise   Ther. Procedure: strength, endurance, ROM, flexibillity Minutes (94975) 8   Treatment Detail/Skilled Intervention supine bilat LE ex x 10 reps   Therapeutic Activity   Therapeutic Activities: dynamic activities to improve functional performance Minutes (80629) 10   Treatment Detail/Skilled Intervention Supine>sit with CGA with HOB elevated with pt using the rail. Sit scoot with CGA and Pt did try sit >stand with max A x1 and he was not able to get to standing.  Pt did a semi stand and did not WB well on the LEs for a transfer.  PT did try a pivot transfer with max A  x1 and he was not safe to get all the way over to the chair yet.  Sit scoot the the HOB with min A x 1.   PT Discharge Planning   PT Plan bed mobility, transfers with A x 2.  LE ex.   PT Discharge Recommendation (DC Rec) Transitional Care Facility   PT Rationale for DC Rec The pt does need mod A x 1 with sit<>semi stand.  Pt was not able to pivot safely to a chair yet due to weakness. TCU is recommended at this time.   PT Brief overview of current status PT eval, bed mobility with   PT Total Distance Amb During Session (feet) 0   PT Equipment Needed at Discharge other (see comments)  (Pt has electric WC at home)   Physical Therapy Time and Intention   Timed Code Treatment Minutes 18   Total Session Time (sum of timed and untimed services) 33

## 2025-01-15 NOTE — PROGRESS NOTES
"Occupational Therapy     01/15/25 1510   Appointment Info   Signing Clinician's Name / Credentials (OT) Afsaneh Rae OTR/L   Rehab Comments (OT) @ end of session, pt seated in recliner w/alarm activated & call light/needs in reach. Sarah sling placed & notified next shift's CNA to use sarah to get pt back to bed d/t difficult trf.   Living Environment   People in Home alone   Current Living Arrangements assisted living   Home Accessibility no concerns   Self-Care   Usual Activity Tolerance moderate   Current Activity Tolerance fair   Equipment Currently Used at Home wheelchair, power   Activity/Exercise/Self-Care Comment pt providing differing info then what was reported with other team members & w/conversation w/CM. Pt reports he is independent w/ADLs-he can have assist of 1 if needed. A for meds & IADLs except pt reports he prepares meals. He uses a power wc that he reports can assist w/sit to stand. He has a hospital bed. He stated he does try & walk w/a walker \"right before I came in\" but then also describes more of a squat pivot trf. Per report, pt will need demo SBA w/trfs as staff can not provide assistance.   General Information   Onset of Illness/Injury or Date of Surgery 01/12/25   Referring Physician Will Faulkner MD   Patient/Family Therapy Goal Statement (OT) none stated   Existing Precautions/Restrictions fall   General Observations and Info 74 year old male with a past medical history of ALS, asthma, tobacco abuse, hypertension, hyperlipidemia, pulmonary hypertension, chronic neuropathic pain syndrome, BPH, constipation, cervical spine stenosis with myelopathy, decubitus pressure injury who was brought to the emergency department by ambulance from assisted living facility for evaluation of bodyaches, nasal congestion, cough, feeling cold, decreased oral intake, found to have pneumonia and positive coronavirus.   Cognitive Status Examination   Orientation Status person;place   Follows Commands " "follows one-step commands   Posture   Posture forward head position;protracted shoulders;kyphosis  (significant flexion in cervical spine; pt can slightly lift his head if he assists using his R hand, but unable to maintain w/o support. He states \"I'm looking into a neck brace.\")   Range of Motion Comprehensive   Comment, General Range of Motion R UE WFL; L UE limited shldr flex-AAROM to ~75 degrees before it's painful; hand to elbow WFL.   Strength Comprehensive (MMT)   Comment, General Manual Muscle Testing (MMT) Assessment generalized weakness throughout; UEs fatigue easily.   Coordination   Fine Motor Coordination R hand dominant; unable to fully straighten/extend all digits. Demo functional B  for basic ADLs.   Bed Mobility   Comment (Bed Mobility) CGA sup to sit w/HOB elevated; use of grab bar & incr time to complete.   Transfers   Transfer Comments max A partial stand-unable to reach full stand-doesn't @ baseline d/t contractures & flexed posture per pt.   Balance   Balance Comments SBA sitting balance, max A partial stand-unsteady   Activities of Daily Living   BADL Assessment/Intervention lower body dressing;toileting   Lower Body Dressing Assessment/Training   Comment, (Lower Body Dressing) dependent for socks   Toileting   Comment, (Toileting) dependent managing adult brief   Clinical Impression   Criteria for Skilled Therapeutic Interventions Met (OT) Yes, treatment indicated   OT Diagnosis decreased ADL activity tolerance   OT Problem List-Impairments impacting ADL problems related to;activity tolerance impaired;balance;strength;range of motion (ROM);mobility   Assessment of Occupational Performance 5 or more Performance Deficits   Identified Performance Deficits bed mob, trfs, LB drsg, toileting, weakness   Planned Therapy Interventions (OT) ADL retraining;bed mobility training;transfer training;strengthening;progressive activity/exercise   Clinical Decision Making Complexity (OT) detailed " assessment/moderate complexity   Risk & Benefits of therapy have been explained evaluation/treatment results reviewed;care plan/treatment goals reviewed;risks/benefits reviewed;current/potential barriers reviewed;patient   Clinical Impression Comments Pt presents w/weakness & decreased activity tolerance impacting his ability to participate in all ADLs & trfs.   OT Total Evaluation Time   OT Eval, Moderate Complexity Minutes (17415) 12   OT Goals   Therapy Frequency (OT) 4 times/week   OT Predicted Duration/Target Date for Goal Attainment 01/22/25   OT Goals OT Goal 1;Toilet Transfer/Toileting;OT Goal 2   OT: Toilet Transfer/Toileting Moderate assist  (squat pivot)   OT: Goal 1 Pt to partipate in > 10 minutes B UE ex for increased strength for trfs & ADLs.   OT: Goal 2 Pt to demo SBA shifting his sitting wt side to side enough to clear hips from bed in prep clothing management for dressing & toileting.   Interventions   Interventions Quick Adds Therapeutic Procedures/Exercise   Self-Care/Home Management   Self-Care/Home Mgmt/ADL, Compensatory, Meal Prep Minutes (48644) 15   Treatment Detail/Skilled Intervention Incr time to attempt to simulate his home enviornment w/safety measures in place. Pt's power wc isn't here & it doesn't sound feasible per pt to get it here in order to use it & further practice trfs. TH had pt direct which way he exits his bed & he stated his L LE is stronger but he pivots both directions. Max A another partial stand. Max A pivot bed to recliner w/pt using UEs to reach & assist w/pulling himself to recliner surface-unsteady & weak. Pt on RA-O2 sats remained > 94% during session & HR in 90's.   Therapeutic Procedures/Exercise   Therapeutic Procedure: strength, endurance, ROM, flexibillity minutes (05416) 8   Treatment Detail/Skilled Intervention Pt reports feeling dizzy upon sitting EOB-he reports this isn't new. He needed ~3 more minutes of sitting before he could proceed w/activity (vitals  WNL). Pt able to demo sit/scoot ~2 feet up/down the bed w/min A. Pt having difficulty shifting wt/hips in prep pulling LB clothing up/down while sitting EOB.   OT Discharge Planning   OT Plan A for any attempt @ squat pivot trfs, EOB sitting w/wt shifting, UE ex (L shldr flexion limited)   OT Discharge Recommendation (DC Rec) Transitional Care Facility   OT Rationale for DC Rec Pt is needing 24 hr A for all ADLs & rec emeterio lift for trfs d/t weakness & impaired balance. Per report, pt's facility can not provide trf assistance-pt will need further inpt therapy/TCU.   OT Brief overview of current status CGA bed mob, max A squat pivot trfs, dependent LB drsg   OT Total Distance Amb During Session (feet) 0   Total Session Time   Timed Code Treatment Minutes 23   Total Session Time (sum of timed and untimed services) 35

## 2025-01-15 NOTE — TREATMENT PLAN
RCAT Treatment Plan    Patient Score: 8  Patient Acuity: 4    Clinical Indication for Therapy: history of bronchospasm, prevent atelectasis, and unable to deep breath, Asthma    Therapy Ordered: Flutter Valve QID    Assessment Summary: Patient has a history of asthma, smoker greater than 1 pack per day. Patient has a strong productive cough sating 98% on room air. Flutter valve performed with patient with encouragement. Continue flutter valve QID. Patient needs encouragement.     Randi Starkey, RT  1/14/2025

## 2025-01-15 NOTE — PLAN OF CARE
"  Problem: Adult Inpatient Plan of Care  Goal: Plan of Care Review  Description: The Plan of Care Review/Shift note should be completed every shift.  The Outcome Evaluation is a brief statement about your assessment that the patient is improving, declining, or no change.  This information will be displayed automatically on your shift  note.  Outcome: Progressing  Flowsheets (Taken 1/14/2025 1803)  Plan of Care Reviewed With: patient  Goal: Patient-Specific Goal (Individualized)  Description: You can add care plan individualizations to a care plan. Examples of Individualization might be:  \"Parent requests to be called daily at 9am for status\", \"I have a hard time hearing out of my right ear\", or \"Do not touch me to wake me up as it startles  me\".  Outcome: Progressing  Goal: Absence of Hospital-Acquired Illness or Injury  Outcome: Progressing  Intervention: Identify and Manage Fall Risk  Recent Flowsheet Documentation  Taken 1/14/2025 1648 by Norm Odell RN  Safety Promotion/Fall Prevention:   activity supervised   clutter free environment maintained   lighting adjusted  Intervention: Prevent Skin Injury  Recent Flowsheet Documentation  Taken 1/14/2025 1705 by Norm Odell RN  Body Position: position maintained  Taken 1/14/2025 1648 by Norm Odell RN  Body Position:   right   turned   heels elevated   supine  Taken 1/14/2025 1515 by Norm Odell RN  Body Position: refuses positioning  Goal: Readiness for Transition of Care  Outcome: Progressing     Problem: Comorbidity Management  Goal: Maintenance of Asthma Control  Outcome: Progressing  Intervention: Maintain Asthma Symptom Control  Recent Flowsheet Documentation  Taken 1/14/2025 1648 by Norm Odell RN  Medication Review/Management: medications reviewed  Goal: Blood Pressure in Desired Range  Outcome: Progressing  Intervention: Maintain Blood Pressure Management  Recent Flowsheet Documentation  Taken 1/14/2025 1648 by Norm Odell RN  Medication " Review/Management: medications reviewed     Problem: Pneumonia  Goal: Fluid Balance  Outcome: Progressing  Goal: Resolution of Infection Signs and Symptoms  Outcome: Progressing  Goal: Effective Oxygenation and Ventilation  Outcome: Progressing  Intervention: Promote Airway Secretion Clearance  Recent Flowsheet Documentation  Taken 1/14/2025 1705 by Norm Odell RN  Activity Management: bedrest  Taken 1/14/2025 1648 by Norm Odell RN  Activity Management: bedrest  Intervention: Optimize Oxygenation and Ventilation  Recent Flowsheet Documentation  Taken 1/14/2025 1705 by Norm Odell RN  Head of Bed (HOB) Positioning: HOB at 20-30 degrees  Taken 1/14/2025 1648 by Norm Odell RN  Head of Bed (HOB) Positioning: HOB at 20-30 degrees  Taken 1/14/2025 1515 by Norm Odell RN  Head of Bed (HOB) Positioning: HOB at 20-30 degrees     Problem: Pain Acute  Goal: Optimal Pain Control and Function  Outcome: Progressing  Intervention: Prevent or Manage Pain  Recent Flowsheet Documentation  Taken 1/14/2025 1648 by Norm Odell RN  Medication Review/Management: medications reviewed     Problem: Electrolyte Imbalance  Goal: Electrolyte Balance  Outcome: Progressing     Problem: Constipation  Goal: Effective Bowel Elimination  Outcome: Progressing     Problem: Infection  Goal: Absence of Infection Signs and Symptoms  Outcome: Progressing     Problem: Skin Injury Risk Increased  Goal: Skin Health and Integrity  Outcome: Progressing  Intervention: Plan: Nurse Driven Intervention: Moisture Management  Recent Flowsheet Documentation  Taken 1/14/2025 1648 by Norm Odell RN  Moisture Interventions: Incontinence pad  Intervention: Plan: Nurse Driven Intervention: Friction and Shear  Recent Flowsheet Documentation  Taken 1/14/2025 1648 by Norm Odell RN  Friction/Shear Interventions: Silicone foam sacral dressing  Intervention: Optimize Skin Protection  Recent Flowsheet Documentation  Taken 1/14/2025 1705 by Norm Odell  A, RN  Activity Management: bedrest  Head of Bed (HOB) Positioning: HOB at 20-30 degrees  Taken 1/14/2025 1648 by Norm Odell RN  Activity Management: bedrest  Head of Bed (HOB) Positioning: HOB at 20-30 degrees  Taken 1/14/2025 1515 by Norm Odell RN  Head of Bed (HOB) Positioning: HOB at 20-30 degrees     Problem: Wound  Goal: Optimal Coping  Outcome: Progressing  Goal: Optimal Functional Ability  Outcome: Progressing  Intervention: Optimize Functional Ability  Recent Flowsheet Documentation  Taken 1/14/2025 1705 by Norm Odell RN  Assistive Device Utilized: lift device  Activity Management: bedrest  Activity Assistance Provided: assistance, 1 person  Taken 1/14/2025 1648 by Norm Odell RN  Activity Management: bedrest  Goal: Absence of Infection Signs and Symptoms  Outcome: Progressing  Goal: Improved Oral Intake  Outcome: Progressing  Goal: Optimal Pain Control and Function  Outcome: Progressing  Goal: Skin Health and Integrity  Outcome: Progressing  Intervention: Optimize Skin Protection  Recent Flowsheet Documentation  Taken 1/14/2025 1705 by Norm Odell RN  Activity Management: bedrest  Head of Bed (HOB) Positioning: HOB at 20-30 degrees  Taken 1/14/2025 1648 by Norm Odell RN  Activity Management: bedrest  Head of Bed (HOB) Positioning: HOB at 20-30 degrees  Taken 1/14/2025 1515 by Norm Odell RN  Head of Bed (HOB) Positioning: HOB at 20-30 degrees  Goal: Optimal Wound Healing  Outcome: Progressing     Problem: Pain Chronic (Persistent)  Goal: Optimal Pain Control and Function  Outcome: Progressing  Intervention: Manage Persistent Pain  Recent Flowsheet Documentation  Taken 1/14/2025 1648 by Norm Odell RN  Medication Review/Management: medications reviewed   Goal Outcome Evaluation:      Plan of Care Reviewed With: patient

## 2025-01-16 VITALS
RESPIRATION RATE: 20 BRPM | HEART RATE: 98 BPM | TEMPERATURE: 98.3 F | DIASTOLIC BLOOD PRESSURE: 66 MMHG | SYSTOLIC BLOOD PRESSURE: 125 MMHG | WEIGHT: 123.68 LBS | BODY MASS INDEX: 17.71 KG/M2 | OXYGEN SATURATION: 100 % | HEIGHT: 70 IN

## 2025-01-16 LAB
ANION GAP SERPL CALCULATED.3IONS-SCNC: 7 MMOL/L (ref 7–15)
BACTERIA BLD CULT: NORMAL
BACTERIA BLD CULT: NORMAL
BUN SERPL-MCNC: 6.4 MG/DL (ref 8–23)
CALCIUM SERPL-MCNC: 8.9 MG/DL (ref 8.8–10.4)
CHLORIDE SERPL-SCNC: 100 MMOL/L (ref 98–107)
CREAT SERPL-MCNC: 0.45 MG/DL (ref 0.67–1.17)
EGFRCR SERPLBLD CKD-EPI 2021: >90 ML/MIN/1.73M2
ERYTHROCYTE [DISTWIDTH] IN BLOOD BY AUTOMATED COUNT: 15.8 % (ref 10–15)
GLUCOSE SERPL-MCNC: 90 MG/DL (ref 70–99)
HCO3 SERPL-SCNC: 29 MMOL/L (ref 22–29)
HCT VFR BLD AUTO: 36.1 % (ref 40–53)
HGB BLD-MCNC: 12.3 G/DL (ref 13.3–17.7)
MAGNESIUM SERPL-MCNC: 1.8 MG/DL (ref 1.7–2.3)
MCH RBC QN AUTO: 29.9 PG (ref 26.5–33)
MCHC RBC AUTO-ENTMCNC: 34.1 G/DL (ref 31.5–36.5)
MCV RBC AUTO: 88 FL (ref 78–100)
PHOSPHATE SERPL-MCNC: 2.8 MG/DL (ref 2.5–4.5)
PLATELET # BLD AUTO: 213 10E3/UL (ref 150–450)
POTASSIUM SERPL-SCNC: 3.9 MMOL/L (ref 3.4–5.3)
POTASSIUM SERPL-SCNC: 4.3 MMOL/L (ref 3.4–5.3)
RBC # BLD AUTO: 4.12 10E6/UL (ref 4.4–5.9)
SODIUM SERPL-SCNC: 136 MMOL/L (ref 135–145)
WBC # BLD AUTO: 6.6 10E3/UL (ref 4–11)

## 2025-01-16 PROCEDURE — 84132 ASSAY OF SERUM POTASSIUM: CPT | Performed by: INTERNAL MEDICINE

## 2025-01-16 PROCEDURE — 84100 ASSAY OF PHOSPHORUS: CPT | Performed by: INTERNAL MEDICINE

## 2025-01-16 PROCEDURE — 82565 ASSAY OF CREATININE: CPT | Performed by: INTERNAL MEDICINE

## 2025-01-16 PROCEDURE — 120N000001 HC R&B MED SURG/OB

## 2025-01-16 PROCEDURE — 82435 ASSAY OF BLOOD CHLORIDE: CPT | Performed by: INTERNAL MEDICINE

## 2025-01-16 PROCEDURE — 250N000013 HC RX MED GY IP 250 OP 250 PS 637: Performed by: INTERNAL MEDICINE

## 2025-01-16 PROCEDURE — 85014 HEMATOCRIT: CPT | Performed by: INTERNAL MEDICINE

## 2025-01-16 PROCEDURE — 250N000013 HC RX MED GY IP 250 OP 250 PS 637: Performed by: HOSPITALIST

## 2025-01-16 PROCEDURE — 99233 SBSQ HOSP IP/OBS HIGH 50: CPT | Performed by: INTERNAL MEDICINE

## 2025-01-16 PROCEDURE — 80048 BASIC METABOLIC PNL TOTAL CA: CPT | Performed by: INTERNAL MEDICINE

## 2025-01-16 PROCEDURE — 83735 ASSAY OF MAGNESIUM: CPT | Performed by: INTERNAL MEDICINE

## 2025-01-16 PROCEDURE — 250N000011 HC RX IP 250 OP 636: Performed by: INTERNAL MEDICINE

## 2025-01-16 PROCEDURE — 250N000012 HC RX MED GY IP 250 OP 636 PS 637: Performed by: HOSPITALIST

## 2025-01-16 RX ADMIN — ASPIRIN 81 MG: 81 TABLET, COATED ORAL at 09:25

## 2025-01-16 RX ADMIN — GABAPENTIN 600 MG: 300 CAPSULE ORAL at 13:12

## 2025-01-16 RX ADMIN — METOPROLOL TARTRATE 12.5 MG: 25 TABLET, FILM COATED ORAL at 21:16

## 2025-01-16 RX ADMIN — CYCLOBENZAPRINE HYDROCHLORIDE 5 MG: 5 TABLET, FILM COATED ORAL at 21:08

## 2025-01-16 RX ADMIN — FLUTICASONE PROPIONATE 1 SPRAY: 50 SPRAY, METERED NASAL at 13:12

## 2025-01-16 RX ADMIN — OXYCODONE HYDROCHLORIDE 10 MG: 5 TABLET ORAL at 13:12

## 2025-01-16 RX ADMIN — LORATADINE 10 MG: 10 TABLET ORAL at 09:26

## 2025-01-16 RX ADMIN — ENOXAPARIN SODIUM 30 MG: 30 INJECTION SUBCUTANEOUS at 13:12

## 2025-01-16 RX ADMIN — ACETAMINOPHEN 1000 MG: 500 TABLET, FILM COATED ORAL at 23:51

## 2025-01-16 RX ADMIN — GABAPENTIN 600 MG: 300 CAPSULE ORAL at 09:25

## 2025-01-16 RX ADMIN — TAMSULOSIN HYDROCHLORIDE 0.4 MG: 0.4 CAPSULE ORAL at 09:26

## 2025-01-16 RX ADMIN — GUAIFENESIN 600 MG: 600 TABLET, EXTENDED RELEASE ORAL at 21:08

## 2025-01-16 RX ADMIN — BACLOFEN 10 MG: 10 TABLET ORAL at 09:26

## 2025-01-16 RX ADMIN — RILUZOLE 50 MG: 50 TABLET ORAL at 09:24

## 2025-01-16 RX ADMIN — OXYCODONE HYDROCHLORIDE 10 MG: 5 TABLET ORAL at 21:09

## 2025-01-16 RX ADMIN — Medication 1 TABLET: at 09:25

## 2025-01-16 RX ADMIN — GABAPENTIN 600 MG: 300 CAPSULE ORAL at 21:08

## 2025-01-16 RX ADMIN — GUAIFENESIN 600 MG: 600 TABLET, EXTENDED RELEASE ORAL at 09:25

## 2025-01-16 RX ADMIN — BACLOFEN 10 MG: 10 TABLET ORAL at 21:09

## 2025-01-16 RX ADMIN — OXYCODONE HYDROCHLORIDE 10 MG: 5 TABLET ORAL at 05:04

## 2025-01-16 RX ADMIN — AZITHROMYCIN DIHYDRATE 250 MG: 250 TABLET, FILM COATED ORAL at 21:09

## 2025-01-16 RX ADMIN — ACETAMINOPHEN 1000 MG: 500 TABLET, FILM COATED ORAL at 00:07

## 2025-01-16 RX ADMIN — FLUTICASONE FUROATE AND VILANTEROL TRIFENATATE 1 PUFF: 200; 25 POWDER RESPIRATORY (INHALATION) at 09:28

## 2025-01-16 RX ADMIN — BACLOFEN 10 MG: 10 TABLET ORAL at 13:12

## 2025-01-16 RX ADMIN — PREDNISONE 2.5 MG: 2.5 TABLET ORAL at 09:25

## 2025-01-16 RX ADMIN — ALBUTEROL SULFATE 2 PUFF: 90 AEROSOL, METERED RESPIRATORY (INHALATION) at 23:55

## 2025-01-16 RX ADMIN — CYCLOBENZAPRINE HYDROCHLORIDE 5 MG: 5 TABLET, FILM COATED ORAL at 09:26

## 2025-01-16 RX ADMIN — PANTOPRAZOLE SODIUM 40 MG: 40 TABLET, DELAYED RELEASE ORAL at 09:27

## 2025-01-16 RX ADMIN — METOPROLOL TARTRATE 12.5 MG: 25 TABLET, FILM COATED ORAL at 09:27

## 2025-01-16 RX ADMIN — ATORVASTATIN CALCIUM 10 MG: 10 TABLET, FILM COATED ORAL at 21:09

## 2025-01-16 RX ADMIN — CEFDINIR 300 MG: 300 CAPSULE ORAL at 09:27

## 2025-01-16 RX ADMIN — DULOXETINE HYDROCHLORIDE 60 MG: 60 CAPSULE, DELAYED RELEASE ORAL at 09:26

## 2025-01-16 RX ADMIN — CEFDINIR 300 MG: 300 CAPSULE ORAL at 21:08

## 2025-01-16 RX ADMIN — RILUZOLE 50 MG: 50 TABLET ORAL at 18:16

## 2025-01-16 RX ADMIN — NAPROXEN 250 MG: 250 TABLET ORAL at 02:08

## 2025-01-16 ASSESSMENT — ACTIVITIES OF DAILY LIVING (ADL)
ADLS_ACUITY_SCORE: 72
ADLS_ACUITY_SCORE: 72
ADLS_ACUITY_SCORE: 68
ADLS_ACUITY_SCORE: 68
ADLS_ACUITY_SCORE: 72
ADLS_ACUITY_SCORE: 68
ADLS_ACUITY_SCORE: 72
ADLS_ACUITY_SCORE: 68
ADLS_ACUITY_SCORE: 68

## 2025-01-16 NOTE — PROGRESS NOTES
Care Management Follow Up    Length of Stay (days): 3    Expected Discharge Date: 01/18/2025    Anticipated Discharge Plan:   TBD    Transportation: TBD    PT Recommendations: Transitional Care Facility  OT Recommendations:  Transitional Care Facility     Barriers to Discharge: medical stability, placement    Prior Living Situation: Patient resides at [facility] in [place]. He uses a urinal for voiding and is able to transfer himself to his electric scooter and the toilet for bowel movements. He does not wear oxygen at baseline. He currently is open to Home Care through [facility] for his wound cares. He is able to feed himself. He gets assist with weekly showers, making his bed, medication management, putting his TEDs on and getting dressed if necessary.     Discussed  Partnership in Safe Discharge Planning  document with patient/family: No     Handoff Completed: No, handoff not indicated or clinically appropriate    Patient/Spokesperson Updated: Yes. Who? Pt and [facility]    Additional Information:  Went to pt room to discuss again therapy recommendation for TCU pt is still declining going to TCU and he wants to go back to his [facility].  then tried to connect with [facility] to see what we would need in order to get pt back, no answer LVM to call CM back.    Contacts;  [facility] [phone]  [facility] care manager [phone]       Next Steps: medical progression and follow up conversation with RONEY Zelaya RN

## 2025-01-16 NOTE — PLAN OF CARE
Problem: Adult Inpatient Plan of Care  Goal: Plan of Care Review  Description: The Plan of Care Review/Shift note should be completed every shift.  The Outcome Evaluation is a brief statement about your assessment that the patient is improving, declining, or no change.  This information will be displayed automatically on your shift  note.  Outcome: Progressing   Goal Outcome Evaluation:         Pt has been a little down today, states he really wants to leave. Pt getting scheduled pain meds and oral abx. PICC line in place and working.

## 2025-01-16 NOTE — PROGRESS NOTES
[facility]    Medicine Progress Note - Hospitalist Service    Date of Admission:  1/12/2025    Assessment & Plan   74 year old male with a past medical history of ALS, asthma, tobacco abuse, hypertension, hyperlipidemia, pulmonary hypertension, chronic neuropathic pain syndrome, BPH, constipation, cervical spine stenosis with myelopathy, decubitus pressure injury who was brought to the emergency department by ambulance from assisted living facility for evaluation of bodyaches, nasal congestion, cough, feeling cold, decreased oral intake, found to have pneumonia and positive coronavirus.     1.Acute corona viral pneumonia  - Negative COVID and influenza, positive coronavirus.  - CT chest, abdomen pelvis showed scattered areas of bilateral bronchial wall thickening most prevalent in the right lower lobe consistent with acute infectious etiology  - Had low-grade fever of 100.2 in spite of taking acetaminophen and ibuprofen on a daily basis  - Normal WBC, procalcitonin and lactic acid; negative SARS-CoV-2/influenza/RSV PCR  - Negative Legionella and streptococcal pneumoniae antigens  - Treated with IV ceftriaxone and azithromycin in the ED, will continue both antibiotic given his chronic steroid dependent and ALS  -Switched to oral antibiotic to finish course     2.Moderate persistent asthma  - Ongoing tobacco abuse  - No bronchospasms on exam  - Requires only 2 L on admission, resolved, now on RA  -VBG consistent with metabolic alkalosis  -Continue PTA albuterol HFA, Symbicort (auto substituted with Breo Ellipta)  -DuoNebs as needed  -Flutter valve  -Continue home steroid dose  -Weaned off oxygen     3.Hyponatremia, hypochloremia with high anion gap  - Secondary to decreased oral intake and PTA hydrochlorothiazide  - UA showed some ketonuria  - Treated in the ED with 1905 mL milliliters IV NS bolus  - Encourage oral intake  - Continue to hold HCTZ-will probably discontinue on discharge as blood  pressure on the low side  -Resolved  - Discontinue IV fluid  -1/16 NA is 136     4.Hypokalemia  -Replaced per protocol  -Unremarkable magnesium and phosphorus  -Continue to monitor , today 4.3     5.Chronic neuropathic pain syndrome  - Opioid dependence  - On PTA gabapentin, duloxetine, and as needed oxycodone  - Patient has been refusing buprenorphine patch until he sees prescribing provider, last administered on 12/16/2024     6.Essential hypertension  - Hold PTA hydrochlorothiazide because of hyponatremia as above  - Continue PTA metoprolol  -Blood pressure has been stable, doubt he will need to resume HCTZ on discharge.  - Continue to monitor blood pressure     7.Mild rhabdomyolysis  -Probably secondary to infection  -Elevated CK improved to normal by 1/15, so restart statin now  -D/Continue IV hydration      8.Tobacco abuse  - Smokes 1 pack/day  - Cessation education  - Nicotine patch     9.BPH with LUTS  - Intermittent catheterization  - Continue PTA tamsulosin     10.Chronic steroid use  -On PTA prednisone 2.5 mg daily,  -Probably secondary to COPD versus ALS     11.Hyperlipidemia  -restart PTA atorvastatin because of rhabdo     12.Amyotrophic lateral sclerosis  - Patient receives 24-hour cares and is nonambulatory bedbound  - Continue PTA riluzole, baclofen, cyclobenzaprine, bowel regimen  -Patient uses power wheelchair for ambulation and pt/ot eval transfers  -today SW notes needs tcu vs AL today      13.Decubitus pressure injury, present on admission  -Wound care consult  -Dressing as per wound care     14.Drug-induced platelet defect  -On PTA aspirin, monitor for bleeding     15.Incidental radiographic findings  -Irregular opacity in the lingula measuring 1.4 cm, likely inflammatory though short-term follow-up chest CT may be beneficial to ensure resolution  -Outpatient follow-up--I placed referral in lung nodule service for this I discharge navigator      16.Significant weakness and  "deconditioning  -PT/OT evaluate  -Patient has been using electric wheelchair.  - Patient needs TCU on discharge.    --I called son Darnell to update at 1239--I let him know about lung nodule as well                 Diet: Regular Diet Adult  Snacks/Supplements Adult: Ensure Enlive; Between Meals    DVT Prophylaxis: Enoxaparin (Lovenox) SQ  Sepulveda Catheter: Not present  Lines: PRESENT      PICC 01/13/25 Double Lumen Right Basilic Antibiotic.-Site Assessment: WDL      Cardiac Monitoring: None  Code Status: No CPR- Do NOT Intubate      Clinically Significant Risk Factors               # Hypoalbuminemia: Lowest albumin = 3.1 g/dL at 1/14/2025  5:17 AM, will monitor as appropriate     # Hypertension: Noted on problem list            # Cachexia: Estimated body mass index is 17.75 kg/m  as calculated from the following:    Height as of this encounter: 1.778 m (5' 10\").    Weight as of this encounter: 56.1 kg (123 lb 10.9 oz)., PRESENT ON ADMISSION     # Financial/Environmental Concerns: none         Social Drivers of Health    Depression: At risk (9/19/2024)    PHQ-2     PHQ-2 Score: 3   Housing Stability: Low Risk  (1/14/2025)    Housing Stability     Do you have housing? : Yes     Are you worried about losing your housing?: No   Recent Concern: Housing Stability - High Risk (1/13/2025)    Housing Stability     Do you have housing? : No     Are you worried about losing your housing?: No   Tobacco Use: High Risk (11/13/2024)    Received from [facility]    Patient History     Smoking Tobacco Use: Every Day     Smokeless Tobacco Use: Never    Received from [email or web address], [email or web address]    Social Connections          Disposition Plan     Medically Ready for Discharge: Anticipated in 2-4 Days             Gretel Mccann MD  Hospitalist Service  [facility]  Securely message with [employer] (more info)  Text page via [employer] Paging/Directory "   ______________________________________________________________________    Interval History   He notes he is doing better  Still some cough, less  Still getting stronger  Lives in assisted living   Ok with me updating family     Physical Exam   Vital Signs: Temp: 98.1  F (36.7  C) Temp src: Oral BP: 99/55 Pulse: 83   Resp: 20 SpO2: 93 % O2 Device: None (Room air)    Weight: 123 lbs 10.85 oz    Constitutional: awake, fatigued, alert, cooperative, and no apparent distress  Eyes: sclera clear  Respiratory: no increased work of breathing, good air exchange, no retractions, and diminished breath sounds right base and left base  Cardiovascular: regular rate and rhythm and normal S1 and S2  GI: normal bowel sounds, soft, non-distended, and non-tender  Skin: no bruising or bleeding  Musculoskeletal: no lower extremity pitting edema present  Neurologic: Mental Status Exam:  Level of Alertness:   awake, motor hand  4-/5 both hands, general weakness all over   Neuropsychiatric: General: normal, calm, and normal eye contact    Medical Decision Making       50 MINUTES SPENT BY ME on the date of service doing chart review, history, exam, documentation & further activities per the note.      Data     I have personally reviewed the following data over the past 24 hrs:    6.6  \   12.3 (L)   / 213     136 100 6.4 (L) /  90   4.3 29 0.45 (L) \       Imaging results reviewed over the past 24 hrs:   No results found for this or any previous visit (from the past 24 hours).

## 2025-01-16 NOTE — PLAN OF CARE
"Goal Outcome Evaluation:      Problem: Adult Inpatient Plan of Care  Goal: Plan of Care Review  Description: The Plan of Care Review/Shift note should be completed every shift.  The Outcome Evaluation is a brief statement about your assessment that the patient is improving, declining, or no change.  This information will be displayed automatically on your shift  note.  Outcome: Progressing     Problem: Adult Inpatient Plan of Care  Goal: Patient-Specific Goal (Individualized)  Description: You can add care plan individualizations to a care plan. Examples of Individualization might be:  \"Parent requests to be called daily at 9am for status\", \"I have a hard time hearing out of my right ear\", or \"Do not touch me to wake me up as it startles  me\".  Outcome: Progressing     Problem: Adult Inpatient Plan of Care  Goal: Absence of Hospital-Acquired Illness or Injury  Outcome: Progressing     Problem: Pain Acute  Goal: Optimal Pain Control and Function  Outcome: Progressing  A/O x4. VSS. Denied pain. Sacral mepilex applied to denuded coccyx area. Sarah transfer. Assist of one with ADLs.                            "

## 2025-01-16 NOTE — PLAN OF CARE
Alert and orientedx4. Vital signs table. Room air. 02 Sats >94%. Complained of pain covered it with scheduled oxycodone 10mg and effective. Primofit clean, dry and intact. Urine Output of 600ml. Will monitor closely.    Problem: Comorbidity Management  Goal: Maintenance of Asthma Control  Intervention: Maintain Asthma Symptom Control  Goal: Blood Pressure in Desired Range  Outcome: Progressing  Intervention: Maintain Blood Pressure Management     Problem: Pain Chronic (Persistent)  Goal: Optimal Pain Control and Function  Intervention: Optimize Psychosocial Wellbeing  Intervention: Manage Persistent Pain   Event Note/Ortho

## 2025-01-16 NOTE — PLAN OF CARE
Problem: Adult Inpatient Plan of Care  Goal: Absence of Hospital-Acquired Illness or Injury  Intervention: Identify and Manage Fall Risk  Recent Flowsheet Documentation  Taken 1/16/2025 0115 by Lois Sanford RN  Safety Promotion/Fall Prevention: activity supervised     Problem: Adult Inpatient Plan of Care  Goal: Absence of Hospital-Acquired Illness or Injury  Intervention: Prevent Infection  Recent Flowsheet Documentation  Taken 1/16/2025 0115 by Lois Sanford RN  Infection Prevention:   rest/sleep promoted   hand hygiene promoted     Problem: Comorbidity Management  Goal: Maintenance of Asthma Control  Intervention: Maintain Asthma Symptom Control  Recent Flowsheet Documentation  Taken 1/16/2025 0115 by Lois Sanford RN  Medication Review/Management: medications reviewed   Goal Outcome Evaluation:       Patient alert and oriented. Complained of generalized pain, prn tylenol and naproxen given with relief. Turned and repositioned for comfort. He slept between cares.

## 2025-01-17 ENCOUNTER — APPOINTMENT (OUTPATIENT)
Dept: PHYSICAL THERAPY | Facility: HOSPITAL | Age: 75
End: 2025-01-17
Payer: COMMERCIAL

## 2025-01-17 LAB
ANION GAP SERPL CALCULATED.3IONS-SCNC: 8 MMOL/L (ref 7–15)
BUN SERPL-MCNC: 8.9 MG/DL (ref 8–23)
CALCIUM SERPL-MCNC: 9.2 MG/DL (ref 8.8–10.4)
CHLORIDE SERPL-SCNC: 98 MMOL/L (ref 98–107)
CREAT SERPL-MCNC: 0.49 MG/DL (ref 0.67–1.17)
EGFRCR SERPLBLD CKD-EPI 2021: >90 ML/MIN/1.73M2
GLUCOSE SERPL-MCNC: 81 MG/DL (ref 70–99)
HCO3 SERPL-SCNC: 27 MMOL/L (ref 22–29)
MAGNESIUM SERPL-MCNC: 1.8 MG/DL (ref 1.7–2.3)
PHOSPHATE SERPL-MCNC: 2.9 MG/DL (ref 2.5–4.5)
POTASSIUM SERPL-SCNC: 4.8 MMOL/L (ref 3.4–5.3)
SODIUM SERPL-SCNC: 133 MMOL/L (ref 135–145)
URATE SERPL-MCNC: 2.4 MG/DL (ref 3.4–7)

## 2025-01-17 PROCEDURE — 250N000011 HC RX IP 250 OP 636: Performed by: INTERNAL MEDICINE

## 2025-01-17 PROCEDURE — 99232 SBSQ HOSP IP/OBS MODERATE 35: CPT | Performed by: INTERNAL MEDICINE

## 2025-01-17 PROCEDURE — 84100 ASSAY OF PHOSPHORUS: CPT | Performed by: INTERNAL MEDICINE

## 2025-01-17 PROCEDURE — 84550 ASSAY OF BLOOD/URIC ACID: CPT | Performed by: INTERNAL MEDICINE

## 2025-01-17 PROCEDURE — 97110 THERAPEUTIC EXERCISES: CPT | Mod: GP

## 2025-01-17 PROCEDURE — 250N000012 HC RX MED GY IP 250 OP 636 PS 637: Performed by: HOSPITALIST

## 2025-01-17 PROCEDURE — 250N000013 HC RX MED GY IP 250 OP 250 PS 637: Performed by: HOSPITALIST

## 2025-01-17 PROCEDURE — 82374 ASSAY BLOOD CARBON DIOXIDE: CPT | Performed by: INTERNAL MEDICINE

## 2025-01-17 PROCEDURE — 250N000013 HC RX MED GY IP 250 OP 250 PS 637: Performed by: INTERNAL MEDICINE

## 2025-01-17 PROCEDURE — 97530 THERAPEUTIC ACTIVITIES: CPT | Mod: GP

## 2025-01-17 PROCEDURE — 80048 BASIC METABOLIC PNL TOTAL CA: CPT | Performed by: INTERNAL MEDICINE

## 2025-01-17 PROCEDURE — 120N000001 HC R&B MED SURG/OB

## 2025-01-17 PROCEDURE — 999N000156 HC STATISTIC RCP CONSULT EA 30 MIN

## 2025-01-17 PROCEDURE — 83735 ASSAY OF MAGNESIUM: CPT | Performed by: INTERNAL MEDICINE

## 2025-01-17 RX ORDER — KETOROLAC TROMETHAMINE 15 MG/ML
15 INJECTION, SOLUTION INTRAMUSCULAR; INTRAVENOUS ONCE
Status: COMPLETED | OUTPATIENT
Start: 2025-01-17 | End: 2025-01-17

## 2025-01-17 RX ORDER — POLYETHYLENE GLYCOL 3350 17 G/17G
17 POWDER, FOR SOLUTION ORAL DAILY
Status: DISCONTINUED | OUTPATIENT
Start: 2025-01-17 | End: 2025-01-23 | Stop reason: HOSPADM

## 2025-01-17 RX ADMIN — ALBUTEROL SULFATE 2 PUFF: 90 AEROSOL, METERED RESPIRATORY (INHALATION) at 21:13

## 2025-01-17 RX ADMIN — OXYCODONE HYDROCHLORIDE 10 MG: 5 TABLET ORAL at 06:25

## 2025-01-17 RX ADMIN — BACLOFEN 10 MG: 10 TABLET ORAL at 13:16

## 2025-01-17 RX ADMIN — ASPIRIN 81 MG: 81 TABLET, COATED ORAL at 09:45

## 2025-01-17 RX ADMIN — GABAPENTIN 600 MG: 300 CAPSULE ORAL at 13:16

## 2025-01-17 RX ADMIN — CYCLOBENZAPRINE HYDROCHLORIDE 5 MG: 5 TABLET, FILM COATED ORAL at 09:44

## 2025-01-17 RX ADMIN — NICOTINE 1 PATCH: 21 PATCH, EXTENDED RELEASE TRANSDERMAL at 09:46

## 2025-01-17 RX ADMIN — GUAIFENESIN 600 MG: 600 TABLET, EXTENDED RELEASE ORAL at 21:07

## 2025-01-17 RX ADMIN — CEFDINIR 300 MG: 300 CAPSULE ORAL at 09:45

## 2025-01-17 RX ADMIN — RILUZOLE 50 MG: 50 TABLET ORAL at 17:23

## 2025-01-17 RX ADMIN — ALBUTEROL SULFATE 2 PUFF: 90 AEROSOL, METERED RESPIRATORY (INHALATION) at 06:35

## 2025-01-17 RX ADMIN — PREDNISONE 2.5 MG: 2.5 TABLET ORAL at 09:44

## 2025-01-17 RX ADMIN — FLUTICASONE FUROATE AND VILANTEROL TRIFENATATE 1 PUFF: 200; 25 POWDER RESPIRATORY (INHALATION) at 09:43

## 2025-01-17 RX ADMIN — ATORVASTATIN CALCIUM 10 MG: 10 TABLET, FILM COATED ORAL at 21:07

## 2025-01-17 RX ADMIN — LORATADINE 10 MG: 10 TABLET ORAL at 09:45

## 2025-01-17 RX ADMIN — RILUZOLE 50 MG: 50 TABLET ORAL at 09:44

## 2025-01-17 RX ADMIN — BACLOFEN 10 MG: 10 TABLET ORAL at 09:44

## 2025-01-17 RX ADMIN — METOPROLOL TARTRATE 12.5 MG: 25 TABLET, FILM COATED ORAL at 21:07

## 2025-01-17 RX ADMIN — OXYCODONE HYDROCHLORIDE 10 MG: 5 TABLET ORAL at 21:07

## 2025-01-17 RX ADMIN — ALBUTEROL SULFATE 2 PUFF: 90 AEROSOL, METERED RESPIRATORY (INHALATION) at 13:27

## 2025-01-17 RX ADMIN — FLUTICASONE PROPIONATE 1 SPRAY: 50 SPRAY, METERED NASAL at 13:16

## 2025-01-17 RX ADMIN — CYCLOBENZAPRINE HYDROCHLORIDE 5 MG: 5 TABLET, FILM COATED ORAL at 21:08

## 2025-01-17 RX ADMIN — GABAPENTIN 600 MG: 300 CAPSULE ORAL at 09:44

## 2025-01-17 RX ADMIN — BACLOFEN 10 MG: 10 TABLET ORAL at 21:08

## 2025-01-17 RX ADMIN — KETOROLAC TROMETHAMINE 15 MG: 15 INJECTION, SOLUTION INTRAMUSCULAR; INTRAVENOUS at 02:46

## 2025-01-17 RX ADMIN — ENOXAPARIN SODIUM 30 MG: 30 INJECTION SUBCUTANEOUS at 13:16

## 2025-01-17 RX ADMIN — GABAPENTIN 600 MG: 300 CAPSULE ORAL at 21:08

## 2025-01-17 RX ADMIN — ACETAMINOPHEN 1000 MG: 500 TABLET, FILM COATED ORAL at 17:23

## 2025-01-17 RX ADMIN — POLYETHYLENE GLYCOL 3350 17 G: 17 POWDER, FOR SOLUTION ORAL at 14:29

## 2025-01-17 RX ADMIN — OXYCODONE HYDROCHLORIDE 10 MG: 5 TABLET ORAL at 13:16

## 2025-01-17 RX ADMIN — METOPROLOL TARTRATE 12.5 MG: 25 TABLET, FILM COATED ORAL at 09:45

## 2025-01-17 RX ADMIN — PANTOPRAZOLE SODIUM 40 MG: 40 TABLET, DELAYED RELEASE ORAL at 06:25

## 2025-01-17 RX ADMIN — Medication 1 TABLET: at 09:45

## 2025-01-17 RX ADMIN — CEFDINIR 300 MG: 300 CAPSULE ORAL at 21:08

## 2025-01-17 RX ADMIN — TAMSULOSIN HYDROCHLORIDE 0.4 MG: 0.4 CAPSULE ORAL at 09:45

## 2025-01-17 RX ADMIN — GUAIFENESIN 600 MG: 600 TABLET, EXTENDED RELEASE ORAL at 09:45

## 2025-01-17 RX ADMIN — SENNOSIDES, DOCUSATE SODIUM 2 TABLET: 50; 8.6 TABLET, FILM COATED ORAL at 14:31

## 2025-01-17 RX ADMIN — DULOXETINE HYDROCHLORIDE 60 MG: 60 CAPSULE, DELAYED RELEASE ORAL at 09:45

## 2025-01-17 ASSESSMENT — ACTIVITIES OF DAILY LIVING (ADL)
ADLS_ACUITY_SCORE: 72
ADLS_ACUITY_SCORE: 72
ADLS_ACUITY_SCORE: 68
ADLS_ACUITY_SCORE: 73
ADLS_ACUITY_SCORE: 72
ADLS_ACUITY_SCORE: 73
ADLS_ACUITY_SCORE: 68
ADLS_ACUITY_SCORE: 68
ADLS_ACUITY_SCORE: 73
ADLS_ACUITY_SCORE: 68
ADLS_ACUITY_SCORE: 73
ADLS_ACUITY_SCORE: 72
ADLS_ACUITY_SCORE: 68
ADLS_ACUITY_SCORE: 68
ADLS_ACUITY_SCORE: 72
ADLS_ACUITY_SCORE: 68
ADLS_ACUITY_SCORE: 73

## 2025-01-17 NOTE — PLAN OF CARE
Problem: Adult Inpatient Plan of Care  Goal: Plan of Care Review  Description: The Plan of Care Review/Shift note should be completed every shift.  The Outcome Evaluation is a brief statement about your assessment that the patient is improving, declining, or no change.  This information will be displayed automatically on your shift  note.  Outcome: Progressing     Problem: Comorbidity Management  Goal: Maintenance of Asthma Control  Outcome: Progressing  Intervention: Maintain Asthma Symptom Control  Recent Flowsheet Documentation  Taken 1/16/2025 1709 by Shanta Sherman RN  Medication Review/Management: medications reviewed     Problem: Pneumonia  Goal: Fluid Balance  Outcome: Progressing  Intervention: Monitor and Manage Fluid Balance  Recent Flowsheet Documentation  Taken 1/16/2025 1709 by Shanta Sherman, RN  Fluid/Electrolyte Management: fluids provided     Problem: Pain Acute  Goal: Optimal Pain Control and Function  Outcome: Progressing  Intervention: Prevent or Manage Pain  Recent Flowsheet Documentation  Taken 1/16/2025 1709 by Shanta Sherman RN  Medication Review/Management: medications reviewed     Problem: Electrolyte Imbalance  Goal: Electrolyte Balance  Outcome: Progressing  Intervention: Monitor and Manage Electrolyte Imbalance  Recent Flowsheet Documentation  Taken 1/16/2025 1709 by Shanta Sherman RN  Fluid/Electrolyte Management: fluids provided     Problem: Constipation  Goal: Effective Bowel Elimination  Outcome: Progressing     Problem: Adult Inpatient Plan of Care  Goal: Absence of Hospital-Acquired Illness or Injury  Intervention: Identify and Manage Fall Risk  Recent Flowsheet Documentation  Taken 1/16/2025 1709 by Shanta Sherman RN  Safety Promotion/Fall Prevention: activity supervised  Intervention: Prevent Skin Injury  Recent Flowsheet Documentation  Taken 1/16/2025 1709 by Shanta Sherman RN  Body Position:   position maintained   legs elevated  Intervention: Prevent  Infection  Recent Flowsheet Documentation  Taken 1/16/2025 1709 by Shanta Sherman RN  Infection Prevention:   rest/sleep promoted   hand hygiene promoted  Goal: Optimal Comfort and Wellbeing  Intervention: Provide Person-Centered Care  Recent Flowsheet Documentation  Taken 1/16/2025 1709 by Shanta Sherman RN  Trust Relationship/Rapport:   care explained   choices provided     Problem: Pain Acute  Goal: Optimal Pain Control and Function  Intervention: Prevent or Manage Pain  Recent Flowsheet Documentation  Taken 1/16/2025 1709 by Shanta Sherman RN  Medication Review/Management: medications reviewed     Problem: Fall Injury Risk  Goal: Absence of Fall and Fall-Related Injury  Intervention: Identify and Manage Contributors  Recent Flowsheet Documentation  Taken 1/16/2025 1709 by Shanta Sherman RN  Medication Review/Management: medications reviewed  Intervention: Promote Injury-Free Environment  Recent Flowsheet Documentation  Taken 1/16/2025 1709 by Shanta Sherman RN  Safety Promotion/Fall Prevention: activity supervised     Problem: Comorbidity Management  Goal: Blood Pressure in Desired Range  Intervention: Maintain Blood Pressure Management  Recent Flowsheet Documentation  Taken 1/16/2025 1709 by Shanta Sherman RN  Medication Review/Management: medications reviewed     Problem: Pneumonia  Goal: Effective Oxygenation and Ventilation  Intervention: Promote Airway Secretion Clearance  Recent Flowsheet Documentation  Taken 1/16/2025 1709 by Shanta Sherman RN  Cough And Deep Breathing: done independently per patient  Activity Management: activity adjusted per tolerance  Intervention: Optimize Oxygenation and Ventilation  Recent Flowsheet Documentation  Taken 1/16/2025 1709 by Shanta Sherman, RN  Head of Bed (HOB) Positioning: HOB at 20-30 degrees     Problem: Skin Injury Risk Increased  Goal: Skin Health and Integrity  Intervention: Plan: Nurse Driven Intervention: Moisture Management  Recent Flowsheet  Documentation  Taken 1/16/2025 1709 by Shanta Sherman RN  Moisture Interventions: Incontinence pad  Intervention: Plan: Nurse Driven Intervention: Friction and Shear  Recent Flowsheet Documentation  Taken 1/16/2025 1709 by Shanta Sherman RN  Friction/Shear Interventions: HOB 30 degrees or less  Intervention: Optimize Skin Protection  Recent Flowsheet Documentation  Taken 1/16/2025 1709 by Shanta Sherman, RN  Activity Management: activity adjusted per tolerance  Head of Bed (HOB) Positioning: HOB at 20-30 degrees     Problem: Wound  Goal: Optimal Functional Ability  Intervention: Optimize Functional Ability  Recent Flowsheet Documentation  Taken 1/16/2025 1709 by Shanta Sherman, RN  Activity Management: activity adjusted per tolerance  Activity Assistance Provided: assistance, 2 people  Goal: Skin Health and Integrity  Intervention: Optimize Skin Protection  Recent Flowsheet Documentation  Taken 1/16/2025 1709 by Shanta Sherman, RN  Activity Management: activity adjusted per tolerance  Head of Bed (HOB) Positioning: HOB at 20-30 degrees     Problem: Pain Chronic (Persistent)  Goal: Optimal Pain Control and Function  Intervention: Manage Persistent Pain  Recent Flowsheet Documentation  Taken 1/16/2025 1709 by Shanta Sherman, RN  Medication Review/Management: medications reviewed   Goal Outcome Evaluation:                    Pt. A&O. Scheduled pain meds given for pain this shift. RA. Continue with POC.

## 2025-01-17 NOTE — PLAN OF CARE
"Problem: Comorbidity Management  Goal: Blood Pressure in Desired Range  Outcome: Progressing  Intervention: Maintain Blood Pressure Management  Recent Flowsheet Documentation  Taken 1/16/2025 2356 by Sallie Ding RN  Medication Review/Management: medications reviewed     Problem: Pneumonia  Goal: Fluid Balance  Outcome: Progressing  Intervention: Monitor and Manage Fluid Balance  Recent Flowsheet Documentation  Taken 1/16/2025 2356 by Sallie Ding RN  Fluid/Electrolyte Management: fluids provided  Goal: Resolution of Infection Signs and Symptoms  Outcome: Progressing  Goal: Effective Oxygenation and Ventilation  Outcome: Progressing  Intervention: Promote Airway Secretion Clearance  Recent Flowsheet Documentation  Taken 1/16/2025 2356 by Sallie Ding RN  Cough And Deep Breathing: done independently per patient  Activity Management: activity adjusted per tolerance  Intervention: Optimize Oxygenation and Ventilation  Recent Flowsheet Documentation  Taken 1/16/2025 2356 by Sallie Ding RN  Head of Bed (HOB) Positioning: HOB at 20-30 degrees   Goal Outcome Evaluation:       Patient is alert and oriented and able to make needs known.  Complained of 8/10, all over his body but especially in both hips, after scheduled oxycodone and PRN Tylenol.  Paged Dr. Fields and order for Toradol was placed.  Patient fell asleep shortly after.  Potassium protocol- 4.8 this morning, recheck tomorrow morning.  VSS on room air.    /73 (BP Location: Left arm)   Pulse 80   Temp 97.9  F (36.6  C) (Oral)   Resp 19   Ht 1.778 m (5' 10\")   Wt 56.1 kg (123 lb 10.9 oz)   SpO2 100%   BMI 17.75 kg/m      Sallie Ding RN       "

## 2025-01-17 NOTE — PROGRESS NOTES
[facility]    Medicine Progress Note - Hospitalist Service    Date of Admission:  1/12/2025    Assessment & Plan   74 year old male with a past medical history of ALS, asthma, tobacco abuse, hypertension, hyperlipidemia, pulmonary hypertension, chronic neuropathic pain syndrome, BPH, constipation, cervical spine stenosis with myelopathy, decubitus pressure injury who was brought to the emergency department by ambulance from assisted living facility for evaluation of bodyaches, nasal congestion, cough, feeling cold, decreased oral intake, found to have pneumonia and positive coronavirus.     1.Acute corona viral pneumonia  - Negative COVID and influenza, positive coronavirus.  - CT chest, abdomen pelvis showed scattered areas of bilateral bronchial wall thickening most prevalent in the right lower lobe consistent with acute infectious etiology  - Had low-grade fever of 100.2 in spite of taking acetaminophen and ibuprofen on a daily basis  - Normal WBC, procalcitonin and lactic acid; negative SARS-CoV-2/influenza/RSV PCR  - Negative Legionella and streptococcal pneumoniae antigens  - He was treated with IV ceftriaxone and azithromycin  -Switched to oral antibiotic to finish course     2.Moderate persistent asthma  - Ongoing tobacco abuse  - No bronchospasms on exam  - Requires only 2 L on admission, resolved, now on RA  -VBG consistent with metabolic alkalosis  -Continue PTA albuterol HFA, Symbicort (auto substituted with Breo Ellipta)  -DuoNebs as needed  -Flutter valve  -Continue home steroid dose     3.Hyponatremia, hypochloremia with high anion gap  - Secondary to decreased oral intake and PTA hydrochlorothiazide  - UA showed some ketonuria  - Treated in the ED with 1905 mL milliliters IV NS bolus  - Encourage oral intake  - Continue to hold HCTZ-will probably discontinue on discharge as blood pressure on the low side  -Resolved  - Discontinue IV fluid  -1/17       4.Hypokalemia  -Replaced per protocol  -Unremarkable magnesium and phosphorus  -Continue to monitor , today 4.8     5.Chronic neuropathic pain syndrome  - Opioid dependence  - On PTA gabapentin, duloxetine, and as needed oxycodone  - Patient has been refusing buprenorphine patch until he sees prescribing provider, last administered on 12/16/2024     6.Essential hypertension  - Hold PTA hydrochlorothiazide because of hyponatremia as above  - Continue PTA metoprolol  -Blood pressure has been stable, doubt he will need to resume HCTZ on discharge.  - Continue to monitor blood pressure     7.Mild rhabdomyolysis  -Probably secondary to infection  -Elevated CK improved to normal by 1/15, so restarted statin   -D/Continue IV hydration      8.Tobacco abuse  - Smokes 1 pack/day  - Cessation education  - Nicotine patch     9.BPH with LUTS  - Intermittent catheterization  - Continue PTA tamsulosin     10.Chronic steroid use  -On PTA prednisone 2.5 mg daily,  -Probably secondary to COPD versus ALS     11.Hyperlipidemia  -restart PTA atorvastatin because of rhabdo     12.Amyotrophic lateral sclerosis  - Patient receives 24-hour cares and is nonambulatory bedbound  - Continue PTA riluzole, baclofen, cyclobenzaprine, bowel regimen  -Patient uses power wheelchair for ambulation and pt/ot eval transfers  -Care management still discussing needs  needs tcu vs AL --pt wants AL, so likely here over weekend , work on strength and re-eval      13.Decubitus pressure injury, present on admission  -Wound care consult  -Dressing as per wound care     14.Drug-induced platelet defect  -On PTA aspirin, monitor for bleeding     15.Incidental radiographic findings  -Irregular opacity in the lingula measuring 1.4 cm, likely inflammatory though short-term follow-up chest CT may be beneficial to ensure resolution  -Outpatient follow-up--I placed referral in lung nodule service for this I discharge navigator      16.Significant weakness and  "deconditioning  -PT/OT evaluate  -Patient has been using electric wheelchair.  - Patient needs TCU on discharge.      --I called son Darnell to update at 1339--I shared my concerns about need for TCU           Diet: Regular Diet Adult  Snacks/Supplements Adult: Ensure Enlive; Between Meals    DVT Prophylaxis: Enoxaparin (Lovenox) SQ  Sepulveda Catheter: Not present  Lines: PRESENT      PICC 01/13/25 Double Lumen Right Basilic Antibiotic.-Site Assessment: WDL      Cardiac Monitoring: None  Code Status: No CPR- Do NOT Intubate      Clinically Significant Risk Factors         # Hyponatremia: Lowest Na = 133 mmol/L in last 2 days, will monitor as appropriate       # Hypoalbuminemia: Lowest albumin = 3.1 g/dL at 1/14/2025  5:17 AM, will monitor as appropriate     # Hypertension: Noted on problem list            # Cachexia: Estimated body mass index is 17.75 kg/m  as calculated from the following:    Height as of this encounter: 1.778 m (5' 10\").    Weight as of this encounter: 56.1 kg (123 lb 10.9 oz).      # Financial/Environmental Concerns: none         Social Drivers of Health    Depression: At risk (9/19/2024)    PHQ-2     PHQ-2 Score: 3   Housing Stability: Low Risk  (1/14/2025)    Housing Stability     Do you have housing? : Yes     Are you worried about losing your housing?: No   Recent Concern: Housing Stability - High Risk (1/13/2025)    Housing Stability     Do you have housing? : No     Are you worried about losing your housing?: No   Tobacco Use: High Risk (11/13/2024)    Received from [employer]    Patient History     Smoking Tobacco Use: Every Day     Smokeless Tobacco Use: Never    [facility] [employer] [facility] [employer] [facility]    Social Connections          Disposition Plan     Medically Ready for Discharge: Anticipated in 2-4 Days             Gretel Mccann MD  Hospitalist Service  [facility]  Securely message with " Leyla (more info)  Text page via [facility] Paging/Directory   ______________________________________________________________________    Interval History   He feels ok  Less cough  Hungry this am  No stool  Off O2    Physical Exam   Vital Signs: Temp: 98.2  F (36.8  C) Temp src: Oral BP: 106/63 Pulse: 85   Resp: 18 SpO2: 100 % O2 Device: None (Room air)    Weight: 123 lbs 10.85 oz    Constitutional: awake, fatigued, alert, cooperative, and no apparent distress  Respiratory: no increased work of breathing, good air exchange, no retractions, and diminished breath sounds throughout lungs  Cardiovascular: regular rate and rhythm and normal S1 and S2  GI: normal bowel sounds, soft, non-distended, and non-tender  Skin: no bruising or bleeding  Musculoskeletal: no lower extremity pitting edema present  Neurologic: Mental Status Exam:  Level of Alertness:   awake, general weakness, legs >arms   Neuropsychiatric: General: normal, calm, and normal eye contact    Medical Decision Making       35 MINUTES SPENT BY ME on the date of service doing chart review, history, exam, documentation & further activities per the note.      Data     I have personally reviewed the following data over the past 24 hrs:    N/A  \   N/A   / N/A     133 (L) 98 8.9 /  81   4.8 27 0.49 (L) \       Imaging results reviewed over the past 24 hrs:   No results found for this or any previous visit (from the past 24 hours).

## 2025-01-17 NOTE — TREATMENT PLAN
"/76 (BP Location: Left arm)   Pulse 91   Temp 97.6  F (36.4  C) (Oral)   Resp 20   Ht 1.778 m (5' 10\")   Wt 56.5 kg (124 lb 9 oz)   SpO2 93%   BMI 17.87 kg/m       Pt on RA. Good voicing. Pt using IS/Aerobika on own. Will continue BID per [facility] protocol. Pt has no questions regarding use or technique. Breath sounds few crackles. Pt up in chair.    [facility]AT Treatment Plan    Patient Score: 7  Patient Acuity: 4    Clinical Indication for Therapy: prevent atelectasis    Therapy Ordered: BID Aerobika/IS.  Douneb prn.     Assessment Summary: See above.     Tomas Morrell, RT  1/17/2025    "

## 2025-01-17 NOTE — PLAN OF CARE
Problem: Pneumonia  Goal: Fluid Balance  Outcome: Progressing   Goal Outcome Evaluation:       Patient on room air and saturation in the high 90s to 100. Patient does have occasional productive cough, lung sounds diminished. Patient up to chair with assist of two to get to chair but required emeterio to get back to bed. Patient reporting pain in hips and back controlled with scheduled pain medication. Noted patient no bowel movement since admission MD ordered scheduled miralax also administered 2 senna. Bowel sounds positive patient reports passing flatus.

## 2025-01-17 NOTE — PROGRESS NOTES
Care Management Follow Up    Length of Stay (days): 4    Expected Discharge Date: 01/20/2025    Anticipated Discharge Plan:   TBD    Transportation: TBD    PT Recommendations: Transitional Care Facility  OT Recommendations:  Transitional Care Facility     Barriers to Discharge: medical stability    Prior Living Situation: Patient resides at [facility] in [place]. He uses a urinal for voiding and is able to transfer himself to his electric scooter and the toilet for bowel movements. He does not wear oxygen at baseline. He currently is open to Home Care through [facility] for his wound cares. He is able to feed himself. He gets assist with weekly showers, making his bed, medication management, putting his TEDs on and getting dressed if necessary.     Discussed  Partnership in Safe Discharge Planning  document with patient/family: No     Handoff Completed: No, handoff not indicated or clinically appropriate    Patient/Spokesperson Updated: Yes. Who? pt    Additional Information:  Therapy recommendation remains TCU however pt is still declining going to TCU and only wants to go back to his [facility]. Writer tried to call [facility] yesterday and today to see if they could provide more assistance until pt gets stronger as he is not back to his baseline, no answer LVM.    Contacts;  [facility] [phone]  [facility] care manager [phone]     Next Steps: medical progression, discuss discharge plan with RONEY/    Beba Zelaya RN

## 2025-01-18 LAB
ANION GAP SERPL CALCULATED.3IONS-SCNC: 7 MMOL/L (ref 7–15)
BACTERIA BLD CULT: NO GROWTH
BACTERIA BLD CULT: NO GROWTH
BUN SERPL-MCNC: 9.2 MG/DL (ref 8–23)
CALCIUM SERPL-MCNC: 9.3 MG/DL (ref 8.8–10.4)
CHLORIDE SERPL-SCNC: 98 MMOL/L (ref 98–107)
CREAT SERPL-MCNC: 0.54 MG/DL (ref 0.67–1.17)
EGFRCR SERPLBLD CKD-EPI 2021: >90 ML/MIN/1.73M2
ERYTHROCYTE [DISTWIDTH] IN BLOOD BY AUTOMATED COUNT: 15.7 % (ref 10–15)
GLUCOSE SERPL-MCNC: 79 MG/DL (ref 70–99)
HCO3 SERPL-SCNC: 28 MMOL/L (ref 22–29)
HCT VFR BLD AUTO: 37.2 % (ref 40–53)
HGB BLD-MCNC: 12.8 G/DL (ref 13.3–17.7)
MCH RBC QN AUTO: 30.2 PG (ref 26.5–33)
MCHC RBC AUTO-ENTMCNC: 34.4 G/DL (ref 31.5–36.5)
MCV RBC AUTO: 88 FL (ref 78–100)
PLATELET # BLD AUTO: 232 10E3/UL (ref 150–450)
POTASSIUM SERPL-SCNC: 4.4 MMOL/L (ref 3.4–5.3)
RBC # BLD AUTO: 4.24 10E6/UL (ref 4.4–5.9)
SODIUM SERPL-SCNC: 133 MMOL/L (ref 135–145)
WBC # BLD AUTO: 7 10E3/UL (ref 4–11)

## 2025-01-18 PROCEDURE — 250N000013 HC RX MED GY IP 250 OP 250 PS 637: Performed by: INTERNAL MEDICINE

## 2025-01-18 PROCEDURE — 250N000013 HC RX MED GY IP 250 OP 250 PS 637: Performed by: HOSPITALIST

## 2025-01-18 PROCEDURE — 120N000001 HC R&B MED SURG/OB

## 2025-01-18 PROCEDURE — 250N000012 HC RX MED GY IP 250 OP 636 PS 637: Performed by: HOSPITALIST

## 2025-01-18 PROCEDURE — 99232 SBSQ HOSP IP/OBS MODERATE 35: CPT | Performed by: INTERNAL MEDICINE

## 2025-01-18 PROCEDURE — 250N000011 HC RX IP 250 OP 636: Performed by: INTERNAL MEDICINE

## 2025-01-18 PROCEDURE — 80048 BASIC METABOLIC PNL TOTAL CA: CPT | Performed by: INTERNAL MEDICINE

## 2025-01-18 PROCEDURE — 999N000157 HC STATISTIC RCP TIME EA 10 MIN

## 2025-01-18 PROCEDURE — 85027 COMPLETE CBC AUTOMATED: CPT | Performed by: INTERNAL MEDICINE

## 2025-01-18 RX ORDER — AMOXICILLIN 250 MG
1 CAPSULE ORAL 2 TIMES DAILY
Status: DISCONTINUED | OUTPATIENT
Start: 2025-01-18 | End: 2025-01-23 | Stop reason: HOSPADM

## 2025-01-18 RX ORDER — SODIUM PHOSPHATE,MONO-DIBASIC 19G-7G/118
1 ENEMA (ML) RECTAL ONCE
Status: COMPLETED | OUTPATIENT
Start: 2025-01-18 | End: 2025-01-18

## 2025-01-18 RX ADMIN — FLUTICASONE FUROATE AND VILANTEROL TRIFENATATE 1 PUFF: 200; 25 POWDER RESPIRATORY (INHALATION) at 09:53

## 2025-01-18 RX ADMIN — BACLOFEN 10 MG: 10 TABLET ORAL at 21:06

## 2025-01-18 RX ADMIN — Medication 1 TABLET: at 09:55

## 2025-01-18 RX ADMIN — PREDNISONE 2.5 MG: 2.5 TABLET ORAL at 09:55

## 2025-01-18 RX ADMIN — BACLOFEN 10 MG: 10 TABLET ORAL at 14:03

## 2025-01-18 RX ADMIN — ASPIRIN 81 MG: 81 TABLET, COATED ORAL at 09:53

## 2025-01-18 RX ADMIN — OXYCODONE HYDROCHLORIDE 10 MG: 5 TABLET ORAL at 21:07

## 2025-01-18 RX ADMIN — CYCLOBENZAPRINE HYDROCHLORIDE 5 MG: 5 TABLET, FILM COATED ORAL at 21:07

## 2025-01-18 RX ADMIN — GUAIFENESIN 600 MG: 600 TABLET, EXTENDED RELEASE ORAL at 21:07

## 2025-01-18 RX ADMIN — RILUZOLE 50 MG: 50 TABLET ORAL at 09:53

## 2025-01-18 RX ADMIN — ALBUTEROL SULFATE 2 PUFF: 90 AEROSOL, METERED RESPIRATORY (INHALATION) at 17:37

## 2025-01-18 RX ADMIN — DULOXETINE HYDROCHLORIDE 60 MG: 60 CAPSULE, DELAYED RELEASE ORAL at 09:54

## 2025-01-18 RX ADMIN — BACLOFEN 10 MG: 10 TABLET ORAL at 09:54

## 2025-01-18 RX ADMIN — GABAPENTIN 600 MG: 300 CAPSULE ORAL at 14:02

## 2025-01-18 RX ADMIN — CEFDINIR 300 MG: 300 CAPSULE ORAL at 21:07

## 2025-01-18 RX ADMIN — TAMSULOSIN HYDROCHLORIDE 0.4 MG: 0.4 CAPSULE ORAL at 09:54

## 2025-01-18 RX ADMIN — ALBUTEROL SULFATE 2 PUFF: 90 AEROSOL, METERED RESPIRATORY (INHALATION) at 12:52

## 2025-01-18 RX ADMIN — ALBUTEROL SULFATE 2 PUFF: 90 AEROSOL, METERED RESPIRATORY (INHALATION) at 03:38

## 2025-01-18 RX ADMIN — ENOXAPARIN SODIUM 30 MG: 30 INJECTION SUBCUTANEOUS at 14:02

## 2025-01-18 RX ADMIN — ALBUTEROL SULFATE 2 PUFF: 90 AEROSOL, METERED RESPIRATORY (INHALATION) at 21:14

## 2025-01-18 RX ADMIN — OXYCODONE HYDROCHLORIDE 10 MG: 5 TABLET ORAL at 05:17

## 2025-01-18 RX ADMIN — CEFDINIR 300 MG: 300 CAPSULE ORAL at 09:55

## 2025-01-18 RX ADMIN — POLYETHYLENE GLYCOL 3350 17 G: 17 POWDER, FOR SOLUTION ORAL at 09:53

## 2025-01-18 RX ADMIN — SENNOSIDES AND DOCUSATE SODIUM 1 TABLET: 50; 8.6 TABLET ORAL at 21:07

## 2025-01-18 RX ADMIN — FLUTICASONE PROPIONATE 1 SPRAY: 50 SPRAY, METERED NASAL at 14:02

## 2025-01-18 RX ADMIN — METOPROLOL TARTRATE 12.5 MG: 25 TABLET, FILM COATED ORAL at 21:07

## 2025-01-18 RX ADMIN — SODIUM PHOSPHATE, DIBASIC AND SODIUM PHOSPHATE, MONOBASIC 1 ENEMA: 7; 19 ENEMA RECTAL at 11:29

## 2025-01-18 RX ADMIN — PANTOPRAZOLE SODIUM 40 MG: 40 TABLET, DELAYED RELEASE ORAL at 07:03

## 2025-01-18 RX ADMIN — SENNOSIDES AND DOCUSATE SODIUM 1 TABLET: 50; 8.6 TABLET ORAL at 14:03

## 2025-01-18 RX ADMIN — ATORVASTATIN CALCIUM 10 MG: 10 TABLET, FILM COATED ORAL at 21:07

## 2025-01-18 RX ADMIN — CYCLOBENZAPRINE HYDROCHLORIDE 5 MG: 5 TABLET, FILM COATED ORAL at 09:55

## 2025-01-18 RX ADMIN — LORATADINE 10 MG: 10 TABLET ORAL at 09:54

## 2025-01-18 RX ADMIN — GUAIFENESIN 600 MG: 600 TABLET, EXTENDED RELEASE ORAL at 09:54

## 2025-01-18 RX ADMIN — RILUZOLE 50 MG: 50 TABLET ORAL at 17:37

## 2025-01-18 RX ADMIN — ACETAMINOPHEN 1000 MG: 500 TABLET, FILM COATED ORAL at 01:26

## 2025-01-18 RX ADMIN — OXYCODONE HYDROCHLORIDE 10 MG: 5 TABLET ORAL at 14:03

## 2025-01-18 RX ADMIN — GABAPENTIN 600 MG: 300 CAPSULE ORAL at 21:07

## 2025-01-18 RX ADMIN — METOPROLOL TARTRATE 12.5 MG: 25 TABLET, FILM COATED ORAL at 09:53

## 2025-01-18 RX ADMIN — GABAPENTIN 600 MG: 300 CAPSULE ORAL at 09:54

## 2025-01-18 ASSESSMENT — ACTIVITIES OF DAILY LIVING (ADL)
ADLS_ACUITY_SCORE: 68
ADLS_ACUITY_SCORE: 73
ADLS_ACUITY_SCORE: 73
ADLS_ACUITY_SCORE: 68
ADLS_ACUITY_SCORE: 73
ADLS_ACUITY_SCORE: 68
ADLS_ACUITY_SCORE: 68
ADLS_ACUITY_SCORE: 64
ADLS_ACUITY_SCORE: 73
ADLS_ACUITY_SCORE: 68
ADLS_ACUITY_SCORE: 68
ADLS_ACUITY_SCORE: 64
ADLS_ACUITY_SCORE: 64
ADLS_ACUITY_SCORE: 73
ADLS_ACUITY_SCORE: 73
ADLS_ACUITY_SCORE: 68
ADLS_ACUITY_SCORE: 68
ADLS_ACUITY_SCORE: 73

## 2025-01-18 NOTE — PLAN OF CARE
Problem: Comorbidity Management  Goal: Maintenance of Asthma Control  Outcome: Progressing     Problem: Comorbidity Management  Goal: Blood Pressure in Desired Range  Outcome: Progressing     Problem: Pneumonia  Goal: Resolution of Infection Signs and Symptoms  Outcome: Progressing     Problem: Pneumonia  Goal: Effective Oxygenation and Ventilation  Outcome: Progressing     Problem: Constipation  Goal: Effective Bowel Elimination  Outcome: Progressing     Problem: Wound  Goal: Optimal Pain Control and Function  Outcome: Progressing     Problem: Wound  Goal: Optimal Wound Healing  Outcome: Progressing     Problem: Pain Chronic (Persistent)  Goal: Optimal Pain Control and Function  Outcome: Progressing   Goal Outcome Evaluation:    Patient slept between cares. Alert and oriented x4, able to make needs known. VSS on room air. Chronic pain managed with prn tylenol and scheduled oxycodone. Received prn inhaler x1 per request.Turned in bed for comfort. Primofit in place with good urine output. Plan of care ongoing.

## 2025-01-18 NOTE — PLAN OF CARE
Problem: Adult Inpatient Plan of Care  Goal: Plan of Care Review  Description: The Plan of Care Review/Shift note should be completed every shift.  The Outcome Evaluation is a brief statement about your assessment that the patient is improving, declining, or no change.  This information will be displayed automatically on your shift  note.  Outcome: Progressing     Problem: Comorbidity Management  Goal: Maintenance of Asthma Control  Outcome: Progressing  Intervention: Maintain Asthma Symptom Control  Recent Flowsheet Documentation  Taken 1/17/2025 1657 by Shanta Sherman RN  Medication Review/Management: medications reviewed     Problem: Pneumonia  Goal: Fluid Balance  Outcome: Progressing  Intervention: Monitor and Manage Fluid Balance  Recent Flowsheet Documentation  Taken 1/17/2025 1657 by Shanta Sherman, RN  Fluid/Electrolyte Management: fluids provided     Problem: Pain Acute  Goal: Optimal Pain Control and Function  Outcome: Progressing  Intervention: Develop Pain Management Plan  Recent Flowsheet Documentation  Taken 1/17/2025 1656 by Shanta Sherman, RN  Pain Management Interventions: medication (see MAR)  Intervention: Prevent or Manage Pain  Recent Flowsheet Documentation  Taken 1/17/2025 1657 by Shanta Sherman RN  Medication Review/Management: medications reviewed     Problem: Electrolyte Imbalance  Goal: Electrolyte Balance  Outcome: Progressing  Intervention: Monitor and Manage Electrolyte Imbalance  Recent Flowsheet Documentation  Taken 1/17/2025 1657 by Shanta Sherman RN  Fluid/Electrolyte Management: fluids provided     Problem: Skin Injury Risk Increased  Goal: Skin Health and Integrity  Outcome: Progressing  Intervention: Plan: Nurse Driven Intervention: Moisture Management  Recent Flowsheet Documentation  Taken 1/17/2025 1657 by Shanta Sherman RN  Moisture Interventions: Incontinence pad  Intervention: Plan: Nurse Driven Intervention: Friction and Shear  Recent Flowsheet  Documentation  Taken 1/17/2025 1657 by Shanta Sherman RN  Friction/Shear Interventions: HOB 30 degrees or less  Intervention: Optimize Skin Protection  Recent Flowsheet Documentation  Taken 1/17/2025 1657 by Shanta Sherman RN  Activity Management: activity adjusted per tolerance  Head of Bed (HOB) Positioning: HOB at 20-30 degrees     Problem: Wound  Goal: Optimal Coping  Outcome: Progressing     Problem: Pain Chronic (Persistent)  Goal: Optimal Pain Control and Function  Outcome: Progressing  Intervention: Develop Pain Management Plan  Recent Flowsheet Documentation  Taken 1/17/2025 1656 by Shanta Sherman RN  Pain Management Interventions: medication (see MAR)  Intervention: Manage Persistent Pain  Recent Flowsheet Documentation  Taken 1/17/2025 1657 by Shanta Sherman RN  Medication Review/Management: medications reviewed     Problem: Adult Inpatient Plan of Care  Goal: Absence of Hospital-Acquired Illness or Injury  Intervention: Identify and Manage Fall Risk  Recent Flowsheet Documentation  Taken 1/17/2025 1657 by Shanta Sherman RN  Safety Promotion/Fall Prevention: activity supervised  Intervention: Prevent Skin Injury  Recent Flowsheet Documentation  Taken 1/17/2025 1657 by Shanta Sherman RN  Body Position:   position maintained   legs elevated  Intervention: Prevent Infection  Recent Flowsheet Documentation  Taken 1/17/2025 1657 by Shanta Sherman RN  Infection Prevention:   rest/sleep promoted   hand hygiene promoted  Goal: Optimal Comfort and Wellbeing  Intervention: Monitor Pain and Promote Comfort  Recent Flowsheet Documentation  Taken 1/17/2025 1656 by Shanta Sherman RN  Pain Management Interventions: medication (see MAR)  Intervention: Provide Person-Centered Care  Recent Flowsheet Documentation  Taken 1/17/2025 1657 by Shanta Sherman RN  Trust Relationship/Rapport:   care explained   choices provided     Problem: Pain Acute  Goal: Optimal Pain Control and Function  Intervention: Develop  Pain Management Plan  Recent Flowsheet Documentation  Taken 1/17/2025 1656 by Shanta Sherman, RN  Pain Management Interventions: medication (see MAR)  Intervention: Prevent or Manage Pain  Recent Flowsheet Documentation  Taken 1/17/2025 1657 by Shanta Sherman RN  Medication Review/Management: medications reviewed     Problem: Fall Injury Risk  Goal: Absence of Fall and Fall-Related Injury  Intervention: Identify and Manage Contributors  Recent Flowsheet Documentation  Taken 1/17/2025 1657 by Shanta Sherman RN  Medication Review/Management: medications reviewed  Intervention: Promote Injury-Free Environment  Recent Flowsheet Documentation  Taken 1/17/2025 1657 by Shanta Sherman, RN  Safety Promotion/Fall Prevention: activity supervised     Problem: Comorbidity Management  Goal: Blood Pressure in Desired Range  Intervention: Maintain Blood Pressure Management  Recent Flowsheet Documentation  Taken 1/17/2025 1657 by Shanta Sherman RN  Medication Review/Management: medications reviewed     Problem: Pneumonia  Goal: Effective Oxygenation and Ventilation  Intervention: Promote Airway Secretion Clearance  Recent Flowsheet Documentation  Taken 1/17/2025 1657 by Shanta Sherman RN  Cough And Deep Breathing: done independently per patient  Activity Management: activity adjusted per tolerance  Intervention: Optimize Oxygenation and Ventilation  Recent Flowsheet Documentation  Taken 1/17/2025 1657 by Shanta Sherman, RN  Head of Bed (HOB) Positioning: HOB at 20-30 degrees     Problem: Wound  Goal: Optimal Functional Ability  Intervention: Optimize Functional Ability  Recent Flowsheet Documentation  Taken 1/17/2025 1657 by Shanta Sherman, RN  Activity Management: activity adjusted per tolerance  Activity Assistance Provided: assistance, 2 people  Goal: Optimal Pain Control and Function  Intervention: Prevent or Manage Pain  Recent Flowsheet Documentation  Taken 1/17/2025 1656 by Shanta Sherman, RN  Pain Management  Interventions: medication (see MAR)  Goal: Skin Health and Integrity  Intervention: Optimize Skin Protection  Recent Flowsheet Documentation  Taken 1/17/2025 1657 by Shanta Sherman, RN  Activity Management: activity adjusted per tolerance  Head of Bed (HOB) Positioning: HOB at 20-30 degrees   Goal Outcome Evaluation:                    Pt. Alert and calm this shift. Scheduled Oxy along other pain meds given. RA. Prima fit in place. Continue with POC.

## 2025-01-18 NOTE — PROGRESS NOTES
[facility]    Medicine Progress Note - Hospitalist Service    Date of Admission:  1/12/2025    Assessment & Plan   74 year old male with a past medical history of ALS, asthma, tobacco abuse, hypertension, hyperlipidemia, pulmonary hypertension, chronic neuropathic pain syndrome, BPH, constipation, cervical spine stenosis with myelopathy, decubitus pressure injury who was brought to the emergency department by ambulance from assisted living facility for evaluation of bodyaches, nasal congestion, cough, feeling cold, decreased oral intake, found to have pneumonia and positive coronavirus.     1.Acute corona viral pneumonia  - Negative COVID and influenza, positive coronavirus.  - CT chest, abdomen pelvis showed scattered areas of bilateral bronchial wall thickening most prevalent in the right lower lobe consistent with acute infectious etiology  - Had low-grade fever of 100.2 in spite of taking acetaminophen and ibuprofen on a daily basis  - Normal WBC, procalcitonin and lactic acid; negative SARS-CoV-2/influenza/RSV PCR  - Negative Legionella and streptococcal pneumoniae antigens  - He was treated with IV ceftriaxone and azithromycin  -Switched to oral antibiotic to finish course  -he notes still some cough     2.Moderate persistent asthma  - Ongoing tobacco abuse  - No bronchospasms on exam  - Requires only 2 L on admission, resolved, now on RA  -VBG consistent with metabolic alkalosis  -Continue PTA albuterol HFA, Symbicort (auto substituted with Breo Ellipta)  -DuoNebs as needed  -Flutter valve  -Continue home steroid dose     3.Hyponatremia, hypochloremia with high anion gap  - Secondary to decreased oral intake and PTA hydrochlorothiazide  - UA showed some ketonuria  - Treated in the ED with 1905 mL milliliters IV NS bolus  - Encourage oral intake  - Continue to hold HCTZ-will probably discontinue on discharge as blood pressure on the low side  -Resolved  - Discontinue IV fluid  -1/18        4.Hypokalemia  -Replaced per protocol  -Unremarkable magnesium and phosphorus  -Continue to monitor , today 4.4     5.Chronic neuropathic pain syndrome  - Opioid dependence  - On PTA gabapentin, duloxetine, and as needed oxycodone  - Patient has been refusing buprenorphine patch until he sees prescribing provider, last administered on 12/16/2024     6.Essential hypertension  - Hold PTA hydrochlorothiazide because of hyponatremia as above  - Continue PTA metoprolol  -Blood pressure has been stable, doubt he will need to resume HCTZ on discharge.  - Continue to monitor blood pressure     7.Mild rhabdomyolysis  -Probably secondary to infection  -Elevated CK improved to normal by 1/15, so restarted statin   -D/Continue IV hydration      8.Tobacco abuse  - Smokes 1 pack/day  - Cessation education  - Nicotine patch     9.BPH with LUTS  - Intermittent catheterization  - Continue PTA tamsulosin     10.Chronic steroid use  -On PTA prednisone 2.5 mg daily,  -Probably secondary to COPD versus ALS     11.Hyperlipidemia  -restart PTA atorvastatin because of rhabdo     12.Amyotrophic lateral sclerosis  - Patient receives 24-hour cares and is nonambulatory bedbound  - Continue PTA riluzole, baclofen, cyclobenzaprine, bowel regimen  -Patient uses power wheelchair for ambulation and pt/ot eval transfers  -Care management still discussing needs  needs tcu vs AL --pt wants AL, so likely here over weekend , work on strength and re-eval      13.Decubitus pressure injury, present on admission  -Wound care consult  -Dressing as per wound care     14.Drug-induced platelet defect  -On PTA aspirin, monitor for bleeding     15.Incidental radiographic findings  -Irregular opacity in the lingula measuring 1.4 cm, likely inflammatory though short-term follow-up chest CT may be beneficial to ensure resolution  -Outpatient follow-up--I placed referral in lung nodule service for this I discharge navigator      16.Significant weakness and  "deconditioning  -PT/OT evaluate  -Patient has been using electric wheelchair.  - Patient needs TCU on discharge.    17.Constipation  -on meds, Miralax, senna, doc  -I offered bisacodyl supp, but he told me he feels he needs enema and uses them at home too                 Diet: Regular Diet Adult  Snacks/Supplements Adult: Ensure Enlive; Between Meals    DVT Prophylaxis: Enoxaparin (Lovenox) SQ  Sepulveda Catheter: Not present  Lines: PRESENT      PICC 01/13/25 Double Lumen Right Basilic Antibiotic.-Site Assessment: WDL      Cardiac Monitoring: None  Code Status: No CPR- Do NOT Intubate      Clinically Significant Risk Factors         # Hyponatremia: Lowest Na = 133 mmol/L in last 2 days, will monitor as appropriate       # Hypoalbuminemia: Lowest albumin = 3.1 g/dL at 1/14/2025  5:17 AM, will monitor as appropriate     # Hypertension: Noted on problem list            # Cachexia: Estimated body mass index is 17.87 kg/m  as calculated from the following:    Height as of this encounter: 1.778 m (5' 10\").    Weight as of this encounter: 56.5 kg (124 lb 9 oz).      # Financial/Environmental Concerns: none         Social Drivers of Health    Depression: At risk (9/19/2024)    PHQ-2     PHQ-2 Score: 3   Housing Stability: Low Risk  (1/14/2025)    Housing Stability     Do you have housing? : Yes     Are you worried about losing your housing?: No   Recent Concern: Housing Stability - High Risk (1/13/2025)    Housing Stability     Do you have housing? : No     Are you worried about losing your housing?: No   Tobacco Use: High Risk (11/13/2024)    Received from [facility]    Patient History     Smoking Tobacco Use: Every Day     Smokeless Tobacco Use: Never    [facility]    Social Connections          Disposition Plan     Medically Ready for Discharge: Anticipated in 2-4 Days             Gretel Mccann MD  Hospitalist Service  M " [facility]  Securely message with [employer] (more info)  Text page via [employer] Paging/Directory   ______________________________________________________________________    Interval History   Does not feel well  Still no bm, notes at home uses enema and requests it today, does not want supp  Still some cough, no sputum    Physical Exam   Vital Signs: Temp: 98  F (36.7  C) Temp src: Oral BP: 130/71 Pulse: 90   Resp: 16 SpO2: 91 % O2 Device: None (Room air)    Weight: 124 lbs 8.96 oz    Constitutional: awake, alert, cooperative, and no apparent distress  Eyes: sclera clear  Respiratory: no increased work of breathing, good air exchange, no retractions, and diminished breath sounds throughout lungs  Cardiovascular: regular rate and rhythm and normal S1 and S2  GI: normal bowel sounds, soft, non-distended, and non-tender  Skin: no bruising or bleeding  Musculoskeletal: no lower extremity pitting edema present  Neurologic: Mental Status Exam:  Level of Alertness:   awake, motor weak all over   Neuropsychiatric: Affect: flat    Medical Decision Making       35 MINUTES SPENT BY ME on the date of service doing chart review, history, exam, documentation & further activities per the note.      Data     I have personally reviewed the following data over the past 24 hrs:    7.0  \   12.8 (L)   / 232     133 (L) 98 9.2 /  79   4.4 28 0.54 (L) \       Imaging results reviewed over the past 24 hrs:   No results found for this or any previous visit (from the past 24 hours).

## 2025-01-19 ENCOUNTER — MEDICAL CORRESPONDENCE (OUTPATIENT)
Dept: HEALTH INFORMATION MANAGEMENT | Facility: CLINIC | Age: 75
End: 2025-01-19
Payer: COMMERCIAL

## 2025-01-19 LAB
ANION GAP SERPL CALCULATED.3IONS-SCNC: 13 MMOL/L (ref 7–15)
BUN SERPL-MCNC: 9.1 MG/DL (ref 8–23)
CALCIUM SERPL-MCNC: 9.8 MG/DL (ref 8.8–10.4)
CHLORIDE SERPL-SCNC: 97 MMOL/L (ref 98–107)
CREAT SERPL-MCNC: 0.53 MG/DL (ref 0.67–1.17)
EGFRCR SERPLBLD CKD-EPI 2021: >90 ML/MIN/1.73M2
ERYTHROCYTE [DISTWIDTH] IN BLOOD BY AUTOMATED COUNT: 15.6 % (ref 10–15)
GLUCOSE BLDC GLUCOMTR-MCNC: 196 MG/DL (ref 70–99)
GLUCOSE SERPL-MCNC: 76 MG/DL (ref 70–99)
HCO3 SERPL-SCNC: 23 MMOL/L (ref 22–29)
HCT VFR BLD AUTO: 38.6 % (ref 40–53)
HGB BLD-MCNC: 13.4 G/DL (ref 13.3–17.7)
MAGNESIUM SERPL-MCNC: 1.8 MG/DL (ref 1.7–2.3)
MCH RBC QN AUTO: 30.1 PG (ref 26.5–33)
MCHC RBC AUTO-ENTMCNC: 34.7 G/DL (ref 31.5–36.5)
MCV RBC AUTO: 87 FL (ref 78–100)
PHOSPHATE SERPL-MCNC: 3.2 MG/DL (ref 2.5–4.5)
PLATELET # BLD AUTO: 312 10E3/UL (ref 150–450)
POTASSIUM SERPL-SCNC: 4.5 MMOL/L (ref 3.4–5.3)
POTASSIUM SERPL-SCNC: 4.5 MMOL/L (ref 3.4–5.3)
RBC # BLD AUTO: 4.45 10E6/UL (ref 4.4–5.9)
SODIUM SERPL-SCNC: 133 MMOL/L (ref 135–145)
WBC # BLD AUTO: 8.3 10E3/UL (ref 4–11)

## 2025-01-19 PROCEDURE — 250N000013 HC RX MED GY IP 250 OP 250 PS 637: Performed by: INTERNAL MEDICINE

## 2025-01-19 PROCEDURE — 94799 UNLISTED PULMONARY SVC/PX: CPT

## 2025-01-19 PROCEDURE — 84132 ASSAY OF SERUM POTASSIUM: CPT | Performed by: INTERNAL MEDICINE

## 2025-01-19 PROCEDURE — 83735 ASSAY OF MAGNESIUM: CPT | Performed by: INTERNAL MEDICINE

## 2025-01-19 PROCEDURE — 120N000001 HC R&B MED SURG/OB

## 2025-01-19 PROCEDURE — 99232 SBSQ HOSP IP/OBS MODERATE 35: CPT | Performed by: INTERNAL MEDICINE

## 2025-01-19 PROCEDURE — 250N000011 HC RX IP 250 OP 636: Performed by: INTERNAL MEDICINE

## 2025-01-19 PROCEDURE — 250N000013 HC RX MED GY IP 250 OP 250 PS 637: Performed by: HOSPITALIST

## 2025-01-19 PROCEDURE — 80048 BASIC METABOLIC PNL TOTAL CA: CPT | Performed by: INTERNAL MEDICINE

## 2025-01-19 PROCEDURE — 85014 HEMATOCRIT: CPT | Performed by: INTERNAL MEDICINE

## 2025-01-19 PROCEDURE — 250N000012 HC RX MED GY IP 250 OP 636 PS 637: Performed by: HOSPITALIST

## 2025-01-19 PROCEDURE — 84100 ASSAY OF PHOSPHORUS: CPT | Performed by: INTERNAL MEDICINE

## 2025-01-19 PROCEDURE — 85049 AUTOMATED PLATELET COUNT: CPT | Performed by: INTERNAL MEDICINE

## 2025-01-19 PROCEDURE — 999N000157 HC STATISTIC RCP TIME EA 10 MIN

## 2025-01-19 RX ADMIN — FLUTICASONE PROPIONATE 1 SPRAY: 50 SPRAY, METERED NASAL at 13:56

## 2025-01-19 RX ADMIN — Medication 1 TABLET: at 09:58

## 2025-01-19 RX ADMIN — GABAPENTIN 600 MG: 300 CAPSULE ORAL at 09:58

## 2025-01-19 RX ADMIN — ATORVASTATIN CALCIUM 10 MG: 10 TABLET, FILM COATED ORAL at 21:00

## 2025-01-19 RX ADMIN — BACLOFEN 10 MG: 10 TABLET ORAL at 13:55

## 2025-01-19 RX ADMIN — OXYCODONE HYDROCHLORIDE 10 MG: 5 TABLET ORAL at 06:22

## 2025-01-19 RX ADMIN — NICOTINE 1 PATCH: 21 PATCH, EXTENDED RELEASE TRANSDERMAL at 10:00

## 2025-01-19 RX ADMIN — CYCLOBENZAPRINE HYDROCHLORIDE 5 MG: 5 TABLET, FILM COATED ORAL at 09:59

## 2025-01-19 RX ADMIN — CEFDINIR 300 MG: 300 CAPSULE ORAL at 09:59

## 2025-01-19 RX ADMIN — ALBUTEROL SULFATE 2 PUFF: 90 AEROSOL, METERED RESPIRATORY (INHALATION) at 21:04

## 2025-01-19 RX ADMIN — GABAPENTIN 600 MG: 300 CAPSULE ORAL at 20:59

## 2025-01-19 RX ADMIN — NAPROXEN 250 MG: 250 TABLET ORAL at 17:55

## 2025-01-19 RX ADMIN — GUAIFENESIN 600 MG: 600 TABLET, EXTENDED RELEASE ORAL at 21:00

## 2025-01-19 RX ADMIN — ALBUTEROL SULFATE 2 PUFF: 90 AEROSOL, METERED RESPIRATORY (INHALATION) at 16:36

## 2025-01-19 RX ADMIN — BACLOFEN 10 MG: 10 TABLET ORAL at 21:01

## 2025-01-19 RX ADMIN — CYCLOBENZAPRINE HYDROCHLORIDE 5 MG: 5 TABLET, FILM COATED ORAL at 21:00

## 2025-01-19 RX ADMIN — BACLOFEN 10 MG: 10 TABLET ORAL at 09:57

## 2025-01-19 RX ADMIN — SENNOSIDES AND DOCUSATE SODIUM 1 TABLET: 50; 8.6 TABLET ORAL at 21:00

## 2025-01-19 RX ADMIN — OXYCODONE HYDROCHLORIDE 10 MG: 5 TABLET ORAL at 13:55

## 2025-01-19 RX ADMIN — LORATADINE 10 MG: 10 TABLET ORAL at 09:58

## 2025-01-19 RX ADMIN — RILUZOLE 50 MG: 50 TABLET ORAL at 09:56

## 2025-01-19 RX ADMIN — METOPROLOL TARTRATE 12.5 MG: 25 TABLET, FILM COATED ORAL at 09:58

## 2025-01-19 RX ADMIN — OXYCODONE HYDROCHLORIDE 10 MG: 5 TABLET ORAL at 21:00

## 2025-01-19 RX ADMIN — PREDNISONE 2.5 MG: 2.5 TABLET ORAL at 09:59

## 2025-01-19 RX ADMIN — RILUZOLE 50 MG: 50 TABLET ORAL at 17:56

## 2025-01-19 RX ADMIN — CEFDINIR 300 MG: 300 CAPSULE ORAL at 20:59

## 2025-01-19 RX ADMIN — ASPIRIN 81 MG: 81 TABLET, COATED ORAL at 09:59

## 2025-01-19 RX ADMIN — DULOXETINE HYDROCHLORIDE 60 MG: 60 CAPSULE, DELAYED RELEASE ORAL at 09:58

## 2025-01-19 RX ADMIN — SENNOSIDES AND DOCUSATE SODIUM 1 TABLET: 50; 8.6 TABLET ORAL at 09:58

## 2025-01-19 RX ADMIN — GUAIFENESIN 600 MG: 600 TABLET, EXTENDED RELEASE ORAL at 09:59

## 2025-01-19 RX ADMIN — ACETAMINOPHEN 1000 MG: 500 TABLET, FILM COATED ORAL at 17:56

## 2025-01-19 RX ADMIN — METOPROLOL TARTRATE 12.5 MG: 25 TABLET, FILM COATED ORAL at 21:00

## 2025-01-19 RX ADMIN — FLUTICASONE FUROATE AND VILANTEROL TRIFENATATE 1 PUFF: 200; 25 POWDER RESPIRATORY (INHALATION) at 09:56

## 2025-01-19 RX ADMIN — GABAPENTIN 600 MG: 300 CAPSULE ORAL at 13:55

## 2025-01-19 RX ADMIN — ENOXAPARIN SODIUM 30 MG: 30 INJECTION SUBCUTANEOUS at 13:56

## 2025-01-19 RX ADMIN — TAMSULOSIN HYDROCHLORIDE 0.4 MG: 0.4 CAPSULE ORAL at 09:57

## 2025-01-19 RX ADMIN — PANTOPRAZOLE SODIUM 40 MG: 40 TABLET, DELAYED RELEASE ORAL at 06:23

## 2025-01-19 ASSESSMENT — ACTIVITIES OF DAILY LIVING (ADL)
ADLS_ACUITY_SCORE: 68
ADLS_ACUITY_SCORE: 64
ADLS_ACUITY_SCORE: 69
ADLS_ACUITY_SCORE: 64
ADLS_ACUITY_SCORE: 73
ADLS_ACUITY_SCORE: 64
ADLS_ACUITY_SCORE: 73
ADLS_ACUITY_SCORE: 63
ADLS_ACUITY_SCORE: 73
ADLS_ACUITY_SCORE: 68
ADLS_ACUITY_SCORE: 64
ADLS_ACUITY_SCORE: 64
ADLS_ACUITY_SCORE: 63
ADLS_ACUITY_SCORE: 68
ADLS_ACUITY_SCORE: 68
ADLS_ACUITY_SCORE: 64
ADLS_ACUITY_SCORE: 64
ADLS_ACUITY_SCORE: 63
ADLS_ACUITY_SCORE: 73
ADLS_ACUITY_SCORE: 68
ADLS_ACUITY_SCORE: 64

## 2025-01-19 NOTE — PLAN OF CARE
Problem: Adult Inpatient Plan of Care  Goal: Plan of Care Review  Description: The Plan of Care Review/Shift note should be completed every shift.  The Outcome Evaluation is a brief statement about your assessment that the patient is improving, declining, or no change.  This information will be displayed automatically on your shift  note.  Outcome: Progressing     Problem: Comorbidity Management  Goal: Maintenance of Asthma Control  Outcome: Progressing  Intervention: Maintain Asthma Symptom Control  Recent Flowsheet Documentation  Taken 1/18/2025 1700 by Shanta Sherman RN  Medication Review/Management: medications reviewed     Problem: Pneumonia  Goal: Fluid Balance  Outcome: Progressing  Intervention: Monitor and Manage Fluid Balance  Recent Flowsheet Documentation  Taken 1/18/2025 1700 by Shanta Sherman RN  Fluid/Electrolyte Management: fluids provided     Problem: Pain Acute  Goal: Optimal Pain Control and Function  Outcome: Progressing  Intervention: Prevent or Manage Pain  Recent Flowsheet Documentation  Taken 1/18/2025 1700 by Shanta Sherman RN  Medication Review/Management: medications reviewed     Problem: Constipation  Goal: Effective Bowel Elimination  Outcome: Progressing     Problem: Adult Inpatient Plan of Care  Goal: Absence of Hospital-Acquired Illness or Injury  Intervention: Identify and Manage Fall Risk  Recent Flowsheet Documentation  Taken 1/18/2025 1700 by Shanta Sherman RN  Safety Promotion/Fall Prevention: activity supervised  Intervention: Prevent Skin Injury  Recent Flowsheet Documentation  Taken 1/18/2025 1700 by Shanta Sherman RN  Body Position:   position maintained   legs elevated  Intervention: Prevent Infection  Recent Flowsheet Documentation  Taken 1/18/2025 1700 by Shanta Sherman RN  Infection Prevention:   rest/sleep promoted   hand hygiene promoted  Goal: Optimal Comfort and Wellbeing  Intervention: Provide Person-Centered Care  Recent Flowsheet Documentation  Taken  1/18/2025 1700 by Shanta Sherman RN  Trust Relationship/Rapport:   care explained   choices provided     Problem: Pain Acute  Goal: Optimal Pain Control and Function  Intervention: Prevent or Manage Pain  Recent Flowsheet Documentation  Taken 1/18/2025 1700 by Shanta Sherman RN  Medication Review/Management: medications reviewed     Problem: Fall Injury Risk  Goal: Absence of Fall and Fall-Related Injury  Intervention: Identify and Manage Contributors  Recent Flowsheet Documentation  Taken 1/18/2025 1700 by Shanta Sherman RN  Medication Review/Management: medications reviewed  Intervention: Promote Injury-Free Environment  Recent Flowsheet Documentation  Taken 1/18/2025 1700 by Shanta Shemran, RN  Safety Promotion/Fall Prevention: activity supervised     Problem: Comorbidity Management  Goal: Blood Pressure in Desired Range  Intervention: Maintain Blood Pressure Management  Recent Flowsheet Documentation  Taken 1/18/2025 1700 by Shanta Sherman RN  Medication Review/Management: medications reviewed     Problem: Pneumonia  Goal: Effective Oxygenation and Ventilation  Intervention: Promote Airway Secretion Clearance  Recent Flowsheet Documentation  Taken 1/18/2025 1700 by Shanta Sherman RN  Cough And Deep Breathing: done independently per patient  Activity Management: activity adjusted per tolerance  Intervention: Optimize Oxygenation and Ventilation  Recent Flowsheet Documentation  Taken 1/18/2025 1700 by Shanta Sherman RN  Head of Bed (HOB) Positioning: HOB at 20-30 degrees     Problem: Electrolyte Imbalance  Goal: Electrolyte Balance  Intervention: Monitor and Manage Electrolyte Imbalance  Recent Flowsheet Documentation  Taken 1/18/2025 1700 by Shanta Sherman, RN  Fluid/Electrolyte Management: fluids provided     Problem: Skin Injury Risk Increased  Goal: Skin Health and Integrity  Intervention: Plan: Nurse Driven Intervention: Moisture Management  Recent Flowsheet Documentation  Taken 1/18/2025 1700  by Shanta Sherman RN  Moisture Interventions: Urinary collection device  Intervention: Plan: Nurse Driven Intervention: Friction and Shear  Recent Flowsheet Documentation  Taken 1/18/2025 1700 by Shanta Sherman RN  Friction/Shear Interventions: HOB 30 degrees or less  Intervention: Optimize Skin Protection  Recent Flowsheet Documentation  Taken 1/18/2025 1700 by Shanta Sherman, RN  Activity Management: activity adjusted per tolerance  Head of Bed (HOB) Positioning: HOB at 20-30 degrees     Problem: Wound  Goal: Optimal Functional Ability  Intervention: Optimize Functional Ability  Recent Flowsheet Documentation  Taken 1/18/2025 1700 by Shanta Sherman, RN  Activity Management: activity adjusted per tolerance  Activity Assistance Provided: assistance, 2 people  Goal: Skin Health and Integrity  Intervention: Optimize Skin Protection  Recent Flowsheet Documentation  Taken 1/18/2025 1700 by Shanta Sherman, RN  Activity Management: activity adjusted per tolerance  Head of Bed (HOB) Positioning: HOB at 20-30 degrees     Problem: Pain Chronic (Persistent)  Goal: Optimal Pain Control and Function  Intervention: Manage Persistent Pain  Recent Flowsheet Documentation  Taken 1/18/2025 1700 by Shanta Sherman, RN  Medication Review/Management: medications reviewed   Goal Outcome Evaluation:                    Pt. Alert. Oxy along other pain meds administered. Turned and repo.this shift. RA. Make needs known. PRN inhaler used x 1 as per request. Continue with POC.

## 2025-01-19 NOTE — PLAN OF CARE
Problem: Pneumonia  Goal: Effective Oxygenation and Ventilation  Outcome: Progressing  Intervention: Promote Airway Secretion Clearance  Recent Flowsheet Documentation  Taken 1/19/2025 1567 by Sharon De La Cruz RN  Cough And Deep Breathing: done independently per patient   Goal Outcome Evaluation:       Patient continues on room air, did request albuterol inhaler, lung sounds clear but diminished. Patient has good urine output in primafit.

## 2025-01-19 NOTE — PROGRESS NOTES
Care Management Follow Up    Length of Stay (days): 6    Expected Discharge Date: 01/20/2025    Anticipated Discharge Plan:       Transportation: TBD    PT Recommendations: Transitional Care Facility  OT Recommendations:  Transitional Care Facility     Barriers to Discharge: placement    Prior Living Situation: assisted living with facility resident    Discussed  Partnership in Safe Discharge Planning  document with patient/family: No     Handoff Completed: No, handoff not indicated or clinically appropriate    Patient/Spokesperson Updated: No    Additional Information:  SW tried to visit Pt to revisit TCU discussion but Pt was unavailable. Per MD, Pt's son has spoken with Pt about the need to go to TCU and would like CM to try to discuss TCU placement at discharge with Pt again. Pt previously declined recommendations for TCU as he would prefer to return to his senior care.     4:21 PM  SW tried to call Pt's room phone to discuss TCU placement again but Pt did not answer the phone.     Contacts:  Mateo Torres [facility]: [phone]  [facility] : [phone]     Next Steps: CM to revisit TCU discussion with Pt and update [facility] in regards to discharge planning.    WESTON Rodriguez

## 2025-01-19 NOTE — PROGRESS NOTES
[facility]    Medicine Progress Note - Hospitalist Service    Date of Admission:  1/12/2025    Assessment & Plan   74 year old male with a past medical history of ALS, asthma, tobacco abuse, hypertension, hyperlipidemia, pulmonary hypertension, chronic neuropathic pain syndrome, BPH, constipation, cervical spine stenosis with myelopathy, decubitus pressure injury who was brought to the emergency department by ambulance from assisted living facility for evaluation of bodyaches, nasal congestion, cough, feeling cold, decreased oral intake, found to have pneumonia and positive coronavirus.     1.Acute corona viral pneumonia  - Negative COVID and influenza, positive coronavirus.  - CT chest, abdomen pelvis showed scattered areas of bilateral bronchial wall thickening most prevalent in the right lower lobe consistent with acute infectious etiology  - Had low-grade fever of 100.2 in spite of taking acetaminophen and ibuprofen on a daily basis  - Normal WBC, procalcitonin and lactic acid; negative SARS-CoV-2/influenza/RSV PCR  - Negative Legionella and streptococcal pneumoniae antigens  - He was treated with IV ceftriaxone and azithromycin  -Switched to oral antibiotic to finish course  -he notes still some cough better      2.Moderate persistent asthma  - Ongoing tobacco abuse  - No bronchospasms on exam  - Requires only 2 L on admission, resolved, now on RA  -VBG consistent with metabolic alkalosis  -Continue PTA albuterol HFA, Symbicort (auto substituted with Breo Ellipta)  -DuoNebs as needed  -Flutter valve  -Continue home steroid dose     3.Hyponatremia, hypochloremia with high anion gap  - Secondary to decreased oral intake and PTA hydrochlorothiazide  - UA showed some ketonuria  - Treated in the ED with 1905 mL milliliters IV NS bolus  - Encourage oral intake  - Continue to hold HCTZ-will probably discontinue on discharge as blood pressure on the low side  -Resolved  - Discontinue IV  fluid  -1/19       4.Hypokalemia  -Replaced per protocol  -Unremarkable magnesium and phosphorus  -Continue to monitor , today 4.5     5.Chronic neuropathic pain syndrome  - Opioid dependence  - On PTA gabapentin, duloxetine, and as needed oxycodone  - Patient has been refusing buprenorphine patch until he sees prescribing provider, last administered on 12/16/2024     6.Essential hypertension  - Hold PTA hydrochlorothiazide because of hyponatremia as above  - Continue PTA metoprolol  -Blood pressure has been stable, doubt he will need to resume HCTZ on discharge.  - Continue to monitor blood pressure     7.Mild rhabdomyolysis  -Probably secondary to infection  -Elevated CK improved to normal by 1/15, so restarted statin   -D/Continue IV hydration      8.Tobacco abuse  - Smokes 1 pack/day  - Cessation education  - Nicotine patch     9.BPH with LUTS  - Intermittent catheterization  - Continue PTA tamsulosin     10.Chronic steroid use  -On PTA prednisone 2.5 mg daily,  -Probably secondary to COPD versus ALS     11.Hyperlipidemia  -restart PTA atorvastatin because of rhabdo     12.Amyotrophic lateral sclerosis  - Patient receives 24-hour cares and is nonambulatory bedbound  - Continue PTA riluzole, baclofen, cyclobenzaprine, bowel regimen  -Patient uses power wheelchair for ambulation and pt/ot eval transfers  -Care management still discussing needs  needs tcu vs AL --pt wants AL, so likely here over weekend , work on strength and re-eval --I still think he will need tcu     13.Decubitus pressure injury, present on admission  -Wound care consult  -Dressing as per wound care     14.Drug-induced platelet defect  -On PTA aspirin, monitor for bleeding     15.Incidental radiographic findings  -Irregular opacity in the lingula measuring 1.4 cm, likely inflammatory though short-term follow-up chest CT may be beneficial to ensure resolution  -Outpatient follow-up--I placed referral in lung nodule service for this I  "discharge navigator      16.Significant weakness and deconditioning  -PT/OT evaluate  -Patient has been using electric wheelchair.  - Patient needs TCU on discharge.    17.Constipation  -on meds, Miralax, senna, doc  -had enema on 1/18 and had large stool    --I did ask care management to touch base with son today on dispo               Diet: Regular Diet Adult  Snacks/Supplements Adult: Ensure Enlive; Between Meals    DVT Prophylaxis: Enoxaparin (Lovenox) SQ  Sepulveda Catheter: Not present  Lines: PRESENT      PICC 01/13/25 Double Lumen Right Basilic Antibiotic.-Site Assessment: WDL      Cardiac Monitoring: None  Code Status: No CPR- Do NOT Intubate      Clinically Significant Risk Factors         # Hyponatremia: Lowest Na = 133 mmol/L in last 2 days, will monitor as appropriate  # Hypochloremia: Lowest Cl = 97 mmol/L in last 2 days, will monitor as appropriate      # Hypoalbuminemia: Lowest albumin = 3.1 g/dL at 1/14/2025  5:17 AM, will monitor as appropriate     # Hypertension: Noted on problem list            # Cachexia: Estimated body mass index is 17.87 kg/m  as calculated from the following:    Height as of this encounter: 1.778 m (5' 10\").    Weight as of this encounter: 56.5 kg (124 lb 9 oz).      # Financial/Environmental Concerns: none         Social Drivers of Health    Depression: At risk (9/19/2024)    PHQ-2     PHQ-2 Score: 3   Housing Stability: Low Risk  (1/14/2025)    Housing Stability     Do you have housing? : Yes     Are you worried about losing your housing?: No   Recent Concern: Housing Stability - High Risk (1/13/2025)    Housing Stability     Do you have housing? : No     Are you worried about losing your housing?: No   Tobacco Use: High Risk (11/13/2024)    Received from [facility]    Patient History     Smoking Tobacco Use: Every Day     Smokeless Tobacco Use: Never    [facility]    Social " Connections          Disposition Plan     Medically Ready for Discharge: Anticipated Tomorrow             Gretel Mccann MD  Hospitalist Service  [facility]  Securely message with [employer] (more info)  Text page via [employer] Paging/Directory   ______________________________________________________________________    Interval History   He feels ok  Cough better  Had large stool yesterday after enema(at home uses about 2 enemas/week)  Eating ok    Physical Exam   Vital Signs: Temp: 98.2  F (36.8  C) Temp src: Oral BP: 104/65 Pulse: 103   Resp: 18 SpO2: 91 % O2 Device: None (Room air)    Weight: 124 lbs 8.96 oz    Constitutional: awake, fatigued, alert, cooperative, and no apparent distress  Eyes: sclera clear  Respiratory: no increased work of breathing, good air exchange, no retractions, and diminished breath sounds throughout lungs and but clear  Cardiovascular: regular rate and rhythm and normal S1 and S2  GI: normal bowel sounds, soft, non-distended, and non-tender  Skin: no bruising or bleeding  Musculoskeletal: no lower extremity pitting edema present  Neurologic: Mental Status Exam:  Level of Alertness:   awake  Motor Exam:  weakness in all limbs   Neuropsychiatric: General: normal, calm, and normal eye contact    Medical Decision Making       35 MINUTES SPENT BY ME on the date of service doing chart review, history, exam, documentation & further activities per the note.      Data     I have personally reviewed the following data over the past 24 hrs:    8.3  \   13.4   / 312     133 (L) 97 (L) 9.1 /  76   4.5; 4.5 23 0.53 (L) \       Imaging results reviewed over the past 24 hrs:   No results found for this or any previous visit (from the past 24 hours).

## 2025-01-19 NOTE — PLAN OF CARE
Pt is A&Ox4, calm, cooperative  - vitals stable overnight  - complained of 10/10 pain to his back this morning        -> scheduled oxy given 0620  - primofit on and in place, voiding well  - no acute events overnight  - PICC present and patent, labs drawn  - mag & potassium recheck      Problem: Pain Acute  Goal: Optimal Pain Control and Function  Intervention: Optimize Psychosocial Wellbeing  Recent Flowsheet Documentation  Taken 1/19/2025 0030 by Divya Curran RN  Supportive Measures:   active listening utilized   verbalization of feelings encouraged  Intervention: Develop Pain Management Plan  Recent Flowsheet Documentation  Taken 1/19/2025 0030 by Divya Curran RN  Pain Management Interventions:   pillow support provided   repositioned  Intervention: Prevent or Manage Pain  Recent Flowsheet Documentation  Taken 1/19/2025 0030 by Divya Curran RN  Bowel Elimination Promotion: adequate fluid intake promoted  Medication Review/Management: medications reviewed     Problem: Comorbidity Management  Goal: Maintenance of Asthma Control  Outcome: Progressing  Intervention: Maintain Asthma Symptom Control  Recent Flowsheet Documentation  Taken 1/19/2025 0030 by Divya Curran RN  Medication Review/Management: medications reviewed  Goal: Blood Pressure in Desired Range  Outcome: Progressing  Intervention: Maintain Blood Pressure Management  Recent Flowsheet Documentation  Taken 1/19/2025 0030 by Divya Curran RN  Medication Review/Management: medications reviewed   Goal Outcome Evaluation:      Plan of Care Reviewed With: patient    Overall Patient Progress: no changeOverall Patient Progress: no change

## 2025-01-20 ENCOUNTER — APPOINTMENT (OUTPATIENT)
Dept: OCCUPATIONAL THERAPY | Facility: HOSPITAL | Age: 75
DRG: 193 | End: 2025-01-20
Payer: COMMERCIAL

## 2025-01-20 ENCOUNTER — APPOINTMENT (OUTPATIENT)
Dept: PHYSICAL THERAPY | Facility: HOSPITAL | Age: 75
DRG: 193 | End: 2025-01-20
Payer: COMMERCIAL

## 2025-01-20 LAB
ANION GAP SERPL CALCULATED.3IONS-SCNC: 9 MMOL/L (ref 7–15)
BUN SERPL-MCNC: 12.5 MG/DL (ref 8–23)
CALCIUM SERPL-MCNC: 9.8 MG/DL (ref 8.8–10.4)
CHLORIDE SERPL-SCNC: 100 MMOL/L (ref 98–107)
CREAT SERPL-MCNC: 0.59 MG/DL (ref 0.67–1.17)
EGFRCR SERPLBLD CKD-EPI 2021: >90 ML/MIN/1.73M2
GLUCOSE SERPL-MCNC: 90 MG/DL (ref 70–99)
HCO3 SERPL-SCNC: 28 MMOL/L (ref 22–29)
POTASSIUM SERPL-SCNC: 4.3 MMOL/L (ref 3.4–5.3)
SODIUM SERPL-SCNC: 137 MMOL/L (ref 135–145)

## 2025-01-20 PROCEDURE — 250N000009 HC RX 250: Performed by: HOSPITALIST

## 2025-01-20 PROCEDURE — 99233 SBSQ HOSP IP/OBS HIGH 50: CPT | Performed by: INTERNAL MEDICINE

## 2025-01-20 PROCEDURE — 250N000012 HC RX MED GY IP 250 OP 636 PS 637: Performed by: HOSPITALIST

## 2025-01-20 PROCEDURE — 97110 THERAPEUTIC EXERCISES: CPT | Mod: GP

## 2025-01-20 PROCEDURE — 80048 BASIC METABOLIC PNL TOTAL CA: CPT | Performed by: INTERNAL MEDICINE

## 2025-01-20 PROCEDURE — 999N000156 HC STATISTIC RCP CONSULT EA 30 MIN

## 2025-01-20 PROCEDURE — 97530 THERAPEUTIC ACTIVITIES: CPT | Mod: GO

## 2025-01-20 PROCEDURE — 250N000013 HC RX MED GY IP 250 OP 250 PS 637: Performed by: INTERNAL MEDICINE

## 2025-01-20 PROCEDURE — 97530 THERAPEUTIC ACTIVITIES: CPT | Mod: GP

## 2025-01-20 PROCEDURE — 250N000011 HC RX IP 250 OP 636: Performed by: INTERNAL MEDICINE

## 2025-01-20 PROCEDURE — 94799 UNLISTED PULMONARY SVC/PX: CPT

## 2025-01-20 PROCEDURE — 120N000001 HC R&B MED SURG/OB

## 2025-01-20 PROCEDURE — 250N000013 HC RX MED GY IP 250 OP 250 PS 637: Performed by: HOSPITALIST

## 2025-01-20 PROCEDURE — 97535 SELF CARE MNGMENT TRAINING: CPT | Mod: GO

## 2025-01-20 RX ADMIN — TAMSULOSIN HYDROCHLORIDE 0.4 MG: 0.4 CAPSULE ORAL at 10:04

## 2025-01-20 RX ADMIN — ALBUTEROL SULFATE 2 PUFF: 90 AEROSOL, METERED RESPIRATORY (INHALATION) at 13:41

## 2025-01-20 RX ADMIN — ALBUTEROL SULFATE 2 PUFF: 90 AEROSOL, METERED RESPIRATORY (INHALATION) at 10:06

## 2025-01-20 RX ADMIN — DULOXETINE HYDROCHLORIDE 60 MG: 60 CAPSULE, DELAYED RELEASE ORAL at 10:04

## 2025-01-20 RX ADMIN — GUAIFENESIN 600 MG: 600 TABLET, EXTENDED RELEASE ORAL at 10:05

## 2025-01-20 RX ADMIN — IPRATROPIUM BROMIDE AND ALBUTEROL SULFATE 3 ML: .5; 3 SOLUTION RESPIRATORY (INHALATION) at 19:40

## 2025-01-20 RX ADMIN — ENOXAPARIN SODIUM 30 MG: 30 INJECTION SUBCUTANEOUS at 13:42

## 2025-01-20 RX ADMIN — SENNOSIDES AND DOCUSATE SODIUM 1 TABLET: 50; 8.6 TABLET ORAL at 21:18

## 2025-01-20 RX ADMIN — NICOTINE 1 PATCH: 21 PATCH, EXTENDED RELEASE TRANSDERMAL at 10:03

## 2025-01-20 RX ADMIN — GABAPENTIN 600 MG: 300 CAPSULE ORAL at 21:17

## 2025-01-20 RX ADMIN — METOPROLOL TARTRATE 12.5 MG: 25 TABLET, FILM COATED ORAL at 10:05

## 2025-01-20 RX ADMIN — ALBUTEROL SULFATE 2 PUFF: 90 AEROSOL, METERED RESPIRATORY (INHALATION) at 00:46

## 2025-01-20 RX ADMIN — POLYETHYLENE GLYCOL 3350 17 G: 17 POWDER, FOR SOLUTION ORAL at 10:02

## 2025-01-20 RX ADMIN — GABAPENTIN 600 MG: 300 CAPSULE ORAL at 13:41

## 2025-01-20 RX ADMIN — ATORVASTATIN CALCIUM 10 MG: 10 TABLET, FILM COATED ORAL at 21:17

## 2025-01-20 RX ADMIN — ASPIRIN 81 MG: 81 TABLET, COATED ORAL at 10:05

## 2025-01-20 RX ADMIN — FLUTICASONE FUROATE AND VILANTEROL TRIFENATATE 1 PUFF: 200; 25 POWDER RESPIRATORY (INHALATION) at 10:02

## 2025-01-20 RX ADMIN — PANTOPRAZOLE SODIUM 40 MG: 40 TABLET, DELAYED RELEASE ORAL at 05:46

## 2025-01-20 RX ADMIN — OXYCODONE HYDROCHLORIDE 10 MG: 5 TABLET ORAL at 05:45

## 2025-01-20 RX ADMIN — PREDNISONE 2.5 MG: 2.5 TABLET ORAL at 10:03

## 2025-01-20 RX ADMIN — GABAPENTIN 600 MG: 300 CAPSULE ORAL at 10:05

## 2025-01-20 RX ADMIN — ACETAMINOPHEN 1000 MG: 500 TABLET, FILM COATED ORAL at 00:47

## 2025-01-20 RX ADMIN — BACLOFEN 10 MG: 10 TABLET ORAL at 10:04

## 2025-01-20 RX ADMIN — Medication 1 TABLET: at 10:06

## 2025-01-20 RX ADMIN — RILUZOLE 50 MG: 50 TABLET ORAL at 17:55

## 2025-01-20 RX ADMIN — OXYCODONE HYDROCHLORIDE 10 MG: 5 TABLET ORAL at 21:18

## 2025-01-20 RX ADMIN — GUAIFENESIN 600 MG: 600 TABLET, EXTENDED RELEASE ORAL at 21:24

## 2025-01-20 RX ADMIN — FLUTICASONE PROPIONATE 1 SPRAY: 50 SPRAY, METERED NASAL at 13:42

## 2025-01-20 RX ADMIN — METOPROLOL TARTRATE 12.5 MG: 25 TABLET, FILM COATED ORAL at 21:17

## 2025-01-20 RX ADMIN — LORATADINE 10 MG: 10 TABLET ORAL at 10:04

## 2025-01-20 RX ADMIN — SENNOSIDES AND DOCUSATE SODIUM 1 TABLET: 50; 8.6 TABLET ORAL at 10:05

## 2025-01-20 RX ADMIN — ALBUTEROL SULFATE 2 PUFF: 90 AEROSOL, METERED RESPIRATORY (INHALATION) at 17:52

## 2025-01-20 RX ADMIN — CYCLOBENZAPRINE HYDROCHLORIDE 5 MG: 5 TABLET, FILM COATED ORAL at 10:05

## 2025-01-20 RX ADMIN — BACLOFEN 10 MG: 10 TABLET ORAL at 21:16

## 2025-01-20 RX ADMIN — CYCLOBENZAPRINE HYDROCHLORIDE 5 MG: 5 TABLET, FILM COATED ORAL at 21:17

## 2025-01-20 RX ADMIN — RILUZOLE 50 MG: 50 TABLET ORAL at 10:04

## 2025-01-20 RX ADMIN — ALBUTEROL SULFATE 2 PUFF: 90 AEROSOL, METERED RESPIRATORY (INHALATION) at 05:36

## 2025-01-20 RX ADMIN — OXYCODONE HYDROCHLORIDE 10 MG: 5 TABLET ORAL at 13:41

## 2025-01-20 RX ADMIN — BACLOFEN 10 MG: 10 TABLET ORAL at 13:42

## 2025-01-20 ASSESSMENT — ACTIVITIES OF DAILY LIVING (ADL)
ADLS_ACUITY_SCORE: 63
ADLS_ACUITY_SCORE: 68
ADLS_ACUITY_SCORE: 69
ADLS_ACUITY_SCORE: 69
ADLS_ACUITY_SCORE: 63
ADLS_ACUITY_SCORE: 69
ADLS_ACUITY_SCORE: 68
ADLS_ACUITY_SCORE: 63
ADLS_ACUITY_SCORE: 69
ADLS_ACUITY_SCORE: 69
ADLS_ACUITY_SCORE: 68
ADLS_ACUITY_SCORE: 69
ADLS_ACUITY_SCORE: 63
ADLS_ACUITY_SCORE: 69

## 2025-01-20 NOTE — TREATMENT PLAN
"RCAT Treatment Plan    Patient Score: 8  Patient Acuity: 4    Clinical Indication for Therapy: bronchospasm, history of bronchospasm, productive cough, and prevent atelectasis    Therapy Ordered: Breo ellipta daily, duoneb Q4 PRN, albuterol inhaler Q4 PRN, flutter BID    Assessment Summary: Patient is positive for corona virus and has a pulmonary history of asthma and is a current smoker. He reports using both inhalers and nebs at home, no baseline oxygen. Patient was seen in room air. No respiratory distress. Patient has been using flutter valve on his own and has demonstrated proper use of flutter valve. He has a strong, congested and nonproductive cough on demand. Faint expiratory wheeze auscultated on RUL; both lung fields are diminished with crackles. Will change flutter valve to PRN.     Chest CT 1/12/25   \"Scattered areas of bilateral bronchial wall thickening with associated airspace opacities most prevalent in the right lower lobe consistent with acute infectious etiology. There is an irregular opacity in the lingula measuring 1.4 cm which is likely   inflammatory, though short-term follow-up chest CT may be beneficial to ensure that this resolves.\"    Carlyn Starkey, RT  1/20/2025    "

## 2025-01-20 NOTE — PLAN OF CARE
Problem: Pneumonia  Goal: Fluid Balance  Outcome: Progressing   Goal Outcome Evaluation:       Patient continues on room air with minimal cough, did request albuterol inhaler X2 this shift. Patient reports feeling good and to baseline. Patient did get up to chair with pivot and tolerated well. Patient hopes to be able to go home. Has good oral intake.

## 2025-01-20 NOTE — PROGRESS NOTES
CLINICAL NUTRITION SERVICES - REASSESSMENT NOTE     RECOMMENDATIONS FOR MDs/PROVIDERS TO ORDER:    Malnutrition Status:    Patient does not meet two of the established criteria necessary for diagnosing malnutrition but is at risk for malnutrition     Registered Dietitian Interventions:  Continue ensure enlive BID    Future/Additional Recommendations:  Monitor po, supplement timi, weight, labs, I/Os, healing.      SUBJECTIVE INFORMATION  Patient not available for interview due to pt resting/busy during attempts    CURRENT NUTRITION ORDERS  Diet: Regular  Nutrition Supplement: Ensure enlive BID     CURRENT INTAKE/TOLERANCE  1/15-19 pt consuming % of meals. 1/17 pt consuming an estimated 1503 kcal and 55 g protein meeting >75% nutrition needs  Supplement intakes not documented, x2 unopened ensure enlive + x1 opened ensure enlive visible in room      NEW FINDINGS  Weight: 6.9% wt loss x10 months, not clinically significant   Date/Time Weight Weight Method   01/17/25 1316 56.5 kg (124 lb 9 oz) --   01/13/25 2024 56.1 kg (123 lb 10.9 oz) Bed scale   01/12/25 1930 63.5 kg (140 lb) --     09/19/24 63.5 kg (140 lb) - likely stated weight    03/20/24 60.7 kg (133 lb 13.1 oz)   02/10/21 62 kg (136 lb 9.6 oz)   BM: x1 1/18   Healed coccyx PI per WOC 1/13  Oral: Dentures at home    Nutrition-relevant labs: Creat 0.59(L)   Nutrition-relevant medications: Scheduled lipitor, MVI/M, oxycodone, protonix, miralax, deltasone, senna    Dosing Weight: 56.1 kg, based on actual wt     ASSESSED NUTRITION NEEDS  Estimated Energy Needs: 0776-7661 kcals/day (30 - 35 kcals/kg)  Justification: Increased needs and Underweight  Estimated Protein Needs: 56-67 grams protein/day (1 - 1.2 grams of pro/kg)  Justification: Hypercatabolism with acute illness and Repletion  Estimated Fluid Needs: 9415-6826 mL/day (25 - 30 mL/kg)  Justification: Maintenance    MALNUTRITION  Malnutrition Diagnosis: Patient does not meet two of the established criteria  necessary for diagnosing malnutrition but is at risk for malnutrition     EVALUATION OF THE PROGRESS TOWARD GOALS     NUTRITION DIAGNOSIS  Inadequate oral intake related to decreased appetite as evidenced by eating 25-50% of meals, possible wt loss   Evaluation: Progressing     INTERVENTIONS  Medical food supplement therapy - Continue ensure enlive BID     Goals  Total avg nutritional intake to meet a minimum of 1700 kcal/kg and 56 g PRO/kg daily (per dosing wt 56.1 kg). - Met 1/17  Weight maintenance 56.1 kg + - Met   Wound healing- NEW     Monitoring/Evaluation      Progress toward goals will be monitored and evaluated per policy.

## 2025-01-20 NOTE — PLAN OF CARE
Problem: Adult Inpatient Plan of Care  Goal: Plan of Care Review  Description: The Plan of Care Review/Shift note should be completed every shift.  The Outcome Evaluation is a brief statement about your assessment that the patient is improving, declining, or no change.  This information will be displayed automatically on your shift  note.  Outcome: Progressing     Problem: Comorbidity Management  Goal: Maintenance of Asthma Control  Outcome: Progressing  Intervention: Maintain Asthma Symptom Control  Recent Flowsheet Documentation  Taken 1/19/2025 1742 by Shanta Sherman RN  Medication Review/Management: medications reviewed     Problem: Pneumonia  Goal: Fluid Balance  Outcome: Progressing  Intervention: Monitor and Manage Fluid Balance  Recent Flowsheet Documentation  Taken 1/19/2025 1742 by Shanta Sherman, RN  Fluid/Electrolyte Management: fluids provided     Problem: Pain Acute  Goal: Optimal Pain Control and Function  Outcome: Progressing  Intervention: Prevent or Manage Pain  Recent Flowsheet Documentation  Taken 1/19/2025 1742 by Shanta Sherman, RN  Medication Review/Management: medications reviewed     Problem: Electrolyte Imbalance  Goal: Electrolyte Balance  Outcome: Progressing  Intervention: Monitor and Manage Electrolyte Imbalance  Recent Flowsheet Documentation  Taken 1/19/2025 1742 by Shanta Sherman RN  Fluid/Electrolyte Management: fluids provided     Problem: Constipation  Goal: Effective Bowel Elimination  Outcome: Progressing   Goal Outcome Evaluation:                  Pt. Requested for pain meds and scheduled oxy and naproxen along other pain meds were given. T&R this shift. Primafit in place. Inhaler given during this shift as well. Didn't get out of bed.

## 2025-01-20 NOTE — PLAN OF CARE
Pt is A&Ox4, calm, cooperative  - vitals stable, soft BP at midnight: 96/57  - complained of 10/10 pain to hips & feet      -> prn tylenol 1,000 mg given  - pt requested albuterol, expiratory wheezes heard, clear after albuterol  - potassium recheck this am  - droplet precautions maintained       Problem: Pain Acute  Goal: Optimal Pain Control and Function  Intervention: Optimize Psychosocial Wellbeing  Recent Flowsheet Documentation  Taken 1/20/2025 0040 by Divya Curran RN  Supportive Measures:   active listening utilized   verbalization of feelings encouraged  Intervention: Develop Pain Management Plan  Recent Flowsheet Documentation  Taken 1/20/2025 0047 by Divya Curran RN  Pain Management Interventions: medication (see MAR)  Taken 1/20/2025 0040 by Divya Curran RN  Pain Management Interventions:   pillow support provided   repositioned  Intervention: Prevent or Manage Pain  Recent Flowsheet Documentation  Taken 1/20/2025 0040 by Divya Curran RN  Sensory Stimulation Regulation:   lighting decreased   quiet environment promoted  Bowel Elimination Promotion: adequate fluid intake promoted  Medication Review/Management: medications reviewed     Problem: Infection  Goal: Absence of Infection Signs and Symptoms  Outcome: Progressing  Intervention: Prevent or Manage Infection  Recent Flowsheet Documentation  Taken 1/20/2025 0040 by Divya Curran RN  Isolation Precautions: droplet precautions maintained   Goal Outcome Evaluation:      Plan of Care Reviewed With: patient    Overall Patient Progress: no changeOverall Patient Progress: no change

## 2025-01-20 NOTE — PROGRESS NOTES
Care Management Follow Up    Length of Stay (days): 7    Expected Discharge Date: 01/20/2025    Anticipated Discharge Plan:   TBD - return to [facility] vs TCU    Transportation: TBD    PT Recommendations: Transitional Care Facility  OT Recommendations:  Transitional Care Facility     Barriers to Discharge: medical stability, placement    Prior Living Situation: assisted living with facility resident    Advanced Directive on File: no concerns identified     Patient/Spokesperson Updated: Yes. Who? Pt    Additional Information:  VICKEY met with Pt at bedside to discuss PT/OT recommendation for TCU. Pt declines TCU stating that he wants to return to his [facility] and that he does not need additional assistance. Per PT/OT notes, Pt is needing assist of 2 for transfers, which Pt also confirms. SW explained to Pt that his [facility] may not be able to take him back if he is needing an assist of 2. SW stated they will call [facility] and update Pt.     SW left voicemail for DON at [facility] requesting call back.     3:17 PM  SW spoke with DON at [facility] who confirms they are unable to accept Pt with the current level of assist he is needing. Pt needs to be at standby assist in order for him to return to half-way. VICKEY met with Pt at bedside to give update regarding [facility] not being able to accept him back. Pt is still declining TCU at this time and states he can transfer himself into his wheelchair if it's next to his bed. Pt states he wants to find a different [facility]. VICKEY explained that most ALFs cannot take people needing an assist of 2. VICKEY gave TCU list for Pt to review and will follow up with him tomorrow morning 1/21.     Contacts:  [facility]: [phone]  [facility] : [phone]     ELIN ToribioW

## 2025-01-21 ENCOUNTER — APPOINTMENT (OUTPATIENT)
Dept: PHYSICAL THERAPY | Facility: HOSPITAL | Age: 75
DRG: 193 | End: 2025-01-21
Payer: COMMERCIAL

## 2025-01-21 LAB — POTASSIUM SERPL-SCNC: 4.4 MMOL/L (ref 3.4–5.3)

## 2025-01-21 PROCEDURE — 250N000011 HC RX IP 250 OP 636: Performed by: INTERNAL MEDICINE

## 2025-01-21 PROCEDURE — 250N000013 HC RX MED GY IP 250 OP 250 PS 637: Performed by: HOSPITALIST

## 2025-01-21 PROCEDURE — 84132 ASSAY OF SERUM POTASSIUM: CPT | Performed by: INTERNAL MEDICINE

## 2025-01-21 PROCEDURE — 97530 THERAPEUTIC ACTIVITIES: CPT | Mod: GP

## 2025-01-21 PROCEDURE — 250N000013 HC RX MED GY IP 250 OP 250 PS 637: Performed by: INTERNAL MEDICINE

## 2025-01-21 PROCEDURE — 250N000012 HC RX MED GY IP 250 OP 636 PS 637: Performed by: HOSPITALIST

## 2025-01-21 PROCEDURE — 99232 SBSQ HOSP IP/OBS MODERATE 35: CPT | Performed by: INTERNAL MEDICINE

## 2025-01-21 PROCEDURE — 120N000001 HC R&B MED SURG/OB

## 2025-01-21 RX ORDER — GUAIFENESIN 200 MG/10ML
10 LIQUID ORAL EVERY 4 HOURS PRN
Status: DISCONTINUED | OUTPATIENT
Start: 2025-01-21 | End: 2025-01-23 | Stop reason: HOSPADM

## 2025-01-21 RX ADMIN — BACLOFEN 10 MG: 10 TABLET ORAL at 21:02

## 2025-01-21 RX ADMIN — GUAIFENESIN 600 MG: 600 TABLET, EXTENDED RELEASE ORAL at 11:03

## 2025-01-21 RX ADMIN — Medication 1 TABLET: at 11:02

## 2025-01-21 RX ADMIN — OXYCODONE HYDROCHLORIDE 10 MG: 5 TABLET ORAL at 06:15

## 2025-01-21 RX ADMIN — ATORVASTATIN CALCIUM 10 MG: 10 TABLET, FILM COATED ORAL at 21:04

## 2025-01-21 RX ADMIN — NAPROXEN 250 MG: 250 TABLET ORAL at 11:56

## 2025-01-21 RX ADMIN — RILUZOLE 50 MG: 50 TABLET ORAL at 11:08

## 2025-01-21 RX ADMIN — SENNOSIDES AND DOCUSATE SODIUM 1 TABLET: 50; 8.6 TABLET ORAL at 21:03

## 2025-01-21 RX ADMIN — SENNOSIDES AND DOCUSATE SODIUM 1 TABLET: 50; 8.6 TABLET ORAL at 11:02

## 2025-01-21 RX ADMIN — RILUZOLE 50 MG: 50 TABLET ORAL at 17:57

## 2025-01-21 RX ADMIN — METOPROLOL TARTRATE 12.5 MG: 25 TABLET, FILM COATED ORAL at 11:03

## 2025-01-21 RX ADMIN — NICOTINE 1 PATCH: 21 PATCH, EXTENDED RELEASE TRANSDERMAL at 11:08

## 2025-01-21 RX ADMIN — GABAPENTIN 600 MG: 300 CAPSULE ORAL at 13:56

## 2025-01-21 RX ADMIN — LORATADINE 10 MG: 10 TABLET ORAL at 11:03

## 2025-01-21 RX ADMIN — PREDNISONE 2.5 MG: 2.5 TABLET ORAL at 11:08

## 2025-01-21 RX ADMIN — FLUTICASONE PROPIONATE 1 SPRAY: 50 SPRAY, METERED NASAL at 13:57

## 2025-01-21 RX ADMIN — PANTOPRAZOLE SODIUM 40 MG: 40 TABLET, DELAYED RELEASE ORAL at 06:15

## 2025-01-21 RX ADMIN — TAMSULOSIN HYDROCHLORIDE 0.4 MG: 0.4 CAPSULE ORAL at 11:08

## 2025-01-21 RX ADMIN — ENOXAPARIN SODIUM 30 MG: 30 INJECTION SUBCUTANEOUS at 13:57

## 2025-01-21 RX ADMIN — OXYCODONE HYDROCHLORIDE 10 MG: 5 TABLET ORAL at 21:11

## 2025-01-21 RX ADMIN — CYCLOBENZAPRINE HYDROCHLORIDE 5 MG: 5 TABLET, FILM COATED ORAL at 21:02

## 2025-01-21 RX ADMIN — ACETAMINOPHEN 1000 MG: 500 TABLET, FILM COATED ORAL at 11:02

## 2025-01-21 RX ADMIN — ALBUTEROL SULFATE 2 PUFF: 90 AEROSOL, METERED RESPIRATORY (INHALATION) at 11:04

## 2025-01-21 RX ADMIN — OXYCODONE HYDROCHLORIDE 10 MG: 5 TABLET ORAL at 13:56

## 2025-01-21 RX ADMIN — GABAPENTIN 600 MG: 300 CAPSULE ORAL at 11:03

## 2025-01-21 RX ADMIN — GUAIFENESIN 600 MG: 600 TABLET, EXTENDED RELEASE ORAL at 21:04

## 2025-01-21 RX ADMIN — GABAPENTIN 600 MG: 300 CAPSULE ORAL at 21:03

## 2025-01-21 RX ADMIN — METOPROLOL TARTRATE 12.5 MG: 25 TABLET, FILM COATED ORAL at 21:04

## 2025-01-21 RX ADMIN — SODIUM PHOSPHATE, DIBASIC AND SODIUM PHOSPHATE, MONOBASIC 1 ENEMA: 7; 19 ENEMA RECTAL at 19:51

## 2025-01-21 RX ADMIN — FLUTICASONE FUROATE AND VILANTEROL TRIFENATATE 1 PUFF: 200; 25 POWDER RESPIRATORY (INHALATION) at 11:04

## 2025-01-21 RX ADMIN — BACLOFEN 10 MG: 10 TABLET ORAL at 11:07

## 2025-01-21 RX ADMIN — DULOXETINE HYDROCHLORIDE 60 MG: 60 CAPSULE, DELAYED RELEASE ORAL at 11:02

## 2025-01-21 RX ADMIN — ASPIRIN 81 MG: 81 TABLET, COATED ORAL at 11:03

## 2025-01-21 RX ADMIN — CYCLOBENZAPRINE HYDROCHLORIDE 5 MG: 5 TABLET, FILM COATED ORAL at 11:03

## 2025-01-21 RX ADMIN — BACLOFEN 10 MG: 10 TABLET ORAL at 13:56

## 2025-01-21 ASSESSMENT — ACTIVITIES OF DAILY LIVING (ADL)
ADLS_ACUITY_SCORE: 70
ADLS_ACUITY_SCORE: 70
ADLS_ACUITY_SCORE: 64
ADLS_ACUITY_SCORE: 70
ADLS_ACUITY_SCORE: 64
ADLS_ACUITY_SCORE: 74
ADLS_ACUITY_SCORE: 69
ADLS_ACUITY_SCORE: 64
ADLS_ACUITY_SCORE: 70
ADLS_ACUITY_SCORE: 64

## 2025-01-21 NOTE — PROGRESS NOTES
Patient is alert, oriented, quiet. Takes fluids well, fair appetite. Medicated with naprosyn prn for general aches. Slept in this am, worked with therapy a little, and then napping this afternoon. Will monitor.

## 2025-01-21 NOTE — PLAN OF CARE
Patient alert and oriented this shift. Still has an occasional cough throughout the shift.  He requested his albuterol inhaler around 1750 and then 2 hours later was asking again, so PRN neb was given and helpful.  He stated his pain was 7/10 prior to his scheduled pain medications and was helpful.  PICC dressing changed this shift.  Vitals stable. Sharon Hughes RN     Problem: Adult Inpatient Plan of Care  Goal: Absence of Hospital-Acquired Illness or Injury  1/20/2025 2313 by Sharon Hughes RN  Outcome: Progressing  1/20/2025 2312 by Sharon Hughes RN  Outcome: Progressing  Intervention: Prevent Infection  Recent Flowsheet Documentation  Taken 1/20/2025 1700 by Sharon Hughes RN  Infection Prevention: rest/sleep promoted     Problem: Comorbidity Management  Goal: Maintenance of Asthma Control  1/20/2025 2313 by Sharon Hughes RN  Outcome: Progressing  1/20/2025 2312 by Sharon Hughes RN  Outcome: Progressing  Goal: Blood Pressure in Desired Range  1/20/2025 2313 by Sharon Hughes RN  Outcome: Progressing  1/20/2025 2312 by Sharon Hughes RN  Outcome: Progressing     Problem: Pneumonia  Goal: Fluid Balance  1/20/2025 2313 by Sharon Hughes RN  Outcome: Progressing  1/20/2025 2312 by Sharon Hughes RN  Outcome: Progressing  Goal: Resolution of Infection Signs and Symptoms  1/20/2025 2313 by Sharon Hughes RN  Outcome: Progressing  1/20/2025 2312 by Sharon Hughes RN  Outcome: Progressing  Goal: Effective Oxygenation and Ventilation  1/20/2025 2313 by Sharon Hughes RN  Outcome: Progressing  1/20/2025 2312 by Sharon Hughes RN  Outcome: Progressing  Intervention: Promote Airway Secretion Clearance  Recent Flowsheet Documentation  Taken 1/20/2025 1700 by Sharon Hughes RN  Cough And Deep Breathing: done independently per patient     Problem: Skin Injury Risk Increased  Goal: Skin Health and Integrity  Intervention: Plan: Nurse  Driven Intervention: Moisture Management  Recent Flowsheet Documentation  Taken 1/20/2025 1700 by Sharon Hughes RN  Moisture Interventions: Incontinence pad     Problem: Skin Injury Risk Increased  Goal: Skin Health and Integrity  Intervention: Plan: Nurse Driven Intervention: Friction and Shear  Recent Flowsheet Documentation  Taken 1/20/2025 1700 by Sharon Hughes RN  Friction/Shear Interventions: HOB 30 degrees or less   Goal Outcome Evaluation:

## 2025-01-21 NOTE — PROGRESS NOTES
[facility]    Medicine Progress Note - Hospitalist Service    Date of Admission:  1/12/2025    Assessment & Plan   74 year old male with a past medical history of ALS, asthma, tobacco abuse, hypertension, hyperlipidemia, pulmonary hypertension, chronic neuropathic pain syndrome, BPH, constipation, cervical spine stenosis with myelopathy, decubitus pressure injury who was brought to the emergency department by ambulance from assisted living facility for evaluation of bodyaches, nasal congestion, cough, feeling cold, decreased oral intake, found to have pneumonia and positive coronavirus.    1/20 :     Medically stable  Continue current treatment    Awaiting safe disposition      A/p :      1.Acute corona viral pneumonia  - Negative COVID and influenza, positive coronavirus.  - CT chest, abdomen pelvis showed scattered areas of bilateral bronchial wall thickening most prevalent in the right lower lobe consistent with acute infectious etiology  - Had low-grade fever of 100.2 in spite of taking acetaminophen and ibuprofen on a daily basis  - Normal WBC, procalcitonin and lactic acid; negative SARS-CoV-2/influenza/RSV PCR  - Negative Legionella and streptococcal pneumoniae antigens  - He was treated with IV ceftriaxone and azithromycin  -Switched to oral antibiotic to finish course  -he notes still some cough better      2.Moderate persistent asthma  - Ongoing tobacco abuse  - No bronchospasms on exam  - Requires only 2 L on admission, resolved, now on RA  -VBG consistent with metabolic alkalosis  -Continue PTA albuterol HFA, Symbicort (auto substituted with Breo Ellipta)  -DuoNebs as needed  -Flutter valve  -Continue home steroid dose     3.Hyponatremia, hypochloremia with high anion gap  - Secondary to decreased oral intake and PTA hydrochlorothiazide  - UA showed some ketonuria  - Treated in the ED with 1905 mL milliliters IV NS bolus  - Encourage oral intake  - Continue to hold HCTZ-will  probably discontinue on discharge as blood pressure on the low side  -Resolved  - Discontinue IV fluid  -1/19       4.Hypokalemia  -Replaced per protocol  -Unremarkable magnesium and phosphorus  -Continue to monitor , today 4.5     5.Chronic neuropathic pain syndrome  - Opioid dependence  - On PTA gabapentin, duloxetine, and as needed oxycodone  - Patient has been refusing buprenorphine patch until he sees prescribing provider, last administered on 12/16/2024     6.Essential hypertension  - Hold PTA hydrochlorothiazide because of hyponatremia as above  - Continue PTA metoprolol  -Blood pressure has been stable, doubt he will need to resume HCTZ on discharge.  - Continue to monitor blood pressure     7.Mild rhabdomyolysis  -Probably secondary to infection  -Elevated CK improved to normal by 1/15, so restarted statin   -D/Continue IV hydration      8.Tobacco abuse  - Smokes 1 pack/day  - Cessation education  - Nicotine patch     9.BPH with LUTS  - Intermittent catheterization  - Continue PTA tamsulosin     10.Chronic steroid use  -On PTA prednisone 2.5 mg daily,  -Probably secondary to COPD versus ALS     11.Hyperlipidemia  -restart PTA atorvastatin because of rhabdo     12.Amyotrophic lateral sclerosis  - Patient receives 24-hour cares and is nonambulatory bedbound  - Continue PTA riluzole, baclofen, cyclobenzaprine, bowel regimen  -Patient uses power wheelchair for ambulation and pt/ot eval transfers  -Care management still discussing needs  needs tcu vs AL --pt wants AL, so likely here over weekend , work on strength and re-eval --I still think he will need tcu     13.Decubitus pressure injury, present on admission  -Wound care consult  -Dressing as per wound care     14.Drug-induced platelet defect  -On PTA aspirin, monitor for bleeding     15.Incidental radiographic findings  -Irregular opacity in the lingula measuring 1.4 cm, likely inflammatory though short-term follow-up chest CT may be beneficial to  "ensure resolution  -Outpatient follow-up--I placed referral in lung nodule service for this I discharge navigator      16.Significant weakness and deconditioning  -PT/OT evaluate  -Patient has been using electric wheelchair.  - Patient needs TCU on discharge.    17.Constipation  -on meds, Miralax, senna, doc  -had enema on 1/18 and had large stool    --I did ask care management to touch base with son today on dispo               Diet: Regular Diet Adult  Snacks/Supplements Adult: Ensure Enlive; Between Meals    DVT Prophylaxis: Enoxaparin (Lovenox) SQ  Sepulveda Catheter: Not present  Lines: PRESENT             Cardiac Monitoring: None  Code Status: No CPR- Do NOT Intubate      Clinically Significant Risk Factors         # Hyponatremia: Lowest Na = 133 mmol/L in last 2 days, will monitor as appropriate  # Hypochloremia: Lowest Cl = 97 mmol/L in last 2 days, will monitor as appropriate      # Hypoalbuminemia: Lowest albumin = 3.1 g/dL at 1/14/2025  5:17 AM, will monitor as appropriate     # Hypertension: Noted on problem list            # Cachexia: Estimated body mass index is 17.87 kg/m  as calculated from the following:    Height as of this encounter: 1.778 m (5' 10\").    Weight as of this encounter: 56.5 kg (124 lb 9 oz).        # Financial/Environmental Concerns: none         Social Drivers of Health    Depression: At risk (9/19/2024)    PHQ-2     PHQ-2 Score: 3   Housing Stability: Low Risk  (1/14/2025)    Housing Stability     Do you have housing? : Yes     Are you worried about losing your housing?: No   Recent Concern: Housing Stability - High Risk (1/13/2025)    Housing Stability     Do you have housing? : No     Are you worried about losing your housing?: No   Tobacco Use: High Risk (11/13/2024)    Received from [facility]    Patient History     Smoking Tobacco Use: Every Day     Smokeless Tobacco Use: Never    Received from [facility], [facility]    Social Connections          Disposition Plan     Medically Ready for Discharge: Anticipated Tomorrow             Olaf Lucia MD  Hospitalist Service  [facility]  Securely message with [employer] (more info)  Text page via [employer] Paging/Directory   ______________________________________________________________________        Physical Exam   Vital Signs: Temp: 98.6  F (37  C) Temp src: Oral BP: 109/62 Pulse: 114   Resp: 18 SpO2: 93 % O2 Device: None (Room air)    Weight: 124 lbs 8.96 oz    Constitutional: awake, fatigued, alert, cooperative, and no apparent distress  Eyes: sclera clear  Respiratory: no increased work of breathing, good air exchange, no retractions, and diminished breath sounds throughout lungs and but clear  Cardiovascular: regular rate and rhythm and normal S1 and S2  GI: normal bowel sounds, soft, non-distended, and non-tender  Skin: no bruising or bleeding  Musculoskeletal: no lower extremity pitting edema present  Neurologic: Mental Status Exam:  Level of Alertness:   awake  Motor Exam:  weakness in all limbs   Neuropsychiatric: General: normal, calm, and normal eye contact    Medical Decision Making       35 MINUTES SPENT BY ME on the date of service doing chart review, history, exam, documentation & further activities per the note.      Data     I have personally reviewed the following data over the past 24 hrs:    N/A  \   N/A   / N/A     137 100 12.5 /  90   4.3 28 0.59 (L) \       Imaging results reviewed over the past 24 hrs:   No results found for this or any previous visit (from the past 24 hours).

## 2025-01-21 NOTE — PROGRESS NOTES
"Care Management Follow Up    Length of Stay (days): 8    Expected Discharge Date: 01/22/2025    Anticipated Discharge Plan:   TCU    Transportation: TBD    PT Recommendations: Transitional Care Facility  OT Recommendations:  Transitional Care Facility     Barriers to Discharge: placement    Prior Living Situation: assisted living with facility resident    Advanced Directive on File: no concerns identified     Patient/Spokesperson Updated: Yes. Who? Pt    Additional Information:  SW met with Pt at bedside to discuss TCU. Pt states he'll \"do whatever you guys want to do with me\" and confirms he would be agreeable to TCU. Pt indicates he does not have any preferences for which facility. SW sent referrals to [facility]essandra Moctezuma at [place], [facility], [facility], [facility], [facility], [facility], [facility], and [facility]. Referrals pending.     Olesya Carrizales BSW     "

## 2025-01-21 NOTE — PLAN OF CARE
Problem: Adult Inpatient Plan of Care  Goal: Plan of Care Review  Description: The Plan of Care Review/Shift note should be completed every shift.  The Outcome Evaluation is a brief statement about your assessment that the patient is improving, declining, or no change.  This information will be displayed automatically on your shift  note.  Outcome: Progressing  Goal: Absence of Hospital-Acquired Illness or Injury  Intervention: Identify and Manage Fall Risk  Recent Flowsheet Documentation  Taken 1/21/2025 0047 by Max Jacobo RN  Safety Promotion/Fall Prevention:   clutter free environment maintained   increased rounding and observation   safety round/check completed  Intervention: Prevent Skin Injury  Recent Flowsheet Documentation  Taken 1/21/2025 0047 by Max Jacobo RN  Body Position:   position maintained   refuses positioning  Intervention: Prevent Infection  Recent Flowsheet Documentation  Taken 1/21/2025 0047 by Max Jacobo RN  Infection Prevention: rest/sleep promoted     Problem: Pain Acute  Goal: Optimal Pain Control and Function  Outcome: Progressing  Intervention: Prevent or Manage Pain  Recent Flowsheet Documentation  Taken 1/21/2025 0047 by Max Jacobo RN  Medication Review/Management: medications reviewed     Problem: Comorbidity Management  Goal: Blood Pressure in Desired Range  Outcome: Progressing  Intervention: Maintain Blood Pressure Management  Recent Flowsheet Documentation  Taken 1/21/2025 0047 by Max Jacobo RN  Medication Review/Management: medications reviewed     Problem: Pneumonia  Goal: Effective Oxygenation and Ventilation  Outcome: Progressing  Intervention: Promote Airway Secretion Clearance  Recent Flowsheet Documentation  Taken 1/21/2025 0047 by Max Jacobo RN  Cough And Deep Breathing: done independently per patient  Activity Management: activity adjusted per tolerance  Intervention: Optimize Oxygenation  and Ventilation  Recent Flowsheet Documentation  Taken 1/21/2025 0047 by Max Jacobo RN  Head of Bed (HOB) Positioning: HOB at 15 degrees   Goal Outcome Evaluation:         Problem: Pain Chronic (Persistent)  Goal: Optimal Pain Control and Function  Outcome: Progressing  Intervention: Manage Persistent Pain  Recent Flowsheet Documentation  Taken 1/21/2025 0047 by Max Jacobo RN  Medication Review/Management: medications reviewed         Pt alert & oriented. Pain managed by scheduled oxycodone. On room air, Vital Signs WNL. Will continue to monitor.

## 2025-01-22 PROBLEM — R05.1 ACUTE COUGH: Status: RESOLVED | Noted: 2025-01-13 | Resolved: 2025-01-22

## 2025-01-22 PROBLEM — J20.9 ACUTE BRONCHITIS: Status: ACTIVE | Noted: 2025-01-22

## 2025-01-22 PROBLEM — R00.0 SINUS TACHYCARDIA: Status: RESOLVED | Noted: 2025-01-13 | Resolved: 2025-01-22

## 2025-01-22 LAB
PLATELET # BLD AUTO: 331 10E3/UL (ref 150–450)
POTASSIUM SERPL-SCNC: 4.2 MMOL/L (ref 3.4–5.3)

## 2025-01-22 PROCEDURE — 250N000013 HC RX MED GY IP 250 OP 250 PS 637: Performed by: INTERNAL MEDICINE

## 2025-01-22 PROCEDURE — 84132 ASSAY OF SERUM POTASSIUM: CPT | Performed by: INTERNAL MEDICINE

## 2025-01-22 PROCEDURE — 85049 AUTOMATED PLATELET COUNT: CPT | Performed by: INTERNAL MEDICINE

## 2025-01-22 PROCEDURE — 250N000013 HC RX MED GY IP 250 OP 250 PS 637: Performed by: HOSPITALIST

## 2025-01-22 PROCEDURE — 99232 SBSQ HOSP IP/OBS MODERATE 35: CPT | Performed by: INTERNAL MEDICINE

## 2025-01-22 PROCEDURE — 120N000001 HC R&B MED SURG/OB

## 2025-01-22 PROCEDURE — 250N000011 HC RX IP 250 OP 636: Performed by: INTERNAL MEDICINE

## 2025-01-22 PROCEDURE — 250N000012 HC RX MED GY IP 250 OP 636 PS 637: Performed by: HOSPITALIST

## 2025-01-22 RX ORDER — NICOTINE 21 MG/24HR
1 PATCH, TRANSDERMAL 24 HOURS TRANSDERMAL DAILY
Qty: 14 PATCH | Refills: 0 | Status: SHIPPED | OUTPATIENT
Start: 2025-01-22 | End: 2025-02-05

## 2025-01-22 RX ORDER — AMOXICILLIN 250 MG
1 CAPSULE ORAL 2 TIMES DAILY
Qty: 100 TABLET | Refills: 0 | Status: SHIPPED | OUTPATIENT
Start: 2025-01-22

## 2025-01-22 RX ORDER — POLYETHYLENE GLYCOL 3350 17 G/17G
17 POWDER, FOR SOLUTION ORAL DAILY
Status: SHIPPED
Start: 2025-01-22

## 2025-01-22 RX ADMIN — ATORVASTATIN CALCIUM 10 MG: 10 TABLET, FILM COATED ORAL at 21:25

## 2025-01-22 RX ADMIN — ASPIRIN 81 MG: 81 TABLET, COATED ORAL at 09:50

## 2025-01-22 RX ADMIN — OXYCODONE HYDROCHLORIDE 10 MG: 5 TABLET ORAL at 06:33

## 2025-01-22 RX ADMIN — RILUZOLE 50 MG: 50 TABLET ORAL at 18:29

## 2025-01-22 RX ADMIN — PANTOPRAZOLE SODIUM 40 MG: 40 TABLET, DELAYED RELEASE ORAL at 06:33

## 2025-01-22 RX ADMIN — ALBUTEROL SULFATE 2 PUFF: 90 AEROSOL, METERED RESPIRATORY (INHALATION) at 18:29

## 2025-01-22 RX ADMIN — GABAPENTIN 600 MG: 300 CAPSULE ORAL at 14:22

## 2025-01-22 RX ADMIN — CYCLOBENZAPRINE HYDROCHLORIDE 5 MG: 5 TABLET, FILM COATED ORAL at 09:52

## 2025-01-22 RX ADMIN — LORATADINE 10 MG: 10 TABLET ORAL at 09:52

## 2025-01-22 RX ADMIN — PREDNISONE 2.5 MG: 2.5 TABLET ORAL at 09:50

## 2025-01-22 RX ADMIN — DULOXETINE HYDROCHLORIDE 60 MG: 60 CAPSULE, DELAYED RELEASE ORAL at 09:53

## 2025-01-22 RX ADMIN — SENNOSIDES AND DOCUSATE SODIUM 1 TABLET: 50; 8.6 TABLET ORAL at 21:25

## 2025-01-22 RX ADMIN — BACLOFEN 10 MG: 10 TABLET ORAL at 21:23

## 2025-01-22 RX ADMIN — NICOTINE 1 PATCH: 21 PATCH, EXTENDED RELEASE TRANSDERMAL at 09:50

## 2025-01-22 RX ADMIN — RILUZOLE 50 MG: 50 TABLET ORAL at 09:53

## 2025-01-22 RX ADMIN — METOPROLOL TARTRATE 12.5 MG: 25 TABLET, FILM COATED ORAL at 09:52

## 2025-01-22 RX ADMIN — OXYCODONE HYDROCHLORIDE 10 MG: 5 TABLET ORAL at 14:22

## 2025-01-22 RX ADMIN — SENNOSIDES AND DOCUSATE SODIUM 1 TABLET: 50; 8.6 TABLET ORAL at 09:52

## 2025-01-22 RX ADMIN — CYCLOBENZAPRINE HYDROCHLORIDE 5 MG: 5 TABLET, FILM COATED ORAL at 21:25

## 2025-01-22 RX ADMIN — ENOXAPARIN SODIUM 30 MG: 30 INJECTION SUBCUTANEOUS at 14:21

## 2025-01-22 RX ADMIN — GABAPENTIN 600 MG: 300 CAPSULE ORAL at 21:25

## 2025-01-22 RX ADMIN — GUAIFENESIN 600 MG: 600 TABLET, EXTENDED RELEASE ORAL at 21:23

## 2025-01-22 RX ADMIN — METOPROLOL TARTRATE 12.5 MG: 25 TABLET, FILM COATED ORAL at 21:24

## 2025-01-22 RX ADMIN — FLUTICASONE PROPIONATE 1 SPRAY: 50 SPRAY, METERED NASAL at 14:21

## 2025-01-22 RX ADMIN — BACLOFEN 10 MG: 10 TABLET ORAL at 09:51

## 2025-01-22 RX ADMIN — TAMSULOSIN HYDROCHLORIDE 0.4 MG: 0.4 CAPSULE ORAL at 09:52

## 2025-01-22 RX ADMIN — GUAIFENESIN 600 MG: 600 TABLET, EXTENDED RELEASE ORAL at 09:51

## 2025-01-22 RX ADMIN — BACLOFEN 10 MG: 10 TABLET ORAL at 14:22

## 2025-01-22 RX ADMIN — OXYCODONE HYDROCHLORIDE 10 MG: 5 TABLET ORAL at 21:25

## 2025-01-22 RX ADMIN — FLUTICASONE FUROATE AND VILANTEROL TRIFENATATE 1 PUFF: 200; 25 POWDER RESPIRATORY (INHALATION) at 09:53

## 2025-01-22 RX ADMIN — Medication 1 TABLET: at 09:52

## 2025-01-22 RX ADMIN — POLYETHYLENE GLYCOL 3350 17 G: 17 POWDER, FOR SOLUTION ORAL at 09:50

## 2025-01-22 RX ADMIN — GABAPENTIN 600 MG: 300 CAPSULE ORAL at 09:51

## 2025-01-22 ASSESSMENT — ACTIVITIES OF DAILY LIVING (ADL)
ADLS_ACUITY_SCORE: 68

## 2025-01-22 NOTE — PROGRESS NOTES
Care Management Follow Up    Length of Stay (days): 9    Expected Discharge Date: 01/23/2025    Anticipated Discharge Plan:   TCU    Transportation: TBD    PT Recommendations: Transitional Care Facility  OT Recommendations:  Transitional Care Facility     Barriers to Discharge: placement    Prior Living Situation: assisted living with facility resident    Advanced Directive on File: no concerns identified     Patient/Spokesperson Updated: No    Additional Information:  Pt declined by multiple TCUs. SW sent additional TCU referrals as Pt does not have a preference for any facility. Referrals pending.     Next steps:  Secure placement.     ELIN ToribioW

## 2025-01-22 NOTE — PROGRESS NOTES
[facility]    Medicine Progress Note - Hospitalist Service    Date of Admission:  1/12/2025    Assessment & Plan   74 year old male with a past medical history of ALS, asthma, tobacco abuse, hypertension, hyperlipidemia, pulmonary hypertension, chronic neuropathic pain syndrome, BPH, constipation, cervical spine stenosis with myelopathy, decubitus pressure injury who was brought to the emergency department by ambulance from assisted living facility for evaluation of bodyaches, nasal congestion, cough, feeling cold, decreased oral intake, found to have pneumonia and positive coronavirus.        1/21 :     Medically stable  Continue current treatment    Medically ready  Awaiting TCU placement      A/p :      1.Acute corona viral pneumonia  - Negative COVID and influenza, positive coronavirus.  - CT chest, abdomen pelvis showed scattered areas of bilateral bronchial wall thickening most prevalent in the right lower lobe consistent with acute infectious etiology  - Had low-grade fever of 100.2 in spite of taking acetaminophen and ibuprofen on a daily basis  - Normal WBC, procalcitonin and lactic acid; negative SARS-CoV-2/influenza/RSV PCR  - Negative Legionella and streptococcal pneumoniae antigens  - He was treated with IV ceftriaxone and azithromycin  -Switched to oral antibiotic to finish course  -he notes still some cough better      2.Moderate persistent asthma  - Ongoing tobacco abuse  - No bronchospasms on exam  - Requires only 2 L on admission, resolved, now on RA  -VBG consistent with metabolic alkalosis  -Continue PTA albuterol HFA, Symbicort (auto substituted with Breo Ellipta)  -DuoNebs as needed  -Flutter valve  -Continue home steroid dose     3.Hyponatremia, hypochloremia with high anion gap  - Secondary to decreased oral intake and PTA hydrochlorothiazide  - UA showed some ketonuria  - Treated in the ED with 1905 mL milliliters IV NS bolus  - Encourage oral intake  - Continue to  hold HCTZ-will probably discontinue on discharge as blood pressure on the low side  -Resolved  - Discontinue IV fluid  -1/19       4.Hypokalemia  -Replaced per protocol  -Unremarkable magnesium and phosphorus  -Continue to monitor , today 4.5     5.Chronic neuropathic pain syndrome  - Opioid dependence  - On PTA gabapentin, duloxetine, and as needed oxycodone  - Patient has been refusing buprenorphine patch until he sees prescribing provider, last administered on 12/16/2024     6.Essential hypertension  - Hold PTA hydrochlorothiazide because of hyponatremia as above  - Continue PTA metoprolol  -Blood pressure has been stable, doubt he will need to resume HCTZ on discharge.  - Continue to monitor blood pressure     7.Mild rhabdomyolysis  -Probably secondary to infection  -Elevated CK improved to normal by 1/15, so restarted statin   -D/Continue IV hydration      8.Tobacco abuse  - Smokes 1 pack/day  - Cessation education  - Nicotine patch     9.BPH with LUTS  - Intermittent catheterization  - Continue PTA tamsulosin     10.Chronic steroid use  -On PTA prednisone 2.5 mg daily,  -Probably secondary to COPD versus ALS     11.Hyperlipidemia  -restart PTA atorvastatin because of rhabdo     12.Amyotrophic lateral sclerosis  - Patient receives 24-hour cares and is nonambulatory bedbound  - Continue PTA riluzole, baclofen, cyclobenzaprine, bowel regimen  -Patient uses power wheelchair for ambulation and pt/ot eval transfers  -Care management still discussing needs  needs tcu vs AL --pt wants AL, so likely here over weekend , work on strength and re-eval --I still think he will need tcu     13.Decubitus pressure injury, present on admission  -Wound care consult  -Dressing as per wound care     14.Drug-induced platelet defect  -On PTA aspirin, monitor for bleeding     15.Incidental radiographic findings  -Irregular opacity in the lingula measuring 1.4 cm, likely inflammatory though short-term follow-up chest CT may be  "beneficial to ensure resolution  -Outpatient follow-up--I placed referral in lung nodule service for this I discharge navigator      16.Significant weakness and deconditioning  -PT/OT evaluate  -Patient has been using electric wheelchair.  - Patient needs TCU on discharge.    17.Constipation  -on meds, Miralax, senna, doc  -had enema on 1/18 and had large stool    --I did ask care management to touch base with son today on dispo               Diet: Regular Diet Adult  Snacks/Supplements Adult: Ensure Enlive; Between Meals    DVT Prophylaxis: Enoxaparin (Lovenox) SQ  Sepulveda Catheter: Not present  Lines: PRESENT      PICC 01/13/25 Double Lumen Right Basilic Antibiotic.-Site Assessment: WDL      Cardiac Monitoring: None  Code Status: No CPR- Do NOT Intubate      Clinically Significant Risk Factors               # Hypoalbuminemia: Lowest albumin = 3.1 g/dL at 1/14/2025  5:17 AM, will monitor as appropriate     # Hypertension: Noted on problem list            # Cachexia: Estimated body mass index is 17.87 kg/m  as calculated from the following:    Height as of this encounter: 1.778 m (5' 10\").    Weight as of this encounter: 56.5 kg (124 lb 9 oz).        # Financial/Environmental Concerns: none         Social Drivers of Health    Depression: At risk (9/19/2024)    PHQ-2     PHQ-2 Score: 3   Housing Stability: Low Risk  (1/14/2025)    Housing Stability     Do you have housing? : Yes     Are you worried about losing your housing?: No   Recent Concern: Housing Stability - High Risk (1/13/2025)    Housing Stability     Do you have housing? : No     Are you worried about losing your housing?: No   Tobacco Use: High Risk (11/13/2024)    Received from [facility]    Patient History     Smoking Tobacco Use: Every Day     Smokeless Tobacco Use: Never    [facility] [employer] [facility]    Social Connections          Disposition Plan     Medically Ready for " Discharge: Anticipated Tomorrow             Olaf Lucia MD  Hospitalist Service  [facility]  Securely message with [employer] (more info)  Text page via [employer] Paging/Directory   ______________________________________________________________________        Physical Exam   Vital Signs: Temp: 98.4  F (36.9  C) Temp src: Oral BP: 96/54 Pulse: 102   Resp: 18 SpO2: 97 % O2 Device: None (Room air)    Weight: 124 lbs 8.96 oz    Constitutional: awake, fatigued, alert, cooperative, and no apparent distress  Eyes: sclera clear  Respiratory: no increased work of breathing, good air exchange, no retractions, and diminished breath sounds throughout lungs and but clear  Cardiovascular: regular rate and rhythm and normal S1 and S2  GI: normal bowel sounds, soft, non-distended, and non-tender  Skin: no bruising or bleeding  Musculoskeletal: no lower extremity pitting edema present  Neurologic: Mental Status Exam:  Level of Alertness:   awake  Motor Exam:  weakness in all limbs   Neuropsychiatric: General: normal, calm, and normal eye contact    Medical Decision Making       35 MINUTES SPENT BY ME on the date of service doing chart review, history, exam, documentation & further activities per the note.      Data     I have personally reviewed the following data over the past 24 hrs:    N/A  \   N/A   / N/A     N/A N/A N/A /  N/A   4.4 N/A N/A \       Imaging results reviewed over the past 24 hrs:   No results found for this or any previous visit (from the past 24 hours).

## 2025-01-22 NOTE — PLAN OF CARE
Problem: Pain Acute  Goal: Optimal Pain Control and Function  Intervention: Prevent or Manage Pain  Recent Flowsheet Documentation  Taken 1/21/2025 1800 by Jef Barkley RN  Sensory Stimulation Regulation: television on     Problem: Comorbidity Management  Goal: Blood Pressure in Desired Range  Outcome: Progressing     Problem: Pneumonia  Goal: Fluid Balance  Outcome: Progressing   Goal Outcome Evaluation:               Patient A and O times 4. Can answer all questions appropriately. Very difficult assist of 2 for tranfers and cares. Expiratory wheezes and course lung sounds. Enema given one time this shift for constipation. Proved effective, one bowel movement this shift, bed side commode utilized. Double lumen PICC flushes well. K protocol, recheck in morning.

## 2025-01-22 NOTE — PLAN OF CARE
Problem: Pain Acute  Goal: Optimal Pain Control and Function  Outcome: Progressing  Intervention: Prevent or Manage Pain  Recent Flowsheet Documentation  Taken 1/22/2025 6849 by Cassie Potter RN  Bowel Elimination Promotion: adequate fluid intake promoted  Medication Review/Management: medications reviewed     Problem: Electrolyte Imbalance  Goal: Electrolyte Balance  Outcome: Progressing   Goal Outcome Evaluation:               Pt on K+ protocol and was WNL at 4.2, will recheck tomorrow.  PICC line removed per order, but then discharge was canceled, so pt has no IV access.  Pt denies pain, but received scheduled Oxycodone, Flexeril, and Baclofen.

## 2025-01-22 NOTE — DISCHARGE SUMMARY
[facility]    Discharge Summary  Hospitalist    Date of Admission:  1/12/2025  Date of Discharge:  1/22/2025  Discharging Provider: Jef Garcia MD, MD    Discharge Diagnoses   Active Problems:    ALS (amyotrophic lateral sclerosis) (H)    Cognitive disorder    Essential hypertension    Impaired mobility and activities of daily living    Neurogenic bowel    Neuropathic pain syndrome (non-herpetic)    Sinus tachycardia    Myalgia    Acute cough      History of Present Illness   74 year old male with a past medical history of ALS, asthma, tobacco abuse, hypertension, hyperlipidemia, pulmonary hypertension, chronic neuropathic pain syndrome, BPH, constipation, cervical spine stenosis with myelopathy, decubitus pressure injury who was brought to the emergency department by ambulance from assisted living facility for evaluation of bodyaches, nasal congestion, cough, feeling cold, decreased oral intake, found to have pneumonia and positive coronavirus.     Hospital Course   Acute bronchitis  -Corona virus on respiratory panel.   -CT chest, abdomen pelvis showed scattered areas of bilateral bronchial wall thickening most prevalent in the right lower lobe consistent with acute infectious etiology  -Treated with IV ceftriaxone and azithromycin, clinically improved.      #Moderate persistent asthma  -Ongoing tobacco abuse, used nicotine patches when here -- he is not sure whether he is going to quit when he returns home.      #Hyponatremia  - hydrochlorothiazide stopped, Sodium 137 on discharge     #Hypokalemia  -Replaced, potassium 4.3 on discharge     #Chronic neuropathic pain syndrome  -On PTA gabapentin, duloxetine, and as needed oxycodone  -Patient has been refusing buprenorphine patch until he sees his present provider     #Amyotrophic lateral sclerosis  -Patient receives 24-hour cares and is nonambulatory bedbound  -Continue PTA riluzole, baclofen, cyclobenzaprine, bowel regimen      #Decubitus pressure injury, present on admission  -Wound care consult     #Essential hypertension  -Continue PTA metoprolol     #Tobacco abuse  -Smokes 1 pack/day     #BPH with LUTS  -Continue PTA tamsulosin     #Chronic steroid use  -On PTA prednisone 2.5 mg daily, indication is unclear: COPD versus ALS, will continue     #Hyperlipidemia  -Hold PTA atorvastatin  -Check CK level 571     #Drug-induced platelet defect  -On PTA aspirin, monitor for bleeding     #Incidental radiographic findings  -Irregular opacity in the lingula measuring 1.4 cm, likely inflammatory though short-term follow-up chest CT may be beneficial to ensure resolution  -Outpatient follow-up     Discharge home with home care.      Jef Garcia MD  Pager: [phone]  Cell Phone:  [phone]       Significant Results and Procedures   As above    Pending Results   These results will be followed up by Dr. Garcia  Unresulted Labs Ordered in the Past 30 Days of this Admission       No orders found from 12/13/2024 to 1/13/2025.            Code Status   DNR / DNI       Primary Care Physician   Marco A Avina    Physical Exam   Temp: 98.3  F (36.8  C) Temp src: Oral BP: 103/59 Pulse: 96   Resp: 18 SpO2: 93 % O2 Device: None (Room air)    Vitals:    01/12/25 1930 01/13/25 2024 01/17/25 1316   Weight: 63.5 kg (140 lb) 56.1 kg (123 lb 10.9 oz) 56.5 kg (124 lb 9 oz)     Vital Signs with Ranges  Temp:  [97.5  F (36.4  C)-98.4  F (36.9  C)] 98.3  F (36.8  C)  Pulse:  [] 96  Resp:  [18-22] 18  BP: ()/(54-72) 103/59  Cuff Mean (mmHg):  [83] 83  SpO2:  [93 %-97 %] 93 %  I/O last 3 completed shifts:  In: 240 [P.O.:240]  Out: 1900 [Urine:1900]    Exam on discharge:   Lungs clear    Discharge Disposition   Admited to home care:    Discharged to home  Condition at discharge: Stable    Consultations This Hospital Stay   VASCULAR ACCESS ADULT IP CONSULT  WOUND OSTOMY CONTINENCE NURSE  IP CONSULT  CARE MANAGEMENT / SOCIAL WORK IP  CONSULT  CARE MANAGEMENT / SOCIAL WORK IP CONSULT  CARE MANAGEMENT / SOCIAL WORK IP CONSULT  PHYSICAL THERAPY ADULT IP CONSULT  OCCUPATIONAL THERAPY ADULT IP CONSULT    Time Spent on this Encounter   I spent a total of 35 minutes discharging this patient.     Discharge Orders      Primary Care - Care Coordination Referral      Adult Oncology/Hematology  Referral      Home Care Referral      Reason for your hospital stay    Acute bronchitis from Corona Virus (not Covid)     Activity    Your activity upon discharge: activity as tolerated     Diet    Follow this diet upon discharge: Current Diet:Orders Placed This Encounter      Snacks/Supplements Adult: Ensure Enlive; Between Meals      Regular Diet Adult     Hospital Follow-up with Existing Primary Care Provider (PCP)    Please see details below          Discharge Medications   Current Discharge Medication List        START taking these medications    Details   nicotine (NICODERM CQ) 21 MG/24HR 24 hr patch Place 1 patch over 24 hours onto the skin daily for 14 days.  Qty: 14 patch, Refills: 0    Associated Diagnoses: Encounter for smoking cessation counseling      senna-docusate (SENOKOT-S/PERICOLACE) 8.6-50 MG tablet Take 1 tablet by mouth 2 times daily.  Qty: 100 tablet, Refills: 0    Associated Diagnoses: Constipation, unspecified constipation type           CONTINUE these medications which have CHANGED    Details   polyethylene glycol (MIRALAX) 17 GM/Dose powder Take 17 g by mouth daily.    Associated Diagnoses: Constipation, unspecified constipation type           CONTINUE these medications which have NOT CHANGED    Details   acetaminophen (TYLENOL) 500 MG tablet Take 1,000 mg by mouth every 8 hours as needed for mild pain.      albuterol (PROAIR HFA/PROVENTIL HFA/VENTOLIN HFA) 108 (90 Base) MCG/ACT inhaler Inhale 1-2 puffs into the lungs every 4 hours as needed for shortness of breath or wheezing.  Qty: 18 g, Refills: 11    Comments: Pharmacy may  dispense brand covered by insurance (Proair, or proventil or ventolin or generic albuterol inhaler)  Associated Diagnoses: Chronic obstructive pulmonary disease with acute exacerbation (H)      ammonium lactate (LAC-HYDRIN) 12 % external lotion Apply topically 2 times daily as needed.      aspirin (ASA) 81 MG EC tablet Take 1 tablet (81 mg) by mouth daily  Qty: 100 tablet, Refills: 3    Associated Diagnoses: Type 2 diabetes mellitus with diabetic neuropathy, without long-term current use of insulin (H)      atorvastatin (LIPITOR) 10 MG tablet Take 1 tablet (10 mg) by mouth daily.  Qty: 90 tablet, Refills: 3    Associated Diagnoses: Type 2 diabetes mellitus with hyperglycemia, without long-term current use of insulin (H)      baclofen (LIORESAL) 10 MG tablet Take 10 mg by mouth 3 times daily      budesonide-formoterol (SYMBICORT) 160-4.5 MCG/ACT Inhaler Inhale 2 puffs into the lungs 2 times daily.  Qty: 10.2 g, Refills: 11    Associated Diagnoses: Chronic obstructive pulmonary disease with acute exacerbation (H)      buprenorphine (BUTRANS) 10 MCG/HR WK patch Place 1 patch onto the skin once a week. Monday(s)      CALCIUM + VITAMIN D3 600-10 MG-MCG per tablet Take 1 tablet by mouth every morning.      Cholecalciferol (VITAMIN D3) 25 MCG (1000 UT) CAPS Take 4 tablets by mouth every morning.      cyclobenzaprine (FLEXERIL) 5 MG tablet Take 5 mg by mouth 2 times daily      docusate sodium (COLACE) 100 MG capsule Take 100 mg by mouth as needed for constipation      DULoxetine (CYMBALTA) 60 MG capsule Take 60 mg by mouth every morning.      ferrous sulfate (FEROSUL) 325 (65 Fe) MG tablet Take 1 tablet (325 mg) by mouth daily (with breakfast)  Qty: 90 tablet, Refills: 3    Associated Diagnoses: Iron deficiency anemia      fluticasone (FLONASE) 50 MCG/ACT nasal spray Spray 1 spray into both nostrils daily at 2 pm.      folic acid (FOLVITE) 1 MG tablet Take 1 tablet (1,000 mcg) by mouth daily.  Qty: 90 tablet, Refills: 3     Associated Diagnoses: ALS (amyotrophic lateral sclerosis) (H)      gabapentin (NEURONTIN) 300 MG capsule Take 600 mg by mouth 3 times daily      lidocaine (LIDODERM) 5 % patch Apply one patch to affected area for no more than 12 hours, Remove patch, and maintain 12 hours free of Lidocaine before applying the next patch, Apply a new patch for 12 of every 24 hours as needed to increase efficacy  Qty: 60 patch, Refills: 5    Associated Diagnoses: Chronic neck pain      loperamide (IMODIUM) 2 MG capsule Take 2 mg by mouth as needed for diarrhea      loratadine (CLARITIN) 10 MG tablet Take 10 mg by mouth every morning.      metoprolol tartrate (LOPRESSOR) 25 MG tablet Take 0.5 tablets (12.5 mg) by mouth 2 times daily.  Qty: 180 tablet, Refills: 1    Associated Diagnoses: Essential hypertension      multivitamin (ONE A DAY) per tablet [MULTIVITAMIN (ONE A DAY) PER TABLET] TAKE 1 TABLET BY MOUTH DAILY  Qty: 90 tablet, Refills: 3    Associated Diagnoses: Diabetes mellitus, type 2 (H)      naloxone (NARCAN) 4 MG/0.1ML nasal spray Spray 4 mg into one nostril alternating nostrils as needed for opioid reversal every 2-3 minutes until assistance arrives      naproxen sodium (ANAPROX) 220 MG tablet Take 220 mg by mouth 2 times daily as needed.      omeprazole (PRILOSEC) 20 MG DR capsule Take 1 capsule (20 mg) by mouth every morning.  Qty: 90 capsule, Refills: 3    Associated Diagnoses: Iron deficiency anemia      oxyCODONE IR (ROXICODONE) 10 MG tablet Take 10 mg by mouth 3 times daily      predniSONE (DELTASONE) 2.5 MG tablet Take 1 tablet by mouth every morning.      riluzole (RILUTEK) 50 MG tablet Take 50 mg by mouth 2 times daily (with meals)      sodium phosphate (FLEET ENEMA) 7-19 GM/118ML rectal enema Place 1 enema rectally daily as needed.      tamsulosin (FLOMAX) 0.4 MG capsule Take 1 capsule (0.4 mg) by mouth daily (with breakfast).  Qty: 90 capsule, Refills: 3    Associated Diagnoses: Benign prostatic hyperplasia with  urinary frequency      triamcinolone (KENALOG) 0.1 % external cream Apply topically 2 times daily as needed.      vitamin (B COMPLEX) capsule Take 1 tablet by mouth every morning.           STOP taking these medications       bacitracin 500 UNIT/GM external ointment Comments:   Reason for Stopping:         hydrochlorothiazide (HYDRODIURIL) 50 MG tablet Comments:   Reason for Stopping:             Allergies   No Known Allergies  Data   Most Recent 3 CBC's:  Recent Labs   Lab Test 01/22/25  0640 01/19/25  0807 01/18/25  0533 01/16/25  0747   WBC  --  8.3 7.0 6.6   HGB  --  13.4 12.8* 12.3*   MCV  --  87 88 88    312 232 213      Most Recent 3 BMP's:  Recent Labs   Lab Test 01/22/25  0640 01/21/25  0621 01/20/25  0538 01/19/25  1626 01/19/25  0624 01/18/25  0533   NA  --   --  137  --  133* 133*   POTASSIUM 4.2 4.4 4.3  --  4.5  4.5 4.4   CHLORIDE  --   --  100  --  97* 98   CO2  --   --  28  --  23 28   BUN  --   --  12.5  --  9.1 9.2   CR  --   --  0.59*  --  0.53* 0.54*   ANIONGAP  --   --  9  --  13 7   CT  --   --  9.8  --  9.8 9.3   GLC  --   --  90 196* 76 79     Most Recent 2 LFT's:  Recent Labs   Lab Test 01/15/25  0555 01/14/25  0517   AST 41 44   ALT 20 21   ALKPHOS 85 91   BILITOTAL 0.3 0.3     Most Recent INR's and Anticoagulation Dosing History:  Anticoagulation Dose History          Latest Ref Rng & Units 2/7/2018 1/12/2025   Recent Dosing and Labs   INR 0.85 - 1.15 1.18  0.98      Most Recent 3 Troponin's:No lab results found.  Most Recent Cholesterol Panel:  Recent Labs   Lab Test 09/19/24  1215   CHOL 112   LDL 52   HDL 45   TRIG 73     Most Recent 6 Bacteria Isolates From Any Culture (See EPIC Reports for Culture Details):No lab results found.  Most Recent TSH, T4 and A1c Labs:  Recent Labs   Lab Test 09/19/24  1215 06/25/19  1322 06/14/19  0635   TSH  --   --  0.67   A1C 5.6   < >  --     < > = values in this interval not displayed.

## 2025-01-22 NOTE — PROGRESS NOTES
[facility]    Hospitalist Progress Note    Assessment & Plan   74 year old male with a past medical history of ALS, asthma, tobacco abuse, hypertension, hyperlipidemia, pulmonary hypertension, chronic neuropathic pain syndrome, BPH, constipation, cervical spine stenosis with myelopathy, decubitus pressure injury who was brought to the emergency department by ambulance from assisted living facility for evaluation of bodyaches, nasal congestion, cough, feeling cold, decreased oral intake, found to have pneumonia and positive coronavirus.     Impression:   Principal Problem:    Acute bronchitis -- Corona Virus (not Covid)      ALS (amyotrophic lateral sclerosis) (H)      Mild Cognitive Impairment      Essential hypertension      Impaired mobility and activities of daily living      Neurogenic bowel      Neuropathic pain syndrome (non-herpetic)      Myalgia      Plan:  Medically stable -- planned to discharge back to Assisted Living today, but they are unable to take him back because his care needs are too high.  Discussed with Care management -- they are looking for a TCU and he is agreeable to that.      DVT Prophylaxis: Low Risk/Ambulatory with no VTE prophylaxis indicated  Code Status: No CPR- Do NOT Intubate    Disposition: Expected discharge to TCU when a place if available.     Jef Garcia MD  Pager [phone]  Cell Phone [phone]  Text Page (7am to 6pm)    Interval History   No Complaints.  Slight cough, no sputum production.     Physical Exam   Temp: 98.3  F (36.8  C) Temp src: Oral BP: 103/59 Pulse: 96   Resp: 18 SpO2: 93 % O2 Device: None (Room air)    Vitals:    01/12/25 1930 01/13/25 2024 01/17/25 1316   Weight: 63.5 kg (140 lb) 56.1 kg (123 lb 10.9 oz) 56.5 kg (124 lb 9 oz)     Vital Signs with Ranges  Temp:  [97.5  F (36.4  C)-98.4  F (36.9  C)] 98.3  F (36.8  C)  Pulse:  [] 96  Resp:  [18] 18  BP: ()/(54-72) 103/59  SpO2:  [93 %-97 %] 93 %  I/O last  3 completed shifts:  In: 240 [P.O.:240]  Out: 1900 [Urine:1900]    # Pain Assessment:      1/22/2025     6:33 AM   Current Pain Score   Patient currently in pain? yes   Jef lawrence pain level was assessed and he currently denies pain.        Constitutional: Awake, alert, cooperative, no apparent distress  Respiratory: Clear to auscultation bilaterally, no crackles or wheezing  Cardiovascular: Regular rate and rhythm, normal S1 and S2, and no murmur noted  GI: Normal bowel sounds, soft, non-distended, non-tender  Extrem: No calf tenderness, no ankle edema  Neuro: Ox2, no focal motor or sensory deficits but generalized weakness    Medications   Current Facility-Administered Medications   Medication Dose Route Frequency Provider Last Rate Last Admin     Current Facility-Administered Medications   Medication Dose Route Frequency Provider Last Rate Last Admin    aspirin EC tablet 81 mg  81 mg Oral Daily Armin Ríos MD   81 mg at 01/22/25 0950    atorvastatin (LIPITOR) tablet 10 mg  10 mg Oral QPM Gretel Mccann MD   10 mg at 01/21/25 2104    baclofen (LIORESAL) tablet 10 mg  10 mg Oral TID Armin Ríos MD   10 mg at 01/22/25 0951    cyclobenzaprine (FLEXERIL) tablet 5 mg  5 mg Oral BID Armin Ríos MD   5 mg at 01/22/25 0952    DULoxetine (CYMBALTA) DR capsule 60 mg  60 mg Oral QAM Armin Ríos MD   60 mg at 01/22/25 0953    enoxaparin ANTICOAGULANT (LOVENOX) injection 30 mg  30 mg Subcutaneous Q24H Will Faulkner MD   30 mg at 01/21/25 1357    fluticasone (FLONASE) 50 MCG/ACT spray 1 spray  1 spray Both Nostrils Daily Armin Ríos MD   1 spray at 01/21/25 1357    fluticasone-vilanterol (BREO ELLIPTA) 200-25 MCG/ACT inhaler 1 puff  1 puff Inhalation Daily Armin Ríos MD   1 puff at 01/22/25 0953    gabapentin (NEURONTIN) capsule 600 mg  600 mg Oral TID Armin Ríos MD   600 mg at 01/22/25 0951    guaiFENesin (MUCINEX) 12 hr tablet 600 mg  600 mg Oral BID Armin Ríos  MD Evelio   600 mg at 01/22/25 0951    loratadine (CLARITIN) tablet 10 mg  10 mg Oral QAM Armin Ríos MD   10 mg at 01/22/25 0952    metoprolol tartrate (LOPRESSOR) half-tab 12.5 mg  12.5 mg Oral BID Armin Ríos MD   12.5 mg at 01/22/25 0952    multivitamin w/minerals (THERA-VIT-M) tablet 1 tablet  1 tablet Oral Daily Will Faulkner MD   1 tablet at 01/22/25 0952    nicotine (NICODERM CQ) 21 MG/24HR 24 hr patch 1 patch  1 patch Transdermal Daily Armin Ríos MD   1 patch at 01/22/25 0950    oxyCODONE (ROXICODONE) tablet 10 mg  10 mg Oral Q8H Armin Ríos MD   10 mg at 01/22/25 0633    pantoprazole (PROTONIX) EC tablet 40 mg  40 mg Oral QAM AC Armin Ríos MD   40 mg at 01/22/25 0633    polyethylene glycol (MIRALAX) Packet 17 g  17 g Oral Daily Gretel Mccann MD   17 g at 01/22/25 0950    predniSONE (DELTASONE) tablet 2.5 mg  2.5 mg Oral QAM Armin Ríos MD   2.5 mg at 01/22/25 0950    riluzole (RILUTEK) tablet 50 mg  50 mg Oral BID w/meals Armin Ríos MD   50 mg at 01/22/25 0953    senna-docusate (SENOKOT-S/PERICOLACE) 8.6-50 MG per tablet 1 tablet  1 tablet Oral BID Gretel Mccann MD   1 tablet at 01/22/25 0952    sodium chloride (PF) 0.9% PF flush 3 mL  3 mL Intracatheter Q8H Danielle Chamberlain MD   3 mL at 01/22/25 0635    tamsulosin (FLOMAX) capsule 0.4 mg  0.4 mg Oral Daily with breakfast Armin Ríos MD   0.4 mg at 01/22/25 0952       Data   Recent Labs   Lab 01/22/25  0640 01/21/25  0621 01/20/25  0538 01/19/25  1626 01/19/25  0807 01/19/25  0624 01/18/25  0533 01/17/25  0251 01/16/25  0747   WBC  --   --   --   --  8.3  --  7.0  --  6.6   HGB  --   --   --   --  13.4  --  12.8*  --  12.3*   MCV  --   --   --   --  87  --  88  --  88     --   --   --  312  --  232  --  213   NA  --   --  137  --   --  133* 133*   < > 136   POTASSIUM 4.2 4.4 4.3  --   --  4.5  4.5 4.4   < > 4.3   CHLORIDE  --   --  100  --   --  97* 98   < > 100   CO2   --   --  28  --   --  23 28   < > 29   BUN  --   --  12.5  --   --  9.1 9.2   < > 6.4*   CR  --   --  0.59*  --   --  0.53* 0.54*   < > 0.45*   ANIONGAP  --   --  9  --   --  13 7   < > 7   CT  --   --  9.8  --   --  9.8 9.3   < > 8.9   GLC  --   --  90 196*  --  76 79   < > 90    < > = values in this interval not displayed.       Imaging:   No results found for this or any previous visit (from the past 24 hours).

## 2025-01-22 NOTE — PLAN OF CARE
Problem: Adult Inpatient Plan of Care  Goal: Plan of Care Review  Description: The Plan of Care Review/Shift note should be completed every shift.  The Outcome Evaluation is a brief statement about your assessment that the patient is improving, declining, or no change.  This information will be displayed automatically on your shift  note.  Outcome: Progressing  Goal: Absence of Hospital-Acquired Illness or Injury  Intervention: Identify and Manage Fall Risk  Recent Flowsheet Documentation  Taken 1/22/2025 0017 by Max Jacobo RN  Safety Promotion/Fall Prevention:   clutter free environment maintained   increased rounding and observation   safety round/check completed  Intervention: Prevent Skin Injury  Recent Flowsheet Documentation  Taken 1/22/2025 0017 by Max Jacobo RN  Body Position:   position maintained   refuses positioning  Intervention: Prevent and Manage VTE (Venous Thromboembolism) Risk  Recent Flowsheet Documentation  Taken 1/22/2025 0017 by Max Jacobo RN  VTE Prevention/Management: SCDs off (sequential compression devices)  Intervention: Prevent Infection  Recent Flowsheet Documentation  Taken 1/22/2025 0017 by Max Jacobo RN  Infection Prevention: rest/sleep promoted     Problem: Pain Acute  Goal: Optimal Pain Control and Function  Outcome: Progressing  Intervention: Prevent or Manage Pain  Recent Flowsheet Documentation  Taken 1/22/2025 0017 by Max Jacobo RN  Medication Review/Management: medications reviewed     Problem: Comorbidity Management  Goal: Blood Pressure in Desired Range  Outcome: Progressing  Intervention: Maintain Blood Pressure Management  Recent Flowsheet Documentation  Taken 1/22/2025 0017 by Max Jacobo RN  Medication Review/Management: medications reviewed   Goal Outcome Evaluation:       Pt alert & oriented. Pain managed by scheduled oxycodone. Primofit in place. On room air, Vital Signs WNL. Will  continue to monitor.

## 2025-01-22 NOTE — PROGRESS NOTES
Physical Therapy Discharge Summary    Reason for therapy discharge:    Discharged to transitional care facility.    Progress towards therapy goal(s). See goals on Care Plan in [facility] electronic health record for goal details.  Goals partially met.  Barriers to achieving goals:   discharge from facility.    Therapy recommendation(s):    Continued therapy is recommended.  Rationale/Recommendations:  TCU to progress independence with functional mobility.

## 2025-01-23 VITALS
RESPIRATION RATE: 20 BRPM | HEIGHT: 70 IN | TEMPERATURE: 98 F | OXYGEN SATURATION: 92 % | HEART RATE: 109 BPM | DIASTOLIC BLOOD PRESSURE: 55 MMHG | WEIGHT: 124.56 LBS | SYSTOLIC BLOOD PRESSURE: 122 MMHG | BODY MASS INDEX: 17.83 KG/M2

## 2025-01-23 LAB
ATRIAL RATE - MUSE: 125 BPM
DIASTOLIC BLOOD PRESSURE - MUSE: NORMAL MMHG
INTERPRETATION ECG - MUSE: NORMAL
P AXIS - MUSE: 46 DEGREES
POTASSIUM SERPL-SCNC: 4.1 MMOL/L (ref 3.4–5.3)
PR INTERVAL - MUSE: 150 MS
QRS DURATION - MUSE: 80 MS
QT - MUSE: 310 MS
QTC - MUSE: 447 MS
R AXIS - MUSE: 31 DEGREES
SYSTOLIC BLOOD PRESSURE - MUSE: NORMAL MMHG
T AXIS - MUSE: 72 DEGREES
VENTRICULAR RATE- MUSE: 125 BPM

## 2025-01-23 PROCEDURE — 250N000009 HC RX 250: Performed by: HOSPITALIST

## 2025-01-23 PROCEDURE — 250N000013 HC RX MED GY IP 250 OP 250 PS 637: Performed by: HOSPITALIST

## 2025-01-23 PROCEDURE — 250N000013 HC RX MED GY IP 250 OP 250 PS 637: Performed by: INTERNAL MEDICINE

## 2025-01-23 PROCEDURE — 99239 HOSP IP/OBS DSCHRG MGMT >30: CPT | Performed by: HOSPITALIST

## 2025-01-23 PROCEDURE — 250N000011 HC RX IP 250 OP 636: Performed by: INTERNAL MEDICINE

## 2025-01-23 PROCEDURE — 84132 ASSAY OF SERUM POTASSIUM: CPT | Performed by: INTERNAL MEDICINE

## 2025-01-23 PROCEDURE — 36415 COLL VENOUS BLD VENIPUNCTURE: CPT | Performed by: INTERNAL MEDICINE

## 2025-01-23 PROCEDURE — 250N000012 HC RX MED GY IP 250 OP 636 PS 637: Performed by: HOSPITALIST

## 2025-01-23 RX ORDER — OXYCODONE HYDROCHLORIDE 10 MG/1
10 TABLET ORAL 3 TIMES DAILY
Qty: 9 TABLET | Refills: 0 | Status: SHIPPED | OUTPATIENT
Start: 2025-01-23

## 2025-01-23 RX ADMIN — PREDNISONE 2.5 MG: 2.5 TABLET ORAL at 09:57

## 2025-01-23 RX ADMIN — RILUZOLE 50 MG: 50 TABLET ORAL at 09:57

## 2025-01-23 RX ADMIN — DULOXETINE HYDROCHLORIDE 60 MG: 60 CAPSULE, DELAYED RELEASE ORAL at 09:54

## 2025-01-23 RX ADMIN — GUAIFENESIN 600 MG: 600 TABLET, EXTENDED RELEASE ORAL at 09:55

## 2025-01-23 RX ADMIN — FLUTICASONE FUROATE AND VILANTEROL TRIFENATATE 1 PUFF: 200; 25 POWDER RESPIRATORY (INHALATION) at 09:54

## 2025-01-23 RX ADMIN — IPRATROPIUM BROMIDE AND ALBUTEROL SULFATE 3 ML: .5; 3 SOLUTION RESPIRATORY (INHALATION) at 10:46

## 2025-01-23 RX ADMIN — SENNOSIDES AND DOCUSATE SODIUM 1 TABLET: 50; 8.6 TABLET ORAL at 09:58

## 2025-01-23 RX ADMIN — GABAPENTIN 600 MG: 300 CAPSULE ORAL at 09:54

## 2025-01-23 RX ADMIN — BACLOFEN 10 MG: 10 TABLET ORAL at 09:53

## 2025-01-23 RX ADMIN — BACLOFEN 10 MG: 10 TABLET ORAL at 14:31

## 2025-01-23 RX ADMIN — ASPIRIN 81 MG: 81 TABLET, COATED ORAL at 09:52

## 2025-01-23 RX ADMIN — CYCLOBENZAPRINE HYDROCHLORIDE 5 MG: 5 TABLET, FILM COATED ORAL at 09:53

## 2025-01-23 RX ADMIN — ALBUTEROL SULFATE 2 PUFF: 90 AEROSOL, METERED RESPIRATORY (INHALATION) at 17:17

## 2025-01-23 RX ADMIN — PANTOPRAZOLE SODIUM 40 MG: 40 TABLET, DELAYED RELEASE ORAL at 06:44

## 2025-01-23 RX ADMIN — FLUTICASONE PROPIONATE 1 SPRAY: 50 SPRAY, METERED NASAL at 14:30

## 2025-01-23 RX ADMIN — POLYETHYLENE GLYCOL 3350 17 G: 17 POWDER, FOR SOLUTION ORAL at 09:57

## 2025-01-23 RX ADMIN — GABAPENTIN 600 MG: 300 CAPSULE ORAL at 14:31

## 2025-01-23 RX ADMIN — RILUZOLE 50 MG: 50 TABLET ORAL at 17:16

## 2025-01-23 RX ADMIN — ENOXAPARIN SODIUM 30 MG: 30 INJECTION SUBCUTANEOUS at 14:30

## 2025-01-23 RX ADMIN — LORATADINE 10 MG: 10 TABLET ORAL at 09:55

## 2025-01-23 RX ADMIN — NAPROXEN 250 MG: 250 TABLET ORAL at 17:16

## 2025-01-23 RX ADMIN — ALBUTEROL SULFATE 2 PUFF: 90 AEROSOL, METERED RESPIRATORY (INHALATION) at 09:58

## 2025-01-23 RX ADMIN — Medication 1 TABLET: at 09:56

## 2025-01-23 RX ADMIN — OXYCODONE HYDROCHLORIDE 10 MG: 5 TABLET ORAL at 06:43

## 2025-01-23 RX ADMIN — TAMSULOSIN HYDROCHLORIDE 0.4 MG: 0.4 CAPSULE ORAL at 09:58

## 2025-01-23 RX ADMIN — OXYCODONE HYDROCHLORIDE 10 MG: 5 TABLET ORAL at 14:30

## 2025-01-23 ASSESSMENT — ACTIVITIES OF DAILY LIVING (ADL)
ADLS_ACUITY_SCORE: 68
ADLS_ACUITY_SCORE: 68
ADLS_ACUITY_SCORE: 64
ADLS_ACUITY_SCORE: 68
ADLS_ACUITY_SCORE: 64
ADLS_ACUITY_SCORE: 64
ADLS_ACUITY_SCORE: 68
ADLS_ACUITY_SCORE: 64
ADLS_ACUITY_SCORE: 64
ADLS_ACUITY_SCORE: 68
ADLS_ACUITY_SCORE: 64
ADLS_ACUITY_SCORE: 68
ADLS_ACUITY_SCORE: 64
ADLS_ACUITY_SCORE: 68

## 2025-01-23 NOTE — PROGRESS NOTES
Care Management Discharge Note    Discharge Date: 01/23/2025       Discharge Disposition: Transitional Care - [facility]    Discharge Services: Transportation Services, Transitional Care - [facility]    Discharge DME: None    Discharge Transportation:  [employer] ride between [id number]     Private pay costs discussed: transportation costs    Does the patient's insurance plan have a 3 day qualifying hospital stay waiver?  No    PAS Confirmation Code: [id number]  Patient/family educated on Medicare website which has current facility and service quality ratings: yes    Education Provided on the Discharge Plan: Yes  Persons Notified of Discharge Plans: Patient  Patient/Family in Agreement with the Plan: yes    Handoff Referral Completed: No, handoff not indicated or clinically appropriate    Additional Information:  Patient discharge to Transitional Care - [facility] via [employer] [facility] ride between [id number].     Orders sent to Transitional Care - [facility]    Carlyn Lea RN

## 2025-01-23 NOTE — PLAN OF CARE
"  Problem: Adult Inpatient Plan of Care  Goal: Plan of Care Review  Description: The Plan of Care Review/Shift note should be completed every shift.  The Outcome Evaluation is a brief statement about your assessment that the patient is improving, declining, or no change.  This information will be displayed automatically on your shift  note.  Outcome: Progressing  Goal: Patient-Specific Goal (Individualized)  Description: You can add care plan individualizations to a care plan. Examples of Individualization might be:  \"Parent requests to be called daily at 9am for status\", \"I have a hard time hearing out of my right ear\", or \"Do not touch me to wake me up as it startles  me\".  Outcome: Progressing  Goal: Absence of Hospital-Acquired Illness or Injury  Outcome: Progressing  Intervention: Identify and Manage Fall Risk  Recent Flowsheet Documentation  Taken 1/23/2025 0115 by Priyanka Flores RN  Safety Promotion/Fall Prevention:   activity supervised   clutter free environment maintained   nonskid shoes/slippers when out of bed   increased rounding and observation   lighting adjusted   increase visualization of patient   room near nurse's station   supervised activity  Intervention: Prevent Skin Injury  Recent Flowsheet Documentation  Taken 1/23/2025 0643 by Priyanka Flores RN  Body Position: supine  Taken 1/23/2025 0600 by Priyanka Flores RN  Body Position:   turned   right  Taken 1/23/2025 0400 by Priyanka Flores RN  Body Position:   turned   left  Taken 1/23/2025 0115 by Priyanka Flores RN  Body Position:   turned   right   heels elevated  Skin Protection: adhesive use limited  Intervention: Prevent Infection  Recent Flowsheet Documentation  Taken 1/23/2025 0115 by Priyanka Flores RN  Infection Prevention:   rest/sleep promoted   single patient room provided  Goal: Optimal Comfort and Wellbeing  Intervention: Monitor Pain and Promote Comfort  Recent Flowsheet Documentation  Taken 1/23/2025 0643 by " Priyanka Flores, RN  Pain Management Interventions: medication (see MAR)  Intervention: Provide Person-Centered Care  Recent Flowsheet Documentation  Taken 1/23/2025 0115 by Priyanka Flores, RN  Trust Relationship/Rapport:   care explained   choices provided   emotional support provided  Goal: Readiness for Transition of Care  Outcome: Progressing   Goal Outcome Evaluation:       Pt a/o with no c/o pain until this morning, then pain increased everywhere to 10. Pt given scheduled oxycodone and repositioned.

## 2025-01-23 NOTE — DISCHARGE SUMMARY
[facility] MEDICINE  DISCHARGE SUMMARY     Primary Care Physician: Marco A Avina  Admission Date: 1/12/2025   Discharge Provider: Vivian Arenas MD Discharge Date: 1/23/2025   Diet:   Active Diet and Nourishment Order   Procedures    Snacks/Supplements Adult: Ensure Enlive; Between Meals    Regular Diet Adult    Diet    Diet       Code Status: No CPR- Do NOT Intubate   Activity: DCACTIVITY: Activity as tolerated        Condition at Discharge: Stable     REASON FOR PRESENTATION(See Admission Note for Details)     Flu like symptoms    PRINCIPAL & ACTIVE DISCHARGE DIAGNOSES     Coronavirus (not COVID-19) Bronchitis  ALS    PENDING LABS     Unresulted Labs Ordered in the Past 30 Days of this Admission       No orders found from 12/13/2024 to 1/13/2025.              PROCEDURES ( this hospitalization only)          RECOMMENDATIONS TO OUTPATIENT PROVIDER FOR F/U VISIT     Follow-up Appointments       Hospital Follow-up with Existing Primary Care Provider (PCP)      Please see details below         Schedule Primary Care visit within: 30 Days       Follow Up and recommended labs and tests      Follow up with Nursing home physician.                  DISPOSITION     Skilled Nursing Facility    SUMMARY OF HOSPITAL COURSE:    74 year old male with a past medical history of ALS, asthma, tobacco abuse, hypertension, hyperlipidemia, pulmonary hypertension, chronic neuropathic pain syndrome, BPH, constipation, cervical spine stenosis with myelopathy, decubitus pressure injury who was brought to the emergency department by ambulance from assisted living facility for evaluation of bodyaches, nasal congestion, cough, feeling cold, decreased oral intake, found to have pneumonia and positive coronavirus.         Hospital Course  Acute bronchitis  -Corona virus on respiratory panel.   -CT chest, abdomen pelvis showed scattered areas of bilateral bronchial wall thickening most prevalent in the right  lower lobe consistent with acute infectious etiology  -Treated with IV ceftriaxone and azithromycin, clinically improved.      #Moderate persistent asthma  -Ongoing tobacco abuse, used nicotine patches when here -- he is not sure whether he is going to quit when he returns home.      #Hyponatremia  - hydrochlorothiazide stopped, Sodium 137 on discharge     #Hypokalemia  -Replaced, potassium 4.3 on discharge     #Chronic neuropathic pain syndrome with opiate dependence  -On PTA gabapentin, duloxetine, and as needed oxycodone  -Patient has been refusing buprenorphine patch until he sees his present provider (apparently has not been on this since December)  -continue PTA oxycodone 10mg TID     #Amyotrophic lateral sclerosis  -Patient receives 24-hour cares and is nonambulatory bedbound  -Continue PTA riluzole, baclofen, cyclobenzaprine, bowel regimen     #Decubitus pressure injury, present on admission  -Wound care consult     #Essential hypertension  -Continue PTA metoprolol     #Tobacco abuse  -Smokes 1 pack/day     #BPH with LUTS  -Continue PTA tamsulosin     #Chronic steroid use  -On PTA prednisone 2.5 mg daily, likely for COPD     #Hyperlipidemia  -Hold PTA atorvastatin  -Check CK level 571     #Drug-induced platelet defect  -On PTA aspirin, monitor for bleeding     #Incidental radiographic findings  -Irregular opacity in the lingula measuring 1.4 cm, likely inflammatory though short-term follow-up chest CT may be beneficial to ensure resolution  -Outpatient follow-up if consistent with goals of care.  Referral already sent       Discharge Medications with Med changes:     Current Discharge Medication List        START taking these medications    Details   nicotine (NICODERM CQ) 21 MG/24HR 24 hr patch Place 1 patch over 24 hours onto the skin daily for 14 days.  Qty: 14 patch, Refills: 0    Associated Diagnoses: Encounter for smoking cessation counseling      senna-docusate (SENOKOT-S/PERICOLACE) 8.6-50 MG tablet  Take 1 tablet by mouth 2 times daily.  Qty: 100 tablet, Refills: 0    Associated Diagnoses: Constipation, unspecified constipation type           CONTINUE these medications which have CHANGED    Details   polyethylene glycol (MIRALAX) 17 GM/Dose powder Take 17 g by mouth daily.    Associated Diagnoses: Constipation, unspecified constipation type           CONTINUE these medications which have NOT CHANGED    Details   acetaminophen (TYLENOL) 500 MG tablet Take 1,000 mg by mouth every 8 hours as needed for mild pain.      albuterol (PROAIR HFA/PROVENTIL HFA/VENTOLIN HFA) 108 (90 Base) MCG/ACT inhaler Inhale 1-2 puffs into the lungs every 4 hours as needed for shortness of breath or wheezing.  Qty: 18 g, Refills: 11    Comments: Pharmacy may dispense brand covered by insurance (Proair, or proventil or ventolin or generic albuterol inhaler)  Associated Diagnoses: Chronic obstructive pulmonary disease with acute exacerbation (H)      ammonium lactate (LAC-HYDRIN) 12 % external lotion Apply topically 2 times daily as needed.      aspirin (ASA) 81 MG EC tablet Take 1 tablet (81 mg) by mouth daily  Qty: 100 tablet, Refills: 3    Associated Diagnoses: Type 2 diabetes mellitus with diabetic neuropathy, without long-term current use of insulin (H)      atorvastatin (LIPITOR) 10 MG tablet Take 1 tablet (10 mg) by mouth daily.  Qty: 90 tablet, Refills: 3    Associated Diagnoses: Type 2 diabetes mellitus with hyperglycemia, without long-term current use of insulin (H)      baclofen (LIORESAL) 10 MG tablet Take 10 mg by mouth 3 times daily      budesonide-formoterol (SYMBICORT) 160-4.5 MCG/ACT Inhaler Inhale 2 puffs into the lungs 2 times daily.  Qty: 10.2 g, Refills: 11    Associated Diagnoses: Chronic obstructive pulmonary disease with acute exacerbation (H)      CALCIUM + VITAMIN D3 600-10 MG-MCG per tablet Take 1 tablet by mouth every morning.      Cholecalciferol (VITAMIN D3) 25 MCG (1000 UT) CAPS Take 4 tablets by mouth  every morning.      cyclobenzaprine (FLEXERIL) 5 MG tablet Take 5 mg by mouth 2 times daily      docusate sodium (COLACE) 100 MG capsule Take 100 mg by mouth as needed for constipation      DULoxetine (CYMBALTA) 60 MG capsule Take 60 mg by mouth every morning.      ferrous sulfate (FEROSUL) 325 (65 Fe) MG tablet Take 1 tablet (325 mg) by mouth daily (with breakfast)  Qty: 90 tablet, Refills: 3    Associated Diagnoses: Iron deficiency anemia      fluticasone (FLONASE) 50 MCG/ACT nasal spray Spray 1 spray into both nostrils daily at 2 pm.      folic acid (FOLVITE) 1 MG tablet Take 1 tablet (1,000 mcg) by mouth daily.  Qty: 90 tablet, Refills: 3    Associated Diagnoses: ALS (amyotrophic lateral sclerosis) (H)      gabapentin (NEURONTIN) 300 MG capsule Take 600 mg by mouth 3 times daily      lidocaine (LIDODERM) 5 % patch Apply one patch to affected area for no more than 12 hours, Remove patch, and maintain 12 hours free of Lidocaine before applying the next patch, Apply a new patch for 12 of every 24 hours as needed to increase efficacy  Qty: 60 patch, Refills: 5    Associated Diagnoses: Chronic neck pain      loperamide (IMODIUM) 2 MG capsule Take 2 mg by mouth as needed for diarrhea      loratadine (CLARITIN) 10 MG tablet Take 10 mg by mouth every morning.      metoprolol tartrate (LOPRESSOR) 25 MG tablet Take 0.5 tablets (12.5 mg) by mouth 2 times daily.  Qty: 180 tablet, Refills: 1    Associated Diagnoses: Essential hypertension      multivitamin (ONE A DAY) per tablet [MULTIVITAMIN (ONE A DAY) PER TABLET] TAKE 1 TABLET BY MOUTH DAILY  Qty: 90 tablet, Refills: 3    Associated Diagnoses: Diabetes mellitus, type 2 (H)      naloxone (NARCAN) 4 MG/0.1ML nasal spray Spray 4 mg into one nostril alternating nostrils as needed for opioid reversal every 2-3 minutes until assistance arrives      naproxen sodium (ANAPROX) 220 MG tablet Take 220 mg by mouth 2 times daily as needed.      omeprazole (PRILOSEC) 20 MG DR capsule  Take 1 capsule (20 mg) by mouth every morning.  Qty: 90 capsule, Refills: 3    Associated Diagnoses: Iron deficiency anemia      oxyCODONE IR (ROXICODONE) 10 MG tablet Take 10 mg by mouth 3 times daily      predniSONE (DELTASONE) 2.5 MG tablet Take 1 tablet by mouth every morning.      riluzole (RILUTEK) 50 MG tablet Take 50 mg by mouth 2 times daily (with meals)      sodium phosphate (FLEET ENEMA) 7-19 GM/118ML rectal enema Place 1 enema rectally daily as needed.      tamsulosin (FLOMAX) 0.4 MG capsule Take 1 capsule (0.4 mg) by mouth daily (with breakfast).  Qty: 90 capsule, Refills: 3    Associated Diagnoses: Benign prostatic hyperplasia with urinary frequency      triamcinolone (KENALOG) 0.1 % external cream Apply topically 2 times daily as needed.      vitamin (B COMPLEX) capsule Take 1 tablet by mouth every morning.           STOP taking these medications       bacitracin 500 UNIT/GM external ointment Comments:   Reason for Stopping:         buprenorphine (BUTRANS) 10 MCG/HR WK patch Comments:   Reason for Stopping:         hydrochlorothiazide (HYDRODIURIL) 50 MG tablet Comments:   Reason for Stopping:                     Rationale for medication changes:      Hydrochlorothiazide held for nl BP and electrolyte abnormalities  Has not been taking butrans patch        Consults     VASCULAR ACCESS ADULT IP CONSULT  WOUND OSTOMY CONTINENCE NURSE  IP CONSULT  CARE MANAGEMENT / SOCIAL WORK IP CONSULT  CARE MANAGEMENT / SOCIAL WORK IP CONSULT  CARE MANAGEMENT / SOCIAL WORK IP CONSULT  PHYSICAL THERAPY ADULT IP CONSULT  OCCUPATIONAL THERAPY ADULT IP CONSULT  PHYSICAL THERAPY ADULT IP CONSULT  OCCUPATIONAL THERAPY ADULT IP CONSULT    Immunizations given this encounter     Most Recent Immunizations   Administered Date(s) Administered    COVID-19 12+ (MODERNA) 09/30/2024    COVID-19 Bivalent 12+ (Pfizer) 07/13/2023    COVID-19 Monovalent 12+ (Pfizer 2022) 04/04/2022    COVID-19 Vaccine (Gege) 11/14/2021    Flu,  Unspecified 08/31/2020    Influenza (H1N1) 01/02/2010    Influenza (High Dose) Trivalent,PF (Fluzone) 09/30/2024    Influenza (IIV3) PF 09/04/2012    Influenza (prior to 2024) 08/31/2020    Influenza Vaccine 65+ (FLUAD) 09/01/2023    Influenza Vaccine >6 months,quad, PF 09/06/2016    Influenza, Split Virus, Trivalent, Pf (Fluzone\Fluarix) 08/31/2020    Pneumo Conj 13-V (2010&after) 01/01/2016    Pneumococcal (PCV 7) 02/27/2003    Pneumococcal 20 valent Conjugate (Prevnar 20) 08/26/2024    Pneumococcal 23 valent 03/18/2014    RSV Vaccine (Arexvy) 08/26/2024    TDAP (Adacel,Boostrix) 02/20/2007    Td (Adult), Adsorbed 06/19/2017    Td,adult,historic,unspecified 1950    Zoster recombinant adjuvanted (SHINGRIX) 11/12/2020    Zoster vaccine, live 09/07/2012           Anticoagulation Information            SIGNIFICANT IMAGING FINDINGS     Results for orders placed or performed during the hospital encounter of 01/12/25   XR Chest Port 1 View    Impression    IMPRESSION: Overlying skull limits evaluation of the lung apices. Unchanged elevation of the left hemidiaphragm. No pleural effusion. No lobar consolidation. The cardiac mediastinal silhouette is within normal limits. Degenerative changes of the left   shoulder. Upper abdominal surgical clips.   CT Chest/Abdomen/Pelvis w Contrast    Impression    IMPRESSION:  1.  Scattered areas of bilateral bronchial wall thickening with associated airspace opacities most prevalent in the right lower lobe consistent with acute infectious etiology. There is an irregular opacity in the lingula measuring 1.4 cm which is likely   inflammatory, though short-term follow-up chest CT may be beneficial to ensure that this resolves.    2.  Moderate coronary artery disease.    3.  Moderate to large amount of colonic stool.    4.  Moderate to severe enlargement of the prostate.         Discharge Orders        Primary Care - Care Coordination Referral      Adult Oncology/Hematology   Referral      Reason for your hospital stay    Acute bronchitis from Corona Virus (not Covid)     Activity    Your activity upon discharge: activity as tolerated     General info for SNF    Length of Stay Estimate: Long Term Care  Condition at Discharge: Stable  Level of care:skilled   Rehabilitation Potential: Poor  Admission H&P remains valid and up-to-date: Yes  Recent Chemotherapy: N/A  Use Nursing Home Standing Orders: Yes     Mantoux instructions    Give two-step Mantoux (PPD) Per Facility Policy Yes     Follow Up and recommended labs and tests    Follow up with Nursing home physician.     Activity - Up with nursing assistance     No CPR- Do NOT Intubate     Physical Therapy Adult Consult    Evaluate and treat as clinically indicated.    Reason:  Strengthening     Occupational Therapy Adult Consult    Evaluate and treat as clinically indicated.    Reason:  ADLs     Droplet Isolation     Fall precautions     Diet    Follow this diet upon discharge: Current Diet:Orders Placed This Encounter      Snacks/Supplements Adult: Ensure Enlive; Between Meals      Regular Diet Adult     Diet    Follow this diet upon discharge: Current Diet:Orders Placed This Encounter      Snacks/Supplements Adult: Ensure Enlive; Between Meals      Regular Diet Adult      Diet     Hospital Follow-up with Existing Primary Care Provider (PCP)    Please see details below            Examination   Physical Exam   Temp:  [97.9  F (36.6  C)-98.9  F (37.2  C)] 98.4  F (36.9  C)  Pulse:  [] 99  Resp:  [18-20] 20  BP: (103-126)/(58-73) 103/58  SpO2:  [92 %-95 %] 92 %  Wt Readings from Last 1 Encounters:   01/17/25 56.5 kg (124 lb 9 oz)       No complaints.  Feels ready for discharge.      Please see EMR for more detailed significant labs, imaging, consultant notes etc.    IVivian MD, personally saw the patient today and spent greater than 30 minutes discharging this patient.    Vivian Arenas MD  [facility]  [place] [facility]    CC:Marco A Avina

## 2025-01-23 NOTE — PROGRESS NOTES
Care Management Follow Up    Length of Stay (days): 10    Expected Discharge Date: 01/24/2025     Concerns to be Addressed: Care progression - discharge planning     Patient plan of care discussed at interdisciplinary rounds: Yes    Anticipated Discharge Disposition:  Therapy rec Transitional care    Anticipated Discharge Services:  Transitional care  Anticipated Discharge DME:  NA    Patient/family educated on Medicare website which has current facility and service quality ratings:  Yes  Education Provided on the Discharge Plan:  Yes per team  Patient/Family in Agreement with the Plan:  Yes    Referrals Placed by CM/SW:  Yes  Private pay costs discussed: Not applicable    Discussed  Partnership in Safe Discharge Planning  document with patient/family: No    Handoff Completed: No, handoff not indicated or clinically appropriate    Additional Information:  1108 rec'd a message from bedside nurse, Sharon, regarding TCU.  Sent message back, no accepting TCU yet.    1114 called and spoke with Nancy at [facility]. She is screening patient for [facility]. She will respond later.    Social Hx:  Assessment: Follow. ALS, lives at [facility]. Indep w/electric scooter.  Last Note: 1/23/25   Plan: Therapy rec TCU, but pt only wants to go back to his [facility], [facility] needs him to be SBA. Accepted by [facility]. PAS completed.  Needs: Medical clearance  Transport: [facility] WC transport between [id number]    Next Steps: RNCM to follow for medical progression, recommendations, and final discharge plan.     Carlyn Lea, RN     1142 Nancy called and can accept patient for today at [facility]. Arrive before 1900.     Paged to update bedside nurseSharon  Called patient's son, Darnell, and he states patient will  need medical transportation.    Met with patient at bedside to discuss the above. Patient agreed and prefer a WC transport.  Discussed out of pocket cost of [facility] medical transportation by wheelchair with patient. Patient agreed with the plan to have transportation arranged by [facility] transport.      Called [facility] to set up a WC ride between [id number].  Paged to update bedside nurseSharon  [place]ed to [place] [facility]    [phone]

## 2025-01-23 NOTE — PLAN OF CARE
Occupational Therapy Discharge Summary    Reason for therapy discharge:    Discharging to TCU.    Progress towards therapy goal(s). See goals on Care Plan in [facility] electronic health record for goal details.  Working towards goals/not met.    Therapy recommendation(s):    Recommend continued OT @ TCU

## 2025-01-23 NOTE — PLAN OF CARE
"Patient alert and oriented this shift.  He continues to have some chronic neuropathy pain and back pain.  Scheduled medications have been helpful.  He requested his albuterol inhaler x 2 this shift, so was given x 1 and then a PRN neb was given and was effective.  He ate a late breakfast this shift. Ready to discharge to TCU this Evening.  Shaorn Hughes RN     Problem: Adult Inpatient Plan of Care  Goal: Plan of Care Review  Description: The Plan of Care Review/Shift note should be completed every shift.  The Outcome Evaluation is a brief statement about your assessment that the patient is improving, declining, or no change.  This information will be displayed automatically on your shift  note.  1/23/2025 1537 by Sharon Hughes RN  Outcome: Adequate for Care Transition  1/23/2025 1536 by Sharon Hughes RN  Outcome: Progressing  Goal: Patient-Specific Goal (Individualized)  Description: You can add care plan individualizations to a care plan. Examples of Individualization might be:  \"Parent requests to be called daily at 9am for status\", \"I have a hard time hearing out of my right ear\", or \"Do not touch me to wake me up as it startles  me\".  1/23/2025 1537 by Sharon Hughes RN  Outcome: Adequate for Care Transition  1/23/2025 1536 by Sharon Hughes RN  Outcome: Progressing  Goal: Absence of Hospital-Acquired Illness or Injury  1/23/2025 1537 by Sharon Hughes RN  Outcome: Adequate for Care Transition  1/23/2025 1536 by Sharon Hughes RN  Outcome: Progressing  Intervention: Identify and Manage Fall Risk  Recent Flowsheet Documentation  Taken 1/23/2025 0950 by Sharon Hughes RN  Safety Promotion/Fall Prevention:   activity supervised   clutter free environment maintained   nonskid shoes/slippers when out of bed  Goal: Optimal Comfort and Wellbeing  Intervention: Provide Person-Centered Care  Recent Flowsheet Documentation  Taken 1/23/2025 0950 by Sharon Hughes, " RN  Trust Relationship/Rapport:   care explained   choices provided   emotional support provided  Goal: Readiness for Transition of Care  1/23/2025 1537 by Sharon Hughes RN  Outcome: Adequate for Care Transition  1/23/2025 1536 by Sharon Hughes RN  Outcome: Progressing     Problem: Pain Acute  Goal: Optimal Pain Control and Function  Intervention: Prevent or Manage Pain  Recent Flowsheet Documentation  Taken 1/23/2025 0950 by Sharon Hughes RN  Bowel Elimination Promotion: adequate fluid intake promoted  Medication Review/Management: medications reviewed     Problem: Pain Acute  Goal: Optimal Pain Control and Function  1/23/2025 1537 by Sharon Hughes RN  Outcome: Adequate for Care Transition  1/23/2025 1536 by Sharon Hughes RN  Outcome: Progressing  Intervention: Prevent or Manage Pain  Recent Flowsheet Documentation  Taken 1/23/2025 0950 by Sharon Hughes RN  Bowel Elimination Promotion: adequate fluid intake promoted  Medication Review/Management: medications reviewed     Problem: Pain Acute  Goal: Optimal Pain Control and Function  1/23/2025 1537 by Sharon Hughes RN  Outcome: Adequate for Care Transition  1/23/2025 1536 by Sharon Hughes RN  Outcome: Progressing  Intervention: Prevent or Manage Pain  Recent Flowsheet Documentation  Taken 1/23/2025 0950 by Sharon Hughes RN  Bowel Elimination Promotion: adequate fluid intake promoted  Medication Review/Management: medications reviewed     Problem: Fall Injury Risk  Goal: Absence of Fall and Fall-Related Injury  Intervention: Identify and Manage Contributors  Recent Flowsheet Documentation  Taken 1/23/2025 0950 by Sharon Hughes RN  Medication Review/Management: medications reviewed  Intervention: Promote Injury-Free Environment  Recent Flowsheet Documentation  Taken 1/23/2025 0950 by Sharon Hughes RN  Safety Promotion/Fall Prevention:   activity supervised   clutter free environment  maintained   nonskid shoes/slippers when out of bed     Problem: Comorbidity Management  Goal: Maintenance of Asthma Control  1/23/2025 1537 by Sharon Hughes RN  Outcome: Adequate for Care Transition  1/23/2025 1536 by Sharon Hughes RN  Outcome: Progressing  Intervention: Maintain Asthma Symptom Control  Recent Flowsheet Documentation  Taken 1/23/2025 0950 by Sharon Hughes RN  Medication Review/Management: medications reviewed  Goal: Blood Pressure in Desired Range  1/23/2025 1537 by Sharon Hughes RN  Outcome: Adequate for Care Transition  1/23/2025 1536 by Sharon Hughes RN  Outcome: Progressing  Intervention: Maintain Blood Pressure Management  Recent Flowsheet Documentation  Taken 1/23/2025 0950 by Sharon Hughes RN  Medication Review/Management: medications reviewed     Problem: Pneumonia  Goal: Fluid Balance  1/23/2025 1537 by Sharon Hughes RN  Outcome: Adequate for Care Transition  1/23/2025 1536 by Sharon Hughes RN  Outcome: Progressing  Goal: Resolution of Infection Signs and Symptoms  1/23/2025 1537 by Sharon Hughes RN  Outcome: Adequate for Care Transition  1/23/2025 1536 by Sharon Hughes RN  Outcome: Progressing  Intervention: Prevent Infection Progression  Recent Flowsheet Documentation  Taken 1/23/2025 0950 by Sharon Hughes RN  Isolation Precautions: droplet precautions maintained  Goal: Effective Oxygenation and Ventilation  1/23/2025 1537 by Sharon Hughes RN  Outcome: Adequate for Care Transition  1/23/2025 1536 by Sharon Hughes RN  Outcome: Progressing  Intervention: Promote Airway Secretion Clearance  Recent Flowsheet Documentation  Taken 1/23/2025 0950 by Sharon Hughes RN  Cough And Deep Breathing: done independently per patient  Activity Management: activity adjusted per tolerance  Intervention: Optimize Oxygenation and Ventilation  Recent Flowsheet Documentation  Taken 1/23/2025 0950 by Saul  Sharon MEDINA RN  Head of Bed (HOB) Positioning: HOB at 20-30 degrees     Problem: Electrolyte Imbalance  Goal: Electrolyte Balance  1/23/2025 1537 by Sharon Hughes RN  Outcome: Adequate for Care Transition  1/23/2025 1536 by Sharon Hughes RN  Outcome: Progressing     Problem: Constipation  Goal: Effective Bowel Elimination  1/23/2025 1537 by Sharon Hughes RN  Outcome: Adequate for Care Transition  1/23/2025 1536 by Sharon Hughes RN  Outcome: Progressing     Problem: Skin Injury Risk Increased  Goal: Skin Health and Integrity  Intervention: Optimize Skin Protection  Recent Flowsheet Documentation  Taken 1/23/2025 0950 by Sharon Hughes RN  Activity Management: activity adjusted per tolerance  Head of Bed (HOB) Positioning: HOB at 20-30 degrees     Problem: Wound  Goal: Optimal Functional Ability  Intervention: Optimize Functional Ability  Recent Flowsheet Documentation  Taken 1/23/2025 0950 by Sharon Hughes RN  Assistive Device Utilized: gait belt  Activity Management: activity adjusted per tolerance  Activity Assistance Provided: assistance, 2 people     Problem: Pain Chronic (Persistent)  Goal: Optimal Pain Control and Function  1/23/2025 1537 by Sharon Hughes RN  Outcome: Adequate for Care Transition  1/23/2025 1536 by Sharon Hughes RN  Outcome: Progressing  Intervention: Manage Persistent Pain  Recent Flowsheet Documentation  Taken 1/23/2025 0950 by Sharon Hughes RN  Bowel Elimination Promotion: adequate fluid intake promoted  Medication Review/Management: medications reviewed   Goal Outcome Evaluation:

## 2025-01-23 NOTE — PLAN OF CARE
Problem: Adult Inpatient Plan of Care  Goal: Plan of Care Review  Description: The Plan of Care Review/Shift note should be completed every shift.  The Outcome Evaluation is a brief statement about your assessment that the patient is improving, declining, or no change.  This information will be displayed automatically on your shift  note.  Outcome: Progressing     Problem: Comorbidity Management  Goal: Maintenance of Asthma Control  Outcome: Progressing  Intervention: Maintain Asthma Symptom Control  Recent Flowsheet Documentation  Taken 1/22/2025 1700 by Shanta Sherman RN  Medication Review/Management: medications reviewed     Problem: Pneumonia  Goal: Fluid Balance  Outcome: Progressing     Problem: Pain Acute  Goal: Optimal Pain Control and Function  Outcome: Progressing  Intervention: Prevent or Manage Pain  Recent Flowsheet Documentation  Taken 1/22/2025 1700 by Shanta Sherman RN  Bowel Elimination Promotion: adequate fluid intake promoted  Medication Review/Management: medications reviewed     Problem: Adult Inpatient Plan of Care  Goal: Absence of Hospital-Acquired Illness or Injury  Intervention: Identify and Manage Fall Risk  Recent Flowsheet Documentation  Taken 1/22/2025 1700 by Shanta Sherman RN  Safety Promotion/Fall Prevention:   activity supervised   clutter free environment maintained   nonskid shoes/slippers when out of bed  Intervention: Prevent Skin Injury  Recent Flowsheet Documentation  Taken 1/22/2025 2115 by Shanta Sherman, RN  Body Position:   turned   supine  Taken 1/22/2025 1915 by Shanta Sherman RN  Body Position: sitting up in bed  Goal: Optimal Comfort and Wellbeing  Intervention: Provide Person-Centered Care  Recent Flowsheet Documentation  Taken 1/22/2025 1700 by Shanta Sherman RN  Trust Relationship/Rapport:   care explained   choices provided   emotional support provided     Problem: Pain Acute  Goal: Optimal Pain Control and Function  Intervention: Prevent or Manage  Pain  Recent Flowsheet Documentation  Taken 1/22/2025 1700 by Shanta Sherman RN  Bowel Elimination Promotion: adequate fluid intake promoted  Medication Review/Management: medications reviewed     Problem: Fall Injury Risk  Goal: Absence of Fall and Fall-Related Injury  Intervention: Identify and Manage Contributors  Recent Flowsheet Documentation  Taken 1/22/2025 1700 by Shanta Sherman RN  Medication Review/Management: medications reviewed  Intervention: Promote Injury-Free Environment  Recent Flowsheet Documentation  Taken 1/22/2025 1700 by Shanta Sherman RN  Safety Promotion/Fall Prevention:   activity supervised   clutter free environment maintained   nonskid shoes/slippers when out of bed     Problem: Comorbidity Management  Goal: Blood Pressure in Desired Range  Intervention: Maintain Blood Pressure Management  Recent Flowsheet Documentation  Taken 1/22/2025 1700 by Shanta Sherman RN  Medication Review/Management: medications reviewed     Problem: Pneumonia  Goal: Resolution of Infection Signs and Symptoms  Intervention: Prevent Infection Progression  Recent Flowsheet Documentation  Taken 1/22/2025 1700 by Shanta Sherman RN  Isolation Precautions: droplet precautions maintained  Goal: Effective Oxygenation and Ventilation  Intervention: Promote Airway Secretion Clearance  Recent Flowsheet Documentation  Taken 1/22/2025 1700 by Shanta Sherman RN  Cough And Deep Breathing: done independently per patient  Activity Management: activity adjusted per tolerance  Intervention: Optimize Oxygenation and Ventilation  Recent Flowsheet Documentation  Taken 1/22/2025 2115 by Shanta Sherman RN  Head of Bed (HOB) Positioning: HOB at 30-45 degrees  Taken 1/22/2025 1915 by Shanta Sherman RN  Head of Bed (HOB) Positioning: HOB at 30-45 degrees  Taken 1/22/2025 1700 by Shanta Sherman RN  Head of Bed (HOB) Positioning: HOB at 20-30 degrees     Problem: Infection  Goal: Absence of Infection Signs and  Symptoms  Intervention: Prevent or Manage Infection  Recent Flowsheet Documentation  Taken 1/22/2025 1700 by Shanta Sherman RN  Isolation Precautions: droplet precautions maintained     Problem: Skin Injury Risk Increased  Goal: Skin Health and Integrity  Intervention: Plan: Nurse Driven Intervention: Moisture Management  Recent Flowsheet Documentation  Taken 1/22/2025 2000 by Shanta Sherman RN  Moisture Interventions: Incontinence pad  Bathing/Skin Care: incontinence care  Taken 1/22/2025 1700 by Shanta Sherman RN  Moisture Interventions: Encourage regular toileting  Intervention: Plan: Nurse Driven Intervention: Friction and Shear  Recent Flowsheet Documentation  Taken 1/22/2025 1700 by Shanta Sherman RN  Friction/Shear Interventions: HOB 30 degrees or less  Intervention: Optimize Skin Protection  Recent Flowsheet Documentation  Taken 1/22/2025 2115 by Shanta Sherman RN  Head of Bed (HOB) Positioning: HOB at 30-45 degrees  Taken 1/22/2025 1915 by Shanta Sherman RN  Head of Bed (HOB) Positioning: HOB at 30-45 degrees  Taken 1/22/2025 1700 by Shanta Sherman RN  Activity Management: activity adjusted per tolerance  Head of Bed (HOB) Positioning: HOB at 20-30 degrees     Problem: Wound  Goal: Optimal Functional Ability  Intervention: Optimize Functional Ability  Recent Flowsheet Documentation  Taken 1/22/2025 1700 by Shanta Sherman RN  Assistive Device Utilized: gait belt  Activity Management: activity adjusted per tolerance  Activity Assistance Provided: assistance, 2 people  Goal: Absence of Infection Signs and Symptoms  Intervention: Prevent or Manage Infection  Recent Flowsheet Documentation  Taken 1/22/2025 1700 by Shanta Sherman RN  Isolation Precautions: droplet precautions maintained  Goal: Skin Health and Integrity  Intervention: Optimize Skin Protection  Recent Flowsheet Documentation  Taken 1/22/2025 2115 by Shanta Sherman RN  Head of Bed (HOB) Positioning: HOB at 30-45 degrees  Taken  1/22/2025 1915 by Shanta Sherman, RN  Head of Bed (HOB) Positioning: HOB at 30-45 degrees  Taken 1/22/2025 1700 by Shanta Sherman, RN  Activity Management: activity adjusted per tolerance  Head of Bed (HOB) Positioning: HOB at 20-30 degrees     Problem: Pain Chronic (Persistent)  Goal: Optimal Pain Control and Function  Intervention: Manage Persistent Pain  Recent Flowsheet Documentation  Taken 1/22/2025 1700 by Shanta Sherman, RN  Bowel Elimination Promotion: adequate fluid intake promoted  Medication Review/Management: medications reviewed   Goal Outcome Evaluation:                      Pt. Had pain and pain meds given as per order. RA. Has no IV access. K+ PROTOCOL in place , Recheck in the am .

## 2025-01-24 NOTE — PLAN OF CARE
Goal Outcome Evaluation:                    Pt. Medically stable for discharge. [facility] transportation wheeled him down .

## 2025-01-30 NOTE — TELEPHONE ENCOUNTER
RECORDS STATUS - ALL OTHER DIAGNOSIS      RECORDS RECEIVED FROM: [facility]   NOTES STATUS DETAILS   OFFICE NOTE from referring provider Epic Dr. Alia Mccann    DISCHARGE SUMMARY from hospital [facility] 1/12/2025 - [facility]    MEDICATION LIST [facility]    LABS     PATHOLOGY REPORTS     ANYTHING RELATED TO DIAGNOSIS Epic 1/23/2025   IMAGING (NEED IMAGES & REPORT)     CT SCANS PACS CT CAP: 1/12/2025, 3/20/2024   XRAYS PACS Xray Chest: 1/12/2025, 3/20/2024

## 2025-02-26 ENCOUNTER — PRE VISIT (OUTPATIENT)
Dept: PULMONOLOGY | Facility: CLINIC | Age: 75
End: 2025-02-26

## 2025-03-13 ENCOUNTER — LAB REQUISITION (OUTPATIENT)
Dept: LAB | Facility: CLINIC | Age: 75
End: 2025-03-13
Payer: COMMERCIAL

## 2025-03-13 DIAGNOSIS — Z72.0 TOBACCO USE: ICD-10-CM

## 2025-03-13 DIAGNOSIS — G89.29 OTHER CHRONIC PAIN: ICD-10-CM

## 2025-03-13 DIAGNOSIS — E87.6 HYPOKALEMIA: ICD-10-CM

## 2025-03-13 DIAGNOSIS — I10 ESSENTIAL (PRIMARY) HYPERTENSION: ICD-10-CM

## 2025-03-13 DIAGNOSIS — R60.0 LOCALIZED EDEMA: ICD-10-CM

## 2025-03-13 DIAGNOSIS — J44.9 CHRONIC OBSTRUCTIVE PULMONARY DISEASE, UNSPECIFIED (H): ICD-10-CM

## 2025-03-17 LAB
ERYTHROCYTE [DISTWIDTH] IN BLOOD BY AUTOMATED COUNT: 16.4 % (ref 10–15)
GLUCOSE SERPL-MCNC: 88 MG/DL (ref 70–99)
HCT VFR BLD AUTO: 40.6 % (ref 40–53)
HGB BLD-MCNC: 13.4 G/DL (ref 13.3–17.7)
MCH RBC QN AUTO: 30.2 PG (ref 26.5–33)
MCHC RBC AUTO-ENTMCNC: 33 G/DL (ref 31.5–36.5)
MCV RBC AUTO: 91 FL (ref 78–100)
PLATELET # BLD AUTO: 305 10E3/UL (ref 150–450)
RBC # BLD AUTO: 4.44 10E6/UL (ref 4.4–5.9)
WBC # BLD AUTO: 6.2 10E3/UL (ref 4–11)

## 2025-03-17 PROCEDURE — 36415 COLL VENOUS BLD VENIPUNCTURE: CPT | Mod: ORL | Performed by: NURSE PRACTITIONER

## 2025-03-17 PROCEDURE — 80048 BASIC METABOLIC PNL TOTAL CA: CPT | Mod: ORL | Performed by: NURSE PRACTITIONER

## 2025-03-17 PROCEDURE — 85027 COMPLETE CBC AUTOMATED: CPT | Mod: ORL | Performed by: NURSE PRACTITIONER

## 2025-03-17 PROCEDURE — P9603 ONE-WAY ALLOW PRORATED MILES: HCPCS | Mod: ORL | Performed by: NURSE PRACTITIONER

## 2025-03-18 LAB
ANION GAP SERPL CALCULATED.3IONS-SCNC: 15 MMOL/L (ref 7–15)
BUN SERPL-MCNC: 7.2 MG/DL (ref 8–23)
CALCIUM SERPL-MCNC: 9.2 MG/DL (ref 8.8–10.4)
CHLORIDE SERPL-SCNC: 95 MMOL/L (ref 98–107)
CREAT SERPL-MCNC: 0.52 MG/DL (ref 0.67–1.17)
EGFRCR SERPLBLD CKD-EPI 2021: >90 ML/MIN/1.73M2
HCO3 SERPL-SCNC: 24 MMOL/L (ref 22–29)
POTASSIUM SERPL-SCNC: 4.3 MMOL/L (ref 3.4–5.3)
SODIUM SERPL-SCNC: 134 MMOL/L (ref 135–145)

## 2025-04-24 ENCOUNTER — TELEPHONE (OUTPATIENT)
Dept: PULMONOLOGY | Facility: CLINIC | Age: 75
End: 2025-04-24

## 2025-06-05 ENCOUNTER — TRANSCRIBE ORDERS (OUTPATIENT)
Dept: OTHER | Age: 75
End: 2025-06-05

## 2025-06-05 DIAGNOSIS — R91.8 PULMONARY NODULES: Primary | ICD-10-CM

## 2025-07-09 NOTE — PROGRESS NOTES
Patient has no formal advance directives  mPOA is wife Za Corona  Discussed risks/benefits/alternatives of CPR, resuscitation  Patient confirms DNR/DNI  Encourage formal advance directives, information given     ACP time <16 minutes   Akosua Marquez MD     [facility] Care Coordination     CHW, NAHEED for member  for the \[facility]\"" [facility] located at [place]. [address] [place] [address]. [phone].    told member to call if he needed assistance in scheduling appt.      TIFFANY Pearl  [facility]  [phone]

## 2025-07-10 ENCOUNTER — DOCUMENTATION ONLY (OUTPATIENT)
Dept: PULMONOLOGY | Facility: CLINIC | Age: 75
End: 2025-07-10
Payer: COMMERCIAL

## 2025-07-10 PROBLEM — R97.20 ELEVATED PSA: Status: ACTIVE | Noted: 2022-12-08

## 2025-07-10 PROBLEM — L89.309 DECUBITUS ULCER OF BUTTOCK: Status: ACTIVE | Noted: 2019-02-13

## 2025-07-10 PROBLEM — E87.6 HYPOKALEMIA: Status: ACTIVE | Noted: 2025-03-13

## 2025-07-10 PROBLEM — K21.9 GERD (GASTROESOPHAGEAL REFLUX DISEASE): Status: ACTIVE | Noted: 2023-04-06

## 2025-07-10 PROBLEM — R60.0 LOWER EXTREMITY EDEMA: Status: ACTIVE | Noted: 2025-05-15

## 2025-07-10 PROBLEM — G12.29: Status: ACTIVE | Noted: 2017-11-15

## 2025-07-10 PROBLEM — J45.901 PERSISTENT ASTHMA WITH ACUTE EXACERBATION: Status: ACTIVE | Noted: 2017-12-20

## 2025-07-10 PROBLEM — K59.01 CONSTIPATION BY DELAYED COLONIC TRANSIT: Status: ACTIVE | Noted: 2025-05-15

## 2025-07-10 PROBLEM — D64.9 ANEMIA: Status: ACTIVE | Noted: 2018-01-17

## 2025-07-10 PROBLEM — M62.838 MUSCLE SPASM: Status: ACTIVE | Noted: 2017-11-15

## 2025-07-10 PROBLEM — G82.20 PARAPARESIS (H): Status: ACTIVE | Noted: 2025-06-12

## 2025-07-10 PROBLEM — G89.29 CHRONIC PAIN: Status: ACTIVE | Noted: 2017-06-13

## 2025-07-10 PROBLEM — S82.402A FRACTURE TIBIA/FIBULA, LEFT, CLOSED, INITIAL ENCOUNTER: Status: ACTIVE | Noted: 2019-06-06

## 2025-07-10 PROBLEM — L89.152 PRESSURE INJURY OF SACRAL REGION, STAGE 2 (H): Status: ACTIVE | Noted: 2025-01-09

## 2025-07-10 PROBLEM — R91.8 PULMONARY NODULES: Status: ACTIVE | Noted: 2025-07-10

## 2025-07-10 PROBLEM — E78.5 HYPERLIPEMIA: Status: ACTIVE | Noted: 2020-05-13

## 2025-07-10 PROBLEM — S82.202A FRACTURE TIBIA/FIBULA, LEFT, CLOSED, INITIAL ENCOUNTER: Status: ACTIVE | Noted: 2019-06-06

## 2025-07-10 PROBLEM — J30.2 SEASONAL ALLERGIES: Status: ACTIVE | Noted: 2024-07-11

## 2025-07-10 PROBLEM — M53.3 PAIN IN THE COCCYX: Status: ACTIVE | Noted: 2023-12-21

## 2025-07-10 RX ORDER — IPRATROPIUM BROMIDE AND ALBUTEROL SULFATE 2.5; .5 MG/3ML; MG/3ML
SOLUTION RESPIRATORY (INHALATION)
COMMUNITY

## 2025-07-10 RX ORDER — BUPRENORPHINE 10 UG/H
PATCH TRANSDERMAL
COMMUNITY
Start: 2025-02-24

## 2025-07-10 RX ORDER — HYDROCHLOROTHIAZIDE 25 MG/1
TABLET ORAL
COMMUNITY
Start: 2025-02-13 | End: 2025-07-12

## 2025-07-10 NOTE — NURSING NOTE
Pre-visit planning and chart review completed.     NEW patient appointment:    7/17 with Dr. Ida Andrade  7/16 CT chest wo contrast    - On Aspirin  - Current smoker.    CE updated. Medications, allergies, problem list, and immunizations reconciled.

## (undated) DEVICE — SOL WATER IRRIG 1000ML BOTTLE 2F7114

## (undated) DEVICE — TUBING SUCTION MEDI-VAC 1/4"X20' N620A - HE